# Patient Record
Sex: MALE | Race: WHITE | Employment: UNEMPLOYED | URBAN - METROPOLITAN AREA
[De-identification: names, ages, dates, MRNs, and addresses within clinical notes are randomized per-mention and may not be internally consistent; named-entity substitution may affect disease eponyms.]

---

## 2019-12-25 ENCOUNTER — APPOINTMENT (OUTPATIENT)
Dept: GENERAL RADIOLOGY | Age: 59
DRG: 030 | End: 2019-12-25
Payer: MEDICAID

## 2019-12-25 ENCOUNTER — APPOINTMENT (OUTPATIENT)
Dept: CT IMAGING | Age: 59
DRG: 030 | End: 2019-12-25
Payer: MEDICAID

## 2019-12-25 ENCOUNTER — HOSPITAL ENCOUNTER (INPATIENT)
Age: 59
LOS: 33 days | Discharge: NURSING, REHAB OR LONG-TERM CARE | DRG: 030 | End: 2020-01-27
Attending: EMERGENCY MEDICINE | Admitting: PSYCHIATRY & NEUROLOGY
Payer: MEDICAID

## 2019-12-25 PROBLEM — I60.9 SAH (SUBARACHNOID HEMORRHAGE) (HCC): Status: ACTIVE | Noted: 2019-12-25

## 2019-12-25 LAB
ABO/RH: NORMAL
ACT TEG: 121 SEC (ref 86–118)
ALBUMIN SERPL-MCNC: 4.2 G/DL (ref 3.5–5.2)
ALBUMIN/GLOBULIN RATIO: 1.5 (ref 1–2.5)
ALLEN TEST: ABNORMAL
ALP BLD-CCNC: 95 U/L (ref 40–129)
ALT SERPL-CCNC: 23 U/L (ref 5–41)
AMYLASE: 27 U/L (ref 28–100)
ANGLE, RAPID TEG: 77.2 DEG (ref 64–80)
ANION GAP SERPL CALCULATED.3IONS-SCNC: 15 MMOL/L (ref 9–17)
ANTIBODY SCREEN: NEGATIVE
ARM BAND NUMBER: NORMAL
AST SERPL-CCNC: 25 U/L
BILIRUB SERPL-MCNC: 1.27 MG/DL (ref 0.3–1.2)
BILIRUBIN DIRECT: 0.45 MG/DL
BILIRUBIN, INDIRECT: 0.82 MG/DL (ref 0–1)
BLOOD BANK SPECIMEN: ABNORMAL
BUN BLDV-MCNC: 7 MG/DL (ref 6–20)
CARBOXYHEMOGLOBIN: 3.3 % (ref 0–5)
CHLORIDE BLD-SCNC: 90 MMOL/L (ref 98–107)
CO2: 30 MMOL/L (ref 20–31)
CREAT SERPL-MCNC: 0.4 MG/DL (ref 0.7–1.2)
EPL-TEG: 0.6 % (ref 0–15)
ETHANOL PERCENT: <0.01 %
ETHANOL: <10 MG/DL
EXPIRATION DATE: NORMAL
FIO2: ABNORMAL
GFR AFRICAN AMERICAN: >60 ML/MIN
GFR NON-AFRICAN AMERICAN: >60 ML/MIN
GFR SERPL CREATININE-BSD FRML MDRD: ABNORMAL ML/MIN/{1.73_M2}
GFR SERPL CREATININE-BSD FRML MDRD: ABNORMAL ML/MIN/{1.73_M2}
GLOBULIN: ABNORMAL G/DL (ref 1.5–3.8)
GLUCOSE BLD-MCNC: 181 MG/DL (ref 70–99)
HCG QUALITATIVE: ABNORMAL
HCO3 VENOUS: 34.6 MMOL/L (ref 24–30)
HCT VFR BLD CALC: 39.5 % (ref 40.7–50.3)
HEMOGLOBIN: 11.6 G/DL (ref 13–17)
HEPARIN THERAPY: ABNORMAL
INR BLD: 1
KINETICS RAPID TEG: 0.9 MIN (ref 1–2)
LACTIC ACID, WHOLE BLOOD: 1.7 MMOL/L (ref 0.7–2.1)
LIPASE: 11 U/L (ref 13–60)
LY30 (LYSIS) TEG: 0.6 % (ref 0–8)
MA(MAX CLOT) RAPID TEG: 70.9 MM (ref 52–71)
MCH RBC QN AUTO: 22.1 PG (ref 25.2–33.5)
MCHC RBC AUTO-ENTMCNC: 29.4 G/DL (ref 28.4–34.8)
MCV RBC AUTO: 75.1 FL (ref 82.6–102.9)
METHEMOGLOBIN: ABNORMAL % (ref 0–1.5)
MODE: ABNORMAL
MYOGLOBIN: <21 NG/ML (ref 28–72)
NEGATIVE BASE EXCESS, VEN: ABNORMAL MMOL/L (ref 0–2)
NOTIFICATION TIME: ABNORMAL
NOTIFICATION: ABNORMAL
NRBC AUTOMATED: 0 PER 100 WBC
O2 DEVICE/FLOW/%: ABNORMAL
O2 SAT, VEN: 57.2 % (ref 60–85)
OXYHEMOGLOBIN: ABNORMAL % (ref 95–98)
PARTIAL THROMBOPLASTIN TIME: 23.9 SEC (ref 20.5–30.5)
PATIENT TEMP: 37
PCO2, VEN, TEMP ADJ: ABNORMAL MMHG (ref 39–55)
PCO2, VEN: 46.1 (ref 39–55)
PDW BLD-RTO: 20.1 % (ref 11.8–14.4)
PEEP/CPAP: ABNORMAL
PH VENOUS: 7.49 (ref 7.32–7.42)
PH, VEN, TEMP ADJ: ABNORMAL (ref 7.32–7.42)
PLATELET # BLD: 259 K/UL (ref 138–453)
PMV BLD AUTO: 8.8 FL (ref 8.1–13.5)
PO2, VEN, TEMP ADJ: ABNORMAL MMHG (ref 30–50)
PO2, VEN: 31.5 (ref 30–50)
POSITIVE BASE EXCESS, VEN: 10.1 MMOL/L (ref 0–2)
POTASSIUM SERPL-SCNC: 3.2 MMOL/L (ref 3.7–5.3)
PROTHROMBIN TIME: 10.5 SEC (ref 9–12)
PSV: ABNORMAL
PT. POSITION: ABNORMAL
RBC # BLD: 5.26 M/UL (ref 4.21–5.77)
REACTION TIME RAPID TEG: 0.8 MIN (ref 0–1)
RESPIRATORY RATE: ABNORMAL
SAMPLE SITE: ABNORMAL
SET RATE: ABNORMAL
SODIUM BLD-SCNC: 135 MMOL/L (ref 135–144)
TEG COMMENT: ABNORMAL
TEXT FOR RESPIRATORY: ABNORMAL
TOTAL CK: 33 U/L (ref 39–308)
TOTAL HB: ABNORMAL G/DL (ref 12–16)
TOTAL PROTEIN: 7 G/DL (ref 6.4–8.3)
TOTAL RATE: ABNORMAL
TROPONIN INTERP: ABNORMAL
TROPONIN T: ABNORMAL NG/ML
TROPONIN, HIGH SENSITIVITY: 27 NG/L (ref 0–22)
VT: ABNORMAL
WBC # BLD: 6.4 K/UL (ref 3.5–11.3)

## 2019-12-25 PROCEDURE — 83690 ASSAY OF LIPASE: CPT

## 2019-12-25 PROCEDURE — 73090 X-RAY EXAM OF FOREARM: CPT

## 2019-12-25 PROCEDURE — 85210 CLOT FACTOR II PROTHROM SPEC: CPT

## 2019-12-25 PROCEDURE — 73130 X-RAY EXAM OF HAND: CPT

## 2019-12-25 PROCEDURE — 82805 BLOOD GASES W/O2 SATURATION: CPT

## 2019-12-25 PROCEDURE — 73110 X-RAY EXAM OF WRIST: CPT

## 2019-12-25 PROCEDURE — G0480 DRUG TEST DEF 1-7 CLASSES: HCPCS

## 2019-12-25 PROCEDURE — 83605 ASSAY OF LACTIC ACID: CPT

## 2019-12-25 PROCEDURE — 82150 ASSAY OF AMYLASE: CPT

## 2019-12-25 PROCEDURE — 85730 THROMBOPLASTIN TIME PARTIAL: CPT

## 2019-12-25 PROCEDURE — 72128 CT CHEST SPINE W/O DYE: CPT

## 2019-12-25 PROCEDURE — 82947 ASSAY GLUCOSE BLOOD QUANT: CPT

## 2019-12-25 PROCEDURE — 2500000003 HC RX 250 WO HCPCS

## 2019-12-25 PROCEDURE — 70450 CT HEAD/BRAIN W/O DYE: CPT

## 2019-12-25 PROCEDURE — 73610 X-RAY EXAM OF ANKLE: CPT

## 2019-12-25 PROCEDURE — 85610 PROTHROMBIN TIME: CPT

## 2019-12-25 PROCEDURE — 82565 ASSAY OF CREATININE: CPT

## 2019-12-25 PROCEDURE — 86900 BLOOD TYPING SEROLOGIC ABO: CPT

## 2019-12-25 PROCEDURE — 6360000004 HC RX CONTRAST MEDICATION: Performed by: SURGERY

## 2019-12-25 PROCEDURE — 6360000002 HC RX W HCPCS

## 2019-12-25 PROCEDURE — 84484 ASSAY OF TROPONIN QUANT: CPT

## 2019-12-25 PROCEDURE — 6360000002 HC RX W HCPCS: Performed by: STUDENT IN AN ORGANIZED HEALTH CARE EDUCATION/TRAINING PROGRAM

## 2019-12-25 PROCEDURE — 99222 1ST HOSP IP/OBS MODERATE 55: CPT | Performed by: NEUROLOGICAL SURGERY

## 2019-12-25 PROCEDURE — 74177 CT ABD & PELVIS W/CONTRAST: CPT

## 2019-12-25 PROCEDURE — 73562 X-RAY EXAM OF KNEE 3: CPT

## 2019-12-25 PROCEDURE — 99223 1ST HOSP IP/OBS HIGH 75: CPT | Performed by: PSYCHIATRY & NEUROLOGY

## 2019-12-25 PROCEDURE — 85390 FIBRINOLYSINS SCREEN I&R: CPT

## 2019-12-25 PROCEDURE — 72125 CT NECK SPINE W/O DYE: CPT

## 2019-12-25 PROCEDURE — 84703 CHORIONIC GONADOTROPIN ASSAY: CPT

## 2019-12-25 PROCEDURE — 72131 CT LUMBAR SPINE W/O DYE: CPT

## 2019-12-25 PROCEDURE — 84520 ASSAY OF UREA NITROGEN: CPT

## 2019-12-25 PROCEDURE — 82550 ASSAY OF CK (CPK): CPT

## 2019-12-25 PROCEDURE — 73502 X-RAY EXAM HIP UNI 2-3 VIEWS: CPT

## 2019-12-25 PROCEDURE — 93005 ELECTROCARDIOGRAM TRACING: CPT | Performed by: STUDENT IN AN ORGANIZED HEALTH CARE EDUCATION/TRAINING PROGRAM

## 2019-12-25 PROCEDURE — 70498 CT ANGIOGRAPHY NECK: CPT

## 2019-12-25 PROCEDURE — 80076 HEPATIC FUNCTION PANEL: CPT

## 2019-12-25 PROCEDURE — 2000000003 HC NEURO ICU R&B

## 2019-12-25 PROCEDURE — 99291 CRITICAL CARE FIRST HOUR: CPT

## 2019-12-25 PROCEDURE — 85027 COMPLETE CBC AUTOMATED: CPT

## 2019-12-25 PROCEDURE — 86901 BLOOD TYPING SEROLOGIC RH(D): CPT

## 2019-12-25 PROCEDURE — 80307 DRUG TEST PRSMV CHEM ANLYZR: CPT

## 2019-12-25 PROCEDURE — 82306 VITAMIN D 25 HYDROXY: CPT

## 2019-12-25 PROCEDURE — 83874 ASSAY OF MYOGLOBIN: CPT

## 2019-12-25 PROCEDURE — 86850 RBC ANTIBODY SCREEN: CPT

## 2019-12-25 PROCEDURE — 80051 ELECTROLYTE PANEL: CPT

## 2019-12-25 RX ORDER — ACETAMINOPHEN 325 MG/1
650 TABLET ORAL EVERY 4 HOURS PRN
Status: DISCONTINUED | OUTPATIENT
Start: 2019-12-25 | End: 2019-12-26 | Stop reason: SDUPTHER

## 2019-12-25 RX ORDER — PROCHLORPERAZINE EDISYLATE 5 MG/ML
10 INJECTION INTRAMUSCULAR; INTRAVENOUS ONCE
Status: DISCONTINUED | OUTPATIENT
Start: 2019-12-25 | End: 2019-12-30

## 2019-12-25 RX ORDER — LEVETIRACETAM 5 MG/ML
500 INJECTION INTRAVASCULAR EVERY 12 HOURS
Status: DISCONTINUED | OUTPATIENT
Start: 2019-12-26 | End: 2019-12-25 | Stop reason: DRUGHIGH

## 2019-12-25 RX ORDER — LIDOCAINE HYDROCHLORIDE 10 MG/ML
20 INJECTION, SOLUTION INFILTRATION; PERINEURAL ONCE
Status: DISCONTINUED | OUTPATIENT
Start: 2019-12-25 | End: 2019-12-30

## 2019-12-25 RX ORDER — ONDANSETRON 2 MG/ML
4 INJECTION INTRAMUSCULAR; INTRAVENOUS ONCE
Status: DISCONTINUED | OUTPATIENT
Start: 2019-12-25 | End: 2019-12-30

## 2019-12-25 RX ORDER — ONDANSETRON 2 MG/ML
INJECTION INTRAMUSCULAR; INTRAVENOUS
Status: DISCONTINUED
Start: 2019-12-25 | End: 2019-12-26

## 2019-12-25 RX ORDER — LEVETIRACETAM 10 MG/ML
1000 INJECTION INTRAVASCULAR EVERY 12 HOURS
Status: DISCONTINUED | OUTPATIENT
Start: 2019-12-26 | End: 2019-12-26 | Stop reason: SDUPTHER

## 2019-12-25 RX ORDER — LIDOCAINE HYDROCHLORIDE 10 MG/ML
INJECTION, SOLUTION INFILTRATION; PERINEURAL
Status: DISPENSED
Start: 2019-12-25 | End: 2019-12-26

## 2019-12-25 RX ORDER — SODIUM CHLORIDE 0.9 % (FLUSH) 0.9 %
10 SYRINGE (ML) INJECTION EVERY 12 HOURS SCHEDULED
Status: DISCONTINUED | OUTPATIENT
Start: 2019-12-25 | End: 2020-01-27 | Stop reason: HOSPADM

## 2019-12-25 RX ORDER — ONDANSETRON 2 MG/ML
4 INJECTION INTRAMUSCULAR; INTRAVENOUS EVERY 6 HOURS PRN
Status: DISCONTINUED | OUTPATIENT
Start: 2019-12-25 | End: 2020-01-01

## 2019-12-25 RX ORDER — LORAZEPAM 2 MG/ML
1 INJECTION INTRAMUSCULAR ONCE
Status: COMPLETED | OUTPATIENT
Start: 2019-12-25 | End: 2019-12-25

## 2019-12-25 RX ORDER — SODIUM CHLORIDE 0.9 % (FLUSH) 0.9 %
10 SYRINGE (ML) INJECTION PRN
Status: DISCONTINUED | OUTPATIENT
Start: 2019-12-25 | End: 2020-01-27 | Stop reason: HOSPADM

## 2019-12-25 RX ORDER — NIMODIPINE 30 MG/1
60 CAPSULE, LIQUID FILLED ORAL
Status: DISCONTINUED | OUTPATIENT
Start: 2019-12-26 | End: 2019-12-26

## 2019-12-25 RX ORDER — MAGNESIUM SULFATE 1 G/100ML
1 INJECTION INTRAVENOUS PRN
Status: DISCONTINUED | OUTPATIENT
Start: 2019-12-25 | End: 2019-12-26

## 2019-12-25 RX ORDER — PROMETHAZINE HYDROCHLORIDE 25 MG/ML
12.5 INJECTION, SOLUTION INTRAMUSCULAR; INTRAVENOUS ONCE
Status: DISCONTINUED | OUTPATIENT
Start: 2019-12-25 | End: 2019-12-30

## 2019-12-25 RX ORDER — LEVETIRACETAM 10 MG/ML
1000 INJECTION INTRAVASCULAR ONCE
Status: DISCONTINUED | OUTPATIENT
Start: 2019-12-25 | End: 2019-12-25 | Stop reason: DRUGHIGH

## 2019-12-25 RX ORDER — ROSUVASTATIN CALCIUM 20 MG/1
40 TABLET, COATED ORAL NIGHTLY
Status: DISCONTINUED | OUTPATIENT
Start: 2019-12-25 | End: 2019-12-28

## 2019-12-25 RX ORDER — FENTANYL CITRATE 50 UG/ML
INJECTION, SOLUTION INTRAMUSCULAR; INTRAVENOUS
Status: DISCONTINUED
Start: 2019-12-25 | End: 2019-12-26

## 2019-12-25 RX ADMIN — IOHEXOL 150 ML: 350 INJECTION, SOLUTION INTRAVENOUS at 21:15

## 2019-12-25 RX ADMIN — LORAZEPAM 1 MG: 2 INJECTION INTRAMUSCULAR at 23:50

## 2019-12-25 SDOH — HEALTH STABILITY: MENTAL HEALTH: HOW OFTEN DO YOU HAVE A DRINK CONTAINING ALCOHOL?: 4 OR MORE TIMES A WEEK

## 2019-12-25 ASSESSMENT — ENCOUNTER SYMPTOMS
NAUSEA: 0
CHEST TIGHTNESS: 0
VOMITING: 0
BACK PAIN: 0
COUGH: 0
SHORTNESS OF BREATH: 0
ABDOMINAL PAIN: 0

## 2019-12-26 ENCOUNTER — APPOINTMENT (OUTPATIENT)
Dept: CT IMAGING | Age: 59
DRG: 030 | End: 2019-12-26
Payer: MEDICAID

## 2019-12-26 ENCOUNTER — ANESTHESIA (OUTPATIENT)
Dept: INTERVENTIONAL RADIOLOGY/VASCULAR | Age: 59
DRG: 030 | End: 2019-12-26
Payer: MEDICAID

## 2019-12-26 ENCOUNTER — APPOINTMENT (OUTPATIENT)
Dept: INTERVENTIONAL RADIOLOGY/VASCULAR | Age: 59
DRG: 030 | End: 2019-12-26
Payer: MEDICAID

## 2019-12-26 ENCOUNTER — ANESTHESIA EVENT (OUTPATIENT)
Dept: INTERVENTIONAL RADIOLOGY/VASCULAR | Age: 59
DRG: 030 | End: 2019-12-26
Payer: MEDICAID

## 2019-12-26 VITALS — TEMPERATURE: 92.8 F | DIASTOLIC BLOOD PRESSURE: 97 MMHG | OXYGEN SATURATION: 97 % | SYSTOLIC BLOOD PRESSURE: 145 MMHG

## 2019-12-26 LAB
-: NORMAL
ABSOLUTE EOS #: 0.13 K/UL (ref 0–0.44)
ABSOLUTE IMMATURE GRANULOCYTE: 0 K/UL (ref 0–0.3)
ABSOLUTE LYMPH #: 0.92 K/UL (ref 1.1–3.7)
ABSOLUTE MONO #: 0.53 K/UL (ref 0.1–1.2)
AMORPHOUS: NORMAL
AMPHETAMINE SCREEN URINE: NEGATIVE
ANION GAP SERPL CALCULATED.3IONS-SCNC: 11 MMOL/L (ref 9–17)
BACTERIA: NORMAL
BARBITURATE SCREEN URINE: NEGATIVE
BASOPHILS # BLD: 0 % (ref 0–2)
BASOPHILS ABSOLUTE: 0 K/UL (ref 0–0.2)
BENZODIAZEPINE SCREEN, URINE: POSITIVE
BILIRUBIN URINE: NEGATIVE
BUN BLDV-MCNC: 7 MG/DL (ref 6–20)
BUN/CREAT BLD: ABNORMAL (ref 9–20)
BUPRENORPHINE URINE: ABNORMAL
CALCIUM SERPL-MCNC: 8.3 MG/DL (ref 8.6–10.4)
CANNABINOID SCREEN URINE: NEGATIVE
CASTS UA: NORMAL /LPF (ref 0–2)
CHLORIDE BLD-SCNC: 96 MMOL/L (ref 98–107)
CO2: 29 MMOL/L (ref 20–31)
COCAINE METABOLITE, URINE: NEGATIVE
COLOR: YELLOW
COMMENT UA: ABNORMAL
CREAT SERPL-MCNC: 0.3 MG/DL (ref 0.7–1.2)
CRYSTALS, UA: NORMAL /HPF
DIFFERENTIAL TYPE: ABNORMAL
EKG ATRIAL RATE: 110 BPM
EKG Q-T INTERVAL: 434 MS
EKG QRS DURATION: 174 MS
EKG QTC CALCULATION (BAZETT): 528 MS
EKG R AXIS: -88 DEGREES
EKG T AXIS: 43 DEGREES
EKG VENTRICULAR RATE: 89 BPM
EOSINOPHILS RELATIVE PERCENT: 2 % (ref 1–4)
EPITHELIAL CELLS UA: NORMAL /HPF (ref 0–5)
FOLATE: >20 NG/ML
GFR AFRICAN AMERICAN: >60 ML/MIN
GFR NON-AFRICAN AMERICAN: >60 ML/MIN
GFR SERPL CREATININE-BSD FRML MDRD: ABNORMAL ML/MIN/{1.73_M2}
GFR SERPL CREATININE-BSD FRML MDRD: ABNORMAL ML/MIN/{1.73_M2}
GLUCOSE BLD-MCNC: 108 MG/DL (ref 75–110)
GLUCOSE BLD-MCNC: 122 MG/DL (ref 75–110)
GLUCOSE BLD-MCNC: 133 MG/DL (ref 70–99)
GLUCOSE BLD-MCNC: 152 MG/DL (ref 75–110)
GLUCOSE URINE: NEGATIVE
HCT VFR BLD CALC: 36.7 % (ref 40.7–50.3)
HEMOGLOBIN: 10.5 G/DL (ref 13–17)
IMMATURE GRANULOCYTES: 0 %
KETONES, URINE: ABNORMAL
LEUKOCYTE ESTERASE, URINE: NEGATIVE
LYMPHOCYTES # BLD: 14 % (ref 24–43)
MCH RBC QN AUTO: 22.5 PG (ref 25.2–33.5)
MCHC RBC AUTO-ENTMCNC: 28.6 G/DL (ref 28.4–34.8)
MCV RBC AUTO: 78.8 FL (ref 82.6–102.9)
MDMA URINE: ABNORMAL
METHADONE SCREEN, URINE: NEGATIVE
METHAMPHETAMINE, URINE: ABNORMAL
MONOCYTES # BLD: 8 % (ref 3–12)
MORPHOLOGY: ABNORMAL
MRSA, DNA, NASAL: NORMAL
MUCUS: NORMAL
NITRITE, URINE: NEGATIVE
NRBC AUTOMATED: 0 PER 100 WBC
OPIATES, URINE: POSITIVE
OTHER OBSERVATIONS UA: NORMAL
OXYCODONE SCREEN URINE: NEGATIVE
PDW BLD-RTO: 20.1 % (ref 11.8–14.4)
PH UA: 7 (ref 5–8)
PHENCYCLIDINE, URINE: NEGATIVE
PLATELET # BLD: 226 K/UL (ref 138–453)
PLATELET ESTIMATE: ABNORMAL
PMV BLD AUTO: 9.7 FL (ref 8.1–13.5)
POTASSIUM SERPL-SCNC: 3.1 MMOL/L (ref 3.7–5.3)
PROPOXYPHENE, URINE: ABNORMAL
PROTEIN UA: ABNORMAL
RBC # BLD: 4.66 M/UL (ref 4.21–5.77)
RBC # BLD: ABNORMAL 10*6/UL
RBC UA: NORMAL /HPF (ref 0–2)
RENAL EPITHELIAL, UA: NORMAL /HPF
SEG NEUTROPHILS: 76 % (ref 36–65)
SEGMENTED NEUTROPHILS ABSOLUTE COUNT: 5.02 K/UL (ref 1.5–8.1)
SODIUM BLD-SCNC: 136 MMOL/L (ref 135–144)
SPECIFIC GRAVITY UA: 1.02 (ref 1–1.03)
SPECIMEN DESCRIPTION: NORMAL
TEST INFORMATION: ABNORMAL
TRICHOMONAS: NORMAL
TRICYCLIC ANTIDEPRESSANTS, UR: ABNORMAL
TROPONIN INTERP: ABNORMAL
TROPONIN T: ABNORMAL NG/ML
TROPONIN, HIGH SENSITIVITY: 26 NG/L (ref 0–22)
TURBIDITY: CLEAR
URINE HGB: NEGATIVE
UROBILINOGEN, URINE: NORMAL
VITAMIN B-12: 676 PG/ML (ref 232–1245)
VITAMIN D 25-HYDROXY: 13.4 NG/ML (ref 30–100)
WBC # BLD: 6.6 K/UL (ref 3.5–11.3)
WBC # BLD: ABNORMAL 10*3/UL
WBC UA: NORMAL /HPF (ref 0–5)
YEAST: NORMAL

## 2019-12-26 PROCEDURE — 2580000003 HC RX 258: Performed by: STUDENT IN AN ORGANIZED HEALTH CARE EDUCATION/TRAINING PROGRAM

## 2019-12-26 PROCEDURE — 84484 ASSAY OF TROPONIN QUANT: CPT

## 2019-12-26 PROCEDURE — 70450 CT HEAD/BRAIN W/O DYE: CPT

## 2019-12-26 PROCEDURE — 0PSQXZZ REPOSITION LEFT METACARPAL, EXTERNAL APPROACH: ICD-10-PCS | Performed by: ORTHOPAEDIC SURGERY

## 2019-12-26 PROCEDURE — 2580000003 HC RX 258: Performed by: EMERGENCY MEDICINE

## 2019-12-26 PROCEDURE — B3151ZZ FLUOROSCOPY OF BILATERAL COMMON CAROTID ARTERIES USING LOW OSMOLAR CONTRAST: ICD-10-PCS | Performed by: PSYCHIATRY & NEUROLOGY

## 2019-12-26 PROCEDURE — 99152 MOD SED SAME PHYS/QHP 5/>YRS: CPT | Performed by: PSYCHIATRY & NEUROLOGY

## 2019-12-26 PROCEDURE — 80307 DRUG TEST PRSMV CHEM ANLYZR: CPT

## 2019-12-26 PROCEDURE — 36226 PLACE CATH VERTEBRAL ART: CPT | Performed by: PSYCHIATRY & NEUROLOGY

## 2019-12-26 PROCEDURE — 6360000002 HC RX W HCPCS: Performed by: NURSE ANESTHETIST, CERTIFIED REGISTERED

## 2019-12-26 PROCEDURE — 99291 CRITICAL CARE FIRST HOUR: CPT | Performed by: PSYCHIATRY & NEUROLOGY

## 2019-12-26 PROCEDURE — 2580000003 HC RX 258: Performed by: NURSE ANESTHETIST, CERTIFIED REGISTERED

## 2019-12-26 PROCEDURE — 6360000002 HC RX W HCPCS: Performed by: EMERGENCY MEDICINE

## 2019-12-26 PROCEDURE — 3700000001 HC ADD 15 MINUTES (ANESTHESIA)

## 2019-12-26 PROCEDURE — 87641 MR-STAPH DNA AMP PROBE: CPT

## 2019-12-26 PROCEDURE — 82746 ASSAY OF FOLIC ACID SERUM: CPT

## 2019-12-26 PROCEDURE — C1769 GUIDE WIRE: HCPCS

## 2019-12-26 PROCEDURE — 36224 PLACE CATH CAROTD ART: CPT | Performed by: PSYCHIATRY & NEUROLOGY

## 2019-12-26 PROCEDURE — 81001 URINALYSIS AUTO W/SCOPE: CPT

## 2019-12-26 PROCEDURE — 6360000002 HC RX W HCPCS: Performed by: PSYCHIATRY & NEUROLOGY

## 2019-12-26 PROCEDURE — 82607 VITAMIN B-12: CPT

## 2019-12-26 PROCEDURE — 2500000003 HC RX 250 WO HCPCS: Performed by: NURSE ANESTHETIST, CERTIFIED REGISTERED

## 2019-12-26 PROCEDURE — 2709999900 HC NON-CHARGEABLE SUPPLY

## 2019-12-26 PROCEDURE — 36415 COLL VENOUS BLD VENIPUNCTURE: CPT

## 2019-12-26 PROCEDURE — 3700000000 HC ANESTHESIA ATTENDED CARE

## 2019-12-26 PROCEDURE — C1760 CLOSURE DEV, VASC: HCPCS

## 2019-12-26 PROCEDURE — 83735 ASSAY OF MAGNESIUM: CPT

## 2019-12-26 PROCEDURE — 2500000003 HC RX 250 WO HCPCS: Performed by: PSYCHIATRY & NEUROLOGY

## 2019-12-26 PROCEDURE — B3181ZZ FLUOROSCOPY OF BILATERAL INTERNAL CAROTID ARTERIES USING LOW OSMOLAR CONTRAST: ICD-10-PCS | Performed by: PSYCHIATRY & NEUROLOGY

## 2019-12-26 PROCEDURE — 6370000000 HC RX 637 (ALT 250 FOR IP): Performed by: STUDENT IN AN ORGANIZED HEALTH CARE EDUCATION/TRAINING PROGRAM

## 2019-12-26 PROCEDURE — 2580000003 HC RX 258: Performed by: PSYCHIATRY & NEUROLOGY

## 2019-12-26 PROCEDURE — B31G1ZZ FLUOROSCOPY OF BILATERAL VERTEBRAL ARTERIES USING LOW OSMOLAR CONTRAST: ICD-10-PCS | Performed by: PSYCHIATRY & NEUROLOGY

## 2019-12-26 PROCEDURE — 36223 PLACE CATH CAROTID/INOM ART: CPT | Performed by: PSYCHIATRY & NEUROLOGY

## 2019-12-26 PROCEDURE — 6370000000 HC RX 637 (ALT 250 FOR IP): Performed by: PSYCHIATRY & NEUROLOGY

## 2019-12-26 PROCEDURE — 6360000002 HC RX W HCPCS: Performed by: STUDENT IN AN ORGANIZED HEALTH CARE EDUCATION/TRAINING PROGRAM

## 2019-12-26 PROCEDURE — 2000000003 HC NEURO ICU R&B

## 2019-12-26 PROCEDURE — 82947 ASSAY GLUCOSE BLOOD QUANT: CPT

## 2019-12-26 PROCEDURE — 85025 COMPLETE CBC W/AUTO DIFF WBC: CPT

## 2019-12-26 PROCEDURE — 6360000004 HC RX CONTRAST MEDICATION: Performed by: PSYCHIATRY & NEUROLOGY

## 2019-12-26 PROCEDURE — C1894 INTRO/SHEATH, NON-LASER: HCPCS

## 2019-12-26 PROCEDURE — B41F1ZZ FLUOROSCOPY OF RIGHT LOWER EXTREMITY ARTERIES USING LOW OSMOLAR CONTRAST: ICD-10-PCS | Performed by: PSYCHIATRY & NEUROLOGY

## 2019-12-26 PROCEDURE — 80048 BASIC METABOLIC PNL TOTAL CA: CPT

## 2019-12-26 PROCEDURE — 93010 ELECTROCARDIOGRAM REPORT: CPT | Performed by: INTERNAL MEDICINE

## 2019-12-26 PROCEDURE — C1887 CATHETER, GUIDING: HCPCS

## 2019-12-26 RX ORDER — DEXTROSE MONOHYDRATE 50 MG/ML
100 INJECTION, SOLUTION INTRAVENOUS PRN
Status: DISCONTINUED | OUTPATIENT
Start: 2019-12-26 | End: 2020-01-27 | Stop reason: HOSPADM

## 2019-12-26 RX ORDER — ACETAMINOPHEN 325 MG/1
650 TABLET ORAL EVERY 4 HOURS PRN
Status: DISCONTINUED | OUTPATIENT
Start: 2019-12-26 | End: 2019-12-30 | Stop reason: SDUPTHER

## 2019-12-26 RX ORDER — SODIUM CHLORIDE, SODIUM LACTATE, POTASSIUM CHLORIDE, CALCIUM CHLORIDE 600; 310; 30; 20 MG/100ML; MG/100ML; MG/100ML; MG/100ML
INJECTION, SOLUTION INTRAVENOUS CONTINUOUS
Status: DISCONTINUED | OUTPATIENT
Start: 2019-12-26 | End: 2019-12-26

## 2019-12-26 RX ORDER — LORAZEPAM 1 MG/1
4 TABLET ORAL
Status: DISCONTINUED | OUTPATIENT
Start: 2019-12-26 | End: 2019-12-29

## 2019-12-26 RX ORDER — LORAZEPAM 2 MG/ML
3 INJECTION INTRAMUSCULAR
Status: DISCONTINUED | OUTPATIENT
Start: 2019-12-26 | End: 2019-12-29

## 2019-12-26 RX ORDER — LIDOCAINE HYDROCHLORIDE 10 MG/ML
INJECTION, SOLUTION EPIDURAL; INFILTRATION; INTRACAUDAL; PERINEURAL PRN
Status: DISCONTINUED | OUTPATIENT
Start: 2019-12-26 | End: 2019-12-26 | Stop reason: SDUPTHER

## 2019-12-26 RX ORDER — NICOTINE POLACRILEX 4 MG
15 LOZENGE BUCCAL PRN
Status: DISCONTINUED | OUTPATIENT
Start: 2019-12-26 | End: 2020-01-27 | Stop reason: HOSPADM

## 2019-12-26 RX ORDER — LEVETIRACETAM 5 MG/ML
500 INJECTION INTRAVASCULAR EVERY 12 HOURS
Status: DISCONTINUED | OUTPATIENT
Start: 2019-12-26 | End: 2019-12-31

## 2019-12-26 RX ORDER — IODIXANOL 270 MG/ML
100 INJECTION, SOLUTION INTRAVASCULAR
Status: COMPLETED | OUTPATIENT
Start: 2019-12-26 | End: 2019-12-26

## 2019-12-26 RX ORDER — NIMODIPINE 30 MG/1
30 CAPSULE, LIQUID FILLED ORAL
Status: DISCONTINUED | OUTPATIENT
Start: 2019-12-26 | End: 2019-12-27

## 2019-12-26 RX ORDER — THIAMINE MONONITRATE (VIT B1) 100 MG
100 TABLET ORAL DAILY
Status: DISCONTINUED | OUTPATIENT
Start: 2019-12-29 | End: 2019-12-30

## 2019-12-26 RX ORDER — LORAZEPAM 1 MG/1
1 TABLET ORAL
Status: DISCONTINUED | OUTPATIENT
Start: 2019-12-26 | End: 2019-12-29

## 2019-12-26 RX ORDER — FENTANYL CITRATE 50 UG/ML
INJECTION, SOLUTION INTRAMUSCULAR; INTRAVENOUS PRN
Status: DISCONTINUED | OUTPATIENT
Start: 2019-12-26 | End: 2019-12-26 | Stop reason: SDUPTHER

## 2019-12-26 RX ORDER — LORAZEPAM 1 MG/1
2 TABLET ORAL
Status: DISCONTINUED | OUTPATIENT
Start: 2019-12-26 | End: 2019-12-29

## 2019-12-26 RX ORDER — SODIUM CHLORIDE 9 MG/ML
INJECTION, SOLUTION INTRAVENOUS CONTINUOUS PRN
Status: DISCONTINUED | OUTPATIENT
Start: 2019-12-26 | End: 2019-12-26 | Stop reason: SDUPTHER

## 2019-12-26 RX ORDER — PROPOFOL 10 MG/ML
INJECTION, EMULSION INTRAVENOUS PRN
Status: DISCONTINUED | OUTPATIENT
Start: 2019-12-26 | End: 2019-12-26 | Stop reason: SDUPTHER

## 2019-12-26 RX ORDER — DEXTROSE MONOHYDRATE 25 G/50ML
12.5 INJECTION, SOLUTION INTRAVENOUS PRN
Status: DISCONTINUED | OUTPATIENT
Start: 2019-12-26 | End: 2020-01-27 | Stop reason: HOSPADM

## 2019-12-26 RX ORDER — MIDAZOLAM HYDROCHLORIDE 1 MG/ML
INJECTION INTRAMUSCULAR; INTRAVENOUS PRN
Status: DISCONTINUED | OUTPATIENT
Start: 2019-12-26 | End: 2019-12-26 | Stop reason: SDUPTHER

## 2019-12-26 RX ORDER — SODIUM CHLORIDE 0.9 % (FLUSH) 0.9 %
10 SYRINGE (ML) INJECTION PRN
Status: DISCONTINUED | OUTPATIENT
Start: 2019-12-26 | End: 2020-01-27 | Stop reason: HOSPADM

## 2019-12-26 RX ORDER — SODIUM CHLORIDE 9 MG/ML
INJECTION, SOLUTION INTRAVENOUS CONTINUOUS
Status: DISCONTINUED | OUTPATIENT
Start: 2019-12-26 | End: 2020-01-04

## 2019-12-26 RX ORDER — LORAZEPAM 2 MG/ML
4 INJECTION INTRAMUSCULAR
Status: DISCONTINUED | OUTPATIENT
Start: 2019-12-26 | End: 2019-12-29

## 2019-12-26 RX ORDER — LORAZEPAM 1 MG/1
3 TABLET ORAL
Status: DISCONTINUED | OUTPATIENT
Start: 2019-12-26 | End: 2019-12-29

## 2019-12-26 RX ORDER — LORAZEPAM 2 MG/ML
1 INJECTION INTRAMUSCULAR
Status: DISCONTINUED | OUTPATIENT
Start: 2019-12-26 | End: 2019-12-29

## 2019-12-26 RX ORDER — LORAZEPAM 2 MG/ML
2 INJECTION INTRAMUSCULAR
Status: DISCONTINUED | OUTPATIENT
Start: 2019-12-26 | End: 2019-12-29

## 2019-12-26 RX ORDER — LEVETIRACETAM 500 MG/1
500 TABLET ORAL 2 TIMES DAILY
Status: DISCONTINUED | OUTPATIENT
Start: 2019-12-26 | End: 2019-12-26

## 2019-12-26 RX ORDER — SODIUM CHLORIDE 0.9 % (FLUSH) 0.9 %
10 SYRINGE (ML) INJECTION EVERY 12 HOURS SCHEDULED
Status: DISCONTINUED | OUTPATIENT
Start: 2019-12-26 | End: 2020-01-27 | Stop reason: HOSPADM

## 2019-12-26 RX ADMIN — ROSUVASTATIN CALCIUM 40 MG: 20 TABLET, FILM COATED ORAL at 20:45

## 2019-12-26 RX ADMIN — NIMODIPINE 30 MG: 30 CAPSULE ORAL at 22:00

## 2019-12-26 RX ADMIN — IODIXANOL 100 ML: 270 INJECTION, SOLUTION INTRAVASCULAR at 16:26

## 2019-12-26 RX ADMIN — FOLIC ACID: 5 INJECTION, SOLUTION INTRAMUSCULAR; INTRAVENOUS; SUBCUTANEOUS at 12:17

## 2019-12-26 RX ADMIN — THIAMINE HYDROCHLORIDE 500 MG: 100 INJECTION, SOLUTION INTRAMUSCULAR; INTRAVENOUS at 11:13

## 2019-12-26 RX ADMIN — PROPOFOL 20 MG: 10 INJECTION, EMULSION INTRAVENOUS at 14:10

## 2019-12-26 RX ADMIN — NIMODIPINE 30 MG: 30 CAPSULE ORAL at 19:32

## 2019-12-26 RX ADMIN — NIMODIPINE 60 MG: 30 CAPSULE ORAL at 02:25

## 2019-12-26 RX ADMIN — LEVETIRACETAM 2000 MG: 100 INJECTION, SOLUTION INTRAVENOUS at 02:07

## 2019-12-26 RX ADMIN — FENTANYL CITRATE 50 MCG: 50 INJECTION INTRAMUSCULAR; INTRAVENOUS at 14:07

## 2019-12-26 RX ADMIN — NIMODIPINE 30 MG: 30 CAPSULE ORAL at 23:37

## 2019-12-26 RX ADMIN — SODIUM CHLORIDE: 0.9 INJECTION, SOLUTION INTRAVENOUS at 14:05

## 2019-12-26 RX ADMIN — INSULIN LISPRO 1 UNITS: 100 INJECTION, SOLUTION INTRAVENOUS; SUBCUTANEOUS at 20:44

## 2019-12-26 RX ADMIN — PROPOFOL 20 MG: 10 INJECTION, EMULSION INTRAVENOUS at 14:44

## 2019-12-26 RX ADMIN — FENTANYL CITRATE 25 MCG: 50 INJECTION INTRAMUSCULAR; INTRAVENOUS at 15:05

## 2019-12-26 RX ADMIN — LORAZEPAM 1 MG: 2 INJECTION INTRAMUSCULAR; INTRAVENOUS at 20:48

## 2019-12-26 RX ADMIN — THIAMINE HYDROCHLORIDE 500 MG: 100 INJECTION, SOLUTION INTRAMUSCULAR; INTRAVENOUS at 20:46

## 2019-12-26 RX ADMIN — MIDAZOLAM HYDROCHLORIDE 1 MG: 1 INJECTION, SOLUTION INTRAMUSCULAR; INTRAVENOUS at 14:07

## 2019-12-26 RX ADMIN — SODIUM CHLORIDE, POTASSIUM CHLORIDE, SODIUM LACTATE AND CALCIUM CHLORIDE: 600; 310; 30; 20 INJECTION, SOLUTION INTRAVENOUS at 04:30

## 2019-12-26 RX ADMIN — SODIUM CHLORIDE: 9 INJECTION, SOLUTION INTRAVENOUS at 11:14

## 2019-12-26 RX ADMIN — MIDAZOLAM HYDROCHLORIDE 0.5 MG: 1 INJECTION, SOLUTION INTRAMUSCULAR; INTRAVENOUS at 15:33

## 2019-12-26 RX ADMIN — MIDAZOLAM HYDROCHLORIDE 0.5 MG: 1 INJECTION, SOLUTION INTRAMUSCULAR; INTRAVENOUS at 15:05

## 2019-12-26 RX ADMIN — LIDOCAINE HYDROCHLORIDE 30 MG: 10 INJECTION, SOLUTION EPIDURAL; INFILTRATION; INTRACAUDAL; PERINEURAL at 14:10

## 2019-12-26 RX ADMIN — SODIUM CHLORIDE, PRESERVATIVE FREE 10 ML: 5 INJECTION INTRAVENOUS at 20:45

## 2019-12-26 RX ADMIN — LEVETIRACETAM 500 MG: 5 INJECTION INTRAVENOUS at 23:40

## 2019-12-26 RX ADMIN — PROPOFOL 20 MG: 10 INJECTION, EMULSION INTRAVENOUS at 15:34

## 2019-12-26 RX ADMIN — PROPOFOL 20 MG: 10 INJECTION, EMULSION INTRAVENOUS at 14:15

## 2019-12-26 RX ADMIN — Medication 2 G: at 14:12

## 2019-12-26 RX ADMIN — FENTANYL CITRATE 25 MCG: 50 INJECTION INTRAMUSCULAR; INTRAVENOUS at 15:33

## 2019-12-26 ASSESSMENT — PULMONARY FUNCTION TESTS
PIF_VALUE: 1
PIF_VALUE: 0
PIF_VALUE: 1
PIF_VALUE: 0
PIF_VALUE: 1
PIF_VALUE: 0
PIF_VALUE: 1
PIF_VALUE: 0
PIF_VALUE: 1
PIF_VALUE: 0
PIF_VALUE: 1
PIF_VALUE: 0
PIF_VALUE: 1
PIF_VALUE: 0
PIF_VALUE: 1
PIF_VALUE: 0
PIF_VALUE: 1

## 2019-12-26 ASSESSMENT — PAIN DESCRIPTION - ORIENTATION: ORIENTATION: LEFT;RIGHT

## 2019-12-26 ASSESSMENT — PAIN SCALES - GENERAL
PAINLEVEL_OUTOF10: 6
PAINLEVEL_OUTOF10: 0

## 2019-12-26 ASSESSMENT — LIFESTYLE VARIABLES: SMOKING_STATUS: 1

## 2019-12-26 ASSESSMENT — PAIN DESCRIPTION - DESCRIPTORS: DESCRIPTORS: CONSTANT;ACHING

## 2019-12-26 ASSESSMENT — PAIN DESCRIPTION - PAIN TYPE: TYPE: ACUTE PAIN

## 2019-12-26 ASSESSMENT — PAIN DESCRIPTION - LOCATION: LOCATION: ARM

## 2019-12-26 ASSESSMENT — PAIN DESCRIPTION - ONSET: ONSET: ON-GOING

## 2019-12-26 ASSESSMENT — PAIN DESCRIPTION - FREQUENCY: FREQUENCY: CONTINUOUS

## 2019-12-26 NOTE — PROGRESS NOTES
(!) 163/74 -- -- 102 -- 98 %   12/26/19 1648 (!) 166/80 -- -- 102 -- 96 %   12/26/19 1626 (!) 162/87 98.4 °F (36.9 °C) Oral 104 13 95 %   12/26/19 1233 (!) 160/81 -- -- 100 -- 92 %   12/26/19 1203 114/62 -- -- 72 -- 98 %   12/26/19 1103 110/64 -- -- 81 -- 99 %       I/O last 3 completed shifts:  In: -   Out: 400 [Urine:400]  I/O this shift:  In: 150 [I.V.:150]  Out: 190 [Urine:160; Blood:30]    Radiology: No new imaging ordered at this time    PHYSICAL EXAM:   GCS:  4 - Opens eyes on own   6 - Follows simple motor commands  5 - Alert and oriented    Pupil size:  Left 4 mm Right 4 mm  Pupil reaction: Yes  Wiggles fingers: Left Yes Right Yes  Hand grasp:   Left normal   Right normal  Wiggles toes: Left Yes    Right Yes  Plantar flexion: Left normal  Right normal    BP (!) 161/87   Pulse 94   Temp 98.4 °F (36.9 °C) (Oral)   Resp 13   Wt 210 lb 8.6 oz (95.5 kg)   SpO2 97%   General appearance: alert, appears stated age and cooperative  Eyes: negative findings: pupils equal, round, reactive to light and accomodation, positive findings: conjunctiva: trace subconjunctival hemorrhage  Neck: no adenopathy, no JVD and supple, symmetrical, trachea midline  Back: symmetric, no curvature. ROM normal. No CVA tenderness.   Lungs: clear to auscultation bilaterally  Chest wall: no tenderness  Abdomen: soft, non-tender; bowel sounds normal; no masses,  no organomegaly  Extremities: extremities normal, atraumatic, no cyanosis or edema  Pulses: 2+ and symmetric  Skin: Skin color, texture, turgor normal. No rashes or lesions    Spine:     Spine Tenderness ROM   Cervical 0 /10 Normal   Thoracic 0 /10 Normal   Lumbar 0 /10 Normal     Musculoskeletal    Joint Tenderness Swelling ROM   Right shoulder absent absent normal   Left shoulder absent absent normal   Right elbow absent absent normal   Left elbow absent absent normal   Right wrist absent absent normal   Left wrist present Wrist splinted Cannot assess - splinted   Right hand grasp absent absent normal   Left hand grasp absent absent normal   Right hip absent absent normal   Left hip absent absent normal   Right knee absent absent normal   Left knee absent absent normal   Right ankle absent     absent normal   Left ankle absent absent normal   Right foot absent absent normal   Left foot absent absent normal           CONSULTS: No new consults needed at this time. Ortho and neuro critical care already consulted    PROCEDURES: See ortho and neuro critical care notes    INJURIES:      Patient Active Problem List   Diagnosis    [facility]ach[facility]oid [facility]) [facility]    Subarachnoid bleed ([facility]    Traumatic subarachnoid hemorrhage with loss of consciousness of 1 hour to 5 hours 59 minutes (HCC)         Assessment/Plan:   [de-identified]  1. SAH  1. Neuro crit care as primary.    2. Seizure prophylaxis and CIWA protocol  3. BP control    DISPOSITION:   Continue ICU

## 2019-12-26 NOTE — ED PROVIDER NOTES
[facility] ED  Emergency Department Encounter  EmergencyMedicine Resident     Pt Name:Laron Duque  MRN: [id number]  ARHJIQDMY 1960  Date of evaluation: 12/25/19  PCP:  Kathy Foreman MD    [address] [place] [address]       Chief Complaint   Patient presents with    Fall     multiple falls, on eliquis, scatered [address], kcentra en route     Trauma         HISTORY OF PRESENT ILLNESS  (Location/Symptom, Timing/Onset, Context/Setting, Quality, Duration,Modifying Factors, Severity.)      Surjit Watt is a 61 y.o. male who presents via air ambulance transport from [facility] for subarachnoid hemorrhage. Patient reportedly having multiple falls today. CT scan at [facility] showing bilateral scattered subarachnoid hemorrhage. Patient is on Eliquis for history of A. fib. He received Kcentra. Patient is also complaining of right-sided rib pain, bilateral hand pain. He presents with splints on bilateral arms is not sure why they are on his arms. They appear to be old. No obvious signs of trauma. Patient tells me he has a extensive cardiac history as well including multiple stents, he has had carotid endarterectomy as well as DVTs in the past.  Severity is moderate duration is constant context is falls, subarachnoid hemorrhage          PAST MEDICAL / SURGICAL / SOCIAL / FAMILY HISTORY      has a past medical history of Alcohol abuse, Atrial fibrillation ([address] Diabetes ([address] Gastritis, Hyperlipidemia, Hypertension, Left ventricular hypertrophy, Neuropathy, and Renal cyst.      has no past surgical history on file.     Social History     Socioeconomic History    Marital status: Single     Spouse name: Not on file    Number of children: Not on file    Years of education: Not on file    Highest education level: Not on file   Occupational History    Not on file   Social Needs    Financial resource strain: Not on file    Food insecurity:     Worry: Not on file     Inability: Not on file  Transportation needs:     Medical: Not on file     Non-medical: Not on file   Tobacco Use    Smoking status: Current Every Day Smoker     Packs/day: 0.50     Types: Cigarettes    Smokeless tobacco: Current User   Substance and Sexual Activity    Alcohol use: Yes     Frequency: 4 or more times a week    Drug use: Never    Sexual activity: Not on file   Lifestyle    Physical activity:     Days per week: Not on file     Minutes per session: Not on file    Stress: Not on file   Relationships    Social connections:     Talks on phone: Not on file     Gets together: Not on file     Attends [employer] service: Not on file     Active member of club or organization: Not on file     Attends meetings of clubs or organizations: Not on file     Relationship status: Not on file    Intimate partner violence:     Fear of current or ex partner: Not on file     Emotionally abused: Not on file     Physically abused: Not on file     Forced sexual activity: Not on file   Other Topics Concern    Not on file   Social History Narrative    Not on file       Patient advised to stop smoking or to avoid tobacco use. No family history on file. Allergies:  Patient has no allergy information on record. HomeMedications:  Prior to Admission medications    Not on File       REVIEW OF SYSTEMS    (2-9 systems for level 4, 10 or more for level 5)      Review of Systems   Constitutional: Negative for chills and fever. Respiratory: Negative for cough, chest tightness and shortness of breath. Cardiovascular: Positive for chest pain. Negative for leg swelling. Gastrointestinal: Negative for abdominal pain, nausea and vomiting. Musculoskeletal: Positive for neck pain. Negative for back pain. Skin: Negative for rash and wound. Neurological: Positive for headaches. Negative for dizziness, syncope, speech difficulty and weakness.        PHYSICAL EXAM   (up to 7 for level 4, 8or more for level 5)      INITIAL VITALS:   BP  Lactic Acid, Whole Blood    Lipase    Hepatic Function Panel    TEG, Rapid Citrated    Urine Drug Screen    Urinalysis    Basic metabolic panel    Magnesium    Hemoglobin A1c    Lipid panel - fasting    CBC    Diet NPO Effective Now    Vital signs    Telemetry monitoring    Notify Physician    Notify physician    Bedrest    Adv Diet as Kristine (nurse communication)    Neuro checks    Marcella coma scale    NIHSS    Tobacco cessation education    Swallow screen by nursing before diet and oral medications started.  Stroke education    Place intermittent pneumatic compression device    Full Code    Inpatient consult to Neurosurgery    Inpatient consult to Neurocritical care    Pharmacy to Dose: Other - See Comments: The pharmacist will review this patient's medication profile to evaluate IV medications and change all base solutions to 0.9% sodium chloride if possible based on compatibility and product availability. This. ..     Consult to Endovascular Neurosurgery    Inpatient consult to Stroke Team    Inpatient Consult to Endovascular Neurosurgery    OT eval and treat    PT evaluation and treat    Initiate Oxygen Therapy Protocol    Pulse oximetry, continuous    Speech Language Pathology (SLP) eval and treat    EKG 12 Lead    EKG 12 lead    Echocardiogram complete 2D with doppler with color    Type and Screen    PATIENT STATUS (DIRECT) Inpatient    Seizure precautions       MEDICATIONS ORDERED:  Orders Placed This Encounter   Medications    ondansetron (ZOFRAN) 4 MG/2ML injection     Mari Spencer: cabinet override    fentaNYL (SUBLIMAZE) 100 MCG/2ML injection     DONA LOPEZ: cabinet override    iohexol (OMNIPAQUE 350) solution 150 mL    ondansetron (ZOFRAN) injection 4 mg    ondansetron (ZOFRAN) 4 MG/2ML injection     DONA LOPEZ: cabinet override    sodium chloride flush 0.9 % injection 10 mL    sodium chloride flush 0.9 % injection 10 mL    magnesium sulfate 1 g in dextrose 5% 100 mL IVPB    acetaminophen (TYLENOL) tablet 650 mg    ondansetron (ZOFRAN) injection 4 mg    DISCONTD: levetiracetam (KEPPRA) 1000 mg/100 mL IVPB    DISCONTD: levetiracetam (KEPPRA) 500 mg/100 mL IVPB    niMODipine (NIMOTOP) capsule 60 mg    rosuvastatin (CRESTOR) tablet 40 mg    DISCONTD: clevidipine (CLEVIPREX) infusion     Order Specific Question:   Please select a reason the therapeutic interchange was not accepted:      Answer:   Baldo Wilder for Pharmacy to Substitute    prochlorperazine (COMPAZINE) injection 10 mg    promethazine (PHENERGAN) injection 12.5 mg    magnesium sulfate 2 g in dextrose 5 % 100 mL IVPB    FOLLOWED BY Linked Order Group     levETIRAcetam (KEPPRA) 2,000 mg in sodium chloride 0.9 % 100 mL IVPB     levetiracetam (KEPPRA) 1000 mg/100 mL IVPB    lidocaine 1 % injection 20 mL    lidocaine 1 % injection     Sandra Jasso: cabinet override    LORazepam (ATIVAN) injection 1 mg         SCORES  DIAGNOSTIC RESULTS / EMERGENCY DEPARTMENT COURSE / MDM     LABS:  Results for orders placed or performed during the hospital encounter of 12/25/19   Amylase   Result Value Ref Range    Amylase 27 (L) 28 - 100 U/L   CK   Result Value Ref Range    Total CK 33 (L) 39 - 308 U/L   TROP/MYOGLOBIN   Result Value Ref Range    Troponin, High Sensitivity 27 (H) 0 - 22 ng/L    Troponin T NOT REPORTED <0.03 ng/mL    Troponin Interp NOT REPORTED     Myoglobin <21 (L) 28 - 72 ng/mL   Trauma Panel   Result Value Ref Range    WBC 6.4 3.5 - 11.3 k/uL    RBC 5.26 4.21 - 5.77 m/uL    Hemoglobin 11.6 (L) 13.0 - 17.0 g/dL    Hematocrit 39.5 (L) 40.7 - 50.3 %    MCV 75.1 (L) 82.6 - 102.9 fL    MCH 22.1 (L) 25.2 - 33.5 pg    MCHC 29.4 28.4 - 34.8 g/dL    RDW 20.1 (H) 11.8 - 14.4 %    Platelets 148 316 - 948 k/uL    MPV 8.8 8.1 - 13.5 fL    NRBC Automated 0.0 0.0 per 100 WBC    Sodium 135 135 - 144 mmol/L    Potassium 3.2 (L) 3.7 - 5.3 mmol/L    Chloride 90 (L) 98 - 107 mmol/L    CO2 30 20 - 31 mmol/L Anion Gap 15 9 - 17 mmol/L    Glucose 181 (H) 70 - 99 mg/dL    pH, Markus 7.488 (H) 7.320 - 7.420    pCO2, Markus 46.1 39 - 55    pO2, Markus 31.5 30 - 50    HCO3, Venous 34.6 (H) 24 - 30 mmol/L    Positive Base Excess, Markus 10.1 (H) 0.0 - 2.0 mmol/L    Negative Base Excess, Markus NOT REPORTED 0.0 - 2.0 mmol/L    O2 Sat, Markus 57.2 (L) 60.0 - 85.0 %    Total Hb NOT REPORTED 12.0 - 16.0 g/dl    Oxyhemoglobin NOT REPORTED 95.0 - 98.0 %    Carboxyhemoglobin 3.3 0 - 5 %    Methemoglobin NOT REPORTED 0.0 - 1.5 %    Pt Temp 37.0     pH, Markus, Temp Adj NOT REPORTED 7.320 - 7.420    pCO2, Markus, Temp Adj NOT REPORTED 39 - 55 mmHg    pO2, Markus, Temp Adj NOT REPORTED 30 - 50 mmHg    O2 Device/Flow/% NOT REPORTED     Respiratory Rate NOT REPORTED     Ozzie Test NOT REPORTED     Sample Site NOT REPORTED     Pt.  Position NOT REPORTED     Mode NOT REPORTED     Set Rate NOT REPORTED     Total Rate NOT REPORTED     VT NOT REPORTED     FIO2 TRAUMA     Peep/Cpap NOT REPORTED     PSV NOT REPORTED     Text for Respiratory NOT REPORTED     NOTIFICATION NOT REPORTED     NOTIFICATION TIME NOT REPORTED     Blood Bank Specimen BILL FOR SERVICES PERFORMED     hCG Qual PT IS MALE NEGATIVE    BUN 7 6 - 20 mg/dL    CREATININE 0.40 (L) 0.70 - 1.20 mg/dL    GFR Non-African American >60 >60 mL/min    GFR African American >60 >60 mL/min    GFR Comment          GFR Staging NOT REPORTED     Ethanol <10 <10 mg/dL    Ethanol percent <0.010 <0.010 %    Protime 10.5 9.0 - 12.0 sec    INR 1.0     PTT 23.9 20.5 - 30.5 sec   Lactic Acid, Whole Blood   Result Value Ref Range    Lactic Acid, Whole Blood 1.7 0.7 - 2.1 mmol/L   Lipase   Result Value Ref Range    Lipase 11 (L) 13 - 60 U/L   Hepatic Function Panel   Result Value Ref Range    Alb 4.2 3.5 - 5.2 g/dL    Alkaline Phosphatase 95 40 - 129 U/L    ALT 23 5 - 41 U/L    AST 25 <40 U/L    Total Bilirubin 1.27 (H) 0.3 - 1.2 mg/dL    Bilirubin, Direct 0.45 (H) <0.31 mg/dL    Bilirubin, Indirect 0.82 0.00 - 1.00 mg/dL    Total abnormality in the wrist.         XR WRIST RIGHT (MIN 3 VIEWS)   Final Result   No acute osseous abnormality the right wrist.      Widening of the scapholunate interval suggesting ligamentous injury. Radiocarpal joint osteoarthritis. CT THORACIC SPINE WO CONTRAST   Preliminary Result   No acute traumatic abnormality of the chest, abdomen, or pelvis. Remote, healing anterolateral left 6th rib fracture. No acute osseous abnormality of the thoracic or lumbar spine. CT LUMBAR SPINE WO CONTRAST   Preliminary Result   No acute traumatic abnormality of the chest, abdomen, or pelvis. Remote, healing anterolateral left 6th rib fracture. No acute osseous abnormality of the thoracic or lumbar spine. CT CHEST ABDOMEN PELVIS W CONTRAST   Preliminary Result   No acute traumatic abnormality of the chest, abdomen, or pelvis. Remote, healing anterolateral left 6th rib fracture. No acute osseous abnormality of the thoracic or lumbar spine. CTA HEAD NECK W CONTRAST   Final Result   No cerebral aneurysm or vascular malformation identified. Significant narrowing of the intracranial vessels, likely due to a   combination of atherosclerotic disease as well as vasospasm related to   subarachnoid hemorrhage. Critical stenosis of the proximal left vertebral artery. Critical stenosis of the proximal right cervical ICA with attenuated flow   seen distally. Severe near critical stenoses of the right cavernous ICA. Moderate stenoses of the contralateral cavernous ICA.          XR HAND RIGHT (MIN 3 VIEWS)    (Results Pending)   XR RADIUS ULNA LEFT (2 VIEWS)    (Results Pending)   XR RADIUS ULNA RIGHT (2 VIEWS)    (Results Pending)   XR HIP 2-3 VW W PELVIS RIGHT    (Results Pending)   XR KNEE RIGHT (3 VIEWS)    (Results Pending)   XR ANKLE RIGHT (MIN 3 VIEWS)    (Results Pending)   XR KNEE LEFT (3 VIEWS)    (Results Pending)   XR ANKLE LEFT (MIN 3 VIEWS)

## 2019-12-26 NOTE — PROGRESS NOTES
Physical Therapy  DATE: 2019    NAME: Mayra Bautista  MRN: [id number]   : 1960    Patient not seen this date for Physical Therapy due to:  [] Blood transfusion in progress  [] Hemodialysis  []  Patient Declined  [] Spine Precautions   [x] Strict Bedrest: SBR order in place. RN notified/ confirmed. PT will check back as time allows or 19. [] Surgery/ Procedure  [] Testing      [] Other        [] PT being discontinued at this time. Patient independent. No further needs. [] PT being discontinued at this time as the patient has been transferred to palliative care. No further needs.     Destin Bojorquez, PT

## 2019-12-26 NOTE — H&P
anterolateral left 6th rib fx     GENERAL DATA  Age 61 y.o.  male   Patient information was obtained from patient. History/Exam limitations: none. Patient presented to the Emergency Department by [address] as a transfer from [facility]   Injury Date: 12/25/2019   Approximate Injury Time: woke up 430pm      Transport mode:   []Ambulance      [x] Helicopter     []Car       [] Other    INJURY LOCATION  Home     HISTORY:   Patient states he has had multiple falls lately. States today he woke up at 4:30pm in his bed not knowing how he got there. He states he felt very ill, had nausea and emesis, and headache. He states he walked to the living room and called family who came and took him to the hospital.     He does not recall falling today. History of alcohol abuse. States he was not drinking today. He was evaluate at [facility]. CT head revealed bilateral subarachnoid blood. CT cervical without acute fracture. Patient transferred to [facility] for further evaluation. He did receive kcentra en route. GCS 15 on arrival to trauma bay. States he currently has right wrist pain and right flank/back pain. Patient with spica splints to bilateral forearms. States the left splint was from a fall several weeks ago. He does take eliquis and plavix at home. Has a recent history of left carotid stenting. MEDICATIONS:     eliquis   plavix   amlodipine    Insulin   Acetaminophen   Cilostazol   Albuterol   Omeprazole  Duloxetine  Digoxin   atorvastatin    ALLERGIES:   [x]  None      PAST MEDICAL HISTORY: []  None   []   Information not available due to exam limitations documented above    has a past medical history of Alcohol abuse, Atrial fibrillation ([facility] [address] Diabetes ([facility] [address] Gastritis, Hyperlipidemia, Hypertension, Left ventricular hypertrophy, Neuropathy, and Renal cyst.  has no past surgical history on file. SOCIAL HISTORY       reports that he has been smoking cigarettes.  He has been smoking about osseous abnormality of the thoracic or lumbar spine. LABS  Labs Reviewed   AMYLASE - Abnormal; Notable for the following components:       Result Value    Amylase 27 (*)     All other components within normal limits   CK - Abnormal; Notable for the following components: Total CK 33 (*)     All other components within normal limits   TROP/MYOGLOBIN - Abnormal; Notable for the following components:    Troponin, High Sensitivity 27 (*)     Myoglobin <21 (*)     All other components within normal limits   TRAUMA PANEL - Abnormal; Notable for the following components:    Hemoglobin 11.6 (*)     Hematocrit 39.5 (*)     MCV 75.1 (*)     MCH 22.1 (*)     RDW 20.1 (*)     Potassium 3.2 (*)     Chloride 90 (*)     Glucose 181 (*)     pH, Markus 7.488 (*)     HCO3, Venous 34.6 (*)     Positive Base Excess, Markus 10.1 (*)     O2 Sat, Markus 57.2 (*)     CREATININE 0.40 (*)     All other components within normal limits   LIPASE - Abnormal; Notable for the following components:    Lipase 11 (*)     All other components within normal limits   HEPATIC FUNCTION PANEL - Abnormal; Notable for the following components:     Total Bilirubin 1.27 (*)     Bilirubin, Direct 0.45 (*)     All other components within normal limits   TEG, RAPID CITRATED - Abnormal; Notable for the following components:    ACT .0 (*)     Kinetics Rapid TEG 0.9 (*)     All other components within normal limits   LACTIC ACID, WHOLE BLOOD   URINE DRUG SCREEN   URINALYSIS   BASIC METABOLIC PANEL   MAGNESIUM   HEMOGLOBIN A1C   LIPID PANEL   CBC   TYPE AND SCREEN       Scar Chamberlain DO  General Surgery Resident

## 2019-12-26 NOTE — CONSULTS
respond  1b. Level of consciousness questions:  0 - answers both questions correctly  1c. Level of consciousness questions:  0 - performs both tasks correctly  2. Best Gaze:  0 - normal  3. Visual:  0 - no visual loss  4. Facial Palsy:  2 - partial paralysis (total or near total paralysis of the lower face)  5a. Motor left arm:  1 - drift, limb holds 90 (or 45) degrees but drifts down before full 10 seconds: does not hit bed  5b. Motor right arm:  0 - no drift, limb holds 90 (or 45) degrees for full 10 seconds  6a. Motor left le - drift; leg falls by the end of the 5 second period but does not hit bed  6b. Motor right le - no drift; leg holds 30 degree position for full 5 seconds  7. Limb Ataxia:  0 - absent  8. Sensory:  1 - mild to moderate sensory loss; patient feels pinprick is less sharp or is dull on the affected side; there is a loss of superficial pain with pinprick but patient is aware of being touched   9. Best Language:  0 - no aphasia, normal  10. Dysarthria:  1 - mild to moderate, patient slurs at least some words and at worst, can be understood with some difficulty  11. Extinction and Inattention:  1 - visual, tactile, auditory, spatial or personal inattention or extinction to bilateral simultaneous stimulation in one of the sensory modalities       Total 8. Improved to 6 during the course of the stay with the left upper and lower extremity drift no longer present, but weakness to resistance.     Carrillo & Ferreira 2, modified Wilburn 1    Modified Aman Score Scale:     [x] Zero: No symptoms at all   [] 1: No significant disability despite symptoms; able to carry out all usual duties and activities   [] 2: Slight disability; unable to carry out all previous activities, but able to look after own affairs without assistance   [] 3:Moderate disability; requiring some help, but able to walk without assistance   [] 4: Moderately severe disability; unable to walk and attend to bodily needs without assistance   [] 5:Severe disability; bedridden, incontinent and requiring constant nursing care and attention         PAST MEDICAL HISTORY :       Past Medical History:        Diagnosis Date    Alcohol abuse     [facility] fibrillation ([facility] [address]     [facility]     Gastritis     Hyperlipidemia     Hypertension     Left ventricular hypertrophy     Neuropathy     Renal cyst        Past Surgical History:    No past surgical history on file.     Social History:   Social History     Socioeconomic History    Marital status: Single     Spouse name: Not on file    Number of children: Not on file    Years of education: Not on file    Highest education level: Not on file   Occupational History    Not on file   Social Needs    Financial resource strain: Not on file    Food insecurity:     Worry: Not on file     Inability: Not on file    Transportation needs:     Medical: Not on file     Non-medical: Not on file   Tobacco Use    Smoking status: Current Every Day Smoker     Packs/day: 0.50     Types: Cigarettes    Smokeless tobacco: Current User   Substance and Sexual Activity    Alcohol use: Yes     Frequency: 4 or more times a week    Drug use: Never    Sexual activity: Not on file   Lifestyle    Physical activity:     Days per week: Not on file     Minutes per session: Not on file    Stress: Not on file   Relationships    Social connections:     Talks on phone: Not on file     Gets together: Not on file     Attends Christian service: Not on file     Active member of club or organization: Not on file     Attends meetings of clubs or organizations: Not on file     Relationship status: Not on file    Intimate partner violence:     Fear of current or ex partner: Not on file     Emotionally abused: Not on file     Physically abused: Not on file     Forced sexual activity: Not on file   Other Topics Concern    Not on file   Social History Narrative    Not on file       Family History:   No 12/25/2019    CREATININE 0.40 12/25/2019    GFRAA >60 12/25/2019    LABGLOM >60 12/25/2019    GLUCOSE 181 12/25/2019       Radiology Review:  CTH    Subarachnoid hemorrhage, predominantly in the bilateral sylvian fissures and   bilateral cerebral hemisphere sulci.       Acute left parietal convexity subdural hematoma measures up to 4 mm in   thickness.  No associated mass effect.       No midline shift or evidence of intracranial herniation. CTA h&N    No cerebral aneurysm or vascular malformation identified.       Significant narrowing of the intracranial vessels, likely due to a   combination of atherosclerotic disease as well as vasospasm related to   subarachnoid hemorrhage.       Critical stenosis of the proximal left vertebral artery.       Critical stenosis of the proximal right cervical ICA with attenuated flow   seen distally.       Severe near critical stenoses of the right cavernous ICA.       Moderate stenoses of the contralateral cavernous ICA. ASSESSMENT AND PLAN:       Patient Active Problem List   Diagnosis    SAH (subarachnoid hemorrhage) ([facility] [address]       1. Last Known Well (date and time): 4 PM, 12/25/2019    2. Candidate for IV tPA therapy     Yes []     No  [x] due to the following exclusion criteria: Hemorrhage. 3. Candidate for Thrombectomy    Yes []      No [x] due to the following exclusion criteria: no lvo    61 y.o. male with likely traumatic bilateral subarachnoid hemorrhage and left parietal subdural 4 mm, no shift. Left facial and arm and leg weakness and numbness with a stroke scale of 8 on presentation. Likely combination of vasospasm around hemorrhage on top of critical right ICA stenosis. As a balance between ischemia versus hemorrhage will recommend -160. .     - Discussed with Dr. Raji Chang, Dr. Edgar Martin  avoid symptomatic hypotension, BP: 130-160. Can continue nimodipine for vasospasm as long as pressures do not drop. Keppra 1 g twice daily.   Neuro ICU admission with neurovascular checks. Hold anticoagulation.   - MRI Brain WO   - ECHO,    - Fasting Lipid panel, hemoglobin A1c.    - PT, OT, Speech eval    - Telemetry   - Blood glucose goal less than 180  - Please avoid dextrose containing solutions    Additional recommendations may follow    Please contact [facility] with any changes in patients neurologic status. Thank you for your consult.        Warren Rahman MD   12/25/2019  11:16 PM

## 2019-12-26 NOTE — PROGRESS NOTES
Attempted TCD at bedside x 2, patient about to have EEG the first time, second time patient had gone to interventional radiology.

## 2019-12-26 NOTE — CONSULTS
Department of Neurosurgery                                            Nurse Practitioner Consult Note      Reason for Consult:  [facility]  Requesting Physician:  Sergio  Neurosurgeon:   [] Dr. Dotty Villanueva  [x] Dr. Dorie Luis  [] Dr. Romy Argueta  [] Dr. Campbell Thakur  [] Dr. Suhas Quick  [] Dr. Benito Crandall    patientHistory Obtained From:      Marvelangela Aiden:         SAH    HISTORY OF PRESENT ILLNESS:       The patient is a 61 y.o. male who presents with complicated medical history. Patient with subarachnoid hemorrhage diffuse bilateral, reports waking up earlier today with nausea headache. Has had multiple falls over the last several weeks but is unsure if he fell today. Does take Eliquis for atrial fibrillation. Was found to have subarachnoid hemorrhage bilaterally, no CTA done at outlying facility. Was given Kcentra for reversal.  Does also take Plavix. Has a history, per patient of one-sided ICA occlusion, reported history of ICA stenting, for which he takes Plavix. Also has peripheral lower extremity vascular stents. Reports history of multiple heart attacks with PCI. History of alcohol abuse and diabetes. The patient does have splints on both of his wrist, unclear if he has chronic or acute fractures. Trauma to follow. PAST MEDICAL HISTORY :       Past Medical History:        Diagnosis Date    Alcohol abuse     [facility] fibrillation ([facility] [facility]     Diabetes ([facility]     Gastritis     Hyperlipidemia     Hypertension     Left ventricular hypertrophy     Neuropathy     Renal cyst        Past Surgical History:    No past surgical history on file.     Social History:   Social History     Socioeconomic History    Marital status: Single     Spouse name: Not on file    Number of children: Not on file    Years of education: Not on file    Highest education level: Not on file   Occupational History    Not on file   Social Needs    Financial resource strain: Not on file    Food insecurity:     Worry: Not on file Q12H  [START ON 12/26/2019] niMODipine (NIMOTOP) capsule 60 mg, 60 mg, Oral, 6 times per day  rosuvastatin (CRESTOR) tablet 40 mg, 40 mg, Oral, Nightly  clevidipine (CLEVIPREX) infusion, 2 mg/hr, Intravenous, Continuous    REVIEW OF SYSTEMS:       CONSTITUTIONAL: negative for fatigue and malaise--positive for headache and nausea   EYES: negative for double vision and photophobia --left-sided subconjunctival hemorrhage   HEENT: negative for tinnitus and sore throat   RESPIRATORY: negative for cough, shortness of breath   CARDIOVASCULAR: negative for chest pain, palpitations   GASTROINTESTINAL:  Positive for nausea, vomiting   GENITOURINARY: negative for incontinence   MUSCULOSKELETAL:  Positive for neck and back pain   NEUROLOGICAL: negative for seizures   PSYCHIATRIC: negative for agitated     Review of systems otherwise negative. PHYSICAL EXAM:       There were no vitals taken for this visit.       CONSTITUTIONAL: no apparent distress, appears stated age   HEAD: normocephalic, atraumatic   ENT: moist mucous membranes, uvula midline   NECK:  Cervical collar in place   BACK: without midline tenderness, step-offs or deformities   LUNGS: clear to auscultation bilaterally   CARDIOVASCULAR: regular rate and rhythm   ABDOMEN: Soft, non-tender, non-distended with normal active bowel sounds   NEUROLOGIC:  EYE OPENING     Spontaneous - 4 -       To voice - 3 -       To pain - 2 -       None - 1 -    VERBAL RESPONSE     Appropriate, oriented - 5 -       Dazed or confused - 4 -       Syllables, expletives - 3 -       Grunts - 2 -       None - 1 -    MOTOR RESPONSE     Spontaneous, command - 6 -       Localizes pain - 5 -       Withdraws pain - 4 -       Abnormal flexion - 3 -       Abnormal extension - 2 -       None - 1 -            Total GCS: 15    Mental Status:  A/o 2/3--not to situation               Cranial Nerves:    cranial nerves II-XII are grossly intact    Motor Exam:    Drift:  absent  Tone:  normal    Motor exam is symmetrical 5 out of 5 all extremities bilaterally    Sensory:    Right Upper Extremity:  normal  Left Upper Extremity:  normal  Right Lower Extremity:  normal  Left Lower Extremity:  normal    Deep Tendon Reflexes:    Right Bicep:  2+  Left Bicep:  2+  Right Knee:  2+  Left Knee:  2+    Plantar Response:    Right:  downgoing  Left:  downgoing     SKIN: no rash       LABS AND IMAGING:     CBC with Differential:    Lab Results   Component Value Date    WBC 6.4 12/25/2019    RBC 5.26 12/25/2019    HGB 11.6 12/25/2019    HCT 39.5 12/25/2019     12/25/2019    MCV 75.1 12/25/2019    MCH 22.1 12/25/2019    MCHC 29.4 12/25/2019    RDW 20.1 12/25/2019     BMP:    Lab Results   Component Value Date     12/25/2019    K 3.2 12/25/2019    CL 90 12/25/2019    CO2 30 12/25/2019    BUN 7 12/25/2019    LABALBU 4.2 12/25/2019    CREATININE 0.40 12/25/2019    GFRAA >60 12/25/2019    LABGLOM >60 12/25/2019    GLUCOSE 181 12/25/2019       Radiology Review:  No results found. ASSESSMENT AND PLAN:       Patient Active Problem List   Diagnosis    SAH (subarachnoid hemorrhage) (HCC)         A/P:  This is a 61 y.o. male with diffuse bilateral subarachnoid hemorrhage without ventricular extension, patient mentating well maintaining airway. We will treat as a spontaneous aneurysmal subarachnoid at this point with blood pressure under 130, nimodipine, endovascular consultation, Keppra. Repeat head CT in the morning, follow-up CTA.     Neurosurgery to follow  Patient care will be discussed with attending, will reevaluated patient along with attending.      - No neurosurgical interventions planned for now  - CTLS recommendations: c-collar until mentating well  - HOB: 30 degrees   - Obtain CT head in AM   - Neuro checks per protoco  - Hold all antiplatelets and anticoagulants  - O- We recommend SBP < 130   - Determine the lower limit of SBP clinically based on mentation    Additional recommendations may follow    Please

## 2019-12-26 NOTE — PROGRESS NOTES
Speech Language Pathology  [facility]  Speech Language Pathology    Date: 12/26/2019  Patient Name: Rabia Meadows  YOB: 1960   AGE: 61 y.o. 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 Pt current sleeping, did not awaken given max verbal and tactile stimuli. Pt not appropriate for speech evaluation at this time. Will continue to monitor. Next scheduled treatment: 12/27 or as appropriate    Completed by: Robert Tidwell M.A.  CCC-SLP

## 2019-12-26 NOTE — CONSULTS
 sodium chloride 100 mL/hr at 12/26/19 1114     PRN Meds:.glucose, dextrose, glucagon (rDNA), dextrose, magnesium sulfate, sodium chloride flush, LORazepam **OR** LORazepam **OR** LORazepam **OR** LORazepam **OR** LORazepam **OR** LORazepam **OR** LORazepam **OR** LORazepam, sodium chloride flush, acetaminophen, ondansetron  Past Medical History   has a past medical history of Alcohol abuse, Atrial fibrillation ([facility] [address] Diabetes ([facility] [address] Gastritis, Hyperlipidemia, Hypertension, Left ventricular hypertrophy, Neuropathy, and Renal cyst.  Past Surgical History   has no past surgical history on file. Social History   reports that he has been smoking cigarettes. He has been smoking about 0.50 packs per day. He uses smokeless tobacco.   reports current alcohol use. reports no history of drug use. Family History  family history is not on file. Review of Systems:  CONSTITUTIONAL:  negative for fevers, chills, fatigue and malaise    EYES:  negative for double vision, blurred vision and photophobia     HEENT:  negative for tinnitus, epistaxis and sore throat    RESPIRATORY:  negative for cough, shortness of breath, wheezing    CARDIOVASCULAR:  negative for chest pain, palpitations, syncope, edema    GASTROINTESTINAL:  negative for nausea, vomiting    GENITOURINARY:  negative for incontinence    MUSCULOSKELETAL:  negative for neck or back pain    NEUROLOGICAL:  Negative for weakness and tingling  negative for headaches and dizziness    PSYCHIATRIC:  negative for anxiety      Review of systems otherwise negative. OBJECTIVE:   Vitals: /62   Pulse 78   Temp 98.2 °F (36.8 °C) (Oral)   Resp 16   Wt 210 lb 8.6 oz (95.5 kg)   SpO2 98%   General appearance: Lying in bed, NAD, neck collar in place. HEENT: Atraumatic. Neck: Neck is supple. Lungs: No respiratory distress noted. Heart: In sinus rhythm. Abdomen: Soft nontender. Extremities: No lower limb edema noted.   Neurologic: He is arousable, tracking in the [id number]  Electronically signed 12/26/2019 at 11:52 AM

## 2019-12-26 NOTE — ED PROVIDER NOTES
[facility] [facility]     Emergency Department     Faculty Attestation        I performed a history and physical examination of the patient and discussed management with the resident. I reviewed the residents note and agree with the documented findings and plan of care. Any areas of disagreement are noted on the chart. I was personally present for the key portions of any procedures. I have documented in the chart those procedures where I was not present during the key portions. I have reviewed the emergency nurses triage note. I agree with the chief complaint, past medical history, past surgical history, allergies, medications, social and family history as documented unless otherwise noted below. For Physician Assistant/ Nurse Practitioner cases/documentation I have personally evaluated this patient and have completed at least one if not all key elements of the E/M (history, physical exam, and MDM). Additional findings are as noted. Please refer to Trauma Flow Sheet as well. Pertinent Comments: The patient is a trauma with transfer from [facility] ER with many falls and possible EtOHism, but anticoagulated on Eliquis. CT head with SAH and probable traumatic etiology. KCENTRA given PTA. A Trauma Priority was called for the patient, and the Trauma team was in the room. Neurosurgery consulted in the ER. Patient here with rather large subarachnoid hemorrhage and awaiting CTAs but neuro endovascular consulted as well in case aneurysm is present. Orthopedic surgery seeing the patient in the emergency room as well. CRITICAL CARE: There was a high probability of clinically significant/life threatening deterioration in this patient's condition which required my urgent intervention. Total critical care time was 30 minutes. This excludes any time for separately reportable procedures.        (Please note that portions of this note

## 2019-12-26 NOTE — CONSULTS
respond  1b. Level of consciousness questions:  0 - answers both questions correctly  1c. Level of consciousness questions:  0 - performs both tasks correctly  2. Best Gaze:  0 - normal  3. Visual:  0 - no visual loss  4. Facial Palsy:  2 - partial paralysis (total or near total paralysis of the lower face)  5a. Motor left arm:  1 - drift, limb holds 90 (or 45) degrees but drifts down before full 10 seconds: does not hit bed  5b. Motor right arm:  0 - no drift, limb holds 90 (or 45) degrees for full 10 seconds  6a. Motor left le - drift; leg falls by the end of the 5 second period but does not hit bed  6b. Motor right le - no drift; leg holds 30 degree position for full 5 seconds  7. Limb Ataxia:  0 - absent  8. Sensory:  1 - mild to moderate sensory loss; patient feels pinprick is less sharp or is dull on the affected side; there is a loss of superficial pain with pinprick but patient is aware of being touched   9. Best Language:  0 - no aphasia, normal  10. Dysarthria:  1 - mild to moderate, patient slurs at least some words and at worst, can be understood with some difficulty  11. Extinction and Inattention:  1 - visual, tactile, auditory, spatial or personal inattention or extinction to bilateral simultaneous stimulation in one of the sensory modalities       Total 8. Improved to 6 during the course of the stay with the left upper and lower extremity drift no longer present, but weakness to resistance.     Carrillo & Ferreira 2, modified Wilburn 1    Modified Aman Score Scale:     [x] Zero: No symptoms at all   [] 1: No significant disability despite symptoms; able to carry out all usual duties and activities   [] 2: Slight disability; unable to carry out all previous activities, but able to look after own affairs without assistance   [] 3:Moderate disability; requiring some help, but able to walk without assistance   [] 4: Moderately severe disability; unable to walk and attend to bodily needs without assistance   [] 5:Severe disability; bedridden, incontinent and requiring constant nursing care and attention         PAST MEDICAL HISTORY :       Past Medical History:        Diagnosis Date    Alcohol abuse     [facility] [address] [facility]     [facility]     Gastritis     Hyperlipidemia     Hypertension     Left ventricular hypertrophy     Neuropathy     Renal cyst        Past Surgical History:    No past surgical history on file.     Social History:   Social History     Socioeconomic History    Marital status: Single     Spouse name: Not on file    Number of children: Not on file    Years of education: Not on file    Highest education level: Not on file   Occupational History    Not on file   Social Needs    Financial resource strain: Not on file    Food insecurity:     Worry: Not on file     Inability: Not on file    Transportation needs:     Medical: Not on file     Non-medical: Not on file   Tobacco Use    Smoking status: Current Every Day Smoker     Packs/day: 0.50     Types: Cigarettes    Smokeless tobacco: Current User   Substance and Sexual Activity    Alcohol use: Yes     Frequency: 4 or more times a week    Drug use: Never    Sexual activity: Not on file   Lifestyle    Physical activity:     Days per week: Not on file     Minutes per session: Not on file    Stress: Not on file   Relationships    Social connections:     Talks on phone: Not on file     Gets together: Not on file     Attends Christianity service: Not on file     Active member of club or organization: Not on file     Attends meetings of clubs or organizations: Not on file     Relationship status: Not on file    Intimate partner violence:     Fear of current or ex partner: Not on file     Emotionally abused: Not on file     Physically abused: Not on file     Forced sexual activity: Not on file   Other Topics Concern    Not on file   Social History Narrative    Not on file       Family History:   No family history on file. Allergies:  Patient has no allergy information on record.     Home Medications:  Prior to Admission medications    Not on File       Current Medications:   Current Facility-Administered Medications: ondansetron (ZOFRAN) 4 MG/2ML injection, , ,   fentaNYL (SUBLIMAZE) 100 MCG/2ML injection, , ,   ondansetron (ZOFRAN) injection 4 mg, 4 mg, Intravenous, Once  ondansetron (ZOFRAN) 4 MG/2ML injection, , ,   sodium chloride flush 0.9 % injection 10 mL, 10 mL, Intravenous, 2 times per day  sodium chloride flush 0.9 % injection 10 mL, 10 mL, Intravenous, PRN  magnesium sulfate 1 g in dextrose 5% 100 mL IVPB, 1 g, Intravenous, PRN  acetaminophen (TYLENOL) tablet 650 mg, 650 mg, Oral, Q4H PRN  ondansetron (ZOFRAN) injection 4 mg, 4 mg, Intravenous, Q6H PRN  [START ON 12/26/2019] niMODipine (NIMOTOP) capsule 60 mg, 60 mg, Oral, 6 times per day  rosuvastatin (CRESTOR) tablet 40 mg, 40 mg, Oral, Nightly  prochlorperazine (COMPAZINE) injection 10 mg, 10 mg, Intravenous, Once  promethazine (PHENERGAN) injection 12.5 mg, 12.5 mg, Intravenous, Once  magnesium sulfate 2 g in dextrose 5 % 100 mL IVPB, 2 g, Intravenous, Once  levETIRAcetam (KEPPRA) 2,000 mg in sodium chloride 0.9 % 100 mL IVPB, 2,000 mg, Intravenous, Once **FOLLOWED BY** [START ON 12/26/2019] levetiracetam (KEPPRA) 1000 mg/100 mL IVPB, 1,000 mg, Intravenous, Q12H  lidocaine 1 % injection 20 mL, 20 mL, Intradermal, Once  lidocaine 1 % injection, , ,     REVIEW OF SYSTEMS:       CONSTITUTIONAL:  Positive for fatigue and malaise   EYES: negative for double vision and photophobia    HEENT: negative for tinnitus and sore throat   RESPIRATORY: negative for cough, shortness of breath   CARDIOVASCULAR: negative for chest pain, palpitations   GASTROINTESTINAL:  Positive for nausea, vomiting   GENITOURINARY: negative for incontinence   MUSCULOSKELETAL:  Positive for neck negative for back pain   NEUROLOGICAL: negative for seizures   PSYCHIATRIC: Positive for fatigue     Review of systems otherwise negative. PHYSICAL EXAM:       There were no vitals taken for this visit. CONSTITUTIONAL:   alert and oriented x 3, in moderate distress. GCS 14. Mild dysarthria, no aphasia. EOMI. left conjunctival hemorrhage   HEAD:  normocephalic,    EYES:  PERRLA, EOMI. left conjunctival hemorrhage   ENT:  moist mucous membranes   NECK:   In c-collar, symmetric, no midline tenderness to palpation    BACK:  without midline tenderness, step-offs or deformities    LUNGS:  Equal air entry bilaterally   CARDIOVASCULAR:  normal s1 / s2   ABDOMEN:  Soft, no rigidity   NEUROLOGIC:  Mental Status:  A & O x3,lethargic             Cranial Nerves:    cranial nerves II-XII are grossly intact except left lower two third facial weakness. Motor Exam:    Drift:  present -left upper and bilateral lower extremities.   Tone:  normal    Motor exam is symmetrical 4 out of 5 upper extremities bilaterally  Motor exam is symmetrical 4 out of 5 lower extremities bilaterally    Sensory:    Touch:    Right Upper Extremity:  normal  Left Upper Extremity:  decreased  Right Lower Extremity:  normal  Left Lower Extremity: Decreased    Deep Tendon Reflexes:    Right Bicep:  2+  Left Bicep:  2+  Right Knee:  2+  Left Knee:  2+    Plantar Response:  Right:  downgoing  Left:  downgoing    Clonus:  absent  Solorzano's:  absent    Coordination/Dysmetria:    Finger to Nose:   Right:  normal  Left:  normal   Dysdiadochokinesia:  absent         SKIN:  no rash      LABS AND IMAGING:     CBC with Differential:    Lab Results   Component Value Date    WBC 6.4 12/25/2019    RBC 5.26 12/25/2019    HGB 11.6 12/25/2019    HCT 39.5 12/25/2019     12/25/2019    MCV 75.1 12/25/2019    MCH 22.1 12/25/2019    MCHC 29.4 12/25/2019    RDW 20.1 12/25/2019     BMP:    Lab Results   Component Value Date     12/25/2019    K 3.2 12/25/2019    CL 90 12/25/2019    CO2 30 12/25/2019    BUN 7 12/25/2019    LABALBU 4.2

## 2019-12-26 NOTE — ED NOTES
Bed: TRAUMAB  Expected date:   Expected time:   Means of arrival:   Comments:  LF4  BV  Transfer      Lianna Mei RN  12/25/19 2047

## 2019-12-26 NOTE — CONSULTS
Orthopedic Surgery Consult  (Dr. Jesse Gardiner)      CC/Reason for consult:  Left proximal phalanx fracture, right wrist swelling    HPI:      The patient is a 61 y.o. male presents to [facility] as transfer from [facility] for bilateral [address]. Pt reports waking up around 430pm today, not feeling well. Called family who took him to ED. Pt wound to have [address] for which he was transferred as well as left proximal phalanx fracture and right scapholunate widening. Per patient and family at bedside, pt with hx of multiple falls and etoh use. Pt recently injured left thumb 2-4 weeks ago. He was placed in splint and instructed to f/u but did not. Pt also with acute right wrist pain. Pt also reports bilateral leg pain from hips downward to feet. Pt denies pain elsewhere. Pt reports chronic numbness, tingling bilateral feet. Pt with hx of afib on eliquis. Past Medical History:    Past Medical History:   Diagnosis Date    Alcohol abuse     Atrial fibrillation ([facility] [address] [facility]     Diabetes ([facility]     Gastritis     Hyperlipidemia     Hypertension     Left ventricular hypertrophy     Neuropathy     Renal cyst        Past Surgical History:    No past surgical history on file. Medications Prior to Admission:   Prior to Admission medications    Not on File       Allergies:    Patient has no allergy information on record.     Social History:   Social History     Socioeconomic History    Marital status: Single     Spouse name: Not on file    Number of children: Not on file    Years of education: Not on file    Highest education level: Not on file   Occupational History    Not on file   Social Needs    Financial resource strain: Not on file    Food insecurity:     Worry: Not on file     Inability: Not on file    Transportation needs:     Medical: Not on file     Non-medical: Not on file   Tobacco Use    Smoking status: Current Every Day Smoker     Packs/day: 0.50     Types: Cigarettes    Smokeless tobacco: Current User BCR    LUE: Mild swelling to thumb with TTP. No other ecchymoses, abrasion, deformity, erythema or lacerations. Skin intact. No TTP or crepitus to shoulder, arm, elbow, forearm. Full active and passive ROM met without pain. Compartments soft and compressible. Hand is warm and perfused. Axillary/MSC/Ulnar/Median/AIN/PIN motor intact. C4-T1 SILT. Radial pulse 2+ with BCR    RLE: No ecchymoses, abrasions, deformity, erythema or lacerations. Skin intact. TTP to hip, femur, knee, tibia/fibula, ankle or foot. Full active and passive ROM met with minimal pain. Positive log roll. Compartments soft and compressible. Foot is warm and perfused. EHL/FHL/TA/GS complex motor intact. Sural, saphenous, superficial/deep peroneal, and plantar nerve distribution SILT. Dorsalis pedis/posterior tibial pulses 2+ with BCR. Knee ligaments grossly intact. LLE: No ecchymoses, abrasions, deformity, erythema or lacerations. Skin intact. TTP to hip, femur, knee, tibia/fibula, ankle or foot. Full active and passive ROM met with minimal pain. Positive log roll. Compartments soft and compressible. Foot is warm and perfused. EHL/FHL/TA/GS complex motor intact. Sural, saphenous, superficial/deep peroneal, and plantar nerve distribution SILT. Dorsalis pedis/posterior tibial pulses 2+ with BCR. Knee ligaments grossly intact. LABS:  Recent Labs     12/25/19  2101   WBC 6.4   HGB 11.6*   HCT 39.5*      INR 1.0      K 3.2*   BUN 7   CREATININE 0.40*   GLUCOSE 181*        Radiology:   -XRs left wrist demonstrates possible acute on chronic 1st proximal phalanx fracture  -XRs right wrist demonstrates scapholunate widening    A/P: 61 y.o. male being seen after possible recent falls with the following injuries:     -Left acute on chronic 1st proximal metacarpal fracture  -R scapholunate widening  -SAH  -SDH  -OBI    -No acute surgical intervention  -Left thumb closed reduced. Thumb spica splint applied.  Keep splint clean and dry  -Right removable wrist splint to RUE. Ok to remove for ROM. -Weight bearing: NWB through left wrist. No heavy lifting, pushing, pulling, right wrist. Ok to mobilize through both elbows.   -Trauma primary  -Pain control per primary  -Strict ice and elevation for pain/swelling  -F/u LE films  -DVT ppx: ok from ortho standpoint when able  -Ok to DC. F/u with Dr. Zak Mead or [facility] 7-10d.   -Please page Ortho with any questions or concerns      Salma Aguilar DO   Orthopedic Surgery Resident PGY-2  [id number]

## 2019-12-26 NOTE — H&P
cervical ICA, severe near critical stenosis of the right cavernous ICA, moderate stenosis of the left cavernous ICA. Non con CT head repeat at our emergency facility showed subarachnoid, predominantly in the bilateral sylvian fissures and bilateral cerebral hemisphere sulcal I, acute left parietal convexity subdural hematoma 4 mm in thickness without mass-effect. Patient admitted to the neuro ICU--initially requested been to give patient demoted pain and Cardene with tight blood pressure control, however given high risk for watershed infarct, the patient will be given higher blood pressure goal per endovascular neuro critical intensivist      Admitted to ICU From: ed   Reason for ICU Admission: [facility]       PATIENT HISTORY   Past Medical History:        Diagnosis Date    Alcohol abuse     Atrial fibrillation ([facility] [address] [facility]     Diabetes ([facility] [facility]     Gastritis     Hyperlipidemia     Hypertension     Left ventricular hypertrophy     Neuropathy     Renal cyst        Past Surgical History:    No past surgical history on file.     Social History:   Social History     Socioeconomic History    Marital status: Single     Spouse name: Not on file    Number of children: Not on file    Years of education: Not on file    Highest education level: Not on file   Occupational History    Not on file   Social Needs    Financial resource strain: Not on file    Food insecurity:     Worry: Not on file     Inability: Not on file    Transportation needs:     Medical: Not on file     Non-medical: Not on file   Tobacco Use    Smoking status: Current Every Day Smoker     Packs/day: 0.50     Types: Cigarettes    Smokeless tobacco: Current User   Substance and Sexual Activity    Alcohol use: Yes     Frequency: 4 or more times a week    Drug use: Never    Sexual activity: Not on file   Lifestyle    Physical activity:     Days per week: Not on file     Minutes per session: Not on file    Stress: Not on file   Relationships    Social connections:     Talks on phone: Not on file     Gets together: Not on file     Attends Restorationism service: Not on file     Active member of club or organization: Not on file     Attends meetings of clubs or organizations: Not on file     Relationship status: Not on file    Intimate partner violence:     Fear of current or ex partner: Not on file     Emotionally abused: Not on file     Physically abused: Not on file     Forced sexual activity: Not on file   Other Topics Concern    Not on file   Social History Narrative    Not on file       Family History:   No family history on file. Allergies:    Patient has no allergy information on record. Medications Prior to Admission:    Not in a hospital admission.     Current Medications:  Current Facility-Administered Medications: clevidipine (CLEVIPREX) infusion, 2 mg/hr, Intravenous, Continuous  ondansetron (ZOFRAN) 4 MG/2ML injection, , ,   fentaNYL (SUBLIMAZE) 100 MCG/2ML injection, , ,   ondansetron (ZOFRAN) injection 4 mg, 4 mg, Intravenous, Once  ondansetron (ZOFRAN) 4 MG/2ML injection, , ,   sodium chloride flush 0.9 % injection 10 mL, 10 mL, Intravenous, 2 times per day  sodium chloride flush 0.9 % injection 10 mL, 10 mL, Intravenous, PRN  magnesium sulfate 1 g in dextrose 5% 100 mL IVPB, 1 g, Intravenous, PRN  acetaminophen (TYLENOL) tablet 650 mg, 650 mg, Oral, Q4H PRN  ondansetron (ZOFRAN) injection 4 mg, 4 mg, Intravenous, Q6H PRN  niMODipine (NIMOTOP) capsule 60 mg, 60 mg, Oral, 6 times per day  rosuvastatin (CRESTOR) tablet 40 mg, 40 mg, Oral, Nightly  prochlorperazine (COMPAZINE) injection 10 mg, 10 mg, Intravenous, Once  promethazine (PHENERGAN) injection 12.5 mg, 12.5 mg, Intravenous, Once  magnesium sulfate 2 g in dextrose 5 % 100 mL IVPB, 2 g, Intravenous, Once  levETIRAcetam (KEPPRA) 2,000 mg in sodium chloride 0.9 % 100 mL IVPB, 2,000 mg, Intravenous, Once **FOLLOWED BY** levetiracetam (KEPPRA) 1000 mg/100 mL IVPB, 1,000 mg, Intravenous, Q12H  lidocaine 1 % injection 20 mL, 20 mL, Intradermal, Once  lidocaine 1 % injection, , ,     REVIEW OF SYSTEMS       CONSTITUTIONAL:  Positive for fatigue and malaise   EYES: negative for double vision and photophobia    HEENT: negative for tinnitus and sore throat   RESPIRATORY: negative for cough, shortness of breath   CARDIOVASCULAR: negative for chest pain, palpitations   GASTROINTESTINAL:  Positive for nausea, vomiting   GENITOURINARY: negative for incontinence   MUSCULOSKELETAL:  Positive for neck negative for back pain   NEUROLOGICAL: negative for seizures   PSYCHIATRIC:  Positive for fatigue       PHYSICAL EXAM:     BP (!) 160/95   Pulse 68   Resp 16   Wt 210 lb 8.6 oz (95.5 kg)   SpO2 100%     PHYSICAL EXAM:   CONSTITUTIONAL: no apparent distress, appears stated age   HEAD: normocephalic, atraumatic   ENT: moist mucous membranes, uvula midline   NECK:  Cervical collar in place   BACK: without midline tenderness, step-offs or deformities   LUNGS: clear to auscultation bilaterally   CARDIOVASCULAR: regular rate and rhythm   ABDOMEN: Soft, non-tender, non-distended with normal active bowel sounds   NEUROLOGIC:  EYE OPENING     Spontaneous - 4 -       To voice - 3 -       To pain - 2 -       None - 1 -    VERBAL RESPONSE     Appropriate, oriented - 5 -       Dazed or confused - 4 -       Syllables, expletives - 3 -       Grunts - 2 -       None - 1 -    MOTOR RESPONSE     Spontaneous, command - 6 -       Localizes pain - 5 -       Withdraws pain - 4 -       Abnormal flexion - 3 -       Abnormal extension - 2 -       None - 1 -             Total GCS: 15     Mental Status:  A/o 2/3--not to situation                Cranial Nerves:    cranial nerves II-XII are grossly intact     Motor Exam:    Drift:  absent  Tone:  normal     Motor exam is symmetrical 5 out of 5 all extremities bilaterally     Sensory:    Right Upper Extremity:  normal  Left Upper Extremity:  normal  Right Lower Extremity:  normal  Left Contrast    Result Date: 12/25/2019  EXAMINATION: CT OF THE HEAD WITHOUT CONTRAST  12/25/2019 9:04 pm TECHNIQUE: CT of the head was performed without the administration of intravenous contrast. Dose modulation, iterative reconstruction, and/or weight based adjustment of the mA/kV was utilized to reduce the radiation dose to as low as reasonably achievable. COMPARISON: None HISTORY: ORDERING SYSTEM PROVIDED HISTORY: trauma TECHNOLOGIST PROVIDED HISTORY: trauma Multiple falls. On Eliquis. Subarachnoid hemorrhage on CT head performed at outside institution. FINDINGS: BRAIN/VENTRICLES: There is moderate subarachnoid hemorrhage in the bilateral sylvian fissures and bilateral frontal, temporal, and parietal lobe sulci. Additional subarachnoid hemorrhage is noted in the interhemispheric fissure and prepontine cistern. Gray-white matter differentiation is grossly maintained without CT evidence of acute, territorial infarct. Acute, left parietal convexity subdural hematoma measures up to 4 mm in thickness. No associated mass effect. No parenchymal hemorrhage. There is no hydrocephalus or midline shift. Basal cisterns are patent. ORBITS: The visualized portion of the orbits demonstrate no acute abnormality. SINUSES: The visualized paranasal sinuses and mastoid air cells demonstrate no acute abnormality. SOFT TISSUES/SKULL:  No acute abnormality of the visualized skull or soft tissues. Subarachnoid hemorrhage, predominantly in the bilateral sylvian fissures and bilateral cerebral hemisphere sulci. Acute left parietal convexity subdural hematoma measures up to 4 mm in thickness. No associated mass effect. No midline shift or evidence of intracranial herniation. Findings were discussed with Dae Fernandez at 9:32 pm on 12/25/2019.      Ct Cervical Spine Wo Contrast    Result Date: 12/25/2019  EXAMINATION: CT OF THE CERVICAL SPINE WITHOUT CONTRAST 12/25/2019 9:04 pm TECHNIQUE: CT of the cervical spine was performed without the administration of intravenous contrast. Multiplanar reformatted images are provided for review. Dose modulation, iterative reconstruction, and/or weight based adjustment of the mA/kV was utilized to reduce the radiation dose to as low as reasonably achievable. COMPARISON: None HISTORY: ORDERING SYSTEM PROVIDED HISTORY: trauma TECHNOLOGIST PROVIDED HISTORY: trauma FINDINGS: BONES/ALIGNMENT: No traumatic malalignment. Vertebral body heights are maintained. No acute fracture. DEGENERATIVE CHANGES: Moderate multilevel disc narrowing and endplate osteophyte formation. Multilevel uncovertebral and facet joint degenerative changes. SOFT TISSUES: Paraspinal soft tissues are normal.  Partially visualized prepontine cistern subarachnoid hemorrhage is better evaluated on CT head performed concurrently. No acute abnormality of the cervical spine. Ct Thoracic Spine Wo Contrast    Result Date: 12/25/2019  EXAMINATION: CT OF THE THORACIC SPINE WITHOUT CONTRAST; CT OF THE LUMBAR SPINE WITHOUT CONTRAST; CT OF THE CHEST, ABDOMEN, AND PELVIS WITH CONTRAST, 12/25/2019 9:04 pm; 12/25/2019 9:05 pm TECHNIQUE: CT of the thoracic spine was performed without the administration of intravenous contrast. Multiplanar reformatted images are provided for review. Dose modulation, iterative reconstruction, and/or weight based adjustment of the mA/kV was utilized to reduce the radiation dose to as low as reasonably achievable.; CT of the lumbar spine was performed without the administration of intravenous contrast. Multiplanar reformatted images are provided for review. Dose modulation, iterative reconstruction, and/or weight based adjustment of the mA/kV was utilized to reduce the radiation dose to as low as reasonably achievable.; CT of the chest, abdomen and pelvis was performed with the administration of intravenous contrast. Multiplanar reformatted images are provided for review.  Dose modulation, iterative reconstruction, and/or weight based adjustment of the mA/kV was utilized to reduce the radiation dose to as low as reasonably achievable. CT of the thoracic and lumbar spine was performed without the administration of intravenous contrast.  CT of the chest, abdomen and pelvis was performed with the administration of intravenous contrast. Multiplanar reformatted images are provided for review. Dose modulation, iterative reconstruction, and/or weight based adjustment of the mA/kV was utilized to reduce the radiation dose to as low as reasonably achievable. COMPARISON: None HISTORY: ORDERING SYSTEM PROVIDED HISTORY: trauma TECHNOLOGIST PROVIDED HISTORY: trauma Reason for Exam: fall from standing Acuity: Acute Type of Exam: Initial; ORDERING SYSTEM PROVIDED HISTORY: trauma TECHNOLOGIST PROVIDED HISTORY: trauma Reason for Exam: fall from standing Acuity: Acute Type of Exam: Initial; ORDERING SYSTEM PROVIDED HISTORY: trauma TECHNOLOGIST PROVIDED HISTORY: trauma Reason for Exam: fall from standing Acuity: Acute Type of Exam: Initial FINDINGS: CTA CHEST: Stable cardiomegaly. No pericardial effusion. No mediastinal hematoma or pneumomediastinum. No enlarged mediastinal or hilar lymph nodes. Main pulmonary artery is dilated, measuring up to 4 cm, suggesting pulmonary hypertension. Calcified and noncalcified atherosclerotic plaque of the thoracic aorta. Incidentally noted 4 vessel aortic arch with separate arch origin of the left vertebral artery. Ectatic ascending thoracic aorta measures up to 4 cm in diameter. No acute aortic abnormality. Central airways are clear. No pleural effusion or pneumothorax. No pulmonary contusion or pulmonary laceration. Mild bilateral lower lobe dependent atelectatic changes. Healing anterolateral left 6th rib fracture. No acute displaced rib fracture or chest wall hematoma. CTA ABDOMEN: No acute traumatic abnormality of the liver, spleen, pancreas, kidneys, or adrenal glands. Normal gallbladder. intravenous contrast. Multiplanar reformatted images are provided for review. Dose modulation, iterative reconstruction, and/or weight based adjustment of the mA/kV was utilized to reduce the radiation dose to as low as reasonably achievable. CT of the thoracic and lumbar spine was performed without the administration of intravenous contrast.  CT of the chest, abdomen and pelvis was performed with the administration of intravenous contrast. Multiplanar reformatted images are provided for review. Dose modulation, iterative reconstruction, and/or weight based adjustment of the mA/kV was utilized to reduce the radiation dose to as low as reasonably achievable. COMPARISON: None HISTORY: ORDERING SYSTEM PROVIDED HISTORY: trauma TECHNOLOGIST PROVIDED HISTORY: trauma Reason for Exam: fall from standing Acuity: Acute Type of Exam: Initial; ORDERING SYSTEM PROVIDED HISTORY: trauma TECHNOLOGIST PROVIDED HISTORY: trauma Reason for Exam: fall from standing Acuity: Acute Type of Exam: Initial; ORDERING SYSTEM PROVIDED HISTORY: trauma TECHNOLOGIST PROVIDED HISTORY: trauma Reason for Exam: fall from standing Acuity: Acute Type of Exam: Initial FINDINGS: CTA CHEST: Stable cardiomegaly. No pericardial effusion. No mediastinal hematoma or pneumomediastinum. No enlarged mediastinal or hilar lymph nodes. Main pulmonary artery is dilated, measuring up to 4 cm, suggesting pulmonary hypertension. Calcified and noncalcified atherosclerotic plaque of the thoracic aorta. Incidentally noted 4 vessel aortic arch with separate arch origin of the left vertebral artery. Ectatic ascending thoracic aorta measures up to 4 cm in diameter. No acute aortic abnormality. Central airways are clear. No pleural effusion or pneumothorax. No pulmonary contusion or pulmonary laceration. Mild bilateral lower lobe dependent atelectatic changes. Healing anterolateral left 6th rib fracture. No acute displaced rib fracture or chest wall hematoma.  CTA of the proximal left vertebral artery. Critical stenosis of the proximal right cervical ICA with attenuated flow seen distally. Severe near critical stenoses of the right cavernous ICA. Moderate stenoses of the contralateral cavernous ICA. Ct Chest Abdomen Pelvis W Contrast    Result Date: 12/25/2019  EXAMINATION: CT OF THE THORACIC SPINE WITHOUT CONTRAST; CT OF THE LUMBAR SPINE WITHOUT CONTRAST; CT OF THE CHEST, ABDOMEN, AND PELVIS WITH CONTRAST, 12/25/2019 9:04 pm; 12/25/2019 9:05 pm TECHNIQUE: CT of the thoracic spine was performed without the administration of intravenous contrast. Multiplanar reformatted images are provided for review. Dose modulation, iterative reconstruction, and/or weight based adjustment of the mA/kV was utilized to reduce the radiation dose to as low as reasonably achievable.; CT of the lumbar spine was performed without the administration of intravenous contrast. Multiplanar reformatted images are provided for review. Dose modulation, iterative reconstruction, and/or weight based adjustment of the mA/kV was utilized to reduce the radiation dose to as low as reasonably achievable.; CT of the chest, abdomen and pelvis was performed with the administration of intravenous contrast. Multiplanar reformatted images are provided for review. Dose modulation, iterative reconstruction, and/or weight based adjustment of the mA/kV was utilized to reduce the radiation dose to as low as reasonably achievable. CT of the thoracic and lumbar spine was performed without the administration of intravenous contrast.  CT of the chest, abdomen and pelvis was performed with the administration of intravenous contrast. Multiplanar reformatted images are provided for review. Dose modulation, iterative reconstruction, and/or weight based adjustment of the mA/kV was utilized to reduce the radiation dose to as low as reasonably achievable.  COMPARISON: None HISTORY: ORDERING SYSTEM PROVIDED HISTORY: trauma TECHNOLOGIST PROVIDED HISTORY: trauma Reason for Exam: fall from standing Acuity: Acute Type of Exam: Initial; ORDERING SYSTEM PROVIDED HISTORY: trauma TECHNOLOGIST PROVIDED HISTORY: trauma Reason for Exam: fall from standing Acuity: Acute Type of Exam: Initial; ORDERING SYSTEM PROVIDED HISTORY: trauma TECHNOLOGIST PROVIDED HISTORY: trauma Reason for Exam: fall from standing Acuity: Acute Type of Exam: Initial FINDINGS: CTA CHEST: Stable cardiomegaly. No pericardial effusion. No mediastinal hematoma or pneumomediastinum. No enlarged mediastinal or hilar lymph nodes. Main pulmonary artery is dilated, measuring up to 4 cm, suggesting pulmonary hypertension. Calcified and noncalcified atherosclerotic plaque of the thoracic aorta. Incidentally noted 4 vessel aortic arch with separate arch origin of the left vertebral artery. Ectatic ascending thoracic aorta measures up to 4 cm in diameter. No acute aortic abnormality. Central airways are clear. No pleural effusion or pneumothorax. No pulmonary contusion or pulmonary laceration. Mild bilateral lower lobe dependent atelectatic changes. Healing anterolateral left 6th rib fracture. No acute displaced rib fracture or chest wall hematoma. CTA ABDOMEN: No acute traumatic abnormality of the liver, spleen, pancreas, kidneys, or adrenal glands. Normal gallbladder. Stomach, small bowel, and colon are normal in caliber without evidence of obstruction. Normal appendix. Sigmoid diverticulosis without diverticulitis. Calcified and noncalcified aortoiliac atherosclerotic calcification. Abdominal aorta is normal in caliber. No free fluid in the abdomen. CTA PELVIS: Urinary bladder and pelvic organs are normal.  No free fluid in the pelvis. Bilateral common iliac stents are in position. THORACIC/LUMBAR SPINE: BONES/ALIGNMENT: Normal alignment of the thoracic and lumbar spine. Vertebral body heights are maintained. No acute fracture.  DEGENERATIVE CHANGES: Multilevel disc narrowing and endplate osteophyte formation. Mild multilevel facet joint degenerative changes. No high-grade, bony spinal canal stenosis. SOFT TISSUES: Paraspinal soft tissues are normal.     No acute traumatic abnormality of the chest, abdomen, or pelvis. Remote, healing anterolateral left 6th rib fracture. No acute osseous abnormality of the thoracic or lumbar spine. Labs and Images reviewed with:    [] Darrius Jiang MD    [] Cathryne Heimlich, MD  [] Keila Machuca MD  --[] there are no new interval images to review. ASSESSMENT AND PLAN:         ASSESSMENT:     This is a 61 y.o. male with multiple medical comorbidities here with bilateral subarachnoid hemorrhage, possibly traumatic while on Eliquis for atrial fibrillation status post Kcentra without any aneurysm identified on CTA. Critical multivessel cerebrovascular stenosis  Critical stenosis of L proximal Vert; crit stenosis r cervical ICA; crit stenosis R cavernous ICA; moderate stenosis L cavernous ICA. Hx of CEA, CAD with PCI, peripheral artery stenting on plavix. Orthopedic injuries as follows: Widening of the scapholunate right wrist interval: Remote rib fracture: Acute traumatic closed first proximal MC shaft fx    Patient care will be discussed with attending, will reevaluate patient along with attending. PLAN/MEDICAL DECISION MAKING:    Plan for repeat CT in the a.m. Maintain antiepileptic drugs, will defer Cardene and nimodipine in the absence of aneurysm. Orthopedics to manage fractures, trauma still on board for other injuries. Likely traumatic subarachnoid in the context of critical stenosis from significant atherosclerotic cerebrovascular disease.     NEUROLOGIC:  - Imaging repeat CT in AM  - AEDs Keppra 1000 BID  - Goal -160  - Neuro checks per protocol  Monitor for vasospasm--holding nimodipine in light of hypotension  ENS to follow and give further recs  cardene as needed    CARDIOVASCULAR:  - Goal SBP 130-160  afib--on elliquis--hold for now; on plavix --hold for now  CAD with hx of cardiac stents  -multivessel critical stenosis R vert, R ICA, moderate L ICA  - Continue telemetry  -cardiology consultation if appropriate  ENS to follow  2d echo    PULMONARY:  Monitor SPO2    RENAL/FLUID/ELECTROLYTE:  - BUN 7/ Creatinine 0.4  - Urine output monitior ml/kg/hr  - IVF: 100 ml/hr LR  - Replace electrolytes PRN  - Daily BMP  Hypokalemia--3.2--replace PO    GI/NUTRITION:  NUTRITION:  Diet NPO Effective Now  - Bowel regimen: mom  - GI prophylaxis: not indicated    ID:  - Tmax monitor  - wbc 6.4  - Continue to monitor for fevers  - Daily CBC    HEME:   - H&H 11.6  - Platelets 320  - Daily CBC    ENDOCRINE:  - Continue to monitor blood glucose, goal <180  -   OTHER:  - PT/OT/ST when ready    PROPHYLAXIS:  Stress ulcer: not indicated    DVT PROPHYLAXIS:  - SCD sleeves - Thigh High   - JEANNA stockings - Thigh High  Hold elliquis and plavix.      DISPOSITION: NICU with trauma to follow      Ree Pantoja MD  Neuro Critical Care Service   Pager [phone]  12/26/2019     1:08 AM

## 2019-12-26 NOTE — OP NOTE
[facility]    NEUROENDOVASCULAR SERVICE: POST-OP NOTE: 12/26/2019    Pt Name: Pal Ospina  MRN: [id number]  YOB: 1960  Date of Procedure: 12/26/2019  Primary Care Physician: Josephine Simon MD      Pre-Procedural Diagnosis: Traumatic Subarachnoid hemorrhage and bilateral intracranial athero with right carotid stenosis  Post-Procedural Diagnosis: Critical right significant ICA stenosis wiithout evidence of discrete aneurysm      Procedure Performed: Diagnostic cerebral angiogram     Surgeon:   Tenzin Camarena MD    Fellow:  Abner Johnson MD    1st Assistant:  Lyly Infante    PRE-PROCEDURAL EXAM:  Prestroke baseline mRS MODIFIED JUS SCORE: 1 - No significant disability: despite symptoms, able to carry out all usual duties and activities. Neurological exam performed and unchanged from initial H&P or consult    Anesthesia: MAC anesthesia  Complications: none    Intra-Operative EXAM:  Neurological exam performed and unchanged from initial H&P or consult    EBL: < Minimal      Cc            Specimens: Were not Obtained  Contrast:     Visipaque 270 low osmolar 100 Cc             Fluoro: 29.7 min    Findings:  Please see dictated Radiology note for further details  1. Critical right cervical ICA stenosis measuring approximately 90 to 99% per NASCET criteria. There is delayed anterograde filling of the cervical ica across the stenosis. There is Collateral filling of the distal right ica from the retrograde filling of the right ophthalmic artery from the right IMAX. In addition there is pial collaterals from the right PCA supplying the distal right mca territory parieto/occipital region.     2. No MCA vasospasm noted however there are irregularities suspected from diffuse intracranial athero including 50% left vert athero with stenosis in the v3 segment        POST-PROCEDURAL EXAM :   Stable neurological Exam  Neurological exam performed and unchanged from initial H&P or consult    Closure:  right Vascade 5   F        POST-PROCEDURAL MONITORING : see orders  Disposition: Neuro ICU      Recommendations:  1. Back to NSICU. 2. Do not bend left leg for 3 hours. 3. Groin checks per protocol. 4. Neuro checks per icu protocol. 5. Peripheral pulse checks per protocol.   6. ICU management per NSICU team.   7. SBP goal 130-160    MD Kenn Houston MD   Pager [phone]  Stroke, [facility]  [address]  Electronically signed 12/26/2019 at 4:14 PM

## 2019-12-26 NOTE — ED PROVIDER NOTES
[facility] [address] [facility]  Emergency Department  Emergency Medicine Resident Sign-out     Care of Krissy Desouza was assumed from Dr. Catrachito Baig and is being seen for Fall (multiple falls, on eliquis, scatered SAH, kcentra en route ) and Trauma  . The patient's initial evaluation and plan have been discussed with the prior provider who initially evaluated the patient. EMERGENCY DEPARTMENT COURSE / MEDICAL DECISION MAKING:       MEDICATIONS GIVEN:  Orders Placed This Encounter   Medications    ondansetron (ZOFRAN) 4 MG/2ML injection     Mari Spencer: cabinet override    fentaNYL (SUBLIMAZE) 100 MCG/2ML injection     DNOA LOPEZ: cabinet override    iohexol (OMNIPAQUE 350) solution 150 mL    ondansetron (ZOFRAN) injection 4 mg    ondansetron (ZOFRAN) 4 MG/2ML injection     DONA LOPEZ: cabinet override    sodium chloride flush 0.9 % injection 10 mL    sodium chloride flush 0.9 % injection 10 mL    magnesium sulfate 1 g in dextrose 5% 100 mL IVPB    acetaminophen (TYLENOL) tablet 650 mg    ondansetron (ZOFRAN) injection 4 mg    DISCONTD: levetiracetam (KEPPRA) 1000 mg/100 mL IVPB    DISCONTD: levetiracetam (KEPPRA) 500 mg/100 mL IVPB    niMODipine (NIMOTOP) capsule 60 mg    rosuvastatin (CRESTOR) tablet 40 mg    DISCONTD: clevidipine (CLEVIPREX) infusion     Order Specific Question:   Please select a reason the therapeutic interchange was not accepted:      Answer:   Tomas Ortega for Pharmacy to Substitute    prochlorperazine (COMPAZINE) injection 10 mg    promethazine (PHENERGAN) injection 12.5 mg    magnesium sulfate 2 g in dextrose 5 % 100 mL IVPB    FOLLOWED BY Linked Order Group     levETIRAcetam (KEPPRA) 2,000 mg in sodium chloride 0.9 % 100 mL IVPB     levetiracetam (KEPPRA) 1000 mg/100 mL IVPB    lidocaine 1 % injection 20 mL    lidocaine 1 % injection     Sandra Jasso: cabinet override    LORazepam (ATIVAN) injection 1 mg    clevidipine (CLEVIPREX) infusion       LABS / Contrast   Preliminary Result   No acute abnormality of the cervical spine. XR WRIST LEFT (MIN 3 VIEWS)   Final Result   Acute traumatic closed 1st proximal metacarpal fracture. No acute osseous abnormality in the wrist.         XR WRIST RIGHT (MIN 3 VIEWS)   Final Result   No acute osseous abnormality the right wrist.      Widening of the scapholunate interval suggesting ligamentous injury. Radiocarpal joint osteoarthritis. CT THORACIC SPINE WO CONTRAST   Preliminary Result   No acute traumatic abnormality of the chest, abdomen, or pelvis. Remote, healing anterolateral left 6th rib fracture. No acute osseous abnormality of the thoracic or lumbar spine. CT LUMBAR SPINE WO CONTRAST   Preliminary Result   No acute traumatic abnormality of the chest, abdomen, or pelvis. Remote, healing anterolateral left 6th rib fracture. No acute osseous abnormality of the thoracic or lumbar spine. CT CHEST ABDOMEN PELVIS W CONTRAST   Preliminary Result   No acute traumatic abnormality of the chest, abdomen, or pelvis. Remote, healing anterolateral left 6th rib fracture. No acute osseous abnormality of the thoracic or lumbar spine. CTA HEAD NECK W CONTRAST   Final Result   No cerebral aneurysm or vascular malformation identified. Significant narrowing of the intracranial vessels, likely due to a   combination of atherosclerotic disease as well as vasospasm related to   subarachnoid hemorrhage. Critical stenosis of the proximal left vertebral artery. Critical stenosis of the proximal right cervical ICA with attenuated flow   seen distally. Severe near critical stenoses of the right cavernous ICA. Moderate stenoses of the contralateral cavernous ICA.          XR HAND RIGHT (MIN 3 VIEWS)    (Results Pending)   XR RADIUS ULNA LEFT (2 VIEWS)    (Results Pending)   XR RADIUS ULNA RIGHT (2 VIEWS)    (Results Pending)   XR HIP 2-3 VW W

## 2019-12-27 ENCOUNTER — APPOINTMENT (OUTPATIENT)
Dept: GENERAL RADIOLOGY | Age: 59
DRG: 030 | End: 2019-12-27
Payer: MEDICAID

## 2019-12-27 LAB
ABSOLUTE EOS #: 0.16 K/UL (ref 0–0.44)
ABSOLUTE IMMATURE GRANULOCYTE: 0.03 K/UL (ref 0–0.3)
ABSOLUTE LYMPH #: 0.93 K/UL (ref 1.1–3.7)
ABSOLUTE MONO #: 0.55 K/UL (ref 0.1–1.2)
ANION GAP SERPL CALCULATED.3IONS-SCNC: 13 MMOL/L (ref 9–17)
BASOPHILS # BLD: 0 % (ref 0–2)
BASOPHILS ABSOLUTE: <0.03 K/UL (ref 0–0.2)
BUN BLDV-MCNC: 7 MG/DL (ref 6–20)
BUN/CREAT BLD: ABNORMAL (ref 9–20)
CALCIUM SERPL-MCNC: 8.3 MG/DL (ref 8.6–10.4)
CHLORIDE BLD-SCNC: 97 MMOL/L (ref 98–107)
CHOLESTEROL/HDL RATIO: 2
CHOLESTEROL: 105 MG/DL
CO2: 27 MMOL/L (ref 20–31)
CREAT SERPL-MCNC: 0.36 MG/DL (ref 0.7–1.2)
DIFFERENTIAL TYPE: ABNORMAL
EOSINOPHILS RELATIVE PERCENT: 2 % (ref 1–4)
ESTIMATED AVERAGE GLUCOSE: 157 MG/DL
GFR AFRICAN AMERICAN: >60 ML/MIN
GFR NON-AFRICAN AMERICAN: >60 ML/MIN
GFR SERPL CREATININE-BSD FRML MDRD: ABNORMAL ML/MIN/{1.73_M2}
GFR SERPL CREATININE-BSD FRML MDRD: ABNORMAL ML/MIN/{1.73_M2}
GLUCOSE BLD-MCNC: 131 MG/DL (ref 75–110)
GLUCOSE BLD-MCNC: 146 MG/DL (ref 70–99)
GLUCOSE BLD-MCNC: 155 MG/DL (ref 75–110)
HBA1C MFR BLD: 7.1 % (ref 4–6)
HCT VFR BLD CALC: 35.4 % (ref 40.7–50.3)
HDLC SERPL-MCNC: 52 MG/DL
HEMOGLOBIN: 10.3 G/DL (ref 13–17)
IMMATURE GRANULOCYTES: 0 %
LDL CHOLESTEROL: 41 MG/DL (ref 0–130)
LYMPHOCYTES # BLD: 14 % (ref 24–43)
MAGNESIUM: 1.6 MG/DL (ref 1.6–2.6)
MCH RBC QN AUTO: 22.5 PG (ref 25.2–33.5)
MCHC RBC AUTO-ENTMCNC: 29.1 G/DL (ref 28.4–34.8)
MCV RBC AUTO: 77.3 FL (ref 82.6–102.9)
MONOCYTES # BLD: 8 % (ref 3–12)
NRBC AUTOMATED: 0 PER 100 WBC
PDW BLD-RTO: 19.8 % (ref 11.8–14.4)
PLATELET # BLD: 223 K/UL (ref 138–453)
PLATELET ESTIMATE: ABNORMAL
PMV BLD AUTO: 9.3 FL (ref 8.1–13.5)
POTASSIUM SERPL-SCNC: 3.5 MMOL/L (ref 3.7–5.3)
RBC # BLD: 4.58 M/UL (ref 4.21–5.77)
RBC # BLD: ABNORMAL 10*6/UL
SEG NEUTROPHILS: 76 % (ref 36–65)
SEGMENTED NEUTROPHILS ABSOLUTE COUNT: 5.11 K/UL (ref 1.5–8.1)
SODIUM BLD-SCNC: 137 MMOL/L (ref 135–144)
TRIGL SERPL-MCNC: 60 MG/DL
VLDLC SERPL CALC-MCNC: NORMAL MG/DL (ref 1–30)
WBC # BLD: 6.8 K/UL (ref 3.5–11.3)
WBC # BLD: ABNORMAL 10*3/UL

## 2019-12-27 PROCEDURE — 93886 INTRACRANIAL COMPLETE STUDY: CPT

## 2019-12-27 PROCEDURE — 2580000003 HC RX 258: Performed by: PSYCHIATRY & NEUROLOGY

## 2019-12-27 PROCEDURE — 2580000003 HC RX 258: Performed by: STUDENT IN AN ORGANIZED HEALTH CARE EDUCATION/TRAINING PROGRAM

## 2019-12-27 PROCEDURE — 92610 EVALUATE SWALLOWING FUNCTION: CPT

## 2019-12-27 PROCEDURE — 80061 LIPID PANEL: CPT

## 2019-12-27 PROCEDURE — 6360000002 HC RX W HCPCS: Performed by: STUDENT IN AN ORGANIZED HEALTH CARE EDUCATION/TRAINING PROGRAM

## 2019-12-27 PROCEDURE — 92611 MOTION FLUOROSCOPY/SWALLOW: CPT

## 2019-12-27 PROCEDURE — 97162 PT EVAL MOD COMPLEX 30 MIN: CPT

## 2019-12-27 PROCEDURE — 6360000002 HC RX W HCPCS: Performed by: PSYCHIATRY & NEUROLOGY

## 2019-12-27 PROCEDURE — 82947 ASSAY GLUCOSE BLOOD QUANT: CPT

## 2019-12-27 PROCEDURE — 6360000002 HC RX W HCPCS: Performed by: NURSE PRACTITIONER

## 2019-12-27 PROCEDURE — 2500000003 HC RX 250 WO HCPCS: Performed by: STUDENT IN AN ORGANIZED HEALTH CARE EDUCATION/TRAINING PROGRAM

## 2019-12-27 PROCEDURE — 74018 RADEX ABDOMEN 1 VIEW: CPT

## 2019-12-27 PROCEDURE — 6370000000 HC RX 637 (ALT 250 FOR IP): Performed by: PSYCHIATRY & NEUROLOGY

## 2019-12-27 PROCEDURE — 71045 X-RAY EXAM CHEST 1 VIEW: CPT

## 2019-12-27 PROCEDURE — 2700000000 HC OXYGEN THERAPY PER DAY

## 2019-12-27 PROCEDURE — 31720 CLEARANCE OF AIRWAYS: CPT

## 2019-12-27 PROCEDURE — 83735 ASSAY OF MAGNESIUM: CPT

## 2019-12-27 PROCEDURE — 94761 N-INVAS EAR/PLS OXIMETRY MLT: CPT

## 2019-12-27 PROCEDURE — 85025 COMPLETE CBC W/AUTO DIFF WBC: CPT

## 2019-12-27 PROCEDURE — 2000000003 HC NEURO ICU R&B

## 2019-12-27 PROCEDURE — 6360000002 HC RX W HCPCS

## 2019-12-27 PROCEDURE — 83036 HEMOGLOBIN GLYCOSYLATED A1C: CPT

## 2019-12-27 PROCEDURE — 97535 SELF CARE MNGMENT TRAINING: CPT

## 2019-12-27 PROCEDURE — 74230 X-RAY XM SWLNG FUNCJ C+: CPT

## 2019-12-27 PROCEDURE — 97166 OT EVAL MOD COMPLEX 45 MIN: CPT

## 2019-12-27 PROCEDURE — 6370000000 HC RX 637 (ALT 250 FOR IP): Performed by: STUDENT IN AN ORGANIZED HEALTH CARE EDUCATION/TRAINING PROGRAM

## 2019-12-27 PROCEDURE — 97530 THERAPEUTIC ACTIVITIES: CPT

## 2019-12-27 PROCEDURE — 99233 SBSQ HOSP IP/OBS HIGH 50: CPT | Performed by: PSYCHIATRY & NEUROLOGY

## 2019-12-27 PROCEDURE — 6370000000 HC RX 637 (ALT 250 FOR IP): Performed by: NURSE PRACTITIONER

## 2019-12-27 PROCEDURE — 36415 COLL VENOUS BLD VENIPUNCTURE: CPT

## 2019-12-27 PROCEDURE — 2500000003 HC RX 250 WO HCPCS: Performed by: PSYCHIATRY & NEUROLOGY

## 2019-12-27 PROCEDURE — 80048 BASIC METABOLIC PNL TOTAL CA: CPT

## 2019-12-27 RX ORDER — METOPROLOL TARTRATE 5 MG/5ML
5 INJECTION INTRAVENOUS EVERY 5 MIN PRN
Status: DISCONTINUED | OUTPATIENT
Start: 2019-12-27 | End: 2020-01-27 | Stop reason: HOSPADM

## 2019-12-27 RX ORDER — POTASSIUM CHLORIDE 7.45 MG/ML
40 INJECTION INTRAVENOUS ONCE
Status: COMPLETED | OUTPATIENT
Start: 2019-12-27 | End: 2019-12-27

## 2019-12-27 RX ORDER — ERGOCALCIFEROL 1.25 MG/1
50000 CAPSULE ORAL WEEKLY
Status: DISCONTINUED | OUTPATIENT
Start: 2019-12-27 | End: 2020-01-27 | Stop reason: HOSPADM

## 2019-12-27 RX ORDER — HYDRALAZINE HYDROCHLORIDE 20 MG/ML
10 INJECTION INTRAMUSCULAR; INTRAVENOUS EVERY 4 HOURS PRN
Status: DISCONTINUED | OUTPATIENT
Start: 2019-12-27 | End: 2020-01-08

## 2019-12-27 RX ORDER — POTASSIUM CHLORIDE 20 MEQ/1
40 TABLET, EXTENDED RELEASE ORAL 2 TIMES DAILY WITH MEALS
Status: DISCONTINUED | OUTPATIENT
Start: 2019-12-27 | End: 2019-12-30

## 2019-12-27 RX ORDER — POTASSIUM CHLORIDE 7.45 MG/ML
INJECTION INTRAVENOUS
Status: COMPLETED
Start: 2019-12-27 | End: 2019-12-27

## 2019-12-27 RX ORDER — HEPARIN SODIUM 5000 [USP'U]/ML
5000 INJECTION, SOLUTION INTRAVENOUS; SUBCUTANEOUS EVERY 8 HOURS SCHEDULED
Status: DISCONTINUED | OUTPATIENT
Start: 2019-12-27 | End: 2019-12-31

## 2019-12-27 RX ADMIN — METOPROLOL TARTRATE 5 MG: 5 INJECTION, SOLUTION INTRAVENOUS at 16:11

## 2019-12-27 RX ADMIN — NIMODIPINE 30 MG: 30 CAPSULE ORAL at 08:09

## 2019-12-27 RX ADMIN — MAGNESIUM SULFATE HEPTAHYDRATE 2 G: 500 INJECTION, SOLUTION INTRAMUSCULAR; INTRAVENOUS at 06:39

## 2019-12-27 RX ADMIN — METOPROLOL TARTRATE 5 MG: 5 INJECTION, SOLUTION INTRAVENOUS at 15:19

## 2019-12-27 RX ADMIN — HYDRALAZINE HYDROCHLORIDE 10 MG: 20 INJECTION INTRAMUSCULAR; INTRAVENOUS at 21:56

## 2019-12-27 RX ADMIN — METOPROLOL TARTRATE 5 MG: 5 INJECTION, SOLUTION INTRAVENOUS at 15:09

## 2019-12-27 RX ADMIN — HEPARIN SODIUM 5000 UNITS: 5000 INJECTION INTRAVENOUS; SUBCUTANEOUS at 21:01

## 2019-12-27 RX ADMIN — HEPARIN SODIUM 5000 UNITS: 5000 INJECTION INTRAVENOUS; SUBCUTANEOUS at 15:31

## 2019-12-27 RX ADMIN — LORAZEPAM 1 MG: 1 TABLET ORAL at 18:08

## 2019-12-27 RX ADMIN — METOPROLOL TARTRATE 5 MG: 5 INJECTION, SOLUTION INTRAVENOUS at 09:36

## 2019-12-27 RX ADMIN — LORAZEPAM 2 MG: 2 INJECTION INTRAMUSCULAR; INTRAVENOUS at 22:25

## 2019-12-27 RX ADMIN — LORAZEPAM 1 MG: 2 INJECTION INTRAMUSCULAR; INTRAVENOUS at 02:00

## 2019-12-27 RX ADMIN — METOPROLOL TARTRATE 5 MG: 5 INJECTION, SOLUTION INTRAVENOUS at 18:00

## 2019-12-27 RX ADMIN — THIAMINE HYDROCHLORIDE 500 MG: 100 INJECTION, SOLUTION INTRAMUSCULAR; INTRAVENOUS at 18:42

## 2019-12-27 RX ADMIN — LEVETIRACETAM 500 MG: 5 INJECTION INTRAVENOUS at 12:42

## 2019-12-27 RX ADMIN — NIMODIPINE 30 MG: 30 CAPSULE ORAL at 10:05

## 2019-12-27 RX ADMIN — METOPROLOL TARTRATE 25 MG: 25 TABLET ORAL at 18:08

## 2019-12-27 RX ADMIN — HYDRALAZINE HYDROCHLORIDE 10 MG: 20 INJECTION INTRAMUSCULAR; INTRAVENOUS at 16:41

## 2019-12-27 RX ADMIN — LORAZEPAM 1 MG: 2 INJECTION INTRAMUSCULAR; INTRAVENOUS at 20:58

## 2019-12-27 RX ADMIN — SODIUM CHLORIDE, PRESERVATIVE FREE 10 ML: 5 INJECTION INTRAVENOUS at 21:43

## 2019-12-27 RX ADMIN — NIMODIPINE 30 MG: 30 CAPSULE ORAL at 06:27

## 2019-12-27 RX ADMIN — FOLIC ACID: 5 INJECTION, SOLUTION INTRAMUSCULAR; INTRAVENOUS; SUBCUTANEOUS at 08:09

## 2019-12-27 RX ADMIN — THIAMINE HYDROCHLORIDE 500 MG: 100 INJECTION, SOLUTION INTRAMUSCULAR; INTRAVENOUS at 11:50

## 2019-12-27 RX ADMIN — METOPROLOL TARTRATE 5 MG: 5 INJECTION, SOLUTION INTRAVENOUS at 12:04

## 2019-12-27 RX ADMIN — NIMODIPINE 30 MG: 30 CAPSULE ORAL at 01:46

## 2019-12-27 RX ADMIN — NIMODIPINE 30 MG: 30 CAPSULE ORAL at 04:08

## 2019-12-27 RX ADMIN — ERGOCALCIFEROL 50000 UNITS: 1.25 CAPSULE ORAL at 18:01

## 2019-12-27 RX ADMIN — POTASSIUM CHLORIDE 40 MEQ: 7.46 INJECTION, SOLUTION INTRAVENOUS at 01:40

## 2019-12-27 RX ADMIN — THIAMINE HYDROCHLORIDE 500 MG: 100 INJECTION, SOLUTION INTRAMUSCULAR; INTRAVENOUS at 03:00

## 2019-12-27 RX ADMIN — POTASSIUM CHLORIDE 40 MEQ: 7.45 INJECTION INTRAVENOUS at 01:40

## 2019-12-27 RX ADMIN — METOPROLOL TARTRATE 5 MG: 5 INJECTION, SOLUTION INTRAVENOUS at 04:05

## 2019-12-27 RX ADMIN — METOPROLOL TARTRATE 5 MG: 5 INJECTION, SOLUTION INTRAVENOUS at 01:39

## 2019-12-27 ASSESSMENT — PAIN SCALES - GENERAL
PAINLEVEL_OUTOF10: 0

## 2019-12-27 ASSESSMENT — PAIN - FUNCTIONAL ASSESSMENT
PAIN_FUNCTIONAL_ASSESSMENT: 0-10
PAIN_FUNCTIONAL_ASSESSMENT: 0-10

## 2019-12-27 ASSESSMENT — ENCOUNTER SYMPTOMS
RESPIRATORY NEGATIVE: 1
EYES NEGATIVE: 1

## 2019-12-27 NOTE — PROGRESS NOTES
Daily Progress Note  Neuro Critical Care    Patient Name: Krissy Desouza  Patient : 1960  Room/Bed: 0522/0522-01  Code Status: full  Allergies: Allergies not on file    CHIEF COMPLAINT:      Keesha    The patient is a 56 y. o. male PMH significant for atrial fibrillation on Eliquis, bilateral ICA stenosis right more than left, DM2, HLD, HTN, CAD s/p PCI stents, PVD, CEA, ETOH abuse. Home meds not avaiable.      TFx from [facility] as a trauma alert with head CT showing diffuse bilateral SAH and left parietal SDH--received kcentra at outlying facility.       Patient was found confused by family at his home after being unable to reach him on the phone.  He was complaining of headache, and actively vomiting.  Also had bilateral arm scrapes and bruises concerning for recent fall, patient himself does not member falling, said around 4 PM he woke up and felt frontal and vertex headache and neck pain, and felt nauseous and started throwing up.  Endorses drinking alcohol last night states that he had 2 beers.  Per family has significant alcohol abuse history and has had multiple falls in the past.     On presentation in the ED [facility], patient mildly lethargic but oriented x4, complaining of headache, nausea, mild vertigo, left arm weaker than right, bilateral leg weakness.      Significant interdisciplinary discussion between the neurosurgeon, radiologist, trauma surgeon and stroke specialist about whether  bleed is consistent with traumatic versus spontaneous subarachnoid. Stroke specialist has significant concern for severe stenosis, suspects traumatic bleed and that patient is at risk for vasospasm. In addition has intracranial left vertebral artery athero-stenosis.   Consensus management strategy is to keep the patient 130-160 for blood pressure goals to avoid right MCA watershed stroke.     No aneurysm found on CTA or malformation-critical stenosis of the proximal left vertebral artery, critical stenosis of the proximal right cervical ICA, severe near critical stenosis of the right cavernous ICA, moderate stenosis of the left cavernous ICA.  Non con CT head repeat at our emergency facility showed subarachnoid, predominantly in the bilateral sylvian fissures and bilateral cerebral hemisphere sulcal I, acute left parietal convexity subdural hematoma 4 mm in thickness without mass-effect.     Patient admitted to the neuro ICU--initially requested been to give patient demoted pain and Cardene with tight blood pressure control, however given high risk for watershed infarct, the patient will be given higher blood pressure goal per endovascular neuro critical intensivist      CURRENT MEDICATIONS:  SCHEDULED MEDICATIONS:   insulin lispro  0-6 Units Subcutaneous TID WC    insulin lispro  0-3 Units Subcutaneous Nightly    thiamine (VITAMIN B1) IVPB  500 mg Intravenous Q8H    Followed by   Renetta Nelson ON 2019] thiamine  100 mg Oral Daily    niMODipine  30 mg Oral Q2H    IVPB builder   Intravenous Daily    sodium chloride flush  10 mL Intravenous 2 times per day    levetiracetam  500 mg Intravenous Q12H    ondansetron  4 mg Intravenous Once    sodium chloride flush  10 mL Intravenous 2 times per day    rosuvastatin  40 mg Oral Nightly    prochlorperazine  10 mg Intravenous Once    promethazine  12.5 mg Intravenous Once    lidocaine  20 mL Intradermal Once     CONTINUOUS INFUSIONS:   clevidipine      dextrose      sodium chloride 100 mL/hr at 19 1413     PRN MEDICATIONS:   metoprolol, glucose, dextrose, glucagon (rDNA), dextrose, magnesium sulfate, sodium chloride flush, LORazepam **OR** LORazepam **OR** LORazepam **OR** LORazepam **OR** LORazepam **OR** LORazepam **OR** LORazepam **OR** LORazepam, acetaminophen, sodium chloride flush, ondansetron    VITALS:  Temperature Range: Temp: 98.4 °F (36.9 °C) Temp  Av.2 °F (36.2 °C)  Min: 92.8 °F (33.8 °C)  Max: 98.5 °F (36.9 °C)  BP Range: Systolic (15BTB), NXP:752 , Min:84 , NKC:787     Diastolic (34RRL), BMZ:35, Min:55, Max:102    Pulse Range: Pulse  Av  Min: 72  Max: 123  Respiration Range: Resp  Av.6  Min: 0  Max: 22  Current Pulse Ox: SpO2: 93 %  24HR Pulse Ox Range: SpO2  Av.3 %  Min: 66 %  Max: 100 %  Patient Vitals for the past 12 hrs:   BP Temp Pulse Resp SpO2   19 0500 135/79 -- 104 16 --   19 0400 (!) 154/92 98.4 °F (36.9 °C) 111 16 93 %   19 0300 (!) 155/87 -- 102 14 97 %   19 0200 (!) 148/69 -- 93 16 96 %   19 0100 (!) 143/78 -- 120 16 98 %   19 0000 138/77 -- 114 16 95 %   19 2300 (!) 143/80 -- 105 16 96 %   19 2200 (!) 156/87 -- 99 14 99 %   19 2100 139/72 -- 115 16 90 %   19 2000 (!) 148/99 -- 123 18 91 %     There is no height or weight on file to calculate BMI.  []<16 Severe malnutrition  []16-16.99 Moderate malnutrition  []17-18.49 Mild malnutrition  []18.5-24.9 Normal  []25-29.9 Overweight (not obese)  []30-34.9 Obese class 1 (Low Risk)  []35-39.9 Obese class 2 (Moderate Risk)  []?40 Obese class 3 (High Risk)    RECENT LABS:   Lab Results   Component Value Date    WBC 6.6 2019    HGB 10.5 (L) 2019    HCT 36.7 (L) 2019     2019    ALT 23 2019    AST 25 2019     2019    K 3.5 (L) 2019    CL 97 (L) 2019    CREATININE 0.36 (L) 2019    BUN 7 2019    CO2 27 2019    INR 1.0 2019     24 HOUR INTAKE/OUTPUT:    Intake/Output Summary (Last 24 hours) at 2019 0710  Last data filed at 2019 0300  Gross per 24 hour   Intake 1970 ml   Output 2890 ml   Net -920 ml       Labs and Images reviewed with:  [] Dr. Brar Friends. Sanjay    [] Dr. Garrett Zaman  [] Dr. Keila Machuca  [] There are no new interval images to review. PHYSICAL EXAM       CONSTITUTIONAL:  Well developed, well nourished, alert and oriented x 3, in no acute distress. GCS 15. Nontoxic. No dysarthria. No aphasia. HEAD:  normocephalic, atraumatic    EYES:  PERRLA, EOMI.   ENT:  moist mucous membranes   NECK:  supple, symmetric   LUNGS:  Equal air entry bilaterally   CARDIOVASCULAR:  normal s1 / s2, RRR, distal pulses intact   ABDOMEN:  Soft, no rigidity   NEUROLOGIC:    Neurologic:  Cranial Nerves:              CNII: Visual acuity normal, Visual fields full to confrontation              CNIII, IV, VI: Pupils equal, round and reactive to light, full extraoccular movements without nystagmus              CN V: Facial sensation intact bilaterally to fine touch and pinprick, masseter 5/5              CN VII: Facial muscles asymmetric with left sided facial droop with smile. CN VIII: deaf on L               CN IX: Deferred              CN X: Palate elevates symmetrically              CN XI: Full strength shoulder shrug bilaterally              CN XII: Tongue protrusion full and midline  Sensation: Sensation is intact to proprioception, two point discrimination, and light touch  Cerebellar: Heel to shin intact bilaterally  Gait: Patient's gait is normal not ataxic wide-based, no shuffling. Muscle Strength:  + 5 / 5 Strength to LUE flexion, Extension  + 5 / 5 Strength to RUE flexion , Extension  + 5 / 5 Strength to flexion & extension of Left Hip leg plantar and dorsi flexion  + 5 / 5 Strength to flexion & extension of Right Hip leg plantar and dorsi flexion         DRAINS:  [x] There are no drains for Neuro Critical Care to monitor at this time. ASSESSMENT AND PLAN:           NEUROLOGIC:  1. Bilateral SAH,  IVH ,  L parietal SDH with critical R ICA stenosis   - Keppra 500 q12h  - Goal SBP < 160   - Neuro checks per protocol  - CARLOS following : TCDs, diagnostic angio 12/26   - CTH repeat stable  - Nimodipine, if TCD no spasm can d/c  - MRI with SWI  -SLP     2.  Alcoholism  - Watch for withdrawal  - B12 & folate normal   - CIWA  - Thiamine 2/3 , folate qd     CARDIOVASCULAR:  - Goal SBP

## 2019-12-27 NOTE — CARE COORDINATION
Case Management Initial Discharge Plan  [place],             Met with:patient to discuss discharge plans. Information verified: address, contacts, phone number, , insurance Yes  PCP: Chelsea Salter MD  Date of last visit:     Insurance Provider: [facility]    Discharge Planning    Living Arrangements:      Support Systems:       Home has 1 stories  3 stairs to climb to get into front door, 0stairs to climb to reach second floor  Location of bedroom/bathroom in home main floor    Patient able to perform ADL's:Independent    Current Services (outpatient & in home) none  DME equipment: walker  DME provider:       Potential Assistance Needed:       Patient agreeable to home care: Yes  Freedom of choice provided:  yes    Prior SNF/Rehab Placement and Facility: none  Agreeable to SNF/Rehab: Yes  [place] of choice provided: yes   Evaluation: n/a    Expected Discharge date:     Patient expects to be discharged to: Follow Up Appointment: Best Day/ Time:      Transportation provider: self/family  Transportation arrangements needed for discharge: No    Readmission Risk              Risk of Unplanned Readmission:        14             Does patient have a readmission risk score greater than 14?: No  If yes, follow-up appointment must be made within 7 days of discharge. Goals of Care: TO be able to eat and care for self      Discharge Plan: Pt is agreeable to home care or snf pending pt/ot rec.  Lists provided          Electronically signed by Eliseo Cortes RN on 19 at 6:28 PM

## 2019-12-27 NOTE — PLAN OF CARE
TODAY:      AWAKE & FOLLOWING COMMANDS:  [] No   [x] Yes    INTUBATED:   [x] No   [] Yes    SEDATION/ANALGESIA:    [] Propofol gtt  [] Versed gtt  [] Ativan gtt   [x] No Sedation    FEEDING: Able to take PO?   [x] No:   NPO for SLP clinical swallow study     DVT Prophylaxis:  [x] Yes: Heparin           [] No rationale:    Stress Ulcer Prophylaxis: [] Yes:   [x] Not indicated    VASOPRESSORS:  [x] No    [] Yes    CENTRAL/ARTERIAL LINES:  [x] No       BECERRIL CATHETER: [x] No       DRAINS: [x] No       Head of Bed: [x] Elevated:          [] Flat    Glucose management:  [x] Sliding Scale :  Low ISS         Inez Rising  12/27/2019  9:55 AM

## 2019-12-27 NOTE — PROGRESS NOTES
consistent with traumatic versus spontaneous subarachnoid. Stroke specialist has significant concern for severe stenosis, suspects traumatic bleed and that patient is at risk for vasospasm. In addition has intracranial left vertebral artery athero-stenosis. Consensus management strategy is to keep the patient 130-160 for blood pressure goals to avoid right MCA watershed stroke. No aneurysm found on CTA or malformation-critical stenosis of the proximal left vertebral artery, critical stenosis of the proximal right cervical ICA, severe near critical stenosis of the right cavernous ICA, moderate stenosis of the left cavernous ICA. Non con CT head repeat at our emergency facility showed subarachnoid, predominantly in the bilateral sylvian fissures and bilateral cerebral hemisphere sulcal I, acute left parietal convexity subdural hematoma 4 mm in thickness without mass-effect. Patient admitted to the neuro ICU--initially requested been to give patient demoted pain and Cardene with tight blood pressure control, however given high risk for watershed infarct, the patient will be given higher blood pressure goal per endovascular neuro critical intensivist       Pain:  Pain Assessment  Pain Assessment: 0-10  Pain Level: 0    Reason for Referral  Surjit Watt was referred for a bedside swallow evaluation to assess the efficiency of his swallow function, identify signs and symptoms of aspiration and make recommendations regarding safe dietary consistencies, effective compensatory strategies, and safe eating environment. Impression  Recommend NPO and complete Modified Barium Swallow Study to r/o/confirm aspiration prior to initiation of PO intake. Initially, pt with no immediate, overt s/s of aspiration with puree, nectar thick, and thin liquid. Pt presented with +immediate wet/gurgly cough with soft solid consistency, and +latent wet/gurgly cough following all PO trials. Decreased hyolaryngeal elevation observed.  Pt

## 2019-12-27 NOTE — PROGRESS NOTES
Wt 210 lb 8.6 oz (95.5 kg)   SpO2 92%     Blood pressure range: Systolic (64RSJ), TBI:884 , Min:108 , PYB:459   ; Diastolic (45SUF), MZV:26, Min:62, Max:105      ROS:  Review of Systems   Constitutional: Negative. Eyes: Negative. Respiratory: Negative. Cardiovascular: Negative. Genitourinary: Negative. Musculoskeletal: Negative. Hematological: Negative. NEUROLOGIC EXAMINATION  Physical Exam  HENT:      Head: Normocephalic and atraumatic. Cardiovascular:      Rate and Rhythm: Normal rate and regular rhythm. Pulmonary:      Effort: Pulmonary effort is normal.      Breath sounds: Normal breath sounds. Abdominal:      General: Abdomen is flat. Palpations: Abdomen is soft. Neurological:      General: No focal deficit present. Mental Status: He is lethargic. GCS: GCS eye subscore is 4. GCS verbal subscore is 5. GCS motor subscore is 6. Motor: No tremor, atrophy, abnormal muscle tone or seizure activity. Deep Tendon Reflexes:      Reflex Scores:       Bicep reflexes are 2+ on the right side and 2+ on the left side. Patellar reflexes are 2+ on the right side and 2+ on the left side.     .  Neurologic Exam     Gait, Coordination, and Reflexes     Reflexes   Right biceps: 2+  Left biceps: 2+  Right patellar: 2+  Left patellar: 2+       Lab Results:   CBC:   Recent Labs     12/25/19 2101 12/26/19  0826 12/27/19  0748   WBC 6.4 6.6 6.8   HGB 11.6* 10.5* 10.3*    226 223     BMP:    Recent Labs     12/25/19 2101 12/26/19  0826 12/27/19  0326    136 137   K 3.2* 3.1* 3.5*   CL 90* 96* 97*   CO2 30 29 27   BUN 7 7 7   CREATININE 0.40* 0.30* 0.36*   GLUCOSE 181* 133* 146*         Lab Results   Component Value Date    ALT 23 12/25/2019    AST 25 12/25/2019    INR 1.0 12/25/2019    [id number] 676 12/26/2019       No results found for: PHENYTOIN, PHENYTOIN, VALPROATE, CBMZ    IMAGING  CT Head w/o 12/26  Stable head CT demonstrating extensive bilateral

## 2019-12-27 NOTE — PROCEDURES
INSTRUMENTAL SWALLOW REPORT  MODIFIED BARIUM SWALLOW    NAME: Rachell Varma   : 1960  MRN: [id number]       Date of Eval: 2019              Referring Diagnosis(es):      Past Medical History:  has a past medical history of Alcohol abuse, Atrial fibrillation ([facility] [address] Diabetes ([facility] [address] Gastritis, Hyperlipidemia, Hypertension, Left ventricular hypertrophy, Neuropathy, and Renal cyst.  Past Surgical History:  has no past surgical history on file. Current Diet Solid Consistency: NPO  Current Diet Liquid Consistency: NPO       Type of Study: Initial MBS       Recent CXR: (  19  )    Impression   Increased right lower lobe opacity in the setting of trauma could represent a   pulmonary contusion or aspiration.               Patient Complaints/Reason for Referral:  Rachell Varma was referred for a MBS to assess the efficiency of his swallow function, assess for aspiration, and to make recommendations regarding safe dietary consistencies, effective compensatory strategies, and safe eating environment. Onset of problem: Per chart, The patient is a 56 y. o. male PMH significant for atrial fibrillation on Eliquis, bilateral ICA stenosis right more than left, DM2, HLD, HTN, CAD s/p PCI stents, PVD, CEA, ETOH abuse. Home meds not avaiable. TFx from [facility] as a trauma alert with head CT showing diffuse bilateral SAH and left parietal SDH--received kcentra at outlying facility. Patient was found confused by family at his home after being unable to reach him on the phone. He was complaining of headache, and actively vomiting. Also had bilateral arm scrapes and bruises concerning for recent fall, patient himself does not member falling, said around 4 PM he woke up and felt frontal and vertex headache and neck pain, and felt nauseous and started throwing up. Endorses drinking alcohol last night states that he had 2 beers.  Per family has significant alcohol abuse history and has had of nectar thick liquid and soft solid consistencies with latent cough and throat clear, respectively. Pt presented with +impaired mastication and increased oral transit time with puree and soft solid consistency. Pt exhibited premature spillage to the valleculae with soft solid and puree consistencies and premature spillage to the pyriform sinuses with nectar thick liquid. Additionally, pt with +min-mod residual in valleculae and pyriform with nectar thick liquid and puree, and min residual in valleculae and mod residual in pyriform sinuses with soft solid consistency. Decreased hyolaryngeal elevation & decreased airway protection observed throughout evaluation. ST to follow up to provide O/M/pharyngeal exercise program for dysphagia. Results & recommendations reviewed with pt & reported to RN. Patient Position: Lateral and Patient Degrees: 90    Consistencies Administered: Dysphagia Soft and Bite-Sized (Dysphagia III); Nectar  teaspoon;Dysphagia Pureed (Dysphagia I)                  Dysphagia Outcome Severity Scale: Level 1: Severe dysphagia- NPO. Unable to tolerate any PO safely  Penetration-Aspiration Scale (PAS): 7 - Material enters the airway, passes below the vocal folds, and is not ejected from the trachea despite effort    Recommended Diet:  Solid consistency: NPO  Liquid consistency: NPO     Medication administration: Via NG/PEG    Recommendations/Treatment  Requires SLP Intervention: Yes        D/C Recommendations: Further therapy recommended at discharge. Recommended Exercises:    Therapeutic Interventions: Patient/Family education; Laryngeal exercises; Pharyngeal exercises; Tongue base strengthening;[place];Oral motor exercises         Education: Images and recommendations were reviewed with pt & RN following this exam.   Patient Education: yes  Patient Education Response: Needs reinforcement    Prognosis  Prognosis for safe diet advancement: guarded  Duration/Frequency of

## 2019-12-27 NOTE — PROGRESS NOTES
Occupational Therapy   Occupational Therapy Initial Assessment  Date: 2019   Patient Name: Mayra Bautista  MRN: [id number]     : 1960    Date of Service: 2019    Discharge Recommendations:    Further therapy recommended at discharge. Assessment   Performance deficits / Impairments: Decreased functional mobility ; Decreased safe awareness;Decreased ADL status; Decreased high-level IADLs;Decreased endurance;Decreased strength;Decreased ROM; Decreased balance;Decreased posture  Assessment: Patient is expected to need acute OT services at this time to address deficits impacting performance and safety in ADL tasks and functional mobility/transfers to engage in functional activity. Prognosis: Good  Decision Making: Medium Complexity  OT Education: OT Role  Patient Education: OT role, OT POC, purpose of evaluation, importance of OOB activity - good return   REQUIRES OT FOLLOW UP: Yes  Activity Tolerance  Activity Tolerance: Patient Tolerated treatment well  Safety Devices  Safety Devices in place: Yes  Type of devices: All fall risk precautions in place;Gait belt;Patient at risk for falls; Left in bed;Call light within reach; Bed alarm in place;Nurse notified  Restraints  Initially in place: No           Patient Diagnosis(es): The encounter diagnosis was Subarachnoid bleed ([facility] [address] has a past medical history of Alcohol abuse, Atrial fibrillation ([facility] [address] Diabetes ([facility] [address] Gastritis, Hyperlipidemia, Hypertension, Left ventricular hypertrophy, Neuropathy, and Renal cyst.   has no past surgical history on file.            Restrictions  Restrictions/Precautions  Restrictions/Precautions: Fall Risk, Seizure, Weight Bearing  Required Braces or Orthoses?: No  Upper Extremity Weight Bearing Restrictions  Right Upper Extremity Weight Bearing: (No heavy lifting, pushing, pulling, right wrist)  Left Upper Extremity Weight Bearing: Non Weight Bearing  Position Activity Restriction  Other position/activity restrictions: okay to WB through the elbows     Subjective   General  Patient assessed for rehabilitation services?: Yes  Family / Caregiver Present: No  Patient Currently in Pain: Denies  Pain Assessment  Pain Assessment: 0-10  Pain Level: 0    Social/Functional History  Social/Functional History  Lives With: Alone  Type of Home: Apartment  Home Layout: One level  Bathroom Shower/Tub: Tub/Shower unit  Bathroom Toilet: Standard  Bathroom Accessibility: Accessible  ADL Assistance: Independent  Homemaking Assistance: Independent  Homemaking Responsibilities: Yes  Meal Prep Responsibility: Primary  Laundry Responsibility: Primary  Cleaning Responsibility: Primary  Shopping Responsibility: Primary  Ambulation Assistance: Independent  Transfer Assistance: Independent  Active : No  Mode of Transportation: Family  Occupation: On disability       Objective   Vision: Within Functional Limits  Hearing: Exceptions to [facility] in the L ear )  Hearing Exceptions: Hard of hearing/hearing concerns          Balance  Sitting Balance: Contact guard assistance(CGA with periods of Min A sitting EOB)  Standing Balance: Minimal assistance(posterior lean noted during standing )  Standing Balance  Time: ~3 min   Activity: EOB   ADL  Feeding: Independent  Grooming: Stand by assistance(cues to wipe mouth off from drooling )  UE Bathing: Minimal assistance  LE Bathing: Maximum assistance  UE Dressing: Minimal assistance  LE Dressing: Maximum assistance(pt requiring Max A to don socks )  Toileting:  Moderate assistance  Tone RUE  RUE Tone: Normotonic  Tone LUE  LUE Tone: Normotonic  Coordination  Movements Are Fluid And Coordinated: Yes     Bed mobility  Supine to Sit: Moderate assistance  Sit to Supine: Minimal assistance  Scooting: Minimal assistance  Transfers  Sit to stand: Minimal assistance  Stand to sit: Minimal assistance  Transfer Comments: difficulty maintaining NWB status of B UEs      Cognition  Overall Cognitive Status: Exceptions  Arousal/Alertness: Delayed responses to stimuli  Following Commands: Follows one step commands consistently; Follows one step commands with increased time  Safety Judgement: Decreased awareness of need for assistance;Decreased awareness of need for safety  Insights: Decreased awareness of deficits  Initiation: Requires cues for some  Sequencing: Requires cues for some                 LUE AROM : WFL  Left Hand AROM: WFL  RUE AROM : WFL  Right Hand AROM: WFL  LUE Strength  Gross LUE Strength: WFL  LUE Strength Comment: painful in wrist, DNT MMT; grossly WFL   RUE Strength  Gross RUE Strength: WFL  RUE Strength Comment: painful in wrist, DNT MMT; grossly [facility]               Plan   Plan  Times per week: 3-5x/wk     AM-PAC Score        AM-[facility] Inpatient Daily Activity Raw Score: 17 (12/27/19 1537)  AM-PAC Inpatient ADL T-Scale Score : 37.26 (12/27/19 1537)  ADL Inpatient CMS 0-100% Score: 50.11 (12/27/19 1537)  ADL Inpatient CMS G-Code Modifier : CK (12/27/19 1537)    Goals  Short term goals  Time Frame for Short term goals: Patient will, by discharge  Short term goal 1: engage in ROM exercises to reduce risk of swelling and maintain functional use of bilateral hands  Short term goal 2: demonstrate UB self-cares at [facility] 346 term goal 3: demonstrate LB self-cares at [facility] using AE   Short term goal 4: demonstrate ~10 min of dynamic standing tolerance to engage in ADL tasks using LRD   Short term goal 5: demonstrate ~30 min of functional activity tolerance to engage in ADL tasks        Therapy Time   Individual Concurrent Group Co-treatment   Time In 1008         Time Out 1030         Minutes 22                 Magali Red, OTR/L

## 2019-12-27 NOTE — PROGRESS NOTES
ENDOVASCULAR NEUROSURGERY PROGRESS NOTE  12/27/2019 12:00 PM  Subjective:   Admit Date: 12/25/2019  PCP: Mayo Curry MD    Patient is more arousable this morning. He is following commands. No headache. Objective:   Vitals: BP (!) 147/89   Pulse 106   Temp 98.6 °F (37 °C) (Oral)   Resp 17   Wt 210 lb 8.6 oz (95.5 kg)   SpO2 93%   General appearance: Lying in bed, NAD  HEENT: Atraumatic. Neck: Neck is supple. Lungs: No respiratory distress noted. Abdomen: Soft nontender. Extremities: No lower limb edema noted. Neurologic: He is arousable, tracking in the room, he is following simple commands. He is oriented to the month and year correctly. CN: Both are equal reactive to light at 3 mm, he is tracking the room, no facial droop noted. MOTOR:  He is moving both upper and lower extremities against gravity with no drift noted. SENSORY: He is withdrawing both upper and lower extremities.     Medications and labs:   Scheduled Meds:   potassium chloride  40 mEq Oral BID WC    vitamin D  50,000 Units Oral Weekly    heparin (porcine)  5,000 Units Subcutaneous 3 times per day    insulin lispro  0-6 Units Subcutaneous TID WC    insulin lispro  0-3 Units Subcutaneous Nightly    thiamine (VITAMIN B1) IVPB  500 mg Intravenous Q8H    Followed by   Omar Garvin ON 12/29/2019] thiamine  100 mg Oral Daily    niMODipine  30 mg Oral Q2H    IVPB builder   Intravenous Daily    sodium chloride flush  10 mL Intravenous 2 times per day    levetiracetam  500 mg Intravenous Q12H    ondansetron  4 mg Intravenous Once    sodium chloride flush  10 mL Intravenous 2 times per day    rosuvastatin  40 mg Oral Nightly    prochlorperazine  10 mg Intravenous Once    promethazine  12.5 mg Intravenous Once    lidocaine  20 mL Intradermal Once     Continuous Infusions:   dextrose      sodium chloride 100 mL/hr at 12/26/19 1413     CBC:   Recent Labs     12/25/19  2101 12/26/19  0826 12/27/19  0748   WBC 6.4 6.6 6.8   HGB 11.6* 10.5* 10.3*    226 223     BMP:    Recent Labs     12/25/19 2101 12/26/19  0826 12/27/19  0326    136 137   K 3.2* 3.1* 3.5*   CL 90* 96* 97*   CO2 30 29 27   BUN 7 7 7   CREATININE 0.40* 0.30* 0.36*   GLUCOSE 181* 133* 146*     Hepatic:   Recent Labs     12/25/19 2101   AST 25   ALT 23   BILITOT 1.27*   ALKPHOS 95     Troponin: No results for input(s): TROPONINI in the last 72 hours. BNP: No results for input(s): BNP in the last 72 hours. Lipids:   Recent Labs     12/27/19  0748   CHOL 105   HDL 52     INR:   Recent Labs     12/25/19 2101   INR 1.0       Assessment and Recommendations:     Traumatic subarachnoid hemorrhage. Less likely aneurysmal subarachnoid hemorrhage.     s/p diagnostic cerebral angiogram in December 26, 2019  1. Critical right cervical ICA stenosis measuring approximately 90 to 99% per NASCET criteria. There is delayed anterograde filling of the cervical ica across the stenosis. There is Collateral filling of the distal right ica from the retrograde filling of the right ophthalmic artery from the right IMAX. In addition there is pial collaterals from the right PCA supplying the distal right mca territory parieto/occipital region. 2. No MCA vasospasm noted however there are irregularities suspected from diffuse intracranial athero including 50% left vert athero with stenosis in the v3 segment     Right groin looks clean, no hematoma or swelling noted. PLAN:  1. Traumatic subarachnoid hemorrhage management per neuro ICU. 2. Pressure goal 1 30-1 60.  3. Smoking cessation, PT/OT evaluation. Please contact neuro endovascular team for any questions or concerns.     Sola Patton MD  Stroke, [facility]  [address] [place]  Electronically signed 12/27/2019 at 12:00 PM

## 2019-12-27 NOTE — PROGRESS NOTES
goals  Time Frame for Short term goals: 15  Short term goal 1: Pt to perform bed mobility CGA  Short term goal 2: Demonstrate functional transfers CGA  Short term goal 3: Ambulate 100ft w/ least restrictive AD Desi with good safety awareness  Short term goal 4: Tolerate 30 minutes of therapy to demo increased endurance  Patient Goals   Patient goals : Did not state       Therapy Time   Individual Concurrent Group Co-treatment   Time In 1008         Time Out 1031         Minutes 23         Timed Code Treatment Minutes: 9 Minutes       Selena Chen, PT

## 2019-12-27 NOTE — PROGRESS NOTES
Nutrition Assessment    Type and Reason for Visit: Initial, Consult    Nutrition Recommendations: Recommend Glucerna at 85 ml per hour to provide 2448 kcal 122 g protein    Nutrition Assessment: RD consulted for initiation of Tube feed. Malnutrition Assessment:  · Malnutrition Status:    · Context: Acute illness or injury  · Findings of the 6 clinical characteristics of malnutrition (Minimum of 2 out of 6 clinical characteristics is required to make the diagnosis of moderate or severe Protein Calorie Malnutrition based on AND/ASPEN Guidelines):  1. Energy Intake-Unable to assess, Unable to assess    2. Weight Loss-Unable to assess,    3. Fat Loss-Unable to assess,    4. Muscle Loss-Unable to assess,    5. Fluid Accumulation-Mild fluid accumulation, Extremities  6.   Strength-Not measured    Nutrition Risk Level: High    Nutrient Needs:  · Estimated Daily Total Kcal: 25 kcal/sh=1372 kcal  · Estimated Daily Protein (g): 1g/kg=95g    Nutrition Diagnosis:   · Problem: Inadequate oral intake  · Etiology: related to Difficulty swallowing     Signs and symptoms:  as evidenced by NPO status due to medical condition, Nutrition support - EN    Objective Information:  · Current Nutrition Therapies:  · Oral Diet Orders: NPO   · Anthropometric Measures:  · Ht:   N/A  · Current Body Wt: 210 lb (95.3 kg)    Nutrition Interventions:   Start Tube Feeding  Education Not Indicated    Nutrition Evaluation:   · Evaluation: Goals set   · Goals: meet more than 75% of nutrition needs    · Monitoring: TF Tolerance, TF Intake, Pertinent Labs, Monitor Bowel Function      Electronically signed by Sachin Sweet RD, LD on 12/27/19 at 2:49 PM    Contact Number: [phone]

## 2019-12-28 ENCOUNTER — APPOINTMENT (OUTPATIENT)
Dept: CT IMAGING | Age: 59
DRG: 030 | End: 2019-12-28
Payer: MEDICAID

## 2019-12-28 ENCOUNTER — APPOINTMENT (OUTPATIENT)
Dept: GENERAL RADIOLOGY | Age: 59
DRG: 030 | End: 2019-12-28
Payer: MEDICAID

## 2019-12-28 LAB
ABSOLUTE EOS #: 0.08 K/UL (ref 0–0.44)
ABSOLUTE IMMATURE GRANULOCYTE: 0 K/UL (ref 0–0.3)
ABSOLUTE LYMPH #: 0.98 K/UL (ref 1.1–3.7)
ABSOLUTE MONO #: 0.53 K/UL (ref 0.1–1.2)
ALLEN TEST: POSITIVE
ALLEN TEST: POSITIVE
ANION GAP SERPL CALCULATED.3IONS-SCNC: 14 MMOL/L (ref 9–17)
BASOPHILS # BLD: 0 % (ref 0–2)
BASOPHILS ABSOLUTE: 0 K/UL (ref 0–0.2)
BUN BLDV-MCNC: 6 MG/DL (ref 6–20)
BUN/CREAT BLD: ABNORMAL (ref 9–20)
CALCIUM SERPL-MCNC: 8.3 MG/DL (ref 8.6–10.4)
CHLORIDE BLD-SCNC: 97 MMOL/L (ref 98–107)
CO2: 22 MMOL/L (ref 20–31)
CREAT SERPL-MCNC: 0.23 MG/DL (ref 0.7–1.2)
DIFFERENTIAL TYPE: ABNORMAL
EKG ATRIAL RATE: 133 BPM
EKG ATRIAL RATE: 156 BPM
EKG Q-T INTERVAL: 356 MS
EKG Q-T INTERVAL: 364 MS
EKG QRS DURATION: 162 MS
EKG QRS DURATION: 172 MS
EKG QTC CALCULATION (BAZETT): 500 MS
EKG QTC CALCULATION (BAZETT): 522 MS
EKG R AXIS: -77 DEGREES
EKG R AXIS: -82 DEGREES
EKG T AXIS: 39 DEGREES
EKG T AXIS: 9 DEGREES
EKG VENTRICULAR RATE: 119 BPM
EKG VENTRICULAR RATE: 124 BPM
EOSINOPHILS RELATIVE PERCENT: 1 % (ref 1–4)
FIO2: 30
FIO2: 4.5
GFR AFRICAN AMERICAN: >60 ML/MIN
GFR NON-AFRICAN AMERICAN: >60 ML/MIN
GFR SERPL CREATININE-BSD FRML MDRD: ABNORMAL ML/MIN/{1.73_M2}
GFR SERPL CREATININE-BSD FRML MDRD: ABNORMAL ML/MIN/{1.73_M2}
GLUCOSE BLD-MCNC: 139 MG/DL (ref 70–99)
GLUCOSE BLD-MCNC: 148 MG/DL (ref 75–110)
GLUCOSE BLD-MCNC: 149 MG/DL (ref 75–110)
GLUCOSE BLD-MCNC: 172 MG/DL (ref 75–110)
HCT VFR BLD CALC: 37.3 % (ref 40.7–50.3)
HEMOGLOBIN: 10.8 G/DL (ref 13–17)
IMMATURE GRANULOCYTES: 0 %
LYMPHOCYTES # BLD: 13 % (ref 24–43)
MAGNESIUM: 1.6 MG/DL (ref 1.6–2.6)
MCH RBC QN AUTO: 22.5 PG (ref 25.2–33.5)
MCHC RBC AUTO-ENTMCNC: 29 G/DL (ref 28.4–34.8)
MCV RBC AUTO: 77.5 FL (ref 82.6–102.9)
MODE: ABNORMAL
MODE: ABNORMAL
MONOCYTES # BLD: 7 % (ref 3–12)
MORPHOLOGY: ABNORMAL
NEGATIVE BASE EXCESS, ART: ABNORMAL (ref 0–2)
NEGATIVE BASE EXCESS, ART: ABNORMAL (ref 0–2)
NRBC AUTOMATED: 0 PER 100 WBC
O2 DEVICE/FLOW/%: ABNORMAL
O2 DEVICE/FLOW/%: ABNORMAL
PATIENT TEMP: ABNORMAL
PATIENT TEMP: ABNORMAL
PDW BLD-RTO: 20.3 % (ref 11.8–14.4)
PLATELET # BLD: 226 K/UL (ref 138–453)
PLATELET ESTIMATE: ABNORMAL
PMV BLD AUTO: 9.1 FL (ref 8.1–13.5)
POC HCO3: 26.6 MMOL/L (ref 21–28)
POC HCO3: 27.6 MMOL/L (ref 21–28)
POC O2 SATURATION: 97 % (ref 94–98)
POC O2 SATURATION: 98 % (ref 94–98)
POC PCO2 TEMP: ABNORMAL MM HG
POC PCO2 TEMP: ABNORMAL MM HG
POC PCO2: 35.8 MM HG (ref 35–48)
POC PCO2: 38.9 MM HG (ref 35–48)
POC PH TEMP: ABNORMAL
POC PH TEMP: ABNORMAL
POC PH: 7.46 (ref 7.35–7.45)
POC PH: 7.48 (ref 7.35–7.45)
POC PO2 TEMP: ABNORMAL MM HG
POC PO2 TEMP: ABNORMAL MM HG
POC PO2: 100.3 MM HG (ref 83–108)
POC PO2: 85.2 MM HG (ref 83–108)
POSITIVE BASE EXCESS, ART: 3 (ref 0–3)
POSITIVE BASE EXCESS, ART: 4 (ref 0–3)
POTASSIUM SERPL-SCNC: 3.4 MMOL/L (ref 3.7–5.3)
RBC # BLD: 4.81 M/UL (ref 4.21–5.77)
RBC # BLD: ABNORMAL 10*6/UL
SAMPLE SITE: ABNORMAL
SAMPLE SITE: ABNORMAL
SEG NEUTROPHILS: 79 % (ref 36–65)
SEGMENTED NEUTROPHILS ABSOLUTE COUNT: 5.91 K/UL (ref 1.5–8.1)
SODIUM BLD-SCNC: 133 MMOL/L (ref 135–144)
TCO2 (CALC), ART: 28 MMOL/L (ref 22–29)
TCO2 (CALC), ART: 29 MMOL/L (ref 22–29)
TROPONIN INTERP: ABNORMAL
TROPONIN T: ABNORMAL NG/ML
TROPONIN, HIGH SENSITIVITY: 27 NG/L (ref 0–22)
WBC # BLD: 7.5 K/UL (ref 3.5–11.3)
WBC # BLD: ABNORMAL 10*3/UL

## 2019-12-28 PROCEDURE — 6370000000 HC RX 637 (ALT 250 FOR IP): Performed by: PSYCHIATRY & NEUROLOGY

## 2019-12-28 PROCEDURE — 6360000002 HC RX W HCPCS: Performed by: STUDENT IN AN ORGANIZED HEALTH CARE EDUCATION/TRAINING PROGRAM

## 2019-12-28 PROCEDURE — 70450 CT HEAD/BRAIN W/O DYE: CPT

## 2019-12-28 PROCEDURE — 5A1955Z RESPIRATORY VENTILATION, GREATER THAN 96 CONSECUTIVE HOURS: ICD-10-PCS | Performed by: PSYCHIATRY & NEUROLOGY

## 2019-12-28 PROCEDURE — 6360000002 HC RX W HCPCS: Performed by: NURSE PRACTITIONER

## 2019-12-28 PROCEDURE — 2500000003 HC RX 250 WO HCPCS: Performed by: STUDENT IN AN ORGANIZED HEALTH CARE EDUCATION/TRAINING PROGRAM

## 2019-12-28 PROCEDURE — 2500000003 HC RX 250 WO HCPCS: Performed by: NURSE PRACTITIONER

## 2019-12-28 PROCEDURE — 71045 X-RAY EXAM CHEST 1 VIEW: CPT

## 2019-12-28 PROCEDURE — 6370000000 HC RX 637 (ALT 250 FOR IP): Performed by: STUDENT IN AN ORGANIZED HEALTH CARE EDUCATION/TRAINING PROGRAM

## 2019-12-28 PROCEDURE — 93005 ELECTROCARDIOGRAM TRACING: CPT | Performed by: STUDENT IN AN ORGANIZED HEALTH CARE EDUCATION/TRAINING PROGRAM

## 2019-12-28 PROCEDURE — 6360000002 HC RX W HCPCS: Performed by: PSYCHIATRY & NEUROLOGY

## 2019-12-28 PROCEDURE — 2580000003 HC RX 258: Performed by: PSYCHIATRY & NEUROLOGY

## 2019-12-28 PROCEDURE — 6370000000 HC RX 637 (ALT 250 FOR IP): Performed by: NURSE PRACTITIONER

## 2019-12-28 PROCEDURE — 84484 ASSAY OF TROPONIN QUANT: CPT

## 2019-12-28 PROCEDURE — 2700000000 HC OXYGEN THERAPY PER DAY

## 2019-12-28 PROCEDURE — 36600 WITHDRAWAL OF ARTERIAL BLOOD: CPT

## 2019-12-28 PROCEDURE — 2000000003 HC NEURO ICU R&B

## 2019-12-28 PROCEDURE — 2580000003 HC RX 258: Performed by: STUDENT IN AN ORGANIZED HEALTH CARE EDUCATION/TRAINING PROGRAM

## 2019-12-28 PROCEDURE — 31500 INSERT EMERGENCY AIRWAY: CPT

## 2019-12-28 PROCEDURE — 99291 CRITICAL CARE FIRST HOUR: CPT | Performed by: PSYCHIATRY & NEUROLOGY

## 2019-12-28 PROCEDURE — 85025 COMPLETE CBC W/AUTO DIFF WBC: CPT

## 2019-12-28 PROCEDURE — 99233 SBSQ HOSP IP/OBS HIGH 50: CPT | Performed by: PSYCHIATRY & NEUROLOGY

## 2019-12-28 PROCEDURE — 0BH18EZ INSERTION OF ENDOTRACHEAL AIRWAY INTO TRACHEA, VIA NATURAL OR ARTIFICIAL OPENING ENDOSCOPIC: ICD-10-PCS | Performed by: PSYCHIATRY & NEUROLOGY

## 2019-12-28 PROCEDURE — 82803 BLOOD GASES ANY COMBINATION: CPT

## 2019-12-28 PROCEDURE — 80048 BASIC METABOLIC PNL TOTAL CA: CPT

## 2019-12-28 PROCEDURE — 94761 N-INVAS EAR/PLS OXIMETRY MLT: CPT

## 2019-12-28 PROCEDURE — 94770 HC ETCO2 MONITOR DAILY: CPT

## 2019-12-28 PROCEDURE — 2500000003 HC RX 250 WO HCPCS: Performed by: PSYCHIATRY & NEUROLOGY

## 2019-12-28 PROCEDURE — 83735 ASSAY OF MAGNESIUM: CPT

## 2019-12-28 PROCEDURE — 36415 COLL VENOUS BLD VENIPUNCTURE: CPT

## 2019-12-28 PROCEDURE — 82947 ASSAY GLUCOSE BLOOD QUANT: CPT

## 2019-12-28 PROCEDURE — 94002 VENT MGMT INPAT INIT DAY: CPT

## 2019-12-28 RX ORDER — SOTALOL HYDROCHLORIDE 80 MG/1
80 TABLET ORAL 2 TIMES DAILY
Status: DISCONTINUED | OUTPATIENT
Start: 2019-12-28 | End: 2019-12-31

## 2019-12-28 RX ORDER — MAGNESIUM SULFATE 1 G/100ML
1 INJECTION INTRAVENOUS
Status: COMPLETED | OUTPATIENT
Start: 2019-12-28 | End: 2019-12-28

## 2019-12-28 RX ORDER — ERGOCALCIFEROL 1.25 MG/1
50000 CAPSULE ORAL WEEKLY
Qty: 8 CAPSULE | Refills: 0 | Status: ON HOLD | OUTPATIENT
Start: 2019-12-28 | End: 2020-02-12 | Stop reason: HOSPADM

## 2019-12-28 RX ORDER — CHLORHEXIDINE GLUCONATE 0.12 MG/ML
15 RINSE ORAL 2 TIMES DAILY
Status: DISCONTINUED | OUTPATIENT
Start: 2019-12-28 | End: 2020-01-27 | Stop reason: HOSPADM

## 2019-12-28 RX ORDER — METOPROLOL TARTRATE 5 MG/5ML
5 INJECTION INTRAVENOUS ONCE
Status: COMPLETED | OUTPATIENT
Start: 2019-12-28 | End: 2019-12-28

## 2019-12-28 RX ORDER — METOPROLOL TARTRATE 50 MG/1
50 TABLET, FILM COATED ORAL 2 TIMES DAILY
Status: DISCONTINUED | OUTPATIENT
Start: 2019-12-28 | End: 2019-12-28

## 2019-12-28 RX ORDER — OLANZAPINE 10 MG/1
5 INJECTION, POWDER, LYOPHILIZED, FOR SOLUTION INTRAMUSCULAR ONCE
Status: COMPLETED | OUTPATIENT
Start: 2019-12-28 | End: 2019-12-28

## 2019-12-28 RX ORDER — ROSUVASTATIN CALCIUM 5 MG/1
5 TABLET, COATED ORAL NIGHTLY
Status: DISCONTINUED | OUTPATIENT
Start: 2019-12-28 | End: 2020-01-27 | Stop reason: HOSPADM

## 2019-12-28 RX ADMIN — HEPARIN SODIUM 5000 UNITS: 5000 INJECTION INTRAVENOUS; SUBCUTANEOUS at 13:56

## 2019-12-28 RX ADMIN — THIAMINE HYDROCHLORIDE 500 MG: 100 INJECTION, SOLUTION INTRAMUSCULAR; INTRAVENOUS at 10:23

## 2019-12-28 RX ADMIN — ROSUVASTATIN CALCIUM 5 MG: 5 TABLET, FILM COATED ORAL at 20:14

## 2019-12-28 RX ADMIN — FAMOTIDINE 20 MG: 10 INJECTION, SOLUTION INTRAVENOUS at 09:20

## 2019-12-28 RX ADMIN — METOPROLOL TARTRATE 5 MG: 5 INJECTION, SOLUTION INTRAVENOUS at 04:07

## 2019-12-28 RX ADMIN — INSULIN LISPRO 1 UNITS: 100 INJECTION, SOLUTION INTRAVENOUS; SUBCUTANEOUS at 20:40

## 2019-12-28 RX ADMIN — SODIUM CHLORIDE, PRESERVATIVE FREE 10 ML: 5 INJECTION INTRAVENOUS at 20:21

## 2019-12-28 RX ADMIN — METOPROLOL TARTRATE 5 MG: 5 INJECTION, SOLUTION INTRAVENOUS at 01:04

## 2019-12-28 RX ADMIN — HEPARIN SODIUM 5000 UNITS: 5000 INJECTION INTRAVENOUS; SUBCUTANEOUS at 21:20

## 2019-12-28 RX ADMIN — SODIUM CHLORIDE: 9 INJECTION, SOLUTION INTRAVENOUS at 07:25

## 2019-12-28 RX ADMIN — THIAMINE HYDROCHLORIDE 500 MG: 100 INJECTION, SOLUTION INTRAMUSCULAR; INTRAVENOUS at 02:56

## 2019-12-28 RX ADMIN — POTASSIUM CHLORIDE 40 MEQ: 1500 TABLET, EXTENDED RELEASE ORAL at 20:19

## 2019-12-28 RX ADMIN — LORAZEPAM 2 MG: 2 INJECTION INTRAMUSCULAR; INTRAVENOUS at 00:17

## 2019-12-28 RX ADMIN — METOPROLOL TARTRATE 5 MG: 5 INJECTION, SOLUTION INTRAVENOUS at 04:21

## 2019-12-28 RX ADMIN — MAGNESIUM SULFATE HEPTAHYDRATE 1 G: 1 INJECTION, SOLUTION INTRAVENOUS at 08:59

## 2019-12-28 RX ADMIN — METOPROLOL TARTRATE 5 MG: 5 INJECTION, SOLUTION INTRAVENOUS at 10:23

## 2019-12-28 RX ADMIN — WATER 2.1 ML: 1 INJECTION INTRAMUSCULAR; INTRAVENOUS; SUBCUTANEOUS at 03:39

## 2019-12-28 RX ADMIN — FOLIC ACID: 5 INJECTION, SOLUTION INTRAMUSCULAR; INTRAVENOUS; SUBCUTANEOUS at 08:56

## 2019-12-28 RX ADMIN — FAMOTIDINE 20 MG: 10 INJECTION, SOLUTION INTRAVENOUS at 20:15

## 2019-12-28 RX ADMIN — DEXMEDETOMIDINE HYDROCHLORIDE 0.2 MCG/KG/HR: 100 INJECTION, SOLUTION INTRAVENOUS at 16:17

## 2019-12-28 RX ADMIN — POTASSIUM BICARBONATE 40 MEQ: 782 TABLET, EFFERVESCENT ORAL at 08:54

## 2019-12-28 RX ADMIN — DILTIAZEM HYDROCHLORIDE 5 MG/HR: 5 INJECTION INTRAVENOUS at 05:23

## 2019-12-28 RX ADMIN — METOPROLOL TARTRATE 5 MG: 5 INJECTION, SOLUTION INTRAVENOUS at 01:24

## 2019-12-28 RX ADMIN — SOTALOL HYDROCHLORIDE 80 MG: 80 TABLET ORAL at 13:56

## 2019-12-28 RX ADMIN — OLANZAPINE 5 MG: 10 INJECTION, POWDER, LYOPHILIZED, FOR SOLUTION INTRAMUSCULAR at 03:37

## 2019-12-28 RX ADMIN — Medication 15 ML: at 20:14

## 2019-12-28 RX ADMIN — Medication 15 ML: at 10:23

## 2019-12-28 RX ADMIN — HEPARIN SODIUM 5000 UNITS: 5000 INJECTION INTRAVENOUS; SUBCUTANEOUS at 06:58

## 2019-12-28 RX ADMIN — SODIUM CHLORIDE, PRESERVATIVE FREE 10 ML: 5 INJECTION INTRAVENOUS at 20:20

## 2019-12-28 RX ADMIN — METOPROLOL TARTRATE 5 MG: 5 INJECTION, SOLUTION INTRAVENOUS at 04:13

## 2019-12-28 RX ADMIN — ACETAMINOPHEN 650 MG: 325 TABLET ORAL at 08:54

## 2019-12-28 RX ADMIN — MAGNESIUM SULFATE HEPTAHYDRATE 1 G: 1 INJECTION, SOLUTION INTRAVENOUS at 08:54

## 2019-12-28 RX ADMIN — POTASSIUM CHLORIDE 40 MEQ: 1500 TABLET, EXTENDED RELEASE ORAL at 08:54

## 2019-12-28 RX ADMIN — INSULIN LISPRO 1 UNITS: 100 INJECTION, SOLUTION INTRAVENOUS; SUBCUTANEOUS at 16:51

## 2019-12-28 RX ADMIN — Medication 1 MG/HR: at 04:42

## 2019-12-28 RX ADMIN — THIAMINE HYDROCHLORIDE 500 MG: 100 INJECTION, SOLUTION INTRAMUSCULAR; INTRAVENOUS at 21:19

## 2019-12-28 RX ADMIN — LORAZEPAM 4 MG: 2 INJECTION INTRAMUSCULAR; INTRAVENOUS at 01:24

## 2019-12-28 RX ADMIN — LEVETIRACETAM 500 MG: 5 INJECTION INTRAVENOUS at 00:30

## 2019-12-28 RX ADMIN — LEVETIRACETAM 500 MG: 5 INJECTION INTRAVENOUS at 14:05

## 2019-12-28 RX ADMIN — LORAZEPAM 4 MG: 2 INJECTION INTRAMUSCULAR; INTRAVENOUS at 02:51

## 2019-12-28 RX ADMIN — HYDRALAZINE HYDROCHLORIDE 10 MG: 20 INJECTION INTRAMUSCULAR; INTRAVENOUS at 09:20

## 2019-12-28 RX ADMIN — SOTALOL HYDROCHLORIDE 80 MG: 80 TABLET ORAL at 20:14

## 2019-12-28 RX ADMIN — INSULIN LISPRO 1 UNITS: 100 INJECTION, SOLUTION INTRAVENOUS; SUBCUTANEOUS at 14:05

## 2019-12-28 ASSESSMENT — PULMONARY FUNCTION TESTS
PIF_VALUE: 20
PIF_VALUE: 19
PIF_VALUE: 21
PIF_VALUE: 18
PIF_VALUE: 22
PIF_VALUE: 20
PIF_VALUE: 20

## 2019-12-28 NOTE — PROGRESS NOTES
Charting intubations and reintubations    ETT Size : 8.0  ETT Placement : 26@ lips  ETT secured with : Ronit    Tube placement verified by:  Auscultation : yes  Positive color change on end tidal CO2 detector : yes  CXR : ordered    Reason for intubation :Airway protection          -- Notify charge therapist regarding all intubations, extubations, reintubations and self extubations    John Gallagher  4:47 AM

## 2019-12-28 NOTE — PROCEDURES
Intubation Procedure Note    Indication: impending respiratory failure    Consent: Unable to be obtained due to the emergent nature of this procedure. Medications Used: etomidate intravenously and succinycholine intravenously    Procedure: The patient was placed in the appropriate position. Intubation was performed by indirect laryngoscopy using a bronchoscope and an 8.0 cuffed endotracheal tube. The cuff was then inflated and the tube was secured appropriately at a distance of 26 cm to the dental ridge. Initial confirmation of placement included bilateral breath sounds, an end tidal CO2 detector, absence of sounds over the stomach, tube fogging and adequate pulse oximetry reading. A chest x-ray to verify correct placement of the tube has been ordered but is still pending. The patient tolerated the procedure well.      Complications: None

## 2019-12-28 NOTE — PLAN OF CARE
Problem: OXYGENATION/RESPIRATORY FUNCTION  Goal: Patient will maintain patent airway  Outcome: Ongoing     Problem: OXYGENATION/RESPIRATORY FUNCTION  Goal: Patient will achieve/maintain normal respiratory rate/effort  Description  Respiratory rate and effort will be within normal limits for the patient  Outcome: Ongoing     Problem: MECHANICAL VENTILATION  Goal: Mobility/activity is maintained at optimum level for patient  12/28/2019 0450 by Juan Prar RCP  Outcome: Ongoing     Problem: MECHANICAL VENTILATION  Goal: Patient will maintain patent airway  Outcome: Ongoing     Problem: MECHANICAL VENTILATION  Goal: Oral health is maintained or improved  Outcome: Ongoing     Problem: MECHANICAL VENTILATION  Goal: ET tube will be managed safely  Outcome: Ongoing     Problem: MECHANICAL VENTILATION  Goal: Ability to express needs and understand communication  Outcome: Ongoing     Problem: SKIN INTEGRITY  Goal: Skin integrity is maintained or improved  Outcome: Ongoing

## 2019-12-28 NOTE — PROGRESS NOTES
Daily Progress Note  Neuro Critical Care    Patient Name: Rabia Meadows  Patient : 1960  Room/Bed: 0522/0522-01  Code Status: full  Allergies: No Known Allergies    CHIEF COMPLAINT:      SAH     INTERVAL HISTORY    The patient is a 56 y. o. male PMH significant for atrial fibrillation on Eliquis, bilateral ICA stenosis right more than left, DM2, HLD, HTN, CAD s/p PCI stents, PVD, CEA, ETOH abuse. Home meds not avaiable.      TFx from [facility] as a trauma alert with head CT showing diffuse bilateral SAH and left parietal SDH--received kcentra at outlying facility.       Patient was found confused by family at his home after being unable to reach him on the phone.  He was complaining of headache, and actively vomiting.  Also had bilateral arm scrapes and bruises concerning for recent fall, patient himself does not member falling, said around 4 PM he woke up and felt frontal and vertex headache and neck pain, and felt nauseous and started throwing up.  Endorses drinking alcohol last night states that he had 2 beers.  Per family has significant alcohol abuse history and has had multiple falls in the past.     On presentation in the ED [facility], patient mildly lethargic but oriented x4, complaining of headache, nausea, mild vertigo, left arm weaker than right, bilateral leg weakness.      Significant interdisciplinary discussion between the neurosurgeon, radiologist, trauma surgeon and stroke specialist about whether  bleed is consistent with traumatic versus spontaneous subarachnoid. Stroke specialist has significant concern for severe stenosis, suspects traumatic bleed and that patient is at risk for vasospasm. In addition has intracranial left vertebral artery athero-stenosis.   Consensus management strategy is to keep the patient 130-160 for blood pressure goals to avoid right MCA watershed stroke.     No aneurysm found on CTA or malformation-critical stenosis of the proximal left vertebral Intravenous Once    sodium chloride flush  10 mL Intravenous 2 times per day    rosuvastatin  40 mg Oral Nightly    prochlorperazine  10 mg Intravenous Once    promethazine  12.5 mg Intravenous Once    lidocaine  20 mL Intradermal Once     CONTINUOUS INFUSIONS:   diltiazem (CARDIZEM) 125 mg in dextrose 5% 125 mL infusion 5 mg/hr (19 0523)    midazolam 3 mg/hr (19 0640)    dextrose      sodium chloride 100 mL/hr at 19 0725     PRN MEDICATIONS:   metoprolol, hydrALAZINE, glucose, dextrose, glucagon (rDNA), dextrose, magnesium sulfate, sodium chloride flush, LORazepam **OR** LORazepam **OR** LORazepam **OR** LORazepam **OR** LORazepam **OR** LORazepam **OR** LORazepam **OR** LORazepam, acetaminophen, sodium chloride flush, ondansetron    VITALS:  Temperature Range: Temp: 98.9 °F (37.2 °C) Temp  Av.6 °F (37 °C)  Min: 98.2 °F (36.8 °C)  Max: 98.9 °F (37.2 °C)  BP Range: Systolic (67XVT), XPH:417 , Min:119 , VUD:192     Diastolic (55ZXV), AAU:74, Min:65, Max:113    Pulse Range: Pulse  Av.7  Min: 94  Max: 157  Respiration Range: Resp  Av.7  Min: 17  Max: 36  Current Pulse Ox: SpO2: 100 %  24HR Pulse Ox Range: SpO2  Av.6 %  Min: 85 %  Max: 100 %  Patient Vitals for the past 12 hrs:   BP Temp Temp src Pulse Resp SpO2 Height   19 0730 -- -- -- 115 -- 100 % --   19 0500 (!) 176/113 -- -- 130 20 98 % --   19 0438 -- -- -- 131 -- 96 % 5' 11\" (1.803 m)   19 0400 (!) 180/98 98.9 °F (37.2 °C) Oral 157 (!) 36 96 % --   19 0300 136/86 -- -- 129 -- 100 % --   19 0200 (!) 155/81 -- -- 116 26 98 % --   19 0100 (!) 134/93 -- -- 127 26 (!) 88 % --   19 0000 (!) 176/95 98.5 °F (36.9 °C) Axillary 114 20 (!) 88 % --   19 2300 (!) 155 -- -- 112 -- 94 % --   19 2200 (!) 155 -- -- 104 -- (!) 85 % --   19 2156 (!) 166/104 -- -- -- -- -- --   19 2100 (!) 155/98 -- -- 96 -- 96 % --     Estimated body mass index is 29.36 assess this morning but moving all extremities spontaneously with stimulation       DRAINS:  [x] There are no drains for Neuro Critical Care to monitor at this time. ASSESSMENT AND PLAN:           NEUROLOGIC:  1. Bilateral SAH,  IVH ,  L parietal SDH with critical R ICA stenosis   - Keppra 500 q12h  - Goal SBP < 160   - Neuro checks per protocol  -f/u MRI and CT Head this morning.  - CARLOS following : TCDs, diagnostic angio 12/26   - CTH repeat stable  - Nimodipine, if TCD no spasm can d/c  - MRI with SWI  -SLP   Sedation -- wean off of Versed; status post Zyprexa one-time  2. Alcoholism  - Watch for withdrawal  - B12 & folate normal   - CIWA  - Thiamine 2/3 , folate qd     CARDIOVASCULAR:  - Goal  - 160   - Continue telemetry  - Troponin elevation stable  - Awaiting Lipid panel results and A1C  - Echo pending   - Crestor  - Afib restart sotalol 50 mg twice daily, home dose; cardizem drip    PULMONARY:  -Intubated -- follow blood gases  - CXR concern aspiration -- infiltrate on postintubation exam  7.48/35.8/85.2/26.6 on 30%    RENAL/FLUID/ELECTROLYTE:  - BUN/CR 6/0.23  - condom cath  - Urine output 0.9 ml/kg/hr, - 2L since admit   - IVF: NS @ 100cc/hr   - K 3.5   MAG 2G today. GI/NUTRITION:  NUTRITION:  DIET TUBE FEED CONTINUOUS/CYCLIC NPO; Diabetic; Nasogastric; Cyclic; 25; 85; 24  - Bowel regimen: MoM  - GI prophylaxis: none   - Vit D 89028 U     ID:  - questionable fever this morning, tmax 37.2 overnight no leukocytosis 7.5  - Continue to monitor for fevers  - Daily CBC    HEME:   - H&H stable  - Platelets stable  - Daily CBC  - R/s AC    ENDOCRINE:  - Continue to monitor blood glucose, goal <180  - Insulin home dose    OTHER:  - PT/OT/ST   - Code Status: Full    PROPHYLAXIS:  Stress ulcer:     DVT PROPHYLAXIS:  - SCD sleeves - Thigh High   - JEANNA stockings - Thigh High    DISPOSITION:  [x] To remain ICU:   [] OK for out of ICU from Neuro Critical Care standpoint    We will continue to follow along.

## 2019-12-28 NOTE — PLAN OF CARE
PROVIDE ADEQUATE OXYGENATION WITH ACCEPTABLE SP02/ABG'S    [x]  IDENTIFY APPROPRIATE OXYGEN THERAPY  [x]   MONITOR SP02/ABG'S AS NEEDED   [x]   PATIENT EDUCATION AS NEEDED    Ventilator Bronchodilator assessment    Breath sounds: rhonchi cleared with suctioning  Inspiratory Pressure: 19  Plateau Pressure: 15    Patient assessed at level 1          []    Bronchodilator Assessment    BRONCHODILATOR ASSESSMENT SCORE  Score 0 (Home) 1 2 3 4   Breath Sounds   []  Chronic Ventilator: Patient at baseline [x]  Mild Wheezes/ Clear []  Intermittent wheezes with good air entry []  Bilateral/unilateral wheezing with diminished air entry []  Insp/Exp wheeze and/or poor aeration   Ventilator Pressures   []  Chronic Ventilator [x]  Insp. Pressure less than 25 cm H20 []  Insp. Pressure less than 25 cm H20 []  Insp. Pressure exceeds 25 cm H20 []  Insp.  Pressure exceeds 30 cm H20   Plateau Pressure []  NA   [x]  Plateau Pressure less than 4  []  Plateau Pressure less than or equal to 5 []  Plateau Pressure greater than or equal to 6 []  Plateau Pressure greater than or equal to [place]  7:47 AM

## 2019-12-28 NOTE — PROGRESS NOTES
traumatic subarachnoid hemorrhage   - Left occipital parietal subdural hematoma, traumatic, stable   - Atrial fibrillation.   Eliquis is on hold     PLAN   - No neurosurgical intervention is planned at this time   - Neuro checks per unit protocol   - Head CT due to loss of neuro exam following intubation   - Control HR, consult cards if needed   - Extubate when able   - CIWA protocol for EtOH withdrawal   - Continue management of TBI per neuro critical care team     Please contact neurosurgery with any change in the patient neurological status or any questions or concerns    Dorothy Mullen MD, [facility]  Neurosurgery/Neuro Critical Care Service  Pager [phone]

## 2019-12-28 NOTE — PROGRESS NOTES
Pt is resting in bed at this time, A/O x3, denies pain. See flowsheet for full assessment. Pt is saturated with urine and stool at this time. Full linen change.

## 2019-12-28 NOTE — PROGRESS NOTES
Patient admitted on Mechanical Ventilator Protocol. Patients height measured at 71\" for an IBW 75.3kg    Patient placed on the ventilator on settings as charted on flowsheeet. Ventilator Bronchodilator assessment    Breath sounds: clear  Inspiratory Pressure: 22  Plateau Pressure: 17    Patient assessed at level 1          []    Bronchodilator Assessment    BRONCHODILATOR ASSESSMENT SCORE  Score 0 (Home) 1 2 3 4   Breath Sounds   []  Chronic Ventilator: Patient at baseline [x]  Mild Wheezes/ Clear []  Intermittent wheezes with good air entry []  Bilateral/unilateral wheezing with diminished air entry []  Insp/Exp wheeze and/or poor aeration   Ventilator Pressures   []  Chronic Ventilator [x]  Insp. Pressure less than 25 cm H20 []  Insp. Pressure less than 25 cm H20 []  Insp. Pressure exceeds 25 cm H20 []  Insp.  Pressure exceeds 30 cm H20   Plateau Pressure []  NA   []  Plateau Pressure less than 4  [x]  Plateau Pressure less than or equal to 5 []  Plateau Pressure greater than or equal to 6 []  Plateau Pressure greater than or equal to [id number]  4:49 AM

## 2019-12-28 NOTE — FLOWSHEET NOTE
Patient's mother gave phone numbers to obtain records on heart stents. RN called all numbers. Offices are not open until Monday.     Neema Carvajal office: [phone]    Dr. Carina Seaman (Family Doctor): [phone]    Medical Records: [phone]

## 2019-12-28 NOTE — PROGRESS NOTES
(12/28/19 1006)    midazolam 3 mg/hr (12/28/19 0640)    dextrose      sodium chloride 100 mL/hr at 12/28/19 0725     CBC:   Recent Labs     12/26/19  0826 12/27/19  0748 12/28/19  0417   WBC 6.6 6.8 7.5   HGB 10.5* 10.3* 10.8*    223 226     BMP:    Recent Labs     12/26/19  0826 12/27/19  0326 12/28/19  0417    137 133*   K 3.1* 3.5* 3.4*   CL 96* 97* 97*   CO2 29 27 22   BUN 7 7 6   CREATININE 0.30* 0.36* 0.23*   GLUCOSE 133* 146* 139*     Hepatic:   Recent Labs     12/25/19  2101   AST 25   ALT 23   BILITOT 1.27*   ALKPHOS 95     Troponin: No results for input(s): TROPONINI in the last 72 hours. BNP: No results for input(s): BNP in the last 72 hours. Lipids:   Recent Labs     12/27/19  0748   CHOL 105   HDL 52     INR:   Recent Labs     12/25/19  2101   INR 1.0       Assessment and Recommendations:     Traumatic subarachnoid hemorrhage.     s/p diagnostic cerebral angiogram in December 26, 2019  1. Critical right cervical ICA stenosis measuring approximately 90 to 99% per NASCET criteria. There is delayed anterograde filling of the cervical ica across the stenosis. There is Collateral filling of the distal right ica from the retrograde filling of the right ophthalmic artery from the right IMAX. In addition there is pial collaterals from the right PCA supplying the distal right mca territory parieto/occipital region. 2. No MCA vasospasm noted however there are irregularities suspected from diffuse intracranial athero including 50% left vert athero with stenosis in the v3 segment     Right groin looks clean, no hematoma or swelling noted. He is now intubated. Follow-up CT head this morning. PLAN:  1. Traumatic subarachnoid hemorrhage management per neuro ICU. 2. Pressure goal 1 30-1 60.  3. Smoking cessation, PT/OT evaluation. Please contact neuro endovascular team for any questions or concerns.     Evelio Aranda MD  Stroke, [facility]  [address] [place]  Electronically signed 12/28/2019 at 12:53 PM

## 2019-12-28 NOTE — PLAN OF CARE
TODAY:      AWAKE & FOLLOWING COMMANDS:  [x] No   [] Yes    INTUBATED:   [] No   [x] Yes    SEDATION/ANALGESIA:    [] Propofol gtt  [x] Versed gtt  [] Ativan gtt   [] No Sedation    FEEDING: Able to take PO?  [] No:    [x] NG/OG [] PEG  Tube Feeds: Diabetic tube feeds      DVT Prophylaxis:  [x] Yes: heparin           [] No rationale:     Stress Ulcer Prophylaxis: [x] Yes: pepcid  [] Not indicated    VASOPRESSORS:  [x] No    [] Yes    CENTRAL/ARTERIAL LINES:  [x] No   [] Yes    BECERRIL CATHETER: [x] No   [] Yes    DRAINS: [x] No   [] Yes    Head of Bed: [x] Elevated:          [] Flat    Glucose management: [x] Not indicated, consistently less than 180 [] Sliding Scale :            [] Long Acting:    Anselmo Poster  12/28/2019  8:30 AM

## 2019-12-29 ENCOUNTER — APPOINTMENT (OUTPATIENT)
Dept: GENERAL RADIOLOGY | Age: 59
DRG: 030 | End: 2019-12-29
Payer: MEDICAID

## 2019-12-29 LAB
-: NORMAL
ABSOLUTE EOS #: 0.07 K/UL (ref 0–0.4)
ABSOLUTE IMMATURE GRANULOCYTE: 0 K/UL (ref 0–0.3)
ABSOLUTE LYMPH #: 1.01 K/UL (ref 1–4.8)
ABSOLUTE MONO #: 0.34 K/UL (ref 0.1–0.8)
ALLEN TEST: POSITIVE
AMORPHOUS: NORMAL
ANION GAP SERPL CALCULATED.3IONS-SCNC: 13 MMOL/L (ref 9–17)
BACTERIA: NORMAL
BASOPHILS # BLD: 1 % (ref 0–2)
BASOPHILS ABSOLUTE: 0.07 K/UL (ref 0–0.2)
BILIRUBIN URINE: ABNORMAL
BUN BLDV-MCNC: 13 MG/DL (ref 6–20)
BUN/CREAT BLD: ABNORMAL (ref 9–20)
C DIFF AG + TOXIN: NEGATIVE
CALCIUM SERPL-MCNC: 9.1 MG/DL (ref 8.6–10.4)
CASTS UA: NORMAL /LPF (ref 0–8)
CHLORIDE BLD-SCNC: 102 MMOL/L (ref 98–107)
CO2: 21 MMOL/L (ref 20–31)
COLOR: ABNORMAL
COMMENT UA: ABNORMAL
CREAT SERPL-MCNC: 0.4 MG/DL (ref 0.7–1.2)
CRYSTALS, UA: NORMAL /HPF
DIFFERENTIAL TYPE: ABNORMAL
EOSINOPHILS RELATIVE PERCENT: 1 % (ref 1–4)
EPITHELIAL CELLS UA: NORMAL /HPF (ref 0–5)
FIO2: 30
GFR AFRICAN AMERICAN: >60 ML/MIN
GFR NON-AFRICAN AMERICAN: >60 ML/MIN
GFR SERPL CREATININE-BSD FRML MDRD: ABNORMAL ML/MIN/{1.73_M2}
GFR SERPL CREATININE-BSD FRML MDRD: ABNORMAL ML/MIN/{1.73_M2}
GLUCOSE BLD-MCNC: 185 MG/DL (ref 75–110)
GLUCOSE BLD-MCNC: 186 MG/DL (ref 70–99)
GLUCOSE BLD-MCNC: 203 MG/DL (ref 75–110)
GLUCOSE BLD-MCNC: 211 MG/DL (ref 75–110)
GLUCOSE BLD-MCNC: 213 MG/DL (ref 74–100)
GLUCOSE BLD-MCNC: 214 MG/DL (ref 75–110)
GLUCOSE URINE: NEGATIVE
HCT VFR BLD CALC: 40.2 % (ref 40.7–50.3)
HEMOGLOBIN: 11.2 G/DL (ref 13–17)
IMMATURE GRANULOCYTES: 0 %
KETONES, URINE: ABNORMAL
LACTIC ACID, WHOLE BLOOD: 1.4 MMOL/L (ref 0.7–2.1)
LEUKOCYTE ESTERASE, URINE: ABNORMAL
LYMPHOCYTES # BLD: 15 % (ref 24–44)
MAGNESIUM: 1.9 MG/DL (ref 1.6–2.6)
MCH RBC QN AUTO: 22.5 PG (ref 25.2–33.5)
MCHC RBC AUTO-ENTMCNC: 27.9 G/DL (ref 28.4–34.8)
MCV RBC AUTO: 80.7 FL (ref 82.6–102.9)
MODE: ABNORMAL
MONOCYTES # BLD: 5 % (ref 1–7)
MORPHOLOGY: ABNORMAL
MUCUS: NORMAL
NEGATIVE BASE EXCESS, ART: ABNORMAL (ref 0–2)
NITRITE, URINE: NEGATIVE
NRBC AUTOMATED: 0 PER 100 WBC
O2 DEVICE/FLOW/%: ABNORMAL
OTHER OBSERVATIONS UA: NORMAL
PATIENT TEMP: 37.8
PDW BLD-RTO: 20.8 % (ref 11.8–14.4)
PH UA: 5 (ref 5–8)
PLATELET # BLD: 224 K/UL (ref 138–453)
PLATELET ESTIMATE: ABNORMAL
PMV BLD AUTO: 9.9 FL (ref 8.1–13.5)
POC HCO3: 23.1 MMOL/L (ref 21–28)
POC O2 SATURATION: 98 % (ref 94–98)
POC PCO2 TEMP: 29 MM HG
POC PCO2: 28.1 MM HG (ref 35–48)
POC PH TEMP: 7.51
POC PH: 7.52 (ref 7.35–7.45)
POC PO2 TEMP: 94 MM HG
POC PO2: 89 MM HG (ref 83–108)
POSITIVE BASE EXCESS, ART: 1 (ref 0–3)
POTASSIUM SERPL-SCNC: 4.3 MMOL/L (ref 3.7–5.3)
PROCALCITONIN: 0.05 NG/ML
PROTEIN UA: ABNORMAL
RBC # BLD: 4.98 M/UL (ref 4.21–5.77)
RBC # BLD: ABNORMAL 10*6/UL
RBC UA: NORMAL /HPF (ref 0–4)
RENAL EPITHELIAL, UA: NORMAL /HPF
SAMPLE SITE: ABNORMAL
SEG NEUTROPHILS: 78 % (ref 36–66)
SEGMENTED NEUTROPHILS ABSOLUTE COUNT: 5.21 K/UL (ref 1.8–7.7)
SODIUM BLD-SCNC: 136 MMOL/L (ref 135–144)
SPECIFIC GRAVITY UA: 1.03 (ref 1–1.03)
SPECIMEN DESCRIPTION: NORMAL
TCO2 (CALC), ART: 24 MMOL/L (ref 22–29)
TRICHOMONAS: NORMAL
TURBIDITY: ABNORMAL
URINE HGB: NEGATIVE
UROBILINOGEN, URINE: NORMAL
WBC # BLD: 6.7 K/UL (ref 3.5–11.3)
WBC # BLD: ABNORMAL 10*3/UL
WBC UA: NORMAL /HPF (ref 0–5)
YEAST: NORMAL

## 2019-12-29 PROCEDURE — 6360000002 HC RX W HCPCS: Performed by: PSYCHIATRY & NEUROLOGY

## 2019-12-29 PROCEDURE — 87040 BLOOD CULTURE FOR BACTERIA: CPT

## 2019-12-29 PROCEDURE — 94761 N-INVAS EAR/PLS OXIMETRY MLT: CPT

## 2019-12-29 PROCEDURE — 6360000002 HC RX W HCPCS: Performed by: NURSE PRACTITIONER

## 2019-12-29 PROCEDURE — 2000000003 HC NEURO ICU R&B

## 2019-12-29 PROCEDURE — 94770 HC ETCO2 MONITOR DAILY: CPT

## 2019-12-29 PROCEDURE — 95951 PR EEG MONITORING/VIDEORECORD: CPT | Performed by: PSYCHIATRY & NEUROLOGY

## 2019-12-29 PROCEDURE — 2580000003 HC RX 258: Performed by: PSYCHIATRY & NEUROLOGY

## 2019-12-29 PROCEDURE — 83605 ASSAY OF LACTIC ACID: CPT

## 2019-12-29 PROCEDURE — 86403 PARTICLE AGGLUT ANTBDY SCRN: CPT

## 2019-12-29 PROCEDURE — 87205 SMEAR GRAM STAIN: CPT

## 2019-12-29 PROCEDURE — 6360000002 HC RX W HCPCS: Performed by: STUDENT IN AN ORGANIZED HEALTH CARE EDUCATION/TRAINING PROGRAM

## 2019-12-29 PROCEDURE — 0PSQXZZ REPOSITION LEFT METACARPAL, EXTERNAL APPROACH: ICD-10-PCS | Performed by: ORTHOPAEDIC SURGERY

## 2019-12-29 PROCEDURE — 83735 ASSAY OF MAGNESIUM: CPT

## 2019-12-29 PROCEDURE — 80048 BASIC METABOLIC PNL TOTAL CA: CPT

## 2019-12-29 PROCEDURE — 87324 CLOSTRIDIUM AG IA: CPT

## 2019-12-29 PROCEDURE — 2580000003 HC RX 258: Performed by: STUDENT IN AN ORGANIZED HEALTH CARE EDUCATION/TRAINING PROGRAM

## 2019-12-29 PROCEDURE — 6370000000 HC RX 637 (ALT 250 FOR IP): Performed by: STUDENT IN AN ORGANIZED HEALTH CARE EDUCATION/TRAINING PROGRAM

## 2019-12-29 PROCEDURE — 84145 PROCALCITONIN (PCT): CPT

## 2019-12-29 PROCEDURE — 82803 BLOOD GASES ANY COMBINATION: CPT

## 2019-12-29 PROCEDURE — 94003 VENT MGMT INPAT SUBQ DAY: CPT

## 2019-12-29 PROCEDURE — 2700000000 HC OXYGEN THERAPY PER DAY

## 2019-12-29 PROCEDURE — 73120 X-RAY EXAM OF HAND: CPT

## 2019-12-29 PROCEDURE — 99233 SBSQ HOSP IP/OBS HIGH 50: CPT | Performed by: PSYCHIATRY & NEUROLOGY

## 2019-12-29 PROCEDURE — 2500000003 HC RX 250 WO HCPCS: Performed by: NURSE PRACTITIONER

## 2019-12-29 PROCEDURE — 36415 COLL VENOUS BLD VENIPUNCTURE: CPT

## 2019-12-29 PROCEDURE — 81001 URINALYSIS AUTO W/SCOPE: CPT

## 2019-12-29 PROCEDURE — 2500000003 HC RX 250 WO HCPCS: Performed by: PSYCHIATRY & NEUROLOGY

## 2019-12-29 PROCEDURE — 6370000000 HC RX 637 (ALT 250 FOR IP): Performed by: PSYCHIATRY & NEUROLOGY

## 2019-12-29 PROCEDURE — 87070 CULTURE OTHR SPECIMN AEROBIC: CPT

## 2019-12-29 PROCEDURE — 87449 NOS EACH ORGANISM AG IA: CPT

## 2019-12-29 PROCEDURE — 99291 CRITICAL CARE FIRST HOUR: CPT | Performed by: PSYCHIATRY & NEUROLOGY

## 2019-12-29 PROCEDURE — 82947 ASSAY GLUCOSE BLOOD QUANT: CPT

## 2019-12-29 PROCEDURE — 85025 COMPLETE CBC W/AUTO DIFF WBC: CPT

## 2019-12-29 PROCEDURE — 6370000000 HC RX 637 (ALT 250 FOR IP): Performed by: NURSE PRACTITIONER

## 2019-12-29 PROCEDURE — 2500000003 HC RX 250 WO HCPCS: Performed by: STUDENT IN AN ORGANIZED HEALTH CARE EDUCATION/TRAINING PROGRAM

## 2019-12-29 PROCEDURE — 73130 X-RAY EXAM OF HAND: CPT

## 2019-12-29 PROCEDURE — 95951 HC EEG MONITORING VIDEO RECORDING: CPT

## 2019-12-29 PROCEDURE — 71045 X-RAY EXAM CHEST 1 VIEW: CPT

## 2019-12-29 PROCEDURE — 36600 WITHDRAWAL OF ARTERIAL BLOOD: CPT

## 2019-12-29 RX ORDER — MIDAZOLAM HYDROCHLORIDE 1 MG/ML
0.5 INJECTION INTRAMUSCULAR; INTRAVENOUS
Status: DISCONTINUED | OUTPATIENT
Start: 2019-12-29 | End: 2019-12-30

## 2019-12-29 RX ORDER — LIDOCAINE HYDROCHLORIDE 10 MG/ML
5 INJECTION, SOLUTION INFILTRATION; PERINEURAL ONCE
Status: DISCONTINUED | OUTPATIENT
Start: 2019-12-29 | End: 2019-12-30

## 2019-12-29 RX ORDER — LABETALOL 20 MG/4 ML (5 MG/ML) INTRAVENOUS SYRINGE
10 ONCE
Status: COMPLETED | OUTPATIENT
Start: 2019-12-29 | End: 2019-12-30

## 2019-12-29 RX ORDER — MIDAZOLAM HYDROCHLORIDE 1 MG/ML
2 INJECTION INTRAMUSCULAR; INTRAVENOUS ONCE
Status: COMPLETED | OUTPATIENT
Start: 2019-12-29 | End: 2019-12-29

## 2019-12-29 RX ORDER — FENTANYL CITRATE 50 UG/ML
25 INJECTION, SOLUTION INTRAMUSCULAR; INTRAVENOUS ONCE
Status: COMPLETED | OUTPATIENT
Start: 2019-12-29 | End: 2019-12-29

## 2019-12-29 RX ADMIN — INSULIN LISPRO 1 UNITS: 100 INJECTION, SOLUTION INTRAVENOUS; SUBCUTANEOUS at 20:52

## 2019-12-29 RX ADMIN — INSULIN LISPRO 2 UNITS: 100 INJECTION, SOLUTION INTRAVENOUS; SUBCUTANEOUS at 11:56

## 2019-12-29 RX ADMIN — FENTANYL CITRATE 25 MCG: 50 INJECTION, SOLUTION INTRAMUSCULAR; INTRAVENOUS at 13:30

## 2019-12-29 RX ADMIN — HEPARIN SODIUM 5000 UNITS: 5000 INJECTION INTRAVENOUS; SUBCUTANEOUS at 06:41

## 2019-12-29 RX ADMIN — MIDAZOLAM HYDROCHLORIDE 2 MG: 1 INJECTION, SOLUTION INTRAMUSCULAR; INTRAVENOUS at 18:14

## 2019-12-29 RX ADMIN — DEXMEDETOMIDINE HYDROCHLORIDE 0.8 MCG/KG/HR: 100 INJECTION, SOLUTION INTRAVENOUS at 18:08

## 2019-12-29 RX ADMIN — ROSUVASTATIN CALCIUM 5 MG: 5 TABLET, FILM COATED ORAL at 20:55

## 2019-12-29 RX ADMIN — METOPROLOL TARTRATE 5 MG: 5 INJECTION, SOLUTION INTRAVENOUS at 15:05

## 2019-12-29 RX ADMIN — Medication 100 MG: at 08:12

## 2019-12-29 RX ADMIN — POTASSIUM CHLORIDE 40 MEQ: 1500 TABLET, EXTENDED RELEASE ORAL at 20:55

## 2019-12-29 RX ADMIN — HYDRALAZINE HYDROCHLORIDE 10 MG: 20 INJECTION INTRAMUSCULAR; INTRAVENOUS at 14:09

## 2019-12-29 RX ADMIN — INSULIN LISPRO 2 UNITS: 100 INJECTION, SOLUTION INTRAVENOUS; SUBCUTANEOUS at 17:57

## 2019-12-29 RX ADMIN — HEPARIN SODIUM 5000 UNITS: 5000 INJECTION INTRAVENOUS; SUBCUTANEOUS at 20:57

## 2019-12-29 RX ADMIN — HYDRALAZINE HYDROCHLORIDE 10 MG: 20 INJECTION INTRAMUSCULAR; INTRAVENOUS at 15:36

## 2019-12-29 RX ADMIN — SOTALOL HYDROCHLORIDE 80 MG: 80 TABLET ORAL at 08:12

## 2019-12-29 RX ADMIN — SODIUM CHLORIDE, PRESERVATIVE FREE 10 ML: 5 INJECTION INTRAVENOUS at 08:12

## 2019-12-29 RX ADMIN — FOLIC ACID: 5 INJECTION, SOLUTION INTRAMUSCULAR; INTRAVENOUS; SUBCUTANEOUS at 08:12

## 2019-12-29 RX ADMIN — MIDAZOLAM HYDROCHLORIDE 0.5 MG: 1 INJECTION, SOLUTION INTRAMUSCULAR; INTRAVENOUS at 21:42

## 2019-12-29 RX ADMIN — LEVETIRACETAM 500 MG: 5 INJECTION INTRAVENOUS at 14:09

## 2019-12-29 RX ADMIN — HYDRALAZINE HYDROCHLORIDE 10 MG: 20 INJECTION INTRAMUSCULAR; INTRAVENOUS at 21:11

## 2019-12-29 RX ADMIN — POTASSIUM BICARBONATE 40 MEQ: 782 TABLET, EFFERVESCENT ORAL at 08:12

## 2019-12-29 RX ADMIN — Medication 15 ML: at 08:13

## 2019-12-29 RX ADMIN — SOTALOL HYDROCHLORIDE 80 MG: 80 TABLET ORAL at 20:56

## 2019-12-29 RX ADMIN — SODIUM CHLORIDE, PRESERVATIVE FREE 10 ML: 5 INJECTION INTRAVENOUS at 20:56

## 2019-12-29 RX ADMIN — Medication 15 ML: at 20:50

## 2019-12-29 RX ADMIN — HEPARIN SODIUM 5000 UNITS: 5000 INJECTION INTRAVENOUS; SUBCUTANEOUS at 14:10

## 2019-12-29 RX ADMIN — POTASSIUM CHLORIDE 40 MEQ: 1500 TABLET, EXTENDED RELEASE ORAL at 08:12

## 2019-12-29 RX ADMIN — FAMOTIDINE 20 MG: 10 INJECTION, SOLUTION INTRAVENOUS at 08:12

## 2019-12-29 RX ADMIN — LEVETIRACETAM 500 MG: 5 INJECTION INTRAVENOUS at 00:15

## 2019-12-29 RX ADMIN — INSULIN LISPRO 2 UNITS: 100 INJECTION, SOLUTION INTRAVENOUS; SUBCUTANEOUS at 08:13

## 2019-12-29 RX ADMIN — FAMOTIDINE 20 MG: 10 INJECTION, SOLUTION INTRAVENOUS at 20:52

## 2019-12-29 RX ADMIN — HYDRALAZINE HYDROCHLORIDE 10 MG: 20 INJECTION INTRAMUSCULAR; INTRAVENOUS at 04:30

## 2019-12-29 RX ADMIN — ACETAMINOPHEN 650 MG: 325 TABLET ORAL at 08:29

## 2019-12-29 RX ADMIN — THIAMINE HYDROCHLORIDE 500 MG: 100 INJECTION, SOLUTION INTRAMUSCULAR; INTRAVENOUS at 03:09

## 2019-12-29 ASSESSMENT — PULMONARY FUNCTION TESTS
PIF_VALUE: 24
PIF_VALUE: 18
PIF_VALUE: 17
PIF_VALUE: 30
PIF_VALUE: 20
PIF_VALUE: 20
PIF_VALUE: 19
PIF_VALUE: 19
PIF_VALUE: 22
PIF_VALUE: 26
PIF_VALUE: 20
PIF_VALUE: 18
PIF_VALUE: 20

## 2019-12-29 NOTE — PROGRESS NOTES
Orthopedic Progress Note    Patient:  Rabia Meadows  YOB: 1960     61 y.o. male    Patients thumb spica splint to left upper extremity was taken down for evaluation of the neurovascular status to distal extremity due to concern of cold fingers. Fingers were found to have good capillary refill after splint was taken down and were pink and appeared well perfused. On examination the hand was warm to touch. On imaging it appeared as though the 1st metacarpal base fracture had displaced from initial films. A reduction of this fracture was performed and post reduction xray imaging confirmed improved alignment of the fracture. A new thumb spica splint was placed and molded to the left upper extremity and repeat xrays confirmed improved alignment of the fracture. Patient is to maintain this splint until follow up visit with [facility] orthopedics 7-10 days following initial presentation and orthopedic evaluation here at the hospital which was 12/25/2019. He is to maintain nonweight bearing status of this left upper extremity. Please page ortho with any further questions.      Dao Edwards DO  Orthopedic Surgery Resident, PGY-1  [facility]

## 2019-12-29 NOTE — PROGRESS NOTES
ENDOVASCULAR NEUROSURGERY PROGRESS NOTE  12/29/2019 8:07 AM  Subjective:   Admit Date: 12/25/2019  PCP: Samantha Francis MD    And is intubated. Exam is limited as patient was on Versed and Precedex. He has been weaned off Versed and Precedex over the night. Objective:   Vitals: /85   Pulse 97   Temp 100 °F (37.8 °C) (Axillary)   Resp 19   Ht 5' 11\" (1.803 m)   Wt 210 lb 8.6 oz (95.5 kg)   SpO2 100%   BMI 29.36 kg/m²   General appearance: Intubated   HEENT: Atraumatic. Neck: Neck is supple. Lungs:  Intubated  Abdomen: Soft nontender. Extremities: No lower limb edema noted. Neurologic: He is arousable, tracking in the room, he is not following simple commands. CN: Both are equal reactive to light at 3 mm, he is tracking the room, no facial droop noted. MOTOR:  Is withdrawing both upper and lower extremities. SENSORY: He is withdrawing both upper and lower extremities.     Medications and labs:   Scheduled Meds:   famotidine (PEPCID) injection  20 mg Intravenous BID    rosuvastatin  5 mg Oral Nightly    sotalol  80 mg Oral BID    chlorhexidine  15 mL Mouth/Throat BID    potassium chloride  40 mEq Oral BID WC    vitamin D  50,000 Units Oral Weekly    heparin (porcine)  5,000 Units Subcutaneous 3 times per day    potassium bicarb-citric acid  40 mEq Oral Daily    insulin lispro  0-6 Units Subcutaneous TID WC    insulin lispro  0-3 Units Subcutaneous Nightly    thiamine  100 mg Oral Daily    IVPB builder   Intravenous Daily    sodium chloride flush  10 mL Intravenous 2 times per day    levetiracetam  500 mg Intravenous Q12H    ondansetron  4 mg Intravenous Once    sodium chloride flush  10 mL Intravenous 2 times per day    prochlorperazine  10 mg Intravenous Once    promethazine  12.5 mg Intravenous Once    lidocaine  20 mL Intradermal Once     Continuous Infusions:   dexmedetomidine (PRECEDEX) IV infusion 0.3 mcg/kg/hr (12/29/19 0104)    dextrose      sodium chloride 100 mL/hr at 12/28/19 0725     CBC:   Recent Labs     12/27/19  0748 12/28/19  0417 12/29/19  0401   WBC 6.8 7.5 6.7   HGB 10.3* 10.8* 11.2*    226 224     BMP:    Recent Labs     12/27/19  0326 12/28/19  0417 12/29/19  0401    133* 136   K 3.5* 3.4* 4.3   CL 97* 97* 102   CO2 27 22 21   BUN 7 6 13   CREATININE 0.36* 0.23* 0.40*   GLUCOSE 146* 139* 186*     Hepatic:   No results for input(s): AST, ALT, ALB, BILITOT, ALKPHOS in the last 72 hours. Troponin: No results for input(s): TROPONINI in the last 72 hours. BNP: No results for input(s): BNP in the last 72 hours. Lipids:   Recent Labs     12/27/19  0748   CHOL 105   HDL 52     INR:   No results for input(s): INR in the last 72 hours. Assessment and Recommendations:     Traumatic subarachnoid hemorrhage.     s/p diagnostic cerebral angiogram in December 26, 2019  1. Critical right cervical ICA stenosis measuring approximately 90 to 99% per NASCET criteria. There is delayed anterograde filling of the cervical ica across the stenosis. There is Collateral filling of the distal right ica from the retrograde filling of the right ophthalmic artery from the right IMAX. In addition there is pial collaterals from the right PCA supplying the distal right mca territory parieto/occipital region. 2. No MCA vasospasm noted however there are irregularities suspected from diffuse intracranial athero including 50% left vert athero with stenosis in the v3 segment     Right groin looks clean, no hematoma or swelling noted. He is now intubated. Follow-up CT head this morning. PLAN:  1. Traumatic subarachnoid hemorrhage management per neuro ICU. 2. Pressure goal 130-1 60.  3. Smoking cessation, PT/OT evaluation. Please contact neuro endovascular team for any questions or concerns.     Jose Garg MD  Stroke, [facility]  [address] [facility] [place]  Electronically signed 12/29/2019 at 8:07 AM

## 2019-12-29 NOTE — PROGRESS NOTES
12/29/19 0743   Vent Information   Vent Mode CPAP   Pressure Support 12 cmH20   PEEP/CPAP 5   Vent Patient Data   Rate Measured 41 br/min   Minute Volume 15.7 Liters   Placed on cpap as ordered. PSV increased to 12 to help with tachypnea with no change. RR is greater than 40 over 5 minutes. Very good cuff leak noted.

## 2019-12-29 NOTE — PROGRESS NOTES
Neurosurgery Resident    Daily Progress Note     12/29/2019  9:11 AM    Pt febrile this morning to 103.2. Fever work up in process. Fecal management system placed last night for excessive diarrhea with foul odor. Specimen sent for possible c.diff. Pt on precedex now, versed shut off yesterday. He will arouse more, not following commands. He spontaneously moves all extremities. Still intubated, getting PRN benzo's for EtOH withdrawal. Repeat head CT performed yesterday with findings of a stable SDH, IVH, and extensive bihemispheric SAH. Vitals:    12/29/19 0725 12/29/19 0743 12/29/19 0748 12/29/19 0815   BP:       Pulse: 97 94 97    Resp:       Temp:    103.2 °F (39.6 °C)   TempSrc:    Oral   SpO2: 99% 100% 100%    Weight:       Height:           Exam  Febrile to 103.2  HENT:      Head: Normocephalic and atraumatic. Cardiovascular:     A fib, rate controlled  Pulmonary:    Intubated with clear breath sounds bilaterally  Abdominal:      General: Abdomen is flat.      Palpations: Abdomen is soft.    Neurological:      General: No focal deficit present.      Mental Status: intubated and sedated, not following commands     Motor: No tremor, atrophy, abnormal muscle tone or seizure activity.      Deep Tendon Reflexes:      Reflex Scores:       Bicep reflexes are 2+ on the right side and 2+ on the left side.       Patellar reflexes are 2+ on the right side and 2+ on the left side.     Lab Results   Component Value Date    WBC 6.7 12/29/2019    HGB 11.2 (L) 12/29/2019    HCT 40.2 (L) 12/29/2019     12/29/2019    CHOL 105 12/27/2019    TRIG 60 12/27/2019    HDL 52 12/27/2019    ALT 23 12/25/2019    AST 25 12/25/2019     12/29/2019    K 4.3 12/29/2019     12/29/2019    CREATININE 0.40 (L) 12/29/2019    BUN 13 12/29/2019    CO2 21 12/29/2019    INR 1.0 12/25/2019    LABA1C 7.1 (H) 12/27/2019       61 y.o. male who presents with               - Diffuse traumatic subarachnoid hemorrhage              - Left occipital parietal subdural hematoma, traumatic, stable              - Atrial fibrillation.  Eliquis is on hold     PLAN              - No neurosurgical intervention is planned at this time              - Neuro checks per unit protocol              - No additional imaging needed at this time              - Control HR, consult cards if needed              - Extubate when able              - CIWA protocol for EtOH withdrawal              - Continue management of TBI per neuro critical care team   - Fever work up     Please contact neurosurgery with any change in the patient neurological status or any questions or concerns      Annie Ramirez MD, [facility]  Neurosurgery/Neuro Critical Care Service  Pager [phone]

## 2019-12-29 NOTE — PROGRESS NOTES
The transport originated from [facility]. Pt. was transported to [facility]. Assisting with the transport was Hilaria MONSON. Appropriate devices were applied to monitor the patient's condition during transport. Patient transported  via 30% O2 via ambu bag. Patient tolerated well.         Devin Weber  6:28 AM

## 2019-12-29 NOTE — PROGRESS NOTES
on CTA or malformation-critical stenosis of the proximal left vertebral artery, critical stenosis of the proximal right cervical ICA, severe near critical stenosis of the right cavernous ICA, moderate stenosis of the left cavernous ICA. Non con CT head repeat at our emergency facility showed subarachnoid, predominantly in the bilateral sylvian fissures and bilateral cerebral hemisphere sulcal I, acute left parietal convexity subdural hematoma 4 mm in thickness without mass-effect. Hospital Course:   12/27: CT head stable  12/28: Intubated overnight for respiratory distress, started on Cardizem infusion for atrial fibrillation with RVR, weaned off and Sotalol started. Versed for sedation with concern for possible alcohol withdrawal.    Last 24h:   Overnight, patient had loose foul smelling stool, c. Diff sent and was negative. Precedex turned off for most of the day and weaned on the ventilator. During rounds, patient noted to have left upper extremity tremor, placed on LTME. Left hand cool to touch with delayed capillary refill, thumb spica splint removed to assess neurovascular status. With splint off, radial and ulnar pulses palpable and assessed via doppler. Capillary refill improved as did warmth and color. Ortho called to replace splint. Febrile this morning - infectious workup sent. Will hold off on antibiotics with no leukocytosis - if spiked temp again will start Vancomycin and zosyn.      CURRENT MEDICATIONS:  SCHEDULED MEDICATIONS:   lidocaine  5 mL Intradermal Once    famotidine (PEPCID) injection  20 mg Intravenous BID    rosuvastatin  5 mg Oral Nightly    sotalol  80 mg Oral BID    chlorhexidine  15 mL Mouth/Throat BID    potassium chloride  40 mEq Oral BID WC    vitamin D  50,000 Units Oral Weekly    heparin (porcine)  5,000 Units Subcutaneous 3 times per day    potassium bicarb-citric acid  40 mEq Oral Daily    insulin lispro  0-6 Units Subcutaneous TID     insulin lispro  0-3 Units Subcutaneous Nightly    thiamine  100 mg Oral Daily    IVPB builder   Intravenous Daily    sodium chloride flush  10 mL Intravenous 2 times per day    levetiracetam  500 mg Intravenous Q12H    ondansetron  4 mg Intravenous Once    sodium chloride flush  10 mL Intravenous 2 times per day    prochlorperazine  10 mg Intravenous Once    promethazine  12.5 mg Intravenous Once    lidocaine  20 mL Intradermal Once     CONTINUOUS INFUSIONS:   dexmedetomidine (PRECEDEX) IV infusion 0.4 mcg/kg/hr (19 1508)    dextrose      sodium chloride 100 mL/hr at 19 0725     PRN MEDICATIONS:   metoprolol, hydrALAZINE, glucose, dextrose, glucagon (rDNA), dextrose, magnesium sulfate, sodium chloride flush, acetaminophen, sodium chloride flush, ondansetron    VITALS:  Temperature Range: Temp: 99.5 °F (37.5 °C) Temp  Av.5 °F (38.1 °C)  Min: 99.5 °F (37.5 °C)  Max: 103.2 °F (39.6 °C)  BP Range: Systolic (77LKT), JESSICA:459 , Min:105 , RTQ:875     Diastolic (25SAV), WCZ:25, Min:67, Max:103    Pulse Range: Pulse  Av.9  Min: 65  Max: 145  Respiration Range: Resp  Av.6  Min: 15  Max: 30  Current Pulse Ox: SpO2: 92 %  24HR Pulse Ox Range: SpO2  Av %  Min: 92 %  Max: 100 %  Patient Vitals for the past 12 hrs:   BP Temp Temp src Pulse Resp SpO2   19 1505 -- -- -- 145 -- 92 %   19 1410 (!) 174/99 -- -- 89 -- 97 %   19 1305 (!) 180/103 -- -- 86 -- 95 %   19 1205 (!) 157/98 99.5 °F (37.5 °C) Oral 94 -- 99 %   19 1141 -- -- -- 83 -- 100 %   19 1105 137/86 -- -- 88 -- 100 %   19 1005 121/67 -- -- 82 -- 99 %   12/29/19 0815 -- 103.2 °F (39.6 °C) Oral -- -- --   1948 -- -- -- 97 -- 100 %   1943 -- -- -- 94 -- 100 %   19 -- -- -- 97 -- 99 %   19 0602 139/85 -- -- 98 -- 98 %   19 0504 (!) 143/77 -- -- 90 19 100 %   19 0417 (!) 175/88 -- -- 91 -- 99 %   19 0403 (!) 181/94 -- -- 86 -- --     Estimated body mass index is 29.36 kg/m² as calculated from the following:    Height as of this encounter: 5' 11\" (1.803 m). Weight as of this encounter: 210 lb 8.6 oz (95.5 kg).  []<16 Severe malnutrition  []16-16.99 Moderate malnutrition  []17-18.49 Mild malnutrition  []18.5-24.9 Normal  [x]25-29.9 Overweight (not obese)  []30-34.9 Obese class 1 (Low Risk)  []35-39.9 Obese class 2 (Moderate Risk)  []?40 Obese class 3 (High Risk)    RECENT LABS:   Lab Results   Component Value Date    WBC 6.7 12/29/2019    HGB 11.2 (L) 12/29/2019    HCT 40.2 (L) 12/29/2019     12/29/2019    CHOL 105 12/27/2019    TRIG 60 12/27/2019    HDL 52 12/27/2019    ALT 23 12/25/2019    AST 25 12/25/2019     12/29/2019    K 4.3 12/29/2019     12/29/2019    CREATININE 0.40 (L) 12/29/2019    BUN 13 12/29/2019    CO2 21 12/29/2019    INR 1.0 12/25/2019    LABA1C 7.1 (H) 12/27/2019     24 HOUR INTAKE/OUTPUT:    Intake/Output Summary (Last 24 hours) at 12/29/2019 1546  Last data filed at 12/29/2019 1200  Gross per 24 hour   Intake 7714 ml   Output 800 ml   Net 6914 ml       Labs and Images reviewed with:  [] Dr. Liang Martin. Sanjay    [x] Dr. Narcisa Redd  [] Dr. Chapincito Calix  [] There are no new interval images to review. PHYSICAL EXAM       CONSTITUTIONAL:  Intubated. Off sedation, patient will open eyes, follow simple commands intermittently. HEAD:  normocephalic   EYES:  PERRL, left eye with subconjunctival hemorrhage   ENT:  moist mucous membranes   NECK:  supple, symmetric   LUNGS:  Equal air entry bilaterally   CARDIOVASCULAR:  normal s1 / s2, RRR, distal pulses intact   MUSCULOSKELETAL: Left thumb spica splint applied   ABDOMEN:  Soft, no rigidity   NEUROLOGIC:  Mental Status: Intubated             Cranial Nerves:    III: Pupils:  equal, round, reactive to light  III,IV,VI: Extra Ocular Movements: intact    Motor Exam:    Localizes with right upper extremity, spontaneous movement to all extremities.       DRAINS:  [x] There are no drains for Neuro Critical Care to monitor at this time. ASSESSMENT AND PLAN:       62 yo male with history of HTN, HLD, Atrial fibrillation who presents with likely a traumatic subdural hematoma, Left parietal SDH, and critical right ICA stenosis    NEUROLOGIC:  - POD # 3 diagnostic angiogram  - Follow up MRI brain  - Concern for seizure like activity, follow up LTME  - Continue Keppra 500 mg BID, seizure precautions  - Folic acid and thiamine   - Precedex for sedation, CIWA protocol discontinued  - Goal SBP < 160  - Neuro checks per protocol    CARDIOVASCULAR:  - Goal SBP < 160  - History of atrial fibrillation, continue sotalol 80 mg BID, Lopressor 5 mg IV prn  - Lipid panel: Cholesterol 105, LDL 41  - Follow up echocardiogram  - Continue telemetry    PULMONARY:  - Vent Settings: 30/15/600/5  - Daily AB.52/23.1/28.1/89  - CXR no acute findings  - Wean as tolerated    RENAL/FLUID/ELECTROLYTE:  - BUN 13/ Creatinine 0.40  - I&Os: 7714/1200  - Total fluids @ 100 mL/hr  - Replace electrolytes PRN  - Daily BMP    GI/NUTRITION:  NUTRITION:  DIET TUBE FEED CONTINUOUS/CYCLIC NPO; Diabetic; Nasogastric; Cyclic; 25; 85; 24  - Bowel regimen: Loose stools - FMS in place  - GI prophylaxis: Pepcid 20 mg BID    ID:  - Tmax 39.6 this morning  - WBC 6.7  - c diff negative  - Procalcitonin 0.05  - Follow up blood cultures  - UA unremarkable   - Continue to monitor for fevers  - Daily CBC    HEME:   - H&H 11.2/40.2  - Platelets 754  - Daily CBC    ENDOCRINE:  - Continue to monitor blood glucose, goal <180  - Hemoglobin A1C 7.1  - continue low ISS    OTHER:  - PT/OT/ST   - Code Status: Full    PROPHYLAXIS:  Stress ulcer: H2 blocker    DVT PROPHYLAXIS:  - SCD sleeves - Thigh High   - Heparin 5000 units q8h    DISPOSITION:  [x] To remain ICU: close neurological monitoring and vent management  [] OK for out of ICU from Neuro Critical Care standpoint    We will continue to follow along.      For any changes in exam or patient status please contact Neuro Critical Care.       PAMELA Yee - CNP  Neuro Critical Care  Pager [phone]  12/29/2019     3:46 PM

## 2019-12-29 NOTE — PROGRESS NOTES
12/29/19 0748   Vent Information   Vent Mode PRVC   Vt Ordered 600 mL   Rate Set 15 bmp   FiO2  30 %   PEEP/CPAP 5   Returned to [facility], [facility]

## 2019-12-30 ENCOUNTER — APPOINTMENT (OUTPATIENT)
Dept: GENERAL RADIOLOGY | Age: 59
DRG: 030 | End: 2019-12-30
Payer: MEDICAID

## 2019-12-30 ENCOUNTER — APPOINTMENT (OUTPATIENT)
Dept: MRI IMAGING | Age: 59
DRG: 030 | End: 2019-12-30
Payer: MEDICAID

## 2019-12-30 LAB
-: NORMAL
ABSOLUTE EOS #: 0.06 K/UL (ref 0–0.44)
ABSOLUTE IMMATURE GRANULOCYTE: 0.06 K/UL (ref 0–0.3)
ABSOLUTE LYMPH #: 1.18 K/UL (ref 1.1–3.7)
ABSOLUTE MONO #: 0.68 K/UL (ref 0.1–1.2)
ALBUMIN SERPL-MCNC: 3 G/DL (ref 3.5–5.2)
ALBUMIN/GLOBULIN RATIO: 1.1 (ref 1–2.5)
ALP BLD-CCNC: 68 U/L (ref 40–129)
ALT SERPL-CCNC: 21 U/L (ref 5–41)
AMORPHOUS: NORMAL
AMYLASE: 42 U/L (ref 28–100)
ANION GAP SERPL CALCULATED.3IONS-SCNC: 11 MMOL/L (ref 9–17)
AST SERPL-CCNC: 21 U/L
BACTERIA: NORMAL
BASOPHILS # BLD: 1 % (ref 0–2)
BASOPHILS ABSOLUTE: 0.06 K/UL (ref 0–0.2)
BILIRUB SERPL-MCNC: 0.76 MG/DL (ref 0.3–1.2)
BILIRUBIN DIRECT: 0.29 MG/DL
BILIRUBIN URINE: NEGATIVE
BILIRUBIN, INDIRECT: 0.47 MG/DL (ref 0–1)
BUN BLDV-MCNC: 12 MG/DL (ref 6–20)
BUN/CREAT BLD: ABNORMAL (ref 9–20)
CALCIUM SERPL-MCNC: 8.5 MG/DL (ref 8.6–10.4)
CASTS UA: NORMAL /LPF (ref 0–8)
CHLORIDE BLD-SCNC: 102 MMOL/L (ref 98–107)
CO2: 22 MMOL/L (ref 20–31)
COLOR: YELLOW
COMMENT UA: ABNORMAL
CREAT SERPL-MCNC: 0.41 MG/DL (ref 0.7–1.2)
CRYSTALS, UA: NORMAL /HPF
DIFFERENTIAL TYPE: ABNORMAL
EOSINOPHILS RELATIVE PERCENT: 1 % (ref 1–4)
EPITHELIAL CELLS UA: NORMAL /HPF (ref 0–5)
GFR AFRICAN AMERICAN: >60 ML/MIN
GFR NON-AFRICAN AMERICAN: >60 ML/MIN
GFR SERPL CREATININE-BSD FRML MDRD: ABNORMAL ML/MIN/{1.73_M2}
GFR SERPL CREATININE-BSD FRML MDRD: ABNORMAL ML/MIN/{1.73_M2}
GLOBULIN: ABNORMAL G/DL (ref 1.5–3.8)
GLUCOSE BLD-MCNC: 201 MG/DL (ref 75–110)
GLUCOSE BLD-MCNC: 207 MG/DL (ref 75–110)
GLUCOSE BLD-MCNC: 210 MG/DL (ref 75–110)
GLUCOSE BLD-MCNC: 221 MG/DL (ref 70–99)
GLUCOSE BLD-MCNC: 225 MG/DL (ref 75–110)
GLUCOSE URINE: ABNORMAL
HCT VFR BLD CALC: 35.6 % (ref 40.7–50.3)
HEMOGLOBIN: 10.6 G/DL (ref 13–17)
IMMATURE GRANULOCYTES: 1 %
KETONES, URINE: NEGATIVE
LEUKOCYTE ESTERASE, URINE: NEGATIVE
LIPASE: 56 U/L (ref 13–60)
LV EF: 45 %
LVEF MODALITY: NORMAL
LYMPHOCYTES # BLD: 19 % (ref 24–43)
MAGNESIUM: 1.5 MG/DL (ref 1.6–2.6)
MCH RBC QN AUTO: 22.8 PG (ref 25.2–33.5)
MCHC RBC AUTO-ENTMCNC: 29.8 G/DL (ref 28.4–34.8)
MCV RBC AUTO: 76.6 FL (ref 82.6–102.9)
MONOCYTES # BLD: 11 % (ref 3–12)
MORPHOLOGY: ABNORMAL
MUCUS: NORMAL
NITRITE, URINE: NEGATIVE
NRBC AUTOMATED: 0 PER 100 WBC
OTHER OBSERVATIONS UA: NORMAL
PDW BLD-RTO: 21 % (ref 11.8–14.4)
PH UA: 8.5 (ref 5–8)
PLATELET # BLD: 236 K/UL (ref 138–453)
PLATELET ESTIMATE: ABNORMAL
PMV BLD AUTO: 9.8 FL (ref 8.1–13.5)
POTASSIUM SERPL-SCNC: 4.3 MMOL/L (ref 3.7–5.3)
PROTEIN UA: NEGATIVE
RBC # BLD: 4.65 M/UL (ref 4.21–5.77)
RBC # BLD: ABNORMAL 10*6/UL
RBC UA: NORMAL /HPF (ref 0–4)
RENAL EPITHELIAL, UA: NORMAL /HPF
SEG NEUTROPHILS: 67 % (ref 36–65)
SEGMENTED NEUTROPHILS ABSOLUTE COUNT: 4.16 K/UL (ref 1.5–8.1)
SODIUM BLD-SCNC: 135 MMOL/L (ref 135–144)
SPECIFIC GRAVITY UA: 1.02 (ref 1–1.03)
TOTAL PROTEIN: 5.7 G/DL (ref 6.4–8.3)
TRICHOMONAS: NORMAL
TURBIDITY: ABNORMAL
URINE HGB: ABNORMAL
UROBILINOGEN, URINE: NORMAL
WBC # BLD: 6.2 K/UL (ref 3.5–11.3)
WBC # BLD: ABNORMAL 10*3/UL
WBC UA: NORMAL /HPF (ref 0–5)
YEAST: NORMAL

## 2019-12-30 PROCEDURE — 81001 URINALYSIS AUTO W/SCOPE: CPT

## 2019-12-30 PROCEDURE — 6370000000 HC RX 637 (ALT 250 FOR IP): Performed by: STUDENT IN AN ORGANIZED HEALTH CARE EDUCATION/TRAINING PROGRAM

## 2019-12-30 PROCEDURE — 6360000002 HC RX W HCPCS: Performed by: STUDENT IN AN ORGANIZED HEALTH CARE EDUCATION/TRAINING PROGRAM

## 2019-12-30 PROCEDURE — 99233 SBSQ HOSP IP/OBS HIGH 50: CPT | Performed by: PSYCHIATRY & NEUROLOGY

## 2019-12-30 PROCEDURE — 2700000000 HC OXYGEN THERAPY PER DAY

## 2019-12-30 PROCEDURE — 2580000003 HC RX 258: Performed by: STUDENT IN AN ORGANIZED HEALTH CARE EDUCATION/TRAINING PROGRAM

## 2019-12-30 PROCEDURE — 2500000003 HC RX 250 WO HCPCS: Performed by: PSYCHIATRY & NEUROLOGY

## 2019-12-30 PROCEDURE — 82150 ASSAY OF AMYLASE: CPT

## 2019-12-30 PROCEDURE — 6360000002 HC RX W HCPCS

## 2019-12-30 PROCEDURE — 95951 PR EEG MONITORING/VIDEORECORD: CPT | Performed by: PSYCHIATRY & NEUROLOGY

## 2019-12-30 PROCEDURE — 6370000000 HC RX 637 (ALT 250 FOR IP): Performed by: PSYCHIATRY & NEUROLOGY

## 2019-12-30 PROCEDURE — 94770 HC ETCO2 MONITOR DAILY: CPT

## 2019-12-30 PROCEDURE — 36620 INSERTION CATHETER ARTERY: CPT

## 2019-12-30 PROCEDURE — 2580000003 HC RX 258: Performed by: NURSE PRACTITIONER

## 2019-12-30 PROCEDURE — 6360000004 HC RX CONTRAST MEDICATION: Performed by: NURSE PRACTITIONER

## 2019-12-30 PROCEDURE — 2500000003 HC RX 250 WO HCPCS: Performed by: NURSE PRACTITIONER

## 2019-12-30 PROCEDURE — 6360000002 HC RX W HCPCS: Performed by: NURSE PRACTITIONER

## 2019-12-30 PROCEDURE — 36415 COLL VENOUS BLD VENIPUNCTURE: CPT

## 2019-12-30 PROCEDURE — 85025 COMPLETE CBC W/AUTO DIFF WBC: CPT

## 2019-12-30 PROCEDURE — 2580000003 HC RX 258: Performed by: PSYCHIATRY & NEUROLOGY

## 2019-12-30 PROCEDURE — 80076 HEPATIC FUNCTION PANEL: CPT

## 2019-12-30 PROCEDURE — 99212 OFFICE O/P EST SF 10 MIN: CPT

## 2019-12-30 PROCEDURE — 93306 TTE W/DOPPLER COMPLETE: CPT

## 2019-12-30 PROCEDURE — 6370000000 HC RX 637 (ALT 250 FOR IP): Performed by: NURSE PRACTITIONER

## 2019-12-30 PROCEDURE — A9576 INJ PROHANCE MULTIPACK: HCPCS | Performed by: NURSE PRACTITIONER

## 2019-12-30 PROCEDURE — 87070 CULTURE OTHR SPECIMN AEROBIC: CPT

## 2019-12-30 PROCEDURE — 95951 HC EEG MONITORING VIDEO RECORDING: CPT

## 2019-12-30 PROCEDURE — 94761 N-INVAS EAR/PLS OXIMETRY MLT: CPT

## 2019-12-30 PROCEDURE — 99291 CRITICAL CARE FIRST HOUR: CPT | Performed by: PSYCHIATRY & NEUROLOGY

## 2019-12-30 PROCEDURE — 87205 SMEAR GRAM STAIN: CPT

## 2019-12-30 PROCEDURE — 70553 MRI BRAIN STEM W/O & W/DYE: CPT

## 2019-12-30 PROCEDURE — 83690 ASSAY OF LIPASE: CPT

## 2019-12-30 PROCEDURE — 80048 BASIC METABOLIC PNL TOTAL CA: CPT

## 2019-12-30 PROCEDURE — 83735 ASSAY OF MAGNESIUM: CPT

## 2019-12-30 PROCEDURE — 94003 VENT MGMT INPAT SUBQ DAY: CPT

## 2019-12-30 PROCEDURE — 71045 X-RAY EXAM CHEST 1 VIEW: CPT

## 2019-12-30 PROCEDURE — 87040 BLOOD CULTURE FOR BACTERIA: CPT

## 2019-12-30 PROCEDURE — 76937 US GUIDE VASCULAR ACCESS: CPT

## 2019-12-30 PROCEDURE — 82947 ASSAY GLUCOSE BLOOD QUANT: CPT

## 2019-12-30 PROCEDURE — 89220 SPUTUM SPECIMEN COLLECTION: CPT

## 2019-12-30 PROCEDURE — 2500000003 HC RX 250 WO HCPCS: Performed by: STUDENT IN AN ORGANIZED HEALTH CARE EDUCATION/TRAINING PROGRAM

## 2019-12-30 PROCEDURE — 2000000003 HC NEURO ICU R&B

## 2019-12-30 RX ORDER — LABETALOL 20 MG/4 ML (5 MG/ML) INTRAVENOUS SYRINGE
10 ONCE
Status: DISCONTINUED | OUTPATIENT
Start: 2019-12-30 | End: 2019-12-30

## 2019-12-30 RX ORDER — SODIUM CHLORIDE 0.9 % (FLUSH) 0.9 %
10 SYRINGE (ML) INJECTION 2 TIMES DAILY
Status: DISCONTINUED | OUTPATIENT
Start: 2019-12-30 | End: 2020-01-27 | Stop reason: HOSPADM

## 2019-12-30 RX ORDER — NICOTINE 21 MG/24HR
1 PATCH, TRANSDERMAL 24 HOURS TRANSDERMAL DAILY
Status: DISCONTINUED | OUTPATIENT
Start: 2019-12-30 | End: 2020-01-27 | Stop reason: HOSPADM

## 2019-12-30 RX ORDER — THIAMINE MONONITRATE (VIT B1) 100 MG
100 TABLET ORAL DAILY
Status: DISCONTINUED | OUTPATIENT
Start: 2019-12-30 | End: 2020-01-27 | Stop reason: HOSPADM

## 2019-12-30 RX ORDER — LABETALOL 20 MG/4 ML (5 MG/ML) INTRAVENOUS SYRINGE
10
Status: DISCONTINUED | OUTPATIENT
Start: 2019-12-30 | End: 2020-01-08

## 2019-12-30 RX ORDER — ACETAMINOPHEN 500 MG
1000 TABLET ORAL EVERY 8 HOURS PRN
Status: DISCONTINUED | OUTPATIENT
Start: 2019-12-30 | End: 2020-01-01

## 2019-12-30 RX ORDER — FENTANYL CITRATE 50 UG/ML
50 INJECTION, SOLUTION INTRAMUSCULAR; INTRAVENOUS ONCE
Status: COMPLETED | OUTPATIENT
Start: 2019-12-30 | End: 2019-12-30

## 2019-12-30 RX ORDER — ATROPINE SULFATE 0.1 MG/ML
INJECTION INTRAVENOUS
Status: DISPENSED
Start: 2019-12-30 | End: 2019-12-31

## 2019-12-30 RX ORDER — FENTANYL CITRATE 50 UG/ML
INJECTION, SOLUTION INTRAMUSCULAR; INTRAVENOUS
Status: COMPLETED
Start: 2019-12-30 | End: 2019-12-30

## 2019-12-30 RX ORDER — MIDAZOLAM HYDROCHLORIDE 1 MG/ML
1 INJECTION INTRAMUSCULAR; INTRAVENOUS
Status: DISCONTINUED | OUTPATIENT
Start: 2019-12-30 | End: 2020-01-06

## 2019-12-30 RX ORDER — MIDAZOLAM HYDROCHLORIDE 1 MG/ML
2 INJECTION INTRAMUSCULAR; INTRAVENOUS ONCE
Status: COMPLETED | OUTPATIENT
Start: 2019-12-30 | End: 2019-12-30

## 2019-12-30 RX ORDER — HALOPERIDOL 5 MG/ML
5 INJECTION INTRAMUSCULAR ONCE
Status: DISCONTINUED | OUTPATIENT
Start: 2019-12-30 | End: 2019-12-30

## 2019-12-30 RX ORDER — FOLIC ACID 1 MG/1
1 TABLET ORAL DAILY
Status: DISCONTINUED | OUTPATIENT
Start: 2019-12-30 | End: 2020-01-27 | Stop reason: HOSPADM

## 2019-12-30 RX ORDER — HALOPERIDOL 5 MG/ML
5 INJECTION INTRAMUSCULAR ONCE
Status: COMPLETED | OUTPATIENT
Start: 2019-12-30 | End: 2019-12-30

## 2019-12-30 RX ORDER — MAGNESIUM SULFATE 1 G/100ML
2 INJECTION INTRAVENOUS ONCE
Status: COMPLETED | OUTPATIENT
Start: 2019-12-30 | End: 2019-12-30

## 2019-12-30 RX ADMIN — MIDAZOLAM HYDROCHLORIDE 0.5 MG: 1 INJECTION, SOLUTION INTRAMUSCULAR; INTRAVENOUS at 02:56

## 2019-12-30 RX ADMIN — FAMOTIDINE 20 MG: 10 INJECTION, SOLUTION INTRAVENOUS at 19:53

## 2019-12-30 RX ADMIN — INSULIN LISPRO 2 UNITS: 100 INJECTION, SOLUTION INTRAVENOUS; SUBCUTANEOUS at 12:07

## 2019-12-30 RX ADMIN — FAMOTIDINE 20 MG: 10 INJECTION, SOLUTION INTRAVENOUS at 08:48

## 2019-12-30 RX ADMIN — Medication 15 ML: at 09:00

## 2019-12-30 RX ADMIN — Medication 2 MG/HR: at 10:20

## 2019-12-30 RX ADMIN — SOTALOL HYDROCHLORIDE 80 MG: 80 TABLET ORAL at 08:47

## 2019-12-30 RX ADMIN — Medication 100 MG: at 08:47

## 2019-12-30 RX ADMIN — ACETAMINOPHEN 1000 MG: 500 TABLET ORAL at 04:37

## 2019-12-30 RX ADMIN — Medication 10 MG: at 00:03

## 2019-12-30 RX ADMIN — ROSUVASTATIN CALCIUM 5 MG: 5 TABLET, FILM COATED ORAL at 19:53

## 2019-12-30 RX ADMIN — HYDRALAZINE HYDROCHLORIDE 10 MG: 20 INJECTION INTRAMUSCULAR; INTRAVENOUS at 21:23

## 2019-12-30 RX ADMIN — SOTALOL HYDROCHLORIDE 80 MG: 80 TABLET ORAL at 19:53

## 2019-12-30 RX ADMIN — MAGNESIUM SULFATE HEPTAHYDRATE 2 G: 1 INJECTION, SOLUTION INTRAVENOUS at 05:50

## 2019-12-30 RX ADMIN — FENTANYL CITRATE 50 MCG: 50 INJECTION, SOLUTION INTRAMUSCULAR; INTRAVENOUS at 08:02

## 2019-12-30 RX ADMIN — HYDRALAZINE HYDROCHLORIDE 10 MG: 20 INJECTION INTRAMUSCULAR; INTRAVENOUS at 04:38

## 2019-12-30 RX ADMIN — Medication 100 MG: at 11:50

## 2019-12-30 RX ADMIN — ACETAMINOPHEN 1000 MG: 500 TABLET ORAL at 16:52

## 2019-12-30 RX ADMIN — POTASSIUM BICARBONATE 40 MEQ: 782 TABLET, EFFERVESCENT ORAL at 08:48

## 2019-12-30 RX ADMIN — Medication 15 ML: at 19:53

## 2019-12-30 RX ADMIN — MIDAZOLAM 2 MG/HR: 5 INJECTION INTRAMUSCULAR; INTRAVENOUS at 17:53

## 2019-12-30 RX ADMIN — SODIUM CHLORIDE, PRESERVATIVE FREE 10 ML: 5 INJECTION INTRAVENOUS at 19:53

## 2019-12-30 RX ADMIN — HEPARIN SODIUM 5000 UNITS: 5000 INJECTION INTRAVENOUS; SUBCUTANEOUS at 05:45

## 2019-12-30 RX ADMIN — PIPERACILLIN AND TAZOBACTAM: 3; .375 INJECTION, POWDER, FOR SOLUTION INTRAVENOUS at 19:54

## 2019-12-30 RX ADMIN — GADOTERIDOL 18 ML: 279.3 INJECTION, SOLUTION INTRAVENOUS at 16:04

## 2019-12-30 RX ADMIN — HEPARIN SODIUM 5000 UNITS: 5000 INJECTION INTRAVENOUS; SUBCUTANEOUS at 20:01

## 2019-12-30 RX ADMIN — Medication 1500 MG: at 11:50

## 2019-12-30 RX ADMIN — FOLIC ACID: 5 INJECTION, SOLUTION INTRAMUSCULAR; INTRAVENOUS; SUBCUTANEOUS at 09:42

## 2019-12-30 RX ADMIN — PIPERACILLIN AND TAZOBACTAM: 3; .375 INJECTION, POWDER, FOR SOLUTION INTRAVENOUS at 12:33

## 2019-12-30 RX ADMIN — HALOPERIDOL LACTATE 5 MG: 5 INJECTION, SOLUTION INTRAMUSCULAR at 05:28

## 2019-12-30 RX ADMIN — MIDAZOLAM HYDROCHLORIDE 2 MG: 1 INJECTION, SOLUTION INTRAMUSCULAR; INTRAVENOUS at 10:09

## 2019-12-30 RX ADMIN — LEVETIRACETAM 500 MG: 5 INJECTION INTRAVENOUS at 11:58

## 2019-12-30 RX ADMIN — ACETAMINOPHEN 650 MG: 325 TABLET ORAL at 00:05

## 2019-12-30 RX ADMIN — INSULIN LISPRO 2 UNITS: 100 INJECTION, SOLUTION INTRAVENOUS; SUBCUTANEOUS at 17:49

## 2019-12-30 RX ADMIN — HEPARIN SODIUM 5000 UNITS: 5000 INJECTION INTRAVENOUS; SUBCUTANEOUS at 13:57

## 2019-12-30 RX ADMIN — LEVETIRACETAM 500 MG: 5 INJECTION INTRAVENOUS at 00:11

## 2019-12-30 RX ADMIN — INSULIN LISPRO 1 UNITS: 100 INJECTION, SOLUTION INTRAVENOUS; SUBCUTANEOUS at 19:53

## 2019-12-30 RX ADMIN — FOLIC ACID 1 MG: 1 TABLET ORAL at 11:50

## 2019-12-30 RX ADMIN — INSULIN LISPRO 2 UNITS: 100 INJECTION, SOLUTION INTRAVENOUS; SUBCUTANEOUS at 08:57

## 2019-12-30 RX ADMIN — Medication 10 MG: at 23:23

## 2019-12-30 ASSESSMENT — PULMONARY FUNCTION TESTS
PIF_VALUE: 17
PIF_VALUE: 19
PIF_VALUE: 18
PIF_VALUE: 12
PIF_VALUE: 13
PIF_VALUE: 17
PIF_VALUE: 21
PIF_VALUE: 15
PIF_VALUE: 20
PIF_VALUE: 19
PIF_VALUE: 14
PIF_VALUE: 17
PIF_VALUE: 16
PIF_VALUE: 20

## 2019-12-30 ASSESSMENT — PAIN SCALES - GENERAL: PAINLEVEL_OUTOF10: 0

## 2019-12-30 NOTE — PROGRESS NOTES
Physical Therapy  DATE: 2019    NAME: Michele Burns  MRN: [id number]   : 1960    Patient not seen this date for Physical Therapy due to:  [] Blood transfusion in progress  [] Hemodialysis  []  Patient Declined  [] Spine Precautions   [] Strict Bedrest  [] Surgery/ Procedure  [] Testing      [x] Other (per RN, attempting to wean pt from vent for possible extubation later this date. Will check back )       [] PT being discontinued at this time. Patient independent. No further needs. [] PT being discontinued at this time as the patient has been transferred to palliative care. No further needs.     Scar Barksdale, PTA

## 2019-12-30 NOTE — PLAN OF CARE
Ventilator Bronchodilator assessment    Breath sounds: clear/slightly decreased bases  Inspiratory Pressure: 14  Plateau Pressure: 13    Patient assessed at level 1          [x]    Bronchodilator Assessment    BRONCHODILATOR ASSESSMENT SCORE  Score 0 (Home) 1 2 3 4   Breath Sounds   []  Chronic Ventilator: Patient at baseline [x]  Mild Wheezes/ Clear []  Intermittent wheezes with good air entry []  Bilateral/unilateral wheezing with diminished air entry []  Insp/Exp wheeze and/or poor aeration   Ventilator Pressures   []  Chronic Ventilator [x]  Insp. Pressure less than 25 cm H20 []  Insp. Pressure less than 25 cm H20 []  Insp. Pressure exceeds 25 cm H20 []  Insp.  Pressure exceeds 30 cm H20   Plateau Pressure []  NA   [x]  Plateau Pressure less than 4  []  Plateau Pressure less than or equal to 5 []  Plateau Pressure greater than or equal to 6 []  Plateau Pressure greater than or equal to 8       jame persaud  2:17 PM

## 2019-12-30 NOTE — PROCEDURES
Arterial Line Placement Procedure Note                     Indication: Need for serial blood work, arterial blood gases, mechanical ventilation and cardiovascular instability    Consent: Unable to be obtained due to the emergent nature of this procedure. Ozzie's Test: Not indicated in this particular procedure    Procedure: The skin over the left Axillary artery was prepped with Chloraprep. Local anesthesia was obtained by infiltration using 1% Lidocaine without epinephrine. A 20 gauge angiocath was then inserted, over a needle, into the vessel. The transducer set was then attached and securely fastened to the skin with sutures and with an adhesive dressing. Waveforms on the monitor were observed and found to be sufficient. The patient had good distal perfusion after the procedure. The site was then dressed in a sterile fashion. The patient tolerated the procedure well.      Complications: None    Kay Flores  12/30/2019  1:47 PM

## 2019-12-30 NOTE — PROCEDURES
LONG-TERM EEG-VIDEO [address]    Patient: Naun Rordigues  Age: 61 y.o. MRN: [id number]    Referring Physician: No ref. provider found  History: The patient is a 61 y.o. male who presented breakthrough seizure/encephalopathy. This long-term video-EEG monitoring study was performed to determine the nature of the patient's clinical events. The patient is on neuroactive medications.    Naun Rodrigues   Current Facility-Administered Medications   Medication Dose Route Frequency Provider Last Rate Last Dose    lidocaine 1 % injection 5 mL  5 mL Intradermal Once Messi Mandril, DO        midazolam (VERSED) injection 0.5 mg  0.5 mg Intravenous Q2H PRN Andrea Perish, DO   0.5 mg at 12/29/19 2142    labetalol (NORMODYNE;TRANDATE) injection syringe 10 mg  10 mg Intravenous Once Andrea Perish, DO        famotidine (PEPCID) injection 20 mg  20 mg Intravenous BID Stevie Mcardle, APRN - CNP   20 mg at 12/29/19 2052    rosuvastatin (CRESTOR) tablet 5 mg  5 mg Oral Nightly Stevie Mcardle, APRN - CNP   5 mg at 12/29/19 2055    sotalol (BETAPACE) tablet 80 mg  80 mg Oral BID Narcisa Redd MD   80 mg at 12/29/19 2056    chlorhexidine (PERIDEX) 0.12 % solution 15 mL  15 mL Mouth/Throat BID Narcisa Redd MD   15 mL at 12/29/19 2050    dexmedetomidine (PRECEDEX) 400 mcg in sodium chloride 0.9 % 100 mL infusion  0.2 mcg/kg/hr Intravenous Continuous Narcisa Redd MD   Stopped at 12/29/19 1814    metoprolol (LOPRESSOR) injection 5 mg  5 mg Intravenous Q5 Min PRN Neena Watt MD   5 mg at 12/29/19 1505    potassium chloride (KLOR-CON M) extended release tablet 40 mEq  40 mEq Oral BID  Zan Rich, DO   40 mEq at 12/29/19 2055    vitamin D (ERGOCALCIFEROL) capsule 50,000 Units  50,000 Units Oral Weekly Zan Rich, DO   50,000 Units at 12/27/19 1801    heparin (porcine) injection 5,000 Units  5,000 Units Subcutaneous 3 10 mL at 12/29/19 2056    sodium chloride flush 0.9 % injection 10 mL  10 mL Intravenous PRN Ruddy Cárdenas MD        ondansetron [facility]) injection 4 mg  4 mg Intravenous Q6H PRN Ruddy Cárdenas MD        prochlorperazine (COMPAZINE) injection 10 mg  10 mg Intravenous Once Ruddy Cárdenas MD        promethazine [facility]) injection 12.5 mg  12.5 mg Intravenous Once Ruddy Cárdenas MD        lidocaine 1 % injection 20 mL  20 mL Intradermal Once Keara Matters, DO         Technical Description: This is a 21-channel digital EEG recording with time-locked video. Electrodes were placed in accordance with the 10-20 International System of Electrode Placement. Single lead EKG monitoring was included. Baseline EEG Recording:  A formal baseline EEG recording was not obtained. Day 1 - 12/29/19, starting at 14:26    Interictal EEG Samples: The background activity consisted of 6 to 7 Hz of polymorphic theta frequency. Frequent bursts of 1 to 2 Hz of rhythmic delta activity were seen. During behavioral sleep, sleep spindles were seen which are symmetric over both hemispheres. No abnormal epileptiform activity was seen. . The EKG channel revealed no abnormalities. Ictal EEG Recording / Patient Events: During this period the patient had no events or seizures. Summary: During this day of recording no events were recorded. The interictal EEG was abnormal due to diffuse slowing and disorganized background in theta frequency suggesting moderate encephalopathy. Monitoring was continued in order to record the patient's typical events. The EKG channel revealed no abnormalities. Wilma Leos MD  Diplomate, American Board of Psychiatry and Neurology  Diplomate, American Board of Clinical Neurophysiology  Diplomate, American Board of Epilepsy     Please note this is a preliminary report and updated daily. The final report will have a summary of behavior and electrographic findings with clinical correlation.

## 2019-12-30 NOTE — PROGRESS NOTES
135   K 3.4* 4.3 4.3   CL 97* 102 102   CO2 22 21 22   BUN 6 13 12   CREATININE 0.23* 0.40* 0.41*   GLUCOSE 139* 186* 221*     Hepatic:   No results for input(s): AST, ALT, ALB, BILITOT, ALKPHOS in the last 72 hours. Troponin: No results for input(s): TROPONINI in the last 72 hours. BNP: No results for input(s): BNP in the last 72 hours. Lipids:   No results for input(s): CHOL, HDL in the last 72 hours. Invalid input(s): LDLCALCU  INR:   No results for input(s): INR in the last 72 hours. Assessment and Recommendations:     Traumatic subarachnoid hemorrhage.     s/p diagnostic cerebral angiogram in December 26, 2019  1. Critical right cervical ICA stenosis measuring approximately 90 to 99% per NASCET criteria. There is delayed anterograde filling of the cervical ica across the stenosis. There is Collateral filling of the distal right ica from the retrograde filling of the right ophthalmic artery from the right IMAX. In addition there is pial collaterals from the right PCA supplying the distal right mca territory parieto/occipital region. 2. No MCA vasospasm noted however there are irregularities suspected from diffuse intracranial athero including 50% left vert athero with stenosis in the v3 segment    He is intubated. PLAN:  1. Traumatic subarachnoid hemorrhage management per neuro ICU. 2. Pressure goal 30-1 60.  3. Smoking cessation, PT/OT evaluation. 4. Suspect alcohol withdrawal    Please contact neuro endovascular team for any questions or concerns.     Bruna Torrez MD  Stroke, [facility]  [id number]  Electronically signed 12/30/2019 at 8:35 AM

## 2019-12-30 NOTE — PROGRESS NOTES
Daily Progress Note  Neuro Critical Care    Patient Name: Melanie Edmonds  Patient : 1960  Room/Bed: 0529/0529-01  Code Status: Full  Allergies: No Known Allergies    CHIEF COMPLAINT:     Subarachnoid hemorrhage     INTERVAL HISTORY    Initial Presentation :    The patient is a 62 yo male with history of atrial fibrillation on Eliquis, bilateral ICA stenosis (right more than left), DM2, HLD, HTN, CAD s/p PCI stents, PVD, CEA, and ETOH abuse who presents as a transfer from [facility] as a trauma alert for CT head revealed diffuse bialteral SAH and left parietal SDH.  He was given [place] and transferred to [facility] for higher level of care.       Patient was found confused by family at his home after being unable to reach him on the phone.  He was complaining of headache, and actively vomiting.  Also had bilateral arm scrapes and bruises concerning for recent fall, patient himself does not member falling, said around 4 PM he woke up and felt frontal and vertex headache and neck pain, and felt nauseous and started throwing up.  Endorses drinking alcohol last night states that he had 2 beers.  Per family has significant alcohol abuse history and has had multiple falls in the past.     On presentation in the ED [facility], patient mildly lethargic but oriented x4, complaining of headache, nausea, mild vertigo, left arm weaker than right, bilateral leg weakness.      Significant interdisciplinary discussion between the neurosurgeon, radiologist, trauma surgeon and stroke specialist about whether  bleed is consistent with traumatic versus spontaneous subarachnoid.  Stroke specialist has significant concern for severe stenosis, suspects traumatic bleed and that patient is at risk for vasospasm.  In addition has intracranial left vertebral artery athero-stenosis.  Consensus management strategy is to keep the patient 130-160 for blood pressure goals to avoid right MCA watershed stroke.     No aneurysm found on CTA or malformation-critical stenosis of the proximal left vertebral artery, critical stenosis of the proximal right cervical ICA, severe near critical stenosis of the right cavernous ICA, moderate stenosis of the left cavernous ICA. Non con CT head repeat at our emergency facility showed subarachnoid, predominantly in the bilateral sylvian fissures and bilateral cerebral hemisphere sulcal I, acute left parietal convexity subdural hematoma 4 mm in thickness without mass-effect.     Hospital Course:   12/27: CT head stable  12/28: Intubated overnight for respiratory distress, started on Cardizem infusion for atrial fibrillation with RVR, weaned off and Sotalol started. Versed for sedation with concern for possible alcohol withdrawal.     12/29: loose foul smelling stool, c. Diff sent and was negative. Off Precdex,had left upper extremity tremor, placed on LTME. Febrile this morning - infectious workup sent. Will hold off on antibiotics with no leukocytosis - if spiked temp again will start Vancomycin and zosyn. Last 24h:   Patient is seen and evaluated at bedside off sedation intubated. SBP in 1  range  Requiring  Hydralazine x2 . On Keppra 500mg q12, LTM EEG shows diffuse slowing and disorganized background in theta frequency suggesting moderate encephalopathy.   Before rounds patient became agitated and restless he was started on Precedex drip and Versed drip,He spiked a fever of 102.5 and is started on Vanco and Zosyn      CURRENT MEDICATIONS:  SCHEDULED MEDICATIONS:   magnesium sulfate  2 g Intravenous Once    labetalol  10 mg Intravenous Once    lidocaine  5 mL Intradermal Once    famotidine (PEPCID) injection  20 mg Intravenous BID    rosuvastatin  5 mg Oral Nightly    sotalol  80 mg Oral BID    chlorhexidine  15 mL Mouth/Throat BID    potassium chloride  40 mEq Oral BID WC    vitamin D  50,000 Units Oral Weekly    heparin (porcine)  5,000 Units Subcutaneous 3 times per day    Continue to monitor blood glucose, goal <180  - Hemoglobin A1C 7.1  - continue low ISS     OTHER:  - PT/OT/ST   - Code Status: Full     PROPHYLAXIS:  Stress ulcer: H2 blocker     DVT PROPHYLAXIS:  - SCD sleeves - Thigh High   - Heparin 5000 units q8h    DISPOSITION:  [x] To remain ICU:   [] OK for out of ICU from Neuro Critical Care standpoint    We will continue to follow along. For any changes in exam or patient status please contact Neuro Critical Care.       Re Lamb MD  Neuro Critical Care  Pager [phone]  12/30/2019     7:01 AM

## 2019-12-30 NOTE — PROGRESS NOTES
Nutrition Assessment (Enteral Nutrition)    Type and Reason for Visit: Reassess    Nutrition Recommendations: Suggest decreasing TF rate to 80 mL/hr to provide 2300 kcals, 115 gm protein per day. Nutrition Assessment: Pt currently tolerating TF at 85 mL/hr. Noted total fluid goal of 100 mL/hr. Malnutrition Assessment:  · Malnutrition Status: Insufficient data  · Context: Acute illness or injury  · Findings of the 6 clinical characteristics of malnutrition (Minimum of 2 out of 6 clinical characteristics is required to make the diagnosis of moderate or severe Protein Calorie Malnutrition based on AND/ASPEN Guidelines):  1. Energy Intake-Unable to assess, Unable to assess    2. Weight Loss-Unable to assess,    3. Fat Loss-Unable to assess,    4. Muscle Loss-Unable to assess,    5. Fluid Accumulation-Mild fluid accumulation, Extremities, Generalized  6.   Strength-Not measured    Nutrition Risk Level: High    Nutrition Needs:  · Estimated Daily Total Kcal: 2189-6687 kcals/day  · Estimated Daily Protein (g):  gm/day    Nutrition Diagnosis:   · Problem: Inadequate oral intake  · Etiology: related to Impaired respiratory function-inability to consume food     Signs and symptoms:  as evidenced by NPO status due to medical condition, Nutrition support - EN    Objective Information:  · Wound Type: None  · Current Nutrition Therapies:  · Oral Diet Orders: NPO   · Tube Feeding (TF) Orders:   · Feeding Route: Nasogastric  · Formula: Diabetic  · Rate (ml/hr):85 mL/hr    · Volume (ml/day): 2040 mL/day  · Duration: Continuous  · Current TF & Flush Orders Provides: Glucerna (diabetic) at 85 mL/hr = 2448 kcals, 122 gm protein  · Goal TF & Flush Orders Provides: Glucerna (diabetic) at 80 mL/hr = 2300 kcals, 115 gm protein per day  · Anthropometric Measures:  · Ht: 5' 11\" (180.3 cm)   · Current Body Wt: 210 lb 8.6 oz (95.5 kg)  · Ideal Body Wt: 172 lb (78 kg), % Ideal Body 122%  · BMI Classification: BMI 25.0 - 29.9 Overweight(BMI 29.4)    Nutrition Interventions:   Modify current Tube Feeding  Continued Inpatient Monitoring, Education Not Indicated    Nutrition Evaluation:   · Evaluation: Goal achieved   · Goals: meet more than 75% of nutrition needs   · Monitoring: TF Intake, TF Tolerance, Pertinent Labs, Weight, Monitor Hemodynamic Status, I&O      Electronically signed by Mandie Chakraborty MS, RD, LD on 12/30/19 at 3:21 PM    Contact Number: [phone]

## 2019-12-30 NOTE — PROGRESS NOTES
Pharmacy Note  Vancomycin Consult    Stephanie Parson is a 61 y.o. male started on Vancomycin for fevers; consult received from Dr. Cornelio Aleman to manage therapy. Also receiving the following antibiotics: Zosyn. Patient Active Problem List   Diagnosis    SAH ([facility]arachnoid hemorrhage) ([facility]    Subarachnoid bleed ([facility]    Traumatic subarachnoid hemorrhage with loss of consciousness of 1 hour to 5 hours 59 minutes (HCC)    Carotid stenosis, right    Asymptomatic stenosis of left vertebral artery    Intracranial atherosclerosis    History of CEA (carotid endarterectomy)    Ventilator dependence ([facility]    Alcohol withdrawal syndrome, with delirium ([facility]       Allergies:  Patient has no known allergies. Temp max: 39.2 C    Recent Labs     12/29/19  0401 12/30/19  0404   BUN 13 12       Recent Labs     12/29/19  0401 12/30/19  0404   CREATININE 0.40* 0.41*       Recent Labs     12/29/19  0401 12/30/19  0404   WBC 6.7 6.2         Intake/Output Summary (Last 24 hours) at 12/30/2019 1049  Last data filed at 12/30/2019 1045  Gross per 24 hour   Intake 2568 ml   Output 1990 ml   Net 578 ml       Culture Date      Source                       Results  12.26                  MRSA Swab               Negative  12.29                  C Diff                          Negative  12.29                  Blood x2                     Pending  12.29                  Sputum                       Pending    Ht Readings from Last 1 Encounters:   12/28/19 5' 11\" (1.803 m)        Wt Readings from Last 1 Encounters:   12/25/19 210 lb 8.6 oz (95.5 kg)         Body mass index is 29.36 kg/m². Estimated Creatinine Clearance: 229 mL/min (A) (based on SCr of 0.41 mg/dL (L)). Goal Trough Level: 15-20 mcg/mL    Assessment/Plan:  Will initiate vancomycin 1500 mg IV every 12 hours. Timing of trough level will be determined based on culture results, renal function, and clinical response. Thank you for the consult.   Will continue to follow.   Alex Cruz PharmD BCPS  12/30/2019 10:49 AM

## 2019-12-30 NOTE — PROGRESS NOTES
12/30/2019  4:42 PM   Wound Surface Area (cm^2) 1.5 cm^2 12/30/2019  4:42 PM   Wound Volume (cm^3) 0.3 cm^3 12/30/2019  4:42 PM   Wound Assessment Dry;Red;Yellow 12/30/2019  4:42 PM   Drainage Amount Scant 12/30/2019  4:00 PM   Drainage Description Yellow 12/30/2019  4:00 PM   Gisselle-wound Assessment Hyperpigmented 12/30/2019  4:42 PM   Number of days: 4       Plan   Plan of Care: Wound 12/26/19 Ankle Lateral;Left-Dressing/Treatment: Hydrating gel, Tegaderm   Wound is dry, red. Daily dressing change with Skintegrity wound gel and dry gauze dressing to restore moisture to wound.     Specialty Bed Required : Yes   [] Low Air Loss   [x] Pressure Redistribution   [place] isoFlex in use  [] Fluid Immersion  [] Bariatric  [] Total Pressure Relief  [] Other:     Current Diet: DIET TUBE FEED CONTINUOUS/CYCLIC NPO; Diabetic; Nasogastric; Continuous; 25; 80; 24   SABIHA FREIREN, RN, [employer]

## 2019-12-30 NOTE — PROCEDURES
[address] [facility] 30        CONTINUOUS VIDEO EEG MONITORING           PATIENT: Michele Burns  MRN #: [id number]  DATE:  12/29/2019 at 11:27PM to 12/30/2019 at 2:32PM  REFERRING PHYSICIAN: THANH Boyd      BRIEF HISTORY: Patient is a 61year old male who was found confused at home, and workup found bilateral SAH, left parietal SDH, LTME to evaluate. AEDs:  Keppra 500mg BID; versed drip     EEG DESCRIPTION: 21 EEG electrodes were placed according to International 10/20 System. Single EKG electrode was also placed. Video recording was time-locked with EEG recording. BASELINE: Normal baseline was not obtainable. The background showed continuous slow, generalized in delta and theta frequency of 2-7 Hz, ranging between 10-50uV. There was a posterior dominant rhythm at [facility], which was symmetric and reactive to eye opening/closure. Spontaneous variability was present. Hyperventilation and photic stimulation were not performed. Single lead EKG showed irregular, heart rate at 70s-80s per minute. Day 1 - 12/29/2019 at 11:27PM to 12/30/2019 at 12:30PM-> 2:32PM  AEDs:  Keppra 500mg BID; versed drip   Interictal:  Posterior dominant rhythm was at [facility]. At times, intermittent rhythmic slow, generalized. Sleep spindles were noted. Ictal: None    Summary: During above recoding period, there was evidence of mild to moderate diffuse encephalopathy. No epileptiform discharges or EEG/clinical seizures were noted. Single lead EKG detected irregular heart rate at 70s- 80s. CLASSIFICATION  Abnormal II (Coma)  1. Continuous slow, generalized   2. Background slow    IMPRESSION  The patient underwent continuous video EEG monitoring from 12/29/2019 at 11:27PM to 12/30/2019 at 2:32PM, which showed evidence of mild to moderate diffuse encephalopathy, no epileptiform discharges or EEG/clinical seizures were noted.        Holden Barron MD, [address] [place] [address], Neurology  Board Certified Epileptologist

## 2019-12-31 ENCOUNTER — APPOINTMENT (OUTPATIENT)
Dept: GENERAL RADIOLOGY | Age: 59
DRG: 030 | End: 2019-12-31
Payer: MEDICAID

## 2019-12-31 LAB
ABSOLUTE EOS #: 0.17 K/UL (ref 0–0.44)
ABSOLUTE IMMATURE GRANULOCYTE: 0.06 K/UL (ref 0–0.3)
ABSOLUTE LYMPH #: 1.31 K/UL (ref 1.1–3.7)
ABSOLUTE MONO #: 0.57 K/UL (ref 0.1–1.2)
ANION GAP SERPL CALCULATED.3IONS-SCNC: 12 MMOL/L (ref 9–17)
BASOPHILS # BLD: 1 % (ref 0–2)
BASOPHILS ABSOLUTE: 0.06 K/UL (ref 0–0.2)
BUN BLDV-MCNC: 8 MG/DL (ref 6–20)
BUN/CREAT BLD: ABNORMAL (ref 9–20)
CALCIUM SERPL-MCNC: 8.7 MG/DL (ref 8.6–10.4)
CHLORIDE BLD-SCNC: 95 MMOL/L (ref 98–107)
CO2: 22 MMOL/L (ref 20–31)
CREAT SERPL-MCNC: 0.39 MG/DL (ref 0.7–1.2)
CULTURE: NORMAL
CULTURE: NORMAL
DIFFERENTIAL TYPE: ABNORMAL
DIRECT EXAM: NORMAL
EOSINOPHILS RELATIVE PERCENT: 3 % (ref 1–4)
GFR AFRICAN AMERICAN: >60 ML/MIN
GFR NON-AFRICAN AMERICAN: >60 ML/MIN
GFR SERPL CREATININE-BSD FRML MDRD: ABNORMAL ML/MIN/{1.73_M2}
GFR SERPL CREATININE-BSD FRML MDRD: ABNORMAL ML/MIN/{1.73_M2}
GLUCOSE BLD-MCNC: 244 MG/DL (ref 75–110)
GLUCOSE BLD-MCNC: 254 MG/DL (ref 75–110)
GLUCOSE BLD-MCNC: 264 MG/DL (ref 75–110)
GLUCOSE BLD-MCNC: 266 MG/DL (ref 70–99)
HCT VFR BLD CALC: 39.1 % (ref 40.7–50.3)
HEMOGLOBIN: 11.4 G/DL (ref 13–17)
IMMATURE GRANULOCYTES: 1 %
LYMPHOCYTES # BLD: 23 % (ref 24–43)
Lab: NORMAL
MAGNESIUM: 1.4 MG/DL (ref 1.6–2.6)
MCH RBC QN AUTO: 22.5 PG (ref 25.2–33.5)
MCHC RBC AUTO-ENTMCNC: 29.2 G/DL (ref 28.4–34.8)
MCV RBC AUTO: 77.3 FL (ref 82.6–102.9)
MONOCYTES # BLD: 10 % (ref 3–12)
MORPHOLOGY: ABNORMAL
NRBC AUTOMATED: 0 PER 100 WBC
OSMOLALITY URINE: 449 MOSM/KG (ref 80–1300)
PDW BLD-RTO: 20.9 % (ref 11.8–14.4)
PLATELET # BLD: 231 K/UL (ref 138–453)
PLATELET ESTIMATE: ABNORMAL
PMV BLD AUTO: 9.6 FL (ref 8.1–13.5)
POTASSIUM SERPL-SCNC: 4.6 MMOL/L (ref 3.7–5.3)
RBC # BLD: 5.06 M/UL (ref 4.21–5.77)
RBC # BLD: ABNORMAL 10*6/UL
SEG NEUTROPHILS: 62 % (ref 36–65)
SEGMENTED NEUTROPHILS ABSOLUTE COUNT: 3.53 K/UL (ref 1.5–8.1)
SERUM OSMOLALITY: 285 MOSM/KG (ref 275–295)
SODIUM BLD-SCNC: 129 MMOL/L (ref 135–144)
SODIUM,UR: 125 MMOL/L
SPECIMEN DESCRIPTION: NORMAL
WBC # BLD: 5.7 K/UL (ref 3.5–11.3)
WBC # BLD: ABNORMAL 10*3/UL

## 2019-12-31 PROCEDURE — 94770 HC ETCO2 MONITOR DAILY: CPT

## 2019-12-31 PROCEDURE — 99291 CRITICAL CARE FIRST HOUR: CPT | Performed by: PSYCHIATRY & NEUROLOGY

## 2019-12-31 PROCEDURE — 82947 ASSAY GLUCOSE BLOOD QUANT: CPT

## 2019-12-31 PROCEDURE — 83930 ASSAY OF BLOOD OSMOLALITY: CPT

## 2019-12-31 PROCEDURE — 36415 COLL VENOUS BLD VENIPUNCTURE: CPT

## 2019-12-31 PROCEDURE — 6370000000 HC RX 637 (ALT 250 FOR IP): Performed by: STUDENT IN AN ORGANIZED HEALTH CARE EDUCATION/TRAINING PROGRAM

## 2019-12-31 PROCEDURE — 2700000000 HC OXYGEN THERAPY PER DAY

## 2019-12-31 PROCEDURE — 6360000002 HC RX W HCPCS: Performed by: NURSE PRACTITIONER

## 2019-12-31 PROCEDURE — 2580000003 HC RX 258: Performed by: STUDENT IN AN ORGANIZED HEALTH CARE EDUCATION/TRAINING PROGRAM

## 2019-12-31 PROCEDURE — 83735 ASSAY OF MAGNESIUM: CPT

## 2019-12-31 PROCEDURE — 2500000003 HC RX 250 WO HCPCS: Performed by: NURSE PRACTITIONER

## 2019-12-31 PROCEDURE — 84300 ASSAY OF URINE SODIUM: CPT

## 2019-12-31 PROCEDURE — 2000000003 HC NEURO ICU R&B

## 2019-12-31 PROCEDURE — 6370000000 HC RX 637 (ALT 250 FOR IP): Performed by: PSYCHIATRY & NEUROLOGY

## 2019-12-31 PROCEDURE — 6370000000 HC RX 637 (ALT 250 FOR IP): Performed by: NURSE PRACTITIONER

## 2019-12-31 PROCEDURE — 93886 INTRACRANIAL COMPLETE STUDY: CPT

## 2019-12-31 PROCEDURE — 71045 X-RAY EXAM CHEST 1 VIEW: CPT

## 2019-12-31 PROCEDURE — 83935 ASSAY OF URINE OSMOLALITY: CPT

## 2019-12-31 PROCEDURE — 85025 COMPLETE CBC W/AUTO DIFF WBC: CPT

## 2019-12-31 PROCEDURE — 94003 VENT MGMT INPAT SUBQ DAY: CPT

## 2019-12-31 PROCEDURE — 6360000002 HC RX W HCPCS: Performed by: PSYCHIATRY & NEUROLOGY

## 2019-12-31 PROCEDURE — 94761 N-INVAS EAR/PLS OXIMETRY MLT: CPT

## 2019-12-31 PROCEDURE — 97110 THERAPEUTIC EXERCISES: CPT

## 2019-12-31 PROCEDURE — 6360000002 HC RX W HCPCS: Performed by: STUDENT IN AN ORGANIZED HEALTH CARE EDUCATION/TRAINING PROGRAM

## 2019-12-31 PROCEDURE — 2580000003 HC RX 258: Performed by: NURSE PRACTITIONER

## 2019-12-31 PROCEDURE — 2500000003 HC RX 250 WO HCPCS: Performed by: STUDENT IN AN ORGANIZED HEALTH CARE EDUCATION/TRAINING PROGRAM

## 2019-12-31 PROCEDURE — 80048 BASIC METABOLIC PNL TOTAL CA: CPT

## 2019-12-31 PROCEDURE — 89220 SPUTUM SPECIMEN COLLECTION: CPT

## 2019-12-31 RX ORDER — LABETALOL 20 MG/4 ML (5 MG/ML) INTRAVENOUS SYRINGE
10 ONCE
Status: COMPLETED | OUTPATIENT
Start: 2019-12-31 | End: 2019-12-31

## 2019-12-31 RX ORDER — PROPOFOL 10 MG/ML
20 INJECTION, EMULSION INTRAVENOUS
Status: DISCONTINUED | OUTPATIENT
Start: 2019-12-31 | End: 2020-01-03

## 2019-12-31 RX ORDER — SOTALOL HYDROCHLORIDE 120 MG/1
120 TABLET ORAL 2 TIMES DAILY
Status: DISCONTINUED | OUTPATIENT
Start: 2019-12-31 | End: 2020-01-01

## 2019-12-31 RX ORDER — INSULIN GLARGINE 100 [IU]/ML
8 INJECTION, SOLUTION SUBCUTANEOUS DAILY
Status: DISCONTINUED | OUTPATIENT
Start: 2019-12-31 | End: 2020-01-01

## 2019-12-31 RX ORDER — CHLORDIAZEPOXIDE HYDROCHLORIDE 25 MG/1
25 CAPSULE, GELATIN COATED ORAL 3 TIMES DAILY
Status: DISCONTINUED | OUTPATIENT
Start: 2019-12-31 | End: 2020-01-05

## 2019-12-31 RX ADMIN — VANCOMYCIN HYDROCHLORIDE 1500 MG: 10 INJECTION, POWDER, LYOPHILIZED, FOR SOLUTION INTRAVENOUS at 00:17

## 2019-12-31 RX ADMIN — INSULIN LISPRO 2 UNITS: 100 INJECTION, SOLUTION INTRAVENOUS; SUBCUTANEOUS at 18:25

## 2019-12-31 RX ADMIN — Medication 15 ML: at 10:45

## 2019-12-31 RX ADMIN — SODIUM CHLORIDE, PRESERVATIVE FREE 10 ML: 5 INJECTION INTRAVENOUS at 09:00

## 2019-12-31 RX ADMIN — FAMOTIDINE 20 MG: 10 INJECTION, SOLUTION INTRAVENOUS at 20:04

## 2019-12-31 RX ADMIN — ACETAMINOPHEN 1000 MG: 500 TABLET ORAL at 00:09

## 2019-12-31 RX ADMIN — Medication 15 ML: at 21:35

## 2019-12-31 RX ADMIN — SOTALOL HYDROCHLORIDE 80 MG: 80 TABLET ORAL at 09:00

## 2019-12-31 RX ADMIN — CHLORDIAZEPOXIDE HYDROCHLORIDE 25 MG: 25 CAPSULE ORAL at 11:39

## 2019-12-31 RX ADMIN — INSULIN GLARGINE 8 UNITS: 100 INJECTION, SOLUTION SUBCUTANEOUS at 11:34

## 2019-12-31 RX ADMIN — SOTALOL HYDROCHLORIDE 120 MG: 120 TABLET ORAL at 20:05

## 2019-12-31 RX ADMIN — FOLIC ACID 1 MG: 1 TABLET ORAL at 10:41

## 2019-12-31 RX ADMIN — SODIUM CHLORIDE, PRESERVATIVE FREE 10 ML: 5 INJECTION INTRAVENOUS at 20:05

## 2019-12-31 RX ADMIN — ENOXAPARIN SODIUM 40 MG: 40 INJECTION SUBCUTANEOUS at 11:35

## 2019-12-31 RX ADMIN — FAMOTIDINE 20 MG: 10 INJECTION, SOLUTION INTRAVENOUS at 10:41

## 2019-12-31 RX ADMIN — LEVETIRACETAM 500 MG: 5 INJECTION INTRAVENOUS at 02:12

## 2019-12-31 RX ADMIN — Medication 100 MG: at 10:41

## 2019-12-31 RX ADMIN — ACETAMINOPHEN 1000 MG: 500 TABLET ORAL at 21:35

## 2019-12-31 RX ADMIN — Medication 10 MG: at 00:12

## 2019-12-31 RX ADMIN — PIPERACILLIN AND TAZOBACTAM: 3; .375 INJECTION, POWDER, FOR SOLUTION INTRAVENOUS at 03:38

## 2019-12-31 RX ADMIN — Medication 10 MG: at 04:08

## 2019-12-31 RX ADMIN — INSULIN LISPRO 3 UNITS: 100 INJECTION, SOLUTION INTRAVENOUS; SUBCUTANEOUS at 12:14

## 2019-12-31 RX ADMIN — ROSUVASTATIN CALCIUM 5 MG: 5 TABLET, FILM COATED ORAL at 20:05

## 2019-12-31 RX ADMIN — HEPARIN SODIUM 5000 UNITS: 5000 INJECTION INTRAVENOUS; SUBCUTANEOUS at 05:50

## 2019-12-31 RX ADMIN — INSULIN LISPRO 2 UNITS: 100 INJECTION, SOLUTION INTRAVENOUS; SUBCUTANEOUS at 20:19

## 2019-12-31 RX ADMIN — Medication 10 MG: at 06:06

## 2019-12-31 RX ADMIN — Medication 10 MG: at 02:12

## 2019-12-31 RX ADMIN — ACETAMINOPHEN 1000 MG: 500 TABLET ORAL at 13:25

## 2019-12-31 RX ADMIN — CHLORDIAZEPOXIDE HYDROCHLORIDE 25 MG: 25 CAPSULE ORAL at 20:04

## 2019-12-31 ASSESSMENT — PULMONARY FUNCTION TESTS
PIF_VALUE: 13
PIF_VALUE: 16
PIF_VALUE: 15
PIF_VALUE: 17
PIF_VALUE: 20
PIF_VALUE: 17
PIF_VALUE: 15
PIF_VALUE: 16
PIF_VALUE: 22
PIF_VALUE: 15
PIF_VALUE: 18
PIF_VALUE: 18
PIF_VALUE: 16
PIF_VALUE: 17

## 2019-12-31 NOTE — PLAN OF CARE
Problem: ACTIVITY INTOLERANCE/IMPAIRED MOBILITY  Goal: Mobility/activity is maintained at optimum level for patient  Outcome: Ongoing     Problem: OXYGENATION/RESPIRATORY FUNCTION  Goal: Patient will maintain patent airway  Outcome: Ongoing     Problem: OXYGENATION/RESPIRATORY FUNCTION  Goal: Patient will achieve/maintain normal respiratory rate/effort  Description  Respiratory rate and effort will be within normal limits for the patient  Outcome: Ongoing     Problem: MECHANICAL VENTILATION  Goal: Mobility/activity is maintained at optimum level for patient  Outcome: Ongoing     Problem: MECHANICAL VENTILATION  Goal: Patient will maintain patent airway  Outcome: Ongoing     Problem: MECHANICAL VENTILATION  Goal: Oral health is maintained or improved  Outcome: Ongoing     Problem: MECHANICAL VENTILATION  Goal: ET tube will be managed safely  Outcome: Ongoing     Problem: MECHANICAL VENTILATION  Goal: Ability to express needs and understand communication  Outcome: Ongoing     Problem: SKIN INTEGRITY  Goal: Skin integrity is maintained or improved  12/31/2019 0908 by French Maria RCP  Outcome: Ongoing

## 2019-12-31 NOTE — PLAN OF CARE
Problem: HEMODYNAMIC STATUS  Goal: Patient has stable vital signs and fluid balance  Outcome: Ongoing     Problem: Pain:  Description  Pain management should include both nonpharmacologic and pharmacologic interventions. Goal: Pain level will decrease  Description  Pain level will decrease  Outcome: Ongoing  Goal: Control of acute pain  Description  Control of acute pain  Outcome: Ongoing  Goal: Control of chronic pain  Description  Control of chronic pain  Outcome: Ongoing     Problem: Falls - Risk of:  Goal: Will remain free from falls  Description  Will remain free from falls  Outcome: Ongoing  Goal: Absence of physical injury  Description  Absence of physical injury  Outcome: Ongoing     Problem: Risk for Impaired Skin Integrity  Goal: Tissue integrity - skin and mucous membranes  Description  Structural intactness and normal physiological function of skin and  mucous membranes.   Outcome: Ongoing     Problem: OXYGENATION/RESPIRATORY FUNCTION  Goal: Patient will maintain patent airway  12/31/2019 1849 by Gerry Cortez RN  Outcome: Ongoing  12/31/2019 0908 by Mahnaz Boone RCP  Outcome: Ongoing  Goal: Patient will achieve/maintain normal respiratory rate/effort  Description  Respiratory rate and effort will be within normal limits for the patient  12/31/2019 1849 by Gerry Cortez RN  Outcome: Ongoing  12/31/2019 0908 by Mahnaz Boone RCP  Outcome: Ongoing     Problem: MECHANICAL VENTILATION  Goal: Mobility/activity is maintained at optimum level for patient  12/31/2019 1849 by Gerry Cortez RN  Outcome: Ongoing  12/31/2019 0908 by Mahnaz Boone RCP  Outcome: Ongoing  Goal: Patient will maintain patent airway  12/31/2019 1849 by Gerry Cortez RN  Outcome: Ongoing  12/31/2019 0908 by Mahnaz Boone RCP  Outcome: Ongoing  Goal: Oral health is maintained or improved  12/31/2019 1849 by Gerry Cortez RN  Outcome: Ongoing  12/31/2019 0908 by Mahnaz Boone RCP  Outcome: Ongoing  Goal: ET tube will be managed safely  12/31/2019 1849 by Eze Bhatia RN  Outcome: Ongoing  12/31/2019 0908 by Edyta Ellison RCP  Outcome: Ongoing  Goal: Ability to express needs and understand communication  12/31/2019 1849 by Eze Bhatia RN  Outcome: Ongoing  12/31/2019 0908 by Edyta Ellison RCP  Outcome: Ongoing     Problem: SKIN INTEGRITY  Goal: Skin integrity is maintained or improved  12/31/2019 1849 by Eze Bhatia RN  Outcome: Ongoing  12/31/2019 0908 by Edyta Ellison RCP  Outcome: Ongoing     Problem: Restraint Use - Nonviolent/Non-Self-Destructive Behavior:  Goal: Absence of restraint indications  Description  Absence of restraint indications  Outcome: Ongoing  Goal: Absence of restraint-related injury  Description  Absence of restraint-related injury  Outcome: Ongoing

## 2019-12-31 NOTE — PROGRESS NOTES
Daily Progress Note  Neuro Critical Care    Patient Name: Rola Ferguson  Patient : [id number]  Room/Bed: 0529/0529-01  Code Status: full  Allergies: No Known Allergies    CHIEF COMPLAINT:      SAH     INTERVAL HISTORY    Initial Presentation     The patient is a 62 yo male with history of atrial fibrillation on Eliquis, bilateral ICA stenosis (right more than left), DM2, HLD, HTN, CAD s/p PCI stents, PVD, CEA, and ETOH abuse who presents as a transfer from [facility] as a trauma alert for CT head revealed diffuse bialteral SAH and left parietal SDH.  He was given [place] and transferred to [facility] for higher level of care.       Patient was found confused by family at his home after being unable to reach him on the phone.  He was complaining of headache, and actively vomiting.  Also had bilateral arm scrapes and bruises concerning for recent fall, patient himself does not member falling, said around 4 PM he woke up and felt frontal and vertex headache and neck pain, and felt nauseous and started throwing up.  Endorses drinking alcohol last night states that he had 2 beers.  Per family has significant alcohol abuse history and has had multiple falls in the past.     On presentation in the ED [facility], patient mildly lethargic but oriented x4, complaining of headache, nausea, mild vertigo, left arm weaker than right, bilateral leg weakness.      Significant interdisciplinary discussion between the neurosurgeon, radiologist, trauma surgeon and stroke specialist about whether  bleed is consistent with traumatic versus spontaneous subarachnoid.  Stroke specialist has significant concern for severe stenosis, suspects traumatic bleed and that patient is at risk for vasospasm.  In addition has intracranial left vertebral artery athero-stenosis.  Consensus management strategy is to keep the patient 130-160 for blood pressure goals to avoid right MCA watershed stroke.     No aneurysm found on CTA or malformation-critical stenosis of the proximal left vertebral artery, critical stenosis of the proximal right cervical ICA, severe near critical stenosis of the right cavernous ICA, moderate stenosis of the left cavernous ICA. Non con CT head repeat at our emergency facility showed subarachnoid, predominantly in the bilateral sylvian fissures and bilateral cerebral hemisphere sulcal I, acute left parietal convexity subdural hematoma 4 mm in thickness without mass-effect.  [place] Course:   12/27: CT head stable  12/28: Intubated overnight for respiratory distress, started on Cardizem infusion for atrial fibrillation with RVR, weaned off and Sotalol started. Versed for sedation with concern for possible alcohol withdrawal.     12/29: loose foul smelling stool, c. Diff sent and was negative. Off Precdex,had left upper extremity tremor, placed on LTME. Febrile this morning - infectious workup sent. Will hold off on antibiotics with no leukocytosis - if spiked temp again will start Vancomycin and zosyn.        12/30:  On Keppra 500mg q12, LTM EEG shows diffuse slowing and disorganized background in theta frequency suggesting moderate encephalopathy. Before rounds patient became agitated and restless he was started on Precedex drip and Versed drip,He spiked a fever of 102.5 and is started on Vanco and Zosyn        Last 24h:   Patient is seen and evaluated at bedside,  SBP goal of less than 160 maintened however requiring multiple  prnsas needed's. He is off Precedex on Versed running at 4 mcg/hr he had temperature spikes of T-max 102. 1over night on Vanco and Zosyn, blood cultures negative so far, sputum pending no leucocytosis. Sodium 129 on a.m. labs.  I/O 1248/3050    CURRENT MEDICATIONS:  SCHEDULED MEDICATIONS:   nicotine  1 patch Transdermal Daily    folic acid  1 mg Oral Daily    thiamine  100 mg Oral Daily    IVPB builder   Intravenous Q8H    vancomycin (VANCOCIN) intermittent dosing (placeholder) Procalcitonin 0.05  - blood cultures no growth  - UA unremarkable   - Continue to monitor for fevers  - Daily CBC     HEME:   - H&H 11.4/39.1  - Platelets 231  - Daily CBC     ENDOCRINE:  - Continue to monitor blood glucose, goal <180  - Hemoglobin A1C 7.1  - on Lantus 8 daily plus sliding scale for better glycemic control     OTHER:  - PT/OT/ST   - Code Status: Full     PROPHYLAXIS:  Stress ulcer: H2 blocker     DVT PROPHYLAXIS:  - SCD sleeves - Thigh High   0n lovenox 40 mg daily    DISPOSITION:  [x] To remain ICU:   [] OK for out of ICU from Neuro Critical Care standpoint    We will continue to follow along. For any changes in exam or patient status please contact Neuro Critical Care.       Sridhar Briggs MD  Neuro Critical Care  Pager [phone]  12/31/2019     7:40 AM

## 2020-01-01 ENCOUNTER — APPOINTMENT (OUTPATIENT)
Dept: GENERAL RADIOLOGY | Age: 60
DRG: 030 | End: 2020-01-01
Payer: MEDICAID

## 2020-01-01 LAB
ABSOLUTE EOS #: 0.12 K/UL (ref 0–0.44)
ABSOLUTE IMMATURE GRANULOCYTE: 0.06 K/UL (ref 0–0.3)
ABSOLUTE LYMPH #: 1.36 K/UL (ref 1.1–3.7)
ABSOLUTE MONO #: 0.74 K/UL (ref 0.1–1.2)
ANION GAP SERPL CALCULATED.3IONS-SCNC: 13 MMOL/L (ref 9–17)
BASOPHILS # BLD: 1 % (ref 0–2)
BASOPHILS ABSOLUTE: 0.06 K/UL (ref 0–0.2)
BUN BLDV-MCNC: 13 MG/DL (ref 6–20)
BUN/CREAT BLD: ABNORMAL (ref 9–20)
CALCIUM SERPL-MCNC: 8.7 MG/DL (ref 8.6–10.4)
CHLORIDE BLD-SCNC: 94 MMOL/L (ref 98–107)
CO2: 24 MMOL/L (ref 20–31)
CREAT SERPL-MCNC: 0.54 MG/DL (ref 0.7–1.2)
CULTURE: NORMAL
DIFFERENTIAL TYPE: ABNORMAL
DIRECT EXAM: NORMAL
EOSINOPHILS RELATIVE PERCENT: 2 % (ref 1–4)
GFR AFRICAN AMERICAN: >60 ML/MIN
GFR NON-AFRICAN AMERICAN: >60 ML/MIN
GFR SERPL CREATININE-BSD FRML MDRD: ABNORMAL ML/MIN/{1.73_M2}
GFR SERPL CREATININE-BSD FRML MDRD: ABNORMAL ML/MIN/{1.73_M2}
GLUCOSE BLD-MCNC: 234 MG/DL (ref 75–110)
GLUCOSE BLD-MCNC: 258 MG/DL (ref 75–110)
GLUCOSE BLD-MCNC: 260 MG/DL (ref 75–110)
GLUCOSE BLD-MCNC: 281 MG/DL (ref 70–99)
HCT VFR BLD CALC: 37 % (ref 40.7–50.3)
HEMOGLOBIN: 11 G/DL (ref 13–17)
IMMATURE GRANULOCYTES: 1 %
LYMPHOCYTES # BLD: 22 % (ref 24–43)
Lab: NORMAL
MAGNESIUM: 1.7 MG/DL (ref 1.6–2.6)
MCH RBC QN AUTO: 22.8 PG (ref 25.2–33.5)
MCHC RBC AUTO-ENTMCNC: 29.7 G/DL (ref 28.4–34.8)
MCV RBC AUTO: 76.8 FL (ref 82.6–102.9)
MONOCYTES # BLD: 12 % (ref 3–12)
MORPHOLOGY: ABNORMAL
NRBC AUTOMATED: 0 PER 100 WBC
PDW BLD-RTO: 20.7 % (ref 11.8–14.4)
PLATELET # BLD: 242 K/UL (ref 138–453)
PLATELET ESTIMATE: ABNORMAL
PMV BLD AUTO: 10.5 FL (ref 8.1–13.5)
POTASSIUM SERPL-SCNC: 4.6 MMOL/L (ref 3.7–5.3)
RBC # BLD: 4.82 M/UL (ref 4.21–5.77)
RBC # BLD: ABNORMAL 10*6/UL
SEG NEUTROPHILS: 62 % (ref 36–65)
SEGMENTED NEUTROPHILS ABSOLUTE COUNT: 3.86 K/UL (ref 1.5–8.1)
SODIUM BLD-SCNC: 131 MMOL/L (ref 135–144)
SPECIMEN DESCRIPTION: NORMAL
WBC # BLD: 6.2 K/UL (ref 3.5–11.3)
WBC # BLD: ABNORMAL 10*3/UL

## 2020-01-01 PROCEDURE — 6370000000 HC RX 637 (ALT 250 FOR IP): Performed by: STUDENT IN AN ORGANIZED HEALTH CARE EDUCATION/TRAINING PROGRAM

## 2020-01-01 PROCEDURE — 2580000003 HC RX 258: Performed by: NURSE PRACTITIONER

## 2020-01-01 PROCEDURE — 71045 X-RAY EXAM CHEST 1 VIEW: CPT

## 2020-01-01 PROCEDURE — 99291 CRITICAL CARE FIRST HOUR: CPT | Performed by: PSYCHIATRY & NEUROLOGY

## 2020-01-01 PROCEDURE — 6360000002 HC RX W HCPCS: Performed by: PSYCHIATRY & NEUROLOGY

## 2020-01-01 PROCEDURE — 94770 HC ETCO2 MONITOR DAILY: CPT

## 2020-01-01 PROCEDURE — 6360000002 HC RX W HCPCS: Performed by: STUDENT IN AN ORGANIZED HEALTH CARE EDUCATION/TRAINING PROGRAM

## 2020-01-01 PROCEDURE — 2500000003 HC RX 250 WO HCPCS: Performed by: NURSE PRACTITIONER

## 2020-01-01 PROCEDURE — 2700000000 HC OXYGEN THERAPY PER DAY

## 2020-01-01 PROCEDURE — 6370000000 HC RX 637 (ALT 250 FOR IP): Performed by: NURSE PRACTITIONER

## 2020-01-01 PROCEDURE — 85025 COMPLETE CBC W/AUTO DIFF WBC: CPT

## 2020-01-01 PROCEDURE — 6370000000 HC RX 637 (ALT 250 FOR IP): Performed by: PSYCHIATRY & NEUROLOGY

## 2020-01-01 PROCEDURE — 80048 BASIC METABOLIC PNL TOTAL CA: CPT

## 2020-01-01 PROCEDURE — 2580000003 HC RX 258: Performed by: STUDENT IN AN ORGANIZED HEALTH CARE EDUCATION/TRAINING PROGRAM

## 2020-01-01 PROCEDURE — 82947 ASSAY GLUCOSE BLOOD QUANT: CPT

## 2020-01-01 PROCEDURE — 94003 VENT MGMT INPAT SUBQ DAY: CPT

## 2020-01-01 PROCEDURE — 83735 ASSAY OF MAGNESIUM: CPT

## 2020-01-01 PROCEDURE — 2000000003 HC NEURO ICU R&B

## 2020-01-01 PROCEDURE — 94761 N-INVAS EAR/PLS OXIMETRY MLT: CPT

## 2020-01-01 RX ORDER — ACETAMINOPHEN 160 MG/5ML
650 SOLUTION ORAL EVERY 6 HOURS PRN
Status: DISCONTINUED | OUTPATIENT
Start: 2020-01-01 | End: 2020-01-27 | Stop reason: HOSPADM

## 2020-01-01 RX ORDER — SOTALOL HYDROCHLORIDE 80 MG/1
40 TABLET ORAL ONCE
Status: COMPLETED | OUTPATIENT
Start: 2020-01-01 | End: 2020-01-01

## 2020-01-01 RX ORDER — ACETAMINOPHEN 325 MG/1
650 TABLET ORAL EVERY 6 HOURS PRN
Status: DISCONTINUED | OUTPATIENT
Start: 2020-01-01 | End: 2020-01-01

## 2020-01-01 RX ORDER — IBUPROFEN 400 MG/1
400 TABLET ORAL EVERY 6 HOURS PRN
Status: DISCONTINUED | OUTPATIENT
Start: 2020-01-01 | End: 2020-01-27 | Stop reason: HOSPADM

## 2020-01-01 RX ORDER — INSULIN GLARGINE 100 [IU]/ML
4 INJECTION, SOLUTION SUBCUTANEOUS ONCE
Status: COMPLETED | OUTPATIENT
Start: 2020-01-01 | End: 2020-01-01

## 2020-01-01 RX ORDER — SOTALOL HYDROCHLORIDE 80 MG/1
160 TABLET ORAL 2 TIMES DAILY
Status: DISCONTINUED | OUTPATIENT
Start: 2020-01-01 | End: 2020-01-27 | Stop reason: HOSPADM

## 2020-01-01 RX ORDER — ASPIRIN 81 MG/1
324 TABLET, CHEWABLE ORAL DAILY
Status: DISCONTINUED | OUTPATIENT
Start: 2020-01-01 | End: 2020-01-09

## 2020-01-01 RX ORDER — INSULIN GLARGINE 100 [IU]/ML
12 INJECTION, SOLUTION SUBCUTANEOUS DAILY
Status: DISCONTINUED | OUTPATIENT
Start: 2020-01-02 | End: 2020-01-02

## 2020-01-01 RX ORDER — SODIUM CHLORIDE 1000 MG
2 TABLET, SOLUBLE MISCELLANEOUS
Status: DISCONTINUED | OUTPATIENT
Start: 2020-01-01 | End: 2020-01-10

## 2020-01-01 RX ADMIN — INSULIN GLARGINE 4 UNITS: 100 INJECTION, SOLUTION SUBCUTANEOUS at 12:09

## 2020-01-01 RX ADMIN — SOTALOL HYDROCHLORIDE 40 MG: 80 TABLET ORAL at 12:51

## 2020-01-01 RX ADMIN — CHLORDIAZEPOXIDE HYDROCHLORIDE 25 MG: 25 CAPSULE ORAL at 08:57

## 2020-01-01 RX ADMIN — SODIUM CHLORIDE, PRESERVATIVE FREE 10 ML: 5 INJECTION INTRAVENOUS at 21:22

## 2020-01-01 RX ADMIN — ACETAMINOPHEN 650 MG: 650 SOLUTION ORAL at 21:31

## 2020-01-01 RX ADMIN — SODIUM CHLORIDE TAB 1 GM 2 G: 1 TAB at 12:50

## 2020-01-01 RX ADMIN — ACETAMINOPHEN 650 MG: 650 SOLUTION ORAL at 13:05

## 2020-01-01 RX ADMIN — INSULIN LISPRO 2 UNITS: 100 INJECTION, SOLUTION INTRAVENOUS; SUBCUTANEOUS at 21:41

## 2020-01-01 RX ADMIN — FOLIC ACID 1 MG: 1 TABLET ORAL at 08:30

## 2020-01-01 RX ADMIN — Medication 15 ML: at 21:22

## 2020-01-01 RX ADMIN — SOTALOL HYDROCHLORIDE 160 MG: 80 TABLET ORAL at 21:22

## 2020-01-01 RX ADMIN — FAMOTIDINE 20 MG: 10 INJECTION, SOLUTION INTRAVENOUS at 21:22

## 2020-01-01 RX ADMIN — CHLORDIAZEPOXIDE HYDROCHLORIDE 25 MG: 25 CAPSULE ORAL at 21:22

## 2020-01-01 RX ADMIN — SODIUM CHLORIDE TAB 1 GM 2 G: 1 TAB at 17:59

## 2020-01-01 RX ADMIN — INSULIN GLARGINE 8 UNITS: 100 INJECTION, SOLUTION SUBCUTANEOUS at 08:33

## 2020-01-01 RX ADMIN — INSULIN LISPRO 3 UNITS: 100 INJECTION, SOLUTION INTRAVENOUS; SUBCUTANEOUS at 17:54

## 2020-01-01 RX ADMIN — HYDRALAZINE HYDROCHLORIDE 10 MG: 20 INJECTION INTRAMUSCULAR; INTRAVENOUS at 22:13

## 2020-01-01 RX ADMIN — SODIUM CHLORIDE, PRESERVATIVE FREE 10 ML: 5 INJECTION INTRAVENOUS at 09:08

## 2020-01-01 RX ADMIN — PROPOFOL 20 MCG/KG/MIN: 10 INJECTION, EMULSION INTRAVENOUS at 08:28

## 2020-01-01 RX ADMIN — CHLORDIAZEPOXIDE HYDROCHLORIDE 25 MG: 25 CAPSULE ORAL at 15:14

## 2020-01-01 RX ADMIN — Medication 100 MG: at 08:30

## 2020-01-01 RX ADMIN — ROSUVASTATIN CALCIUM 5 MG: 5 TABLET, FILM COATED ORAL at 21:22

## 2020-01-01 RX ADMIN — INSULIN LISPRO 2 UNITS: 100 INJECTION, SOLUTION INTRAVENOUS; SUBCUTANEOUS at 12:08

## 2020-01-01 RX ADMIN — Medication 15 ML: at 08:57

## 2020-01-01 RX ADMIN — FAMOTIDINE 20 MG: 10 INJECTION, SOLUTION INTRAVENOUS at 08:33

## 2020-01-01 RX ADMIN — ASPIRIN 324 MG: 81 TABLET, CHEWABLE ORAL at 12:50

## 2020-01-01 RX ADMIN — HYDRALAZINE HYDROCHLORIDE 10 MG: 20 INJECTION INTRAMUSCULAR; INTRAVENOUS at 02:16

## 2020-01-01 RX ADMIN — SOTALOL HYDROCHLORIDE 120 MG: 120 TABLET ORAL at 09:28

## 2020-01-01 RX ADMIN — ENOXAPARIN SODIUM 40 MG: 40 INJECTION SUBCUTANEOUS at 08:48

## 2020-01-01 RX ADMIN — INSULIN LISPRO 3 UNITS: 100 INJECTION, SOLUTION INTRAVENOUS; SUBCUTANEOUS at 08:03

## 2020-01-01 RX ADMIN — PROPOFOL 10 MCG/KG/MIN: 10 INJECTION, EMULSION INTRAVENOUS at 18:02

## 2020-01-01 ASSESSMENT — PULMONARY FUNCTION TESTS
PIF_VALUE: 15
PIF_VALUE: 22
PIF_VALUE: 21
PIF_VALUE: 17
PIF_VALUE: 22
PIF_VALUE: 18
PIF_VALUE: 21

## 2020-01-01 NOTE — PROGRESS NOTES
control              - Extubate when able              - CIWA protocol d/c'd per primary   - Librium 25 mg TID              - Continue management of TBI per neuro critical care team   - Fever work up in process - on vanc/zosyn per primary     Please contact neurosurgery with any change in the patient neurological status or any questions or concerns      Kurtis Fish MD, [facility] [place]  Neurosurgery/Neuro Critical Care Service  Pager [phone]

## 2020-01-01 NOTE — PROGRESS NOTES
morning but didn't tolerate it as he became tachypneic.    MEDICATIONS:     CURRENT MEDICATIONS:  SCHEDULED MEDICATIONS:   sotalol  120 mg Oral BID    insulin glargine  8 Units Subcutaneous Daily    enoxaparin  40 mg Subcutaneous Daily    chlordiazePOXIDE  25 mg Per NG tube TID    nicotine  1 patch Transdermal Daily    folic acid  1 mg Oral Daily    thiamine  100 mg Oral Daily    sodium chloride flush  10 mL Intravenous BID    famotidine (PEPCID) injection  20 mg Intravenous BID    rosuvastatin  5 mg Oral Nightly    chlorhexidine  15 mL Mouth/Throat BID    vitamin D  50,000 Units Oral Weekly    insulin lispro  0-6 Units Subcutaneous TID WC    insulin lispro  0-3 Units Subcutaneous Nightly    sodium chloride flush  10 mL Intravenous 2 times per day    sodium chloride flush  10 mL Intravenous 2 times per day     CONTINUOUS INFUSIONS:   propofol      midazolam (VERSED) infusion Stopped (19 1404)    dextrose      sodium chloride 100 mL/hr at 19 0725     PRN MEDICATIONS:   acetaminophen, midazolam, labetalol, metoprolol, hydrALAZINE, glucose, dextrose, glucagon (rDNA), dextrose, magnesium sulfate, sodium chloride flush, sodium chloride flush, ondansetron    VITALS 24 Hours     Temperature Range: Temp: 99.4 °F (37.4 °C) Temp  Av.3 °F (37.9 °C)  Min: 99.4 °F (37.4 °C)  Max: 101 °F (38.3 °C)  BP Range: Systolic (80QJB), ZCL:354 , Min:101 , RXO:817     Diastolic (67VTS), TXQ:04, Min:48, Max:113    Pulse Range: Pulse  Av  Min: 82  Max: 119  Respiration Range: Resp  Av.5  Min: 17  Max: 31  Current Pulse Ox: SpO2: 99 %  24HR Pulse Ox Range: SpO2  Av.6 %  Min: 96 %  Max: 100 %  Patient Vitals for the past 12 hrs:   BP Temp Temp src Pulse Resp SpO2   20 0402 (!) 151/97 99.4 °F (37.4 °C) Oral 101 22 99 %   20 0323 -- -- -- 91 22 99 %   20 0302 (!) 158/72 -- -- 92 -- 96 %   01/01/20 0230 (!) 156/78 -- -- 87 21 100 %   20 0202 (!) 181/93 -- -- 86 22 100 % 01/01/20 0102 (!) 153/90 -- -- 89 17 99 %   01/01/20 0002 (!) 162/94 100.1 °F (37.8 °C) Oral 85 19 100 %   01/01/20 0000 -- -- -- 86 19 --   12/31/19 2337 -- -- -- 88 -- 100 %   12/31/19 2302 (!) 141/92 -- -- 88 22 99 %   12/31/19 2300 -- -- -- 89 22 --   12/31/19 2202 (!) 138/90 100.2 °F (37.9 °C) -- 82 22 99 %   12/31/19 2200 -- -- -- 89 -- --   12/31/19 2102 (!) 154/97 -- -- 92 20 100 %   12/31/19 2100 -- -- -- 91 20 --   12/31/19 2002 (!) 157/92 101 °F (38.3 °C) Oral 101 21 100 %   12/31/19 2000 -- -- -- 104 21 --   12/31/19 1922 -- -- -- 103 -- 99 %   12/31/19 1902 (!) 169/79 -- -- 96 -- 99 %     Estimated body mass index is 29.36 kg/m² as calculated from the following:    Height as of this encounter: 5' 11\" (1.803 m). Weight as of this encounter: 210 lb 8.6 oz (95.5 kg). PHYSICAL EXAM       Physical Exam  HENT:      Head: Normocephalic and atraumatic. Eyes:      General:         Right eye: No foreign body, discharge or hordeolum. Left eye: No foreign body, discharge or hordeolum. Conjunctiva/sclera:      Right eye: Right conjunctiva is not injected. No chemosis, exudate or hemorrhage. Left eye: Left conjunctiva is injected. Hemorrhage present. No chemosis or exudate. Pupils: Pupils are equal, round, and reactive to light. Cardiovascular:      Rate and Rhythm: Normal rate. Rhythm irregular. Pulmonary:      Breath sounds: Normal breath sounds. Neurological:      Mental Status: He is alert. GCS: GCS eye subscore is 4. GCS verbal subscore is 1. GCS motor subscore is 5. Cranial Nerves: Cranial nerves are intact. Motor: Abnormal muscle tone present. Deep Tendon Reflexes:      Reflex Scores:       Bicep reflexes are 1+ on the right side and 1+ on the left side. Patellar reflexes are 1+ on the right side and 1+ on the left side.      Comments:   Visual tracking is present  Not obeying commands   Localizes to pain on all extremities   Equal movement on all extremities   Cogwheel rigidity RUE           INTAKE/OUTPUT & DRAINS   24 HOUR INTAKE/OUTPUT:    Intake/Output Summary (Last 24 hours) at 1/1/2020 5974  Last data filed at 1/1/2020 0400  Gross per 24 hour   Intake 1211 ml   Output 1250 ml   Net -39 ml       DRAINS:  [x] There are no drains for Neuro Critical Care to monitor at this time.    LABS      Recent Results (from the past 24 hour(s))   OSMOLALITY    Collection Time: 12/31/19  9:40 AM   Result Value Ref Range    Serum Osmolality 285 275 - 295 mOsm/kg   SODIUM, URINE, RANDOM    Collection Time: 12/31/19 10:21 AM   Result Value Ref Range    Sodium,Ur 125 mmol/L   OSMOLALITY, URINE    Collection Time: 12/31/19 10:21 AM   Result Value Ref Range    Osmolality, Ur 449 80 - 1300 mOsm/kg   POC Glucose Fingerstick    Collection Time: 12/31/19 12:04 PM   Result Value Ref Range    POC Glucose 264 (H) 75 - 110 mg/dL   POC Glucose Fingerstick    Collection Time: 12/31/19  6:23 PM   Result Value Ref Range    POC Glucose 244 (H) 75 - 110 mg/dL   POC Glucose Fingerstick    Collection Time: 12/31/19  8:18 PM   Result Value Ref Range    POC Glucose 254 (H) 75 - 110 mg/dL   Magnesium    Collection Time: 01/01/20  5:15 AM   Result Value Ref Range    Magnesium 1.7 1.6 - 2.6 mg/dL   CBC WITH AUTO DIFFERENTIAL    Collection Time: 01/01/20  5:15 AM   Result Value Ref Range    WBC 6.2 3.5 - 11.3 k/uL    RBC 4.82 4.21 - 5.77 m/uL    Hemoglobin 11.0 (L) 13.0 - 17.0 g/dL    Hematocrit 37.0 (L) 40.7 - 50.3 %    MCV 76.8 (L) 82.6 - 102.9 fL    MCH 22.8 (L) 25.2 - 33.5 pg    MCHC 29.7 28.4 - 34.8 g/dL    RDW 20.7 (H) 11.8 - 14.4 %    Platelets 589 855 - 354 k/uL    MPV 10.5 8.1 - 13.5 fL    NRBC Automated 0.0 0.0 per 100 WBC    Differential Type NOT REPORTED     Seg Neutrophils PENDING %    Lymphocytes PENDING %    Monocytes PENDING %    Eosinophils % PENDING %    Basophils PENDING %    Immature Granulocytes PENDING 0 %    Segs Absolute PENDING k/uL    Absolute Lymph # PENDING k/uL compared to prior study. No additional acute fracture or dislocation in the left hand. Overlying splint obscures osseous details. Acute traumatic closed angulated proximal 1st metacarpal fracture with greater angulation of fracture fragments compared the prior study. Xr Hand Left (min 3 Views)    Result Date: 12/26/2019  EXAMINATION: THREE XRAY VIEWS OF THE LEFT HAND 12/26/2019 1:47 am COMPARISON: Left wrist radiographs 12/25/2019 2131 hours HISTORY: ORDERING SYSTEM PROVIDED HISTORY: post reduction TECHNOLOGIST PROVIDED HISTORY: post reduction Reason for Exam: fall trauma Acuity: Acute FINDINGS: Acute traumatic closed comminuted mildly angulated proximal 1st metacarpal fracture. Joint alignment is maintained. No additional acute fracture or dislocation in the left hand. Acute traumatic closed comminuted mildly angulated proximal 1st metacarpal fracture. Xr Hand Right (min 3 Views)    Result Date: 12/26/2019  EXAMINATION: THREE XRAY VIEWS OF THE RIGHT HAND 12/26/2019 1:36 am COMPARISON: None. HISTORY: ORDERING SYSTEM PROVIDED HISTORY: Trauma/Fracture TECHNOLOGIST PROVIDED HISTORY: Include clenched fist view Trauma/Fracture Reason for Exam: fall FINDINGS: Joint alignment is maintained. No acute fracture or dislocation in the right hand. Degenerative changes in the interphalangeal joints. Old ulnar styloid process fracture. Degenerative changes in the radiocarpal joint. No acute osseous abnormality in the right hand. Xr Hip Left (2-3 Views)    Result Date: 12/26/2019  EXAMINATION: ONE XRAY VIEW OF THE PELVIS AND TWO XRAY VIEWS RIGHT HIP; TWO XRAY VIEWS OF THE LEFT HIP 12/26/2019 1:37 am COMPARISON: None. HISTORY: ORDERING SYSTEM PROVIDED HISTORY: Trauma/Fracture TECHNOLOGIST PROVIDED HISTORY: AP and cross-table lateral of the hip please, thank you Trauma/Fracture Reason for Exam: fall/trauma Acuity: Acute FINDINGS: The bones are osteopenic.   The femoral heads located bilateral.  No acute fracture or dislocation pelvis or hips bilaterally. SI joints are grossly intact. Pubic symphysis is intact. The sacrum is partially obscured by contrast in the bladder. No acute osseous abnormality in the pelvis or hips bilaterally. Osteopenia. Xr Knee Left (3 Views)    Result Date: 12/26/2019  EXAMINATION: THREE XRAY VIEWS OF THE LEFT KNEE 12/26/2019 1:36 am COMPARISON: None. HISTORY: ORDERING SYSTEM PROVIDED HISTORY: Trauma/Fracture TECHNOLOGIST PROVIDED HISTORY: Trauma/Fracture Reason for Exam: fall/ trauma Acuity: Acute FINDINGS: Osteopenia. No acute fracture or dislocation. No focal osseous lesion. No joint effusion. No focal soft tissue abnormality. Vascular calcifications. No acute abnormality of the knee. Osteopenia. Xr Knee Right (3 Views)    Result Date: 12/26/2019  EXAMINATION: THREE XRAY VIEWS OF THE RIGHT KNEE 12/26/2019 1:36 am COMPARISON: None. HISTORY: ORDERING SYSTEM PROVIDED HISTORY: Trauma/Fracture TECHNOLOGIST PROVIDED HISTORY: Trauma/Fracture Reason for Exam: ,fall trauma,unable to get xtable on patient Acuity: Acute FINDINGS: The bones are osteopenic. No acute fracture or dislocation. No focal osseous lesion. No evidence of joint effusion. No focal soft tissue abnormality. Vascular calcifications. No acute abnormality of the knee. Osteopenia. Xr Ankle Left (min 3 Views)    Result Date: 12/26/2019  EXAMINATION: THREE XRAY VIEWS OF THE LEFT ANKLE 12/26/2019 1:36 am COMPARISON: None. HISTORY: ORDERING SYSTEM PROVIDED HISTORY: Trauma/Fracture TECHNOLOGIST PROVIDED HISTORY: Trauma/Fracture Acuity: Acute FINDINGS: No acute fracture or dislocation. Normal alignment of the ankle mortise. No focal osseous lesion. No joint effusion. No focal soft tissue abnormality. Vascular calcifications. No acute abnormality of the ankle.      Xr Ankle Right (min 3 Views)    Result Date: 12/26/2019  EXAMINATION: THREE XRAY VIEWS OF THE RIGHT ANKLE 12/26/2019 1:36 am COMPARISON: intracranial hemorrhage. No hydrocephalus. Ct Head Wo Contrast    Result Date: 12/26/2019  EXAMINATION: CT OF THE HEAD WITHOUT CONTRAST  12/26/2019 4:51 am TECHNIQUE: CT of the head was performed without the administration of intravenous contrast. Dose modulation, iterative reconstruction, and/or weight based adjustment of the mA/kV was utilized to reduce the radiation dose to as low as reasonably achievable. COMPARISON: 12/25/2019 HISTORY: ORDERING SYSTEM PROVIDED HISTORY: worsening mentation TECHNOLOGIST PROVIDED HISTORY: worsening mentation FINDINGS: BRAIN/VENTRICLES: Diffuse subarachnoid hemorrhage within the cerebral sulci, sylvian fissures, anterior interhemispheric fissure, and prepontine cistern, similar in appearance to prior study. There is intraventricular hemorrhage layering in the occipital horns. No hydrocephalus. No mass effect or midline shift. Left parietal convexity subdural hemorrhage measures up to 5 mm in thickness. The basal cisterns are patent. The gray-white matter differentiation is maintained. ORBITS: The visualized portion of the orbits demonstrate no acute abnormality. SINUSES: The visualized paranasal sinuses and mastoid air cells demonstrate no acute abnormality. SOFT TISSUES/SKULL:  No acute abnormality of the visualized skull or soft tissues. 1. Stable head CT demonstrating diffuse moderate volume subarachnoid hemorrhage and left parietal convexity subdural hemorrhage measuring up to 5 mm in thickness. 2. No mass effect or midline shift. No hydrocephalus. The findings were sent to the Radiology Results [address] at 5:48 am on 12/26/2019to be communicated to a licensed caregiver.      Ct Head Wo Contrast    Result Date: 12/26/2019  EXAMINATION: CT OF THE HEAD WITHOUT CONTRAST  12/25/2019 9:04 pm TECHNIQUE: CT of the head was performed without the administration of intravenous contrast. Dose modulation, iterative reconstruction, and/or weight based adjustment of COMPARISON: None HISTORY: ORDERING SYSTEM PROVIDED HISTORY: trauma TECHNOLOGIST PROVIDED HISTORY: trauma FINDINGS: BONES/ALIGNMENT: No traumatic malalignment. Vertebral body heights are maintained. No acute fracture. DEGENERATIVE CHANGES: Moderate multilevel disc narrowing and endplate osteophyte formation. Multilevel uncovertebral and facet joint degenerative changes. SOFT TISSUES: Paraspinal soft tissues are normal.  Partially visualized prepontine cistern subarachnoid hemorrhage is better evaluated on CT head performed concurrently. No acute abnormality of the cervical spine. Ct Thoracic Spine Wo Contrast    Result Date: 12/26/2019  EXAMINATION: CT OF THE THORACIC SPINE WITHOUT CONTRAST; CT OF THE LUMBAR SPINE WITHOUT CONTRAST; CT OF THE CHEST, ABDOMEN, AND PELVIS WITH CONTRAST, 12/25/2019 9:04 pm; 12/25/2019 9:05 pm TECHNIQUE: CT of the thoracic and lumbar spine was performed without the administration of intravenous contrast.  CT of the chest, abdomen and pelvis was performed with the administration of intravenous contrast. Multiplanar reformatted images are provided for review. Dose modulation, iterative reconstruction, and/or weight based adjustment of the mA/kV was utilized to reduce the radiation dose to as low as reasonably achievable. COMPARISON: None HISTORY: ORDERING SYSTEM PROVIDED HISTORY: trauma TECHNOLOGIST PROVIDED HISTORY: trauma Reason for Exam: fall from standing Acuity: Acute Type of Exam: Initial; ORDERING SYSTEM PROVIDED HISTORY: trauma TECHNOLOGIST PROVIDED HISTORY: trauma Reason for Exam: fall from standing Acuity: Acute Type of Exam: Initial; ORDERING SYSTEM PROVIDED HISTORY: trauma TECHNOLOGIST PROVIDED HISTORY: trauma Reason for Exam: fall from standing Acuity: Acute Type of Exam: Initial FINDINGS: CTA CHEST: Stable cardiomegaly. No pericardial effusion. No mediastinal hematoma or pneumomediastinum. No enlarged mediastinal or hilar lymph nodes.   Main pulmonary artery is dilated, measuring up to 4 cm, suggesting pulmonary hypertension. Calcified and noncalcified atherosclerotic plaque of the thoracic aorta. Incidentally noted 4 vessel aortic arch with separate arch origin of the left vertebral artery. Ectatic ascending thoracic aorta measures up to 4 cm in diameter. No acute aortic abnormality. Central airways are clear. No pleural effusion or pneumothorax. No pulmonary contusion or pulmonary laceration. Mild bilateral lower lobe dependent atelectatic changes. Healing anterolateral left 6th rib fracture. No acute displaced rib fracture or chest wall hematoma. CTA ABDOMEN: No acute traumatic abnormality of the liver, spleen, pancreas, kidneys, or adrenal glands. Normal gallbladder. Stomach, small bowel, and colon are normal in caliber without evidence of obstruction. Normal appendix. Sigmoid diverticulosis without diverticulitis. Calcified and noncalcified aortoiliac atherosclerotic calcification. Abdominal aorta is normal in caliber. No free fluid in the abdomen. CTA PELVIS: Urinary bladder and pelvic organs are normal.  No free fluid in the pelvis. Bilateral common iliac stents are in position. THORACIC/LUMBAR SPINE: BONES/ALIGNMENT: Normal alignment of the thoracic and lumbar spine. Vertebral body heights are maintained. No acute fracture. DEGENERATIVE CHANGES: Multilevel disc narrowing and endplate osteophyte formation. Mild multilevel facet joint degenerative changes. No high-grade, bony spinal canal stenosis. SOFT TISSUES: Paraspinal soft tissues are normal.     No acute traumatic abnormality of the chest, abdomen, or pelvis. Remote, healing anterolateral left 6th rib fracture. No acute osseous abnormality of the thoracic or lumbar spine.      Ct Lumbar Spine Wo Contrast    Result Date: 12/26/2019  EXAMINATION: CT OF THE THORACIC SPINE WITHOUT CONTRAST; CT OF THE LUMBAR SPINE WITHOUT CONTRAST; CT OF THE CHEST, ABDOMEN, AND PELVIS WITH CONTRAST, 12/25/2019 9:04 pm; appendix. Sigmoid diverticulosis without diverticulitis. Calcified and noncalcified aortoiliac atherosclerotic calcification. Abdominal aorta is normal in caliber. No free fluid in the abdomen. CTA PELVIS: Urinary bladder and pelvic organs are normal.  No free fluid in the pelvis. Bilateral common iliac stents are in position. THORACIC/LUMBAR SPINE: BONES/ALIGNMENT: Normal alignment of the thoracic and lumbar spine. Vertebral body heights are maintained. No acute fracture. DEGENERATIVE CHANGES: Multilevel disc narrowing and endplate osteophyte formation. Mild multilevel facet joint degenerative changes. No high-grade, bony spinal canal stenosis. SOFT TISSUES: Paraspinal soft tissues are normal.     No acute traumatic abnormality of the chest, abdomen, or pelvis. Remote, healing anterolateral left 6th rib fracture. No acute osseous abnormality of the thoracic or lumbar spine. Ir Angiogram Carotid C Erebral Bilateral    Result Date: 12/27/2019  Date of procedure: 12/26/2019 Procedure: Diagnostic cerebral angiogram Indication: Subarachnoid hemorrhage, evaluation for aneurysm. Procedures: 1. Selective right common carotid artery angiogram 2. Selective right internal carotid artery cerebral angiogram 3. Selective right vertebral artery cerebral angiogram 4. Selective left common carotid artery angiogram 5. Selective left internal carotid artery cerebral angiogram 6. Selective left vertebral artery cerebral angiogram 7.  Right common femoral artery angiogram Neurointerventionalist: Ravi Crow MD [place]: Nicole Bonds MD Comparison: None Fluoroscopy time: 29.7 minutes Contrast: 100 cc Visipaque-270 Side of Access: Right femoral Closure device: Vascade Sedation: Conscious sedation Patient arrived to the angio suite: 1325 Puncture obtained at: 1400 Vascular access removed at: 1605 Consent:After explaining the risks and benefits to the patient and the patient's family, including but not limited to stroke, coma, antegrade flow into the external and internal carotid arteries with normal filling of the external carotid artery branches. Course and caliber of the cervical portion of the right internal carotid artery revealed significant proximal right ICA stenosis/string sign measuring approximately 90-99% per NASCET criteria. Flow across the high-grade stenosis was delayed with earlier filling noted of the distal supraclinoid internal carotid artery through retrograde opacification of the right ophthalmic artery from the internal maxillary artery. Further inspection demonstrates no evidence of dissection, aneurysm. Right CCA technique: The right internal carotid artery run was performed from the common carotid artery due to severe stenosis. Digital subtraction angiography of the intracranial right internal carotid circulation was performed in frontal, and lateral projections. Interpretation:There is slow antegrade filling of the distal internal carotid artery from the cervical internal carotid artery. Noted retrograde Filling of the right ophthalmic artery from the right internal maxillary artery branches with slow filling anterior cerebral artery, middle cerebral artery and the distal branches due to previously noted critical proximal cervical ICA stenosis. Inspection of the remaining right internal carotid circulation revealed no other evidence of cerebral aneurysm, arteriovenous malformation. There is severe stenosis noted in the petrous/cavernous and supraclinoid right ICA noted with diminutive appearance. Capillary and venous phase images were also unremarkable, with no evidence of veno-occlusive disease. Left CCA technique: The left common carotid artery was selectively catheterized under fluoroscopic guidance and digital subtraction angiography images were obtained in biplane projections of the left cervical common carotid artery.  Interpretation: The left common carotid artery injection demonstrates normal antegrade flow into the external and internal carotid arteries with normal filling of the external carotid artery branches. Caliber and lumen contour of the cervical portion of the left internal carotid artery noted to be large and irregular which is likely due to prior endarterectomy. Left ICA technique: The left internal carotid artery was then selectively catheterized, and digital subtraction angiography of the intracranial left internal carotid artery circulation was performed in frontal and lateral projections. Interpretation:There is normal antegrade filling of the distal internal carotid artery, ophthalmic artery, anterior cerebral artery, middle cerebral artery and distal branches. Inspection of the remaining left internal carotid artery circulation revealed  mild to moderate left cavernous ICA stenosis. Otherwise, no other evidence of cerebral aneurysm, arteriovenous malformation, or arterial stenosis. No vasospasm noted. Irregularity of the vessels without hemodynamic stenosis which could reflect intracranial atherosclerosis Capillary and venous phase images were also unremarkable, with no evidence of veno-occlusive disease. Left VA technique: The left vertebral artery noted to arise from the aortic arch. Left vertebral artery was then selectively catheterized with roadmap guidance. Digital subtraction angiography of the intracranial left vertebrobasilar run-off  was obtained in frontal and lateral projections. Interpretation:The left vertebral artery injection demonstrates normal antegrade opacification of the left  vertebral artery, including the cervical segment, basilar artery, and respective branches. There is a left V4 moderate stenosis noted. Otherwise, there was no evidence of  cerebral aneurysm, arteriovenous malformation, or arterial stenosis.  There is noted opacification of the distal right middle cerebral artery parieto-occipital territory from pial collaterals arising off the right posterior cerebral head and neck was performed with the administration of intravenous contrast. Multiplanar reformatted images are provided for review. MIP images are provided for review. Stenosis of the internal carotid arteries measured using NASCET criteria. Dose modulation, iterative reconstruction, and/or weight based adjustment of the mA/kV was utilized to reduce the radiation dose to as low as reasonably achievable. COMPARISON: None. HISTORY: ORDERING SYSTEM PROVIDED HISTORY: sah TECHNOLOGIST PROVIDED HISTORY: sah Reason for Exam: SAH after trauma Acuity: Acute Type of Exam: Initial FINDINGS: CTA NECK: AORTIC ARCH/ARCH VESSELS: There is a critical stenosis of the proximal left vertebral artery with moderate and severe stenoses of the V2 segment. There is a severe stenosis at the origin of the right vertebral artery. 66% stenosis of the left subclavian artery is noted. CAROTID ARTERIES: There is 25% stenosis of the proximal left common carotid artery and 50% stenosis of the mid and distal segments. The cervical portion of the left internal carotid artery is normal in course and caliber. There is 20% stenosis of the right common carotid artery. A critical stenosis of the proximal right cervical ICA is noted with attenuated flow seen distally. VERTEBRAL ARTERIES: No dissection, arterial injury, or significant stenosis. SOFT TISSUES: There is a thickened appearance to the mid esophagus. A small amount of layering debris is present within the trachea, likely reflecting pooled secretion. BONES: No acute osseous abnormality. CTA HEAD: ANTERIOR CIRCULATION: There is severely attenuated flow within the petrous and cavernous segments of the right internal carotid artery with severe, near critical stenoses of the cavernous ICA. There is also attenuated flow within the contralateral ICA, to a lesser degree, with moderate stenoses of the left cavernous ICA.  There are moderate and severe multifocal stenoses of the MCA branch vessels, ORDERING SYSTEM PROVIDED HISTORY: trauma TECHNOLOGIST PROVIDED HISTORY: trauma Reason for Exam: fall from standing Acuity: Acute Type of Exam: Initial FINDINGS: CTA CHEST: Stable cardiomegaly. No pericardial effusion. No mediastinal hematoma or pneumomediastinum. No enlarged mediastinal or hilar lymph nodes. Main pulmonary artery is dilated, measuring up to 4 cm, suggesting pulmonary hypertension. Calcified and noncalcified atherosclerotic plaque of the thoracic aorta. Incidentally noted 4 vessel aortic arch with separate arch origin of the left vertebral artery. Ectatic ascending thoracic aorta measures up to 4 cm in diameter. No acute aortic abnormality. Central airways are clear. No pleural effusion or pneumothorax. No pulmonary contusion or pulmonary laceration. Mild bilateral lower lobe dependent atelectatic changes. Healing anterolateral left 6th rib fracture. No acute displaced rib fracture or chest wall hematoma. CTA ABDOMEN: No acute traumatic abnormality of the liver, spleen, pancreas, kidneys, or adrenal glands. Normal gallbladder. Stomach, small bowel, and colon are normal in caliber without evidence of obstruction. Normal appendix. Sigmoid diverticulosis without diverticulitis. Calcified and noncalcified aortoiliac atherosclerotic calcification. Abdominal aorta is normal in caliber. No free fluid in the abdomen. CTA PELVIS: Urinary bladder and pelvic organs are normal.  No free fluid in the pelvis. Bilateral common iliac stents are in position. THORACIC/LUMBAR SPINE: BONES/ALIGNMENT: Normal alignment of the thoracic and lumbar spine. Vertebral body heights are maintained. No acute fracture. DEGENERATIVE CHANGES: Multilevel disc narrowing and endplate osteophyte formation. Mild multilevel facet joint degenerative changes. No high-grade, bony spinal canal stenosis. SOFT TISSUES: Paraspinal soft tissues are normal.     No acute traumatic abnormality of the chest, abdomen, or pelvis. remains in NICU for vent management.  Stepdown after extubation      Stan Tripp MD  Neurology Resident  [facility]   Phone: Vonda Quigley

## 2020-01-01 NOTE — PLAN OF CARE
Problem: SKIN INTEGRITY  Goal: Skin integrity is maintained or improved  1/1/2020 0744 by Prashant Powers RCP  Outcome: Ongoing  12/31/2019 2311 by Manoj Pickard RCP  Outcome: Ongoing  12/31/2019 1849 by Sergio Lopez RN  Outcome: Ongoing   Ventilator Bronchodilator assessment    Breath sounds: clear/decreased bases  Inspiratory Pressure: 19  Plateau Pressure: 17    Patient assessed at level 1          [x]    Bronchodilator Assessment    BRONCHODILATOR ASSESSMENT SCORE  Score 0 (Home) 1 2 3 4   Breath Sounds   []  Chronic Ventilator: Patient at baseline [x]  Mild Wheezes/ Clear []  Intermittent wheezes with good air entry []  Bilateral/unilateral wheezing with diminished air entry []  Insp/Exp wheeze and/or poor aeration   Ventilator Pressures   []  Chronic Ventilator [x]  Insp. Pressure less than 25 cm H20 []  Insp. Pressure less than 25 cm H20 []  Insp. Pressure exceeds 25 cm H20 []  Insp.  Pressure exceeds 30 cm H20   Plateau Pressure []  NA   [x]  Plateau Pressure less than 4  []  Plateau Pressure less than or equal to 5 []  Plateau Pressure greater than or equal to 6 []  Plateau Pressure greater than or equal to 8       jame persaud  12:03 AM

## 2020-01-02 ENCOUNTER — APPOINTMENT (OUTPATIENT)
Dept: GENERAL RADIOLOGY | Age: 60
DRG: 030 | End: 2020-01-02
Payer: MEDICAID

## 2020-01-02 ENCOUNTER — APPOINTMENT (OUTPATIENT)
Dept: CT IMAGING | Age: 60
DRG: 030 | End: 2020-01-02
Payer: MEDICAID

## 2020-01-02 LAB
ABSOLUTE EOS #: 0.06 K/UL (ref 0–0.4)
ABSOLUTE IMMATURE GRANULOCYTE: 0.06 K/UL (ref 0–0.3)
ABSOLUTE LYMPH #: 1.1 K/UL (ref 1–4.8)
ABSOLUTE MONO #: 0.55 K/UL (ref 0.1–0.8)
ALLEN TEST: POSITIVE
ANION GAP SERPL CALCULATED.3IONS-SCNC: 11 MMOL/L (ref 9–17)
BASOPHILS # BLD: 1 % (ref 0–2)
BASOPHILS ABSOLUTE: 0.06 K/UL (ref 0–0.2)
BUN BLDV-MCNC: 18 MG/DL (ref 6–20)
BUN/CREAT BLD: ABNORMAL (ref 9–20)
CALCIUM SERPL-MCNC: 8.8 MG/DL (ref 8.6–10.4)
CHLORIDE BLD-SCNC: 100 MMOL/L (ref 98–107)
CO2: 22 MMOL/L (ref 20–31)
CREAT SERPL-MCNC: 0.55 MG/DL (ref 0.7–1.2)
DIFFERENTIAL TYPE: ABNORMAL
EOSINOPHILS RELATIVE PERCENT: 1 % (ref 1–4)
FIO2: 30
GFR AFRICAN AMERICAN: >60 ML/MIN
GFR NON-AFRICAN AMERICAN: >60 ML/MIN
GFR SERPL CREATININE-BSD FRML MDRD: ABNORMAL ML/MIN/{1.73_M2}
GFR SERPL CREATININE-BSD FRML MDRD: ABNORMAL ML/MIN/{1.73_M2}
GLUCOSE BLD-MCNC: 229 MG/DL (ref 75–110)
GLUCOSE BLD-MCNC: 248 MG/DL (ref 75–110)
GLUCOSE BLD-MCNC: 256 MG/DL (ref 75–110)
GLUCOSE BLD-MCNC: 293 MG/DL (ref 75–110)
GLUCOSE BLD-MCNC: 301 MG/DL (ref 70–99)
HCT VFR BLD CALC: 34.3 % (ref 40.7–50.3)
HEMOGLOBIN: 10.2 G/DL (ref 13–17)
IMMATURE GRANULOCYTES: 1 %
LYMPHOCYTES # BLD: 18 % (ref 24–44)
MAGNESIUM: 1.7 MG/DL (ref 1.6–2.6)
MCH RBC QN AUTO: 23.1 PG (ref 25.2–33.5)
MCHC RBC AUTO-ENTMCNC: 29.7 G/DL (ref 28.4–34.8)
MCV RBC AUTO: 77.6 FL (ref 82.6–102.9)
MODE: ABNORMAL
MONOCYTES # BLD: 9 % (ref 1–7)
MORPHOLOGY: ABNORMAL
NEGATIVE BASE EXCESS, ART: ABNORMAL (ref 0–2)
NRBC AUTOMATED: 0 PER 100 WBC
O2 DEVICE/FLOW/%: ABNORMAL
PATIENT TEMP: ABNORMAL
PDW BLD-RTO: 20.9 % (ref 11.8–14.4)
PLATELET # BLD: 223 K/UL (ref 138–453)
PLATELET ESTIMATE: ABNORMAL
PMV BLD AUTO: 11.5 FL (ref 8.1–13.5)
POC HCO3: 25.8 MMOL/L (ref 21–28)
POC O2 SATURATION: 98 % (ref 94–98)
POC PCO2 TEMP: ABNORMAL MM HG
POC PCO2: 32 MM HG (ref 35–48)
POC PH TEMP: ABNORMAL
POC PH: 7.51 (ref 7.35–7.45)
POC PO2 TEMP: ABNORMAL MM HG
POC PO2: 85.5 MM HG (ref 83–108)
POSITIVE BASE EXCESS, ART: 3 (ref 0–3)
POTASSIUM SERPL-SCNC: 4.2 MMOL/L (ref 3.7–5.3)
RBC # BLD: 4.42 M/UL (ref 4.21–5.77)
RBC # BLD: ABNORMAL 10*6/UL
SAMPLE SITE: ABNORMAL
SEG NEUTROPHILS: 70 % (ref 36–66)
SEGMENTED NEUTROPHILS ABSOLUTE COUNT: 4.27 K/UL (ref 1.8–7.7)
SODIUM BLD-SCNC: 133 MMOL/L (ref 135–144)
TCO2 (CALC), ART: 27 MMOL/L (ref 22–29)
WBC # BLD: 6.1 K/UL (ref 3.5–11.3)
WBC # BLD: ABNORMAL 10*3/UL

## 2020-01-02 PROCEDURE — 6370000000 HC RX 637 (ALT 250 FOR IP): Performed by: STUDENT IN AN ORGANIZED HEALTH CARE EDUCATION/TRAINING PROGRAM

## 2020-01-02 PROCEDURE — 2580000003 HC RX 258: Performed by: STUDENT IN AN ORGANIZED HEALTH CARE EDUCATION/TRAINING PROGRAM

## 2020-01-02 PROCEDURE — 6370000000 HC RX 637 (ALT 250 FOR IP): Performed by: NURSE PRACTITIONER

## 2020-01-02 PROCEDURE — 82803 BLOOD GASES ANY COMBINATION: CPT

## 2020-01-02 PROCEDURE — 6360000002 HC RX W HCPCS: Performed by: STUDENT IN AN ORGANIZED HEALTH CARE EDUCATION/TRAINING PROGRAM

## 2020-01-02 PROCEDURE — 2000000003 HC NEURO ICU R&B

## 2020-01-02 PROCEDURE — 94761 N-INVAS EAR/PLS OXIMETRY MLT: CPT

## 2020-01-02 PROCEDURE — 2580000003 HC RX 258: Performed by: NURSE PRACTITIONER

## 2020-01-02 PROCEDURE — 6360000002 HC RX W HCPCS: Performed by: PSYCHIATRY & NEUROLOGY

## 2020-01-02 PROCEDURE — 70450 CT HEAD/BRAIN W/O DYE: CPT

## 2020-01-02 PROCEDURE — 6370000000 HC RX 637 (ALT 250 FOR IP): Performed by: PSYCHIATRY & NEUROLOGY

## 2020-01-02 PROCEDURE — 99291 CRITICAL CARE FIRST HOUR: CPT | Performed by: PSYCHIATRY & NEUROLOGY

## 2020-01-02 PROCEDURE — 36415 COLL VENOUS BLD VENIPUNCTURE: CPT

## 2020-01-02 PROCEDURE — 2500000003 HC RX 250 WO HCPCS: Performed by: NURSE PRACTITIONER

## 2020-01-02 PROCEDURE — 82947 ASSAY GLUCOSE BLOOD QUANT: CPT

## 2020-01-02 PROCEDURE — 97110 THERAPEUTIC EXERCISES: CPT

## 2020-01-02 PROCEDURE — 71045 X-RAY EXAM CHEST 1 VIEW: CPT

## 2020-01-02 PROCEDURE — 36600 WITHDRAWAL OF ARTERIAL BLOOD: CPT

## 2020-01-02 PROCEDURE — 99212 OFFICE O/P EST SF 10 MIN: CPT

## 2020-01-02 PROCEDURE — 2500000003 HC RX 250 WO HCPCS: Performed by: STUDENT IN AN ORGANIZED HEALTH CARE EDUCATION/TRAINING PROGRAM

## 2020-01-02 PROCEDURE — 94003 VENT MGMT INPAT SUBQ DAY: CPT

## 2020-01-02 PROCEDURE — 87070 CULTURE OTHR SPECIMN AEROBIC: CPT

## 2020-01-02 PROCEDURE — 83735 ASSAY OF MAGNESIUM: CPT

## 2020-01-02 PROCEDURE — 94770 HC ETCO2 MONITOR DAILY: CPT

## 2020-01-02 PROCEDURE — 87205 SMEAR GRAM STAIN: CPT

## 2020-01-02 PROCEDURE — 2700000000 HC OXYGEN THERAPY PER DAY

## 2020-01-02 PROCEDURE — 80048 BASIC METABOLIC PNL TOTAL CA: CPT

## 2020-01-02 PROCEDURE — 85025 COMPLETE CBC W/AUTO DIFF WBC: CPT

## 2020-01-02 RX ORDER — INSULIN GLARGINE 100 [IU]/ML
5 INJECTION, SOLUTION SUBCUTANEOUS ONCE
Status: COMPLETED | OUTPATIENT
Start: 2020-01-02 | End: 2020-01-02

## 2020-01-02 RX ORDER — INSULIN GLARGINE 100 [IU]/ML
17 INJECTION, SOLUTION SUBCUTANEOUS DAILY
Status: DISCONTINUED | OUTPATIENT
Start: 2020-01-03 | End: 2020-01-03

## 2020-01-02 RX ADMIN — SODIUM CHLORIDE TAB 1 GM 2 G: 1 TAB at 18:21

## 2020-01-02 RX ADMIN — PROPOFOL 18 MCG/KG/MIN: 10 INJECTION, EMULSION INTRAVENOUS at 20:21

## 2020-01-02 RX ADMIN — HYDRALAZINE HYDROCHLORIDE 10 MG: 20 INJECTION INTRAMUSCULAR; INTRAVENOUS at 04:28

## 2020-01-02 RX ADMIN — ROSUVASTATIN CALCIUM 5 MG: 5 TABLET, FILM COATED ORAL at 20:28

## 2020-01-02 RX ADMIN — SODIUM CHLORIDE, PRESERVATIVE FREE 10 ML: 5 INJECTION INTRAVENOUS at 20:25

## 2020-01-02 RX ADMIN — ACETAMINOPHEN 650 MG: 650 SOLUTION ORAL at 04:28

## 2020-01-02 RX ADMIN — ASPIRIN 324 MG: 81 TABLET, CHEWABLE ORAL at 09:09

## 2020-01-02 RX ADMIN — FAMOTIDINE 20 MG: 10 INJECTION, SOLUTION INTRAVENOUS at 09:10

## 2020-01-02 RX ADMIN — CHLORDIAZEPOXIDE HYDROCHLORIDE 25 MG: 25 CAPSULE ORAL at 20:21

## 2020-01-02 RX ADMIN — SOTALOL HYDROCHLORIDE 160 MG: 80 TABLET ORAL at 20:28

## 2020-01-02 RX ADMIN — SODIUM CHLORIDE, PRESERVATIVE FREE 10 ML: 5 INJECTION INTRAVENOUS at 09:13

## 2020-01-02 RX ADMIN — INSULIN LISPRO 3 UNITS: 100 INJECTION, SOLUTION INTRAVENOUS; SUBCUTANEOUS at 09:11

## 2020-01-02 RX ADMIN — Medication 10 MG: at 21:47

## 2020-01-02 RX ADMIN — SOTALOL HYDROCHLORIDE 160 MG: 80 TABLET ORAL at 09:09

## 2020-01-02 RX ADMIN — CHLORDIAZEPOXIDE HYDROCHLORIDE 25 MG: 25 CAPSULE ORAL at 09:20

## 2020-01-02 RX ADMIN — PIPERACILLIN AND TAZOBACTAM 3.38 G: 3; .375 INJECTION, POWDER, FOR SOLUTION INTRAVENOUS at 18:21

## 2020-01-02 RX ADMIN — SODIUM CHLORIDE TAB 1 GM 2 G: 1 TAB at 09:09

## 2020-01-02 RX ADMIN — SODIUM CHLORIDE, PRESERVATIVE FREE 10 ML: 5 INJECTION INTRAVENOUS at 09:14

## 2020-01-02 RX ADMIN — CHLORDIAZEPOXIDE HYDROCHLORIDE 25 MG: 25 CAPSULE ORAL at 16:22

## 2020-01-02 RX ADMIN — INSULIN GLARGINE 12 UNITS: 100 INJECTION, SOLUTION SUBCUTANEOUS at 09:10

## 2020-01-02 RX ADMIN — PROPOFOL 15 MCG/KG/MIN: 10 INJECTION, EMULSION INTRAVENOUS at 09:32

## 2020-01-02 RX ADMIN — INSULIN GLARGINE 5 UNITS: 100 INJECTION, SOLUTION SUBCUTANEOUS at 13:27

## 2020-01-02 RX ADMIN — Medication 15 ML: at 20:24

## 2020-01-02 RX ADMIN — FAMOTIDINE 20 MG: 10 INJECTION, SOLUTION INTRAVENOUS at 20:28

## 2020-01-02 RX ADMIN — Medication 100 MG: at 09:09

## 2020-01-02 RX ADMIN — PIPERACILLIN AND TAZOBACTAM 4.5 G: 4; .5 INJECTION, POWDER, LYOPHILIZED, FOR SOLUTION INTRAVENOUS; PARENTERAL at 11:32

## 2020-01-02 RX ADMIN — ENOXAPARIN SODIUM 40 MG: 40 INJECTION SUBCUTANEOUS at 09:10

## 2020-01-02 RX ADMIN — FOLIC ACID 1 MG: 1 TABLET ORAL at 09:09

## 2020-01-02 RX ADMIN — SODIUM CHLORIDE TAB 1 GM 2 G: 1 TAB at 13:26

## 2020-01-02 RX ADMIN — INSULIN LISPRO 1 UNITS: 100 INJECTION, SOLUTION INTRAVENOUS; SUBCUTANEOUS at 20:52

## 2020-01-02 RX ADMIN — INSULIN LISPRO 2 UNITS: 100 INJECTION, SOLUTION INTRAVENOUS; SUBCUTANEOUS at 13:27

## 2020-01-02 RX ADMIN — METOPROLOL TARTRATE 5 MG: 5 INJECTION, SOLUTION INTRAVENOUS at 18:58

## 2020-01-02 RX ADMIN — Medication 15 ML: at 09:20

## 2020-01-02 RX ADMIN — INSULIN LISPRO 3 UNITS: 100 INJECTION, SOLUTION INTRAVENOUS; SUBCUTANEOUS at 17:22

## 2020-01-02 RX ADMIN — HYDRALAZINE HYDROCHLORIDE 10 MG: 20 INJECTION INTRAMUSCULAR; INTRAVENOUS at 17:20

## 2020-01-02 ASSESSMENT — PULMONARY FUNCTION TESTS
PIF_VALUE: 24
PIF_VALUE: 24
PIF_VALUE: 21
PIF_VALUE: 25
PIF_VALUE: 29
PIF_VALUE: 19
PIF_VALUE: 21
PIF_VALUE: 15
PIF_VALUE: 19
PIF_VALUE: 24
PIF_VALUE: 20
PIF_VALUE: 15
PIF_VALUE: 19
PIF_VALUE: 31

## 2020-01-02 NOTE — PROGRESS NOTES
versus spontaneous subarachnoid. Stroke specialist has significant concern for severe stenosis, suspects traumatic bleed and that patient is at risk for vasospasm. In addition has intracranial left vertebral artery athero-stenosis. Consensus management strategy is to keep the patient 130-160 for blood pressure goals to avoid right MCA watershed stroke. No aneurysm found on CTA or malformation-critical stenosis of the proximal left vertebral artery, critical stenosis of the proximal right cervical ICA, severe near critical stenosis of the right cavernous ICA, moderate stenosis of the left cavernous ICA. Non con CT head repeat at our emergency facility showed subarachnoid, predominantly in the bilateral sylvian fissures and bilateral cerebral hemisphere sulcal I, acute left parietal convexity subdural hematoma 4 mm in thickness without mass-effect. Hospital Course:   12/27: CT head stable  12/28: Intubated overnight for respiratory distress, started on Cardizem infusion for atrial fibrillation with RVR, weaned off and Sotalol started. Versed for sedation with concern for possible alcohol withdrawal.     12/29: loose foul smelling stool, c. Diff sent and was negative. Off Precdex,had left upper extremity tremor, placed on LTME. Febrile this morning - infectious workup sent. Will hold off on antibiotics with no leukocytosis - if spiked temp again will start Vancomycin and zosyn. 12/30:  On Keppra 500mg q12, LTM EEG shows diffuse slowing and disorganized background in theta frequency suggesting moderate encephalopathy. Before rounds patient became agitated and restless he was started on Precedex drip and Versed drip,He spiked a fever of 102.5 and is started on Vanco and Zosyn      12/31:   off Precedex on Versed running at 4 mcg/hr he had temperature spikes of T-max 102. 1/1: T max 101 at 21:00. Off antibiotics. TCD is normal, no vasospasm.  Underwent spontaneous breathing trial this morning but didn't osseous abnormality in the wrist.     Xr Wrist Right (min 3 Views)    Result Date: 12/25/2019  EXAMINATION: 3 XRAY VIEWS OF THE RIGHT WRIST 12/25/2019 9:45 pm COMPARISON: None. HISTORY: ORDERING SYSTEM PROVIDED HISTORY: fall TECHNOLOGIST PROVIDED HISTORY: fall Acuity: Acute Type of Exam: Initial FINDINGS: Overlying splint obscures osseous details. Carpal bones are intact. Advanced radiocarpal joint osteoarthritis with joint space narrowing and subchondral sclerosis. Widening of the scapholunate joint. Posttraumatic deformity of the ulnar styloid process. No acute fracture or dislocation. No acute osseous abnormality the right wrist. Widening of the scapholunate interval suggesting ligamentous injury. Radiocarpal joint osteoarthritis. Xr Hand Left (min 3 Views)    Result Date: 12/26/2019  EXAMINATION: THREE XRAY VIEWS OF THE LEFT HAND 12/26/2019 1:47 am COMPARISON: 12/25/2019 2353 hours HISTORY: ORDERING SYSTEM PROVIDED HISTORY: post splint TECHNOLOGIST PROVIDED HISTORY: post splint Reason for Exam: fall trauma/post splint Acuity: Acute FINDINGS: Overlying splint obscures osseous details. Again seen is acute traumatic closed proximal 1st metacarpal fracture. There appears to be greater fracture fragment displacement compared to prior study. No additional acute fracture or dislocation in the left hand. Overlying splint obscures osseous details. Acute traumatic closed angulated proximal 1st metacarpal fracture with greater angulation of fracture fragments compared the prior study. Xr Hand Left (min 3 Views)    Result Date: 12/26/2019  EXAMINATION: THREE XRAY VIEWS OF THE LEFT HAND 12/26/2019 1:47 am COMPARISON: Left wrist radiographs 12/25/2019 2131 hours HISTORY: ORDERING SYSTEM PROVIDED HISTORY: post reduction TECHNOLOGIST PROVIDED HISTORY: post reduction Reason for Exam: fall trauma Acuity: Acute FINDINGS: Acute traumatic closed comminuted mildly angulated proximal 1st metacarpal fracture. utilized to reduce the radiation dose to as low as reasonably achievable. COMPARISON: 12/26/2019 5:10 a.m. HISTORY: ORDERING SYSTEM PROVIDED HISTORY: Reassess bleed for stability. TECHNOLOGIST PROVIDED HISTORY: Reassess bleed for stability. Reason for Exam: Reassess bleed for stability. Acuity: Unknown Type of Exam: Unknown FINDINGS: BRAIN/VENTRICLES: Again seen is extensive subarachnoid hemorrhage within the sylvian fissures as well as cerebral sulci bilaterally. Subdural hemorrhage overlies the left parietal convexity measures 4 mm in thickness. Hemorrhage in the prepontine cistern as well. Small amount of intraventricular hemorrhage layering in the occipital horns bilaterally. Ventricles are stable in caliber. No hydrocephalus. The gray-white matter differentiation is maintained. No midline shift. ORBITS: The visualized portion of the orbits demonstrate no acute abnormality. SINUSES: The visualized paranasal sinuses and mastoid air cells demonstrate no acute abnormality. SOFT TISSUES/SKULL:  No acute abnormality of the visualized skull or soft tissues. Stable head CT demonstrating extensive bilateral subarachnoid hemorrhage as well as intraventricular hemorrhage and left parietal convexity subdural hemorrhage. No new intracranial hemorrhage. No hydrocephalus. Ct Head Wo Contrast    Result Date: 12/26/2019  EXAMINATION: CT OF THE HEAD WITHOUT CONTRAST  12/26/2019 4:51 am TECHNIQUE: CT of the head was performed without the administration of intravenous contrast. Dose modulation, iterative reconstruction, and/or weight based adjustment of the mA/kV was utilized to reduce the radiation dose to as low as reasonably achievable.  COMPARISON: 12/25/2019 HISTORY: ORDERING SYSTEM PROVIDED HISTORY: worsening mentation TECHNOLOGIST PROVIDED HISTORY: worsening mentation FINDINGS: BRAIN/VENTRICLES: Diffuse subarachnoid hemorrhage within the cerebral sulci, sylvian fissures, anterior interhemispheric fissure, and subdural hematoma measures up to 4 mm in thickness. No associated mass effect. No parenchymal hemorrhage. There is no hydrocephalus or midline shift. Basal cisterns are patent. ORBITS: The visualized portion of the orbits demonstrate no acute abnormality. SINUSES: The visualized paranasal sinuses and mastoid air cells demonstrate no acute abnormality. SOFT TISSUES/SKULL:  No acute abnormality of the visualized skull or soft tissues. Subarachnoid hemorrhage, predominantly in the bilateral sylvian fissures and bilateral cerebral hemisphere sulci. Acute left parietal convexity subdural hematoma measures up to 4 mm in thickness. No associated mass effect. No midline shift or evidence of intracranial herniation. Findings were discussed with Nuvia Crews at 9:32 pm on 12/25/2019. Ct Cervical Spine Wo Contrast    Result Date: 12/26/2019  EXAMINATION: CT OF THE CERVICAL SPINE WITHOUT CONTRAST 12/25/2019 9:04 pm TECHNIQUE: CT of the cervical spine was performed without the administration of intravenous contrast. Multiplanar reformatted images are provided for review. Dose modulation, iterative reconstruction, and/or weight based adjustment of the mA/kV was utilized to reduce the radiation dose to as low as reasonably achievable. COMPARISON: None HISTORY: ORDERING SYSTEM PROVIDED HISTORY: trauma TECHNOLOGIST PROVIDED HISTORY: trauma FINDINGS: BONES/ALIGNMENT: No traumatic malalignment. Vertebral body heights are maintained. No acute fracture. DEGENERATIVE CHANGES: Moderate multilevel disc narrowing and endplate osteophyte formation. Multilevel uncovertebral and facet joint degenerative changes. SOFT TISSUES: Paraspinal soft tissues are normal.  Partially visualized prepontine cistern subarachnoid hemorrhage is better evaluated on CT head performed concurrently. No acute abnormality of the cervical spine.      Ct Thoracic Spine Wo Contrast    Result Date: 12/26/2019  EXAMINATION: CT OF THE THORACIC SPINE WITHOUT CONTRAST; CT OF THE LUMBAR SPINE WITHOUT CONTRAST; CT OF THE CHEST, ABDOMEN, AND PELVIS WITH CONTRAST, 12/25/2019 9:04 pm; 12/25/2019 9:05 pm TECHNIQUE: CT of the thoracic and lumbar spine was performed without the administration of intravenous contrast.  CT of the chest, abdomen and pelvis was performed with the administration of intravenous contrast. Multiplanar reformatted images are provided for review. Dose modulation, iterative reconstruction, and/or weight based adjustment of the mA/kV was utilized to reduce the radiation dose to as low as reasonably achievable. COMPARISON: None HISTORY: ORDERING SYSTEM PROVIDED HISTORY: trauma TECHNOLOGIST PROVIDED HISTORY: trauma Reason for Exam: fall from standing Acuity: Acute Type of Exam: Initial; ORDERING SYSTEM PROVIDED HISTORY: trauma TECHNOLOGIST PROVIDED HISTORY: trauma Reason for Exam: fall from standing Acuity: Acute Type of Exam: Initial; ORDERING SYSTEM PROVIDED HISTORY: trauma TECHNOLOGIST PROVIDED HISTORY: trauma Reason for Exam: fall from standing Acuity: Acute Type of Exam: Initial FINDINGS: CTA CHEST: Stable cardiomegaly. No pericardial effusion. No mediastinal hematoma or pneumomediastinum. No enlarged mediastinal or hilar lymph nodes. Main pulmonary artery is dilated, measuring up to 4 cm, suggesting pulmonary hypertension. Calcified and noncalcified atherosclerotic plaque of the thoracic aorta. Incidentally noted 4 vessel aortic arch with separate arch origin of the left vertebral artery. Ectatic ascending thoracic aorta measures up to 4 cm in diameter. No acute aortic abnormality. Central airways are clear. No pleural effusion or pneumothorax. No pulmonary contusion or pulmonary laceration. Mild bilateral lower lobe dependent atelectatic changes. Healing anterolateral left 6th rib fracture. No acute displaced rib fracture or chest wall hematoma.  CTA ABDOMEN: No acute traumatic abnormality of the liver, spleen, pancreas, kidneys, or adrenal glands. Normal gallbladder. Stomach, small bowel, and colon are normal in caliber without evidence of obstruction. Normal appendix. Sigmoid diverticulosis without diverticulitis. Calcified and noncalcified aortoiliac atherosclerotic calcification. Abdominal aorta is normal in caliber. No free fluid in the abdomen. CTA PELVIS: Urinary bladder and pelvic organs are normal.  No free fluid in the pelvis. Bilateral common iliac stents are in position. THORACIC/LUMBAR SPINE: BONES/ALIGNMENT: Normal alignment of the thoracic and lumbar spine. Vertebral body heights are maintained. No acute fracture. DEGENERATIVE CHANGES: Multilevel disc narrowing and endplate osteophyte formation. Mild multilevel facet joint degenerative changes. No high-grade, bony spinal canal stenosis. SOFT TISSUES: Paraspinal soft tissues are normal.     No acute traumatic abnormality of the chest, abdomen, or pelvis. Remote, healing anterolateral left 6th rib fracture. No acute osseous abnormality of the thoracic or lumbar spine. Ct Lumbar Spine Wo Contrast    Result Date: 12/26/2019  EXAMINATION: CT OF THE THORACIC SPINE WITHOUT CONTRAST; CT OF THE LUMBAR SPINE WITHOUT CONTRAST; CT OF THE CHEST, ABDOMEN, AND PELVIS WITH CONTRAST, 12/25/2019 9:04 pm; 12/25/2019 9:05 pm TECHNIQUE: CT of the thoracic and lumbar spine was performed without the administration of intravenous contrast.  CT of the chest, abdomen and pelvis was performed with the administration of intravenous contrast. Multiplanar reformatted images are provided for review. Dose modulation, iterative reconstruction, and/or weight based adjustment of the mA/kV was utilized to reduce the radiation dose to as low as reasonably achievable.  COMPARISON: None HISTORY: ORDERING SYSTEM PROVIDED HISTORY: trauma TECHNOLOGIST PROVIDED HISTORY: trauma Reason for Exam: fall from standing Acuity: Acute Type of Exam: Initial; ORDERING SYSTEM PROVIDED HISTORY: trauma TECHNOLOGIST PROVIDED HISTORY: trauma Reason for Exam: fall from standing Acuity: Acute Type of Exam: Initial; ORDERING SYSTEM PROVIDED HISTORY: trauma TECHNOLOGIST PROVIDED HISTORY: trauma Reason for Exam: fall from standing Acuity: Acute Type of Exam: Initial FINDINGS: CTA CHEST: Stable cardiomegaly. No pericardial effusion. No mediastinal hematoma or pneumomediastinum. No enlarged mediastinal or hilar lymph nodes. Main pulmonary artery is dilated, measuring up to 4 cm, suggesting pulmonary hypertension. Calcified and noncalcified atherosclerotic plaque of the thoracic aorta. Incidentally noted 4 vessel aortic arch with separate arch origin of the left vertebral artery. Ectatic ascending thoracic aorta measures up to 4 cm in diameter. No acute aortic abnormality. Central airways are clear. No pleural effusion or pneumothorax. No pulmonary contusion or pulmonary laceration. Mild bilateral lower lobe dependent atelectatic changes. Healing anterolateral left 6th rib fracture. No acute displaced rib fracture or chest wall hematoma. CTA ABDOMEN: No acute traumatic abnormality of the liver, spleen, pancreas, kidneys, or adrenal glands. Normal gallbladder. Stomach, small bowel, and colon are normal in caliber without evidence of obstruction. Normal appendix. Sigmoid diverticulosis without diverticulitis. Calcified and noncalcified aortoiliac atherosclerotic calcification. Abdominal aorta is normal in caliber. No free fluid in the abdomen. CTA PELVIS: Urinary bladder and pelvic organs are normal.  No free fluid in the pelvis. Bilateral common iliac stents are in position. THORACIC/LUMBAR SPINE: BONES/ALIGNMENT: Normal alignment of the thoracic and lumbar spine. Vertebral body heights are maintained. No acute fracture. DEGENERATIVE CHANGES: Multilevel disc narrowing and endplate osteophyte formation. Mild multilevel facet joint degenerative changes. No high-grade, bony spinal canal stenosis.  SOFT TISSUES: Paraspinal soft hypertension, hyperlipidemia, coronary artery disease s/p PCI stents, peripheral vascular disease, history of bilateral ICA stenosis with  right worse than left, history of CEA, alcohol abuse. ? Patient presented after a fall from outside facility as a trauma alert?and found to have bilateral subarachnoid hemorrhage/left parietal subdural hematoma. ? He received Kcentra in outside hospital. ? Neuro endovascular was consulted. Description and findings: The patient's right groin was prepped and draped in standard sterile fashion and under local anesthesia with conscious sedation, the right common femoral artery was accessed with a single-wall needle technique, and a 5 Fijian intravascular sheath was placed within the right common femoral artery, establishing arterial access. A 5 Fijian multipurpose catheter was then advanced over a guide wire to the level of the aortic arch, and used to selectively catheterize the right common carotid artery. Right CCA technique: The right common carotid artery was selectively catheterized under fluoroscopic guidance and digital subtraction angiography images were obtained in biplane projections of the right cervical carotid artery. Interpretation:The right common carotid artery injection demonstrates normal antegrade flow into the external and internal carotid arteries with normal filling of the external carotid artery branches. Course and caliber of the cervical portion of the right internal carotid artery revealed significant proximal right ICA stenosis/string sign measuring approximately 90-99% per NASCET criteria. Flow across the high-grade stenosis was delayed with earlier filling noted of the distal supraclinoid internal carotid artery through retrograde opacification of the right ophthalmic artery from the internal maxillary artery. Further inspection demonstrates no evidence of dissection, aneurysm. Right CCA technique:  The right internal carotid artery run was performed from the common carotid artery due to severe stenosis. Digital subtraction angiography of the intracranial right internal carotid circulation was performed in frontal, and lateral projections. Interpretation:There is slow antegrade filling of the distal internal carotid artery from the cervical internal carotid artery. Noted retrograde Filling of the right ophthalmic artery from the right internal maxillary artery branches with slow filling anterior cerebral artery, middle cerebral artery and the distal branches due to previously noted critical proximal cervical ICA stenosis. Inspection of the remaining right internal carotid circulation revealed no other evidence of cerebral aneurysm, arteriovenous malformation. There is severe stenosis noted in the petrous/cavernous and supraclinoid right ICA noted with diminutive appearance. Capillary and venous phase images were also unremarkable, with no evidence of veno-occlusive disease. Left CCA technique: The left common carotid artery was selectively catheterized under fluoroscopic guidance and digital subtraction angiography images were obtained in biplane projections of the left cervical common carotid artery. Interpretation: The left common carotid artery injection demonstrates normal antegrade flow into the external and internal carotid arteries with normal filling of the external carotid artery branches. Caliber and lumen contour of the cervical portion of the left internal carotid artery noted to be large and irregular which is likely due to prior endarterectomy. Left ICA technique: The left internal carotid artery was then selectively catheterized, and digital subtraction angiography of the intracranial left internal carotid artery circulation was performed in frontal and lateral projections. Interpretation:There is normal antegrade filling of the distal internal carotid artery, ophthalmic artery, anterior cerebral artery, middle cerebral artery and distal branches. Inspection of the remaining left internal carotid artery circulation revealed  mild to moderate left cavernous ICA stenosis. Otherwise, no other evidence of cerebral aneurysm, arteriovenous malformation, or arterial stenosis. No vasospasm noted. Irregularity of the vessels without hemodynamic stenosis which could reflect intracranial atherosclerosis Capillary and venous phase images were also unremarkable, with no evidence of veno-occlusive disease. Left VA technique: The left vertebral artery noted to arise from the aortic arch. Left vertebral artery was then selectively catheterized with roadmap guidance. Digital subtraction angiography of the intracranial left vertebrobasilar run-off  was obtained in frontal and lateral projections. Interpretation:The left vertebral artery injection demonstrates normal antegrade opacification of the left  vertebral artery, including the cervical segment, basilar artery, and respective branches. There is a left V4 moderate stenosis noted. Otherwise, there was no evidence of  cerebral aneurysm, arteriovenous malformation, or arterial stenosis. There is noted opacification of the distal right middle cerebral artery parieto-occipital territory from pial collaterals arising off the right posterior cerebral artery. Capillary and venous phase images were otherwise unremarkable. Right CFA technique: A right common femoral artery angiogram was performed and demonstrated arterial catheterization proximal to the bifurcation. There was no evidence of dissection or occlusion within the right common femoral artery. There is internal iliac artery stent noted. A 5F Vascade closure device was used to establish hemostasis at the right common femoral artery access site. No immediate complications were experienced. Patient was re-examined after the procedure with no change noted in their neurologic examination, with distal pulses present.  The patient was subsequently discharged from the neurointerventional suite. Impression: 1. Critical right ICA stenosis with a string sign is noted measuring approximately 90-99% per NASCET criteria. Noted retrograde Filling of the right ophthalmic artery from the right internal maxillary artery branches with slow filling of the anterior cerebral artery, middle cerebral artery and the distal branches in addition to distal right mca vascular supply from the right pca due to previously noted critical proximal cervical ICA stenosis. 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 3.No evidence of clear discrete aneurysm, arteriovenous malformation, arterial dissection, or veno-occlusive disease. 4. 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 Racheal Blum dictated this invasive procedure. Dr. Nilay Markham was present for all procedural and imaging components of this case. Examination was reviewed and reported findings confirmed and edited by Dr. Nilay Markham. Dr. Nilay Markham supervised and interpreted this procedure. Kathryn Mayen MD Final report electronically signed by Kathryn Mayen M.D. on 12/27/2019 4:00 PM    Cta Head Neck W Contrast    Result Date: 12/25/2019  EXAMINATION: CTA OF THE HEAD AND NECK WITH CONTRAST 12/25/2019 9:07 pm: TECHNIQUE: CTA of the head and neck was performed with the administration of intravenous contrast. Multiplanar reformatted images are provided for review. MIP images are provided for review. Stenosis of the internal carotid arteries measured using NASCET criteria. Dose modulation, iterative reconstruction, and/or weight based adjustment of the mA/kV was utilized to reduce the radiation dose to as low as reasonably achievable. COMPARISON: None.  HISTORY: ORDERING SYSTEM PROVIDED HISTORY: sah TECHNOLOGIST PROVIDED HISTORY: sah Reason for Exam: SAH after trauma Acuity: Acute Type of Exam: Initial FINDINGS: CTA NECK: AORTIC ARCH/ARCH VESSELS: There is a critical stenosis of the proximal left stenosis of the proximal left vertebral artery. Critical stenosis of the proximal right cervical ICA with attenuated flow seen distally. Severe near critical stenoses of the right cavernous ICA. Moderate stenoses of the contralateral cavernous ICA. Ct Chest Abdomen Pelvis W Contrast    Result Date: 12/26/2019  EXAMINATION: CT OF THE THORACIC SPINE WITHOUT CONTRAST; CT OF THE LUMBAR SPINE WITHOUT CONTRAST; CT OF THE CHEST, ABDOMEN, AND PELVIS WITH CONTRAST, 12/25/2019 9:04 pm; 12/25/2019 9:05 pm TECHNIQUE: CT of the thoracic and lumbar spine was performed without the administration of intravenous contrast.  CT of the chest, abdomen and pelvis was performed with the administration of intravenous contrast. Multiplanar reformatted images are provided for review. Dose modulation, iterative reconstruction, and/or weight based adjustment of the mA/kV was utilized to reduce the radiation dose to as low as reasonably achievable. COMPARISON: None HISTORY: ORDERING SYSTEM PROVIDED HISTORY: trauma TECHNOLOGIST PROVIDED HISTORY: trauma Reason for Exam: fall from standing Acuity: Acute Type of Exam: Initial; ORDERING SYSTEM PROVIDED HISTORY: trauma TECHNOLOGIST PROVIDED HISTORY: trauma Reason for Exam: fall from standing Acuity: Acute Type of Exam: Initial; ORDERING SYSTEM PROVIDED HISTORY: trauma TECHNOLOGIST PROVIDED HISTORY: trauma Reason for Exam: fall from standing Acuity: Acute Type of Exam: Initial FINDINGS: CTA CHEST: Stable cardiomegaly. No pericardial effusion. No mediastinal hematoma or pneumomediastinum. No enlarged mediastinal or hilar lymph nodes. Main pulmonary artery is dilated, measuring up to 4 cm, suggesting pulmonary hypertension. Calcified and noncalcified atherosclerotic plaque of the thoracic aorta. Incidentally noted 4 vessel aortic arch with separate arch origin of the left vertebral artery. Ectatic ascending thoracic aorta measures up to 4 cm in diameter. No acute aortic abnormality.   Central airways are clear. No pleural effusion or pneumothorax. No pulmonary contusion or pulmonary laceration. Mild bilateral lower lobe dependent atelectatic changes. Healing anterolateral left 6th rib fracture. No acute displaced rib fracture or chest wall hematoma. CTA ABDOMEN: No acute traumatic abnormality of the liver, spleen, pancreas, kidneys, or adrenal glands. Normal gallbladder. Stomach, small bowel, and colon are normal in caliber without evidence of obstruction. Normal appendix. Sigmoid diverticulosis without diverticulitis. Calcified and noncalcified aortoiliac atherosclerotic calcification. Abdominal aorta is normal in caliber. No free fluid in the abdomen. CTA PELVIS: Urinary bladder and pelvic organs are normal.  No free fluid in the pelvis. Bilateral common iliac stents are in position. THORACIC/LUMBAR SPINE: BONES/ALIGNMENT: Normal alignment of the thoracic and lumbar spine. Vertebral body heights are maintained. No acute fracture. DEGENERATIVE CHANGES: Multilevel disc narrowing and endplate osteophyte formation. Mild multilevel facet joint degenerative changes. No high-grade, bony spinal canal stenosis. SOFT TISSUES: Paraspinal soft tissues are normal.     No acute traumatic abnormality of the chest, abdomen, or pelvis. Remote, healing anterolateral left 6th rib fracture. No acute osseous abnormality of the thoracic or lumbar spine. Xr Hip 2-3 Vw W Pelvis Right    Result Date: 12/26/2019  EXAMINATION: ONE XRAY VIEW OF THE PELVIS AND TWO XRAY VIEWS RIGHT HIP; TWO XRAY VIEWS OF THE LEFT HIP 12/26/2019 1:37 am COMPARISON: None. HISTORY: ORDERING SYSTEM PROVIDED HISTORY: Trauma/Fracture TECHNOLOGIST PROVIDED HISTORY: AP and cross-table lateral of the hip please, thank you Trauma/Fracture Reason for Exam: fall/trauma Acuity: Acute FINDINGS: The bones are osteopenic. The femoral heads located bilateral.  No acute fracture or dislocation pelvis or hips bilaterally. SI joints are grossly intact.

## 2020-01-02 NOTE — PROGRESS NOTES
Nutrition Assessment (Enteral Nutrition)    Type and Reason for Visit: Reassess    Nutrition Recommendations: Decrease TF to 70 ml/hr x 24 hours to provide 2016 kcal and 101g protein/day. Meets estimated needs with current propofol infusion rate. Nutrition Assessment: Pt is currently tolerating TF of Glucerna 1.2 @ 80ml/hr. Propofol now infusing @ 8.6ml/hr providing 227 kcal/day in addition to TF. Malnutrition Assessment:  · Malnutrition Status: Insufficient data  · Context: Acute illness or injury  · Findings of the 6 clinical characteristics of malnutrition (Minimum of 2 out of 6 clinical characteristics is required to make the diagnosis of moderate or severe Protein Calorie Malnutrition based on AND/ASPEN Guidelines):  1. Energy Intake-Unable to assess, Unable to assess    2. Weight Loss-Unable to assess,    3. Fat Loss-Unable to assess,    4. Muscle Loss-Unable to assess,    5. Fluid Accumulation-Mild fluid accumulation, Extremities, Generalized  6.  Strength-Not measured    Nutrition Risk Level: High    Nutrition Needs:  · Estimated Daily Total Kcal: 2093-7885 kcals/day  · Estimated Daily Protein (g):  gm/day    Nutrition Diagnosis:   · Problem: Inadequate oral intake  · Etiology: related to Impaired respiratory function-inability to consume food     Signs and symptoms:  as evidenced by NPO status due to medical condition, Nutrition support - EN    Objective Information:  · Nutrition-Focused Physical Findings:    · Wound Type: None  · Current Nutrition Therapies:  · Oral Diet Orders: NPO   · Tube Feeding (TF) Orders:   · Feeding Route: Nasogastric  · Formula: Diabetic  · Rate (ml/hr):80 ml/hr    · Volume (ml/day): 1920 ml/day  · Duration: Continuous  · Current TF & Flush Orders Provides: Glucerna @ 80ml/hr = 2304kcal and 115g protein per day  · Goal TF & Flush Orders Provides: Glucerna @ 70ml/hr = 2016 kcal and 101g protein per day.   · Additional Calories:  Propofol providing 227

## 2020-01-02 NOTE — PLAN OF CARE
PROVIDE ADEQUATE OXYGENATION WITH ACCEPTABLE SP02/ABG'S    [x]  IDENTIFY APPROPRIATE OXYGEN THERAPY  [x]   MONITOR SP02/ABG'S AS NEEDED   [x]   PATIENT EDUCATION AS NEEDED    Ventilator Bronchodilator assessment    Breath sounds: clear after suctioning  Inspiratory Pressure: 21  Plateau Pressure: 16    Patient assessed at level 1          []    Bronchodilator Assessment    BRONCHODILATOR ASSESSMENT SCORE  Score 0 (Home) 1 2 3 4   Breath Sounds   []  Chronic Ventilator: Patient at baseline [x]  Mild Wheezes/ Clear []  Intermittent wheezes with good air entry []  Bilateral/unilateral wheezing with diminished air entry []  Insp/Exp wheeze and/or poor aeration   Ventilator Pressures   []  Chronic Ventilator [x]  Insp. Pressure less than 25 cm H20 []  Insp. Pressure less than 25 cm H20 []  Insp. Pressure exceeds 25 cm H20 []  Insp.  Pressure exceeds 30 cm H20   Plateau Pressure []  NA   [x]  Plateau Pressure less than 4  []  Plateau Pressure less than or equal to 5 []  Plateau Pressure greater than or equal to 6 []  Plateau Pressure greater than or equal to [place]  8:09 AM

## 2020-01-02 NOTE — CARE COORDINATION
Spoke with Mery Conroy at [facility]. Oracio Highks the hospital liaison here to do on site. Pt is still intubated and [facility] is able to accept when off vent. Also their sister [address] does have 24 hour RT on staff.  [facility] will follow

## 2020-01-02 NOTE — PLAN OF CARE
Neuro assessment completed, fall precautions in place, aspirations precautions in place, assess for barriers in communication and mobility, interventions to assist in communication and mobility in place, encouraged to call for assistance, adaptive devices used as needed, assess emotional state and support offered, encouraged patient to communicate by available means, and support systems included in patient care. Skin assessed for breakdown and redness, patient turned regularly, and heels elevated off of bed on pillows. Fall risk precautions in place. Bed in lowest position with wheels locked, bed alarm in place and activated. Patient unable to use call light to communicate needs, frequent visual checks performed for safety. Will continue to monitor.

## 2020-01-02 NOTE — PROGRESS NOTES
[address] [facility] Wound Ostomy Continence Nurse  Follow up      NAME:  Pablo Verma RECORD NUMBER:  [id number]  AGE: 61 y.o. GENDER: male  : 1960  TODAY'S DATE:  2020    Subjective:   Reason for [id number] Nurse Evaluation and Assessment:   Wound to left lateral ankle, present on admission, likely traumatic in nature. History of frequent falls         Urmila Damon is a 61 y.o. male referred by:   [x] Physician  [] Nursing  [] Other:     Wound Identification:  Wound Type: traumatic  Contributing Factors: diabetes, poor glucose control, chronic pressure, smoking and substance abuse        PAST MEDICAL HISTORY        Diagnosis Date    Alcohol abuse     Atrial fibrillation ([address]     Diabetes ([facility] [address]     Gastritis     Hyperlipidemia     Hypertension     Left ventricular hypertrophy     Neuropathy     Renal cyst        PAST SURGICAL HISTORY    Past Surgical History:   Procedure Laterality Date    ANGIOPLASTY      NO VASCULAR LEG STENTS PER DR. Chika Ruvalcaba    CORONARY ANGIOPLASTY WITH STENT PLACEMENT  2019    PT HAS 7 CARDIAC STENTS UNKNOWN 1.5 T ONLY       FAMILY HISTORY    No family history on file.     SOCIAL HISTORY    Social History     Tobacco Use    Smoking status: Current Every Day Smoker     Packs/day: 0.50     Types: Cigarettes    Smokeless tobacco: Current User   Substance Use Topics    Alcohol use: Yes     Frequency: 4 or more times a week    Drug use: Never       ALLERGIES    No Known Allergies        Objective:      BP (!) 165/103   Pulse 96   Temp 101.2 °F (38.4 °C)   Resp 23   Ht 5' 11\" (1.803 m)   Wt 210 lb 8.6 oz (95.5 kg)   SpO2 98%   BMI 29.36 kg/m²   Angel Risk Score: Angel Scale Score: 15    LABS    CBC:   Lab Results   Component Value Date    WBC 6.2 2020    RBC 4.82 2020    HGB 11.0 2020     CMP:  Albumin:    Lab Results   Component Value Date    LABALBU 3.0 2019     PT/INR:    Lab Results   Component Value Date    PROTIME 10.5 2019

## 2020-01-02 NOTE — PROGRESS NOTES
Physical Therapy  DATE: 2020  NAME: Tamika Beckman  MRN: [id number]   : 1960    Discharge Recommendations: Continue to Assess (pending progress)     Subjective: RM agreeable to PROM. Pt intubated and sedated at this time. Unresponsive the therapist's voice, spontaneous movement of all 4 extremities noted  Pain: SHA, no facial grimace noted with movements, vitals WNL  Patient follows: No Commands  Is patient on ventilator: Yes  Is patient on sedation: Yes  Precautions:  bilat soft wrist restraints, NG, dotson, Intubated, sedated    Therapeutic exercises:  B UE/LE PROM in all available planes x 15 } reps  Bilat gastrocnemius stretching 3 reps x 30 seconds    Goals  Short Term Goals  Short term goal 1: Pt to perform bed mobility CGA  Short term goal 2: Demonstrate functional transfers CGA  Short term goal 3: Ambulate 100ft w/ least restrictive AD Desi with good safety awareness  Short term goal 4: Tolerate 30 minutes of therapy to demo increased endurance       Plan: Progress functional mobility as medically appropriate.    Time In: 0830  Time Out: 0845  Time Coded Minutes (treatment minutes): 15  Rehab Potential: fair  Treatments/week: 2-3x/wk until extubated    Jeyson Asif PTA

## 2020-01-02 NOTE — PROGRESS NOTES
ENDOVASCULAR NEUROSURGERY PROGRESS NOTE  1/2/2020 9:26 AM  Subjective:   Admit Date: 12/25/2019  PCP: Chelsea Salter MD    Patient is intubated. Objective:   Vitals: BP (!) 165/103   Pulse 96   Temp 101.2 °F (38.4 °C)   Resp 23   Ht 5' 11\" (1.803 m)   Wt 210 lb 8.6 oz (95.5 kg)   SpO2 98%   BMI 29.36 kg/m²   Did examine Precedex. General appearance: Intubated   HEENT: Atraumatic. Neck: Neck is supple. Lungs: Intubated  Abdomen: Soft nontender. Extremities: No lower limb edema noted. Neurologic: He is arousable, tracking in the room, he is not following simple commands. CN: Both are equal reactive to light at 3 mm, he is tracking the room, no facial droop noted. MOTOR:  He is withdrawing both upper and lower extremities. SENSORY: He is withdrawing both upper and lower extremities.     Medications and labs:   Scheduled Meds:   aspirin  324 mg Oral Daily    sotalol  160 mg Oral BID    insulin glargine  12 Units Subcutaneous Daily    sodium chloride  2 g Oral TID WC    enoxaparin  40 mg Subcutaneous Daily    chlordiazePOXIDE  25 mg Per NG tube TID    nicotine  1 patch Transdermal Daily    folic acid  1 mg Oral Daily    thiamine  100 mg Oral Daily    sodium chloride flush  10 mL Intravenous BID    famotidine (PEPCID) injection  20 mg Intravenous BID    rosuvastatin  5 mg Oral Nightly    chlorhexidine  15 mL Mouth/Throat BID    vitamin D  50,000 Units Oral Weekly    insulin lispro  0-6 Units Subcutaneous TID WC    insulin lispro  0-3 Units Subcutaneous Nightly    sodium chloride flush  10 mL Intravenous 2 times per day    sodium chloride flush  10 mL Intravenous 2 times per day     Continuous Infusions:   propofol 15 mcg/kg/min (01/02/20 0905)    dextrose      sodium chloride 50 mL/hr at 01/01/20 1130     CBC:   Recent Labs     12/31/19 0357 01/01/20  0515   WBC 5.7 6.2   HGB 11.4* 11.0*    242     BMP:    Recent Labs     12/31/19 0357 01/01/20  0515   * 131*   K 4.6

## 2020-01-03 ENCOUNTER — APPOINTMENT (OUTPATIENT)
Dept: GENERAL RADIOLOGY | Age: 60
DRG: 030 | End: 2020-01-03
Payer: MEDICAID

## 2020-01-03 LAB
ABSOLUTE EOS #: 0.11 K/UL (ref 0–0.44)
ABSOLUTE IMMATURE GRANULOCYTE: 0 K/UL (ref 0–0.3)
ABSOLUTE LYMPH #: 0.95 K/UL (ref 1.1–3.7)
ABSOLUTE MONO #: 0.45 K/UL (ref 0.1–1.2)
ALLEN TEST: POSITIVE
ANION GAP SERPL CALCULATED.3IONS-SCNC: 12 MMOL/L (ref 9–17)
BASOPHILS # BLD: 1 % (ref 0–2)
BASOPHILS ABSOLUTE: 0.06 K/UL (ref 0–0.2)
BUN BLDV-MCNC: 16 MG/DL (ref 6–20)
BUN/CREAT BLD: ABNORMAL (ref 9–20)
CALCIUM SERPL-MCNC: 8.6 MG/DL (ref 8.6–10.4)
CHLORIDE BLD-SCNC: 99 MMOL/L (ref 98–107)
CO2: 22 MMOL/L (ref 20–31)
CREAT SERPL-MCNC: 0.41 MG/DL (ref 0.7–1.2)
DIFFERENTIAL TYPE: ABNORMAL
EOSINOPHILS RELATIVE PERCENT: 2 % (ref 1–4)
FIO2: 30
GFR AFRICAN AMERICAN: >60 ML/MIN
GFR NON-AFRICAN AMERICAN: >60 ML/MIN
GFR SERPL CREATININE-BSD FRML MDRD: ABNORMAL ML/MIN/{1.73_M2}
GFR SERPL CREATININE-BSD FRML MDRD: ABNORMAL ML/MIN/{1.73_M2}
GLUCOSE BLD-MCNC: 252 MG/DL (ref 75–110)
GLUCOSE BLD-MCNC: 261 MG/DL (ref 75–110)
GLUCOSE BLD-MCNC: 275 MG/DL (ref 70–99)
GLUCOSE BLD-MCNC: 284 MG/DL (ref 75–110)
HCT VFR BLD CALC: 36.6 % (ref 40.7–50.3)
HEMOGLOBIN: 10.5 G/DL (ref 13–17)
IMMATURE GRANULOCYTES: 0 %
LYMPHOCYTES # BLD: 17 % (ref 24–43)
MAGNESIUM: 1.9 MG/DL (ref 1.6–2.6)
MCH RBC QN AUTO: 23.1 PG (ref 25.2–33.5)
MCHC RBC AUTO-ENTMCNC: 28.7 G/DL (ref 28.4–34.8)
MCV RBC AUTO: 80.4 FL (ref 82.6–102.9)
MODE: ABNORMAL
MONOCYTES # BLD: 8 % (ref 3–12)
MORPHOLOGY: ABNORMAL
NEGATIVE BASE EXCESS, ART: ABNORMAL (ref 0–2)
NRBC AUTOMATED: 0 PER 100 WBC
O2 DEVICE/FLOW/%: ABNORMAL
PATIENT TEMP: ABNORMAL
PDW BLD-RTO: 21.2 % (ref 11.8–14.4)
PLATELET # BLD: 191 K/UL (ref 138–453)
PLATELET ESTIMATE: ABNORMAL
PMV BLD AUTO: 11.4 FL (ref 8.1–13.5)
POC HCO3: 25.3 MMOL/L (ref 21–28)
POC O2 SATURATION: 99 % (ref 94–98)
POC PCO2 TEMP: ABNORMAL MM HG
POC PCO2: 26.2 MM HG (ref 35–48)
POC PH TEMP: ABNORMAL
POC PH: 7.59 (ref 7.35–7.45)
POC PO2 TEMP: ABNORMAL MM HG
POC PO2: 126.6 MM HG (ref 83–108)
POSITIVE BASE EXCESS, ART: 4 (ref 0–3)
POTASSIUM SERPL-SCNC: 4.3 MMOL/L (ref 3.7–5.3)
PROCALCITONIN: 0.05 NG/ML
RBC # BLD: 4.55 M/UL (ref 4.21–5.77)
RBC # BLD: ABNORMAL 10*6/UL
SAMPLE SITE: ABNORMAL
SEG NEUTROPHILS: 72 % (ref 36–65)
SEGMENTED NEUTROPHILS ABSOLUTE COUNT: 4.03 K/UL (ref 1.5–8.1)
SODIUM BLD-SCNC: 133 MMOL/L (ref 135–144)
TCO2 (CALC), ART: 26 MMOL/L (ref 22–29)
WBC # BLD: 5.6 K/UL (ref 3.5–11.3)
WBC # BLD: ABNORMAL 10*3/UL

## 2020-01-03 PROCEDURE — 82947 ASSAY GLUCOSE BLOOD QUANT: CPT

## 2020-01-03 PROCEDURE — 6370000000 HC RX 637 (ALT 250 FOR IP): Performed by: NURSE PRACTITIONER

## 2020-01-03 PROCEDURE — 2580000003 HC RX 258: Performed by: STUDENT IN AN ORGANIZED HEALTH CARE EDUCATION/TRAINING PROGRAM

## 2020-01-03 PROCEDURE — 2500000003 HC RX 250 WO HCPCS: Performed by: STUDENT IN AN ORGANIZED HEALTH CARE EDUCATION/TRAINING PROGRAM

## 2020-01-03 PROCEDURE — 6360000002 HC RX W HCPCS: Performed by: STUDENT IN AN ORGANIZED HEALTH CARE EDUCATION/TRAINING PROGRAM

## 2020-01-03 PROCEDURE — 83735 ASSAY OF MAGNESIUM: CPT

## 2020-01-03 PROCEDURE — 6370000000 HC RX 637 (ALT 250 FOR IP): Performed by: STUDENT IN AN ORGANIZED HEALTH CARE EDUCATION/TRAINING PROGRAM

## 2020-01-03 PROCEDURE — 36415 COLL VENOUS BLD VENIPUNCTURE: CPT

## 2020-01-03 PROCEDURE — 2700000000 HC OXYGEN THERAPY PER DAY

## 2020-01-03 PROCEDURE — 94003 VENT MGMT INPAT SUBQ DAY: CPT

## 2020-01-03 PROCEDURE — 2500000003 HC RX 250 WO HCPCS: Performed by: NURSE PRACTITIONER

## 2020-01-03 PROCEDURE — 82803 BLOOD GASES ANY COMBINATION: CPT

## 2020-01-03 PROCEDURE — 84145 PROCALCITONIN (PCT): CPT

## 2020-01-03 PROCEDURE — 94761 N-INVAS EAR/PLS OXIMETRY MLT: CPT

## 2020-01-03 PROCEDURE — 71045 X-RAY EXAM CHEST 1 VIEW: CPT

## 2020-01-03 PROCEDURE — 6360000002 HC RX W HCPCS: Performed by: PSYCHIATRY & NEUROLOGY

## 2020-01-03 PROCEDURE — 73110 X-RAY EXAM OF WRIST: CPT

## 2020-01-03 PROCEDURE — 85025 COMPLETE CBC W/AUTO DIFF WBC: CPT

## 2020-01-03 PROCEDURE — 73130 X-RAY EXAM OF HAND: CPT

## 2020-01-03 PROCEDURE — 2580000003 HC RX 258: Performed by: NURSE PRACTITIONER

## 2020-01-03 PROCEDURE — 2000000003 HC NEURO ICU R&B

## 2020-01-03 PROCEDURE — 94770 HC ETCO2 MONITOR DAILY: CPT

## 2020-01-03 PROCEDURE — 6370000000 HC RX 637 (ALT 250 FOR IP): Performed by: PSYCHIATRY & NEUROLOGY

## 2020-01-03 PROCEDURE — 99291 CRITICAL CARE FIRST HOUR: CPT | Performed by: PSYCHIATRY & NEUROLOGY

## 2020-01-03 PROCEDURE — 95714 VEEG EA 12-26 HR UNMNTR: CPT

## 2020-01-03 PROCEDURE — 80048 BASIC METABOLIC PNL TOTAL CA: CPT

## 2020-01-03 RX ORDER — PROPOFOL 10 MG/ML
5 INJECTION, EMULSION INTRAVENOUS
Status: DISCONTINUED | OUTPATIENT
Start: 2020-01-03 | End: 2020-01-05

## 2020-01-03 RX ORDER — SENNOSIDES 8.8 MG/5ML
5 LIQUID ORAL NIGHTLY
Status: DISCONTINUED | OUTPATIENT
Start: 2020-01-03 | End: 2020-01-09

## 2020-01-03 RX ORDER — INSULIN GLARGINE 100 [IU]/ML
22 INJECTION, SOLUTION SUBCUTANEOUS DAILY
Status: DISCONTINUED | OUTPATIENT
Start: 2020-01-04 | End: 2020-01-03

## 2020-01-03 RX ORDER — INSULIN GLARGINE 100 [IU]/ML
22 INJECTION, SOLUTION SUBCUTANEOUS DAILY
Status: DISCONTINUED | OUTPATIENT
Start: 2020-01-03 | End: 2020-01-04

## 2020-01-03 RX ORDER — INSULIN GLARGINE 100 [IU]/ML
22 INJECTION, SOLUTION SUBCUTANEOUS DAILY
Status: DISCONTINUED | OUTPATIENT
Start: 2020-01-03 | End: 2020-01-03

## 2020-01-03 RX ORDER — PROPRANOLOL HYDROCHLORIDE 40 MG/1
40 TABLET ORAL 2 TIMES DAILY
Status: DISCONTINUED | OUTPATIENT
Start: 2020-01-03 | End: 2020-01-09

## 2020-01-03 RX ADMIN — FAMOTIDINE 20 MG: 10 INJECTION, SOLUTION INTRAVENOUS at 20:19

## 2020-01-03 RX ADMIN — SODIUM CHLORIDE: 9 INJECTION, SOLUTION INTRAVENOUS at 01:37

## 2020-01-03 RX ADMIN — CHLORDIAZEPOXIDE HYDROCHLORIDE 25 MG: 25 CAPSULE ORAL at 20:18

## 2020-01-03 RX ADMIN — ERGOCALCIFEROL 50000 UNITS: 1.25 CAPSULE ORAL at 09:24

## 2020-01-03 RX ADMIN — SODIUM CHLORIDE, PRESERVATIVE FREE 10 ML: 5 INJECTION INTRAVENOUS at 21:00

## 2020-01-03 RX ADMIN — PROPRANOLOL HYDROCHLORIDE 40 MG: 40 TABLET ORAL at 20:18

## 2020-01-03 RX ADMIN — SODIUM CHLORIDE, PRESERVATIVE FREE 10 ML: 5 INJECTION INTRAVENOUS at 20:18

## 2020-01-03 RX ADMIN — INSULIN GLARGINE 22 UNITS: 100 INJECTION, SOLUTION SUBCUTANEOUS at 13:40

## 2020-01-03 RX ADMIN — SOTALOL HYDROCHLORIDE 160 MG: 80 TABLET ORAL at 09:27

## 2020-01-03 RX ADMIN — PROPOFOL 15 MCG/KG/MIN: 10 INJECTION, EMULSION INTRAVENOUS at 06:15

## 2020-01-03 RX ADMIN — Medication 15 ML: at 20:18

## 2020-01-03 RX ADMIN — SODIUM CHLORIDE TAB 1 GM 2 G: 1 TAB at 09:24

## 2020-01-03 RX ADMIN — ACETAMINOPHEN 650 MG: 650 SOLUTION ORAL at 09:30

## 2020-01-03 RX ADMIN — CHLORDIAZEPOXIDE HYDROCHLORIDE 25 MG: 25 CAPSULE ORAL at 16:28

## 2020-01-03 RX ADMIN — ROSUVASTATIN CALCIUM 5 MG: 5 TABLET, FILM COATED ORAL at 20:18

## 2020-01-03 RX ADMIN — Medication 15 ML: at 09:30

## 2020-01-03 RX ADMIN — ASPIRIN 324 MG: 81 TABLET, CHEWABLE ORAL at 09:23

## 2020-01-03 RX ADMIN — Medication 10 MG: at 03:03

## 2020-01-03 RX ADMIN — PIPERACILLIN AND TAZOBACTAM 3.38 G: 3; .375 INJECTION, POWDER, FOR SOLUTION INTRAVENOUS at 20:32

## 2020-01-03 RX ADMIN — Medication 100 MG: at 09:24

## 2020-01-03 RX ADMIN — INSULIN LISPRO 6 UNITS: 100 INJECTION, SOLUTION INTRAVENOUS; SUBCUTANEOUS at 13:37

## 2020-01-03 RX ADMIN — FAMOTIDINE 20 MG: 10 INJECTION, SOLUTION INTRAVENOUS at 09:24

## 2020-01-03 RX ADMIN — ACETAMINOPHEN 650 MG: 650 SOLUTION ORAL at 20:18

## 2020-01-03 RX ADMIN — FOLIC ACID 1 MG: 1 TABLET ORAL at 09:23

## 2020-01-03 RX ADMIN — ENOXAPARIN SODIUM 40 MG: 40 INJECTION SUBCUTANEOUS at 09:24

## 2020-01-03 RX ADMIN — SODIUM CHLORIDE TAB 1 GM 2 G: 1 TAB at 13:28

## 2020-01-03 RX ADMIN — PIPERACILLIN AND TAZOBACTAM 3.38 G: 3; .375 INJECTION, POWDER, FOR SOLUTION INTRAVENOUS at 01:38

## 2020-01-03 RX ADMIN — SODIUM CHLORIDE TAB 1 GM 2 G: 1 TAB at 16:29

## 2020-01-03 RX ADMIN — PIPERACILLIN AND TAZOBACTAM 3.38 G: 3; .375 INJECTION, POWDER, FOR SOLUTION INTRAVENOUS at 13:28

## 2020-01-03 RX ADMIN — PROPOFOL 15 MCG/KG/MIN: 10 INJECTION, EMULSION INTRAVENOUS at 15:00

## 2020-01-03 RX ADMIN — PROPRANOLOL HYDROCHLORIDE 40 MG: 40 TABLET ORAL at 13:28

## 2020-01-03 RX ADMIN — INSULIN LISPRO 3 UNITS: 100 INJECTION, SOLUTION INTRAVENOUS; SUBCUTANEOUS at 20:35

## 2020-01-03 RX ADMIN — INSULIN LISPRO 6 UNITS: 100 INJECTION, SOLUTION INTRAVENOUS; SUBCUTANEOUS at 17:13

## 2020-01-03 RX ADMIN — DOCUSATE SODIUM 100 MG: 50 LIQUID ORAL at 13:28

## 2020-01-03 RX ADMIN — CHLORDIAZEPOXIDE HYDROCHLORIDE 25 MG: 25 CAPSULE ORAL at 09:25

## 2020-01-03 ASSESSMENT — PULMONARY FUNCTION TESTS
PIF_VALUE: 21
PIF_VALUE: 22
PIF_VALUE: 19
PIF_VALUE: 22
PIF_VALUE: 19
PIF_VALUE: 20
PIF_VALUE: 18
PIF_VALUE: 19
PIF_VALUE: 21
PIF_VALUE: 23
PIF_VALUE: 19

## 2020-01-03 NOTE — PROGRESS NOTES
Progress Note      -No neurosurgical intervention planned at this time              - Neuro checks per unit protocol              - HR control              - Extubate when able              - Continue management of TBI per neuro critical care team              - NS to sign off. Please page with any questions    Dexter Kruger.  Jose, [address]

## 2020-01-03 NOTE — PROGRESS NOTES
Orthopedic Progress Note    Patient:  Stephanie Parson  YOB: 1960     61 y.o. male    Subjective:  Patient seen and examined this morning. Patient still intubated  No issue overnight per nursing. Pain controlled on current regimen. Vitals reviewed, afebrile    Objective:   Vitals:    01/03/20 0504   BP: (!) 156/83   Pulse: 94   Resp: 18   Temp:    SpO2: 99%     Gen: NAD, intubation  Cardiovascular: Regular rate, no dependent edema, distal pulses 2+  Respiratory: Chest symmetric, no accessory muscle use, normal respirations, no audible wheezes  MSK: LUE: Splint/dressing clean, dry, intact. Compartments soft. Fingers warm and perfused, unable to assess neurovascular status due to intubation  RUE: Compartments soft. Fingers warm and perfused, unable to assess neurovascular status due to intubation      Recent Labs     01/02/20  1100   WBC 6.1   HGB 10.2*   HCT 34.3*      *   K 4.2   BUN 18   CREATININE 0.55*   GLUCOSE 301*      Meds: Lovenox, ASA   See rec for complete list    Impression/plan: 61 y.o. male being seen after possible recent falls with the following injuries:      -Left acute on chronic 1st proximal metacarpal fracture s/p CR & splinting  -R scapholunate widening  -SAH  -SDH  -OBI    -Continue non operative management  -Follow up repeat x-rays today  -WB status: Non weight bearing bilateral upper extremity  -Maintain splint, keep clean and dry  -Pain control PO/IV Medication. Attempt to Wean IV medications. -DVT ppx: EPC, ASA, Lovenox per primary.   -Ice/Elevate as needed for pain and swelling.  -Encourage Incentive Spirometry use and deep breathing. -PT/OT to evaluate and treat.   -Follow up with Dr. Jesse Gardiner or [facility] ortho in one week  -Please page ortho with any questions    ----------------------------------------  Rufino Mccarthy, DO  PGY-2, Department of [address], [place], [place]

## 2020-01-03 NOTE — PROGRESS NOTES
TODAY:       AWAKE & FOLLOWING COMMANDS:                   [x] No               [] Yes     INTUBATED:              [] No               [x] Yes     SEDATION/ANALGESIA:        [x] Propofol gtt             [] Versed gtt               [] Ativan gtt                [] No Sedation    Pain medications: FEEDING: Able to take PO?   [x] No:  [] NPO for:                 DVT Prophylaxis:  [x] Yes:    [] No rationale:      Stress Ulcer Prophylaxis: [x] Yes:              [] Not indicated     VASOPRESSORS:    [x] No                           [] Yes     CENTRAL/ARTERIAL LINES:  [x] No               BECERRIL CATHETER: [x] No                    DRAINS: [x] No          [] Yes:  Location:  Date placed: , Output:  mL/24h     Head of Bed: [x] Elevated:          [] Flat     Glucose management: [] Not indicated, consistently less than 180 , CASANDRA

## 2020-01-03 NOTE — PROGRESS NOTES
Speech Language Pathology  [facility]  Speech Language Pathology    Date: 1/3/2020  Patient Name: Sharmin Madison  YOB: 1960   AGE: 61 y.o.   MRN: [id number]        Patient Not Available for Speech Therapy     Due to:  [] Testing  [] Hemodialysis  [] Cancelled by RN  [] Surgery   [x] Intubation/Sedation/Pain Medication  [] Medical instability  [] Other:    Next scheduled treatment: As medically appropriate     Completed by: Jluis Melvin M.S., CF-SLP

## 2020-01-04 ENCOUNTER — APPOINTMENT (OUTPATIENT)
Dept: GENERAL RADIOLOGY | Age: 60
DRG: 030 | End: 2020-01-04
Payer: MEDICAID

## 2020-01-04 LAB
ABSOLUTE EOS #: 0.12 K/UL (ref 0–0.4)
ABSOLUTE IMMATURE GRANULOCYTE: 0 K/UL (ref 0–0.3)
ABSOLUTE LYMPH #: 1.22 K/UL (ref 1–4.8)
ABSOLUTE MONO #: 0.58 K/UL (ref 0.1–0.8)
ALLEN TEST: POSITIVE
ANION GAP SERPL CALCULATED.3IONS-SCNC: 14 MMOL/L (ref 9–17)
BASOPHILS # BLD: 1 % (ref 0–2)
BASOPHILS ABSOLUTE: 0.06 K/UL (ref 0–0.2)
BUN BLDV-MCNC: 16 MG/DL (ref 6–20)
BUN/CREAT BLD: ABNORMAL (ref 9–20)
CALCIUM SERPL-MCNC: 9 MG/DL (ref 8.6–10.4)
CHLORIDE BLD-SCNC: 101 MMOL/L (ref 98–107)
CO2: 22 MMOL/L (ref 20–31)
CREAT SERPL-MCNC: 0.61 MG/DL (ref 0.7–1.2)
CULTURE: NORMAL
CULTURE: NORMAL
DIFFERENTIAL TYPE: ABNORMAL
EOSINOPHILS RELATIVE PERCENT: 2 % (ref 1–4)
FIO2: 30
GFR AFRICAN AMERICAN: >60 ML/MIN
GFR NON-AFRICAN AMERICAN: >60 ML/MIN
GFR SERPL CREATININE-BSD FRML MDRD: ABNORMAL ML/MIN/{1.73_M2}
GFR SERPL CREATININE-BSD FRML MDRD: ABNORMAL ML/MIN/{1.73_M2}
GLUCOSE BLD-MCNC: 193 MG/DL (ref 75–110)
GLUCOSE BLD-MCNC: 227 MG/DL (ref 75–110)
GLUCOSE BLD-MCNC: 252 MG/DL (ref 70–99)
GLUCOSE BLD-MCNC: 262 MG/DL (ref 75–110)
HCT VFR BLD CALC: 35.4 % (ref 40.7–50.3)
HEMOGLOBIN: 10.1 G/DL (ref 13–17)
IMMATURE GRANULOCYTES: 0 %
LYMPHOCYTES # BLD: 21 % (ref 24–44)
Lab: NORMAL
Lab: NORMAL
MAGNESIUM: 1.8 MG/DL (ref 1.6–2.6)
MCH RBC QN AUTO: 22.7 PG (ref 25.2–33.5)
MCHC RBC AUTO-ENTMCNC: 28.5 G/DL (ref 28.4–34.8)
MCV RBC AUTO: 79.6 FL (ref 82.6–102.9)
MODE: ABNORMAL
MONOCYTES # BLD: 10 % (ref 1–7)
MORPHOLOGY: ABNORMAL
NEGATIVE BASE EXCESS, ART: ABNORMAL (ref 0–2)
NRBC AUTOMATED: 0 PER 100 WBC
O2 DEVICE/FLOW/%: ABNORMAL
PATIENT TEMP: ABNORMAL
PDW BLD-RTO: 20.8 % (ref 11.8–14.4)
PLATELET # BLD: 208 K/UL (ref 138–453)
PLATELET ESTIMATE: ABNORMAL
PMV BLD AUTO: 11.5 FL (ref 8.1–13.5)
POC HCO3: 27.8 MMOL/L (ref 21–28)
POC O2 SATURATION: 98 % (ref 94–98)
POC PCO2 TEMP: ABNORMAL MM HG
POC PCO2: 34.3 MM HG (ref 35–48)
POC PH TEMP: ABNORMAL
POC PH: 7.52 (ref 7.35–7.45)
POC PO2 TEMP: ABNORMAL MM HG
POC PO2: 95.7 MM HG (ref 83–108)
POSITIVE BASE EXCESS, ART: 5 (ref 0–3)
POTASSIUM SERPL-SCNC: 4.6 MMOL/L (ref 3.7–5.3)
PROCALCITONIN: 0.07 NG/ML
RBC # BLD: 4.45 M/UL (ref 4.21–5.77)
RBC # BLD: ABNORMAL 10*6/UL
SAMPLE SITE: ABNORMAL
SEG NEUTROPHILS: 66 % (ref 36–66)
SEGMENTED NEUTROPHILS ABSOLUTE COUNT: 3.82 K/UL (ref 1.8–7.7)
SODIUM BLD-SCNC: 137 MMOL/L (ref 135–144)
SPECIMEN DESCRIPTION: NORMAL
SPECIMEN DESCRIPTION: NORMAL
TCO2 (CALC), ART: 29 MMOL/L (ref 22–29)
WBC # BLD: 5.8 K/UL (ref 3.5–11.3)
WBC # BLD: ABNORMAL 10*3/UL

## 2020-01-04 PROCEDURE — 82947 ASSAY GLUCOSE BLOOD QUANT: CPT

## 2020-01-04 PROCEDURE — 6370000000 HC RX 637 (ALT 250 FOR IP): Performed by: PSYCHIATRY & NEUROLOGY

## 2020-01-04 PROCEDURE — 94770 HC ETCO2 MONITOR DAILY: CPT

## 2020-01-04 PROCEDURE — 6370000000 HC RX 637 (ALT 250 FOR IP): Performed by: STUDENT IN AN ORGANIZED HEALTH CARE EDUCATION/TRAINING PROGRAM

## 2020-01-04 PROCEDURE — 82803 BLOOD GASES ANY COMBINATION: CPT

## 2020-01-04 PROCEDURE — 2500000003 HC RX 250 WO HCPCS: Performed by: NURSE PRACTITIONER

## 2020-01-04 PROCEDURE — 6360000002 HC RX W HCPCS: Performed by: STUDENT IN AN ORGANIZED HEALTH CARE EDUCATION/TRAINING PROGRAM

## 2020-01-04 PROCEDURE — 6360000002 HC RX W HCPCS: Performed by: PSYCHIATRY & NEUROLOGY

## 2020-01-04 PROCEDURE — 2000000003 HC NEURO ICU R&B

## 2020-01-04 PROCEDURE — 2580000003 HC RX 258: Performed by: NURSE PRACTITIONER

## 2020-01-04 PROCEDURE — 6370000000 HC RX 637 (ALT 250 FOR IP): Performed by: NURSE PRACTITIONER

## 2020-01-04 PROCEDURE — 2700000000 HC OXYGEN THERAPY PER DAY

## 2020-01-04 PROCEDURE — 99291 CRITICAL CARE FIRST HOUR: CPT | Performed by: PSYCHIATRY & NEUROLOGY

## 2020-01-04 PROCEDURE — 84145 PROCALCITONIN (PCT): CPT

## 2020-01-04 PROCEDURE — 2580000003 HC RX 258: Performed by: STUDENT IN AN ORGANIZED HEALTH CARE EDUCATION/TRAINING PROGRAM

## 2020-01-04 PROCEDURE — 36600 WITHDRAWAL OF ARTERIAL BLOOD: CPT

## 2020-01-04 PROCEDURE — 2500000003 HC RX 250 WO HCPCS: Performed by: STUDENT IN AN ORGANIZED HEALTH CARE EDUCATION/TRAINING PROGRAM

## 2020-01-04 PROCEDURE — 95718 EEG PHYS/QHP 2-12 HR W/VEEG: CPT | Performed by: PSYCHIATRY & NEUROLOGY

## 2020-01-04 PROCEDURE — 85025 COMPLETE CBC W/AUTO DIFF WBC: CPT

## 2020-01-04 PROCEDURE — 94761 N-INVAS EAR/PLS OXIMETRY MLT: CPT

## 2020-01-04 PROCEDURE — 80048 BASIC METABOLIC PNL TOTAL CA: CPT

## 2020-01-04 PROCEDURE — 94003 VENT MGMT INPAT SUBQ DAY: CPT

## 2020-01-04 PROCEDURE — 36415 COLL VENOUS BLD VENIPUNCTURE: CPT

## 2020-01-04 PROCEDURE — 71045 X-RAY EXAM CHEST 1 VIEW: CPT

## 2020-01-04 PROCEDURE — 83735 ASSAY OF MAGNESIUM: CPT

## 2020-01-04 PROCEDURE — 95714 VEEG EA 12-26 HR UNMNTR: CPT

## 2020-01-04 RX ORDER — MODAFINIL 100 MG/1
100 TABLET ORAL DAILY
Status: DISCONTINUED | OUTPATIENT
Start: 2020-01-04 | End: 2020-01-04

## 2020-01-04 RX ORDER — MODAFINIL 100 MG/1
100 TABLET ORAL 2 TIMES DAILY
Status: DISCONTINUED | OUTPATIENT
Start: 2020-01-04 | End: 2020-01-09

## 2020-01-04 RX ORDER — INSULIN GLARGINE 100 [IU]/ML
25 INJECTION, SOLUTION SUBCUTANEOUS DAILY
Status: DISCONTINUED | OUTPATIENT
Start: 2020-01-05 | End: 2020-01-08

## 2020-01-04 RX ORDER — INSULIN GLARGINE 100 [IU]/ML
3 INJECTION, SOLUTION SUBCUTANEOUS ONCE
Status: COMPLETED | OUTPATIENT
Start: 2020-01-04 | End: 2020-01-04

## 2020-01-04 RX ORDER — FENTANYL CITRATE 50 UG/ML
25 INJECTION, SOLUTION INTRAMUSCULAR; INTRAVENOUS
Status: DISCONTINUED | OUTPATIENT
Start: 2020-01-04 | End: 2020-01-04

## 2020-01-04 RX ORDER — FENTANYL CITRATE 50 UG/ML
50 INJECTION, SOLUTION INTRAMUSCULAR; INTRAVENOUS ONCE
Status: COMPLETED | OUTPATIENT
Start: 2020-01-04 | End: 2020-01-04

## 2020-01-04 RX ADMIN — INSULIN LISPRO 6 UNITS: 100 INJECTION, SOLUTION INTRAVENOUS; SUBCUTANEOUS at 23:56

## 2020-01-04 RX ADMIN — HYDRALAZINE HYDROCHLORIDE 10 MG: 20 INJECTION INTRAMUSCULAR; INTRAVENOUS at 05:05

## 2020-01-04 RX ADMIN — Medication 15 ML: at 20:34

## 2020-01-04 RX ADMIN — CHLORDIAZEPOXIDE HYDROCHLORIDE 25 MG: 25 CAPSULE ORAL at 09:18

## 2020-01-04 RX ADMIN — ROSUVASTATIN CALCIUM 5 MG: 5 TABLET, FILM COATED ORAL at 20:34

## 2020-01-04 RX ADMIN — PROPRANOLOL HYDROCHLORIDE 40 MG: 40 TABLET ORAL at 09:19

## 2020-01-04 RX ADMIN — MODAFINIL 100 MG: 100 TABLET ORAL at 13:17

## 2020-01-04 RX ADMIN — ASPIRIN 324 MG: 81 TABLET, CHEWABLE ORAL at 09:19

## 2020-01-04 RX ADMIN — DOCUSATE SODIUM 100 MG: 50 LIQUID ORAL at 09:20

## 2020-01-04 RX ADMIN — SODIUM CHLORIDE, PRESERVATIVE FREE 10 ML: 5 INJECTION INTRAVENOUS at 09:20

## 2020-01-04 RX ADMIN — FAMOTIDINE 20 MG: 10 INJECTION, SOLUTION INTRAVENOUS at 09:19

## 2020-01-04 RX ADMIN — PROPRANOLOL HYDROCHLORIDE 40 MG: 40 TABLET ORAL at 20:34

## 2020-01-04 RX ADMIN — SODIUM CHLORIDE, PRESERVATIVE FREE 10 ML: 5 INJECTION INTRAVENOUS at 20:47

## 2020-01-04 RX ADMIN — ENOXAPARIN SODIUM 40 MG: 40 INJECTION SUBCUTANEOUS at 09:20

## 2020-01-04 RX ADMIN — CHLORDIAZEPOXIDE HYDROCHLORIDE 25 MG: 25 CAPSULE ORAL at 20:34

## 2020-01-04 RX ADMIN — Medication 15 ML: at 09:20

## 2020-01-04 RX ADMIN — PROPOFOL 15 MCG/KG/MIN: 10 INJECTION, EMULSION INTRAVENOUS at 00:09

## 2020-01-04 RX ADMIN — INSULIN GLARGINE 3 UNITS: 100 INJECTION, SOLUTION SUBCUTANEOUS at 12:04

## 2020-01-04 RX ADMIN — Medication 100 MG: at 09:18

## 2020-01-04 RX ADMIN — SODIUM CHLORIDE, PRESERVATIVE FREE 10 ML: 5 INJECTION INTRAVENOUS at 20:34

## 2020-01-04 RX ADMIN — SODIUM CHLORIDE TAB 1 GM 2 G: 1 TAB at 17:10

## 2020-01-04 RX ADMIN — INSULIN LISPRO 6 UNITS: 100 INJECTION, SOLUTION INTRAVENOUS; SUBCUTANEOUS at 11:27

## 2020-01-04 RX ADMIN — SOTALOL HYDROCHLORIDE 160 MG: 80 TABLET ORAL at 20:34

## 2020-01-04 RX ADMIN — Medication 10 MG: at 04:03

## 2020-01-04 RX ADMIN — FENTANYL CITRATE 50 MCG: 50 INJECTION, SOLUTION INTRAMUSCULAR; INTRAVENOUS at 13:18

## 2020-01-04 RX ADMIN — INSULIN LISPRO 3 UNITS: 100 INJECTION, SOLUTION INTRAVENOUS; SUBCUTANEOUS at 17:10

## 2020-01-04 RX ADMIN — PIPERACILLIN AND TAZOBACTAM 3.38 G: 3; .375 INJECTION, POWDER, FOR SOLUTION INTRAVENOUS at 04:22

## 2020-01-04 RX ADMIN — ACETAMINOPHEN 650 MG: 650 SOLUTION ORAL at 19:44

## 2020-01-04 RX ADMIN — SODIUM CHLORIDE TAB 1 GM 2 G: 1 TAB at 09:19

## 2020-01-04 RX ADMIN — FOLIC ACID 1 MG: 1 TABLET ORAL at 09:19

## 2020-01-04 RX ADMIN — CHLORDIAZEPOXIDE HYDROCHLORIDE 25 MG: 25 CAPSULE ORAL at 13:17

## 2020-01-04 RX ADMIN — SOTALOL HYDROCHLORIDE 160 MG: 80 TABLET ORAL at 09:18

## 2020-01-04 RX ADMIN — FAMOTIDINE 20 MG: 10 INJECTION, SOLUTION INTRAVENOUS at 20:34

## 2020-01-04 RX ADMIN — SODIUM CHLORIDE TAB 1 GM 2 G: 1 TAB at 11:39

## 2020-01-04 RX ADMIN — INSULIN LISPRO 6 UNITS: 100 INJECTION, SOLUTION INTRAVENOUS; SUBCUTANEOUS at 09:10

## 2020-01-04 RX ADMIN — INSULIN GLARGINE 22 UNITS: 100 INJECTION, SOLUTION SUBCUTANEOUS at 10:28

## 2020-01-04 ASSESSMENT — PULMONARY FUNCTION TESTS
PIF_VALUE: 21
PIF_VALUE: 19
PIF_VALUE: 22
PIF_VALUE: 20
PIF_VALUE: 19
PIF_VALUE: 21
PIF_VALUE: 20
PIF_VALUE: 23
PIF_VALUE: 24
PIF_VALUE: 21
PIF_VALUE: 26
PIF_VALUE: 23
PIF_VALUE: 19
PIF_VALUE: 26

## 2020-01-04 ASSESSMENT — PAIN SCALES - GENERAL: PAINLEVEL_OUTOF10: 5

## 2020-01-04 NOTE — PLAN OF CARE
Monitor vital signs q1 and prn. Medicate per orders. Continue to monitor labs as ordered. Intake and output . Will continue to monitor.

## 2020-01-04 NOTE — PLAN OF CARE
Ventilator Bronchodilator assessment    Breath sounds: clear throughout all lung fields. Slightly diminished in lower luing fields  Inspiratory Pressure: 21  Plateau Pressure: 18    Patient assessed at level 1          []    Bronchodilator Assessment    BRONCHODILATOR ASSESSMENT SCORE  Score 0 (Home) 1 2 3 4   Breath Sounds   []  Chronic Ventilator: Patient at baseline [x]  Mild Wheezes/ Clear []  Intermittent wheezes with good air entry []  Bilateral/unilateral wheezing with diminished air entry []  Insp/Exp wheeze and/or poor aeration   Ventilator Pressures   []  Chronic Ventilator [x]  Insp. Pressure less than 25 cm H20 []  Insp. Pressure less than 25 cm H20 []  Insp. Pressure exceeds 25 cm H20 []  Insp.  Pressure exceeds 30 cm H20   Plateau Pressure []  NA   [x]  Plateau Pressure less than 4  []  Plateau Pressure less than or equal to 5 []  Plateau Pressure greater than or equal to 6 []  Plateau Pressure greater than or equal to 8       [employer]  5:23 PM

## 2020-01-04 NOTE — PROGRESS NOTES
whether  bleed is consistent with traumatic versus spontaneous subarachnoid. Stroke specialist has significant concern for severe stenosis, suspects traumatic bleed and that patient is at risk for vasospasm. In addition has intracranial left vertebral artery athero-stenosis. Consensus management strategy is to keep the patient 130-160 for blood pressure goals to avoid right MCA watershed stroke. No aneurysm found on CTA or malformation-critical stenosis of the proximal left vertebral artery, critical stenosis of the proximal right cervical ICA, severe near critical stenosis of the right cavernous ICA, moderate stenosis of the left cavernous ICA. Non con CT head repeat at our emergency facility showed subarachnoid, predominantly in the bilateral sylvian fissures and bilateral cerebral hemisphere sulcal I, acute left parietal convexity subdural hematoma 4 mm in thickness without mass-effect. Hospital Course:   12/27: CT head stable  12/28: Intubated overnight for respiratory distress, started on Cardizem infusion for atrial fibrillation with RVR, weaned off and Sotalol started. Versed for sedation with concern for possible alcohol withdrawal.     12/29: loose foul smelling stool, c. Diff sent and was negative. Off Precdex,had left upper extremity tremor, placed on LTME. Febrile this morning - infectious workup sent. Will hold off on antibiotics with no leukocytosis - if spiked temp again will start Vancomycin and zosyn. 12/30:  On Keppra 500mg q12, LTM EEG shows diffuse slowing and disorganized background in theta frequency suggesting moderate encephalopathy. Before rounds patient became agitated and restless he was started on Precedex drip and Versed drip,He spiked a fever of 102.5 and is started on Vanco and Zosyn      12/31:   off Precedex on Versed running at 4 mcg/hr he had temperature spikes of T-max 102. 1/1: T max 101 at 21:00. Off antibiotics. TCD is normal, no vasospasm.  Underwent spontaneous breathing trial this morning but didn't tolerate it as he became tachypneic. 1/2:  multiple spikes of fever T-max 103.1 at 8 PM.  Continue to be off antibiotics as septic work-up was negative. Still not following commands. Sotalol was increased to 160 twice daily in light of RVR. He had multiple spikes of temperature T-max 103. 1. Chest x-ray showed right lower zone consolidation concerning for aspiration pneumonia and he was started on Zosyn. Sputum sample was attempted but unsuccessful by RT. Patient is more tachypneic off propofol and has been maintained on propofol which was increased to 20 mics per hour. Heart rate is better controlled on sotalol 160 twice daily. 1/3 continues to be intubated on sedation 15 mics of propofol. Not following commands of propofol. Fever spike 101.1 overnight. CPAP trial was attempted unsuccessfully tidal volume decreased from 600 300 RSBI 120. Over the last 24 hours, he remained on 15 mics per kilogram per hour propofol overnight. Propofol was weaned this morning. Better tolerating CPAP trial this morning.      MEDICATIONS:     CURRENT MEDICATIONS:  SCHEDULED MEDICATIONS:   insulin lispro  0-12 Units Subcutaneous TID WC    insulin lispro  0-6 Units Subcutaneous Nightly    docusate  100 mg Oral Daily    senna  5 mL Oral Nightly    propranolol  40 mg Oral BID    insulin glargine  22 Units Subcutaneous Daily    piperacillin-tazobactam  3.375 g Intravenous Q8H    aspirin  324 mg Oral Daily    sotalol  160 mg Oral BID    sodium chloride  2 g Oral TID WC    enoxaparin  40 mg Subcutaneous Daily    chlordiazePOXIDE  25 mg Per NG tube TID    nicotine  1 patch Transdermal Daily    folic acid  1 mg Oral Daily    thiamine  100 mg Oral Daily    sodium chloride flush  10 mL Intravenous BID    famotidine (PEPCID) injection  20 mg Intravenous BID    rosuvastatin  5 mg Oral Nightly    chlorhexidine  15 mL Mouth/Throat BID    vitamin D  50,000 Units Oral Weekly    sodium chloride flush  10 mL Intravenous 2 times per day    sodium chloride flush  10 mL Intravenous 2 times per day     CONTINUOUS INFUSIONS:   dexmedetomidine (PRECEDEX) IV infusion Stopped (20 1305)    propofol 15 mcg/kg/min (20 0009)    dextrose      sodium chloride 50 mL/hr at 20 0137     PRN MEDICATIONS:   ibuprofen, acetaminophen, midazolam, labetalol, metoprolol, hydrALAZINE, glucose, dextrose, glucagon (rDNA), dextrose, magnesium sulfate, sodium chloride flush, sodium chloride flush    VITALS 24 Hours     Temperature Range: Temp: 98.8 °F (37.1 °C) Temp  Av.2 °F (37.9 °C)  Min: 98.8 °F (37.1 °C)  Max: 102.1 °F (38.9 °C)  BP Range: Systolic (66FHP), DXR:124 , Min:71 , HSD:569     Diastolic (86ZDA), NXE:09, Min:41, Max:109    Pulse Range: Pulse  Av.5  Min: 64  Max: 99  Respiration Range: Resp  Av  Min: 14  Max: 17  Current Pulse Ox: SpO2: 98 %  24HR Pulse Ox Range: SpO2  Av.3 %  Min: 97 %  Max: 100 %  Patient Vitals for the past 12 hrs:   BP Temp Pulse SpO2   20 0604 137/72 -- 95 98 %   20 0504 (!) 190/100 -- 88 97 %   20 0412 (!) 166/78 98.8 °F (37.1 °C) 81 100 %   20 0352 -- -- 82 100 %   20 0104 (!) 149/109 -- 83 99 %   20 0017 -- -- 86 100 %   20 0004 132/76 100.1 °F (37.8 °C) 82 99 %   20 2303 126/65 -- 82 98 %   20 113/68 -- 84 99 %   20 136/78 -- 84 99 %   20 (!) 142/83 101 °F (38.3 °C) 88 100 %   20 -- -- 83 100 %   20 (!) 163/78 -- 80 100 %     Estimated body mass index is 29.36 kg/m² as calculated from the following:    Height as of this encounter: 5' 11\" (1.803 m). Weight as of this encounter: 210 lb 8.6 oz (95.5 kg). PHYSICAL EXAM       Physical Exam  HENT:      Head: Normocephalic and atraumatic. Eyes:      General:         Right eye: No foreign body, discharge or hordeolum.          Left eye: No foreign body, discharge or hordeolum. Conjunctiva/sclera:      Right eye: Right conjunctiva is not injected. No chemosis, exudate or hemorrhage. Left eye: Left conjunctiva is injected. Hemorrhage present. No chemosis or exudate. Pupils: Pupils are equal, round, and reactive to light. Cardiovascular:      Rate and Rhythm: Normal rate. Rhythm irregular. Pulmonary:      Breath sounds: Normal breath sounds. Neurological:      Mental Status: He is alert. GCS: GCS eye subscore is 4. GCS verbal subscore is 1. GCS motor subscore is 5. Cranial Nerves: Cranial nerves are intact. Motor: Abnormal muscle tone present. Deep Tendon Reflexes:      Reflex Scores:       Bicep reflexes are 1+ on the right side and 1+ on the left side. Patellar reflexes are 1+ on the right side and 1+ on the left side. Comments:   Visual tracking is present  Not obeying commands   Localizes to pain on all extremities   Equal movement on all extremities   Cogwheel rigidity RUE           INTAKE/OUTPUT & DRAINS   24 HOUR INTAKE/OUTPUT:    Intake/Output Summary (Last 24 hours) at 1/4/2020 0630  Last data filed at 1/4/2020 0600  Gross per 24 hour   Intake 5003 ml   Output 1000 ml   Net 4003 ml       DRAINS:  [x] There are no drains for Neuro Critical Care to monitor at this time.    LABS      Recent Results (from the past 24 hour(s))   Arterial Blood Gas, POC    Collection Time: 01/03/20  8:49 AM   Result Value Ref Range    POC pH 7.594 (H) 7.350 - 7.450    POC pCO2 26.2 (L) 35.0 - 48.0 mm Hg    POC PO2 126.6 (H) 83.0 - 108.0 mm Hg    POC HCO3 25.3 21.0 - 28.0 mmol/L    TCO2 (calc), Art 26 22.0 - 29.0 mmol/L    Negative Base Excess, Art NOT REPORTED 0.0 - 2.0    Positive Base Excess, Art 4 (H) 0.0 - 3.0    POC O2 SAT 99 (H) 94.0 - 98.0 %    O2 Device/Flow/% Adult Ventilator     Ozzie Test POSITIVE     Sample Site Right Radial Artery     Mode PRVC     FIO2 30.0     Pt Temp NOT REPORTED     POC pH Temp NOT REPORTED     POC pCO2 Temp NOT Absolute Immature Granulocyte 0.00 0.00 - 0.30 k/uL    Segs Absolute 3.82 1.8 - 7.7 k/uL    Absolute Lymph # 1.22 1.0 - 4.8 k/uL    Absolute Mono # 0.58 0.1 - 0.8 k/uL    Absolute Eos # 0.12 0.0 - 0.4 k/uL    Basophils Absolute 0.06 0.0 - 0.2 k/uL    Morphology MICROCYTOSIS PRESENT     Morphology ANISOCYTOSIS PRESENT     Morphology 1+ ELLIPTOCYTES    BASIC METABOLIC PANEL    Collection Time: 01/04/20  4:10 AM   Result Value Ref Range    Glucose 252 (H) 70 - 99 mg/dL    BUN 16 6 - 20 mg/dL    CREATININE 0.61 (L) 0.70 - 1.20 mg/dL    Bun/Cre Ratio NOT REPORTED 9 - 20    Calcium 9.0 8.6 - 10.4 mg/dL    Sodium 137 135 - 144 mmol/L    Potassium 4.6 3.7 - 5.3 mmol/L    Chloride 101 98 - 107 mmol/L    CO2 22 20 - 31 mmol/L    Anion Gap 14 9 - 17 mmol/L    GFR Non-African American >60 >60 mL/min    GFR African American >60 >60 mL/min    GFR Comment          GFR Staging NOT REPORTED    Procalcitonin    Collection Time: 01/04/20  4:10 AM   Result Value Ref Range    Procalcitonin 0.07 <0.09 ng/mL           RADIOLOGY   Xr Radius Ulna Left (2 Views)    Result Date: 12/26/2019  EXAMINATION: TWO XRAY VIEWS OF THE LEFT FOREARM 12/26/2019 1:37 am COMPARISON: None. HISTORY: ORDERING SYSTEM PROVIDED HISTORY: Trauma/Fracture TECHNOLOGIST PROVIDED HISTORY: Trauma/Fracture Reason for Exam: fall/trauma Acuity: Acute FINDINGS: Fiberglass splint obscures osseous details of the distal forearm. The left radius and left are intact. No acute fracture or dislocation in the left forearm. Partial visualization of 1st proximal metacarpal fracture. Osteopenia. No acute osseous abnormality left radius or ulna. Xr Radius Ulna Right (2 Views)    Result Date: 12/26/2019  EXAMINATION: TWO XRAY VIEWS OF THE RIGHT FOREARM 12/26/2019 1:37 am COMPARISON: None. HISTORY: ORDERING SYSTEM PROVIDED HISTORY: Trauma/Fracture TECHNOLOGIST PROVIDED HISTORY: Trauma/Fracture Reason for Exam: fall/trauma Acuity: Acute FINDINGS: The bones are osteopenic.   The right radius and right ulna are intact. No acute fracture or dislocation in the right forearm. Along the ulnar aspect of the proximal forearm, there is soft tissue laceration. Antecubital fossa IV catheter. Osteopenia. No acute osseous abnormality right forearm. Soft tissue laceration ulnar aspect of the proximal forearm. Xr Wrist Left (min 3 Views)    Result Date: 12/25/2019  EXAMINATION: 3 XRAY VIEWS OF THE LEFT WRIST 12/25/2019 9:45 pm COMPARISON: None. HISTORY: ORDERING SYSTEM PROVIDED HISTORY: fall TECHNOLOGIST PROVIDED HISTORY: fall Acuity: Acute Type of Exam: Initial FINDINGS: Overlying splint partially obscures osseous details. Acute traumatic closed proximal 1st metacarpal fracture. The carpal bones are intact. Carpal alignment is maintained soft tissues of the wrist are unremarkable. Acute traumatic closed 1st proximal metacarpal fracture. No acute osseous abnormality in the wrist.     Xr Wrist Right (min 3 Views)    Result Date: 12/25/2019  EXAMINATION: 3 XRAY VIEWS OF THE RIGHT WRIST 12/25/2019 9:45 pm COMPARISON: None. HISTORY: ORDERING SYSTEM PROVIDED HISTORY: fall TECHNOLOGIST PROVIDED HISTORY: fall Acuity: Acute Type of Exam: Initial FINDINGS: Overlying splint obscures osseous details. Carpal bones are intact. Advanced radiocarpal joint osteoarthritis with joint space narrowing and subchondral sclerosis. Widening of the scapholunate joint. Posttraumatic deformity of the ulnar styloid process. No acute fracture or dislocation. No acute osseous abnormality the right wrist. Widening of the scapholunate interval suggesting ligamentous injury. Radiocarpal joint osteoarthritis.      Xr Hand Left (min 3 Views)    Result Date: 12/26/2019  EXAMINATION: THREE XRAY VIEWS OF THE LEFT HAND 12/26/2019 1:47 am COMPARISON: 12/25/2019 2353 hours HISTORY: ORDERING SYSTEM PROVIDED HISTORY: post splint TECHNOLOGIST PROVIDED HISTORY: post splint Reason for Exam: fall trauma/post splint Acuity: soft tissues. Stable head CT demonstrating extensive bilateral subarachnoid hemorrhage as well as intraventricular hemorrhage and left parietal convexity subdural hemorrhage. No new intracranial hemorrhage. No hydrocephalus. Ct Head Wo Contrast    Result Date: 12/26/2019  EXAMINATION: CT OF THE HEAD WITHOUT CONTRAST  12/26/2019 4:51 am TECHNIQUE: CT of the head was performed without the administration of intravenous contrast. Dose modulation, iterative reconstruction, and/or weight based adjustment of the mA/kV was utilized to reduce the radiation dose to as low as reasonably achievable. COMPARISON: 12/25/2019 HISTORY: ORDERING SYSTEM PROVIDED HISTORY: worsening mentation TECHNOLOGIST PROVIDED HISTORY: worsening mentation FINDINGS: BRAIN/VENTRICLES: Diffuse subarachnoid hemorrhage within the cerebral sulci, sylvian fissures, anterior interhemispheric fissure, and prepontine cistern, similar in appearance to prior study. There is intraventricular hemorrhage layering in the occipital horns. No hydrocephalus. No mass effect or midline shift. Left parietal convexity subdural hemorrhage measures up to 5 mm in thickness. The basal cisterns are patent. The gray-white matter differentiation is maintained. ORBITS: The visualized portion of the orbits demonstrate no acute abnormality. SINUSES: The visualized paranasal sinuses and mastoid air cells demonstrate no acute abnormality. SOFT TISSUES/SKULL:  No acute abnormality of the visualized skull or soft tissues. 1. Stable head CT demonstrating diffuse moderate volume subarachnoid hemorrhage and left parietal convexity subdural hemorrhage measuring up to 5 mm in thickness. 2. No mass effect or midline shift. No hydrocephalus. The findings were sent to the Radiology Results [address] at 5:48 am on 12/26/2019to be communicated to a licensed caregiver.      Ct Head Wo Contrast    Result Date: 12/26/2019  EXAMINATION: CT OF THE HEAD WITHOUT CONTRAST 12/25/2019 9:04 pm TECHNIQUE: CT of the head was performed without the administration of intravenous contrast. Dose modulation, iterative reconstruction, and/or weight based adjustment of the mA/kV was utilized to reduce the radiation dose to as low as reasonably achievable. COMPARISON: None HISTORY: ORDERING SYSTEM PROVIDED HISTORY: trauma TECHNOLOGIST PROVIDED HISTORY: trauma Multiple falls. On Eliquis. Subarachnoid hemorrhage on CT head performed at outside institution. FINDINGS: BRAIN/VENTRICLES: There is moderate subarachnoid hemorrhage in the bilateral sylvian fissures and bilateral frontal, temporal, and parietal lobe sulci. Additional subarachnoid hemorrhage is noted in the interhemispheric fissure and prepontine cistern. Gray-white matter differentiation is grossly maintained without CT evidence of acute, territorial infarct. Acute, left parietal convexity subdural hematoma measures up to 4 mm in thickness. No associated mass effect. No parenchymal hemorrhage. There is no hydrocephalus or midline shift. Basal cisterns are patent. ORBITS: The visualized portion of the orbits demonstrate no acute abnormality. SINUSES: The visualized paranasal sinuses and mastoid air cells demonstrate no acute abnormality. SOFT TISSUES/SKULL:  No acute abnormality of the visualized skull or soft tissues. Subarachnoid hemorrhage, predominantly in the bilateral sylvian fissures and bilateral cerebral hemisphere sulci. Acute left parietal convexity subdural hematoma measures up to 4 mm in thickness. No associated mass effect. No midline shift or evidence of intracranial herniation. Findings were discussed with Elizabeth Signs at 9:32 pm on 12/25/2019.      Ct Cervical Spine Wo Contrast    Result Date: 12/26/2019  EXAMINATION: CT OF THE CERVICAL SPINE WITHOUT CONTRAST 12/25/2019 9:04 pm TECHNIQUE: CT of the cervical spine was performed without the administration of intravenous contrast. Multiplanar reformatted images FINDINGS: CTA CHEST: Stable cardiomegaly. No pericardial effusion. No mediastinal hematoma or pneumomediastinum. No enlarged mediastinal or hilar lymph nodes. Main pulmonary artery is dilated, measuring up to 4 cm, suggesting pulmonary hypertension. Calcified and noncalcified atherosclerotic plaque of the thoracic aorta. Incidentally noted 4 vessel aortic arch with separate arch origin of the left vertebral artery. Ectatic ascending thoracic aorta measures up to 4 cm in diameter. No acute aortic abnormality. Central airways are clear. No pleural effusion or pneumothorax. No pulmonary contusion or pulmonary laceration. Mild bilateral lower lobe dependent atelectatic changes. Healing anterolateral left 6th rib fracture. No acute displaced rib fracture or chest wall hematoma. CTA ABDOMEN: No acute traumatic abnormality of the liver, spleen, pancreas, kidneys, or adrenal glands. Normal gallbladder. Stomach, small bowel, and colon are normal in caliber without evidence of obstruction. Normal appendix. Sigmoid diverticulosis without diverticulitis. Calcified and noncalcified aortoiliac atherosclerotic calcification. Abdominal aorta is normal in caliber. No free fluid in the abdomen. CTA PELVIS: Urinary bladder and pelvic organs are normal.  No free fluid in the pelvis. Bilateral common iliac stents are in position. THORACIC/LUMBAR SPINE: BONES/ALIGNMENT: Normal alignment of the thoracic and lumbar spine. Vertebral body heights are maintained. No acute fracture. DEGENERATIVE CHANGES: Multilevel disc narrowing and endplate osteophyte formation. Mild multilevel facet joint degenerative changes. No high-grade, bony spinal canal stenosis. SOFT TISSUES: Paraspinal soft tissues are normal.     No acute traumatic abnormality of the chest, abdomen, or pelvis. Remote, healing anterolateral left 6th rib fracture. No acute osseous abnormality of the thoracic or lumbar spine.      Ct Lumbar Spine Wo Contrast    Result Date: 12/26/2019  EXAMINATION: CT OF THE THORACIC SPINE WITHOUT CONTRAST; CT OF THE LUMBAR SPINE WITHOUT CONTRAST; CT OF THE CHEST, ABDOMEN, AND PELVIS WITH CONTRAST, 12/25/2019 9:04 pm; 12/25/2019 9:05 pm TECHNIQUE: CT of the thoracic and lumbar spine was performed without the administration of intravenous contrast.  CT of the chest, abdomen and pelvis was performed with the administration of intravenous contrast. Multiplanar reformatted images are provided for review. Dose modulation, iterative reconstruction, and/or weight based adjustment of the mA/kV was utilized to reduce the radiation dose to as low as reasonably achievable. COMPARISON: None HISTORY: ORDERING SYSTEM PROVIDED HISTORY: trauma TECHNOLOGIST PROVIDED HISTORY: trauma Reason for Exam: fall from standing Acuity: Acute Type of Exam: Initial; ORDERING SYSTEM PROVIDED HISTORY: trauma TECHNOLOGIST PROVIDED HISTORY: trauma Reason for Exam: fall from standing Acuity: Acute Type of Exam: Initial; ORDERING SYSTEM PROVIDED HISTORY: trauma TECHNOLOGIST PROVIDED HISTORY: trauma Reason for Exam: fall from standing Acuity: Acute Type of Exam: Initial FINDINGS: CTA CHEST: Stable cardiomegaly. No pericardial effusion. No mediastinal hematoma or pneumomediastinum. No enlarged mediastinal or hilar lymph nodes. Main pulmonary artery is dilated, measuring up to 4 cm, suggesting pulmonary hypertension. Calcified and noncalcified atherosclerotic plaque of the thoracic aorta. Incidentally noted 4 vessel aortic arch with separate arch origin of the left vertebral artery. Ectatic ascending thoracic aorta measures up to 4 cm in diameter. No acute aortic abnormality. Central airways are clear. No pleural effusion or pneumothorax. No pulmonary contusion or pulmonary laceration. Mild bilateral lower lobe dependent atelectatic changes. Healing anterolateral left 6th rib fracture. No acute displaced rib fracture or chest wall hematoma.  CTA ABDOMEN: No acute traumatic subtraction angiography images were obtained in biplane projections of the right cervical carotid artery. Interpretation:The right common carotid artery injection demonstrates normal antegrade flow into the external and internal carotid arteries with normal filling of the external carotid artery branches. Course and caliber of the cervical portion of the right internal carotid artery revealed significant proximal right ICA stenosis/string sign measuring approximately 90-99% per NASCET criteria. Flow across the high-grade stenosis was delayed with earlier filling noted of the distal supraclinoid internal carotid artery through retrograde opacification of the right ophthalmic artery from the internal maxillary artery. Further inspection demonstrates no evidence of dissection, aneurysm. Right CCA technique: The right internal carotid artery run was performed from the common carotid artery due to severe stenosis. Digital subtraction angiography of the intracranial right internal carotid circulation was performed in frontal, and lateral projections. Interpretation:There is slow antegrade filling of the distal internal carotid artery from the cervical internal carotid artery. Noted retrograde Filling of the right ophthalmic artery from the right internal maxillary artery branches with slow filling anterior cerebral artery, middle cerebral artery and the distal branches due to previously noted critical proximal cervical ICA stenosis. Inspection of the remaining right internal carotid circulation revealed no other evidence of cerebral aneurysm, arteriovenous malformation. There is severe stenosis noted in the petrous/cavernous and supraclinoid right ICA noted with diminutive appearance. Capillary and venous phase images were also unremarkable, with no evidence of veno-occlusive disease. Left CCA technique:  The left common carotid artery was selectively catheterized under fluoroscopic guidance and digital subtraction or arterial stenosis. There is noted opacification of the distal right middle cerebral artery parieto-occipital territory from pial collaterals arising off the right posterior cerebral artery. Capillary and venous phase images were otherwise unremarkable. Right CFA technique: A right common femoral artery angiogram was performed and demonstrated arterial catheterization proximal to the bifurcation. There was no evidence of dissection or occlusion within the right common femoral artery. There is internal iliac artery stent noted. A 5F Vascade closure device was used to establish hemostasis at the right common femoral artery access site. No immediate complications were experienced. Patient was re-examined after the procedure with no change noted in their neurologic examination, with distal pulses present. The patient was subsequently discharged from the neurointerventional suite. Impression: 1. Critical right ICA stenosis with a string sign is noted measuring approximately 90-99% per NASCET criteria. Noted retrograde Filling of the right ophthalmic artery from the right internal maxillary artery branches with slow filling of the anterior cerebral artery, middle cerebral artery and the distal branches in addition to distal right mca vascular supply from the right pca due to previously noted critical proximal cervical ICA stenosis. 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 3.No evidence of clear discrete aneurysm, arteriovenous malformation, arterial dissection, or veno-occlusive disease. 4. 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 Mario Chahal dictated this invasive procedure. Dr. Emani Peña was present for all procedural and imaging components of this case. Examination was reviewed and reported findings confirmed and edited by Dr. Emani Peña. Dr. Emani Peña supervised and interpreted this procedure.  Yudelka Thornton MD Final report electronically signed by Waldo Angelo M.D. on 12/27/2019 4:00 PM    Cta Head Neck W Contrast    Result Date: 12/25/2019  EXAMINATION: CTA OF THE HEAD AND NECK WITH CONTRAST 12/25/2019 9:07 pm: TECHNIQUE: CTA of the head and neck was performed with the administration of intravenous contrast. Multiplanar reformatted images are provided for review. MIP images are provided for review. Stenosis of the internal carotid arteries measured using NASCET criteria. Dose modulation, iterative reconstruction, and/or weight based adjustment of the mA/kV was utilized to reduce the radiation dose to as low as reasonably achievable. COMPARISON: None. HISTORY: ORDERING SYSTEM PROVIDED HISTORY: sah TECHNOLOGIST PROVIDED HISTORY: sah Reason for Exam: SAH after trauma Acuity: Acute Type of Exam: Initial FINDINGS: CTA NECK: AORTIC ARCH/ARCH VESSELS: There is a critical stenosis of the proximal left vertebral artery with moderate and severe stenoses of the V2 segment. There is a severe stenosis at the origin of the right vertebral artery. 66% stenosis of the left subclavian artery is noted. CAROTID ARTERIES: There is 25% stenosis of the proximal left common carotid artery and 50% stenosis of the mid and distal segments. The cervical portion of the left internal carotid artery is normal in course and caliber. There is 20% stenosis of the right common carotid artery. A critical stenosis of the proximal right cervical ICA is noted with attenuated flow seen distally. VERTEBRAL ARTERIES: No dissection, arterial injury, or significant stenosis. SOFT TISSUES: There is a thickened appearance to the mid esophagus. A small amount of layering debris is present within the trachea, likely reflecting pooled secretion. BONES: No acute osseous abnormality. CTA HEAD: ANTERIOR CIRCULATION: There is severely attenuated flow within the petrous and cavernous segments of the right internal carotid artery with severe, near critical stenoses of the cavernous ICA.   There is also attenuated flow within the contralateral ICA, to a lesser degree, with moderate stenoses of the left cavernous ICA. There are moderate and severe multifocal stenoses of the MCA branch vessels, worse on the right-hand side. The right anterior cerebral artery is attenuated compared to the left. POSTERIOR CIRCULATION: There are mild-to-moderate stenoses of the distal right vertebral artery and a moderate to severe stenosis of the distal left. The basilar artery and PCAs appear to be slightly attenuated. OTHER: No dural venous sinus thrombosis on this non-dedicated study. BRAIN: See separately dictated noncontrast head CT report. No cerebral aneurysm or vascular malformation identified. Significant narrowing of the intracranial vessels, likely due to a combination of atherosclerotic disease as well as vasospasm related to subarachnoid hemorrhage. Critical stenosis of the proximal left vertebral artery. Critical stenosis of the proximal right cervical ICA with attenuated flow seen distally. Severe near critical stenoses of the right cavernous ICA. Moderate stenoses of the contralateral cavernous ICA. Ct Chest Abdomen Pelvis W Contrast    Result Date: 12/26/2019  EXAMINATION: CT OF THE THORACIC SPINE WITHOUT CONTRAST; CT OF THE LUMBAR SPINE WITHOUT CONTRAST; CT OF THE CHEST, ABDOMEN, AND PELVIS WITH CONTRAST, 12/25/2019 9:04 pm; 12/25/2019 9:05 pm TECHNIQUE: CT of the thoracic and lumbar spine was performed without the administration of intravenous contrast.  CT of the chest, abdomen and pelvis was performed with the administration of intravenous contrast. Multiplanar reformatted images are provided for review. Dose modulation, iterative reconstruction, and/or weight based adjustment of the mA/kV was utilized to reduce the radiation dose to as low as reasonably achievable.  COMPARISON: None HISTORY: ORDERING SYSTEM PROVIDED HISTORY: trauma TECHNOLOGIST PROVIDED HISTORY: trauma Reason for Exam: fall from standing Acuity: Acute Type of Exam: Initial; ORDERING SYSTEM PROVIDED HISTORY: trauma TECHNOLOGIST PROVIDED HISTORY: trauma Reason for Exam: fall from standing Acuity: Acute Type of Exam: Initial; ORDERING SYSTEM PROVIDED HISTORY: trauma TECHNOLOGIST PROVIDED HISTORY: trauma Reason for Exam: fall from standing Acuity: Acute Type of Exam: Initial FINDINGS: CTA CHEST: Stable cardiomegaly. No pericardial effusion. No mediastinal hematoma or pneumomediastinum. No enlarged mediastinal or hilar lymph nodes. Main pulmonary artery is dilated, measuring up to 4 cm, suggesting pulmonary hypertension. Calcified and noncalcified atherosclerotic plaque of the thoracic aorta. Incidentally noted 4 vessel aortic arch with separate arch origin of the left vertebral artery. Ectatic ascending thoracic aorta measures up to 4 cm in diameter. No acute aortic abnormality. Central airways are clear. No pleural effusion or pneumothorax. No pulmonary contusion or pulmonary laceration. Mild bilateral lower lobe dependent atelectatic changes. Healing anterolateral left 6th rib fracture. No acute displaced rib fracture or chest wall hematoma. CTA ABDOMEN: No acute traumatic abnormality of the liver, spleen, pancreas, kidneys, or adrenal glands. Normal gallbladder. Stomach, small bowel, and colon are normal in caliber without evidence of obstruction. Normal appendix. Sigmoid diverticulosis without diverticulitis. Calcified and noncalcified aortoiliac atherosclerotic calcification. Abdominal aorta is normal in caliber. No free fluid in the abdomen. CTA PELVIS: Urinary bladder and pelvic organs are normal.  No free fluid in the pelvis. Bilateral common iliac stents are in position. THORACIC/LUMBAR SPINE: BONES/ALIGNMENT: Normal alignment of the thoracic and lumbar spine. Vertebral body heights are maintained. No acute fracture. DEGENERATIVE CHANGES: Multilevel disc narrowing and endplate osteophyte formation.   Mild multilevel facet joint degenerative changes. No high-grade, bony spinal canal stenosis. SOFT TISSUES: Paraspinal soft tissues are normal.     No acute traumatic abnormality of the chest, abdomen, or pelvis. Remote, healing anterolateral left 6th rib fracture. No acute osseous abnormality of the thoracic or lumbar spine. Xr Hip 2-3 Vw W Pelvis Right    Result Date: 12/26/2019  EXAMINATION: ONE XRAY VIEW OF THE PELVIS AND TWO XRAY VIEWS RIGHT HIP; TWO XRAY VIEWS OF THE LEFT HIP 12/26/2019 1:37 am COMPARISON: None. HISTORY: ORDERING SYSTEM PROVIDED HISTORY: Trauma/Fracture TECHNOLOGIST PROVIDED HISTORY: AP and cross-table lateral of the hip please, thank you Trauma/Fracture Reason for Exam: fall/trauma Acuity: Acute FINDINGS: The bones are osteopenic. The femoral heads located bilateral.  No acute fracture or dislocation pelvis or hips bilaterally. SI joints are grossly intact. Pubic symphysis is intact. The sacrum is partially obscured by contrast in the bladder. No acute osseous abnormality in the pelvis or hips bilaterally. Osteopenia. 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 SAH induced vasospasm. LTME repeat showed diffuse encephalopathy no epileptiform discharges.   Continue aspirin 324 mg daily  Critical right ICA stenosis   Systolic blood pressure goal 140-180  Modafinil 100 mg BID   Hold Eliquis for now     Cardio  On sotalol 160 mg twice daily  Heart rate is better controlled overnight with no RVR  EF 45%    Pulm  ABG this morning 7.51, 34, 98, 22  CPAP trial yesterday the patient became tachypneic to 42 breaths/min and tidal volume went down to 300 with RSBI 120, which indicates that the patient has high risk of failed extubation  Respiratory alkalosis due to hyperventilation due to delirium tremens  PRVC 14,

## 2020-01-04 NOTE — PLAN OF CARE
Patient remained safe and free from falls and injury throughout shift. Off sedation. Remains in restraints and intubated.

## 2020-01-05 LAB
ABSOLUTE EOS #: 0.17 K/UL (ref 0–0.44)
ABSOLUTE IMMATURE GRANULOCYTE: 0 K/UL (ref 0–0.3)
ABSOLUTE LYMPH #: 0.97 K/UL (ref 1.1–3.7)
ABSOLUTE MONO #: 0.4 K/UL (ref 0.1–1.2)
ANION GAP SERPL CALCULATED.3IONS-SCNC: 11 MMOL/L (ref 9–17)
BASOPHILS # BLD: 1 % (ref 0–2)
BASOPHILS ABSOLUTE: 0.06 K/UL (ref 0–0.2)
BUN BLDV-MCNC: 16 MG/DL (ref 6–20)
BUN/CREAT BLD: ABNORMAL (ref 9–20)
CALCIUM SERPL-MCNC: 8.6 MG/DL (ref 8.6–10.4)
CHLORIDE BLD-SCNC: 100 MMOL/L (ref 98–107)
CO2: 24 MMOL/L (ref 20–31)
CREAT SERPL-MCNC: 0.43 MG/DL (ref 0.7–1.2)
CULTURE: NORMAL
CULTURE: NORMAL
DIFFERENTIAL TYPE: ABNORMAL
EOSINOPHILS RELATIVE PERCENT: 3 % (ref 1–4)
GFR AFRICAN AMERICAN: >60 ML/MIN
GFR NON-AFRICAN AMERICAN: >60 ML/MIN
GFR SERPL CREATININE-BSD FRML MDRD: ABNORMAL ML/MIN/{1.73_M2}
GFR SERPL CREATININE-BSD FRML MDRD: ABNORMAL ML/MIN/{1.73_M2}
GLUCOSE BLD-MCNC: 171 MG/DL (ref 75–110)
GLUCOSE BLD-MCNC: 172 MG/DL (ref 75–110)
GLUCOSE BLD-MCNC: 187 MG/DL (ref 75–110)
GLUCOSE BLD-MCNC: 205 MG/DL (ref 75–110)
GLUCOSE BLD-MCNC: 227 MG/DL (ref 75–110)
GLUCOSE BLD-MCNC: 240 MG/DL (ref 75–110)
GLUCOSE BLD-MCNC: 258 MG/DL (ref 70–99)
HCT VFR BLD CALC: 34.7 % (ref 40.7–50.3)
HEMOGLOBIN: 10.2 G/DL (ref 13–17)
IMMATURE GRANULOCYTES: 0 %
LYMPHOCYTES # BLD: 17 % (ref 24–43)
Lab: NORMAL
Lab: NORMAL
MAGNESIUM: 1.8 MG/DL (ref 1.6–2.6)
MCH RBC QN AUTO: 22.9 PG (ref 25.2–33.5)
MCHC RBC AUTO-ENTMCNC: 29.4 G/DL (ref 28.4–34.8)
MCV RBC AUTO: 77.8 FL (ref 82.6–102.9)
MONOCYTES # BLD: 7 % (ref 3–12)
MORPHOLOGY: ABNORMAL
NRBC AUTOMATED: 0 PER 100 WBC
PDW BLD-RTO: 20.9 % (ref 11.8–14.4)
PLATELET # BLD: 263 K/UL (ref 138–453)
PLATELET ESTIMATE: ABNORMAL
PMV BLD AUTO: 11.6 FL (ref 8.1–13.5)
POTASSIUM SERPL-SCNC: 4.3 MMOL/L (ref 3.7–5.3)
PROCALCITONIN: 0.07 NG/ML
RBC # BLD: 4.46 M/UL (ref 4.21–5.77)
RBC # BLD: ABNORMAL 10*6/UL
SEG NEUTROPHILS: 72 % (ref 36–65)
SEGMENTED NEUTROPHILS ABSOLUTE COUNT: 4.1 K/UL (ref 1.5–8.1)
SODIUM BLD-SCNC: 135 MMOL/L (ref 135–144)
SPECIMEN DESCRIPTION: NORMAL
SPECIMEN DESCRIPTION: NORMAL
TROPONIN INTERP: ABNORMAL
TROPONIN T: ABNORMAL NG/ML
TROPONIN, HIGH SENSITIVITY: 24 NG/L (ref 0–22)
WBC # BLD: 5.7 K/UL (ref 3.5–11.3)
WBC # BLD: ABNORMAL 10*3/UL

## 2020-01-05 PROCEDURE — 36415 COLL VENOUS BLD VENIPUNCTURE: CPT

## 2020-01-05 PROCEDURE — 99291 CRITICAL CARE FIRST HOUR: CPT | Performed by: PSYCHIATRY & NEUROLOGY

## 2020-01-05 PROCEDURE — 2500000003 HC RX 250 WO HCPCS: Performed by: NURSE PRACTITIONER

## 2020-01-05 PROCEDURE — 85025 COMPLETE CBC W/AUTO DIFF WBC: CPT

## 2020-01-05 PROCEDURE — 95718 EEG PHYS/QHP 2-12 HR W/VEEG: CPT | Performed by: PSYCHIATRY & NEUROLOGY

## 2020-01-05 PROCEDURE — 6370000000 HC RX 637 (ALT 250 FOR IP): Performed by: STUDENT IN AN ORGANIZED HEALTH CARE EDUCATION/TRAINING PROGRAM

## 2020-01-05 PROCEDURE — 93005 ELECTROCARDIOGRAM TRACING: CPT | Performed by: STUDENT IN AN ORGANIZED HEALTH CARE EDUCATION/TRAINING PROGRAM

## 2020-01-05 PROCEDURE — 6370000000 HC RX 637 (ALT 250 FOR IP): Performed by: NURSE PRACTITIONER

## 2020-01-05 PROCEDURE — 6370000000 HC RX 637 (ALT 250 FOR IP): Performed by: PSYCHIATRY & NEUROLOGY

## 2020-01-05 PROCEDURE — 2500000003 HC RX 250 WO HCPCS: Performed by: STUDENT IN AN ORGANIZED HEALTH CARE EDUCATION/TRAINING PROGRAM

## 2020-01-05 PROCEDURE — 6360000002 HC RX W HCPCS: Performed by: STUDENT IN AN ORGANIZED HEALTH CARE EDUCATION/TRAINING PROGRAM

## 2020-01-05 PROCEDURE — 84484 ASSAY OF TROPONIN QUANT: CPT

## 2020-01-05 PROCEDURE — 2000000003 HC NEURO ICU R&B

## 2020-01-05 PROCEDURE — 2700000000 HC OXYGEN THERAPY PER DAY

## 2020-01-05 PROCEDURE — 94003 VENT MGMT INPAT SUBQ DAY: CPT

## 2020-01-05 PROCEDURE — 6360000002 HC RX W HCPCS: Performed by: NURSE PRACTITIONER

## 2020-01-05 PROCEDURE — 2580000003 HC RX 258: Performed by: NURSE PRACTITIONER

## 2020-01-05 PROCEDURE — 94770 HC ETCO2 MONITOR DAILY: CPT

## 2020-01-05 PROCEDURE — 83735 ASSAY OF MAGNESIUM: CPT

## 2020-01-05 PROCEDURE — 84145 PROCALCITONIN (PCT): CPT

## 2020-01-05 PROCEDURE — 80048 BASIC METABOLIC PNL TOTAL CA: CPT

## 2020-01-05 PROCEDURE — 2580000003 HC RX 258: Performed by: STUDENT IN AN ORGANIZED HEALTH CARE EDUCATION/TRAINING PROGRAM

## 2020-01-05 PROCEDURE — 95714 VEEG EA 12-26 HR UNMNTR: CPT

## 2020-01-05 PROCEDURE — 94761 N-INVAS EAR/PLS OXIMETRY MLT: CPT

## 2020-01-05 PROCEDURE — 6360000002 HC RX W HCPCS: Performed by: PSYCHIATRY & NEUROLOGY

## 2020-01-05 RX ORDER — CHLORDIAZEPOXIDE HYDROCHLORIDE 5 MG/1
20 CAPSULE, GELATIN COATED ORAL 3 TIMES DAILY
Status: DISCONTINUED | OUTPATIENT
Start: 2020-01-05 | End: 2020-01-06

## 2020-01-05 RX ORDER — SODIUM CHLORIDE 9 MG/ML
INJECTION, SOLUTION INTRAVENOUS CONTINUOUS
Status: DISCONTINUED | OUTPATIENT
Start: 2020-01-06 | End: 2020-01-27

## 2020-01-05 RX ADMIN — INSULIN LISPRO 3 UNITS: 100 INJECTION, SOLUTION INTRAVENOUS; SUBCUTANEOUS at 17:07

## 2020-01-05 RX ADMIN — ROSUVASTATIN CALCIUM 5 MG: 5 TABLET, FILM COATED ORAL at 21:02

## 2020-01-05 RX ADMIN — PROPRANOLOL HYDROCHLORIDE 40 MG: 40 TABLET ORAL at 21:02

## 2020-01-05 RX ADMIN — SODIUM CHLORIDE, PRESERVATIVE FREE 10 ML: 5 INJECTION INTRAVENOUS at 21:09

## 2020-01-05 RX ADMIN — CHLORDIAZEPOXIDE HYDROCHLORIDE 25 MG: 25 CAPSULE ORAL at 08:33

## 2020-01-05 RX ADMIN — HYDRALAZINE HYDROCHLORIDE 10 MG: 20 INJECTION INTRAMUSCULAR; INTRAVENOUS at 10:13

## 2020-01-05 RX ADMIN — SODIUM CHLORIDE TAB 1 GM 2 G: 1 TAB at 08:33

## 2020-01-05 RX ADMIN — SENNOSIDES 8.8 MG: 8.8 LIQUID ORAL at 21:03

## 2020-01-05 RX ADMIN — CHLORDIAZEPOXIDE HYDROCHLORIDE 20 MG: 5 CAPSULE ORAL at 21:02

## 2020-01-05 RX ADMIN — ACETAMINOPHEN 650 MG: 650 SOLUTION ORAL at 03:39

## 2020-01-05 RX ADMIN — SODIUM CHLORIDE, PRESERVATIVE FREE 10 ML: 5 INJECTION INTRAVENOUS at 08:35

## 2020-01-05 RX ADMIN — Medication 15 ML: at 08:30

## 2020-01-05 RX ADMIN — INSULIN LISPRO 6 UNITS: 100 INJECTION, SOLUTION INTRAVENOUS; SUBCUTANEOUS at 05:38

## 2020-01-05 RX ADMIN — Medication 100 MG: at 08:35

## 2020-01-05 RX ADMIN — SOTALOL HYDROCHLORIDE 160 MG: 80 TABLET ORAL at 21:02

## 2020-01-05 RX ADMIN — Medication 10 MG: at 08:27

## 2020-01-05 RX ADMIN — CHLORDIAZEPOXIDE HYDROCHLORIDE 20 MG: 5 CAPSULE ORAL at 13:11

## 2020-01-05 RX ADMIN — Medication 15 ML: at 21:02

## 2020-01-05 RX ADMIN — MODAFINIL 100 MG: 100 TABLET ORAL at 13:11

## 2020-01-05 RX ADMIN — MODAFINIL 100 MG: 100 TABLET ORAL at 08:33

## 2020-01-05 RX ADMIN — INSULIN LISPRO 3 UNITS: 100 INJECTION, SOLUTION INTRAVENOUS; SUBCUTANEOUS at 12:39

## 2020-01-05 RX ADMIN — PROPRANOLOL HYDROCHLORIDE 40 MG: 40 TABLET ORAL at 08:36

## 2020-01-05 RX ADMIN — DOCUSATE SODIUM 100 MG: 50 LIQUID ORAL at 08:32

## 2020-01-05 RX ADMIN — INSULIN GLARGINE 25 UNITS: 100 INJECTION, SOLUTION SUBCUTANEOUS at 12:38

## 2020-01-05 RX ADMIN — INSULIN LISPRO 6 UNITS: 100 INJECTION, SOLUTION INTRAVENOUS; SUBCUTANEOUS at 23:23

## 2020-01-05 RX ADMIN — MAGNESIUM SULFATE HEPTAHYDRATE 2 G: 500 INJECTION, SOLUTION INTRAMUSCULAR; INTRAVENOUS at 13:07

## 2020-01-05 RX ADMIN — FAMOTIDINE 20 MG: 10 INJECTION, SOLUTION INTRAVENOUS at 21:02

## 2020-01-05 RX ADMIN — ENOXAPARIN SODIUM 40 MG: 40 INJECTION SUBCUTANEOUS at 08:32

## 2020-01-05 RX ADMIN — SOTALOL HYDROCHLORIDE 160 MG: 80 TABLET ORAL at 08:36

## 2020-01-05 RX ADMIN — FAMOTIDINE 20 MG: 10 INJECTION, SOLUTION INTRAVENOUS at 08:29

## 2020-01-05 RX ADMIN — SODIUM CHLORIDE TAB 1 GM 2 G: 1 TAB at 13:10

## 2020-01-05 RX ADMIN — FOLIC ACID 1 MG: 1 TABLET ORAL at 08:36

## 2020-01-05 RX ADMIN — SODIUM CHLORIDE TAB 1 GM 2 G: 1 TAB at 17:07

## 2020-01-05 RX ADMIN — Medication 10 MG: at 01:22

## 2020-01-05 RX ADMIN — SODIUM CHLORIDE, PRESERVATIVE FREE 10 ML: 5 INJECTION INTRAVENOUS at 21:10

## 2020-01-05 RX ADMIN — ASPIRIN 324 MG: 81 TABLET, CHEWABLE ORAL at 08:33

## 2020-01-05 RX ADMIN — HYDRALAZINE HYDROCHLORIDE 10 MG: 20 INJECTION INTRAMUSCULAR; INTRAVENOUS at 02:20

## 2020-01-05 ASSESSMENT — PULMONARY FUNCTION TESTS
PIF_VALUE: 14
PIF_VALUE: 26
PIF_VALUE: 24
PIF_VALUE: 22
PIF_VALUE: 13
PIF_VALUE: 24
PIF_VALUE: 13
PIF_VALUE: 21
PIF_VALUE: 18
PIF_VALUE: 26
PIF_VALUE: 20
PIF_VALUE: 13
PIF_VALUE: 21
PIF_VALUE: 25
PIF_VALUE: 21
PIF_VALUE: 22
PIF_VALUE: 24
PIF_VALUE: 27

## 2020-01-05 NOTE — PROGRESS NOTES
Pt had 8-beat run of V-tach, converted back to A-fib with HR 70's on his own without intervention. BP stable. Notified Dr. Shannan Larson, orders received.

## 2020-01-05 NOTE — PROGRESS NOTES
Daily Progress Note  Neuro Critical Care    Patient Name: Jeremy Suarez  Patient : 1960  Room/Bed: 0529/0529-01  Allergies: No Known Allergies  Problem List:   Patient Active Problem List   Diagnosis    SA[facility]achnoid [facility]) ([facility]    Subarachnoid bleed ([facility]    Traumatic subarachnoid hemorrhage with loss of consciousness of 1 hour to 5 hours 59 minutes (HCC)    Carotid stenosis, right    Asymptomatic stenosis of left vertebral artery    Intracranial atherosclerosis    History of CEA (carotid endarterectomy)    Ventilator dependence ([facility]    Delirium tremens ([facility]    Chronic a-fib    On mechanically assisted ventilation (HCC)    Encephalopathy       INTERVAL HISTORY    The patient is a 60 yo male with history of atrial fibrillation on Eliquis, bilateral ICA stenosis (right more than left), DM2, HLD, HTN, CAD s/p PCI stents, PVD, CEA, and ETOH abuse who presents as a transfer from [facility] as a trauma alert for CT head revealed diffuse bialteral SAH and left parietal SDH. He was given [facility] and transferred to [facility] for higher level of care. Patient was found confused by family at his home after being unable to reach him on the phone. He was complaining of headache, and actively vomiting. Also had bilateral arm scrapes and bruises concerning for recent fall, patient himself does not member falling, said around 4 PM he woke up and felt frontal and vertex headache and neck pain, and felt nauseous and started throwing up. Endorses drinking alcohol last night states that he had 2 beers. Per family has significant alcohol abuse history and has had multiple falls in the past.     On presentation in the ED [facility], patient mildly lethargic but oriented x4, complaining of headache, nausea, mild vertigo, left arm weaker than right, bilateral leg weakness.       Significant interdisciplinary discussion between the neurosurgeon, radiologist, trauma surgeon and stroke specialist about whether  bleed is consistent with traumatic versus spontaneous subarachnoid. Stroke specialist has significant concern for severe stenosis, suspects traumatic bleed and that patient is at risk for vasospasm. In addition has intracranial left vertebral artery athero-stenosis. Consensus management strategy is to keep the patient 130-160 for blood pressure goals to avoid right MCA watershed stroke. No aneurysm found on CTA or malformation-critical stenosis of the proximal left vertebral artery, critical stenosis of the proximal right cervical ICA, severe near critical stenosis of the right cavernous ICA, moderate stenosis of the left cavernous ICA. Non con CT head repeat at our emergency facility showed subarachnoid, predominantly in the bilateral sylvian fissures and bilateral cerebral hemisphere sulcal I, acute left parietal convexity subdural hematoma 4 mm in thickness without mass-effect. Hospital Course:   12/27: CT head stable  12/28: Intubated overnight for respiratory distress, started on Cardizem infusion for atrial fibrillation with RVR, weaned off and Sotalol started. Versed for sedation with concern for possible alcohol withdrawal.     12/29: loose foul smelling stool, c. Diff sent and was negative. Off Precdex,had left upper extremity tremor, placed on LTME. Febrile this morning - infectious workup sent. Will hold off on antibiotics with no leukocytosis - if spiked temp again will start Vancomycin and zosyn. 12/30:  On Keppra 500mg q12, LTM EEG shows diffuse slowing and disorganized background in theta frequency suggesting moderate encephalopathy. Before rounds patient became agitated and restless he was started on Precedex drip and Versed drip,He spiked a fever of 102.5 and is started on Vanco and Zosyn      12/31:   off Precedex on Versed running at 4 mcg/hr he had temperature spikes of T-max 102. 1/1: T max 101 at 21:00. Off antibiotics. TCD is normal, no vasospasm. chloride flush  10 mL Intravenous BID    famotidine (PEPCID) injection  20 mg Intravenous BID    rosuvastatin  5 mg Oral Nightly    chlorhexidine  15 mL Mouth/Throat BID    vitamin D  50,000 Units Oral Weekly    sodium chloride flush  10 mL Intravenous 2 times per day    sodium chloride flush  10 mL Intravenous 2 times per day     CONTINUOUS INFUSIONS:   propofol Stopped (20 1115)    dextrose       PRN MEDICATIONS:   ibuprofen, acetaminophen, midazolam, labetalol, metoprolol, hydrALAZINE, glucose, dextrose, glucagon (rDNA), dextrose, magnesium sulfate, sodium chloride flush, sodium chloride flush    VITALS 24 Hours     Temperature Range: Temp: 100.9 °F (38.3 °C) Temp  Av.9 °F (37.7 °C)  Min: 98.4 °F (36.9 °C)  Max: 101.5 °F (38.6 °C)  BP Range: Systolic (83BWA), OQC:017 , Min:124 , EXQ:515     Diastolic (93XNH), SHX:85, Min:67, Max:104    Pulse Range: Pulse  Av.5  Min: 77  Max: 94  Respiration Range: Resp  Av  Min: 14  Max: 17  Current Pulse Ox: SpO2: 99 %  24HR Pulse Ox Range: SpO2  Av.8 %  Min: 97 %  Max: 100 %  Patient Vitals for the past 12 hrs:   BP Temp Temp src Pulse Resp SpO2   20 0705 134/79 -- -- 88 -- 99 %   20 0605 (!) 142/84 -- -- 94 -- 97 %   20 0508 -- -- -- 91 17 98 %   20 0505 (!) 142/84 -- -- 92 -- 97 %   20 0405 134/84 100.9 °F (38.3 °C) Oral 90 -- 98 %   20 0305 (!) 168/81 -- -- 86 -- 99 %   20 0220 (!) 188/84 -- -- 81 -- 100 %   20 0205 (!) 169/104 -- -- 81 -- 100 %   20 0120 (!) 175/92 -- -- 81 -- 100 %   20 0105 (!) 174/92 -- -- 83 -- 100 %   20 0025 (!) 163/99 -- -- 78 -- 99 %   20 0005 (!) 196/98 99.5 °F (37.5 °C) Oral 80 -- 99 %   20 2334 -- -- -- 77 14 100 %   20 2305 131/83 -- -- 78 -- 99 %   20 2205 124/67 -- -- 80 -- 99 %   20 2105 (!) 165/89 -- -- 92 -- 99 %   20 (!) 155/81 101.5 °F (38.6 °C) Oral 92 -- 98 %   20 -- -- -- 92 -- 98 % Range    POC Glucose 227 (H) 75 - 110 mg/dL   POC Glucose Fingerstick    Collection Time: 01/04/20  5:08 PM   Result Value Ref Range    POC Glucose 193 (H) 75 - 110 mg/dL   POC Glucose Fingerstick    Collection Time: 01/04/20 11:55 PM   Result Value Ref Range    POC Glucose 205 (H) 75 - 110 mg/dL   EKG 12 Lead    Collection Time: 01/05/20  1:44 AM   Result Value Ref Range    Ventricular Rate 74 BPM    Atrial Rate 468 BPM    QRS Duration 154 ms    Q-T Interval 430 ms    QTc Calculation (Bazett) 477 ms    R Axis -72 degrees    T Axis 19 degrees   Magnesium    Collection Time: 01/05/20  4:36 AM   Result Value Ref Range    Magnesium 1.8 1.6 - 2.6 mg/dL   CBC WITH AUTO DIFFERENTIAL    Collection Time: 01/05/20  4:36 AM   Result Value Ref Range    WBC 5.7 3.5 - 11.3 k/uL    RBC 4.46 4.21 - 5.77 m/uL    Hemoglobin 10.2 (L) 13.0 - 17.0 g/dL    Hematocrit 34.7 (L) 40.7 - 50.3 %    MCV 77.8 (L) 82.6 - 102.9 fL    MCH 22.9 (L) 25.2 - 33.5 pg    MCHC 29.4 28.4 - 34.8 g/dL    RDW 20.9 (H) 11.8 - 14.4 %    Platelets 051 393 - 132 k/uL    MPV 11.6 8.1 - 13.5 fL    NRBC Automated 0.0 0.0 per 100 WBC    Differential Type NOT REPORTED     WBC Morphology NOT REPORTED     RBC Morphology NOT REPORTED     Platelet Estimate NOT REPORTED     Immature Granulocytes 0 0 %    Seg Neutrophils 72 (H) 36 - 65 %    Lymphocytes 17 (L) 24 - 43 %    Monocytes 7 3 - 12 %    Eosinophils % 3 1 - 4 %    Basophils 1 0 - 2 %    Absolute Immature Granulocyte 0.00 0.00 - 0.30 k/uL    Segs Absolute 4.10 1.50 - 8.10 k/uL    Absolute Lymph # 0.97 (L) 1.10 - 3.70 k/uL    Absolute Mono # 0.40 0.10 - 1.20 k/uL    Absolute Eos # 0.17 0.00 - 0.44 k/uL    Basophils Absolute 0.06 0.00 - 0.20 k/uL    Morphology MICROCYTOSIS PRESENT     Morphology ANISOCYTOSIS PRESENT     Morphology 1+ ELLIPTOCYTES    BASIC METABOLIC PANEL    Collection Time: 01/05/20  4:36 AM   Result Value Ref Range    Glucose 258 (H) 70 - 99 mg/dL    BUN 16 6 - 20 mg/dL    CREATININE 0.43 (L) 0.70 - 1.20 mg/dL    Bun/Cre Ratio NOT REPORTED 9 - 20    Calcium 8.6 8.6 - 10.4 mg/dL    Sodium 135 135 - 144 mmol/L    Potassium 4.3 3.7 - 5.3 mmol/L    Chloride 100 98 - 107 mmol/L    CO2 24 20 - 31 mmol/L    Anion Gap 11 9 - 17 mmol/L    GFR Non-African American >60 >60 mL/min    GFR African American >60 >60 mL/min    GFR Comment          GFR Staging NOT REPORTED    Procalcitonin    Collection Time: 01/05/20  4:36 AM   Result Value Ref Range    Procalcitonin 0.07 <0.09 ng/mL   POC Glucose Fingerstick    Collection Time: 01/05/20  5:33 AM   Result Value Ref Range    POC Glucose 240 (H) 75 - 110 mg/dL           RADIOLOGY   Xr Radius Ulna Left (2 Views)    Result Date: 12/26/2019  EXAMINATION: TWO XRAY VIEWS OF THE LEFT FOREARM 12/26/2019 1:37 am COMPARISON: None. HISTORY: ORDERING SYSTEM PROVIDED HISTORY: Trauma/Fracture TECHNOLOGIST PROVIDED HISTORY: Trauma/Fracture Reason for Exam: fall/trauma Acuity: Acute FINDINGS: Fiberglass splint obscures osseous details of the distal forearm. The left radius and left are intact. No acute fracture or dislocation in the left forearm. Partial visualization of 1st proximal metacarpal fracture. Osteopenia. No acute osseous abnormality left radius or ulna. Xr Radius Ulna Right (2 Views)    Result Date: 12/26/2019  EXAMINATION: TWO XRAY VIEWS OF THE RIGHT FOREARM 12/26/2019 1:37 am COMPARISON: None. HISTORY: ORDERING SYSTEM PROVIDED HISTORY: Trauma/Fracture TECHNOLOGIST PROVIDED HISTORY: Trauma/Fracture Reason for Exam: fall/trauma Acuity: Acute FINDINGS: The bones are osteopenic. The right radius and right ulna are intact. No acute fracture or dislocation in the right forearm. Along the ulnar aspect of the proximal forearm, there is soft tissue laceration. Antecubital fossa IV catheter. Osteopenia. No acute osseous abnormality right forearm. Soft tissue laceration ulnar aspect of the proximal forearm.      Xr Wrist Left (min 3 Views)    Result Date: 12/25/2019  EXAMINATION: 3 XRAY VIEWS OF THE LEFT WRIST 12/25/2019 9:45 pm COMPARISON: None. HISTORY: ORDERING SYSTEM PROVIDED HISTORY: fall TECHNOLOGIST PROVIDED HISTORY: fall Acuity: Acute Type of Exam: Initial FINDINGS: Overlying splint partially obscures osseous details. Acute traumatic closed proximal 1st metacarpal fracture. The carpal bones are intact. Carpal alignment is maintained soft tissues of the wrist are unremarkable. Acute traumatic closed 1st proximal metacarpal fracture. No acute osseous abnormality in the wrist.     Xr Wrist Right (min 3 Views)    Result Date: 12/25/2019  EXAMINATION: 3 XRAY VIEWS OF THE RIGHT WRIST 12/25/2019 9:45 pm COMPARISON: None. HISTORY: ORDERING SYSTEM PROVIDED HISTORY: fall TECHNOLOGIST PROVIDED HISTORY: fall Acuity: Acute Type of Exam: Initial FINDINGS: Overlying splint obscures osseous details. Carpal bones are intact. Advanced radiocarpal joint osteoarthritis with joint space narrowing and subchondral sclerosis. Widening of the scapholunate joint. Posttraumatic deformity of the ulnar styloid process. No acute fracture or dislocation. No acute osseous abnormality the right wrist. Widening of the scapholunate interval suggesting ligamentous injury. Radiocarpal joint osteoarthritis. Xr Hand Left (min 3 Views)    Result Date: 12/26/2019  EXAMINATION: THREE XRAY VIEWS OF THE LEFT HAND 12/26/2019 1:47 am COMPARISON: 12/25/2019 2353 hours HISTORY: ORDERING SYSTEM PROVIDED HISTORY: post splint TECHNOLOGIST PROVIDED HISTORY: post splint Reason for Exam: fall trauma/post splint Acuity: Acute FINDINGS: Overlying splint obscures osseous details. Again seen is acute traumatic closed proximal 1st metacarpal fracture. There appears to be greater fracture fragment displacement compared to prior study. No additional acute fracture or dislocation in the left hand. Overlying splint obscures osseous details.   Acute traumatic closed angulated proximal 1st metacarpal fracture with greater angulation of fracture fragments compared the prior study. Xr Hand Left (min 3 Views)    Result Date: 12/26/2019  EXAMINATION: THREE XRAY VIEWS OF THE LEFT HAND 12/26/2019 1:47 am COMPARISON: Left wrist radiographs 12/25/2019 2131 hours HISTORY: ORDERING SYSTEM PROVIDED HISTORY: post reduction TECHNOLOGIST PROVIDED HISTORY: post reduction Reason for Exam: fall trauma Acuity: Acute FINDINGS: Acute traumatic closed comminuted mildly angulated proximal 1st metacarpal fracture. Joint alignment is maintained. No additional acute fracture or dislocation in the left hand. Acute traumatic closed comminuted mildly angulated proximal 1st metacarpal fracture. Xr Hand Right (min 3 Views)    Result Date: 12/26/2019  EXAMINATION: THREE XRAY VIEWS OF THE RIGHT HAND 12/26/2019 1:36 am COMPARISON: None. HISTORY: ORDERING SYSTEM PROVIDED HISTORY: Trauma/Fracture TECHNOLOGIST PROVIDED HISTORY: Include clenched fist view Trauma/Fracture Reason for Exam: fall FINDINGS: Joint alignment is maintained. No acute fracture or dislocation in the right hand. Degenerative changes in the interphalangeal joints. Old ulnar styloid process fracture. Degenerative changes in the radiocarpal joint. No acute osseous abnormality in the right hand. Xr Hip Left (2-3 Views)    Result Date: 12/26/2019  EXAMINATION: ONE XRAY VIEW OF THE PELVIS AND TWO XRAY VIEWS RIGHT HIP; TWO XRAY VIEWS OF THE LEFT HIP 12/26/2019 1:37 am COMPARISON: None. HISTORY: ORDERING SYSTEM PROVIDED HISTORY: Trauma/Fracture TECHNOLOGIST PROVIDED HISTORY: AP and cross-table lateral of the hip please, thank you Trauma/Fracture Reason for Exam: fall/trauma Acuity: Acute FINDINGS: The bones are osteopenic. The femoral heads located bilateral.  No acute fracture or dislocation pelvis or hips bilaterally. SI joints are grossly intact. Pubic symphysis is intact. The sacrum is partially obscured by contrast in the bladder.      No acute osseous abnormality in the pelvis or hips bilaterally. Osteopenia. Xr Knee Left (3 Views)    Result Date: 12/26/2019  EXAMINATION: THREE XRAY VIEWS OF THE LEFT KNEE 12/26/2019 1:36 am COMPARISON: None. HISTORY: ORDERING SYSTEM PROVIDED HISTORY: Trauma/Fracture TECHNOLOGIST PROVIDED HISTORY: Trauma/Fracture Reason for Exam: fall/ trauma Acuity: Acute FINDINGS: Osteopenia. No acute fracture or dislocation. No focal osseous lesion. No joint effusion. No focal soft tissue abnormality. Vascular calcifications. No acute abnormality of the knee. Osteopenia. Xr Knee Right (3 Views)    Result Date: 12/26/2019  EXAMINATION: THREE XRAY VIEWS OF THE RIGHT KNEE 12/26/2019 1:36 am COMPARISON: None. HISTORY: ORDERING SYSTEM PROVIDED HISTORY: Trauma/Fracture TECHNOLOGIST PROVIDED HISTORY: Trauma/Fracture Reason for Exam: ,fall trauma,unable to get xtable on patient Acuity: Acute FINDINGS: The bones are osteopenic. No acute fracture or dislocation. No focal osseous lesion. No evidence of joint effusion. No focal soft tissue abnormality. Vascular calcifications. No acute abnormality of the knee. Osteopenia. Xr Ankle Left (min 3 Views)    Result Date: 12/26/2019  EXAMINATION: THREE XRAY VIEWS OF THE LEFT ANKLE 12/26/2019 1:36 am COMPARISON: None. HISTORY: ORDERING SYSTEM PROVIDED HISTORY: Trauma/Fracture TECHNOLOGIST PROVIDED HISTORY: Trauma/Fracture Acuity: Acute FINDINGS: No acute fracture or dislocation. Normal alignment of the ankle mortise. No focal osseous lesion. No joint effusion. No focal soft tissue abnormality. Vascular calcifications. No acute abnormality of the ankle. Xr Ankle Right (min 3 Views)    Result Date: 12/26/2019  EXAMINATION: THREE XRAY VIEWS OF THE RIGHT ANKLE 12/26/2019 1:36 am COMPARISON: None. HISTORY: ORDERING SYSTEM PROVIDED HISTORY: Trauma/Fracture TECHNOLOGIST PROVIDED HISTORY: Trauma/Fracture FINDINGS: Diffuse osteopenia. No acute fracture or dislocation. trauma Multiple falls. On Eliquis. Subarachnoid hemorrhage on CT head performed at outside institution. FINDINGS: BRAIN/VENTRICLES: There is moderate subarachnoid hemorrhage in the bilateral sylvian fissures and bilateral frontal, temporal, and parietal lobe sulci. Additional subarachnoid hemorrhage is noted in the interhemispheric fissure and prepontine cistern. Gray-white matter differentiation is grossly maintained without CT evidence of acute, territorial infarct. Acute, left parietal convexity subdural hematoma measures up to 4 mm in thickness. No associated mass effect. No parenchymal hemorrhage. There is no hydrocephalus or midline shift. Basal cisterns are patent. ORBITS: The visualized portion of the orbits demonstrate no acute abnormality. SINUSES: The visualized paranasal sinuses and mastoid air cells demonstrate no acute abnormality. SOFT TISSUES/SKULL:  No acute abnormality of the visualized skull or soft tissues. Subarachnoid hemorrhage, predominantly in the bilateral sylvian fissures and bilateral cerebral hemisphere sulci. Acute left parietal convexity subdural hematoma measures up to 4 mm in thickness. No associated mass effect. No midline shift or evidence of intracranial herniation. Findings were discussed with Marek Akers at 9:32 pm on 12/25/2019. Ct Cervical Spine Wo Contrast    Result Date: 12/26/2019  EXAMINATION: CT OF THE CERVICAL SPINE WITHOUT CONTRAST 12/25/2019 9:04 pm TECHNIQUE: CT of the cervical spine was performed without the administration of intravenous contrast. Multiplanar reformatted images are provided for review. Dose modulation, iterative reconstruction, and/or weight based adjustment of the mA/kV was utilized to reduce the radiation dose to as low as reasonably achievable. COMPARISON: None HISTORY: ORDERING SYSTEM PROVIDED HISTORY: trauma TECHNOLOGIST PROVIDED HISTORY: trauma FINDINGS: BONES/ALIGNMENT: No traumatic malalignment.   Vertebral body heights are maintained. No acute fracture. DEGENERATIVE CHANGES: Moderate multilevel disc narrowing and endplate osteophyte formation. Multilevel uncovertebral and facet joint degenerative changes. SOFT TISSUES: Paraspinal soft tissues are normal.  Partially visualized prepontine cistern subarachnoid hemorrhage is better evaluated on CT head performed concurrently. No acute abnormality of the cervical spine. Ct Thoracic Spine Wo Contrast    Result Date: 12/26/2019  EXAMINATION: CT OF THE THORACIC SPINE WITHOUT CONTRAST; CT OF THE LUMBAR SPINE WITHOUT CONTRAST; CT OF THE CHEST, ABDOMEN, AND PELVIS WITH CONTRAST, 12/25/2019 9:04 pm; 12/25/2019 9:05 pm TECHNIQUE: CT of the thoracic and lumbar spine was performed without the administration of intravenous contrast.  CT of the chest, abdomen and pelvis was performed with the administration of intravenous contrast. Multiplanar reformatted images are provided for review. Dose modulation, iterative reconstruction, and/or weight based adjustment of the mA/kV was utilized to reduce the radiation dose to as low as reasonably achievable. COMPARISON: None HISTORY: ORDERING SYSTEM PROVIDED HISTORY: trauma TECHNOLOGIST PROVIDED HISTORY: trauma Reason for Exam: fall from standing Acuity: Acute Type of Exam: Initial; ORDERING SYSTEM PROVIDED HISTORY: trauma TECHNOLOGIST PROVIDED HISTORY: trauma Reason for Exam: fall from standing Acuity: Acute Type of Exam: Initial; ORDERING SYSTEM PROVIDED HISTORY: trauma TECHNOLOGIST PROVIDED HISTORY: trauma Reason for Exam: fall from standing Acuity: Acute Type of Exam: Initial FINDINGS: CTA CHEST: Stable cardiomegaly. No pericardial effusion. No mediastinal hematoma or pneumomediastinum. No enlarged mediastinal or hilar lymph nodes. Main pulmonary artery is dilated, measuring up to 4 cm, suggesting pulmonary hypertension. Calcified and noncalcified atherosclerotic plaque of the thoracic aorta.  Incidentally noted 4 vessel aortic arch with with the administration of intravenous contrast. Multiplanar reformatted images are provided for review. Dose modulation, iterative reconstruction, and/or weight based adjustment of the mA/kV was utilized to reduce the radiation dose to as low as reasonably achievable. COMPARISON: None HISTORY: ORDERING SYSTEM PROVIDED HISTORY: trauma TECHNOLOGIST PROVIDED HISTORY: trauma Reason for Exam: fall from standing Acuity: Acute Type of Exam: Initial; ORDERING SYSTEM PROVIDED HISTORY: trauma TECHNOLOGIST PROVIDED HISTORY: trauma Reason for Exam: fall from standing Acuity: Acute Type of Exam: Initial; ORDERING SYSTEM PROVIDED HISTORY: trauma TECHNOLOGIST PROVIDED HISTORY: trauma Reason for Exam: fall from standing Acuity: Acute Type of Exam: Initial FINDINGS: CTA CHEST: Stable cardiomegaly. No pericardial effusion. No mediastinal hematoma or pneumomediastinum. No enlarged mediastinal or hilar lymph nodes. Main pulmonary artery is dilated, measuring up to 4 cm, suggesting pulmonary hypertension. Calcified and noncalcified atherosclerotic plaque of the thoracic aorta. Incidentally noted 4 vessel aortic arch with separate arch origin of the left vertebral artery. Ectatic ascending thoracic aorta measures up to 4 cm in diameter. No acute aortic abnormality. Central airways are clear. No pleural effusion or pneumothorax. No pulmonary contusion or pulmonary laceration. Mild bilateral lower lobe dependent atelectatic changes. Healing anterolateral left 6th rib fracture. No acute displaced rib fracture or chest wall hematoma. CTA ABDOMEN: No acute traumatic abnormality of the liver, spleen, pancreas, kidneys, or adrenal glands. Normal gallbladder. Stomach, small bowel, and colon are normal in caliber without evidence of obstruction. Normal appendix. Sigmoid diverticulosis without diverticulitis. Calcified and noncalcified aortoiliac atherosclerotic calcification. Abdominal aorta is normal in caliber.   No free fluid in the abdomen. CTA PELVIS: Urinary bladder and pelvic organs are normal.  No free fluid in the pelvis. Bilateral common iliac stents are in position. THORACIC/LUMBAR SPINE: BONES/ALIGNMENT: Normal alignment of the thoracic and lumbar spine. Vertebral body heights are maintained. No acute fracture. DEGENERATIVE CHANGES: Multilevel disc narrowing and endplate osteophyte formation. Mild multilevel facet joint degenerative changes. No high-grade, bony spinal canal stenosis. SOFT TISSUES: Paraspinal soft tissues are normal.     No acute traumatic abnormality of the chest, abdomen, or pelvis. Remote, healing anterolateral left 6th rib fracture. No acute osseous abnormality of the thoracic or lumbar spine. Ir Angiogram Carotid C Erebral Bilateral    Result Date: 12/27/2019  Date of procedure: 12/26/2019 Procedure: Diagnostic cerebral angiogram Indication: Subarachnoid hemorrhage, evaluation for aneurysm. Procedures: 1. Selective right common carotid artery angiogram 2. Selective right internal carotid artery cerebral angiogram 3. Selective right vertebral artery cerebral angiogram 4. Selective left common carotid artery angiogram 5. Selective left internal carotid artery cerebral angiogram 6. Selective left vertebral artery cerebral angiogram 7. Right common femoral artery angiogram Neurointerventionalist: Donny Douglas MD Malcolm: Macario Barlow MD Comparison: None Fluoroscopy time: 29.7 minutes Contrast: 100 cc Visipaque-270 Side of Access: Right femoral Closure device: Vascade Sedation: Conscious sedation Patient arrived to the angio suite: 1325 Puncture obtained at: 1400 Vascular access removed at: 1605 Consent:After explaining the risks and benefits to the patient and the patient's family, including but not limited to stroke, coma, death, vessel injury, dissection, tear, occlusion, and X-ray dye allergic type reaction, a signed consent form was obtained.  Anesthesia: IV moderate sedation was supervised by Dr. David Cooper carotid artery revealed significant proximal right ICA stenosis/string sign measuring approximately 90-99% per NASCET criteria. Flow across the high-grade stenosis was delayed with earlier filling noted of the distal supraclinoid internal carotid artery through retrograde opacification of the right ophthalmic artery from the internal maxillary artery. Further inspection demonstrates no evidence of dissection, aneurysm. Right CCA technique: The right internal carotid artery run was performed from the common carotid artery due to severe stenosis. Digital subtraction angiography of the intracranial right internal carotid circulation was performed in frontal, and lateral projections. Interpretation:There is slow antegrade filling of the distal internal carotid artery from the cervical internal carotid artery. Noted retrograde Filling of the right ophthalmic artery from the right internal maxillary artery branches with slow filling anterior cerebral artery, middle cerebral artery and the distal branches due to previously noted critical proximal cervical ICA stenosis. Inspection of the remaining right internal carotid circulation revealed no other evidence of cerebral aneurysm, arteriovenous malformation. There is severe stenosis noted in the petrous/cavernous and supraclinoid right ICA noted with diminutive appearance. Capillary and venous phase images were also unremarkable, with no evidence of veno-occlusive disease. Left CCA technique: The left common carotid artery was selectively catheterized under fluoroscopic guidance and digital subtraction angiography images were obtained in biplane projections of the left cervical common carotid artery. Interpretation: The left common carotid artery injection demonstrates normal antegrade flow into the external and internal carotid arteries with normal filling of the external carotid artery branches.  Caliber and lumen contour of the cervical portion of the left internal carotid artery noted to be large and irregular which is likely due to prior endarterectomy. Left ICA technique: The left internal carotid artery was then selectively catheterized, and digital subtraction angiography of the intracranial left internal carotid artery circulation was performed in frontal and lateral projections. Interpretation:There is normal antegrade filling of the distal internal carotid artery, ophthalmic artery, anterior cerebral artery, middle cerebral artery and distal branches. Inspection of the remaining left internal carotid artery circulation revealed  mild to moderate left cavernous ICA stenosis. Otherwise, no other evidence of cerebral aneurysm, arteriovenous malformation, or arterial stenosis. No vasospasm noted. Irregularity of the vessels without hemodynamic stenosis which could reflect intracranial atherosclerosis Capillary and venous phase images were also unremarkable, with no evidence of veno-occlusive disease. Left VA technique: The left vertebral artery noted to arise from the aortic arch. Left vertebral artery was then selectively catheterized with roadmap guidance. Digital subtraction angiography of the intracranial left vertebrobasilar run-off  was obtained in frontal and lateral projections. Interpretation:The left vertebral artery injection demonstrates normal antegrade opacification of the left  vertebral artery, including the cervical segment, basilar artery, and respective branches. There is a left V4 moderate stenosis noted. Otherwise, there was no evidence of  cerebral aneurysm, arteriovenous malformation, or arterial stenosis. There is noted opacification of the distal right middle cerebral artery parieto-occipital territory from pial collaterals arising off the right posterior cerebral artery. Capillary and venous phase images were otherwise unremarkable.  Right CFA technique: A right common femoral artery angiogram was performed and demonstrated arterial catheterization internal carotid arteries measured using NASCET criteria. Dose modulation, iterative reconstruction, and/or weight based adjustment of the mA/kV was utilized to reduce the radiation dose to as low as reasonably achievable. COMPARISON: None. HISTORY: ORDERING SYSTEM PROVIDED HISTORY: sah TECHNOLOGIST PROVIDED HISTORY: sah Reason for Exam: SAH after trauma Acuity: Acute Type of Exam: Initial FINDINGS: CTA NECK: AORTIC ARCH/ARCH VESSELS: There is a critical stenosis of the proximal left vertebral artery with moderate and severe stenoses of the V2 segment. There is a severe stenosis at the origin of the right vertebral artery. 66% stenosis of the left subclavian artery is noted. CAROTID ARTERIES: There is 25% stenosis of the proximal left common carotid artery and 50% stenosis of the mid and distal segments. The cervical portion of the left internal carotid artery is normal in course and caliber. There is 20% stenosis of the right common carotid artery. A critical stenosis of the proximal right cervical ICA is noted with attenuated flow seen distally. VERTEBRAL ARTERIES: No dissection, arterial injury, or significant stenosis. SOFT TISSUES: There is a thickened appearance to the mid esophagus. A small amount of layering debris is present within the trachea, likely reflecting pooled secretion. BONES: No acute osseous abnormality. CTA HEAD: ANTERIOR CIRCULATION: There is severely attenuated flow within the petrous and cavernous segments of the right internal carotid artery with severe, near critical stenoses of the cavernous ICA. There is also attenuated flow within the contralateral ICA, to a lesser degree, with moderate stenoses of the left cavernous ICA. There are moderate and severe multifocal stenoses of the MCA branch vessels, worse on the right-hand side. The right anterior cerebral artery is attenuated compared to the left.  POSTERIOR CIRCULATION: There are mild-to-moderate stenoses of the distal right vertebral artery and a moderate to severe stenosis of the distal left. The basilar artery and PCAs appear to be slightly attenuated. OTHER: No dural venous sinus thrombosis on this non-dedicated study. BRAIN: See separately dictated noncontrast head CT report. No cerebral aneurysm or vascular malformation identified. Significant narrowing of the intracranial vessels, likely due to a combination of atherosclerotic disease as well as vasospasm related to subarachnoid hemorrhage. Critical stenosis of the proximal left vertebral artery. Critical stenosis of the proximal right cervical ICA with attenuated flow seen distally. Severe near critical stenoses of the right cavernous ICA. Moderate stenoses of the contralateral cavernous ICA. Ct Chest Abdomen Pelvis W Contrast    Result Date: 12/26/2019  EXAMINATION: CT OF THE THORACIC SPINE WITHOUT CONTRAST; CT OF THE LUMBAR SPINE WITHOUT CONTRAST; CT OF THE CHEST, ABDOMEN, AND PELVIS WITH CONTRAST, 12/25/2019 9:04 pm; 12/25/2019 9:05 pm TECHNIQUE: CT of the thoracic and lumbar spine was performed without the administration of intravenous contrast.  CT of the chest, abdomen and pelvis was performed with the administration of intravenous contrast. Multiplanar reformatted images are provided for review. Dose modulation, iterative reconstruction, and/or weight based adjustment of the mA/kV was utilized to reduce the radiation dose to as low as reasonably achievable.  COMPARISON: None HISTORY: ORDERING SYSTEM PROVIDED HISTORY: trauma TECHNOLOGIST PROVIDED HISTORY: trauma Reason for Exam: fall from standing Acuity: Acute Type of Exam: Initial; ORDERING SYSTEM PROVIDED HISTORY: trauma TECHNOLOGIST PROVIDED HISTORY: trauma Reason for Exam: fall from standing Acuity: Acute Type of Exam: Initial; ORDERING SYSTEM PROVIDED HISTORY: trauma TECHNOLOGIST PROVIDED HISTORY: trauma Reason for Exam: fall from standing Acuity: Acute Type of Exam: Initial FINDINGS: CTA CHEST: Stable cardiomegaly. No pericardial effusion. No mediastinal hematoma or pneumomediastinum. No enlarged mediastinal or hilar lymph nodes. Main pulmonary artery is dilated, measuring up to 4 cm, suggesting pulmonary hypertension. Calcified and noncalcified atherosclerotic plaque of the thoracic aorta. Incidentally noted 4 vessel aortic arch with separate arch origin of the left vertebral artery. Ectatic ascending thoracic aorta measures up to 4 cm in diameter. No acute aortic abnormality. Central airways are clear. No pleural effusion or pneumothorax. No pulmonary contusion or pulmonary laceration. Mild bilateral lower lobe dependent atelectatic changes. Healing anterolateral left 6th rib fracture. No acute displaced rib fracture or chest wall hematoma. CTA ABDOMEN: No acute traumatic abnormality of the liver, spleen, pancreas, kidneys, or adrenal glands. Normal gallbladder. Stomach, small bowel, and colon are normal in caliber without evidence of obstruction. Normal appendix. Sigmoid diverticulosis without diverticulitis. Calcified and noncalcified aortoiliac atherosclerotic calcification. Abdominal aorta is normal in caliber. No free fluid in the abdomen. CTA PELVIS: Urinary bladder and pelvic organs are normal.  No free fluid in the pelvis. Bilateral common iliac stents are in position. THORACIC/LUMBAR SPINE: BONES/ALIGNMENT: Normal alignment of the thoracic and lumbar spine. Vertebral body heights are maintained. No acute fracture. DEGENERATIVE CHANGES: Multilevel disc narrowing and endplate osteophyte formation. Mild multilevel facet joint degenerative changes. No high-grade, bony spinal canal stenosis. SOFT TISSUES: Paraspinal soft tissues are normal.     No acute traumatic abnormality of the chest, abdomen, or pelvis. Remote, healing anterolateral left 6th rib fracture. No acute osseous abnormality of the thoracic or lumbar spine.      Xr Hip 2-3 Vw W Pelvis Right    Result Date: One reported 8 beats of ventricular tarcycardia was not seen due to EKG artifacts during that time. 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 SAH induced vasospasm. LTME repeat showed diffuse encephalopathy no epileptiform discharges. Continue aspirin 324 mg daily  Critical right ICA stenosis   Systolic blood pressure goal 140-180  Modafinil 100 mg BID   Hold Eliquis for now     Cardio  On sotalol 160 mg twice daily  Heart rate is better controlled overnight with no RVR  EF 45%  Cardiac pauses reported on LTM E. No telemetry correlation  We will continue cardiac monitor    Pulm  Last ABG 7.51, 34, 98, 22  Tolerated CPAP yesterday. Still stuporous   Decrease Librium then re-eval tomorrow for extubation  PRVC 14, 600, 5, 30%  CXR: ETT, NG in place, bibasilar atelectasis. No consolidation     Endo  Serum glucose continue to be above target of 180  HB A1C: 7.1  On Lantus 22 units daily and high dose sliding scale    GI  Milk of magnesia for bowel regimen  Pepcid 20 mg IV  BID for GI PPx  Had a bowel movement yesterday     Renal  Hyponatremia: 131->133 -> 137 -> 135  Continue salt tabs  Intake 5 L output 1 L +4  BUN 16, creatinine 0.43    Heme/ID  Tmax 101 at 7 PM last night  Chest x-ray right lower zone consolidation. Repeat chest x-ray showed resolution  Was started on Zosyn 3.375 g every 8 hours for gram-negative and anaerobic coverage  Discontinue Zosyn  Hemoglobin hematocrit stable      MS  Nondisplaced mildly angulated left first metacarpal fracture  Splinted by orthopedics  Follow-up with orthopedics    LDAs:  NG tube, ETT, ext. Urinary     DVT PPX: Lovenox 40 mg daily  GI prophylaxis Pepcid 20 mg IV twice daily    Disposition: remains in NICU for vent management.  Stepdown after extubation

## 2020-01-05 NOTE — SIGNIFICANT EVENT
Patient had an 8 beat run of nonsustained monomorphic ventricular tachycardia. Patient asymptomatic and VSS. STAT EKG unchanged from prior EKG on 12/25. QTc interval 477, JTc interval 323. Last Mg2+ 1.8, K+ 4.6. Per nursing staff, no other events prior. BP (!) 163/99   Pulse 78   Temp 99.5 °F (37.5 °C) (Oral)   Resp 14   Ht 5' 11\" (1.803 m)   Wt 210 lb 8.6 oz (95.5 kg)   SpO2 99%   BMI 29.36 kg/m²       Mr. Eber Malhotra remains asymptomatic and hemodynamically stable. Will continue to monitor closely throughout the night. Bryant Valdes MD, [facility]  [facility]  1/5/2020 at 2:05 AM         This note is created with the assistance of a speech recognition program.  While intending to generate a document that actually reflects the content of the visit, the document can still have some errors including those of syntax and sound like substitutions which may escape proof reading. In such instances, actual meaning can be extrapolated by contextual diversion.

## 2020-01-05 NOTE — PLAN OF CARE
Patient will remain safe and free from injury throughout shift. Intubated and in restraints at his time for safety. Hourly rounding to assess neuro status, pain, and needs. Bed alarm maintained. Restraints on bi-lateral upper extremities remains.

## 2020-01-05 NOTE — CARE COORDINATION
Care Transition  Called mom (Mrs. Mercedes Hall) to discuss LTACH choices. Freedom of Choice provided. Mom chose [facility] [address]. Unable to fax referral.     Gregor Reece liaison for [facility] and left vm to let her know of referral and will need fax number to fax referral to.

## 2020-01-06 ENCOUNTER — APPOINTMENT (OUTPATIENT)
Dept: CT IMAGING | Age: 60
DRG: 030 | End: 2020-01-06
Payer: MEDICAID

## 2020-01-06 LAB
ABSOLUTE EOS #: 0.14 K/UL (ref 0–0.4)
ABSOLUTE IMMATURE GRANULOCYTE: 0 K/UL (ref 0–0.3)
ABSOLUTE LYMPH #: 1.09 K/UL (ref 1–4.8)
ABSOLUTE MONO #: 0.48 K/UL (ref 0.1–0.8)
ALLEN TEST: ABNORMAL
ANION GAP SERPL CALCULATED.3IONS-SCNC: 13 MMOL/L (ref 9–17)
BASOPHILS # BLD: 0 % (ref 0–2)
BASOPHILS ABSOLUTE: 0 K/UL (ref 0–0.2)
BUN BLDV-MCNC: 17 MG/DL (ref 6–20)
BUN/CREAT BLD: ABNORMAL (ref 9–20)
CALCIUM SERPL-MCNC: 8.5 MG/DL (ref 8.6–10.4)
CHLORIDE BLD-SCNC: 101 MMOL/L (ref 98–107)
CO2: 24 MMOL/L (ref 20–31)
CREAT SERPL-MCNC: 0.39 MG/DL (ref 0.7–1.2)
CULTURE: NORMAL
DIFFERENTIAL TYPE: ABNORMAL
DIRECT EXAM: NORMAL
EKG ATRIAL RATE: 468 BPM
EKG Q-T INTERVAL: 430 MS
EKG QRS DURATION: 154 MS
EKG QTC CALCULATION (BAZETT): 477 MS
EKG R AXIS: -72 DEGREES
EKG T AXIS: 19 DEGREES
EKG VENTRICULAR RATE: 74 BPM
EOSINOPHILS RELATIVE PERCENT: 2 % (ref 1–4)
FIO2: 30
GFR AFRICAN AMERICAN: >60 ML/MIN
GFR NON-AFRICAN AMERICAN: >60 ML/MIN
GFR SERPL CREATININE-BSD FRML MDRD: ABNORMAL ML/MIN/{1.73_M2}
GFR SERPL CREATININE-BSD FRML MDRD: ABNORMAL ML/MIN/{1.73_M2}
GLUCOSE BLD-MCNC: 129 MG/DL (ref 75–110)
GLUCOSE BLD-MCNC: 131 MG/DL (ref 75–110)
GLUCOSE BLD-MCNC: 148 MG/DL (ref 75–110)
GLUCOSE BLD-MCNC: 155 MG/DL (ref 75–110)
GLUCOSE BLD-MCNC: 156 MG/DL (ref 70–99)
HCT VFR BLD CALC: 37.5 % (ref 40.7–50.3)
HEMOGLOBIN: 11.1 G/DL (ref 13–17)
IMMATURE GRANULOCYTES: 0 %
LYMPHOCYTES # BLD: 16 % (ref 24–44)
Lab: NORMAL
MAGNESIUM: 2 MG/DL (ref 1.6–2.6)
MCH RBC QN AUTO: 23.2 PG (ref 25.2–33.5)
MCHC RBC AUTO-ENTMCNC: 29.6 G/DL (ref 28.4–34.8)
MCV RBC AUTO: 78.5 FL (ref 82.6–102.9)
MODE: ABNORMAL
MONOCYTES # BLD: 7 % (ref 1–7)
MORPHOLOGY: ABNORMAL
NEGATIVE BASE EXCESS, ART: ABNORMAL (ref 0–2)
NRBC AUTOMATED: 0 PER 100 WBC
O2 DEVICE/FLOW/%: ABNORMAL
PATIENT TEMP: ABNORMAL
PDW BLD-RTO: 20.9 % (ref 11.8–14.4)
PLATELET # BLD: 330 K/UL (ref 138–453)
PLATELET ESTIMATE: ABNORMAL
PMV BLD AUTO: 11.5 FL (ref 8.1–13.5)
POC HCO3: 27.9 MMOL/L (ref 21–28)
POC O2 SATURATION: 99 % (ref 94–98)
POC PCO2 TEMP: ABNORMAL MM HG
POC PCO2: 36.7 MM HG (ref 35–48)
POC PH TEMP: ABNORMAL
POC PH: 7.49 (ref 7.35–7.45)
POC PO2 TEMP: ABNORMAL MM HG
POC PO2: 106.8 MM HG (ref 83–108)
POSITIVE BASE EXCESS, ART: 4 (ref 0–3)
POTASSIUM SERPL-SCNC: 4.3 MMOL/L (ref 3.7–5.3)
RBC # BLD: 4.78 M/UL (ref 4.21–5.77)
RBC # BLD: ABNORMAL 10*6/UL
SAMPLE SITE: ABNORMAL
SEG NEUTROPHILS: 75 % (ref 36–66)
SEGMENTED NEUTROPHILS ABSOLUTE COUNT: 5.09 K/UL (ref 1.8–7.7)
SODIUM BLD-SCNC: 138 MMOL/L (ref 135–144)
SPECIMEN DESCRIPTION: NORMAL
TCO2 (CALC), ART: 29 MMOL/L (ref 22–29)
WBC # BLD: 6.8 K/UL (ref 3.5–11.3)
WBC # BLD: ABNORMAL 10*3/UL

## 2020-01-06 PROCEDURE — 36600 WITHDRAWAL OF ARTERIAL BLOOD: CPT

## 2020-01-06 PROCEDURE — 95714 VEEG EA 12-26 HR UNMNTR: CPT

## 2020-01-06 PROCEDURE — 51798 US URINE CAPACITY MEASURE: CPT

## 2020-01-06 PROCEDURE — 36415 COLL VENOUS BLD VENIPUNCTURE: CPT

## 2020-01-06 PROCEDURE — 6360000002 HC RX W HCPCS: Performed by: STUDENT IN AN ORGANIZED HEALTH CARE EDUCATION/TRAINING PROGRAM

## 2020-01-06 PROCEDURE — 93010 ELECTROCARDIOGRAM REPORT: CPT | Performed by: INTERNAL MEDICINE

## 2020-01-06 PROCEDURE — 99291 CRITICAL CARE FIRST HOUR: CPT | Performed by: PSYCHIATRY & NEUROLOGY

## 2020-01-06 PROCEDURE — 6360000002 HC RX W HCPCS: Performed by: PSYCHIATRY & NEUROLOGY

## 2020-01-06 PROCEDURE — 51701 INSERT BLADDER CATHETER: CPT

## 2020-01-06 PROCEDURE — 2580000003 HC RX 258: Performed by: STUDENT IN AN ORGANIZED HEALTH CARE EDUCATION/TRAINING PROGRAM

## 2020-01-06 PROCEDURE — 99233 SBSQ HOSP IP/OBS HIGH 50: CPT | Performed by: PSYCHIATRY & NEUROLOGY

## 2020-01-06 PROCEDURE — 2700000000 HC OXYGEN THERAPY PER DAY

## 2020-01-06 PROCEDURE — 6370000000 HC RX 637 (ALT 250 FOR IP): Performed by: NURSE PRACTITIONER

## 2020-01-06 PROCEDURE — 2500000003 HC RX 250 WO HCPCS: Performed by: NURSE PRACTITIONER

## 2020-01-06 PROCEDURE — 6370000000 HC RX 637 (ALT 250 FOR IP): Performed by: STUDENT IN AN ORGANIZED HEALTH CARE EDUCATION/TRAINING PROGRAM

## 2020-01-06 PROCEDURE — 94770 HC ETCO2 MONITOR DAILY: CPT

## 2020-01-06 PROCEDURE — 97110 THERAPEUTIC EXERCISES: CPT

## 2020-01-06 PROCEDURE — 85025 COMPLETE CBC W/AUTO DIFF WBC: CPT

## 2020-01-06 PROCEDURE — 6360000004 HC RX CONTRAST MEDICATION: Performed by: NURSE PRACTITIONER

## 2020-01-06 PROCEDURE — 82803 BLOOD GASES ANY COMBINATION: CPT

## 2020-01-06 PROCEDURE — 94761 N-INVAS EAR/PLS OXIMETRY MLT: CPT

## 2020-01-06 PROCEDURE — 94003 VENT MGMT INPAT SUBQ DAY: CPT

## 2020-01-06 PROCEDURE — 70498 CT ANGIOGRAPHY NECK: CPT

## 2020-01-06 PROCEDURE — 6370000000 HC RX 637 (ALT 250 FOR IP): Performed by: PSYCHIATRY & NEUROLOGY

## 2020-01-06 PROCEDURE — 2000000003 HC NEURO ICU R&B

## 2020-01-06 PROCEDURE — 95718 EEG PHYS/QHP 2-12 HR W/VEEG: CPT | Performed by: PSYCHIATRY & NEUROLOGY

## 2020-01-06 PROCEDURE — 2580000003 HC RX 258: Performed by: NURSE PRACTITIONER

## 2020-01-06 PROCEDURE — 82947 ASSAY GLUCOSE BLOOD QUANT: CPT

## 2020-01-06 PROCEDURE — 80048 BASIC METABOLIC PNL TOTAL CA: CPT

## 2020-01-06 PROCEDURE — 76937 US GUIDE VASCULAR ACCESS: CPT

## 2020-01-06 PROCEDURE — 83735 ASSAY OF MAGNESIUM: CPT

## 2020-01-06 RX ORDER — CHLORDIAZEPOXIDE HYDROCHLORIDE 5 MG/1
20 CAPSULE, GELATIN COATED ORAL EVERY EVENING
Status: DISCONTINUED | OUTPATIENT
Start: 2020-01-06 | End: 2020-01-09

## 2020-01-06 RX ADMIN — FOLIC ACID 1 MG: 1 TABLET ORAL at 08:17

## 2020-01-06 RX ADMIN — Medication 100 MG: at 08:17

## 2020-01-06 RX ADMIN — ROSUVASTATIN CALCIUM 5 MG: 5 TABLET, FILM COATED ORAL at 20:43

## 2020-01-06 RX ADMIN — SODIUM CHLORIDE: 9 INJECTION, SOLUTION INTRAVENOUS at 09:39

## 2020-01-06 RX ADMIN — ASPIRIN 324 MG: 81 TABLET, CHEWABLE ORAL at 08:17

## 2020-01-06 RX ADMIN — SODIUM CHLORIDE, PRESERVATIVE FREE 10 ML: 5 INJECTION INTRAVENOUS at 08:34

## 2020-01-06 RX ADMIN — SOTALOL HYDROCHLORIDE 160 MG: 80 TABLET ORAL at 20:44

## 2020-01-06 RX ADMIN — SODIUM CHLORIDE: 9 INJECTION, SOLUTION INTRAVENOUS at 00:08

## 2020-01-06 RX ADMIN — MODAFINIL 100 MG: 100 TABLET ORAL at 12:01

## 2020-01-06 RX ADMIN — FAMOTIDINE 20 MG: 10 INJECTION, SOLUTION INTRAVENOUS at 08:16

## 2020-01-06 RX ADMIN — ENOXAPARIN SODIUM 40 MG: 40 INJECTION SUBCUTANEOUS at 08:16

## 2020-01-06 RX ADMIN — PROPRANOLOL HYDROCHLORIDE 40 MG: 40 TABLET ORAL at 20:43

## 2020-01-06 RX ADMIN — SODIUM CHLORIDE, PRESERVATIVE FREE 10 ML: 5 INJECTION INTRAVENOUS at 20:43

## 2020-01-06 RX ADMIN — SODIUM CHLORIDE TAB 1 GM 2 G: 1 TAB at 12:01

## 2020-01-06 RX ADMIN — PROPRANOLOL HYDROCHLORIDE 40 MG: 40 TABLET ORAL at 08:20

## 2020-01-06 RX ADMIN — SODIUM CHLORIDE, PRESERVATIVE FREE 10 ML: 5 INJECTION INTRAVENOUS at 08:21

## 2020-01-06 RX ADMIN — DOCUSATE SODIUM 100 MG: 50 LIQUID ORAL at 08:14

## 2020-01-06 RX ADMIN — SOTALOL HYDROCHLORIDE 160 MG: 80 TABLET ORAL at 08:20

## 2020-01-06 RX ADMIN — CHLORDIAZEPOXIDE HYDROCHLORIDE 20 MG: 5 CAPSULE ORAL at 17:13

## 2020-01-06 RX ADMIN — MODAFINIL 100 MG: 100 TABLET ORAL at 08:20

## 2020-01-06 RX ADMIN — SODIUM CHLORIDE TAB 1 GM 2 G: 1 TAB at 08:17

## 2020-01-06 RX ADMIN — SODIUM CHLORIDE TAB 1 GM 2 G: 1 TAB at 17:13

## 2020-01-06 RX ADMIN — IOHEXOL 90 ML: 350 INJECTION, SOLUTION INTRAVENOUS at 13:17

## 2020-01-06 RX ADMIN — Medication 15 ML: at 20:43

## 2020-01-06 RX ADMIN — Medication 15 ML: at 08:14

## 2020-01-06 RX ADMIN — FAMOTIDINE 20 MG: 10 INJECTION, SOLUTION INTRAVENOUS at 20:43

## 2020-01-06 RX ADMIN — INSULIN LISPRO 3 UNITS: 100 INJECTION, SOLUTION INTRAVENOUS; SUBCUTANEOUS at 17:20

## 2020-01-06 RX ADMIN — HYDRALAZINE HYDROCHLORIDE 10 MG: 20 INJECTION INTRAMUSCULAR; INTRAVENOUS at 17:11

## 2020-01-06 ASSESSMENT — PULMONARY FUNCTION TESTS
PIF_VALUE: 21
PIF_VALUE: 13
PIF_VALUE: 25
PIF_VALUE: 34
PIF_VALUE: 37
PIF_VALUE: 14
PIF_VALUE: 24
PIF_VALUE: 42
PIF_VALUE: 13
PIF_VALUE: 26
PIF_VALUE: 21
PIF_VALUE: 14
PIF_VALUE: 32
PIF_VALUE: 13
PIF_VALUE: 14
PIF_VALUE: 23
PIF_VALUE: 25
PIF_VALUE: 13
PIF_VALUE: 33
PIF_VALUE: 30
PIF_VALUE: 23
PIF_VALUE: 38
PIF_VALUE: 13
PIF_VALUE: 21
PIF_VALUE: 34

## 2020-01-06 NOTE — PROGRESS NOTES
ENDOVASCULAR NEUROSURGERY PROGRESS NOTE  1/6/2020 11:56 AM  Subjective:   Admit Date: 12/25/2019  PCP: Ritesh Adams MD    Patient is intubated. Not following commands. Objective:   Vitals: /81   Pulse 74   Temp 99.1 °F (37.3 °C) (Axillary)   Resp 14   Ht 5' 11\" (1.803 m)   Wt 210 lb 8.6 oz (95.5 kg)   SpO2 99%   BMI 29.36 kg/m²   Did examine Precedex. General appearance: Intubated   HEENT: Atraumatic. Neck: Neck is supple. Lungs: Intubated  Abdomen: Soft nontender. Extremities: No lower limb edema noted. Neurologic:  He is intubated, did not follow commands, did not track in the room. Grimace to pain to noxious stimuli. CN: Both are equal reactive to light at 3 mm, he is tracking the room, no facial droop noted. MOTOR:  He is withdrawing both upper and lower extremities. SENSORY: He is withdrawing both upper and lower extremities.     Medications and labs:   Scheduled Meds:   chlordiazePOXIDE  20 mg Per NG tube QPM    insulin glargine  25 Units Subcutaneous Daily    insulin lispro  0-18 Units Subcutaneous Q6H    modafinil  100 mg Oral BID    docusate  100 mg Oral Daily    senna  5 mL Oral Nightly    propranolol  40 mg Oral BID    aspirin  324 mg Oral Daily    sotalol  160 mg Oral BID    sodium chloride  2 g Oral TID WC    enoxaparin  40 mg Subcutaneous Daily    nicotine  1 patch Transdermal Daily    folic acid  1 mg Oral Daily    thiamine  100 mg Oral Daily    sodium chloride flush  10 mL Intravenous BID    famotidine (PEPCID) injection  20 mg Intravenous BID    rosuvastatin  5 mg Oral Nightly    chlorhexidine  15 mL Mouth/Throat BID    vitamin D  50,000 Units Oral Weekly    sodium chloride flush  10 mL Intravenous 2 times per day    sodium chloride flush  10 mL Intravenous 2 times per day     Continuous Infusions:   sodium chloride 100 mL/hr at 01/06/20 0939    dextrose       CBC:   Recent Labs     01/04/20  0410 01/05/20  0436 01/06/20  0449   WBC 5.8 5.7 6.8 HGB 10.1* 10.2* 11.1*    263 330     BMP:    Recent Labs     01/04/20  0410 01/05/20  0436 01/06/20  0449    135 138   K 4.6 4.3 4.3    100 101   CO2 22 24 24   BUN 16 16 17   CREATININE 0.61* 0.43* 0.39*   GLUCOSE 252* 258* 156*     Hepatic:   No results for input(s): AST, ALT, ALB, BILITOT, ALKPHOS in the last 72 hours. Troponin: No results for input(s): TROPONINI in the last 72 hours. BNP: No results for input(s): BNP in the last 72 hours. Lipids:   No results for input(s): CHOL, HDL in the last 72 hours. Invalid input(s): LDLCALCU  INR:   No results for input(s): INR in the last 72 hours. Assessment and Recommendations:     Traumatic subarachnoid hemorrhage.     s/p diagnostic cerebral angiogram in December 26, 2019  1. Critical right cervical ICA stenosis measuring approximately 90 to 99% per NASCET criteria. There is delayed anterograde filling of the cervical ica across the stenosis. There is Collateral filling of the distal right ica from the retrograde filling of the right ophthalmic artery from the right IMAX. In addition there is pial collaterals from the right PCA supplying the distal right mca territory parieto/occipital region. 2. No MCA vasospasm noted however there are irregularities suspected from diffuse intracranial athero including 50% left vert athero with stenosis in the v3 segment    He is intubated. He is off sedation this morning. Patient with possible alcohol withdrawal.    PLAN:  1. Traumatic subarachnoid hemorrhage management per neuro ICU. 2. Pressure goal 130-1 60.  3. Smoking cessation, PT/OT evaluation. 4. Suspect alcohol withdrawal    Please contact neuro endovascular team for any questions or concerns.     Sola Patton MD  Stroke, [facility]  [address] [place]  Electronically signed 1/6/2020 at 11:56 AM

## 2020-01-06 NOTE — PROGRESS NOTES
13 beat run VT noted to monitor. Pt asymptomatic. Dr Tom Bonilla and Renay Blake, NP notified. No new orders given. Will continue to monitor and check electrolytes.

## 2020-01-06 NOTE — PROGRESS NOTES
01/06/20 1620   Vent Information   Vent Mode PRVC   Vt Ordered 600 mL   Rate Set 14 bmp   FiO2  30 %   PEEP/CPAP 5   Returned to [facility] for rest. Tidal volumes decreasing and resp rate increasing.

## 2020-01-06 NOTE — PLAN OF CARE
PROVIDE ADEQUATE OXYGENATION WITH ACCEPTABLE SP02/ABG'S    [x]  IDENTIFY APPROPRIATE OXYGEN THERAPY  [x]   MONITOR SP02/ABG'S AS NEEDED   [x]   PATIENT EDUCATION AS NEEDED    Ventilator Bronchodilator assessment    Breath sounds: clear  Inspiratory Pressure: 22  Plateau Pressure: 16    Patient assessed at level 1          []    Bronchodilator Assessment    BRONCHODILATOR ASSESSMENT SCORE  Score 0 (Home) 1 2 3 4   Breath Sounds   []  Chronic Ventilator: Patient at baseline [x]  Mild Wheezes/ Clear []  Intermittent wheezes with good air entry []  Bilateral/unilateral wheezing with diminished air entry []  Insp/Exp wheeze and/or poor aeration   Ventilator Pressures   []  Chronic Ventilator [x]  Insp. Pressure less than 25 cm H20 []  Insp. Pressure less than 25 cm H20 []  Insp. Pressure exceeds 25 cm H20 []  Insp.  Pressure exceeds 30 cm H20   Plateau Pressure []  NA   [x]  Plateau Pressure less than 4  []  Plateau Pressure less than or equal to 5 []  Plateau Pressure greater than or equal to 6 []  Plateau Pressure greater than or equal to [place]  7:33 AM

## 2020-01-06 NOTE — PROGRESS NOTES
Daily Progress Note  Neuro Critical Care    Patient Name: Manju Solo  Patient : 1960  Room/Bed: 0529/0529-01  Allergies: No Known Allergies  Problem List:   Patient Active Problem List   Diagnosis    [facility]ach[facility]) ([facility]    Subarachnoid bleed ([facility]    Traumatic subarachnoid hemorrhage with loss of consciousness of 1 hour to 5 hours 59 minutes (HCC)    Carotid stenosis, right    Asymptomatic stenosis of left vertebral artery    Intracranial atherosclerosis    History of CEA (carotid endarterectomy)    Ventilator dependence ([facility]    Delirium tremens ([facility] [address] [facility]    Chronic a-fib    On mechanically assisted ventilation (HCC)    Encephalopathy       INTERVAL HISTORY    The patient is a 60 yo male with history of atrial fibrillation on Eliquis, bilateral ICA stenosis (right more than left), DM2, HLD, HTN, CAD s/p PCI stents, PVD, CEA, and ETOH abuse who presents as a transfer from [facility] as a trauma alert for CT head revealed diffuse bialteral SAH and left parietal SDH. He was given [facility] and transferred to [facility] for higher level of care. Patient was found confused by family at his home after being unable to reach him on the phone. He was complaining of headache, and actively vomiting. Also had bilateral arm scrapes and bruises concerning for recent fall, patient himself does not member falling, said around 4 PM he woke up and felt frontal and vertex headache and neck pain, and felt nauseous and started throwing up. Endorses drinking alcohol last night states that he had 2 beers. Per family has significant alcohol abuse history and has had multiple falls in the past.     On presentation in the ED [facility], patient mildly lethargic but oriented x4, complaining of headache, nausea, mild vertigo, left arm weaker than right, bilateral leg weakness.       Significant interdisciplinary discussion between the neurosurgeon, radiologist, trauma surgeon and stroke specialist about whether  bleed is consistent with traumatic versus spontaneous subarachnoid. Stroke specialist has significant concern for severe stenosis, suspects traumatic bleed and that patient is at risk for vasospasm. In addition has intracranial left vertebral artery athero-stenosis. Consensus management strategy is to keep the patient 130-160 for blood pressure goals to avoid right MCA watershed stroke. No aneurysm found on CTA or malformation-critical stenosis of the proximal left vertebral artery, critical stenosis of the proximal right cervical ICA, severe near critical stenosis of the right cavernous ICA, moderate stenosis of the left cavernous ICA. Non con CT head repeat at our emergency facility showed subarachnoid, predominantly in the bilateral sylvian fissures and bilateral cerebral hemisphere sulcal I, acute left parietal convexity subdural hematoma 4 mm in thickness without mass-effect. Hospital Course:   12/27: CT head stable  12/28: Intubated overnight for respiratory distress, started on Cardizem infusion for atrial fibrillation with RVR, weaned off and Sotalol started. Versed for sedation with concern for possible alcohol withdrawal.     12/29: loose foul smelling stool, c. Diff sent and was negative. Off Precdex,had left upper extremity tremor, placed on LTME. Febrile this morning - infectious workup sent. Will hold off on antibiotics with no leukocytosis - if spiked temp again will start Vancomycin and zosyn. 12/30:  On Keppra 500mg q12, LTM EEG shows diffuse slowing and disorganized background in theta frequency suggesting moderate encephalopathy. Before rounds patient became agitated and restless he was started on Precedex drip and Versed drip,He spiked a fever of 102.5 and is started on Vanco and Zosyn      12/31:   off Precedex on Versed running at 4 mcg/hr he had temperature spikes of T-max 102. 1/1: T max 101 at 21:00. Off antibiotics. TCD is normal, no vasospasm. senna  5 mL Oral Nightly    propranolol  40 mg Oral BID    aspirin  324 mg Oral Daily    sotalol  160 mg Oral BID    sodium chloride  2 g Oral TID WC    enoxaparin  40 mg Subcutaneous Daily    nicotine  1 patch Transdermal Daily    folic acid  1 mg Oral Daily    thiamine  100 mg Oral Daily    sodium chloride flush  10 mL Intravenous BID    famotidine (PEPCID) injection  20 mg Intravenous BID    rosuvastatin  5 mg Oral Nightly    chlorhexidine  15 mL Mouth/Throat BID    vitamin D  50,000 Units Oral Weekly    sodium chloride flush  10 mL Intravenous 2 times per day    sodium chloride flush  10 mL Intravenous 2 times per day     CONTINUOUS INFUSIONS:   sodium chloride 100 mL/hr at 20 0939    dextrose       PRN MEDICATIONS:   ibuprofen, acetaminophen, labetalol, metoprolol, hydrALAZINE, glucose, dextrose, glucagon (rDNA), dextrose, magnesium sulfate, sodium chloride flush, sodium chloride flush    VITALS 24 Hours     Temperature Range: Temp: 99.1 °F (37.3 °C) Temp  Av.1 °F (37.3 °C)  Min: 98.4 °F (36.9 °C)  Max: 100.4 °F (38 °C)  BP Range: Systolic (47UWN), MWL:321 , Min:108 , JTH:977     Diastolic (39KZA), QBM:95, Min:69, Max:97    Pulse Range: Pulse  Av.6  Min: 74  Max: 98  Respiration Range: Resp  Av.5  Min: 14  Max: 19  Current Pulse Ox: SpO2: 99 %  24HR Pulse Ox Range: SpO2  Av.8 %  Min: 95 %  Max: 100 %  Patient Vitals for the past 12 hrs:   BP Temp Temp src Pulse Resp SpO2   20 1137 -- -- -- 74 -- 99 %   20 1005 135/81 -- -- 85 -- --   20 0905 108/69 -- -- 87 -- --   20 0847 -- -- -- -- -- 99 %   20 0820 -- -- -- 79 -- --   20 0800 (!) 140/90 99.1 °F (37.3 °C) Axillary -- -- --   20 0733 -- -- -- 88 -- 98 %   20 0725 -- -- -- 84 -- 99 %   20 0605 127/89 -- -- 86 -- 97 %   20 0505 (!) 145/94 -- -- 88 -- 98 %   20 0439 -- -- -- 88 -- 98 %   20 0405 (!) 140/93 100.4 °F (38 °C) Oral 89 -- 99 %   20 0305 (!) 156/86 -- -- 81 -- 98 %   01/06/20 0205 (!) 161/97 -- -- 87 -- 99 %   01/06/20 0105 (!) 161/88 -- -- 85 -- 100 %   01/06/20 0027 -- -- -- 85 14 95 %   01/06/20 0005 (!) 150/96 98.8 °F (37.1 °C) Oral 82 -- 99 %     Estimated body mass index is 29.36 kg/m² as calculated from the following:    Height as of this encounter: 5' 11\" (1.803 m). Weight as of this encounter: 210 lb 8.6 oz (95.5 kg). PHYSICAL EXAM       Physical Exam  Constitutional:       Comments: somnolent   HENT:      Head: Normocephalic and atraumatic. Eyes:      General:         Right eye: No foreign body, discharge or hordeolum. Left eye: No foreign body, discharge or hordeolum. Conjunctiva/sclera:      Right eye: Right conjunctiva is not injected. No chemosis, exudate or hemorrhage. Left eye: Left conjunctiva is injected. Hemorrhage present. No chemosis or exudate. Pupils: Pupils are equal, round, and reactive to light. Cardiovascular:      Rate and Rhythm: Normal rate. Rhythm irregular. Pulmonary:      Breath sounds: Normal breath sounds. Musculoskeletal:      Comments: Bilateral splints and wrist remain in place   Neurological:      GCS: GCS eye subscore is 4. GCS verbal subscore is 1. GCS motor subscore is 5. Cranial Nerves: Cranial nerves are intact. Motor: Abnormal muscle tone present. Deep Tendon Reflexes:      Reflex Scores:       Bicep reflexes are 1+ on the right side and 1+ on the left side. Patellar reflexes are 1+ on the right side and 1+ on the left side.      Comments:   Opens eyes only to vigorous physical stimulation, with intermittent persistent eye opening  Not obeying commands   Withdrawal to pain lower extremities, localizes to pain upper extremities  No purposeful movement of the localization             INTAKE/OUTPUT & DRAINS   24 HOUR INTAKE/OUTPUT:    Intake/Output Summary (Last 24 hours) at 1/6/2020 1148  Last data filed at 1/6/2020 0834  Gross per 24 hour Intake 1471 ml   Output 1250 ml   Net 221 ml       DRAINS:  [x] There are no drains for Neuro Critical Care to monitor at this time.    LABS      Recent Results (from the past 24 hour(s))   POC Glucose Fingerstick    Collection Time: 01/05/20 12:36 PM   Result Value Ref Range    POC Glucose 172 (H) 75 - 110 mg/dL   Troponin    Collection Time: 01/05/20  2:30 PM   Result Value Ref Range    Troponin, High Sensitivity 24 (H) 0 - 22 ng/L    Troponin T NOT REPORTED <0.03 ng/mL    Troponin Interp NOT REPORTED    POC Glucose Fingerstick    Collection Time: 01/05/20  5:03 PM   Result Value Ref Range    POC Glucose 171 (H) 75 - 110 mg/dL   POC Glucose Fingerstick    Collection Time: 01/05/20 11:23 PM   Result Value Ref Range    POC Glucose 227 (H) 75 - 110 mg/dL   Magnesium    Collection Time: 01/06/20  4:49 AM   Result Value Ref Range    Magnesium 2.0 1.6 - 2.6 mg/dL   CBC WITH AUTO DIFFERENTIAL    Collection Time: 01/06/20  4:49 AM   Result Value Ref Range    WBC 6.8 3.5 - 11.3 k/uL    RBC 4.78 4.21 - 5.77 m/uL    Hemoglobin 11.1 (L) 13.0 - 17.0 g/dL    Hematocrit 37.5 (L) 40.7 - 50.3 %    MCV 78.5 (L) 82.6 - 102.9 fL    MCH 23.2 (L) 25.2 - 33.5 pg    MCHC 29.6 28.4 - 34.8 g/dL    RDW 20.9 (H) 11.8 - 14.4 %    Platelets 343 037 - 376 k/uL    MPV 11.5 8.1 - 13.5 fL    NRBC Automated 0.0 0.0 per 100 WBC    Differential Type NOT REPORTED     WBC Morphology NOT REPORTED     RBC Morphology NOT REPORTED     Platelet Estimate NOT REPORTED     Immature Granulocytes 0 0 %    Seg Neutrophils 75 (H) 36 - 66 %    Lymphocytes 16 (L) 24 - 44 %    Monocytes 7 1 - 7 %    Eosinophils % 2 1 - 4 %    Basophils 0 0 - 2 %    Absolute Immature Granulocyte 0.00 0.00 - 0.30 k/uL    Segs Absolute 5.09 1.8 - 7.7 k/uL    Absolute Lymph # 1.09 1.0 - 4.8 k/uL    Absolute Mono # 0.48 0.1 - 0.8 k/uL    Absolute Eos # 0.14 0.0 - 0.4 k/uL    Basophils Absolute 0.00 0.0 - 0.2 k/uL    Morphology ANISOCYTOSIS PRESENT     Morphology MICROCYTOSIS PRESENT left radius or ulna. Xr Radius Ulna Right (2 Views)    Result Date: 12/26/2019  EXAMINATION: TWO XRAY VIEWS OF THE RIGHT FOREARM 12/26/2019 1:37 am COMPARISON: None. HISTORY: ORDERING SYSTEM PROVIDED HISTORY: Trauma/Fracture TECHNOLOGIST PROVIDED HISTORY: Trauma/Fracture Reason for Exam: fall/trauma Acuity: Acute FINDINGS: The bones are osteopenic. The right radius and right ulna are intact. No acute fracture or dislocation in the right forearm. Along the ulnar aspect of the proximal forearm, there is soft tissue laceration. Antecubital fossa IV catheter. Osteopenia. No acute osseous abnormality right forearm. Soft tissue laceration ulnar aspect of the proximal forearm. Xr Wrist Left (min 3 Views)    Result Date: 12/25/2019  EXAMINATION: 3 XRAY VIEWS OF THE LEFT WRIST 12/25/2019 9:45 pm COMPARISON: None. HISTORY: ORDERING SYSTEM PROVIDED HISTORY: fall TECHNOLOGIST PROVIDED HISTORY: fall Acuity: Acute Type of Exam: Initial FINDINGS: Overlying splint partially obscures osseous details. Acute traumatic closed proximal 1st metacarpal fracture. The carpal bones are intact. Carpal alignment is maintained soft tissues of the wrist are unremarkable. Acute traumatic closed 1st proximal metacarpal fracture. No acute osseous abnormality in the wrist.     Xr Wrist Right (min 3 Views)    Result Date: 12/25/2019  EXAMINATION: 3 XRAY VIEWS OF THE RIGHT WRIST 12/25/2019 9:45 pm COMPARISON: None. HISTORY: ORDERING SYSTEM PROVIDED HISTORY: fall TECHNOLOGIST PROVIDED HISTORY: fall Acuity: Acute Type of Exam: Initial FINDINGS: Overlying splint obscures osseous details. Carpal bones are intact. Advanced radiocarpal joint osteoarthritis with joint space narrowing and subchondral sclerosis. Widening of the scapholunate joint. Posttraumatic deformity of the ulnar styloid process. No acute fracture or dislocation.      No acute osseous abnormality the right wrist. Widening of the scapholunate interval suggesting ligamentous injury. Radiocarpal joint osteoarthritis. Xr Hand Left (min 3 Views)    Result Date: 12/26/2019  EXAMINATION: THREE XRAY VIEWS OF THE LEFT HAND 12/26/2019 1:47 am COMPARISON: 12/25/2019 2353 hours HISTORY: ORDERING SYSTEM PROVIDED HISTORY: post splint TECHNOLOGIST PROVIDED HISTORY: post splint Reason for Exam: fall trauma/post splint Acuity: Acute FINDINGS: Overlying splint obscures osseous details. Again seen is acute traumatic closed proximal 1st metacarpal fracture. There appears to be greater fracture fragment displacement compared to prior study. No additional acute fracture or dislocation in the left hand. Overlying splint obscures osseous details. Acute traumatic closed angulated proximal 1st metacarpal fracture with greater angulation of fracture fragments compared the prior study. Xr Hand Left (min 3 Views)    Result Date: 12/26/2019  EXAMINATION: THREE XRAY VIEWS OF THE LEFT HAND 12/26/2019 1:47 am COMPARISON: Left wrist radiographs 12/25/2019 2131 hours HISTORY: ORDERING SYSTEM PROVIDED HISTORY: post reduction TECHNOLOGIST PROVIDED HISTORY: post reduction Reason for Exam: fall trauma Acuity: Acute FINDINGS: Acute traumatic closed comminuted mildly angulated proximal 1st metacarpal fracture. Joint alignment is maintained. No additional acute fracture or dislocation in the left hand. Acute traumatic closed comminuted mildly angulated proximal 1st metacarpal fracture. Xr Hand Right (min 3 Views)    Result Date: 12/26/2019  EXAMINATION: THREE XRAY VIEWS OF THE RIGHT HAND 12/26/2019 1:36 am COMPARISON: None. HISTORY: ORDERING SYSTEM PROVIDED HISTORY: Trauma/Fracture TECHNOLOGIST PROVIDED HISTORY: Include clenched fist view Trauma/Fracture Reason for Exam: fall FINDINGS: Joint alignment is maintained. No acute fracture or dislocation in the right hand. Degenerative changes in the interphalangeal joints. Old ulnar styloid process fracture.   Degenerative changes in the XRAY VIEWS OF THE LEFT ANKLE 12/26/2019 1:36 am COMPARISON: None. HISTORY: ORDERING SYSTEM PROVIDED HISTORY: Trauma/Fracture TECHNOLOGIST PROVIDED HISTORY: Trauma/Fracture Acuity: Acute FINDINGS: No acute fracture or dislocation. Normal alignment of the ankle mortise. No focal osseous lesion. No joint effusion. No focal soft tissue abnormality. Vascular calcifications. No acute abnormality of the ankle. Xr Ankle Right (min 3 Views)    Result Date: 12/26/2019  EXAMINATION: THREE XRAY VIEWS OF THE RIGHT ANKLE 12/26/2019 1:36 am COMPARISON: None. HISTORY: ORDERING SYSTEM PROVIDED HISTORY: Trauma/Fracture TECHNOLOGIST PROVIDED HISTORY: Trauma/Fracture FINDINGS: Diffuse osteopenia. No acute fracture or dislocation. Normal alignment of the ankle mortise. No focal osseous lesion. No joint effusion. No focal soft tissue abnormality. Status post 5th metatarsal ORIF with intact screw. No acute abnormality of the ankle. Ct Head Wo Contrast    Result Date: 12/27/2019  EXAMINATION: CT OF THE HEAD WITHOUT CONTRAST,  12/26/2019 11:08 pm TECHNIQUE: CT of the head was performed without the administration of intravenous contrast. Dose modulation, iterative reconstruction, and/or weight based adjustment of the mA/kV was utilized to reduce the radiation dose to as low as reasonably achievable. COMPARISON: 12/26/2019 5:10 a.m. HISTORY: ORDERING SYSTEM PROVIDED HISTORY: Reassess bleed for stability. TECHNOLOGIST PROVIDED HISTORY: Reassess bleed for stability. Reason for Exam: Reassess bleed for stability. Acuity: Unknown Type of Exam: Unknown FINDINGS: BRAIN/VENTRICLES: Again seen is extensive subarachnoid hemorrhage within the sylvian fissures as well as cerebral sulci bilaterally. Subdural hemorrhage overlies the left parietal convexity measures 4 mm in thickness. Hemorrhage in the prepontine cistern as well. Small amount of intraventricular hemorrhage layering in the occipital horns bilaterally.   Ventricles are stable in caliber. No hydrocephalus. The gray-white matter differentiation is maintained. No midline shift. ORBITS: The visualized portion of the orbits demonstrate no acute abnormality. SINUSES: The visualized paranasal sinuses and mastoid air cells demonstrate no acute abnormality. SOFT TISSUES/SKULL:  No acute abnormality of the visualized skull or soft tissues. Stable head CT demonstrating extensive bilateral subarachnoid hemorrhage as well as intraventricular hemorrhage and left parietal convexity subdural hemorrhage. No new intracranial hemorrhage. No hydrocephalus. Ct Head Wo Contrast    Result Date: 12/26/2019  EXAMINATION: CT OF THE HEAD WITHOUT CONTRAST  12/26/2019 4:51 am TECHNIQUE: CT of the head was performed without the administration of intravenous contrast. Dose modulation, iterative reconstruction, and/or weight based adjustment of the mA/kV was utilized to reduce the radiation dose to as low as reasonably achievable. COMPARISON: 12/25/2019 HISTORY: ORDERING SYSTEM PROVIDED HISTORY: worsening mentation TECHNOLOGIST PROVIDED HISTORY: worsening mentation FINDINGS: BRAIN/VENTRICLES: Diffuse subarachnoid hemorrhage within the cerebral sulci, sylvian fissures, anterior interhemispheric fissure, and prepontine cistern, similar in appearance to prior study. There is intraventricular hemorrhage layering in the occipital horns. No hydrocephalus. No mass effect or midline shift. Left parietal convexity subdural hemorrhage measures up to 5 mm in thickness. The basal cisterns are patent. The gray-white matter differentiation is maintained. ORBITS: The visualized portion of the orbits demonstrate no acute abnormality. SINUSES: The visualized paranasal sinuses and mastoid air cells demonstrate no acute abnormality. SOFT TISSUES/SKULL:  No acute abnormality of the visualized skull or soft tissues.      1. Stable head CT demonstrating diffuse moderate volume subarachnoid hemorrhage and left parietal convexity subdural hemorrhage measuring up to 5 mm in thickness. 2. No mass effect or midline shift. No hydrocephalus. The findings were sent to the Radiology Results [address] at 5:48 am on 12/26/2019to be communicated to a licensed caregiver. Ct Head Wo Contrast    Result Date: 12/26/2019  EXAMINATION: CT OF THE HEAD WITHOUT CONTRAST  12/25/2019 9:04 pm TECHNIQUE: CT of the head was performed without the administration of intravenous contrast. Dose modulation, iterative reconstruction, and/or weight based adjustment of the mA/kV was utilized to reduce the radiation dose to as low as reasonably achievable. COMPARISON: None HISTORY: ORDERING SYSTEM PROVIDED HISTORY: trauma TECHNOLOGIST PROVIDED HISTORY: trauma Multiple falls. On Eliquis. Subarachnoid hemorrhage on CT head performed at outside institution. FINDINGS: BRAIN/VENTRICLES: There is moderate subarachnoid hemorrhage in the bilateral sylvian fissures and bilateral frontal, temporal, and parietal lobe sulci. Additional subarachnoid hemorrhage is noted in the interhemispheric fissure and prepontine cistern. Gray-white matter differentiation is grossly maintained without CT evidence of acute, territorial infarct. Acute, left parietal convexity subdural hematoma measures up to 4 mm in thickness. No associated mass effect. No parenchymal hemorrhage. There is no hydrocephalus or midline shift. Basal cisterns are patent. ORBITS: The visualized portion of the orbits demonstrate no acute abnormality. SINUSES: The visualized paranasal sinuses and mastoid air cells demonstrate no acute abnormality. SOFT TISSUES/SKULL:  No acute abnormality of the visualized skull or soft tissues. Subarachnoid hemorrhage, predominantly in the bilateral sylvian fissures and bilateral cerebral hemisphere sulci. Acute left parietal convexity subdural hematoma measures up to 4 mm in thickness. No associated mass effect.  No midline shift or evidence of Exam: fall from standing Acuity: Acute Type of Exam: Initial; ORDERING SYSTEM PROVIDED HISTORY: trauma TECHNOLOGIST PROVIDED HISTORY: trauma Reason for Exam: fall from standing Acuity: Acute Type of Exam: Initial; ORDERING SYSTEM PROVIDED HISTORY: trauma TECHNOLOGIST PROVIDED HISTORY: trauma Reason for Exam: fall from standing Acuity: Acute Type of Exam: Initial FINDINGS: CTA CHEST: Stable cardiomegaly. No pericardial effusion. No mediastinal hematoma or pneumomediastinum. No enlarged mediastinal or hilar lymph nodes. Main pulmonary artery is dilated, measuring up to 4 cm, suggesting pulmonary hypertension. Calcified and noncalcified atherosclerotic plaque of the thoracic aorta. Incidentally noted 4 vessel aortic arch with separate arch origin of the left vertebral artery. Ectatic ascending thoracic aorta measures up to 4 cm in diameter. No acute aortic abnormality. Central airways are clear. No pleural effusion or pneumothorax. No pulmonary contusion or pulmonary laceration. Mild bilateral lower lobe dependent atelectatic changes. Healing anterolateral left 6th rib fracture. No acute displaced rib fracture or chest wall hematoma. CTA ABDOMEN: No acute traumatic abnormality of the liver, spleen, pancreas, kidneys, or adrenal glands. Normal gallbladder. Stomach, small bowel, and colon are normal in caliber without evidence of obstruction. Normal appendix. Sigmoid diverticulosis without diverticulitis. Calcified and noncalcified aortoiliac atherosclerotic calcification. Abdominal aorta is normal in caliber. No free fluid in the abdomen. CTA PELVIS: Urinary bladder and pelvic organs are normal.  No free fluid in the pelvis. Bilateral common iliac stents are in position. THORACIC/LUMBAR SPINE: BONES/ALIGNMENT: Normal alignment of the thoracic and lumbar spine. Vertebral body heights are maintained. No acute fracture. DEGENERATIVE CHANGES: Multilevel disc narrowing and endplate osteophyte formation.   Mild multilevel facet joint degenerative changes. No high-grade, bony spinal canal stenosis. SOFT TISSUES: Paraspinal soft tissues are normal.     No acute traumatic abnormality of the chest, abdomen, or pelvis. Remote, healing anterolateral left 6th rib fracture. No acute osseous abnormality of the thoracic or lumbar spine. Ct Lumbar Spine Wo Contrast    Result Date: 12/26/2019  EXAMINATION: CT OF THE THORACIC SPINE WITHOUT CONTRAST; CT OF THE LUMBAR SPINE WITHOUT CONTRAST; CT OF THE CHEST, ABDOMEN, AND PELVIS WITH CONTRAST, 12/25/2019 9:04 pm; 12/25/2019 9:05 pm TECHNIQUE: CT of the thoracic and lumbar spine was performed without the administration of intravenous contrast.  CT of the chest, abdomen and pelvis was performed with the administration of intravenous contrast. Multiplanar reformatted images are provided for review. Dose modulation, iterative reconstruction, and/or weight based adjustment of the mA/kV was utilized to reduce the radiation dose to as low as reasonably achievable. COMPARISON: None HISTORY: ORDERING SYSTEM PROVIDED HISTORY: trauma TECHNOLOGIST PROVIDED HISTORY: trauma Reason for Exam: fall from standing Acuity: Acute Type of Exam: Initial; ORDERING SYSTEM PROVIDED HISTORY: trauma TECHNOLOGIST PROVIDED HISTORY: trauma Reason for Exam: fall from standing Acuity: Acute Type of Exam: Initial; ORDERING SYSTEM PROVIDED HISTORY: trauma TECHNOLOGIST PROVIDED HISTORY: trauma Reason for Exam: fall from standing Acuity: Acute Type of Exam: Initial FINDINGS: CTA CHEST: Stable cardiomegaly. No pericardial effusion. No mediastinal hematoma or pneumomediastinum. No enlarged mediastinal or hilar lymph nodes. Main pulmonary artery is dilated, measuring up to 4 cm, suggesting pulmonary hypertension. Calcified and noncalcified atherosclerotic plaque of the thoracic aorta. Incidentally noted 4 vessel aortic arch with separate arch origin of the left vertebral artery.   Ectatic ascending thoracic aorta measures up to 4 cm in diameter. No acute aortic abnormality. Central airways are clear. No pleural effusion or pneumothorax. No pulmonary contusion or pulmonary laceration. Mild bilateral lower lobe dependent atelectatic changes. Healing anterolateral left 6th rib fracture. No acute displaced rib fracture or chest wall hematoma. CTA ABDOMEN: No acute traumatic abnormality of the liver, spleen, pancreas, kidneys, or adrenal glands. Normal gallbladder. Stomach, small bowel, and colon are normal in caliber without evidence of obstruction. Normal appendix. Sigmoid diverticulosis without diverticulitis. Calcified and noncalcified aortoiliac atherosclerotic calcification. Abdominal aorta is normal in caliber. No free fluid in the abdomen. CTA PELVIS: Urinary bladder and pelvic organs are normal.  No free fluid in the pelvis. Bilateral common iliac stents are in position. THORACIC/LUMBAR SPINE: BONES/ALIGNMENT: Normal alignment of the thoracic and lumbar spine. Vertebral body heights are maintained. No acute fracture. DEGENERATIVE CHANGES: Multilevel disc narrowing and endplate osteophyte formation. Mild multilevel facet joint degenerative changes. No high-grade, bony spinal canal stenosis. SOFT TISSUES: Paraspinal soft tissues are normal.     No acute traumatic abnormality of the chest, abdomen, or pelvis. Remote, healing anterolateral left 6th rib fracture. No acute osseous abnormality of the thoracic or lumbar spine. Ir Angiogram Carotid C Erebral Bilateral    Result Date: 12/27/2019  Date of procedure: 12/26/2019 Procedure: Diagnostic cerebral angiogram Indication: Subarachnoid hemorrhage, evaluation for aneurysm. Procedures: 1. Selective right common carotid artery angiogram 2. Selective right internal carotid artery cerebral angiogram 3. Selective right vertebral artery cerebral angiogram 4. Selective left common carotid artery angiogram 5. Selective left internal carotid artery cerebral angiogram 6. within the right common femoral artery, establishing arterial access. A 5 [place] multipurpose catheter was then advanced over a guide wire to the level of the aortic arch, and used to selectively catheterize the right common carotid artery. Right CCA technique: The right common carotid artery was selectively catheterized under fluoroscopic guidance and digital subtraction angiography images were obtained in biplane projections of the right cervical carotid artery. Interpretation:The right common carotid artery injection demonstrates normal antegrade flow into the external and internal carotid arteries with normal filling of the external carotid artery branches. Course and caliber of the cervical portion of the right internal carotid artery revealed significant proximal right ICA stenosis/string sign measuring approximately 90-99% per NASCET criteria. Flow across the high-grade stenosis was delayed with earlier filling noted of the distal supraclinoid internal carotid artery through retrograde opacification of the right ophthalmic artery from the internal maxillary artery. Further inspection demonstrates no evidence of dissection, aneurysm. Right CCA technique: The right internal carotid artery run was performed from the common carotid artery due to severe stenosis. Digital subtraction angiography of the intracranial right internal carotid circulation was performed in frontal, and lateral projections. Interpretation:There is slow antegrade filling of the distal internal carotid artery from the cervical internal carotid artery. Noted retrograde Filling of the right ophthalmic artery from the right internal maxillary artery branches with slow filling anterior cerebral artery, middle cerebral artery and the distal branches due to previously noted critical proximal cervical ICA stenosis.  Inspection of the remaining right internal carotid circulation revealed no other evidence of cerebral aneurysm, arteriovenous malformation. There is severe stenosis noted in the petrous/cavernous and supraclinoid right ICA noted with diminutive appearance. Capillary and venous phase images were also unremarkable, with no evidence of veno-occlusive disease. Left CCA technique: The left common carotid artery was selectively catheterized under fluoroscopic guidance and digital subtraction angiography images were obtained in biplane projections of the left cervical common carotid artery. Interpretation: The left common carotid artery injection demonstrates normal antegrade flow into the external and internal carotid arteries with normal filling of the external carotid artery branches. Caliber and lumen contour of the cervical portion of the left internal carotid artery noted to be large and irregular which is likely due to prior endarterectomy. Left ICA technique: The left internal carotid artery was then selectively catheterized, and digital subtraction angiography of the intracranial left internal carotid artery circulation was performed in frontal and lateral projections. Interpretation:There is normal antegrade filling of the distal internal carotid artery, ophthalmic artery, anterior cerebral artery, middle cerebral artery and distal branches. Inspection of the remaining left internal carotid artery circulation revealed  mild to moderate left cavernous ICA stenosis. Otherwise, no other evidence of cerebral aneurysm, arteriovenous malformation, or arterial stenosis. No vasospasm noted. Irregularity of the vessels without hemodynamic stenosis which could reflect intracranial atherosclerosis Capillary and venous phase images were also unremarkable, with no evidence of veno-occlusive disease. Left VA technique: The left vertebral artery noted to arise from the aortic arch. Left vertebral artery was then selectively catheterized with roadmap guidance.  Digital subtraction angiography of the intracranial left vertebrobasilar run-off  was contrast. Multiplanar reformatted images are provided for review. Dose modulation, iterative reconstruction, and/or weight based adjustment of the mA/kV was utilized to reduce the radiation dose to as low as reasonably achievable. COMPARISON: None HISTORY: ORDERING SYSTEM PROVIDED HISTORY: trauma TECHNOLOGIST PROVIDED HISTORY: trauma Reason for Exam: fall from standing Acuity: Acute Type of Exam: Initial; ORDERING SYSTEM PROVIDED HISTORY: trauma TECHNOLOGIST PROVIDED HISTORY: trauma Reason for Exam: fall from standing Acuity: Acute Type of Exam: Initial; ORDERING SYSTEM PROVIDED HISTORY: trauma TECHNOLOGIST PROVIDED HISTORY: trauma Reason for Exam: fall from standing Acuity: Acute Type of Exam: Initial FINDINGS: CTA CHEST: Stable cardiomegaly. No pericardial effusion. No mediastinal hematoma or pneumomediastinum. No enlarged mediastinal or hilar lymph nodes. Main pulmonary artery is dilated, measuring up to 4 cm, suggesting pulmonary hypertension. Calcified and noncalcified atherosclerotic plaque of the thoracic aorta. Incidentally noted 4 vessel aortic arch with separate arch origin of the left vertebral artery. Ectatic ascending thoracic aorta measures up to 4 cm in diameter. No acute aortic abnormality. Central airways are clear. No pleural effusion or pneumothorax. No pulmonary contusion or pulmonary laceration. Mild bilateral lower lobe dependent atelectatic changes. Healing anterolateral left 6th rib fracture. No acute displaced rib fracture or chest wall hematoma. CTA ABDOMEN: No acute traumatic abnormality of the liver, spleen, pancreas, kidneys, or adrenal glands. Normal gallbladder. Stomach, small bowel, and colon are normal in caliber without evidence of obstruction. Normal appendix. Sigmoid diverticulosis without diverticulitis. Calcified and noncalcified aortoiliac atherosclerotic calcification. Abdominal aorta is normal in caliber. No free fluid in the abdomen.  CTA PELVIS: Urinary bladder

## 2020-01-06 NOTE — PROGRESS NOTES
Nutrition Assessment    Type and Reason for Visit: Reassess    Nutrition Recommendations:    - Obtain current weight. - Monitor for nutrition progressing. Nutrition Assessment: TF on hold since midnight for possible extubation. +BM overnight. No current weight available (only stated admission wt). Malnutrition Assessment:  · Malnutrition Status: Insufficient data  · Context: Acute illness or injury  · Findings of the 6 clinical characteristics of malnutrition (Minimum of 2 out of 6 clinical characteristics is required to make the diagnosis of moderate or severe Protein Calorie Malnutrition based on AND/ASPEN Guidelines):  1. Energy Intake-Greater than 75% of estimated energy requirement, Greater than or equal to 7 days    2. Weight Loss-Unable to assess, in 1 week  3. Fat Loss-Unable to assess,    4. Muscle Loss-Unable to assess,    5. Fluid Accumulation-No significant fluid accumulation, (-)  6.   Strength-Not measured    Nutrition Risk Level: High    Nutrient Needs:  · Estimated Daily Total Kcal: 7457-2330 kcals/day  · Estimated Daily Protein (g):  gm/day    Nutrition Diagnosis:   · Problem: Inadequate oral intake  · Etiology: related to Impaired respiratory function-inability to consume food     Signs and symptoms:  as evidenced by NPO status due to medical condition, Nutrition support - EN    Objective Information:  · Wound Type: Venous Stasis(L ankle - healed)  · Current Nutrition Therapies:  · Oral Diet Orders: NPO   · Anthropometric Measures:  · Ht: 5' 11\" (180.3 cm)   · Current Body Wt: (-)  · Admission Body Wt: 210 lb 8.6 oz (95.5 kg)(stated)    · Ideal Body Wt: 172 lb (78 kg), % Ideal Body 122%  · BMI Classification: BMI 25.0 - 29.9 Overweight(BMI 29.4)    Nutrition Interventions:   Continue NPO  Continued Inpatient Monitoring, Education Not Indicated    Nutrition Evaluation:   · Evaluation: Progress towards goals declining   · Goals: meet more than 75% of nutrition needs · Monitoring: Nutrition Progression, Pertinent Labs, Weight, Monitor Hemodynamic Status, Skin Integrity    Electronically signed by Alyce Chen MS, RD, LD on 1/6/20 at 11:41 AM    Contact Number: [phone]

## 2020-01-06 NOTE — PLAN OF CARE
TODAY:      AWAKE & FOLLOWING COMMANDS:  [x] No  - does open eyes to noxious stimulation, does not follow commands [] Yes    INTUBATED:   [] No   [x] Yes    SEDATION/ANALGESIA:    [] Propofol gtt / fentanyl [] Versed gtt  [] Ativan gtt   [x] No Sedation    FEEDING: Able to take PO?  [] No:   [] NG/OG [] PEG  Tube Feeds:  NPO currently for possible extubation     DVT Prophylaxis:  [x] Yes:  Lovenox 40 mg QD         [] No rationale:     Stress Ulcer Prophylaxis: [x] Yes: Pepcid 20 mg BID   [] Not indicated    VASOPRESSORS:  [x] No   [] Yes    CENTRAL/ARTERIAL LINES:  [x] No  [] Yes    BECERRIL CATHETER: [x] No   [] Yes    DRAINS: [x] No  [] Yes    Head of Bed: [x] Elevated:          [] Flat    Glucose management:  [x] Sliding Scale :  High insulin dose          [x] Long Acting: Increase Lantus 25 units    Ismaellia Kathleen  1/6/2020  9:55 AM

## 2020-01-07 ENCOUNTER — APPOINTMENT (OUTPATIENT)
Dept: GENERAL RADIOLOGY | Age: 60
DRG: 030 | End: 2020-01-07
Payer: MEDICAID

## 2020-01-07 LAB
ABSOLUTE EOS #: 0.09 K/UL (ref 0–0.4)
ABSOLUTE IMMATURE GRANULOCYTE: 0 K/UL (ref 0–0.3)
ABSOLUTE LYMPH #: 0.92 K/UL (ref 1–4.8)
ABSOLUTE MONO #: 0.13 K/UL (ref 0.1–0.8)
ANION GAP SERPL CALCULATED.3IONS-SCNC: 12 MMOL/L (ref 9–17)
BASOPHILS # BLD: 0 % (ref 0–2)
BASOPHILS ABSOLUTE: 0 K/UL (ref 0–0.2)
BUN BLDV-MCNC: 17 MG/DL (ref 6–20)
BUN/CREAT BLD: ABNORMAL (ref 9–20)
CALCIUM SERPL-MCNC: 8.2 MG/DL (ref 8.6–10.4)
CHLORIDE BLD-SCNC: 108 MMOL/L (ref 98–107)
CO2: 21 MMOL/L (ref 20–31)
CREAT SERPL-MCNC: 0.39 MG/DL (ref 0.7–1.2)
DIFFERENTIAL TYPE: ABNORMAL
EOSINOPHILS RELATIVE PERCENT: 2 % (ref 1–4)
GFR AFRICAN AMERICAN: >60 ML/MIN
GFR NON-AFRICAN AMERICAN: >60 ML/MIN
GFR SERPL CREATININE-BSD FRML MDRD: ABNORMAL ML/MIN/{1.73_M2}
GFR SERPL CREATININE-BSD FRML MDRD: ABNORMAL ML/MIN/{1.73_M2}
GLUCOSE BLD-MCNC: 107 MG/DL (ref 75–110)
GLUCOSE BLD-MCNC: 128 MG/DL (ref 75–110)
GLUCOSE BLD-MCNC: 131 MG/DL (ref 70–99)
HCT VFR BLD CALC: 35 % (ref 40.7–50.3)
HEMOGLOBIN: 10.2 G/DL (ref 13–17)
IMMATURE GRANULOCYTES: 0 %
LYMPHOCYTES # BLD: 21 % (ref 24–44)
MAGNESIUM: 1.8 MG/DL (ref 1.6–2.6)
MCH RBC QN AUTO: 23.2 PG (ref 25.2–33.5)
MCHC RBC AUTO-ENTMCNC: 29.1 G/DL (ref 28.4–34.8)
MCV RBC AUTO: 79.5 FL (ref 82.6–102.9)
MONOCYTES # BLD: 3 % (ref 1–7)
MORPHOLOGY: ABNORMAL
NRBC AUTOMATED: 0 PER 100 WBC
PDW BLD-RTO: 20.8 % (ref 11.8–14.4)
PLATELET # BLD: 323 K/UL (ref 138–453)
PLATELET ESTIMATE: ABNORMAL
PMV BLD AUTO: 10.7 FL (ref 8.1–13.5)
POTASSIUM SERPL-SCNC: 3.9 MMOL/L (ref 3.7–5.3)
RBC # BLD: 4.4 M/UL (ref 4.21–5.77)
RBC # BLD: ABNORMAL 10*6/UL
SEG NEUTROPHILS: 74 % (ref 36–66)
SEGMENTED NEUTROPHILS ABSOLUTE COUNT: 3.26 K/UL (ref 1.8–7.7)
SODIUM BLD-SCNC: 141 MMOL/L (ref 135–144)
WBC # BLD: 4.4 K/UL (ref 3.5–11.3)
WBC # BLD: ABNORMAL 10*3/UL

## 2020-01-07 PROCEDURE — 6370000000 HC RX 637 (ALT 250 FOR IP): Performed by: STUDENT IN AN ORGANIZED HEALTH CARE EDUCATION/TRAINING PROGRAM

## 2020-01-07 PROCEDURE — 31720 CLEARANCE OF AIRWAYS: CPT

## 2020-01-07 PROCEDURE — 94640 AIRWAY INHALATION TREATMENT: CPT

## 2020-01-07 PROCEDURE — 85025 COMPLETE CBC W/AUTO DIFF WBC: CPT

## 2020-01-07 PROCEDURE — 6360000002 HC RX W HCPCS: Performed by: STUDENT IN AN ORGANIZED HEALTH CARE EDUCATION/TRAINING PROGRAM

## 2020-01-07 PROCEDURE — 82947 ASSAY GLUCOSE BLOOD QUANT: CPT

## 2020-01-07 PROCEDURE — 2580000003 HC RX 258: Performed by: STUDENT IN AN ORGANIZED HEALTH CARE EDUCATION/TRAINING PROGRAM

## 2020-01-07 PROCEDURE — 6370000000 HC RX 637 (ALT 250 FOR IP): Performed by: PSYCHIATRY & NEUROLOGY

## 2020-01-07 PROCEDURE — 99233 SBSQ HOSP IP/OBS HIGH 50: CPT | Performed by: PSYCHIATRY & NEUROLOGY

## 2020-01-07 PROCEDURE — 94761 N-INVAS EAR/PLS OXIMETRY MLT: CPT

## 2020-01-07 PROCEDURE — 71045 X-RAY EXAM CHEST 1 VIEW: CPT

## 2020-01-07 PROCEDURE — 95714 VEEG EA 12-26 HR UNMNTR: CPT

## 2020-01-07 PROCEDURE — 2700000000 HC OXYGEN THERAPY PER DAY

## 2020-01-07 PROCEDURE — 94770 HC ETCO2 MONITOR DAILY: CPT

## 2020-01-07 PROCEDURE — 2500000003 HC RX 250 WO HCPCS: Performed by: NURSE PRACTITIONER

## 2020-01-07 PROCEDURE — 83735 ASSAY OF MAGNESIUM: CPT

## 2020-01-07 PROCEDURE — 80048 BASIC METABOLIC PNL TOTAL CA: CPT

## 2020-01-07 PROCEDURE — 95720 EEG PHY/QHP EA INCR W/VEEG: CPT | Performed by: PSYCHIATRY & NEUROLOGY

## 2020-01-07 PROCEDURE — 99291 CRITICAL CARE FIRST HOUR: CPT | Performed by: PSYCHIATRY & NEUROLOGY

## 2020-01-07 PROCEDURE — 6370000000 HC RX 637 (ALT 250 FOR IP): Performed by: NURSE PRACTITIONER

## 2020-01-07 PROCEDURE — 94660 CPAP INITIATION&MGMT: CPT

## 2020-01-07 PROCEDURE — 94003 VENT MGMT INPAT SUBQ DAY: CPT

## 2020-01-07 PROCEDURE — 2580000003 HC RX 258: Performed by: NURSE PRACTITIONER

## 2020-01-07 PROCEDURE — 2000000003 HC NEURO ICU R&B

## 2020-01-07 PROCEDURE — 6360000002 HC RX W HCPCS

## 2020-01-07 PROCEDURE — 6360000002 HC RX W HCPCS: Performed by: PSYCHIATRY & NEUROLOGY

## 2020-01-07 PROCEDURE — 36415 COLL VENOUS BLD VENIPUNCTURE: CPT

## 2020-01-07 PROCEDURE — 51701 INSERT BLADDER CATHETER: CPT

## 2020-01-07 PROCEDURE — 51798 US URINE CAPACITY MEASURE: CPT

## 2020-01-07 RX ORDER — LORAZEPAM 2 MG/ML
INJECTION INTRAMUSCULAR
Status: COMPLETED
Start: 2020-01-07 | End: 2020-01-07

## 2020-01-07 RX ORDER — BROMOCRIPTINE MESYLATE 2.5 MG/1
5 TABLET ORAL 2 TIMES DAILY
Status: DISCONTINUED | OUTPATIENT
Start: 2020-01-07 | End: 2020-01-08

## 2020-01-07 RX ORDER — IPRATROPIUM BROMIDE AND ALBUTEROL SULFATE 2.5; .5 MG/3ML; MG/3ML
1 SOLUTION RESPIRATORY (INHALATION) EVERY 4 HOURS
Status: DISCONTINUED | OUTPATIENT
Start: 2020-01-07 | End: 2020-01-09

## 2020-01-07 RX ORDER — LORAZEPAM 2 MG/ML
1 INJECTION INTRAMUSCULAR ONCE
Status: COMPLETED | OUTPATIENT
Start: 2020-01-07 | End: 2020-01-07

## 2020-01-07 RX ORDER — ALBUTEROL SULFATE 2.5 MG/3ML
2.5 SOLUTION RESPIRATORY (INHALATION)
Status: DISCONTINUED | OUTPATIENT
Start: 2020-01-07 | End: 2020-01-09

## 2020-01-07 RX ADMIN — FAMOTIDINE 20 MG: 10 INJECTION, SOLUTION INTRAVENOUS at 19:56

## 2020-01-07 RX ADMIN — SODIUM CHLORIDE TAB 1 GM 2 G: 1 TAB at 08:27

## 2020-01-07 RX ADMIN — LORAZEPAM 1 MG: 2 INJECTION INTRAMUSCULAR at 21:48

## 2020-01-07 RX ADMIN — Medication 15 ML: at 08:33

## 2020-01-07 RX ADMIN — SODIUM CHLORIDE TAB 1 GM 2 G: 1 TAB at 17:09

## 2020-01-07 RX ADMIN — PROPRANOLOL HYDROCHLORIDE 40 MG: 40 TABLET ORAL at 08:27

## 2020-01-07 RX ADMIN — LORAZEPAM 1 MG: 2 INJECTION INTRAMUSCULAR; INTRAVENOUS at 21:48

## 2020-01-07 RX ADMIN — Medication 15 ML: at 19:56

## 2020-01-07 RX ADMIN — FOLIC ACID 1 MG: 1 TABLET ORAL at 08:27

## 2020-01-07 RX ADMIN — Medication 100 MG: at 08:26

## 2020-01-07 RX ADMIN — CHLORDIAZEPOXIDE HYDROCHLORIDE 20 MG: 5 CAPSULE ORAL at 17:09

## 2020-01-07 RX ADMIN — HYDRALAZINE HYDROCHLORIDE 10 MG: 20 INJECTION INTRAMUSCULAR; INTRAVENOUS at 11:37

## 2020-01-07 RX ADMIN — ROSUVASTATIN CALCIUM 5 MG: 5 TABLET, FILM COATED ORAL at 19:56

## 2020-01-07 RX ADMIN — BROMOCRIPTINE MESYLATE 5 MG: 2.5 TABLET ORAL at 19:56

## 2020-01-07 RX ADMIN — PROPRANOLOL HYDROCHLORIDE 40 MG: 40 TABLET ORAL at 19:56

## 2020-01-07 RX ADMIN — SODIUM CHLORIDE, PRESERVATIVE FREE 10 ML: 5 INJECTION INTRAVENOUS at 08:28

## 2020-01-07 RX ADMIN — SOTALOL HYDROCHLORIDE 160 MG: 80 TABLET ORAL at 08:27

## 2020-01-07 RX ADMIN — SODIUM CHLORIDE, PRESERVATIVE FREE 10 ML: 5 INJECTION INTRAVENOUS at 19:57

## 2020-01-07 RX ADMIN — SODIUM CHLORIDE TAB 1 GM 2 G: 1 TAB at 12:57

## 2020-01-07 RX ADMIN — ASPIRIN 324 MG: 81 TABLET, CHEWABLE ORAL at 08:27

## 2020-01-07 RX ADMIN — SODIUM CHLORIDE, PRESERVATIVE FREE 10 ML: 5 INJECTION INTRAVENOUS at 08:33

## 2020-01-07 RX ADMIN — DOCUSATE SODIUM 100 MG: 50 LIQUID ORAL at 08:26

## 2020-01-07 RX ADMIN — MODAFINIL 100 MG: 100 TABLET ORAL at 12:57

## 2020-01-07 RX ADMIN — SODIUM CHLORIDE: 9 INJECTION, SOLUTION INTRAVENOUS at 17:19

## 2020-01-07 RX ADMIN — ACETAMINOPHEN 650 MG: 650 SOLUTION ORAL at 00:24

## 2020-01-07 RX ADMIN — SOTALOL HYDROCHLORIDE 160 MG: 80 TABLET ORAL at 19:56

## 2020-01-07 RX ADMIN — IPRATROPIUM BROMIDE AND ALBUTEROL SULFATE 1 AMPULE: .5; 3 SOLUTION RESPIRATORY (INHALATION) at 22:25

## 2020-01-07 RX ADMIN — FAMOTIDINE 20 MG: 10 INJECTION, SOLUTION INTRAVENOUS at 08:26

## 2020-01-07 RX ADMIN — MODAFINIL 100 MG: 100 TABLET ORAL at 08:27

## 2020-01-07 RX ADMIN — ENOXAPARIN SODIUM 40 MG: 40 INJECTION SUBCUTANEOUS at 08:26

## 2020-01-07 RX ADMIN — HYDRALAZINE HYDROCHLORIDE 10 MG: 20 INJECTION INTRAMUSCULAR; INTRAVENOUS at 17:09

## 2020-01-07 ASSESSMENT — PULMONARY FUNCTION TESTS
PIF_VALUE: 17
PIF_VALUE: 29
PIF_VALUE: 14
PIF_VALUE: 29
PIF_VALUE: 25
PIF_VALUE: 27
PIF_VALUE: 15
PIF_VALUE: 30
PIF_VALUE: 15
PIF_VALUE: 27
PIF_VALUE: 29
PIF_VALUE: 15
PIF_VALUE: 15
PIF_VALUE: 24
PIF_VALUE: 14
PIF_VALUE: 15
PIF_VALUE: 16

## 2020-01-07 NOTE — PLAN OF CARE
Problem: Risk for Impaired Skin Integrity  Goal: Tissue integrity - skin and mucous membranes  Description  Structural intactness and normal physiological function of skin and  mucous membranes.   Outcome: Ongoing     Problem: OXYGENATION/RESPIRATORY FUNCTION  Goal: Patient will maintain patent airway  Outcome: Ongoing     Problem: Restraint Use - Nonviolent/Non-Self-Destructive Behavior:  Goal: Absence of restraint-related injury  Description  Absence of restraint-related injury  Outcome: Ongoing   Flo Lira RN

## 2020-01-07 NOTE — PROGRESS NOTES
TODAY:      AWAKE & FOLLOWING COMMANDS:  [] No   [x] Yes awake, not following commands    INTUBATED:   [] No   [x] Yes    SEDATION/ANALGESIA:    [] Propofol gtt  [] Versed gtt  [] Ativan gtt   [x] No Sedation  Pain medications: librium 25 qhs  FEEDING: Able to take PO?  [] No:  [] NPO for: \ [] NG/OG [] PEG  Tube Feeds: \     [] Yes:  Diet:   DVT Prophylaxis:  [x] Yes:   lovenox        [] No rationale:     Stress Ulcer Prophylaxis: [x] Yes: pepcid  [] Not indicated    VASOPRESSORS:  [x] No    [] Yes  [] Levophed [] Dopamine [] Vasopressin  [] Dobutamine [] Phenylephrine [] Epinephrine    CENTRAL/ARTERIAL LINES:  [x] No    [] Yes:  Location: , Date placed: , Indication:     BECERRIL CATHETER: [x] No    [] Yes:  Location:penis , Date placed Indication: urinary retention    DRAINS: [x] No    [] Yes:  Location:, Date placed: , Output:     Head of Bed: [x] Elevated:          [] Flat    Glucose management: [] Not indicated, consistently less than 180 [x] Sliding Scale :     breanna-log       [x] Long Acting:lantus  Secondary Stroke PPX: [] Antiplatelet:  asa              [] Statin:    crestor 5mg

## 2020-01-07 NOTE — PROGRESS NOTES
Occupational Therapy    Occupational Therapy Not Seen Note    DATE: 2020  Name: Dora King  : 1960  MRN: [id number]    Patient not available for Occupational Therapy due to: Other: pt intubated    Next Scheduled Treatment: Attempt at later date.     Electronically signed by RD Avila on 2020 at 10:40 AM

## 2020-01-07 NOTE — PLAN OF CARE
Pt was extubated today at [address] [place] [address] and placed on 2L NC. Respiratory status stable with good productive cough, no acute distress noted today. Bilateral wrist restraints removed when extubated. Catherene Cowden, pt's mother updated, by phone, on progress. Pt was treated with hydralazine x2 for SBP >160- see flowsheet for details. Neuro exam slightly improved, still not following commands but very brisk localizing and purposeful movements. Tube feeding remains off for now. Two medium to large liquid BM's on shift, cream applied to reddened coccyx and scrotum. 450cc UOP from condom cath, team was notified of low output during rounds. Continue to monitor, bladder scan, and straight cath as needed. Problem: HEMODYNAMIC STATUS  Goal: Patient has stable vital signs and fluid balance  Outcome: Ongoing     Problem: Pain:  Goal: Pain level will decrease  Description  Pain level will decrease  Outcome: Ongoing  Goal: Control of acute pain  Description  Control of acute pain  Outcome: Ongoing  Goal: Control of chronic pain  Description  Control of chronic pain  Outcome: Ongoing     Problem: Falls - Risk of:  Goal: Will remain free from falls  Description  Will remain free from falls  Outcome: Ongoing  Goal: Absence of physical injury  Description  Absence of physical injury  Outcome: Ongoing     Problem: Risk for Impaired Skin Integrity  Goal: Tissue integrity - skin and mucous membranes  Description  Structural intactness and normal physiological function of skin and  mucous membranes.   1/7/2020 1826 by Hemal Caruso RN  Outcome: Ongoing  1/7/2020 0524 by Corina Lynn RN  Outcome: Ongoing     Problem: OXYGENATION/RESPIRATORY FUNCTION  Goal: Patient will maintain patent airway  1/7/2020 1826 by Hemal Caruso RN  Outcome: Ongoing  1/7/2020 0524 by Corina Lynn RN  Outcome: Ongoing  Goal: Patient will achieve/maintain normal respiratory rate/effort  Description  Respiratory rate and effort will be within normal limits for the

## 2020-01-07 NOTE — PROGRESS NOTES
ENDOVASCULAR NEUROSURGERY PROGRESS NOTE  1/7/2020 9:12 AM  Subjective:   Admit Date: 12/25/2019  PCP: Brandon Patel MD    Patient is intubated. Not following commands. Patient is more arousable this morning, open his eyes and tracking in the room. He did not follow commands. Objective:   Vitals: BP (!) 156/88   Pulse 92   Temp 98.2 °F (36.8 °C) (Oral)   Resp 17   Ht 5' 11\" (1.803 m)   Wt 187 lb 9.8 oz (85.1 kg)   SpO2 100%   BMI 26.17 kg/m²   Did examine Precedex. General appearance: Intubated   HEENT: Atraumatic. Neck: Neck is supple. Lungs: Intubated  Abdomen: Soft nontender. Extremities: No lower limb edema noted. Neurologic:  He is intubated, did not follow commands, he is tracking in the room. Grimace to pain to noxious stimuli. CN: Both are equal reactive to light at 3 mm, he is tracking the room, no facial droop noted. MOTOR:  He is withdrawing both upper and lower extremities. SENSORY: He is withdrawing both upper and lower extremities.     Medications and labs:   Scheduled Meds:   chlordiazePOXIDE  20 mg Per NG tube QPM    insulin glargine  25 Units Subcutaneous Daily    insulin lispro  0-18 Units Subcutaneous Q6H    modafinil  100 mg Oral BID    docusate  100 mg Oral Daily    senna  5 mL Oral Nightly    propranolol  40 mg Oral BID    aspirin  324 mg Oral Daily    sotalol  160 mg Oral BID    sodium chloride  2 g Oral TID WC    enoxaparin  40 mg Subcutaneous Daily    nicotine  1 patch Transdermal Daily    folic acid  1 mg Oral Daily    thiamine  100 mg Oral Daily    sodium chloride flush  10 mL Intravenous BID    famotidine (PEPCID) injection  20 mg Intravenous BID    rosuvastatin  5 mg Oral Nightly    chlorhexidine  15 mL Mouth/Throat BID    vitamin D  50,000 Units Oral Weekly    sodium chloride flush  10 mL Intravenous 2 times per day    sodium chloride flush  10 mL Intravenous 2 times per day     Continuous Infusions:   sodium chloride 100 mL/hr at 01/06/20 8060    dextrose       CBC:   Recent Labs     01/05/20  0436 01/06/20  0449 01/07/20  0424   WBC 5.7 6.8 4.4   HGB 10.2* 11.1* 10.2*    330 323     BMP:    Recent Labs     01/05/20  0436 01/06/20  0449 01/07/20  0424    138 141   K 4.3 4.3 3.9    101 108*   CO2 24 24 21   BUN 16 17 17   CREATININE 0.43* 0.39* 0.39*   GLUCOSE 258* 156* 131*     Hepatic:   No results for input(s): AST, ALT, ALB, BILITOT, ALKPHOS in the last 72 hours. Troponin: No results for input(s): TROPONINI in the last 72 hours. BNP: No results for input(s): BNP in the last 72 hours. Lipids:   No results for input(s): CHOL, HDL in the last 72 hours. Invalid input(s): LDLCALCU  INR:   No results for input(s): INR in the last 72 hours. Assessment and Recommendations:     Traumatic subarachnoid hemorrhage.     s/p diagnostic cerebral angiogram in December 26, 2019  1. Critical right cervical ICA stenosis measuring approximately 90 to 99% per NASCET criteria. There is delayed anterograde filling of the cervical ica across the stenosis. There is Collateral filling of the distal right ica from the retrograde filling of the right ophthalmic artery from the right IMAX. In addition there is pial collaterals from the right PCA supplying the distal right mca territory parieto/occipital region. 2. No MCA vasospasm noted however there are irregularities suspected from diffuse intracranial athero including 50% left vert athero with stenosis in the v3 segment    He is intubated. He is off sedation this morning. Patient with possible alcohol withdrawal.    PLAN:  1. Traumatic subarachnoid hemorrhage management per neuro ICU. 2. Pressure goal 130-1 60.  3. Smoking cessation, PT/OT evaluation. 4. Suspect alcohol withdrawal  5. Right ICA critical stenosis, asymptomatic. He needs a follow-up with neuro endovascular clinic for potential treatment    Please contact neuro endovascular team for any questions or concerns.     Saif

## 2020-01-07 NOTE — PROGRESS NOTES
Daily Progress Note  Neuro Critical Care    Patient Name: Jeremy Suarez  Patient : 1960  Room/Bed: 0529/0529-01  Allergies: No Known Allergies  Problem List:   Patient Active Problem List   Diagnosis    SAH [facility]ach[facility]oid [facility]) ([facility]    Subarachnoid bleed ([facility]    Traumatic subarachnoid hemorrhage with loss of consciousness of 1 hour to 5 hours 59 minutes (HCC)    Carotid stenosis, right    Asymptomatic stenosis of left vertebral artery    Intracranial atherosclerosis    History of CEA (carotid endarterectomy)    Ventilator dependence ([facility]    Delirium tremens ([facility]    Chronic a-fib    On mechanically assisted ventilation (HCC)    Encephalopathy       INTERVAL HISTORY    The patient is a 60 yo male with history of atrial fibrillation on Eliquis, bilateral ICA stenosis (right more than left), DM2, HLD, HTN, CAD s/p PCI stents, PVD, CEA, and ETOH abuse who presents as a transfer from [facility] as a trauma alert for CT head revealed diffuse bialteral SAH and left parietal SDH. He was given [facility] and [facility] and transferred to [facility] for higher level of care. Patient was found confused by family at his home after being unable to reach him on the phone. He was complaining of headache, and actively vomiting. Also had bilateral arm scrapes and bruises concerning for recent fall, patient himself does not member falling, said around 4 PM he woke up and felt frontal and vertex headache and neck pain, and felt nauseous and started throwing up. Endorses drinking alcohol last night states that he had 2 beers. Per family has significant alcohol abuse history and has had multiple falls in the past.     On presentation in the ED [facility], patient mildly lethargic but oriented x4, complaining of headache, nausea, mild vertigo, left arm weaker than right, bilateral leg weakness.       Significant interdisciplinary discussion between the neurosurgeon, radiologist, trauma surgeon and stroke Underwent spontaneous breathing trial this morning but didn't tolerate it as he became tachypneic. 1/2:  multiple spikes of fever T-max 103.1 at 8 PM.  Continue to be off antibiotics as septic work-up was negative. Still not following commands. Sotalol was increased to 160 twice daily in light of RVR. He had multiple spikes of temperature T-max 103. 1. Chest x-ray showed right lower zone consolidation concerning for aspiration pneumonia and he was started on Zosyn. Sputum sample was attempted but unsuccessful by RT. Patient is more tachypneic off propofol and has been maintained on propofol which was increased to 20 mics per hour. Heart rate is better controlled on sotalol 160 twice daily. 1/3 continues to be intubated on sedation 15 mics of propofol. Not following commands of propofol. Fever spike 101.1 overnight. CPAP trial was attempted unsuccessfully tidal volume decreased from 600 300 RSBI 120.  1/4: He remained on 15 mics per kilogram per hour propofol overnight. Propofol was weaned this morning. Better tolerating CPAP trial this morning. 1/5: he remained off sedation overnight. Had 8 beats run of V. tach, monomorphic, resolved spontaneously. Received 10 mg of labetalol and 10 mg of hydralazine. Had 2 bowel movements. T-max 100.9. EEG reported 3 questionable cardiac pauses on LTME, no telemetry correlation    Over the last 24 hours, has been off sedation. Alert but doesn't follow commands. Tolerated CPAP well. Extubated to room air.      MEDICATIONS:     CURRENT MEDICATIONS:  SCHEDULED MEDICATIONS:   chlordiazePOXIDE  20 mg Per NG tube QPM    insulin glargine  25 Units Subcutaneous Daily    insulin lispro  0-18 Units Subcutaneous Q6H    modafinil  100 mg Oral BID    docusate  100 mg Oral Daily    senna  5 mL Oral Nightly    propranolol  40 mg Oral BID    aspirin  324 mg Oral Daily    sotalol  160 mg Oral BID    sodium chloride  2 g Oral TID WC    enoxaparin  40 mg Subcutaneous Daily    nicotine  1 patch Transdermal Daily    folic acid  1 mg Oral Daily    thiamine  100 mg Oral Daily    sodium chloride flush  10 mL Intravenous BID    famotidine (PEPCID) injection  20 mg Intravenous BID    rosuvastatin  5 mg Oral Nightly    chlorhexidine  15 mL Mouth/Throat BID    vitamin D  50,000 Units Oral Weekly    sodium chloride flush  10 mL Intravenous 2 times per day    sodium chloride flush  10 mL Intravenous 2 times per day     CONTINUOUS INFUSIONS:   sodium chloride 100 mL/hr at 20 0939    dextrose       PRN MEDICATIONS:   ibuprofen, acetaminophen, labetalol, metoprolol, hydrALAZINE, glucose, dextrose, glucagon (rDNA), dextrose, magnesium sulfate, sodium chloride flush, sodium chloride flush    VITALS 24 Hours     Temperature Range: Temp: 98.1 °F (36.7 °C) Temp  Av.1 °F (37.3 °C)  Min: 97.5 °F (36.4 °C)  Max: 100.6 °F (38.1 °C)  BP Range: Systolic (65CCG), AU , Min:108 , TSM:497     Diastolic (30XOQ), TDM:93, Min:68, Max:103    Pulse Range: Pulse  Av.2  Min: 73  Max: 103  Respiration Range: Resp  Av.5  Min: 14  Max: 29  Current Pulse Ox: SpO2: 100 %  24HR Pulse Ox Range: SpO2  Av.1 %  Min: 93 %  Max: 100 %  Patient Vitals for the past 12 hrs:   BP Temp Temp src Pulse Resp SpO2   20 0727 -- -- -- 88 -- 100 %   20 07 -- -- -- 85 -- 100 %   20 0640 (!) 163/103 -- -- 79 -- 100 %   20 0605 (!) 173/90 -- -- 81 -- 100 %   20 0505 117/68 -- -- 76 -- 100 %   20 0405 109/70 98.1 °F (36.7 °C) Oral 76 17 100 %   20 0305 (!) 145/78 -- -- 76 -- 100 %   20 0300 -- -- -- 76 18 97 %   20 0205 (!) 140/83 -- -- 75 -- 99 %   20 0105 (!) 160/90 -- -- 74 -- 99 %   20 0005 132/84 100.6 °F (38.1 °C) Oral 79 14 93 %   20 2321 -- -- -- 79 -- 96 %   20 2305 (!) 161/99 -- -- 81 -- 96 %   20 2205 (!) 146/78 -- -- 76 -- 95 %   20 210 (!) 158/88 -- -- 103 -- 93 %   20 (!) 140/89 Basophils Absolute 0.00 0.0 - 0.2 k/uL    Morphology ANISOCYTOSIS PRESENT     Morphology MICROCYTOSIS PRESENT     Morphology 1+ ELLIPTOCYTES    BASIC METABOLIC PANEL    Collection Time: 01/07/20  4:24 AM   Result Value Ref Range    Glucose 131 (H) 70 - 99 mg/dL    BUN 17 6 - 20 mg/dL    CREATININE 0.39 (L) 0.70 - 1.20 mg/dL    Bun/Cre Ratio NOT REPORTED 9 - 20    Calcium 8.2 (L) 8.6 - 10.4 mg/dL    Sodium 141 135 - 144 mmol/L    Potassium 3.9 3.7 - 5.3 mmol/L    Chloride 108 (H) 98 - 107 mmol/L    CO2 21 20 - 31 mmol/L    Anion Gap 12 9 - 17 mmol/L    GFR Non-African American >60 >60 mL/min    GFR African American >60 >60 mL/min    GFR Comment          GFR Staging NOT REPORTED            RADIOLOGY   Xr Radius Ulna Left (2 Views)    Result Date: 12/26/2019  EXAMINATION: TWO XRAY VIEWS OF THE LEFT FOREARM 12/26/2019 1:37 am COMPARISON: None. HISTORY: ORDERING SYSTEM PROVIDED HISTORY: Trauma/Fracture TECHNOLOGIST PROVIDED HISTORY: Trauma/Fracture Reason for Exam: fall/trauma Acuity: Acute FINDINGS: Fiberglass splint obscures osseous details of the distal forearm. The left radius and left are intact. No acute fracture or dislocation in the left forearm. Partial visualization of 1st proximal metacarpal fracture. Osteopenia. No acute osseous abnormality left radius or ulna. Xr Radius Ulna Right (2 Views)    Result Date: 12/26/2019  EXAMINATION: TWO XRAY VIEWS OF THE RIGHT FOREARM 12/26/2019 1:37 am COMPARISON: None. HISTORY: ORDERING SYSTEM PROVIDED HISTORY: Trauma/Fracture TECHNOLOGIST PROVIDED HISTORY: Trauma/Fracture Reason for Exam: fall/trauma Acuity: Acute FINDINGS: The bones are osteopenic. The right radius and right ulna are intact. No acute fracture or dislocation in the right forearm. Along the ulnar aspect of the proximal forearm, there is soft tissue laceration. Antecubital fossa IV catheter. Osteopenia. No acute osseous abnormality right forearm.  Soft tissue laceration ulnar aspect of the proximal forearm. Xr Wrist Left (min 3 Views)    Result Date: 12/25/2019  EXAMINATION: 3 XRAY VIEWS OF THE LEFT WRIST 12/25/2019 9:45 pm COMPARISON: None. HISTORY: ORDERING SYSTEM PROVIDED HISTORY: fall TECHNOLOGIST PROVIDED HISTORY: fall Acuity: Acute Type of Exam: Initial FINDINGS: Overlying splint partially obscures osseous details. Acute traumatic closed proximal 1st metacarpal fracture. The carpal bones are intact. Carpal alignment is maintained soft tissues of the wrist are unremarkable. Acute traumatic closed 1st proximal metacarpal fracture. No acute osseous abnormality in the wrist.     Xr Wrist Right (min 3 Views)    Result Date: 12/25/2019  EXAMINATION: 3 XRAY VIEWS OF THE RIGHT WRIST 12/25/2019 9:45 pm COMPARISON: None. HISTORY: ORDERING SYSTEM PROVIDED HISTORY: fall TECHNOLOGIST PROVIDED HISTORY: fall Acuity: Acute Type of Exam: Initial FINDINGS: Overlying splint obscures osseous details. Carpal bones are intact. Advanced radiocarpal joint osteoarthritis with joint space narrowing and subchondral sclerosis. Widening of the scapholunate joint. Posttraumatic deformity of the ulnar styloid process. No acute fracture or dislocation. No acute osseous abnormality the right wrist. Widening of the scapholunate interval suggesting ligamentous injury. Radiocarpal joint osteoarthritis. Xr Hand Left (min 3 Views)    Result Date: 12/26/2019  EXAMINATION: THREE XRAY VIEWS OF THE LEFT HAND 12/26/2019 1:47 am COMPARISON: 12/25/2019 2353 hours HISTORY: ORDERING SYSTEM PROVIDED HISTORY: post splint TECHNOLOGIST PROVIDED HISTORY: post splint Reason for Exam: fall trauma/post splint Acuity: Acute FINDINGS: Overlying splint obscures osseous details. Again seen is acute traumatic closed proximal 1st metacarpal fracture. There appears to be greater fracture fragment displacement compared to prior study. No additional acute fracture or dislocation in the left hand.      Overlying splint obscures sacrum is partially obscured by contrast in the bladder. No acute osseous abnormality in the pelvis or hips bilaterally. Osteopenia. Xr Knee Left (3 Views)    Result Date: 12/26/2019  EXAMINATION: THREE XRAY VIEWS OF THE LEFT KNEE 12/26/2019 1:36 am COMPARISON: None. HISTORY: ORDERING SYSTEM PROVIDED HISTORY: Trauma/Fracture TECHNOLOGIST PROVIDED HISTORY: Trauma/Fracture Reason for Exam: fall/ trauma Acuity: Acute FINDINGS: Osteopenia. No acute fracture or dislocation. No focal osseous lesion. No joint effusion. No focal soft tissue abnormality. Vascular calcifications. No acute abnormality of the knee. Osteopenia. Xr Knee Right (3 Views)    Result Date: 12/26/2019  EXAMINATION: THREE XRAY VIEWS OF THE RIGHT KNEE 12/26/2019 1:36 am COMPARISON: None. HISTORY: ORDERING SYSTEM PROVIDED HISTORY: Trauma/Fracture TECHNOLOGIST PROVIDED HISTORY: Trauma/Fracture Reason for Exam: ,fall trauma,unable to get xtable on patient Acuity: Acute FINDINGS: The bones are osteopenic. No acute fracture or dislocation. No focal osseous lesion. No evidence of joint effusion. No focal soft tissue abnormality. Vascular calcifications. No acute abnormality of the knee. Osteopenia. Xr Ankle Left (min 3 Views)    Result Date: 12/26/2019  EXAMINATION: THREE XRAY VIEWS OF THE LEFT ANKLE 12/26/2019 1:36 am COMPARISON: None. HISTORY: ORDERING SYSTEM PROVIDED HISTORY: Trauma/Fracture TECHNOLOGIST PROVIDED HISTORY: Trauma/Fracture Acuity: Acute FINDINGS: No acute fracture or dislocation. Normal alignment of the ankle mortise. No focal osseous lesion. No joint effusion. No focal soft tissue abnormality. Vascular calcifications. No acute abnormality of the ankle. Xr Ankle Right (min 3 Views)    Result Date: 12/26/2019  EXAMINATION: THREE XRAY VIEWS OF THE RIGHT ANKLE 12/26/2019 1:36 am COMPARISON: None.  HISTORY: ORDERING SYSTEM PROVIDED HISTORY: Trauma/Fracture TECHNOLOGIST PROVIDED HISTORY: Trauma/Fracture FINDINGS: Diffuse osteopenia. No acute fracture or dislocation. Normal alignment of the ankle mortise. No focal osseous lesion. No joint effusion. No focal soft tissue abnormality. Status post 5th metatarsal ORIF with intact screw. No acute abnormality of the ankle. Ct Head Wo Contrast    Result Date: 12/27/2019  EXAMINATION: CT OF THE HEAD WITHOUT CONTRAST,  12/26/2019 11:08 pm TECHNIQUE: CT of the head was performed without the administration of intravenous contrast. Dose modulation, iterative reconstruction, and/or weight based adjustment of the mA/kV was utilized to reduce the radiation dose to as low as reasonably achievable. COMPARISON: 12/26/2019 5:10 a.m. HISTORY: ORDERING SYSTEM PROVIDED HISTORY: Reassess bleed for stability. TECHNOLOGIST PROVIDED HISTORY: Reassess bleed for stability. Reason for Exam: Reassess bleed for stability. Acuity: Unknown Type of Exam: Unknown FINDINGS: BRAIN/VENTRICLES: Again seen is extensive subarachnoid hemorrhage within the sylvian fissures as well as cerebral sulci bilaterally. Subdural hemorrhage overlies the left parietal convexity measures 4 mm in thickness. Hemorrhage in the prepontine cistern as well. Small amount of intraventricular hemorrhage layering in the occipital horns bilaterally. Ventricles are stable in caliber. No hydrocephalus. The gray-white matter differentiation is maintained. No midline shift. ORBITS: The visualized portion of the orbits demonstrate no acute abnormality. SINUSES: The visualized paranasal sinuses and mastoid air cells demonstrate no acute abnormality. SOFT TISSUES/SKULL:  No acute abnormality of the visualized skull or soft tissues. Stable head CT demonstrating extensive bilateral subarachnoid hemorrhage as well as intraventricular hemorrhage and left parietal convexity subdural hemorrhage. No new intracranial hemorrhage. No hydrocephalus.      Ct Head Wo Contrast    Result Date: 12/26/2019  EXAMINATION: CT OF THE HEAD WITHOUT CONTRAST  12/26/2019 4:51 am TECHNIQUE: CT of the head was performed without the administration of intravenous contrast. Dose modulation, iterative reconstruction, and/or weight based adjustment of the mA/kV was utilized to reduce the radiation dose to as low as reasonably achievable. COMPARISON: 12/25/2019 HISTORY: ORDERING SYSTEM PROVIDED HISTORY: worsening mentation TECHNOLOGIST PROVIDED HISTORY: worsening mentation FINDINGS: BRAIN/VENTRICLES: Diffuse subarachnoid hemorrhage within the cerebral sulci, sylvian fissures, anterior interhemispheric fissure, and prepontine cistern, similar in appearance to prior study. There is intraventricular hemorrhage layering in the occipital horns. No hydrocephalus. No mass effect or midline shift. Left parietal convexity subdural hemorrhage measures up to 5 mm in thickness. The basal cisterns are patent. The gray-white matter differentiation is maintained. ORBITS: The visualized portion of the orbits demonstrate no acute abnormality. SINUSES: The visualized paranasal sinuses and mastoid air cells demonstrate no acute abnormality. SOFT TISSUES/SKULL:  No acute abnormality of the visualized skull or soft tissues. 1. Stable head CT demonstrating diffuse moderate volume subarachnoid hemorrhage and left parietal convexity subdural hemorrhage measuring up to 5 mm in thickness. 2. No mass effect or midline shift. No hydrocephalus. The findings were sent to the Radiology Results [address] at 5:48 am on 12/26/2019to be communicated to a licensed caregiver. Ct Head Wo Contrast    Result Date: 12/26/2019  EXAMINATION: CT OF THE HEAD WITHOUT CONTRAST  12/25/2019 9:04 pm TECHNIQUE: CT of the head was performed without the administration of intravenous contrast. Dose modulation, iterative reconstruction, and/or weight based adjustment of the mA/kV was utilized to reduce the radiation dose to as low as reasonably achievable.  COMPARISON: None HISTORY: BONES/ALIGNMENT: No traumatic malalignment. Vertebral body heights are maintained. No acute fracture. DEGENERATIVE CHANGES: Moderate multilevel disc narrowing and endplate osteophyte formation. Multilevel uncovertebral and facet joint degenerative changes. SOFT TISSUES: Paraspinal soft tissues are normal.  Partially visualized prepontine cistern subarachnoid hemorrhage is better evaluated on CT head performed concurrently. No acute abnormality of the cervical spine. Ct Thoracic Spine Wo Contrast    Result Date: 12/26/2019  EXAMINATION: CT OF THE THORACIC SPINE WITHOUT CONTRAST; CT OF THE LUMBAR SPINE WITHOUT CONTRAST; CT OF THE CHEST, ABDOMEN, AND PELVIS WITH CONTRAST, 12/25/2019 9:04 pm; 12/25/2019 9:05 pm TECHNIQUE: CT of the thoracic and lumbar spine was performed without the administration of intravenous contrast.  CT of the chest, abdomen and pelvis was performed with the administration of intravenous contrast. Multiplanar reformatted images are provided for review. Dose modulation, iterative reconstruction, and/or weight based adjustment of the mA/kV was utilized to reduce the radiation dose to as low as reasonably achievable. COMPARISON: None HISTORY: ORDERING SYSTEM PROVIDED HISTORY: trauma TECHNOLOGIST PROVIDED HISTORY: trauma Reason for Exam: fall from standing Acuity: Acute Type of Exam: Initial; ORDERING SYSTEM PROVIDED HISTORY: trauma TECHNOLOGIST PROVIDED HISTORY: trauma Reason for Exam: fall from standing Acuity: Acute Type of Exam: Initial; ORDERING SYSTEM PROVIDED HISTORY: trauma TECHNOLOGIST PROVIDED HISTORY: trauma Reason for Exam: fall from standing Acuity: Acute Type of Exam: Initial FINDINGS: CTA CHEST: Stable cardiomegaly. No pericardial effusion. No mediastinal hematoma or pneumomediastinum. No enlarged mediastinal or hilar lymph nodes. Main pulmonary artery is dilated, measuring up to 4 cm, suggesting pulmonary hypertension.  Calcified and noncalcified atherosclerotic plaque of the thoracic aorta. Incidentally noted 4 vessel aortic arch with separate arch origin of the left vertebral artery. Ectatic ascending thoracic aorta measures up to 4 cm in diameter. No acute aortic abnormality. Central airways are clear. No pleural effusion or pneumothorax. No pulmonary contusion or pulmonary laceration. Mild bilateral lower lobe dependent atelectatic changes. Healing anterolateral left 6th rib fracture. No acute displaced rib fracture or chest wall hematoma. CTA ABDOMEN: No acute traumatic abnormality of the liver, spleen, pancreas, kidneys, or adrenal glands. Normal gallbladder. Stomach, small bowel, and colon are normal in caliber without evidence of obstruction. Normal appendix. Sigmoid diverticulosis without diverticulitis. Calcified and noncalcified aortoiliac atherosclerotic calcification. Abdominal aorta is normal in caliber. No free fluid in the abdomen. CTA PELVIS: Urinary bladder and pelvic organs are normal.  No free fluid in the pelvis. Bilateral common iliac stents are in position. THORACIC/LUMBAR SPINE: BONES/ALIGNMENT: Normal alignment of the thoracic and lumbar spine. Vertebral body heights are maintained. No acute fracture. DEGENERATIVE CHANGES: Multilevel disc narrowing and endplate osteophyte formation. Mild multilevel facet joint degenerative changes. No high-grade, bony spinal canal stenosis. SOFT TISSUES: Paraspinal soft tissues are normal.     No acute traumatic abnormality of the chest, abdomen, or pelvis. Remote, healing anterolateral left 6th rib fracture. No acute osseous abnormality of the thoracic or lumbar spine.      Ct Lumbar Spine Wo Contrast    Result Date: 12/26/2019  EXAMINATION: CT OF THE THORACIC SPINE WITHOUT CONTRAST; CT OF THE LUMBAR SPINE WITHOUT CONTRAST; CT OF THE CHEST, ABDOMEN, AND PELVIS WITH CONTRAST, 12/25/2019 9:04 pm; 12/25/2019 9:05 pm TECHNIQUE: CT of the thoracic and lumbar spine was performed without the administration of intravenous contrast.  CT of the chest, abdomen and pelvis was performed with the administration of intravenous contrast. Multiplanar reformatted images are provided for review. Dose modulation, iterative reconstruction, and/or weight based adjustment of the mA/kV was utilized to reduce the radiation dose to as low as reasonably achievable. COMPARISON: None HISTORY: ORDERING SYSTEM PROVIDED HISTORY: trauma TECHNOLOGIST PROVIDED HISTORY: trauma Reason for Exam: fall from standing Acuity: Acute Type of Exam: Initial; ORDERING SYSTEM PROVIDED HISTORY: trauma TECHNOLOGIST PROVIDED HISTORY: trauma Reason for Exam: fall from standing Acuity: Acute Type of Exam: Initial; ORDERING SYSTEM PROVIDED HISTORY: trauma TECHNOLOGIST PROVIDED HISTORY: trauma Reason for Exam: fall from standing Acuity: Acute Type of Exam: Initial FINDINGS: CTA CHEST: Stable cardiomegaly. No pericardial effusion. No mediastinal hematoma or pneumomediastinum. No enlarged mediastinal or hilar lymph nodes. Main pulmonary artery is dilated, measuring up to 4 cm, suggesting pulmonary hypertension. Calcified and noncalcified atherosclerotic plaque of the thoracic aorta. Incidentally noted 4 vessel aortic arch with separate arch origin of the left vertebral artery. Ectatic ascending thoracic aorta measures up to 4 cm in diameter. No acute aortic abnormality. Central airways are clear. No pleural effusion or pneumothorax. No pulmonary contusion or pulmonary laceration. Mild bilateral lower lobe dependent atelectatic changes. Healing anterolateral left 6th rib fracture. No acute displaced rib fracture or chest wall hematoma. CTA ABDOMEN: No acute traumatic abnormality of the liver, spleen, pancreas, kidneys, or adrenal glands. Normal gallbladder. Stomach, small bowel, and colon are normal in caliber without evidence of obstruction. Normal appendix. Sigmoid diverticulosis without diverticulitis.  Calcified and noncalcified aortoiliac atherosclerotic Course and caliber of the cervical portion of the right internal carotid artery revealed significant proximal right ICA stenosis/string sign measuring approximately 90-99% per NASCET criteria. Flow across the high-grade stenosis was delayed with earlier filling noted of the distal supraclinoid internal carotid artery through retrograde opacification of the right ophthalmic artery from the internal maxillary artery. Further inspection demonstrates no evidence of dissection, aneurysm. Right CCA technique: The right internal carotid artery run was performed from the common carotid artery due to severe stenosis. Digital subtraction angiography of the intracranial right internal carotid circulation was performed in frontal, and lateral projections. Interpretation:There is slow antegrade filling of the distal internal carotid artery from the cervical internal carotid artery. Noted retrograde Filling of the right ophthalmic artery from the right internal maxillary artery branches with slow filling anterior cerebral artery, middle cerebral artery and the distal branches due to previously noted critical proximal cervical ICA stenosis. Inspection of the remaining right internal carotid circulation revealed no other evidence of cerebral aneurysm, arteriovenous malformation. There is severe stenosis noted in the petrous/cavernous and supraclinoid right ICA noted with diminutive appearance. Capillary and venous phase images were also unremarkable, with no evidence of veno-occlusive disease. Left CCA technique: The left common carotid artery was selectively catheterized under fluoroscopic guidance and digital subtraction angiography images were obtained in biplane projections of the left cervical common carotid artery. Interpretation: The left common carotid artery injection demonstrates normal antegrade flow into the external and internal carotid arteries with normal filling of the external carotid artery branches.  Caliber and CIRCULATION: There are mild-to-moderate stenoses of the distal right vertebral artery and a moderate to severe stenosis of the distal left. The basilar artery and PCAs appear to be slightly attenuated. OTHER: No dural venous sinus thrombosis on this non-dedicated study. BRAIN: See separately dictated noncontrast head CT report. No cerebral aneurysm or vascular malformation identified. Significant narrowing of the intracranial vessels, likely due to a combination of atherosclerotic disease as well as vasospasm related to subarachnoid hemorrhage. Critical stenosis of the proximal left vertebral artery. Critical stenosis of the proximal right cervical ICA with attenuated flow seen distally. Severe near critical stenoses of the right cavernous ICA. Moderate stenoses of the contralateral cavernous ICA. Ct Chest Abdomen Pelvis W Contrast    Result Date: 12/26/2019  EXAMINATION: CT OF THE THORACIC SPINE WITHOUT CONTRAST; CT OF THE LUMBAR SPINE WITHOUT CONTRAST; CT OF THE CHEST, ABDOMEN, AND PELVIS WITH CONTRAST, 12/25/2019 9:04 pm; 12/25/2019 9:05 pm TECHNIQUE: CT of the thoracic and lumbar spine was performed without the administration of intravenous contrast.  CT of the chest, abdomen and pelvis was performed with the administration of intravenous contrast. Multiplanar reformatted images are provided for review. Dose modulation, iterative reconstruction, and/or weight based adjustment of the mA/kV was utilized to reduce the radiation dose to as low as reasonably achievable.  COMPARISON: None HISTORY: ORDERING SYSTEM PROVIDED HISTORY: trauma TECHNOLOGIST PROVIDED HISTORY: trauma Reason for Exam: fall from standing Acuity: Acute Type of Exam: Initial; ORDERING SYSTEM PROVIDED HISTORY: trauma TECHNOLOGIST PROVIDED HISTORY: trauma Reason for Exam: fall from standing Acuity: Acute Type of Exam: Initial; ORDERING SYSTEM PROVIDED HISTORY: trauma TECHNOLOGIST PROVIDED HISTORY: trauma Reason for Exam: fall from standing Acuity: Acute Type of Exam: Initial FINDINGS: CTA CHEST: Stable cardiomegaly. No pericardial effusion. No mediastinal hematoma or pneumomediastinum. No enlarged mediastinal or hilar lymph nodes. Main pulmonary artery is dilated, measuring up to 4 cm, suggesting pulmonary hypertension. Calcified and noncalcified atherosclerotic plaque of the thoracic aorta. Incidentally noted 4 vessel aortic arch with separate arch origin of the left vertebral artery. Ectatic ascending thoracic aorta measures up to 4 cm in diameter. No acute aortic abnormality. Central airways are clear. No pleural effusion or pneumothorax. No pulmonary contusion or pulmonary laceration. Mild bilateral lower lobe dependent atelectatic changes. Healing anterolateral left 6th rib fracture. No acute displaced rib fracture or chest wall hematoma. CTA ABDOMEN: No acute traumatic abnormality of the liver, spleen, pancreas, kidneys, or adrenal glands. Normal gallbladder. Stomach, small bowel, and colon are normal in caliber without evidence of obstruction. Normal appendix. Sigmoid diverticulosis without diverticulitis. Calcified and noncalcified aortoiliac atherosclerotic calcification. Abdominal aorta is normal in caliber. No free fluid in the abdomen. CTA PELVIS: Urinary bladder and pelvic organs are normal.  No free fluid in the pelvis. Bilateral common iliac stents are in position. THORACIC/LUMBAR SPINE: BONES/ALIGNMENT: Normal alignment of the thoracic and lumbar spine. Vertebral body heights are maintained. No acute fracture. DEGENERATIVE CHANGES: Multilevel disc narrowing and endplate osteophyte formation. Mild multilevel facet joint degenerative changes. No high-grade, bony spinal canal stenosis. SOFT TISSUES: Paraspinal soft tissues are normal.     No acute traumatic abnormality of the chest, abdomen, or pelvis. Remote, healing anterolateral left 6th rib fracture. No acute osseous abnormality of the thoracic or lumbar spine.      Robert Valdovinos Hip 2-3 Vw W Pelvis Right    Result Date: 12/26/2019  EXAMINATION: ONE XRAY VIEW OF THE PELVIS AND TWO XRAY VIEWS RIGHT HIP; TWO XRAY VIEWS OF THE LEFT HIP 12/26/2019 1:37 am COMPARISON: None. HISTORY: ORDERING SYSTEM PROVIDED HISTORY: Trauma/Fracture TECHNOLOGIST PROVIDED HISTORY: AP and cross-table lateral of the hip please, thank you Trauma/Fracture Reason for Exam: fall/trauma Acuity: Acute FINDINGS: The bones are osteopenic. The femoral heads located bilateral.  No acute fracture or dislocation pelvis or hips bilaterally. SI joints are grossly intact. Pubic symphysis is intact. The sacrum is partially obscured by contrast in the bladder. No acute osseous abnormality in the pelvis or hips bilaterally. Osteopenia. CTA H/N 1/6/2019  1. New low-density extra-axial collection along the left cerebral convexity measuring 14 mm, with new 4 mm midline shift to the right. This is partially evaluated due to lack of noncontrast CT images. 2. Moderate stenosis involving petrous segment of left internal carotid artery. Severe stenosis involving proximal cavernous segment of left internal carotid artery. Moderate stenosis involving clinoid and supraclinoid segment of left internal carotid artery. 3. Severe diffuse stenosis involving petrous segment of right internal carotid artery, and cavernous segment of right renal carotid artery. Moderate to severe stenosis involving clinoid and supraclinoid segment of the right internal carotid artery. 4. Moderate multifocal stenosis involving anterior cerebral arteries, middle cerebral arteries, and posterior cerebral arteries, without evidence of large vessel occlusion. Appearance is reasonably similar to the previous examination on December 25, 2019.  5. Unchanged appearance of greater than 90% stenosis involving proximal right internal carotid artery, with diffuse severe narrowing of remainder of right internal carotid artery.   This extends into intracranial segments. 6. Postsurgical changes at the left carotid bifurcation, without significant flow-limiting stenosis. 7. Moderate stenosis at origin of right vertebral artery. Moderate to severe stenosis at the proximal left vertebral artery, just distal to the origin. 8. Approximately 50% stenosis involving the left proximal subclavian artery. 9. Scattered multifocal ground-glass pulmonary opacities at both lung apices. Fluid density seen within the trachea adjacent to the endotracheal tube. LTME    Day 1 - 1/3/2020 at 11: 32AM to 1/4/2020 at 7AM  AEDs:  propofol  Interictal: continuous slow, generalized in delta and theta frequency, mostly in delta frequency. No epileptiform discharges. Occasionally eye opening/closing artifacts were seen. Ictal: None     Summary: During above recoding period, there was evidence of moderate to severe diffuse encephalopathy, no epileptiform discharges or EEG/clinical seizures were noted. Day 2 - 1/4/2020 at 7AM to 1/5/2020 at 7AM  AEDs:  propofol  Interictal: continuous slow, generalized in delta and theta frequency. No epileptiform discharges. EKG lead from time to time, not well placed. Heart rate at times, was seen at 100s, mild irregular. At 12:22:50PM, there was 14 second cardiac pause vs  EKG lead off. At time when VT was reported (1-2AM) , EKG showed artifacts. At 5:39:57AM, nursing staff was changing patient, there was 11 second cardiac pause vs EKG lead off; again at 5:43:41AM, lasted 9 seconds. Ictal: None     Summary: During above recoding period, there was evidence of moderate to severe diffuse encephalopathy, no epileptiform discharges or EEG/clinical seizures were noted. There were three  questionable cardiac pause episodes lasted from 9-14 seconds. One reported 8 beats of ventricular tarcycardia was not seen due to EKG artifacts during that time.           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 LTME repeat showed diffuse encephalopathy no epileptiform discharges. DC LTME  Continue aspirin 324 mg daily  Critical right ICA stenosis   Systolic blood pressure goal 140-180  Modafinil 100 mg BID   Hold Eliquis for now   Bromocriptine 5 mg BID for central fever     Cardio  On sotalol 160 mg twice daily  Heart rate is better controlled overnight with no RVR  EF 45%  Cardiac pauses reported on LTM E. No telemetry correlation  We will continue cardiac monitor    Pulm  Extubated to room air  Continue O2 monitor  Maintain saturation > 92%    Endo  Serum glucose is better controlled < 180  HB A1C: 7.1  On Lantus 25 units daily and high dose sliding scale    GI  Milk of magnesia for bowel regimen  Pepcid 20 mg IV  BID for GI PPx  Had a bowel movement yesterday     Renal  Hyponatremia: 131->133 -> 137 -> 135  Continue salt tabs  Intake 2.5 L output 1 L +4  BUN 17, creatinine 0.39    Heme/ID  Tmax 100.4   Chest x-ray right lower zone consolidation. Repeat chest x-ray showed resolution  Hemoglobin hematocrit stable  Bromocriptine as above     MS  Nondisplaced mildly angulated left first metacarpal fracture  Splinted by orthopedics  Follow-up with orthopedics    LDAs:  NG tube, ETT, ext. Urinary     DVT PPX: Lovenox 40 mg daily  GI prophylaxis Pepcid 20 mg IV twice daily    Disposition: remains in NICU for vent management.  Stepdown after extubation      Meera Mireles MD  Neurology Resident  [facility]   Phone: Vonda Quigley

## 2020-01-08 ENCOUNTER — APPOINTMENT (OUTPATIENT)
Dept: CT IMAGING | Age: 60
DRG: 030 | End: 2020-01-08
Payer: MEDICAID

## 2020-01-08 ENCOUNTER — ANESTHESIA EVENT (OUTPATIENT)
Dept: INTERVENTIONAL RADIOLOGY/VASCULAR | Age: 60
DRG: 030 | End: 2020-01-08
Payer: MEDICAID

## 2020-01-08 ENCOUNTER — APPOINTMENT (OUTPATIENT)
Dept: GENERAL RADIOLOGY | Age: 60
DRG: 030 | End: 2020-01-08
Payer: MEDICAID

## 2020-01-08 ENCOUNTER — ANESTHESIA (OUTPATIENT)
Dept: INTERVENTIONAL RADIOLOGY/VASCULAR | Age: 60
DRG: 030 | End: 2020-01-08
Payer: MEDICAID

## 2020-01-08 ENCOUNTER — APPOINTMENT (OUTPATIENT)
Dept: INTERVENTIONAL RADIOLOGY/VASCULAR | Age: 60
DRG: 030 | End: 2020-01-08
Payer: MEDICAID

## 2020-01-08 VITALS — DIASTOLIC BLOOD PRESSURE: 110 MMHG | SYSTOLIC BLOOD PRESSURE: 127 MMHG | TEMPERATURE: 69 F | OXYGEN SATURATION: 100 %

## 2020-01-08 LAB
ABSOLUTE EOS #: 0.05 K/UL (ref 0–0.44)
ABSOLUTE IMMATURE GRANULOCYTE: 0 K/UL (ref 0–0.3)
ABSOLUTE LYMPH #: 0.8 K/UL (ref 1.1–3.7)
ABSOLUTE MONO #: 0.25 K/UL (ref 0.1–1.2)
ALLEN TEST: POSITIVE
ANION GAP SERPL CALCULATED.3IONS-SCNC: 14 MMOL/L (ref 9–17)
BASOPHILS # BLD: 1 % (ref 0–2)
BASOPHILS ABSOLUTE: 0.05 K/UL (ref 0–0.2)
BUN BLDV-MCNC: 15 MG/DL (ref 6–20)
BUN/CREAT BLD: ABNORMAL (ref 9–20)
CALCIUM SERPL-MCNC: 8.3 MG/DL (ref 8.6–10.4)
CHLORIDE BLD-SCNC: 108 MMOL/L (ref 98–107)
CO2: 18 MMOL/L (ref 20–31)
CREAT SERPL-MCNC: 0.34 MG/DL (ref 0.7–1.2)
DIFFERENTIAL TYPE: ABNORMAL
EOSINOPHILS RELATIVE PERCENT: 1 % (ref 1–4)
FIO2: 30
GFR AFRICAN AMERICAN: >60 ML/MIN
GFR NON-AFRICAN AMERICAN: >60 ML/MIN
GFR SERPL CREATININE-BSD FRML MDRD: ABNORMAL ML/MIN/{1.73_M2}
GFR SERPL CREATININE-BSD FRML MDRD: ABNORMAL ML/MIN/{1.73_M2}
GLUCOSE BLD-MCNC: 121 MG/DL (ref 75–110)
GLUCOSE BLD-MCNC: 130 MG/DL (ref 75–110)
GLUCOSE BLD-MCNC: 153 MG/DL (ref 70–99)
GLUCOSE BLD-MCNC: 175 MG/DL (ref 74–100)
HCT VFR BLD CALC: 35.2 % (ref 40.7–50.3)
HEMOGLOBIN: 10.5 G/DL (ref 13–17)
IMMATURE GRANULOCYTES: 0 %
LYMPHOCYTES # BLD: 16 % (ref 24–43)
MAGNESIUM: 1.7 MG/DL (ref 1.6–2.6)
MCH RBC QN AUTO: 23.9 PG (ref 25.2–33.5)
MCHC RBC AUTO-ENTMCNC: 29.8 G/DL (ref 28.4–34.8)
MCV RBC AUTO: 80.2 FL (ref 82.6–102.9)
MODE: ABNORMAL
MONOCYTES # BLD: 5 % (ref 3–12)
MORPHOLOGY: ABNORMAL
NEGATIVE BASE EXCESS, ART: 1 (ref 0–2)
NRBC AUTOMATED: 0 PER 100 WBC
O2 DEVICE/FLOW/%: ABNORMAL
PATIENT TEMP: ABNORMAL
PDW BLD-RTO: 20.9 % (ref 11.8–14.4)
PLATELET # BLD: 367 K/UL (ref 138–453)
PLATELET ESTIMATE: ABNORMAL
PMV BLD AUTO: 11 FL (ref 8.1–13.5)
POC HCO3: 22 MMOL/L (ref 21–28)
POC LACTIC ACID: 0.81 MMOL/L (ref 0.56–1.39)
POC O2 SATURATION: 98 % (ref 94–98)
POC PCO2 TEMP: ABNORMAL MM HG
POC PCO2: 29.5 MM HG (ref 35–48)
POC PH TEMP: ABNORMAL
POC PH: 7.48 (ref 7.35–7.45)
POC PO2 TEMP: ABNORMAL MM HG
POC PO2: 100.3 MM HG (ref 83–108)
POSITIVE BASE EXCESS, ART: ABNORMAL (ref 0–3)
POTASSIUM SERPL-SCNC: 4.5 MMOL/L (ref 3.7–5.3)
RBC # BLD: 4.39 M/UL (ref 4.21–5.77)
RBC # BLD: ABNORMAL 10*6/UL
SAMPLE SITE: ABNORMAL
SEG NEUTROPHILS: 77 % (ref 36–65)
SEGMENTED NEUTROPHILS ABSOLUTE COUNT: 3.85 K/UL (ref 1.5–8.1)
SODIUM BLD-SCNC: 140 MMOL/L (ref 135–144)
TCO2 (CALC), ART: 23 MMOL/L (ref 22–29)
WBC # BLD: 5 K/UL (ref 3.5–11.3)
WBC # BLD: ABNORMAL 10*3/UL

## 2020-01-08 PROCEDURE — 6360000004 HC RX CONTRAST MEDICATION: Performed by: STUDENT IN AN ORGANIZED HEALTH CARE EDUCATION/TRAINING PROGRAM

## 2020-01-08 PROCEDURE — 94003 VENT MGMT INPAT SUBQ DAY: CPT

## 2020-01-08 PROCEDURE — 99233 SBSQ HOSP IP/OBS HIGH 50: CPT | Performed by: PSYCHIATRY & NEUROLOGY

## 2020-01-08 PROCEDURE — 70498 CT ANGIOGRAPHY NECK: CPT

## 2020-01-08 PROCEDURE — 2709999900 HC NON-CHARGEABLE SUPPLY

## 2020-01-08 PROCEDURE — 37215 TRANSCATH STENT CCA W/EPS: CPT | Performed by: PSYCHIATRY & NEUROLOGY

## 2020-01-08 PROCEDURE — 36223 PLACE CATH CAROTID/INOM ART: CPT | Performed by: PSYCHIATRY & NEUROLOGY

## 2020-01-08 PROCEDURE — 2580000003 HC RX 258: Performed by: NURSE ANESTHETIST, CERTIFIED REGISTERED

## 2020-01-08 PROCEDURE — 3700000000 HC ANESTHESIA ATTENDED CARE

## 2020-01-08 PROCEDURE — 82947 ASSAY GLUCOSE BLOOD QUANT: CPT

## 2020-01-08 PROCEDURE — 6360000002 HC RX W HCPCS: Performed by: STUDENT IN AN ORGANIZED HEALTH CARE EDUCATION/TRAINING PROGRAM

## 2020-01-08 PROCEDURE — 2500000003 HC RX 250 WO HCPCS: Performed by: NURSE PRACTITIONER

## 2020-01-08 PROCEDURE — 70450 CT HEAD/BRAIN W/O DYE: CPT

## 2020-01-08 PROCEDURE — 6370000000 HC RX 637 (ALT 250 FOR IP): Performed by: PSYCHIATRY & NEUROLOGY

## 2020-01-08 PROCEDURE — 94761 N-INVAS EAR/PLS OXIMETRY MLT: CPT

## 2020-01-08 PROCEDURE — 36415 COLL VENOUS BLD VENIPUNCTURE: CPT

## 2020-01-08 PROCEDURE — 2500000003 HC RX 250 WO HCPCS: Performed by: NURSE ANESTHETIST, CERTIFIED REGISTERED

## 2020-01-08 PROCEDURE — 3700000001 HC ADD 15 MINUTES (ANESTHESIA)

## 2020-01-08 PROCEDURE — 94640 AIRWAY INHALATION TREATMENT: CPT

## 2020-01-08 PROCEDURE — 6370000000 HC RX 637 (ALT 250 FOR IP): Performed by: STUDENT IN AN ORGANIZED HEALTH CARE EDUCATION/TRAINING PROGRAM

## 2020-01-08 PROCEDURE — 0BH18EZ INSERTION OF ENDOTRACHEAL AIRWAY INTO TRACHEA, VIA NATURAL OR ARTIFICIAL OPENING ENDOSCOPIC: ICD-10-PCS | Performed by: PSYCHIATRY & NEUROLOGY

## 2020-01-08 PROCEDURE — 2000000003 HC NEURO ICU R&B

## 2020-01-08 PROCEDURE — 36600 WITHDRAWAL OF ARTERIAL BLOOD: CPT

## 2020-01-08 PROCEDURE — 2700000000 HC OXYGEN THERAPY PER DAY

## 2020-01-08 PROCEDURE — 2580000003 HC RX 258: Performed by: STUDENT IN AN ORGANIZED HEALTH CARE EDUCATION/TRAINING PROGRAM

## 2020-01-08 PROCEDURE — 36224 PLACE CATH CAROTD ART: CPT | Performed by: PSYCHIATRY & NEUROLOGY

## 2020-01-08 PROCEDURE — 2580000003 HC RX 258: Performed by: PSYCHIATRY & NEUROLOGY

## 2020-01-08 PROCEDURE — 2500000003 HC RX 250 WO HCPCS: Performed by: STUDENT IN AN ORGANIZED HEALTH CARE EDUCATION/TRAINING PROGRAM

## 2020-01-08 PROCEDURE — 71045 X-RAY EXAM CHEST 1 VIEW: CPT

## 2020-01-08 PROCEDURE — C1876 STENT, NON-COA/NON-COV W/DEL: HCPCS

## 2020-01-08 PROCEDURE — 99291 CRITICAL CARE FIRST HOUR: CPT | Performed by: PSYCHIATRY & NEUROLOGY

## 2020-01-08 PROCEDURE — 83605 ASSAY OF LACTIC ACID: CPT

## 2020-01-08 PROCEDURE — 6370000000 HC RX 637 (ALT 250 FOR IP): Performed by: NURSE PRACTITIONER

## 2020-01-08 PROCEDURE — C1725 CATH, TRANSLUMIN NON-LASER: HCPCS

## 2020-01-08 PROCEDURE — 94770 HC ETCO2 MONITOR DAILY: CPT

## 2020-01-08 PROCEDURE — 5A1955Z RESPIRATORY VENTILATION, GREATER THAN 96 CONSECUTIVE HOURS: ICD-10-PCS | Performed by: PSYCHIATRY & NEUROLOGY

## 2020-01-08 PROCEDURE — 6360000002 HC RX W HCPCS: Performed by: PSYCHIATRY & NEUROLOGY

## 2020-01-08 PROCEDURE — 95708 EEG WO VID EA 12-26HR UNMNTR: CPT

## 2020-01-08 PROCEDURE — 82803 BLOOD GASES ANY COMBINATION: CPT

## 2020-01-08 PROCEDURE — C1884 EMBOLIZATION PROTECT SYST: HCPCS

## 2020-01-08 PROCEDURE — 85025 COMPLETE CBC W/AUTO DIFF WBC: CPT

## 2020-01-08 PROCEDURE — 83735 ASSAY OF MAGNESIUM: CPT

## 2020-01-08 PROCEDURE — 61635 INTRACRAN ANGIOPLSTY W/STENT: CPT | Performed by: PSYCHIATRY & NEUROLOGY

## 2020-01-08 PROCEDURE — C1769 GUIDE WIRE: HCPCS

## 2020-01-08 PROCEDURE — 6360000004 HC RX CONTRAST MEDICATION: Performed by: NEUROLOGICAL SURGERY

## 2020-01-08 PROCEDURE — 94002 VENT MGMT INPAT INIT DAY: CPT

## 2020-01-08 PROCEDURE — C1894 INTRO/SHEATH, NON-LASER: HCPCS

## 2020-01-08 PROCEDURE — 6360000002 HC RX W HCPCS: Performed by: NURSE ANESTHETIST, CERTIFIED REGISTERED

## 2020-01-08 PROCEDURE — C1760 CLOSURE DEV, VASC: HCPCS

## 2020-01-08 PROCEDURE — B3131ZZ FLUOROSCOPY OF RIGHT COMMON CAROTID ARTERY USING LOW OSMOLAR CONTRAST: ICD-10-PCS | Performed by: PSYCHIATRY & NEUROLOGY

## 2020-01-08 PROCEDURE — 037K3DZ DILATION OF RIGHT INTERNAL CAROTID ARTERY WITH INTRALUMINAL DEVICE, PERCUTANEOUS APPROACH: ICD-10-PCS | Performed by: PSYCHIATRY & NEUROLOGY

## 2020-01-08 PROCEDURE — 80048 BASIC METABOLIC PNL TOTAL CA: CPT

## 2020-01-08 RX ORDER — SODIUM CHLORIDE 0.9 % (FLUSH) 0.9 %
10 SYRINGE (ML) INJECTION PRN
Status: CANCELLED | OUTPATIENT
Start: 2020-01-08

## 2020-01-08 RX ORDER — HYDRALAZINE HYDROCHLORIDE 20 MG/ML
10 INJECTION INTRAMUSCULAR; INTRAVENOUS
Status: DISCONTINUED | OUTPATIENT
Start: 2020-01-08 | End: 2020-01-27 | Stop reason: HOSPADM

## 2020-01-08 RX ORDER — ASPIRIN 81 MG/1
324 TABLET, CHEWABLE ORAL ONCE
Status: COMPLETED | OUTPATIENT
Start: 2020-01-08 | End: 2020-01-08

## 2020-01-08 RX ORDER — CLOPIDOGREL 300 MG/1
600 TABLET, FILM COATED ORAL ONCE
Status: COMPLETED | OUTPATIENT
Start: 2020-01-08 | End: 2020-01-08

## 2020-01-08 RX ORDER — INSULIN GLARGINE 100 [IU]/ML
25 INJECTION, SOLUTION SUBCUTANEOUS DAILY
Status: DISCONTINUED | OUTPATIENT
Start: 2020-01-09 | End: 2020-01-27 | Stop reason: HOSPADM

## 2020-01-08 RX ORDER — SODIUM CHLORIDE 0.9 % (FLUSH) 0.9 %
10 SYRINGE (ML) INJECTION EVERY 12 HOURS SCHEDULED
Status: CANCELLED | OUTPATIENT
Start: 2020-01-08

## 2020-01-08 RX ORDER — FENTANYL CITRATE 50 UG/ML
50 INJECTION, SOLUTION INTRAMUSCULAR; INTRAVENOUS EVERY 5 MIN PRN
Status: DISCONTINUED | OUTPATIENT
Start: 2020-01-08 | End: 2020-01-22 | Stop reason: HOSPADM

## 2020-01-08 RX ORDER — PROTAMINE SULFATE 10 MG/ML
INJECTION, SOLUTION INTRAVENOUS PRN
Status: DISCONTINUED | OUTPATIENT
Start: 2020-01-08 | End: 2020-01-08 | Stop reason: SDUPTHER

## 2020-01-08 RX ORDER — SODIUM CHLORIDE 9 MG/ML
INJECTION, SOLUTION INTRAVENOUS CONTINUOUS PRN
Status: DISCONTINUED | OUTPATIENT
Start: 2020-01-08 | End: 2020-01-08 | Stop reason: SDUPTHER

## 2020-01-08 RX ORDER — MIDAZOLAM HYDROCHLORIDE 1 MG/ML
INJECTION INTRAMUSCULAR; INTRAVENOUS PRN
Status: DISCONTINUED | OUTPATIENT
Start: 2020-01-08 | End: 2020-01-08 | Stop reason: SDUPTHER

## 2020-01-08 RX ORDER — SUCCINYLCHOLINE CHLORIDE 20 MG/ML
100 INJECTION INTRAMUSCULAR; INTRAVENOUS ONCE
Status: COMPLETED | OUTPATIENT
Start: 2020-01-08 | End: 2020-01-08

## 2020-01-08 RX ORDER — LABETALOL 20 MG/4 ML (5 MG/ML) INTRAVENOUS SYRINGE
20
Status: DISCONTINUED | OUTPATIENT
Start: 2020-01-08 | End: 2020-01-27 | Stop reason: HOSPADM

## 2020-01-08 RX ORDER — FENTANYL CITRATE 50 UG/ML
25 INJECTION, SOLUTION INTRAMUSCULAR; INTRAVENOUS ONCE
Status: CANCELLED | OUTPATIENT
Start: 2020-01-08 | End: 2020-01-08

## 2020-01-08 RX ORDER — HYDRALAZINE HYDROCHLORIDE 20 MG/ML
10 INJECTION INTRAMUSCULAR; INTRAVENOUS EVERY 4 HOURS PRN
Status: DISCONTINUED | OUTPATIENT
Start: 2020-01-08 | End: 2020-01-08

## 2020-01-08 RX ORDER — IODIXANOL 270 MG/ML
98 INJECTION, SOLUTION INTRAVASCULAR
Status: COMPLETED | OUTPATIENT
Start: 2020-01-08 | End: 2020-01-08

## 2020-01-08 RX ORDER — ACETAMINOPHEN 325 MG/1
650 TABLET ORAL EVERY 4 HOURS PRN
Status: DISCONTINUED | OUTPATIENT
Start: 2020-01-08 | End: 2020-01-27 | Stop reason: HOSPADM

## 2020-01-08 RX ORDER — ONDANSETRON 2 MG/ML
4 INJECTION INTRAMUSCULAR; INTRAVENOUS ONCE
Status: CANCELLED | OUTPATIENT
Start: 2020-01-08 | End: 2020-01-08

## 2020-01-08 RX ORDER — CEFAZOLIN SODIUM 1 G/3ML
INJECTION, POWDER, FOR SOLUTION INTRAMUSCULAR; INTRAVENOUS PRN
Status: DISCONTINUED | OUTPATIENT
Start: 2020-01-08 | End: 2020-01-08 | Stop reason: SDUPTHER

## 2020-01-08 RX ORDER — SODIUM CHLORIDE, SODIUM LACTATE, POTASSIUM CHLORIDE, CALCIUM CHLORIDE 600; 310; 30; 20 MG/100ML; MG/100ML; MG/100ML; MG/100ML
INJECTION, SOLUTION INTRAVENOUS CONTINUOUS
Status: CANCELLED | OUTPATIENT
Start: 2020-01-08

## 2020-01-08 RX ORDER — HEPARIN SODIUM 1000 [USP'U]/ML
INJECTION, SOLUTION INTRAVENOUS; SUBCUTANEOUS PRN
Status: DISCONTINUED | OUTPATIENT
Start: 2020-01-08 | End: 2020-01-08 | Stop reason: SDUPTHER

## 2020-01-08 RX ORDER — ETOMIDATE 2 MG/ML
20 INJECTION INTRAVENOUS ONCE
Status: COMPLETED | OUTPATIENT
Start: 2020-01-08 | End: 2020-01-08

## 2020-01-08 RX ORDER — ROCURONIUM BROMIDE 10 MG/ML
INJECTION, SOLUTION INTRAVENOUS PRN
Status: DISCONTINUED | OUTPATIENT
Start: 2020-01-08 | End: 2020-01-08 | Stop reason: SDUPTHER

## 2020-01-08 RX ADMIN — LEVETIRACETAM 750 MG: 100 INJECTION, SOLUTION INTRAVENOUS at 17:46

## 2020-01-08 RX ADMIN — Medication 10 MG: at 14:52

## 2020-01-08 RX ADMIN — IPRATROPIUM BROMIDE AND ALBUTEROL SULFATE 1 AMPULE: .5; 3 SOLUTION RESPIRATORY (INHALATION) at 11:22

## 2020-01-08 RX ADMIN — HYDRALAZINE HYDROCHLORIDE 10 MG: 20 INJECTION INTRAMUSCULAR; INTRAVENOUS at 15:06

## 2020-01-08 RX ADMIN — FAMOTIDINE 20 MG: 10 INJECTION, SOLUTION INTRAVENOUS at 20:28

## 2020-01-08 RX ADMIN — LEVETIRACETAM 2000 MG: 100 INJECTION, SOLUTION INTRAVENOUS at 06:10

## 2020-01-08 RX ADMIN — SOTALOL HYDROCHLORIDE 160 MG: 80 TABLET ORAL at 11:42

## 2020-01-08 RX ADMIN — SODIUM CHLORIDE: 9 INJECTION, SOLUTION INTRAVENOUS at 08:09

## 2020-01-08 RX ADMIN — IPRATROPIUM BROMIDE AND ALBUTEROL SULFATE 1 AMPULE: .5; 3 SOLUTION RESPIRATORY (INHALATION) at 23:15

## 2020-01-08 RX ADMIN — IPRATROPIUM BROMIDE AND ALBUTEROL SULFATE 1 AMPULE: .5; 3 SOLUTION RESPIRATORY (INHALATION) at 02:29

## 2020-01-08 RX ADMIN — SODIUM CHLORIDE, PRESERVATIVE FREE 10 ML: 5 INJECTION INTRAVENOUS at 20:31

## 2020-01-08 RX ADMIN — HYDRALAZINE HYDROCHLORIDE 10 MG: 20 INJECTION INTRAMUSCULAR; INTRAVENOUS at 20:44

## 2020-01-08 RX ADMIN — Medication 10 MG: at 13:40

## 2020-01-08 RX ADMIN — SODIUM CHLORIDE, PRESERVATIVE FREE 10 ML: 5 INJECTION INTRAVENOUS at 20:29

## 2020-01-08 RX ADMIN — SODIUM CHLORIDE: 9 INJECTION, SOLUTION INTRAVENOUS at 07:06

## 2020-01-08 RX ADMIN — ROCURONIUM BROMIDE 50 MG: 10 INJECTION INTRAVENOUS at 08:18

## 2020-01-08 RX ADMIN — ROSUVASTATIN CALCIUM 5 MG: 5 TABLET, FILM COATED ORAL at 21:00

## 2020-01-08 RX ADMIN — Medication 5 MCG/MIN: at 02:11

## 2020-01-08 RX ADMIN — FOLIC ACID 1 MG: 1 TABLET ORAL at 11:35

## 2020-01-08 RX ADMIN — IODIXANOL 98 ML: 270 INJECTION, SOLUTION INTRAVASCULAR at 09:10

## 2020-01-08 RX ADMIN — CEFAZOLIN 2000 MG: 1 INJECTION, POWDER, FOR SOLUTION INTRAMUSCULAR; INTRAVENOUS at 07:44

## 2020-01-08 RX ADMIN — SODIUM CHLORIDE TAB 1 GM 2 G: 1 TAB at 17:46

## 2020-01-08 RX ADMIN — Medication 10 MG: at 11:35

## 2020-01-08 RX ADMIN — Medication 10 MG: at 19:20

## 2020-01-08 RX ADMIN — PHENYLEPHRINE HYDROCHLORIDE 1 MCG/KG/MIN: 10 INJECTION INTRAVENOUS at 12:45

## 2020-01-08 RX ADMIN — Medication 10 MG: at 12:55

## 2020-01-08 RX ADMIN — Medication 10 MG: at 18:34

## 2020-01-08 RX ADMIN — CLOPIDOGREL BISULFATE 600 MG: 300 TABLET, FILM COATED ORAL at 06:14

## 2020-01-08 RX ADMIN — IPRATROPIUM BROMIDE AND ALBUTEROL SULFATE 1 AMPULE: .5; 3 SOLUTION RESPIRATORY (INHALATION) at 19:46

## 2020-01-08 RX ADMIN — PROPRANOLOL HYDROCHLORIDE 40 MG: 40 TABLET ORAL at 20:29

## 2020-01-08 RX ADMIN — IPRATROPIUM BROMIDE AND ALBUTEROL SULFATE 1 AMPULE: .5; 3 SOLUTION RESPIRATORY (INHALATION) at 15:51

## 2020-01-08 RX ADMIN — ROCURONIUM BROMIDE 50 MG: 10 INJECTION INTRAVENOUS at 07:27

## 2020-01-08 RX ADMIN — ASPIRIN 81 MG 324 MG: 81 TABLET ORAL at 06:12

## 2020-01-08 RX ADMIN — IOHEXOL 90 ML: 350 INJECTION, SOLUTION INTRAVENOUS at 05:26

## 2020-01-08 RX ADMIN — MIDAZOLAM HYDROCHLORIDE 2 MG: 1 INJECTION, SOLUTION INTRAMUSCULAR; INTRAVENOUS at 09:40

## 2020-01-08 RX ADMIN — SENNOSIDES 8.8 MG: 8.8 LIQUID ORAL at 21:00

## 2020-01-08 RX ADMIN — Medication 15 ML: at 20:28

## 2020-01-08 RX ADMIN — Medication 10 MG: at 16:17

## 2020-01-08 RX ADMIN — SOTALOL HYDROCHLORIDE 160 MG: 80 TABLET ORAL at 20:28

## 2020-01-08 RX ADMIN — SUCCINYLCHOLINE CHLORIDE 100 MG: 20 INJECTION INTRAMUSCULAR; INTRAVENOUS at 02:13

## 2020-01-08 RX ADMIN — CHLORDIAZEPOXIDE HYDROCHLORIDE 20 MG: 5 CAPSULE ORAL at 20:28

## 2020-01-08 RX ADMIN — SODIUM CHLORIDE TAB 1 GM 2 G: 1 TAB at 11:34

## 2020-01-08 RX ADMIN — HEPARIN SODIUM 6000 UNITS: 1000 INJECTION INTRAVENOUS; SUBCUTANEOUS at 08:15

## 2020-01-08 RX ADMIN — Medication 10 MG: at 21:56

## 2020-01-08 RX ADMIN — PROTAMINE SULFATE 40 MG: 10 INJECTION, SOLUTION INTRAVENOUS at 09:09

## 2020-01-08 RX ADMIN — Medication 100 MG: at 11:34

## 2020-01-08 RX ADMIN — ETOMIDATE 20 MG: 2 INJECTION INTRAVENOUS at 02:20

## 2020-01-08 RX ADMIN — MODAFINIL 100 MG: 100 TABLET ORAL at 11:37

## 2020-01-08 RX ADMIN — FAMOTIDINE 20 MG: 10 INJECTION, SOLUTION INTRAVENOUS at 11:34

## 2020-01-08 ASSESSMENT — PULMONARY FUNCTION TESTS
PIF_VALUE: 18
PIF_VALUE: 18
PIF_VALUE: 14
PIF_VALUE: 16
PIF_VALUE: 18
PIF_VALUE: 3
PIF_VALUE: 18
PIF_VALUE: 18
PIF_VALUE: 20
PIF_VALUE: 18
PIF_VALUE: 18
PIF_VALUE: 17
PIF_VALUE: 18
PIF_VALUE: 20
PIF_VALUE: 18
PIF_VALUE: 1
PIF_VALUE: 18
PIF_VALUE: 18
PIF_VALUE: 13
PIF_VALUE: 18
PIF_VALUE: 16
PIF_VALUE: 18
PIF_VALUE: 18
PIF_VALUE: 14
PIF_VALUE: 18
PIF_VALUE: 16
PIF_VALUE: 19
PIF_VALUE: 18
PIF_VALUE: 17
PIF_VALUE: 20
PIF_VALUE: 18
PIF_VALUE: 23
PIF_VALUE: 18
PIF_VALUE: 18
PIF_VALUE: 17
PIF_VALUE: 18
PIF_VALUE: 18
PIF_VALUE: 19
PIF_VALUE: 18
PIF_VALUE: 19
PIF_VALUE: 18
PIF_VALUE: 18
PIF_VALUE: 15
PIF_VALUE: 18
PIF_VALUE: 18
PIF_VALUE: 17
PIF_VALUE: 18
PIF_VALUE: 10
PIF_VALUE: 20
PIF_VALUE: 18
PIF_VALUE: 26
PIF_VALUE: 19
PIF_VALUE: 17
PIF_VALUE: 4
PIF_VALUE: 17
PIF_VALUE: 18
PIF_VALUE: 20
PIF_VALUE: 14
PIF_VALUE: 13
PIF_VALUE: 18
PIF_VALUE: 1
PIF_VALUE: 17
PIF_VALUE: 18
PIF_VALUE: 1
PIF_VALUE: 17
PIF_VALUE: 16
PIF_VALUE: 18
PIF_VALUE: 19
PIF_VALUE: 19
PIF_VALUE: 18
PIF_VALUE: 17
PIF_VALUE: 26
PIF_VALUE: 20
PIF_VALUE: 20
PIF_VALUE: 18
PIF_VALUE: 20
PIF_VALUE: 18
PIF_VALUE: 16
PIF_VALUE: 17
PIF_VALUE: 17
PIF_VALUE: 18
PIF_VALUE: 11
PIF_VALUE: 18
PIF_VALUE: 3
PIF_VALUE: 46
PIF_VALUE: 18
PIF_VALUE: 15
PIF_VALUE: 18
PIF_VALUE: 17
PIF_VALUE: 18
PIF_VALUE: 20
PIF_VALUE: 18
PIF_VALUE: 18
PIF_VALUE: 21
PIF_VALUE: 4
PIF_VALUE: 18
PIF_VALUE: 22
PIF_VALUE: 17
PIF_VALUE: 18
PIF_VALUE: 0
PIF_VALUE: 17
PIF_VALUE: 1
PIF_VALUE: 19
PIF_VALUE: 16
PIF_VALUE: 16
PIF_VALUE: 19
PIF_VALUE: 18
PIF_VALUE: 14
PIF_VALUE: 18
PIF_VALUE: 1
PIF_VALUE: 18
PIF_VALUE: 16
PIF_VALUE: 20
PIF_VALUE: 21
PIF_VALUE: 1
PIF_VALUE: 17
PIF_VALUE: 18
PIF_VALUE: 17
PIF_VALUE: 22
PIF_VALUE: 19
PIF_VALUE: 17
PIF_VALUE: 18
PIF_VALUE: 20
PIF_VALUE: 16
PIF_VALUE: 20
PIF_VALUE: 16
PIF_VALUE: 0
PIF_VALUE: 18
PIF_VALUE: 17
PIF_VALUE: 16
PIF_VALUE: 19
PIF_VALUE: 18
PIF_VALUE: 16
PIF_VALUE: 20
PIF_VALUE: 18
PIF_VALUE: 17
PIF_VALUE: 18
PIF_VALUE: 23
PIF_VALUE: 17
PIF_VALUE: 17
PIF_VALUE: 9
PIF_VALUE: 15
PIF_VALUE: 17
PIF_VALUE: 16
PIF_VALUE: 18
PIF_VALUE: 18
PIF_VALUE: 17

## 2020-01-08 ASSESSMENT — LIFESTYLE VARIABLES: SMOKING_STATUS: 1

## 2020-01-08 NOTE — PROGRESS NOTES
Date: 1/8/2020  Time: 0214  Patient identity confirmed:  Yes  Indications: RESPIRATORY FAILURE  Preoxygenation: YES    Laryngoscope size and type Harmon 4  Airway introducer used: Yes  Evac: Yes  ETT size:an 8.0 cuffed  Number of attempts:1   Cords visualized:  [x] Clearly  [] Poorly  Breath sounds present bilaterally: Yes   ETCO2   [x] Positive   ETT secured at  26    ETT secured with [employer] TUBE GARCIA  Chest x-ray ordered: Yes     Difficult airway:    No       If yes, was red tape placed around ETT:   No    Was this a Code Situation:    No             BP: 114/71        Procedure performed by: Nikita Mendoza  2:27 AM

## 2020-01-08 NOTE — PLAN OF CARE
Ventilator Bronchodilator assessment    Breath sounds: clear, slightly dimnished throughout  Inspiratory Pressure: 20  Plateau Pressure: 15    Patient assessed at level 3          [x]    Bronchodilator Assessment    BRONCHODILATOR ASSESSMENT SCORE  Score 0 (Home) 1 2 3 4   Breath Sounds   []  Chronic Ventilator: Patient at baseline [x]  Mild Wheezes/ Clear []  Intermittent wheezes with good air entry []  Bilateral/unilateral wheezing with diminished air entry []  Insp/Exp wheeze and/or poor aeration   Ventilator Pressures   []  Chronic Ventilator [x]  Insp. Pressure less than 25 cm H20 [x]  Insp. Pressure less than 25 cm H20 []  Insp. Pressure exceeds 25 cm H20 []  Insp.  Pressure exceeds 30 cm H20   Plateau Pressure []  NA   []  Plateau Pressure less than 4  [x]  Plateau Pressure less than or equal to 5 []  Plateau Pressure greater than or equal to 6 []  Plateau Pressure greater than or equal to 8       General Villarreal  11:41 AM

## 2020-01-08 NOTE — PLAN OF CARE
Problem: HEMODYNAMIC STATUS  Goal: Patient has stable vital signs and fluid balance  1/8/2020 0412 by Fredrick Marie RN  Outcome: Ongoing     Problem: Falls - Risk of:  Goal: Will remain free from falls  Description  Will remain free from falls  1/8/2020 0412 by Fredrick Marie RN  Outcome: Ongoing     Problem: Risk for Impaired Skin Integrity  Goal: Tissue integrity - skin and mucous membranes  Description  Structural intactness and normal physiological function of skin and  mucous membranes.   1/8/2020 0412 by Fredrick Marie RN  Outcome: Ongoing     Problem: SKIN INTEGRITY  Goal: Skin integrity is maintained or improved  1/8/2020 0412 by Fredrick Marie RN  Outcome: Ongoing     Problem: Restraint Use - Nonviolent/Non-Self-Destructive Behavior:  Goal: Absence of restraint-related injury  Description  Absence of restraint-related injury  1/7/2020 1826 by Fermín Walsh RN  Outcome: ongoing     Fredrick Marie RN

## 2020-01-08 NOTE — SIGNIFICANT EVENT
Continues to decompensate with SBP in the 50s - 60s. Started Levo and Vaso. Maintains gaze palsy, will obtain stat CTA head due to concern for CVA. Have called endovascular fellow x2 but no reply. Has h/o critical stenosis of the proximal right cervical ICA, severe near critical stenosis of the right cavernous ICA, moderate stenosis of the left cavernous ICA. Have updated Dr. Mariusz Lebron, NICU attending.  If CTA head does not demonstrate acute CVA, will load with Keppra

## 2020-01-08 NOTE — PROGRESS NOTES
Patient respirations reaching into the 40s with substantial accessory muscle use, O2 sat remains stable. Contacted RT to come evaluate. Made Dr. Margaux Maya with primary team aware. RT came to bedside and placed patient on bipap at this time. Order given for 1 mg of ativan by Dr. Margaux Maya. Will continue to monitor.   Julio Mccoy RN

## 2020-01-08 NOTE — PROGRESS NOTES
Daily Progress Note  Neuro Critical Care    Patient Name: Melanie Edmonds  Patient : 1960  Room/Bed: 0529/0529-01  Allergies: No Known Allergies  Problem List:   Patient Active Problem List   Diagnosis    SAH (sub[facility]achnoid hemorrhage) ([facility] [address]    Subarachnoid bleed ([facility] [address]    Traumatic subarachnoid hemorrhage with loss of consciousness of 1 hour to 5 hours 59 minutes (HCC)    Carotid stenosis, right    Asymptomatic stenosis of left vertebral artery    Intracranial atherosclerosis    History of CEA (carotid endarterectomy)    Ventilator dependence ([facility] [address]    Delirium tremens ([facility] [address]    Chronic a-fib    On mechanically assisted ventilation (HCC)    Encephalopathy       INTERVAL HISTORY    The patient is a 62 yo male with history of atrial fibrillation on Eliquis, bilateral ICA stenosis (right more than left), DM2, HLD, HTN, CAD s/p PCI stents, PVD, CEA, and ETOH abuse who presents as a transfer from [facility] as a trauma alert for CT head revealed diffuse bialteral SAH and left parietal SDH. He was given [facility] and [facility] and transferred to [facility] for higher level of care. Patient was found confused by family at his home after being unable to reach him on the phone. He was complaining of headache, and actively vomiting. Also had bilateral arm scrapes and bruises concerning for recent fall, patient himself does not member falling, said around 4 PM he woke up and felt frontal and vertex headache and neck pain, and felt nauseous and started throwing up. Endorses drinking alcohol last night states that he had 2 beers. Per family has significant alcohol abuse history and has had multiple falls in the past.     On presentation in the ED [facility], patient mildly lethargic but oriented x4, complaining of headache, nausea, mild vertigo, left arm weaker than right, bilateral leg weakness.       Significant interdisciplinary discussion between the neurosurgeon, radiologist, trauma surgeon and stroke Underwent spontaneous breathing trial this morning but didn't tolerate it as he became tachypneic. 1/2:  multiple spikes of fever T-max 103.1 at 8 PM.  Continue to be off antibiotics as septic work-up was negative. Still not following commands. Sotalol was increased to 160 twice daily in light of RVR. He had multiple spikes of temperature T-max 103. 1. Chest x-ray showed right lower zone consolidation concerning for aspiration pneumonia and he was started on Zosyn. Sputum sample was attempted but unsuccessful by RT. Patient is more tachypneic off propofol and has been maintained on propofol which was increased to 20 mics per hour. Heart rate is better controlled on sotalol 160 twice daily. 1/3 continues to be intubated on sedation 15 mics of propofol. Not following commands of propofol. Fever spike 101.1 overnight. CPAP trial was attempted unsuccessfully tidal volume decreased from 600 300 RSBI 120.  1/4: He remained on 15 mics per kilogram per hour propofol overnight. Propofol was weaned this morning. Better tolerating CPAP trial this morning. 1/5: he remained off sedation overnight. Had 8 beats run of V. tach, monomorphic, resolved spontaneously. Received 10 mg of labetalol and 10 mg of hydralazine. Had 2 bowel movements. T-max 100.9. EEG reported 3 questionable cardiac pauses on LTME, no telemetry correlation    1/7 has been off sedation. Alert but doesn't follow commands. Tolerated CPAP well. Extubated to room air. Over the last 24 hours: developed respiratory distress and tachypnea during the night. Received 1 mg of Ativan. Had right gaze preference and was not moving the right side to painful stimuli. Endovascular was called and he was taken for right ICA stenting this morning. He was loaded with 2 g of Keppra. Had successful stenting for right ICA critical stenosis.         MEDICATIONS:     CURRENT MEDICATIONS:  SCHEDULED MEDICATIONS:   bromocriptine  5 mg Oral BID    ipratropium-albuterol  1 ampule Inhalation Q4H    chlordiazePOXIDE  20 mg Per NG tube QPM    insulin glargine  25 Units Subcutaneous Daily    insulin lispro  0-18 Units Subcutaneous Q6H    modafinil  100 mg Oral BID    docusate  100 mg Oral Daily    senna  5 mL Oral Nightly    propranolol  40 mg Oral BID    aspirin  324 mg Oral Daily    sotalol  160 mg Oral BID    sodium chloride  2 g Oral TID WC    enoxaparin  40 mg Subcutaneous Daily    nicotine  1 patch Transdermal Daily    folic acid  1 mg Oral Daily    thiamine  100 mg Oral Daily    sodium chloride flush  10 mL Intravenous BID    famotidine (PEPCID) injection  20 mg Intravenous BID    rosuvastatin  5 mg Oral Nightly    chlorhexidine  15 mL Mouth/Throat BID    vitamin D  50,000 Units Oral Weekly    sodium chloride flush  10 mL Intravenous 2 times per day    sodium chloride flush  10 mL Intravenous 2 times per day     CONTINUOUS INFUSIONS:   norepinephrine Stopped (20 0214)    sodium chloride 100 mL/hr at 20 1719    dextrose       PRN MEDICATIONS:   albuterol, ibuprofen, acetaminophen, labetalol, metoprolol, hydrALAZINE, glucose, dextrose, glucagon (rDNA), dextrose, magnesium sulfate, sodium chloride flush, sodium chloride flush    VITALS 24 Hours     Temperature Range: Temp: 98.9 °F (37.2 °C) Temp  Av.3 °F (36.8 °C)  Min: 97.8 °F (36.6 °C)  Max: 98.9 °F (37.2 °C)  BP Range: Systolic (89CTQ), RJN:498 , Min:63 , BXX:948     Diastolic (32TPC), HZK:10, Min:46, Max:103    Pulse Range: Pulse  Av.5  Min: 67  Max: 96  Respiration Range: Resp  Av.4  Min: 15  Max: 42  Current Pulse Ox: SpO2: 100 %  24HR Pulse Ox Range: SpO2  Av.3 %  Min: 87 %  Max: 100 %  Patient Vitals for the past 12 hrs:   BP Temp Temp src Pulse Resp SpO2   20 0510 120/85 -- -- 88 29 100 %   20 0505 110/71 -- -- 87 (!) 33 100 %   20 0500 128/75 -- -- 91 (!) 31 100 %   20 0450 124/73 -- -- 85 25 100 %   20 0445 96/62 not injected. No chemosis, exudate or hemorrhage. Left eye: Left conjunctiva is injected. Hemorrhage present. No chemosis or exudate. Pupils: Pupils are equal, round, and reactive to light. Cardiovascular:      Rate and Rhythm: Normal rate. Rhythm irregular. Pulmonary:      Breath sounds: Normal breath sounds. Neurological:      Mental Status: He is alert. GCS: GCS eye subscore is 4. GCS verbal subscore is 1. GCS motor subscore is 5. Cranial Nerves: Cranial nerves are intact. Motor: No abnormal muscle tone. Deep Tendon Reflexes:      Reflex Scores:       Bicep reflexes are 1+ on the right side and 1+ on the left side. Patellar reflexes are 1+ on the right side and 1+ on the left side. Comments: Intubated on sedation   Right side is flaccid. Response to pain by moving the left upper and lower extremities. Tone is normal.  Right-sided gaze preference           INTAKE/OUTPUT & DRAINS   24 HOUR INTAKE/OUTPUT:    Intake/Output Summary (Last 24 hours) at 1/8/2020 0626  Last data filed at 1/8/2020 0548  Gross per 24 hour   Intake 2498 ml   Output 700 ml   Net 1798 ml       DRAINS:  [x] There are no drains for Neuro Critical Care to monitor at this time.    LABS      Recent Results (from the past 24 hour(s))   POC Glucose Fingerstick    Collection Time: 01/07/20 11:40 AM   Result Value Ref Range    POC Glucose 107 75 - 110 mg/dL   POC Glucose Fingerstick    Collection Time: 01/07/20  5:15 PM   Result Value Ref Range    POC Glucose 128 (H) 75 - 110 mg/dL   POC Glucose Fingerstick    Collection Time: 01/08/20 12:33 AM   Result Value Ref Range    POC Glucose 130 (H) 75 - 110 mg/dL   Arterial Blood Gas, POC    Collection Time: 01/08/20  2:43 AM   Result Value Ref Range    POC pH 7.480 (H) 7.350 - 7.450    POC pCO2 29.5 (L) 35.0 - 48.0 mm Hg    POC PO2 100.3 83.0 - 108.0 mm Hg    POC HCO3 22.0 21.0 - 28.0 mmol/L    TCO2 (calc), Art 23 22.0 - 29.0 mmol/L    Negative Base Excess, Art 1 0.0 - 2.0    Positive Base Excess, Art NOT REPORTED 0.0 - 3.0    POC O2 SAT 98 94.0 - 98.0 %    O2 Device/Flow/% Adult Ventilator     Ozzie Test POSITIVE     Sample Site Right Radial Artery     Mode NOT REPORTED     FIO2 30.0     Pt Temp NOT REPORTED     POC pH Temp NOT REPORTED     POC pCO2 Temp NOT REPORTED mm Hg    POC pO2 Temp NOT REPORTED mm Hg   Lactic Acid, POC    Collection Time: 01/08/20  2:43 AM   Result Value Ref Range    POC Lactic Acid 0.81 0.56 - 1.39 mmol/L   POCT Glucose    Collection Time: 01/08/20  2:43 AM   Result Value Ref Range    POC Glucose 175 (H) 74 - 100 mg/dL   Magnesium    Collection Time: 01/08/20  4:52 AM   Result Value Ref Range    Magnesium 1.7 1.6 - 2.6 mg/dL   CBC WITH AUTO DIFFERENTIAL    Collection Time: 01/08/20  4:52 AM   Result Value Ref Range    WBC 5.0 3.5 - 11.3 k/uL    RBC 4.39 4.21 - 5.77 m/uL    Hemoglobin 10.5 (L) 13.0 - 17.0 g/dL    Hematocrit 35.2 (L) 40.7 - 50.3 %    MCV 80.2 (L) 82.6 - 102.9 fL    MCH 23.9 (L) 25.2 - 33.5 pg    MCHC 29.8 28.4 - 34.8 g/dL    RDW 20.9 (H) 11.8 - 14.4 %    Platelets 445 222 - 421 k/uL    MPV 11.0 8.1 - 13.5 fL    NRBC Automated 0.0 0.0 per 100 WBC    Differential Type NOT REPORTED     WBC Morphology NOT REPORTED     RBC Morphology NOT REPORTED     Platelet Estimate NOT REPORTED     Immature Granulocytes 0 0 %    Seg Neutrophils 77 (H) 36 - 65 %    Lymphocytes 16 (L) 24 - 43 %    Monocytes 5 3 - 12 %    Eosinophils % 1 1 - 4 %    Basophils 1 0 - 2 %    Absolute Immature Granulocyte 0.00 0.00 - 0.30 k/uL    Segs Absolute 3.85 1.50 - 8.10 k/uL    Absolute Lymph # 0.80 (L) 1.10 - 3.70 k/uL    Absolute Mono # 0.25 0.10 - 1.20 k/uL    Absolute Eos # 0.05 0.00 - 0.44 k/uL    Basophils Absolute 0.05 0.00 - 0.20 k/uL    Morphology ANISOCYTOSIS PRESENT     Morphology MICROCYTOSIS PRESENT     Morphology 1+ ELLIPTOCYTES    BASIC METABOLIC PANEL    Collection Time: 01/08/20  4:52 AM   Result Value Ref Range    Glucose 153 (H) 70 - 99 mg/dL    BUN 15 6 - 20 mg/dL    CREATININE 0.34 (L) 0.70 - 1.20 mg/dL    Bun/Cre Ratio NOT REPORTED 9 - 20    Calcium 8.3 (L) 8.6 - 10.4 mg/dL    Sodium 140 135 - 144 mmol/L    Potassium 4.5 3.7 - 5.3 mmol/L    Chloride 108 (H) 98 - 107 mmol/L    CO2 18 (L) 20 - 31 mmol/L    Anion Gap 14 9 - 17 mmol/L    GFR Non-African American >60 >60 mL/min    GFR African American >60 >60 mL/min    GFR Comment          GFR Staging NOT REPORTED            RADIOLOGY   Xr Radius Ulna Left (2 Views)    Result Date: 12/26/2019  EXAMINATION: TWO XRAY VIEWS OF THE LEFT FOREARM 12/26/2019 1:37 am COMPARISON: None. HISTORY: ORDERING SYSTEM PROVIDED HISTORY: Trauma/Fracture TECHNOLOGIST PROVIDED HISTORY: Trauma/Fracture Reason for Exam: fall/trauma Acuity: Acute FINDINGS: Fiberglass splint obscures osseous details of the distal forearm. The left radius and left are intact. No acute fracture or dislocation in the left forearm. Partial visualization of 1st proximal metacarpal fracture. Osteopenia. No acute osseous abnormality left radius or ulna. Xr Radius Ulna Right (2 Views)    Result Date: 12/26/2019  EXAMINATION: TWO XRAY VIEWS OF THE RIGHT FOREARM 12/26/2019 1:37 am COMPARISON: None. HISTORY: ORDERING SYSTEM PROVIDED HISTORY: Trauma/Fracture TECHNOLOGIST PROVIDED HISTORY: Trauma/Fracture Reason for Exam: fall/trauma Acuity: Acute FINDINGS: The bones are osteopenic. The right radius and right ulna are intact. No acute fracture or dislocation in the right forearm. Along the ulnar aspect of the proximal forearm, there is soft tissue laceration. Antecubital fossa IV catheter. Osteopenia. No acute osseous abnormality right forearm. Soft tissue laceration ulnar aspect of the proximal forearm. Xr Wrist Left (min 3 Views)    Result Date: 12/25/2019  EXAMINATION: 3 XRAY VIEWS OF THE LEFT WRIST 12/25/2019 9:45 pm COMPARISON: None.  HISTORY: ORDERING SYSTEM PROVIDED HISTORY: fall TECHNOLOGIST PROVIDED HISTORY: fall Acuity: Acute Type of 12/26/2019 1:47 am COMPARISON: Left wrist radiographs 12/25/2019 2131 hours HISTORY: ORDERING SYSTEM PROVIDED HISTORY: post reduction TECHNOLOGIST PROVIDED HISTORY: post reduction Reason for Exam: fall trauma Acuity: Acute FINDINGS: Acute traumatic closed comminuted mildly angulated proximal 1st metacarpal fracture. Joint alignment is maintained. No additional acute fracture or dislocation in the left hand. Acute traumatic closed comminuted mildly angulated proximal 1st metacarpal fracture. Xr Hand Right (min 3 Views)    Result Date: 12/26/2019  EXAMINATION: THREE XRAY VIEWS OF THE RIGHT HAND 12/26/2019 1:36 am COMPARISON: None. HISTORY: ORDERING SYSTEM PROVIDED HISTORY: Trauma/Fracture TECHNOLOGIST PROVIDED HISTORY: Include clenched fist view Trauma/Fracture Reason for Exam: fall FINDINGS: Joint alignment is maintained. No acute fracture or dislocation in the right hand. Degenerative changes in the interphalangeal joints. Old ulnar styloid process fracture. Degenerative changes in the radiocarpal joint. No acute osseous abnormality in the right hand. Xr Hip Left (2-3 Views)    Result Date: 12/26/2019  EXAMINATION: ONE XRAY VIEW OF THE PELVIS AND TWO XRAY VIEWS RIGHT HIP; TWO XRAY VIEWS OF THE LEFT HIP 12/26/2019 1:37 am COMPARISON: None. HISTORY: ORDERING SYSTEM PROVIDED HISTORY: Trauma/Fracture TECHNOLOGIST PROVIDED HISTORY: AP and cross-table lateral of the hip please, thank you Trauma/Fracture Reason for Exam: fall/trauma Acuity: Acute FINDINGS: The bones are osteopenic. The femoral heads located bilateral.  No acute fracture or dislocation pelvis or hips bilaterally. SI joints are grossly intact. Pubic symphysis is intact. The sacrum is partially obscured by contrast in the bladder. No acute osseous abnormality in the pelvis or hips bilaterally. Osteopenia.      Xr Knee Left (3 Views)    Result Date: 12/26/2019  EXAMINATION: THREE XRAY VIEWS OF THE LEFT KNEE 12/26/2019 1:36 am COMPARISON: None. HISTORY: ORDERING SYSTEM PROVIDED HISTORY: Trauma/Fracture TECHNOLOGIST PROVIDED HISTORY: Trauma/Fracture Reason for Exam: fall/ trauma Acuity: Acute FINDINGS: Osteopenia. No acute fracture or dislocation. No focal osseous lesion. No joint effusion. No focal soft tissue abnormality. Vascular calcifications. No acute abnormality of the knee. Osteopenia. Xr Knee Right (3 Views)    Result Date: 12/26/2019  EXAMINATION: THREE XRAY VIEWS OF THE RIGHT KNEE 12/26/2019 1:36 am COMPARISON: None. HISTORY: ORDERING SYSTEM PROVIDED HISTORY: Trauma/Fracture TECHNOLOGIST PROVIDED HISTORY: Trauma/Fracture Reason for Exam: ,fall trauma,unable to get xtable on patient Acuity: Acute FINDINGS: The bones are osteopenic. No acute fracture or dislocation. No focal osseous lesion. No evidence of joint effusion. No focal soft tissue abnormality. Vascular calcifications. No acute abnormality of the knee. Osteopenia. Xr Ankle Left (min 3 Views)    Result Date: 12/26/2019  EXAMINATION: THREE XRAY VIEWS OF THE LEFT ANKLE 12/26/2019 1:36 am COMPARISON: None. HISTORY: ORDERING SYSTEM PROVIDED HISTORY: Trauma/Fracture TECHNOLOGIST PROVIDED HISTORY: Trauma/Fracture Acuity: Acute FINDINGS: No acute fracture or dislocation. Normal alignment of the ankle mortise. No focal osseous lesion. No joint effusion. No focal soft tissue abnormality. Vascular calcifications. No acute abnormality of the ankle. Xr Ankle Right (min 3 Views)    Result Date: 12/26/2019  EXAMINATION: THREE XRAY VIEWS OF THE RIGHT ANKLE 12/26/2019 1:36 am COMPARISON: None. HISTORY: ORDERING SYSTEM PROVIDED HISTORY: Trauma/Fracture TECHNOLOGIST PROVIDED HISTORY: Trauma/Fracture FINDINGS: Diffuse osteopenia. No acute fracture or dislocation. Normal alignment of the ankle mortise. No focal osseous lesion. No joint effusion. No focal soft tissue abnormality. Status post 5th metatarsal ORIF with intact screw.      No acute abnormality of the ankle. Ct Head Wo Contrast    Result Date: 12/27/2019  EXAMINATION: CT OF THE HEAD WITHOUT CONTRAST,  12/26/2019 11:08 pm TECHNIQUE: CT of the head was performed without the administration of intravenous contrast. Dose modulation, iterative reconstruction, and/or weight based adjustment of the mA/kV was utilized to reduce the radiation dose to as low as reasonably achievable. COMPARISON: 12/26/2019 5:10 a.m. HISTORY: ORDERING SYSTEM PROVIDED HISTORY: Reassess bleed for stability. TECHNOLOGIST PROVIDED HISTORY: Reassess bleed for stability. Reason for Exam: Reassess bleed for stability. Acuity: Unknown Type of Exam: Unknown FINDINGS: BRAIN/VENTRICLES: Again seen is extensive subarachnoid hemorrhage within the sylvian fissures as well as cerebral sulci bilaterally. Subdural hemorrhage overlies the left parietal convexity measures 4 mm in thickness. Hemorrhage in the prepontine cistern as well. Small amount of intraventricular hemorrhage layering in the occipital horns bilaterally. Ventricles are stable in caliber. No hydrocephalus. The gray-white matter differentiation is maintained. No midline shift. ORBITS: The visualized portion of the orbits demonstrate no acute abnormality. SINUSES: The visualized paranasal sinuses and mastoid air cells demonstrate no acute abnormality. SOFT TISSUES/SKULL:  No acute abnormality of the visualized skull or soft tissues. Stable head CT demonstrating extensive bilateral subarachnoid hemorrhage as well as intraventricular hemorrhage and left parietal convexity subdural hemorrhage. No new intracranial hemorrhage. No hydrocephalus.      Ct Head Wo Contrast    Result Date: 12/26/2019  EXAMINATION: CT OF THE HEAD WITHOUT CONTRAST  12/26/2019 4:51 am TECHNIQUE: CT of the head was performed without the administration of intravenous contrast. Dose modulation, iterative reconstruction, and/or weight based adjustment of the mA/kV was utilized to reduce the radiation dose to as low as reasonably achievable. COMPARISON: 12/25/2019 HISTORY: ORDERING SYSTEM PROVIDED HISTORY: worsening mentation TECHNOLOGIST PROVIDED HISTORY: worsening mentation FINDINGS: BRAIN/VENTRICLES: Diffuse subarachnoid hemorrhage within the cerebral sulci, sylvian fissures, anterior interhemispheric fissure, and prepontine cistern, similar in appearance to prior study. There is intraventricular hemorrhage layering in the occipital horns. No hydrocephalus. No mass effect or midline shift. Left parietal convexity subdural hemorrhage measures up to 5 mm in thickness. The basal cisterns are patent. The gray-white matter differentiation is maintained. ORBITS: The visualized portion of the orbits demonstrate no acute abnormality. SINUSES: The visualized paranasal sinuses and mastoid air cells demonstrate no acute abnormality. SOFT TISSUES/SKULL:  No acute abnormality of the visualized skull or soft tissues. 1. Stable head CT demonstrating diffuse moderate volume subarachnoid hemorrhage and left parietal convexity subdural hemorrhage measuring up to 5 mm in thickness. 2. No mass effect or midline shift. No hydrocephalus. The findings were sent to the Radiology Results [address] at 5:48 am on 12/26/2019to be communicated to a licensed caregiver. Ct Head Wo Contrast    Result Date: 12/26/2019  EXAMINATION: CT OF THE HEAD WITHOUT CONTRAST  12/25/2019 9:04 pm TECHNIQUE: CT of the head was performed without the administration of intravenous contrast. Dose modulation, iterative reconstruction, and/or weight based adjustment of the mA/kV was utilized to reduce the radiation dose to as low as reasonably achievable. COMPARISON: None HISTORY: ORDERING SYSTEM PROVIDED HISTORY: trauma TECHNOLOGIST PROVIDED HISTORY: trauma Multiple falls. On Eliquis. Subarachnoid hemorrhage on CT head performed at outside institution.  FINDINGS: BRAIN/VENTRICLES: There is moderate subarachnoid hemorrhage in the bilateral sylvian fissures and bilateral frontal, temporal, and parietal lobe sulci. Additional subarachnoid hemorrhage is noted in the interhemispheric fissure and prepontine cistern. Gray-white matter differentiation is grossly maintained without CT evidence of acute, territorial infarct. Acute, left parietal convexity subdural hematoma measures up to 4 mm in thickness. No associated mass effect. No parenchymal hemorrhage. There is no hydrocephalus or midline shift. Basal cisterns are patent. ORBITS: The visualized portion of the orbits demonstrate no acute abnormality. SINUSES: The visualized paranasal sinuses and mastoid air cells demonstrate no acute abnormality. SOFT TISSUES/SKULL:  No acute abnormality of the visualized skull or soft tissues. Subarachnoid hemorrhage, predominantly in the bilateral sylvian fissures and bilateral cerebral hemisphere sulci. Acute left parietal convexity subdural hematoma measures up to 4 mm in thickness. No associated mass effect. No midline shift or evidence of intracranial herniation. Findings were discussed with Elizabeth Signs at 9:32 pm on 12/25/2019. Ct Cervical Spine Wo Contrast    Result Date: 12/26/2019  EXAMINATION: CT OF THE CERVICAL SPINE WITHOUT CONTRAST 12/25/2019 9:04 pm TECHNIQUE: CT of the cervical spine was performed without the administration of intravenous contrast. Multiplanar reformatted images are provided for review. Dose modulation, iterative reconstruction, and/or weight based adjustment of the mA/kV was utilized to reduce the radiation dose to as low as reasonably achievable. COMPARISON: None HISTORY: ORDERING SYSTEM PROVIDED HISTORY: trauma TECHNOLOGIST PROVIDED HISTORY: trauma FINDINGS: BONES/ALIGNMENT: No traumatic malalignment. Vertebral body heights are maintained. No acute fracture. DEGENERATIVE CHANGES: Moderate multilevel disc narrowing and endplate osteophyte formation. Multilevel uncovertebral and facet joint degenerative changes.  SOFT is permanently stored in the [place] [id number]. The following is the conscious sedation record including Start and End times: Fentanyl, propofol and Versed from 1400 to  1600 . Clinical History:59 y. o.?male?with past medical history including atrial fibrillation on Eliquis, diabetes mellitus, hypertension, hyperlipidemia, coronary artery disease s/p PCI stents, peripheral vascular disease, history of bilateral ICA stenosis with  right worse than left, history of CEA, alcohol abuse. ? Patient presented after a fall from outside facility as a trauma alert?and found to have bilateral subarachnoid hemorrhage/left parietal subdural hematoma. ? He received Kcentra in outside hospital. ? Neuro endovascular was consulted. Description and findings: The patient's right groin was prepped and draped in standard sterile fashion and under local anesthesia with conscious sedation, the right common femoral artery was accessed with a single-wall needle technique, and a 5 Fijian intravascular sheath was placed within the right common femoral artery, establishing arterial access. A 5 Fijian multipurpose catheter was then advanced over a guide wire to the level of the aortic arch, and used to selectively catheterize the right common carotid artery. Right CCA technique: The right common carotid artery was selectively catheterized under fluoroscopic guidance and digital subtraction angiography images were obtained in biplane projections of the right cervical carotid artery. Interpretation:The right common carotid artery injection demonstrates normal antegrade flow into the external and internal carotid arteries with normal filling of the external carotid artery branches. Course and caliber of the cervical portion of the right internal carotid artery revealed significant proximal right ICA stenosis/string sign measuring approximately 90-99% per NASCET criteria.  Flow across the high-grade stenosis was delayed with earlier filling noted of the distal supraclinoid internal carotid artery through retrograde opacification of the right ophthalmic artery from the internal maxillary artery. Further inspection demonstrates no evidence of dissection, aneurysm. Right CCA technique: The right internal carotid artery run was performed from the common carotid artery due to severe stenosis. Digital subtraction angiography of the intracranial right internal carotid circulation was performed in frontal, and lateral projections. Interpretation:There is slow antegrade filling of the distal internal carotid artery from the cervical internal carotid artery. Noted retrograde Filling of the right ophthalmic artery from the right internal maxillary artery branches with slow filling anterior cerebral artery, middle cerebral artery and the distal branches due to previously noted critical proximal cervical ICA stenosis. Inspection of the remaining right internal carotid circulation revealed no other evidence of cerebral aneurysm, arteriovenous malformation. There is severe stenosis noted in the petrous/cavernous and supraclinoid right ICA noted with diminutive appearance. Capillary and venous phase images were also unremarkable, with no evidence of veno-occlusive disease. Left CCA technique: The left common carotid artery was selectively catheterized under fluoroscopic guidance and digital subtraction angiography images were obtained in biplane projections of the left cervical common carotid artery. Interpretation: The left common carotid artery injection demonstrates normal antegrade flow into the external and internal carotid arteries with normal filling of the external carotid artery branches. Caliber and lumen contour of the cervical portion of the left internal carotid artery noted to be large and irregular which is likely due to prior endarterectomy. Left ICA technique:  The left internal carotid artery was then selectively catheterized, and digital subtraction angiography of the intracranial left internal carotid artery circulation was performed in frontal and lateral projections. Interpretation:There is normal antegrade filling of the distal internal carotid artery, ophthalmic artery, anterior cerebral artery, middle cerebral artery and distal branches. Inspection of the remaining left internal carotid artery circulation revealed  mild to moderate left cavernous ICA stenosis. Otherwise, no other evidence of cerebral aneurysm, arteriovenous malformation, or arterial stenosis. No vasospasm noted. Irregularity of the vessels without hemodynamic stenosis which could reflect intracranial atherosclerosis Capillary and venous phase images were also unremarkable, with no evidence of veno-occlusive disease. Left VA technique: The left vertebral artery noted to arise from the aortic arch. Left vertebral artery was then selectively catheterized with roadmap guidance. Digital subtraction angiography of the intracranial left vertebrobasilar run-off  was obtained in frontal and lateral projections. Interpretation:The left vertebral artery injection demonstrates normal antegrade opacification of the left  vertebral artery, including the cervical segment, basilar artery, and respective branches. There is a left V4 moderate stenosis noted. Otherwise, there was no evidence of  cerebral aneurysm, arteriovenous malformation, or arterial stenosis. There is noted opacification of the distal right middle cerebral artery parieto-occipital territory from pial collaterals arising off the right posterior cerebral artery. Capillary and venous phase images were otherwise unremarkable. Right CFA technique: A right common femoral artery angiogram was performed and demonstrated arterial catheterization proximal to the bifurcation. There was no evidence of dissection or occlusion within the right common femoral artery. There is internal iliac artery stent noted.  A 5F Vascade closure device was used to establish hemostasis at the right common femoral artery access site. No immediate complications were experienced. Patient was re-examined after the procedure with no change noted in their neurologic examination, with distal pulses present. The patient was subsequently discharged from the neurointerventional suite. Impression: 1. Critical right ICA stenosis with a string sign is noted measuring approximately 90-99% per NASCET criteria. Noted retrograde Filling of the right ophthalmic artery from the right internal maxillary artery branches with slow filling of the anterior cerebral artery, middle cerebral artery and the distal branches in addition to distal right mca vascular supply from the right pca due to previously noted critical proximal cervical ICA stenosis. 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 3.No evidence of clear discrete aneurysm, arteriovenous malformation, arterial dissection, or veno-occlusive disease. 4. 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 Anderson Jarvis dictated this invasive procedure. Dr. Kristofer Zamora was present for all procedural and imaging components of this case. Examination was reviewed and reported findings confirmed and edited by Dr. Kristofer Zamora. Dr. Kristofer Zamora supervised and interpreted this procedure. Frankie Mederos MD Final report electronically signed by Frankie Mederos M.D. on 12/27/2019 4:00 PM    Cta Head Neck W Contrast    Result Date: 12/25/2019  EXAMINATION: CTA OF THE HEAD AND NECK WITH CONTRAST 12/25/2019 9:07 pm: TECHNIQUE: CTA of the head and neck was performed with the administration of intravenous contrast. Multiplanar reformatted images are provided for review. MIP images are provided for review. Stenosis of the internal carotid arteries measured using NASCET criteria.  Dose modulation, iterative reconstruction, and/or weight based adjustment of the mA/kV was utilized to reduce the radiation dose to as low as reasonably achievable. COMPARISON: None. HISTORY: ORDERING SYSTEM PROVIDED HISTORY: sah TECHNOLOGIST PROVIDED HISTORY: sah Reason for Exam: SAH after trauma Acuity: Acute Type of Exam: Initial FINDINGS: CTA NECK: AORTIC ARCH/ARCH VESSELS: There is a critical stenosis of the proximal left vertebral artery with moderate and severe stenoses of the V2 segment. There is a severe stenosis at the origin of the right vertebral artery. 66% stenosis of the left subclavian artery is noted. CAROTID ARTERIES: There is 25% stenosis of the proximal left common carotid artery and 50% stenosis of the mid and distal segments. The cervical portion of the left internal carotid artery is normal in course and caliber. There is 20% stenosis of the right common carotid artery. A critical stenosis of the proximal right cervical ICA is noted with attenuated flow seen distally. VERTEBRAL ARTERIES: No dissection, arterial injury, or significant stenosis. SOFT TISSUES: There is a thickened appearance to the mid esophagus. A small amount of layering debris is present within the trachea, likely reflecting pooled secretion. BONES: No acute osseous abnormality. CTA HEAD: ANTERIOR CIRCULATION: There is severely attenuated flow within the petrous and cavernous segments of the right internal carotid artery with severe, near critical stenoses of the cavernous ICA. There is also attenuated flow within the contralateral ICA, to a lesser degree, with moderate stenoses of the left cavernous ICA. There are moderate and severe multifocal stenoses of the MCA branch vessels, worse on the right-hand side. The right anterior cerebral artery is attenuated compared to the left. POSTERIOR CIRCULATION: There are mild-to-moderate stenoses of the distal right vertebral artery and a moderate to severe stenosis of the distal left. The basilar artery and PCAs appear to be slightly attenuated. OTHER: No dural venous sinus thrombosis on this non-dedicated study. pulmonary hypertension. Calcified and noncalcified atherosclerotic plaque of the thoracic aorta. Incidentally noted 4 vessel aortic arch with separate arch origin of the left vertebral artery. Ectatic ascending thoracic aorta measures up to 4 cm in diameter. No acute aortic abnormality. Central airways are clear. No pleural effusion or pneumothorax. No pulmonary contusion or pulmonary laceration. Mild bilateral lower lobe dependent atelectatic changes. Healing anterolateral left 6th rib fracture. No acute displaced rib fracture or chest wall hematoma. CTA ABDOMEN: No acute traumatic abnormality of the liver, spleen, pancreas, kidneys, or adrenal glands. Normal gallbladder. Stomach, small bowel, and colon are normal in caliber without evidence of obstruction. Normal appendix. Sigmoid diverticulosis without diverticulitis. Calcified and noncalcified aortoiliac atherosclerotic calcification. Abdominal aorta is normal in caliber. No free fluid in the abdomen. CTA PELVIS: Urinary bladder and pelvic organs are normal.  No free fluid in the pelvis. Bilateral common iliac stents are in position. THORACIC/LUMBAR SPINE: BONES/ALIGNMENT: Normal alignment of the thoracic and lumbar spine. Vertebral body heights are maintained. No acute fracture. DEGENERATIVE CHANGES: Multilevel disc narrowing and endplate osteophyte formation. Mild multilevel facet joint degenerative changes. No high-grade, bony spinal canal stenosis. SOFT TISSUES: Paraspinal soft tissues are normal.     No acute traumatic abnormality of the chest, abdomen, or pelvis. Remote, healing anterolateral left 6th rib fracture. No acute osseous abnormality of the thoracic or lumbar spine. Xr Hip 2-3 Vw W Pelvis Right    Result Date: 12/26/2019  EXAMINATION: ONE XRAY VIEW OF THE PELVIS AND TWO XRAY VIEWS RIGHT HIP; TWO XRAY VIEWS OF THE LEFT HIP 12/26/2019 1:37 am COMPARISON: None.  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 Continue aspirin 324 mg daily  Critical right ICA stenosis-post right ICA stenting  Systolic blood pressure goal 100-140  Modafinil 100 mg BID   Hold Eliquis for now   Discontinue bromocriptine  Delirium tremens, Librium 20 mg at bedtime    Cardio  On sotalol 160 mg twice daily  Heart rate is better controlled overnight with no RVR  EF 45%  Cardiac pauses reported on LTM E. No telemetry correlation  We will continue cardiac monitor    Pulm  Extubated to room air  Continue O2 monitor  Maintain saturation > 92%    Endo  Serum glucose is better controlled < 180  HB A1C: 7.1  Lantus is on hold as patient has been n.p.o. Resume Lantus tomorrow    GI  Milk of magnesia for bowel regimen  Pepcid 20 mg IV  BID for GI PPx  Resume tube feeds    Renal  Hyponatremia: 131->133 -> 137 -> 135  Continue salt tabs  Intake 2.5 L output 1 L +4  BUN 17, creatinine 0.39    Heme/ID  Tmax 98   Hemoglobin hematocrit stable      MS  Nondisplaced mildly angulated left first metacarpal fracture  Splinted by orthopedics  Follow-up with orthopedics    LDAs:  NG tube, ETT, ext. Urinary     DVT PPX: Lovenox 40 mg daily  GI prophylaxis Pepcid 20 mg IV twice daily    Disposition: remains in NICU for vent management.  Stepdown after extubation      Jones Kaur MD  Neurology Resident  Neuro Critical [facility]   Phone: Vonda Quigley

## 2020-01-08 NOTE — PROCEDURES
PROCEDURE NOTE - EMERGENCY INTUBATION    PATIENT NAME: [id number] [facility] 43 RECORD NO. [id number]  DATE: 1/8/2020  ATTENDING PHYSICIAN: ONDINA    PREOPERATIVE DIAGNOSIS:  Acute Respiratory Failure  POSTOPERATIVE DIAGNOSIS:  Same  PROCEDURE PERFORMED:  Emergency endotracheal intubation  PERFORMING PHYSICIAN: Aileen Perez DO    MEDICATIONS: etomidate intravenously and succinycholine intravenously    DISCUSSION:  Michele Burns is a 61y.o.-year-old male who requires intubation and ventilatory support due to impending respiratory failure, airway compromise and airway protection. The history and physical examination were reviewed and confirmed. CONSENT: Unable to be obtained due to patient's condition. PROCEDURE:  A timeout was initiated by the bedside nurse and was confirmed by those present. The patient was placed in the appropriate position. Cricoid pressure was not required. 8.0 inubation was performed by direct laryngoscopy using a laryngoscope and a  cuffed endotracheal tube. The cuff was then inflated and the tube was secured appropriately at a distance of 25 cm to the dental ridge. Initial confirmation of placement included bilateral breath sounds, an end tidal CO2 detector, absence of sounds over the stomach, tube fogging, adequate chest rise, adequate pulse oximetry reading and improved skin color. A chest x-ray to verify correct placement of the tube has been ordered but is still pending. The patient tolerated the procedure well.      COMPLICATIONS:  None     Aileen Perez DO  2:23 AM, 1/8/20

## 2020-01-08 NOTE — PROGRESS NOTES
Nutrition Assessment (Enteral Nutrition)    Type and Reason for Visit: Reassess    Nutrition Recommendations: When medically appropriate, restart TF. Recommend Glucerna 1.2 with goal of 75ml/hr to provide 2160 kcal and 108g protein per day. Monitor blood sugars and tolerance. Nutrition Assessment: Pt was re-intubated overnight. Pt is NPO x ~2 days. Malnutrition Assessment:  · Malnutrition Status: At risk for malnutrition  · Context: Acute illness or injury  · Findings of the 6 clinical characteristics of malnutrition (Minimum of 2 out of 6 clinical characteristics is required to make the diagnosis of moderate or severe Protein Calorie Malnutrition based on AND/ASPEN Guidelines):  1. Energy Intake-Less than or equal to 75% of estimated energy requirement, Greater than or equal to 7 days    2. Weight Loss-Unable to assess, in 1 week  3. Fat Loss-Unable to assess,    4. Muscle Loss-Unable to assess,    5. Fluid Accumulation-Mild fluid accumulation, Generalized, Extremities  6.  Strength-Not measured    Nutrition Risk Level: High    Nutrition Needs:  · Estimated Daily Total Kcal: 6815-4470 kcals/day  · Estimated Daily Protein (g):  gm/day     Nutrition Diagnosis:   · Problem: Inadequate oral intake  · Etiology: related to Impaired respiratory function-inability to consume food     Signs and symptoms:  as evidenced by NPO status due to medical condition    Objective Information:  · Nutrition-Focused Physical Findings:    · Wound Type: Venous Stasis(L ankle - healed)  · Current Nutrition Therapies:  · Oral Diet Orders: NPO   · Tube Feeding (TF) Orders: NPO  · Goal TF & Flush Orders Provides: Glucerna 1.2 @ 75ml/hr to provide 2160kcal and 108g protein per day. · Anthropometric Measures:  · Ht: 5' 11\" (180.3 cm)   · Current Body Wt: 187 lb 9.8 oz (85.1 kg)  · Admission Body Wt: 210 lb 8.6 oz (95.5 kg)  · Weight Change: 11% weight loss x 2 weeks per admission and current weights.  Likely does not

## 2020-01-08 NOTE — ANESTHESIA PRE PROCEDURE
Department of Anesthesiology  Preprocedure Note       Name:  Jose Chavez   Age:  61 y.o.  :  1960                                          MRN:  [id number]         Date:  2020      Surgeon: * No surgeons listed *    Procedure: IR ANGIOGRAM CAROTID CEREBRAL BILATERAL    Medications prior to admission:   Prior to Admission medications    Medication Sig Start Date End Date Taking? Authorizing Provider   vitamin D (ERGOCALCIFEROL) 1.25 MG (99459 UT) CAPS capsule Take 1 capsule by mouth once a week for 8 doses 19  Jarocho Copeland DO       Current medications:    No current facility-administered medications for this visit.       Current Outpatient Medications   Medication Sig Dispense Refill    vitamin D (ERGOCALCIFEROL) 1.25 MG (36430 UT) CAPS capsule Take 1 capsule by mouth once a week for 8 doses 8 capsule 0     Facility-Administered Medications Ordered in Other Visits   Medication Dose Route Frequency Provider Last Rate Last Dose    norepinephrine (LEVOPHED) 16 mg in dextrose 5% 250 mL infusion  2 mcg/min Intravenous Continuous Franklyn Rosenthal DO   Stopped at 20 0214    bromocriptine (PARLODEL) tablet 5 mg  5 mg Oral BID Patricia Mccall MD   5 mg at 20 1956    ipratropium-albuterol (DUONEB) nebulizer solution 1 ampule  1 ampule Inhalation Q4H Franklyn Rosenthal DO   1 ampule at 20 0229    albuterol (PROVENTIL) nebulizer solution 2.5 mg  2.5 mg Nebulization As Directed RT PRN Franklyn Rosenthal DO        chlordiazePOXIDE (LIBRIUM) capsule 20 mg  20 mg Per NG tube QPM PAMELA Blank CNP   20 mg at 20 1709    0.9 % sodium chloride infusion   Intravenous Continuous Tutu Quan  mL/hr at 20 1719      insulin glargine (LANTUS) injection vial 25 Units  25 Units Subcutaneous Daily PAMELA Blank CNP   25 Units at 20 1238    insulin lispro (HUMALOG) injection vial 0-18 Units  0-18 Units Subcutaneous Q6H PAMELA Blank CNP 3 Units at 01/06/20 1720    modafinil (PROVIGIL) tablet 100 mg  100 mg Oral BID Amilcar Weber MD   100 mg at 01/07/20 1257    docusate (COLACE) 50 MG/5ML liquid 100 mg  100 mg Oral Daily Donata Saver Saint rosenda, DO   100 mg at 01/07/20 2620    senna (SENOKOT) 8.8 MG/5ML syrup 8.8 mg  5 mL Oral Nightly Donata Saver Saint rosenda, DO   8.8 mg at 01/05/20 2103    propranolol (INDERAL) tablet 40 mg  40 mg Oral BID Donata Saver Brzycki, DO   40 mg at 01/07/20 1956    ibuprofen (ADVIL;MOTRIN) tablet 400 mg  400 mg Oral Q6H PRN Andrey Aguilar, APRN - CNP        acetaminophen (TYLENOL) 160 MG/5ML solution 650 mg  650 mg Oral Q6H PRN Andrey Aguilar, APRN - CNP   650 mg at 01/07/20 0024    aspirin chewable tablet 324 mg  324 mg Oral Daily Andrey Aguilar, APRN - CNP   324 mg at 01/07/20 0827    sotalol (BETAPACE) tablet 160 mg  160 mg Oral BID Andrey Aguilar, APRN - CNP   160 mg at 01/07/20 1956    sodium chloride tablet 2 g  2 g Oral TID WC Andrey Aguilar, APRN - CNP   2 g at 01/07/20 1709    enoxaparin (LOVENOX) injection 40 mg  40 mg Subcutaneous Daily Ayaka White MD   40 mg at 01/07/20 0826    nicotine (NICODERM CQ) 21 MG/24HR 1 patch  1 patch Transdermal Daily Andrey Aguilar, APRN - CNP   1 patch at 76/24/58 5034    folic acid (FOLVITE) tablet 1 mg  1 mg Oral Daily Andrey Aguilar, APRN - CNP   1 mg at 01/07/20 0827    vitamin B-1 (THIAMINE) tablet 100 mg  100 mg Oral Daily Andrey Aguilar, APRN - CNP   100 mg at 01/07/20 7684    sodium chloride flush 0.9 % injection 10 mL  10 mL Intravenous BID Andrey Aguilar, APRN - CNP   10 mL at 01/07/20 1957    labetalol (NORMODYNE;TRANDATE) injection syringe 10 mg  10 mg Intravenous Q2H PRN Dina Burdock, DO   10 mg at 01/05/20 0827    famotidine (PEPCID) injection 20 mg  20 mg Intravenous BID PAMELA Flores CNP   20 mg at 01/07/20 1956    rosuvastatin (CRESTOR) tablet 5 mg  5 mg Oral Nightly PAMELA Flores CNP   5 mg at 01/07/20 1956    chlorhexidine (PERIDEX) 0.12 % solution 15 mL  15 mL Mouth/Throat BID Cayla Moy MD   15 mL at 01/07/20 1956    metoprolol (LOPRESSOR) injection 5 mg  5 mg Intravenous Q5 Min PRN Ada Collazo MD   5 mg at 01/02/20 1858    vitamin D (ERGOCALCIFEROL) capsule 50,000 Units  50,000 Units Oral Weekly Shira Jacks, DO   50,000 Units at 01/03/20 3592    hydrALAZINE (APRESOLINE) injection 10 mg  10 mg Intravenous Q4H PRN Shira Jacks, DO   10 mg at 01/07/20 1709    glucose (GLUTOSE) 40 % oral gel 15 g  15 g Oral PRN Benito Coley MD        dextrose 50 % IV solution  12.5 g Intravenous PRN Benito Coley MD        glucagon (rDNA) injection 1 mg  1 mg Intramuscular PRN Benito Coley MD        dextrose 5 % solution  100 mL/hr Intravenous PRN Benito Coley MD        magnesium sulfate 1 g in sodium chloride 0.9 % 100 mL IVPB  1 g Intravenous PRN Bridget Reynolds MD        sodium chloride flush 0.9 % injection 10 mL  10 mL Intravenous 2 times per day Shira Jacks, DO   10 mL at 01/07/20 9944    sodium chloride flush 0.9 % injection 10 mL  10 mL Intravenous PRN Shira Jacks, DO        sodium chloride flush 0.9 % injection 10 mL  10 mL Intravenous 2 times per day Benito Coley MD   10 mL at 01/03/20 2100    sodium chloride flush 0.9 % injection 10 mL  10 mL Intravenous PRN Benito Coley MD           Allergies:  No Known Allergies    Problem List:    Patient Active Problem List   Diagnosis Code    SAH (subarachnoid hemorrhage) ([facility] [address] I60.9    Subarachnoid bleed ([facility] [address] I60.9    Traumatic subarachnoid hemorrhage with loss of consciousness of 1 hour to 5 hours 59 minutes ([address] S06. 6X3A    Carotid stenosis, right I65.21    Asymptomatic stenosis of left vertebral artery I65.02    Intracranial atherosclerosis I67.2    History of CEA (carotid endarterectomy) Z98.890    Ventilator dependence (HCC) Z99.11    Delirium tremens (HCC) F10.231    Chronic a-fib I48.20    On mechanically assisted ventilation ([address] Z99.11    Encephalopathy G93.40       Past Medical History:        Diagnosis Date    Alcohol abuse     Atrial fibrillation (HCC)     Diabetes ([facility] [address]     Gastritis     Hyperlipidemia     Hypertension     Left ventricular hypertrophy     Neuropathy     Renal cyst        Past Surgical History:        Procedure Laterality Date    ANGIOPLASTY  2019    NO VASCULAR LEG STENTS PER DR. Yasmeen Mullins    CORONARY ANGIOPLASTY WITH STENT PLACEMENT  2019    PT HAS 7 CARDIAC STENTS UNKNOWN 1.5 T ONLY       Social History:    Social History     Tobacco Use    Smoking status: Current Every Day Smoker     Packs/day: 0.50     Types: Cigarettes    Smokeless tobacco: Current User   Substance Use Topics    Alcohol use: Yes     Frequency: 4 or more times a week                                Ready to quit: Not Answered  Counseling given: Not Answered      Vital Signs (Current): There were no vitals filed for this visit.                                            BP Readings from Last 3 Encounters:   01/08/20 (!) 166/101   12/26/19 (!) 145/97       NPO Status:                                                                                 BMI:   Wt Readings from Last 3 Encounters:   01/06/20 187 lb 9.8 oz (85.1 kg)     There is no height or weight on file to calculate BMI.    CBC:   Lab Results   Component Value Date    WBC 5.0 01/08/2020    RBC 4.39 01/08/2020    HGB 10.5 01/08/2020    HCT 35.2 01/08/2020    MCV 80.2 01/08/2020    RDW 20.9 01/08/2020     01/08/2020       CMP:   Lab Results   Component Value Date     01/08/2020    K 4.5 01/08/2020     01/08/2020    CO2 18 01/08/2020    BUN 15 01/08/2020    CREATININE 0.34 01/08/2020    GFRAA >60 01/08/2020    LABGLOM >60 01/08/2020    GLUCOSE 153 01/08/2020    PROT 5.7 12/30/2019    CALCIUM 8.3 01/08/2020    BILITOT 0.76 12/30/2019    ALKPHOS 68 12/30/2019    AST 21 12/30/2019    ALT 21 12/30/2019       POC Tests:   Recent Labs     01/08/20  0243   POCGLU 175* Coags:   Lab Results   Component Value Date    PROTIME 10.5 12/25/2019    INR 1.0 12/25/2019    APTT 23.9 12/25/2019       HCG (If Applicable): No results found for: PREGTESTUR, PREGSERUM, HCG, HCGQUANT     ABGs: No results found for: PHART, PO2ART, SNF4LZU, HEX0FSB, BEART, F8NLPTRB     Type & Screen (If Applicable):  No results found for: [facility]    Anesthesia Evaluation  Patient summary reviewed no history of anesthetic complications:   Airway:         Dental:          Pulmonary:   (+) current smoker                           Cardiovascular:    (+) hypertension:, CAD:, CABG/stent (coronary artery disease status post non-STEMI, PCI x2 with 8, most recently June 2018):, dysrhythmias: atrial fibrillation, hyperlipidemia      ECG reviewed                        Neuro/Psych:   (+) depression/anxiety             GI/Hepatic/Renal:            ROS comment: Esophageal lesion. Endo/Other:    (+) Diabetes, . Abdominal:           Vascular:   + PVD, aortic or cerebral, . EKG 12/26/2019  Atrial fibrillation with premature ventricular or aberrantly conducted complexes  Right bundle branch block  Left anterior fascicular block  Inferior infarct , age undetermined  Abnormal ECG  No previous ECGs available    TTE 5/29/2019  · Estimated left ventricular ejection fraction is 55 - 60%. Normal left   ventricular ejection fraction. No regional wall motion abnormalities  · Normal right ventricular systolic function  · Mild mitral regurgitation. · No pericardial effusion         Anesthesia Plan      general     ASA 4 - emergent       Induction: intravenous. arterial line  MIPS: Postoperative opioids intended. Anesthetic plan and risks discussed with Unable to obtain due to emergent nature. Plan discussed with surgical team and CRNA.     Attending anesthesiologist reviewed and agrees with 2300 Lashay Turner MD   1/8/2020

## 2020-01-08 NOTE — SEDATION DOCUMENTATION
Pre procedure assessment:  Pt obtunded with best response as slight withdraw from noxious stimuli. Weak pulses x 4, cap refill < or = 3 sec. Pt vented and breathing with vent settings. Even rise / fall of chest.  Pupils 3 mm equal, round, slow but reactive / right forced upper gaze noted. Shaw draining mana/ clear urine. IV's running without obvious complication. Pt in no obvious discomfort.

## 2020-01-08 NOTE — PROGRESS NOTES
0010 - Upon assessment, patient requiring repeated deep painful stimulus to respond. Right upward gaze also noted. Vitals stable. Dr. Mary Beth Monteiro called to bedside to evaluate. 14 Whitley Montesinos ordered and completed. Patient assessment unchanged upon return from CT, and placed back on bipap. Will continue to monitor.   Devonte Rasmussen RN

## 2020-01-08 NOTE — PROGRESS NOTES
0867  Called to Interventional radiology to  patient. Patient placed on transport vent and returned to Room 529. Assisted by Antonio Katz RN and others from IR.

## 2020-01-08 NOTE — SIGNIFICANT EVENT
Called to bedside as patient became less responsive only arousal to pain which is change in his mental status. He had slight posturing of the BLE. All extremities withdraw to pain. Eyes are deviated up and to the right, no longer tracks. This may be due to the 1mg of Ativan that was given 2 hours ago, however unlikely. ABG and CXR reviewed and grossly unremarkable from earlier. Stat HCT ordered. Possible that patients sx may be due to seizure, will review HCT and consider giving 0.5mg Ativan although seizure is equally unlikely based on recent EEG from today that did not demonstrate epileptiform activity.

## 2020-01-08 NOTE — PROGRESS NOTES
The transport originated from rm. 529  Pt. was transported to CT Scan. Assisting with the transport was Jeannette Kirkpatrick RN. Appropriate devices were applied to monitor the patient's condition during transport. Patient transported  via 30% O2 via ventilator. Patient tolerated well.         Charli Borden  3:05 PM

## 2020-01-08 NOTE — PROGRESS NOTES
weakness. Taken emergently for R ica stenting. PLAN:  1. Traumatic subarachnoid hemorrhage management per neuro ICU. 2. Blood pressure systolic goal after the procedure is 100-140. 3. Continue dual antiplatelet therapy with aspirin 81 and Plavix 75 daily. 4. Smoking cessation, PT/OT evaluation. 5. Follow-up in 2 weeks in neuro device clinic, and with Dr. Aminta Larry in 3 months. 6. Would consider embolization for left subdural hematoma during follow-up. Please contact neuro endovascular team for any questions or concerns.     Jerica Nuñez MD  Stroke, [facility]  [id number] [address]  Electronically signed 1/8/2020 at 10:35 AM

## 2020-01-08 NOTE — PROGRESS NOTES
80 - Dr. Delmy Toledo to bedside to evaluate patient. Decision made to intubate at this time. 0159 - RT to bedside  0202 - SBP in 70s, fluids wide open, and order for Levo  0213 - etomidate pushed, VS stable  0213 - succinylcholine pushed, VS stable  0214 - glidescope in, positive color change, bilateral breath sounds. Stat CXR ordered. Will continue to monitor.   Jaylin Land RN

## 2020-01-08 NOTE — OP NOTE
[facility]    NEUROENDOVASCULAR SERVICE: POST-OP NOTE: 1/8/2020    Pt Name: Rachell Varma  MRN: [id number]  YOB: 1960  Date of Procedure: 1/8/2020  Primary Care Physician: Gordon Ashton MD     Pre-Procedural Diagnosis: Right gaze/right-sided weakness. Post-Procedural Diagnosis: Right ICA symptomatic critical stenosis. Procedure Performed: Right carotid artery stenting. Surgeon:   Geoff Montague MD    Fellow: MD Marisol Chambers MD    1st Assistant:  Jeppie Schilder    PRE-PROCEDURAL EXAM:  Prestroke baseline mRS MODIFIED JUS SCORE: 2 - Slight disability:  unable to carry out all previous activities, but able to look after own affairs without assistance. Neurological exam performed and unchanged from initial H&P or consult    Anesthesia: General Anesthesia  Complications: none    Intra-Operative EXAM:  Neurological exam performed and unchanged from initial H&P or consult    EBL: < Minimal      Cc            Specimens: Were not Obtained  Contrast:     Visipaque 270 low osmolar 98 Cc             Fluoro: 24.2 min    Findings:  Please see dictated Radiology note for further details  1. Left cervical stenosis measuring approximately 20% at the carotid ICA bulb with postsurgical changes noted from prior endarterectomy. 2.Diffuse atherosclerotic disease noted in the left cavernous ICA/left RUSTY and distal left MCA M1. 3.There is increased vascularity noted in the distal left RUSTY pericallosal branch with early venous drainage of unclear etiology. 4.There is decreased parenchymal blush in the left MCA parietal region corresponding to the hypodensity noted on prior CT head. 5. Right proximal cervical ICA critical stenosis measuring approximately 99% per NASCET criteria significantly delayed anterograde flow noted.   6. There is collateral flow noted to the distal right ICA and MCA territory arising from right internal maxillary artery branches filling the

## 2020-01-09 ENCOUNTER — APPOINTMENT (OUTPATIENT)
Dept: MRI IMAGING | Age: 60
DRG: 030 | End: 2020-01-09
Payer: MEDICAID

## 2020-01-09 LAB
ABSOLUTE EOS #: 0.12 K/UL (ref 0–0.44)
ABSOLUTE IMMATURE GRANULOCYTE: 0.04 K/UL (ref 0–0.3)
ABSOLUTE LYMPH #: 0.88 K/UL (ref 1.1–3.7)
ABSOLUTE MONO #: 0.28 K/UL (ref 0.1–1.2)
ALLEN TEST: POSITIVE
ANION GAP SERPL CALCULATED.3IONS-SCNC: 13 MMOL/L (ref 9–17)
BASOPHILS # BLD: 1 % (ref 0–2)
BASOPHILS ABSOLUTE: 0.04 K/UL (ref 0–0.2)
BUN BLDV-MCNC: 13 MG/DL (ref 6–20)
BUN/CREAT BLD: ABNORMAL (ref 9–20)
CALCIUM SERPL-MCNC: 8.2 MG/DL (ref 8.6–10.4)
CHLORIDE BLD-SCNC: 109 MMOL/L (ref 98–107)
CO2: 20 MMOL/L (ref 20–31)
CREAT SERPL-MCNC: 0.3 MG/DL (ref 0.7–1.2)
DIFFERENTIAL TYPE: ABNORMAL
EOSINOPHILS RELATIVE PERCENT: 3 % (ref 1–4)
FIO2: 30
GFR AFRICAN AMERICAN: >60 ML/MIN
GFR NON-AFRICAN AMERICAN: >60 ML/MIN
GFR SERPL CREATININE-BSD FRML MDRD: ABNORMAL ML/MIN/{1.73_M2}
GFR SERPL CREATININE-BSD FRML MDRD: ABNORMAL ML/MIN/{1.73_M2}
GLUCOSE BLD-MCNC: 111 MG/DL (ref 75–110)
GLUCOSE BLD-MCNC: 120 MG/DL (ref 75–110)
GLUCOSE BLD-MCNC: 123 MG/DL (ref 75–110)
GLUCOSE BLD-MCNC: 132 MG/DL (ref 75–110)
GLUCOSE BLD-MCNC: 140 MG/DL (ref 70–99)
HCT VFR BLD CALC: 34 % (ref 40.7–50.3)
HEMOGLOBIN: 9.7 G/DL (ref 13–17)
IMMATURE GRANULOCYTES: 1 %
LYMPHOCYTES # BLD: 22 % (ref 24–43)
MCH RBC QN AUTO: 23.3 PG (ref 25.2–33.5)
MCHC RBC AUTO-ENTMCNC: 28.5 G/DL (ref 28.4–34.8)
MCV RBC AUTO: 81.5 FL (ref 82.6–102.9)
MODE: ABNORMAL
MONOCYTES # BLD: 7 % (ref 3–12)
MORPHOLOGY: ABNORMAL
NEGATIVE BASE EXCESS, ART: ABNORMAL (ref 0–2)
NRBC AUTOMATED: 0 PER 100 WBC
O2 DEVICE/FLOW/%: ABNORMAL
PATIENT TEMP: ABNORMAL
PDW BLD-RTO: 20.9 % (ref 11.8–14.4)
PLATELET # BLD: 404 K/UL (ref 138–453)
PLATELET ESTIMATE: ABNORMAL
PMV BLD AUTO: 10.5 FL (ref 8.1–13.5)
POC HCO3: 23 MMOL/L (ref 21–28)
POC O2 SATURATION: 99 % (ref 94–98)
POC PCO2 TEMP: ABNORMAL MM HG
POC PCO2: 32 MM HG (ref 35–48)
POC PH TEMP: ABNORMAL
POC PH: 7.46 (ref 7.35–7.45)
POC PO2 TEMP: ABNORMAL MM HG
POC PO2: 130.5 MM HG (ref 83–108)
POSITIVE BASE EXCESS, ART: 0 (ref 0–3)
POTASSIUM SERPL-SCNC: 3.7 MMOL/L (ref 3.7–5.3)
RBC # BLD: 4.17 M/UL (ref 4.21–5.77)
RBC # BLD: ABNORMAL 10*6/UL
SAMPLE SITE: ABNORMAL
SEG NEUTROPHILS: 66 % (ref 36–65)
SEGMENTED NEUTROPHILS ABSOLUTE COUNT: 2.64 K/UL (ref 1.5–8.1)
SODIUM BLD-SCNC: 142 MMOL/L (ref 135–144)
TCO2 (CALC), ART: 24 MMOL/L (ref 22–29)
WBC # BLD: 4 K/UL (ref 3.5–11.3)
WBC # BLD: ABNORMAL 10*3/UL

## 2020-01-09 PROCEDURE — 2580000003 HC RX 258: Performed by: PSYCHIATRY & NEUROLOGY

## 2020-01-09 PROCEDURE — 6370000000 HC RX 637 (ALT 250 FOR IP): Performed by: STUDENT IN AN ORGANIZED HEALTH CARE EDUCATION/TRAINING PROGRAM

## 2020-01-09 PROCEDURE — 6360000002 HC RX W HCPCS: Performed by: PSYCHIATRY & NEUROLOGY

## 2020-01-09 PROCEDURE — 82947 ASSAY GLUCOSE BLOOD QUANT: CPT

## 2020-01-09 PROCEDURE — 6360000002 HC RX W HCPCS: Performed by: STUDENT IN AN ORGANIZED HEALTH CARE EDUCATION/TRAINING PROGRAM

## 2020-01-09 PROCEDURE — 99253 IP/OBS CNSLTJ NEW/EST LOW 45: CPT | Performed by: PHYSICAL MEDICINE & REHABILITATION

## 2020-01-09 PROCEDURE — 95720 EEG PHY/QHP EA INCR W/VEEG: CPT | Performed by: PSYCHIATRY & NEUROLOGY

## 2020-01-09 PROCEDURE — 2000000003 HC NEURO ICU R&B

## 2020-01-09 PROCEDURE — 94761 N-INVAS EAR/PLS OXIMETRY MLT: CPT

## 2020-01-09 PROCEDURE — 6370000000 HC RX 637 (ALT 250 FOR IP): Performed by: PSYCHIATRY & NEUROLOGY

## 2020-01-09 PROCEDURE — 36415 COLL VENOUS BLD VENIPUNCTURE: CPT

## 2020-01-09 PROCEDURE — 36600 WITHDRAWAL OF ARTERIAL BLOOD: CPT

## 2020-01-09 PROCEDURE — 6360000002 HC RX W HCPCS: Performed by: ANESTHESIOLOGY

## 2020-01-09 PROCEDURE — 94003 VENT MGMT INPAT SUBQ DAY: CPT

## 2020-01-09 PROCEDURE — 99291 CRITICAL CARE FIRST HOUR: CPT | Performed by: PSYCHIATRY & NEUROLOGY

## 2020-01-09 PROCEDURE — 2500000003 HC RX 250 WO HCPCS: Performed by: STUDENT IN AN ORGANIZED HEALTH CARE EDUCATION/TRAINING PROGRAM

## 2020-01-09 PROCEDURE — 99253 IP/OBS CNSLTJ NEW/EST LOW 45: CPT | Performed by: INTERNAL MEDICINE

## 2020-01-09 PROCEDURE — 82803 BLOOD GASES ANY COMBINATION: CPT

## 2020-01-09 PROCEDURE — 95708 EEG WO VID EA 12-26HR UNMNTR: CPT

## 2020-01-09 PROCEDURE — 76937 US GUIDE VASCULAR ACCESS: CPT

## 2020-01-09 PROCEDURE — 85025 COMPLETE CBC W/AUTO DIFF WBC: CPT

## 2020-01-09 PROCEDURE — 80048 BASIC METABOLIC PNL TOTAL CA: CPT

## 2020-01-09 PROCEDURE — 94770 HC ETCO2 MONITOR DAILY: CPT

## 2020-01-09 PROCEDURE — 70551 MRI BRAIN STEM W/O DYE: CPT

## 2020-01-09 PROCEDURE — 2700000000 HC OXYGEN THERAPY PER DAY

## 2020-01-09 PROCEDURE — 6370000000 HC RX 637 (ALT 250 FOR IP): Performed by: NURSE PRACTITIONER

## 2020-01-09 PROCEDURE — 2500000003 HC RX 250 WO HCPCS: Performed by: NURSE PRACTITIONER

## 2020-01-09 RX ORDER — LEVETIRACETAM 100 MG/ML
500 SOLUTION ORAL 2 TIMES DAILY
Status: DISCONTINUED | OUTPATIENT
Start: 2020-01-09 | End: 2020-01-17

## 2020-01-09 RX ORDER — ASPIRIN 81 MG/1
81 TABLET, CHEWABLE ORAL DAILY
Status: DISCONTINUED | OUTPATIENT
Start: 2020-01-09 | End: 2020-01-20

## 2020-01-09 RX ORDER — SENNA AND DOCUSATE SODIUM 50; 8.6 MG/1; MG/1
2 TABLET, FILM COATED ORAL DAILY
Status: DISCONTINUED | OUTPATIENT
Start: 2020-01-09 | End: 2020-01-12

## 2020-01-09 RX ORDER — CLOPIDOGREL BISULFATE 75 MG/1
75 TABLET ORAL DAILY
Status: DISCONTINUED | OUTPATIENT
Start: 2020-01-09 | End: 2020-01-20

## 2020-01-09 RX ORDER — ALBUTEROL SULFATE 2.5 MG/3ML
2.5 SOLUTION RESPIRATORY (INHALATION) EVERY 6 HOURS PRN
Status: DISCONTINUED | OUTPATIENT
Start: 2020-01-09 | End: 2020-01-27 | Stop reason: HOSPADM

## 2020-01-09 RX ORDER — MODAFINIL 100 MG/1
200 TABLET ORAL DAILY
Status: DISCONTINUED | OUTPATIENT
Start: 2020-01-10 | End: 2020-01-27 | Stop reason: HOSPADM

## 2020-01-09 RX ORDER — IPRATROPIUM BROMIDE AND ALBUTEROL SULFATE 2.5; .5 MG/3ML; MG/3ML
1 SOLUTION RESPIRATORY (INHALATION) EVERY 4 HOURS PRN
Status: DISCONTINUED | OUTPATIENT
Start: 2020-01-09 | End: 2020-01-24

## 2020-01-09 RX ORDER — AMANTADINE HYDROCHLORIDE 50 MG/5ML
100 SOLUTION ORAL 2 TIMES DAILY
Status: DISCONTINUED | OUTPATIENT
Start: 2020-01-09 | End: 2020-01-27 | Stop reason: HOSPADM

## 2020-01-09 RX ORDER — AMANTADINE HYDROCHLORIDE 100 MG/1
100 CAPSULE, GELATIN COATED ORAL 2 TIMES DAILY
Status: DISCONTINUED | OUTPATIENT
Start: 2020-01-09 | End: 2020-01-09

## 2020-01-09 RX ORDER — PROPRANOLOL HYDROCHLORIDE 10 MG/1
20 TABLET ORAL 2 TIMES DAILY
Status: COMPLETED | OUTPATIENT
Start: 2020-01-09 | End: 2020-01-11

## 2020-01-09 RX ORDER — MODAFINIL 100 MG/1
100 TABLET ORAL DAILY
Status: DISCONTINUED | OUTPATIENT
Start: 2020-01-09 | End: 2020-01-27 | Stop reason: HOSPADM

## 2020-01-09 RX ADMIN — Medication 20 MG: at 14:00

## 2020-01-09 RX ADMIN — MODAFINIL 100 MG: 100 TABLET ORAL at 08:10

## 2020-01-09 RX ADMIN — FENTANYL CITRATE 50 MCG: 50 INJECTION, SOLUTION INTRAMUSCULAR; INTRAVENOUS at 13:29

## 2020-01-09 RX ADMIN — AMANTADINE HYDROCHLORIDE 100 MG: 50 SOLUTION ORAL at 14:04

## 2020-01-09 RX ADMIN — INSULIN GLARGINE 25 UNITS: 100 INJECTION, SOLUTION SUBCUTANEOUS at 12:15

## 2020-01-09 RX ADMIN — DOCUSATE SODIUM 100 MG: 50 LIQUID ORAL at 08:10

## 2020-01-09 RX ADMIN — ROSUVASTATIN CALCIUM 5 MG: 5 TABLET, FILM COATED ORAL at 20:51

## 2020-01-09 RX ADMIN — SODIUM CHLORIDE TAB 1 GM 2 G: 1 TAB at 08:10

## 2020-01-09 RX ADMIN — CLOPIDOGREL 75 MG: 75 TABLET, FILM COATED ORAL at 12:15

## 2020-01-09 RX ADMIN — SOTALOL HYDROCHLORIDE 160 MG: 80 TABLET ORAL at 08:11

## 2020-01-09 RX ADMIN — MODAFINIL 100 MG: 100 TABLET ORAL at 14:01

## 2020-01-09 RX ADMIN — Medication 100 MG: at 08:10

## 2020-01-09 RX ADMIN — Medication 20 MG: at 15:37

## 2020-01-09 RX ADMIN — PROPRANOLOL HYDROCHLORIDE 20 MG: 10 TABLET ORAL at 20:50

## 2020-01-09 RX ADMIN — Medication 15 ML: at 08:10

## 2020-01-09 RX ADMIN — HYDRALAZINE HYDROCHLORIDE 10 MG: 20 INJECTION INTRAMUSCULAR; INTRAVENOUS at 17:04

## 2020-01-09 RX ADMIN — SODIUM CHLORIDE TAB 1 GM 2 G: 1 TAB at 14:00

## 2020-01-09 RX ADMIN — ASPIRIN 81 MG: 81 TABLET, CHEWABLE ORAL at 08:10

## 2020-01-09 RX ADMIN — Medication 15 ML: at 20:50

## 2020-01-09 RX ADMIN — SODIUM CHLORIDE TAB 1 GM 2 G: 1 TAB at 17:04

## 2020-01-09 RX ADMIN — SOTALOL HYDROCHLORIDE 160 MG: 80 TABLET ORAL at 20:50

## 2020-01-09 RX ADMIN — Medication 20 MG: at 00:07

## 2020-01-09 RX ADMIN — PROPRANOLOL HYDROCHLORIDE 40 MG: 40 TABLET ORAL at 08:10

## 2020-01-09 RX ADMIN — FAMOTIDINE 20 MG: 10 INJECTION, SOLUTION INTRAVENOUS at 20:50

## 2020-01-09 RX ADMIN — FOLIC ACID 1 MG: 1 TABLET ORAL at 08:10

## 2020-01-09 RX ADMIN — LEVETIRACETAM 500 MG: 500 SOLUTION ORAL at 20:50

## 2020-01-09 RX ADMIN — FAMOTIDINE 20 MG: 10 INJECTION, SOLUTION INTRAVENOUS at 08:10

## 2020-01-09 RX ADMIN — ENOXAPARIN SODIUM 40 MG: 40 INJECTION SUBCUTANEOUS at 08:13

## 2020-01-09 RX ADMIN — LEVETIRACETAM 750 MG: 100 INJECTION, SOLUTION INTRAVENOUS at 06:10

## 2020-01-09 RX ADMIN — HYDRALAZINE HYDROCHLORIDE 10 MG: 20 INJECTION INTRAMUSCULAR; INTRAVENOUS at 02:05

## 2020-01-09 RX ADMIN — HYDRALAZINE HYDROCHLORIDE 10 MG: 20 INJECTION INTRAMUSCULAR; INTRAVENOUS at 04:15

## 2020-01-09 ASSESSMENT — PULMONARY FUNCTION TESTS
PIF_VALUE: 23
PIF_VALUE: 20
PIF_VALUE: 20
PIF_VALUE: 19
PIF_VALUE: 20
PIF_VALUE: 19
PIF_VALUE: 23
PIF_VALUE: 18
PIF_VALUE: 19
PIF_VALUE: 21
PIF_VALUE: 14
PIF_VALUE: 19

## 2020-01-09 NOTE — PROGRESS NOTES
Occupational Therapy Not Seen Note    DATE: 2020  Name: Ramírez Chaney  : 1960  MRN: [id number]    Patient not available for Occupational Therapy due to:    Pt not appropriate to functionally participate in therapy d/t being intubated and not following commands. The following occurred on 19 causing pt to be intubated. Patient started becoming tachycardic with heart rate in 140s-150s,tachypneic RR 22-24, concern for DT's, patient on CIWA protocol at 4 mg of Ativan and still was agitated and restless. He received 5 mg of Zyprexa, which did not help therefore decision was made to intubate him before he deteriorates further. Patient also was in A. fib with RVR Lopressor was given 5 mg 3 doses 5 minutes apart which did not break the RVR and is started on Cardizem drip. He was placed on Versed drip for sedation after intubation. We will continue to monitor            Cosigned by: Cayla Moy MD at 2019 12:14 PM     Collaborated w/ Marcelo Ramirez about pt needing to be re-evaluated when pt is able to participate in therapy.            Next Scheduled Treatment:20    Electronically signed by RD Thomas on 2020 at 1:29 PM

## 2020-01-09 NOTE — PROGRESS NOTES
Physical Therapy  DATE: 2020    NAME: Karma Babb  MRN: [id number]   : 1960    Patient not seen this date for Physical Therapy due to:  [] Blood transfusion in progress  [] Hemodialysis  []  Patient Declined  [] Spine Precautions   [] Strict Bedrest  [] Surgery/ Procedure  [] Testing      [x] Other: Per RN pt not appropriate at this time. [] PT being discontinued at this time. Patient independent. No further needs. [] PT being discontinued at this time as the patient has been transferred to palliative care. No further needs.     Naga Guan, PTA

## 2020-01-09 NOTE — CARE COORDINATION
Transition planning:  Attempt to meet with pt and family to discuss transition plans. No family at bedside, pt is intubated. Pt extubated briefly on 1/7/2020 with reintubation later that evening. Pt continues intubated without sedation and not following commands per review of notes and bedside nurse report. Per review of CM notes referral has been made to [address] [place] [address] which   follows Milliman criteria - Prolonged mechanical ventilation present (21 consecutive days of mechanical  ventilation for at least 6 hours per day requiring 21days continuous on vent and will require trach (7 days) before considering admission to [facility]. Writer spoke with pt's mother, Keron Quan on the phone, she confirms she requested a referral to [facility] in [place], [place]. CM reviewed need for 21 days on vent as described above and she verbalizes understanding,   Continue to monitor as plan of care progresses.  Consult to Palliative Care today

## 2020-01-09 NOTE — CONSULTS
Physical Medicine & Rehabilitation  Consult Note      Admitting Physician:   Stephanie Pack MD    Primary Care Provider:   Chuy Sargent MD     Reason for Consult:  Acute Inpatient Rehabilitation    Chief Complaint: CVA    History of Present Illness:  Referring Provider is requesting an evaluation for appropriate placement upon discharge from acute care. History from chart review and staff. Mayra Bautista is a 61 y.o.  male admitted to [place] [address] on 12/25/2019. Patient admitted from [facility] after being found by family with c/o headache and vomiting. There was concern that patient fell. He had been on Eliquis for A Fib and was initially treated with [facility]. Treatment team managed BP with plan to avoid R MCA stroke initially. CTA found no aneurysm, but critical stenosis L vertebral artery, moderate stenosis L ICA and severe near critical stenosis R ICA. NIHSS 8. Patient was extubated briefly 1/7/2020 then reintubated overnight. EEG notable for mild-moderate encephalopathy, no epileptiform discharges or clinical seizures noted as of 1/8/2020 on LTME. Neuroendovascular: Dr. Shelbi Mai performed R carotid artery stenting 1/8/2020. Diagnostic studies also showed widening of scapholunate interval R wrist, remote history rib fracture and acute closed 1st MC proximal shaft fracture. Orthopedics managed conservatively with spica splint after closed reduction and NWB through L wrist.   They applied removable splint to R wrist - OK for ROM. Patient remains intubated but off sedation. Not following commands.  Withdraws LUE and LLE to stimuli but no response on RUE or RLE    Review of Systems:  SHA due to intubation     Premorbid function:  Independent    Current function:  12/27/19  PT:  Restrictions/Precautions: Fall Risk, Seizure, Weight Bearing  Other position/activity restrictions: okay to WB through the elbows   Right Upper Extremity Weight Bearing: (No heavy lifting, pushing, pulling, right wrist)  Left Upper Extremity Weight Bearing: Non Weight Bearing   Transfers  Sit to Stand: Minimal Assistance  Stand to sit: Minimal Assistance  Comment: Pt required frequent cueing to maintain NWB of BUE  Ambulation 1  Surface: level tile  Device: No Device  Assistance: Minimal assistance, 2 Person assistance  Gait Deviations: Shuffles  Distance: 3ft   Comments: Pt required mod VC's for sequencing of ambulation with fair return demo. Pt demonstrates short, shuffling gait and quickly returned to seated position once fatigued    Transfers  Sit to Stand: Minimal Assistance  Stand to sit: Minimal Assistance  Comment: Pt required frequent cueing to maintain NWB of BUE  Ambulation  Ambulation?: Yes  Ambulation 1  Surface: level tile  Device: No Device  Assistance: Minimal assistance, 2 Person assistance  Gait Deviations: Shuffles  Distance: 3ft   Comments: Pt required mod VC's for sequencing of ambulation with fair return demo. Pt demonstrates short, shuffling gait and quickly returned to seated position once fatigued    Surface: level tile  Ambulation 1  Surface: level tile  Device: No Device  Assistance: Minimal assistance, 2 Person assistance  Gait Deviations: Shuffles  Distance: 3ft   Comments: Pt required mod VC's for sequencing of ambulation with fair return demo. Pt demonstrates short, shuffling gait and quickly returned to seated position once fatigued      OT:   ADL  Feeding: Independent  Grooming: Stand by assistance(cues to wipe mouth off from drooling )  UE Bathing: Minimal assistance  LE Bathing: Maximum assistance  UE Dressing: Minimal assistance  LE Dressing: Maximum assistance(pt requiring Max A to don socks )  Toileting:  Moderate assistance         Balance  Sitting Balance: Contact guard assistance(CGA with periods of Min A sitting EOB)  Standing Balance: Minimal assistance(posterior lean noted during standing )   Standing Balance  Time: ~3 min   Activity: EOB         Bed mobility  Supine to Sit: Moderate assistance  Sit to Supine: Minimal assistance  Scooting: Minimal assistance  Comment: Pt required mod VC's for sequencing with fair return demo   Transfers  Sit to stand: Minimal assistance  Stand to sit: Minimal assistance  Transfer Comments: difficulty maintaining NWB status of B UEs                    Past Medical History:        Diagnosis Date    Alcohol abuse     [facility] [facility]     Diabetes ([facility]     Gastritis     Hyperlipidemia     Hypertension     Left ventricular hypertrophy     Neuropathy     Renal cyst        Past Surgical History:        Procedure Laterality Date    ANGIOPLASTY  2019    NO VASCULAR LEG STENTS PER DR. Dana Loya    CAROTID STENT PLACEMENT  01/08/2019    carotid wallstent 10mm. mri conditonal safe immediately.     CORONARY ANGIOPLASTY WITH STENT PLACEMENT  2019    PT HAS 7 CARDIAC STENTS UNKNOWN 1.5 T ONLY       Allergies:    No Known Allergies     Current Medications:   Current Facility-Administered Medications: aspirin chewable tablet 81 mg, 81 mg, Oral, Daily  clopidogrel (PLAVIX) tablet 75 mg, 75 mg, Oral, Daily  ipratropium-albuterol (DUONEB) nebulizer solution 1 ampule, 1 ampule, Inhalation, Q4H PRN  albuterol (PROVENTIL) nebulizer solution 2.5 mg, 2.5 mg, Nebulization, Q6H PRN  sennosides-docusate sodium (SENOKOT-S) 8.6-50 MG tablet 2 tablet, 2 tablet, Oral, Daily  fentaNYL (SUBLIMAZE) injection 50 mcg, 50 mcg, Intravenous, Q5 Min PRN  acetaminophen (TYLENOL) tablet 650 mg, 650 mg, Oral, Q4H PRN  levETIRAcetam (KEPPRA) 750 mg in sodium chloride 0.9 % 100 mL IVPB, 750 mg, Intravenous, Q12H  insulin glargine (LANTUS) injection vial 25 Units, 25 Units, Subcutaneous, Daily  hydrALAZINE (APRESOLINE) injection 10 mg, 10 mg, Intravenous, Q1H PRN  labetalol (NORMODYNE;TRANDATE) injection syringe 20 mg, 20 mg, Intravenous, Q2H PRN  chlordiazePOXIDE (LIBRIUM) capsule 20 mg, 20 mg, Per NG tube, QPM  0.9 % sodium chloride infusion, , Intravenous, Continuous  insulin lispro (HUMALOG) injection vial 0-18 Units, 0-18 Units, Subcutaneous, Q6H  modafinil (PROVIGIL) tablet 100 mg, 100 mg, Oral, BID  docusate (COLACE) 50 MG/5ML liquid 100 mg, 100 mg, Oral, Daily  propranolol (INDERAL) tablet 40 mg, 40 mg, Oral, BID  ibuprofen (ADVIL;MOTRIN) tablet 400 mg, 400 mg, Oral, Q6H PRN  acetaminophen (TYLENOL) 160 MG/5ML solution 650 mg, 650 mg, Oral, Q6H PRN  sotalol (BETAPACE) tablet 160 mg, 160 mg, Oral, BID  sodium chloride tablet 2 g, 2 g, Oral, TID WC  enoxaparin (LOVENOX) injection 40 mg, 40 mg, Subcutaneous, Daily  nicotine (NICODERM CQ) 21 MG/24HR 1 patch, 1 patch, Transdermal, Daily  folic acid (FOLVITE) tablet 1 mg, 1 mg, Oral, Daily  vitamin B-1 (THIAMINE) tablet 100 mg, 100 mg, Oral, Daily  sodium chloride flush 0.9 % injection 10 mL, 10 mL, Intravenous, BID  famotidine (PEPCID) injection 20 mg, 20 mg, Intravenous, BID  rosuvastatin (CRESTOR) tablet 5 mg, 5 mg, Oral, Nightly  chlorhexidine (PERIDEX) 0.12 % solution 15 mL, 15 mL, Mouth/Throat, BID  metoprolol (LOPRESSOR) injection 5 mg, 5 mg, Intravenous, Q5 Min PRN  vitamin D (ERGOCALCIFEROL) capsule 50,000 Units, 50,000 Units, Oral, Weekly  glucose (GLUTOSE) 40 % oral gel 15 g, 15 g, Oral, PRN  dextrose 50 % IV solution, 12.5 g, Intravenous, PRN  glucagon (rDNA) injection 1 mg, 1 mg, Intramuscular, PRN  dextrose 5 % solution, 100 mL/hr, Intravenous, PRN  magnesium sulfate 1 g in sodium chloride 0.9 % 100 mL IVPB, 1 g, Intravenous, PRN  sodium chloride flush 0.9 % injection 10 mL, 10 mL, Intravenous, 2 times per day  sodium chloride flush 0.9 % injection 10 mL, 10 mL, Intravenous, PRN  sodium chloride flush 0.9 % injection 10 mL, 10 mL, Intravenous, 2 times per day  sodium chloride flush 0.9 % injection 10 mL, 10 mL, Intravenous, PRN    Social History:  Lives with:   Alone  Home setup:   Floors in home:  #1 story apartment. Bed/bathroom on floor  1. Steps into home unknown .    Device:   None  Social History subdural fluid collection   that is similar compared to prior. Km Hoose is redemonstration of frontal and   parietal subarachnoid hemorrhage bilaterally as well as stable   intraventricular hemorrhagic products in the dependent portion bilaterally. There is mild stable rightward midline shift.  The infratentorial structures   are unremarkable.       ORBITS: The visualized portion of the orbits demonstrate no acute abnormality.       SINUSES: There is chronic sinusitis involving the paranasal sinuses diffusely   most pronounced in the right maxillary sinus and sphenoid sinus.       SOFT TISSUES/SKULL:  No acute abnormality of the visualized skull or soft   tissues.           Impression   Similar left subdural fluid collection with stable rightward midline shift.       Redemonstration of frontal and parietal subarachnoid hemorrhage bilaterally.       Redemonstration of mild intraventricular hemorrhagic products dependently. MRI OF THE BRAIN WITHOUT CONTRAST  1/9/2020 9:59 am       TECHNIQUE:   Multiplanar multisequence MRI of the brain was performed without the   administration of intravenous contrast.       COMPARISON:   CT brain performed 01/08/2020.       HISTORY:   ORDERING SYSTEM PROVIDED HISTORY: eval for ischemia   TECHNOLOGIST PROVIDED HISTORY:   eval for ischemia       FINDINGS:   INTRACRANIAL STRUCTURES/VENTRICLES: The sellar and suprasellar structures,   optic chiasm, corpus callosum, pineal gland, tectum, and midline brainstem   structures are unremarkable.  The craniocervical junction is unremarkable.    There is redemonstration of a subdural fluid collection adjacent to the left   cerebral hemisphere. Km Hoose is minimal rightward midline shift.  Scattered   FLAIR signal abnormalities with associated restricted diffusion are seen   throughout the frontal, parietal, and temporal lobes bilaterally consistent   with ischemia. Km Hoose is a mild amount of subarachnoid hemorrhage within the   right frontal,

## 2020-01-09 NOTE — CONSULTS
Normal turgor, no bleeding, no bruising     Palliative Performance Scale:  ___60%  Ambulation reduced; Significant disease; Can't do hobbies/housework; intake normal or reduced; occasional assist; LOC full/confusion  ___50%  Mainly sit/lie; Extensive disease; Can't do any work; Considerable assist; intake normal or reduced; LOC full/confusion  ___40%  Mainly in bed; Extensive disease; Mainly assist; intake normal or reduced; LOC full/confusion   ___30%  Bed Bound; Extensive disease; Total care; intake reduced; LOCfull/confusion  __X_20%  Bed Bound; Extensive disease; Total care; intake minimal; Drowsy/coma  ___10%  Bed Bound; Extensive disease; Total care; Mouth care only; Drowsy/coma  ___0       Death      Plan      Palliative Interaction:    Patient with diffuse BL SAH, complicated by intracranial, extracranial ahterosclerotic disease. Pt required emergent angio and Rt ICA stenting as pt became symptomatic. Currently reintubated and was unable to tolerate SBT. On tube feeding through NG. Patient is , has no children. Mother will be decision maker, who is currently sick and is unable to come to hospital. (will verify if father is in the picture, but likely )     Will likely have family meeting on Monday when mother comes. If meeting needed prior to Monday, will have phone conference depending on patient's progress. Discussed with primary team, neurocritical care NP Danita De León)     If patient declines or with no meaningful recovery, may need to discuss terminal extubation/comfort care.          Principle Problem/Diagnosis:  Active Problems:    SAH (subarachnoid hemorrhage) (HCC)    Subarachnoid bleed (HCC)    Traumatic subarachnoid hemorrhage with loss of consciousness of 1 hour to 5 hours 59 minutes (HCC)    Carotid stenosis, right    Asymptomatic stenosis of left vertebral artery    Intracranial atherosclerosis    History of CEA (carotid endarterectomy)    Ventilator dependence ([facility] [address] [facility]

## 2020-01-09 NOTE — PROGRESS NOTES
TODAY:      AWAKE & FOLLOWING COMMANDS:  [] No   [x] Yes    INTUBATED:   [] No   [x] Yes    SEDATION/ANALGESIA:    [] Propofol gtt  [] Versed gtt  [] Ativan gtt   [x] No Sedation  Pain medications:      FEEDING: Able to take PO?  [] No:  [] NPO for:  [x] NG/OG [] PEG  Tube Feeds:     [] Yes:  Diet: FEEDS THROUGH TUBE  DVT Prophylaxis:  [x] Yes:lovenox           [] No rationale:     Stress Ulcer Prophylaxis: [x] Yes:  pepcid  [] Not indicated    VASOPRESSORS:  [] No    [] Yes  [] Levophed [] Dopamine [] Vasopressin  [] Dobutamine [] Phenylephrine [] Epinephrine    CENTRAL/ARTERIAL LINES:  [x] No    [] Yes:  Location: , Date placed: , Indication:     BECERRIL CATHETER: [] No    [x] Yes:  Location penis      DRAINS: [x] No    [] Yes:  Location: , Date placed: , Output:     Head of Bed: [x] Elevated:          [] Flat    Glucose management: [] Not indicated, consistently less than 180 [x] Sliding Scale :            [x] Long Acting: HELD  Secondary Stroke PPX: [] Antiplatelet:  DAPT              [x] Statin:    5mg

## 2020-01-09 NOTE — PROGRESS NOTES
Daily Progress Note  Neuro Critical Care    Patient Name: Mayra Bautista  Patient : 1960  Room/Bed: 5919/6474-57  Code Status: Full  Allergies: No Known Allergies    CHIEF COMPLAINT:      SAH     INTERVAL HISTORY    Initial Presentation (Admitted 19): The patient is a 62 yo male with history of atrial fibrillation on Eliquis, bilateral ICA stenosis (right more than left), DM2, HLD, HTN, CAD s/p PCI stents, PVD, CEA, and ETOH abuse who presents as a transfer from [facility] as a trauma alert for CT head revealed diffuse bialteral SAH and left parietal SDH.  He was given [place] and transferred to [facility] for higher level of care.     Elina Pak was found confused by family at his home after being unable to reach him on the phone.  He was complaining of headache, and actively vomiting. Miguel Robles had bilateral arm scrapes and bruises concerning for recent fall, patient himself does not member falling, said around 4 PM he woke up and felt frontal and vertex headache and neck pain, and felt nauseous and started throwing up.  Endorses drinking alcohol last night states that he had 2 beers.  Per family has significant alcohol abuse history and has had multiple falls in the past.     On presentation in the ED [facility], patient mildly lethargic but oriented x4, complaining of headache, nausea, mild vertigo, left arm weaker than right, bilateral leg weakness.      Significant interdisciplinary discussion between the neurosurgeon, radiologist, trauma surgeon and stroke specialist about whether  bleed is consistent with traumatic versus spontaneous subarachnoid.  Stroke specialist has significant concern for severe stenosis, suspects traumatic bleed and that patient is at risk for vasospasm.  In addition has intracranial left vertebral artery athero-stenosis.  Consensus management strategy is to keep the patient 130-160 for blood pressure goals to avoid right MCA watershed stroke.     No aneurysm found on CTA or malformation-critical stenosis of the proximal left vertebral artery, critical stenosis of the proximal right cervical ICA, severe near critical stenosis of the right cavernous ICA, moderate stenosis of the left cavernous ICA. Non con CT head repeat at our emergency facility showed subarachnoid, predominantly in the bilateral sylvian fissures and bilateral cerebral hemisphere sulcal I, acute left parietal convexity subdural hematoma 4 mm in thickness without mass-effect. Hospital Course:   12/27: CT head stable  12/28: Intubated overnight for respiratory distress, started on Cardizem infusion for atrial fibrillation with RVR, weaned off and Sotalol started. Versed for sedation with concern for possible alcohol withdrawal.  12/29: Cdiff negative. patient noted to have left upper extremity tremor, placed on LTME. Left hand cool to touch with delayed capillary refill, thumb spica splint removed to assess neurovascular status. With splint off, radial and ulnar pulses palpable and assessed via doppler. Capillary refill improved as did warmth and color. Ortho called to replace splint. Febrile this AM - infectious workup sent. 12/30: LTME showing diffuse slowing and disorganized background suggesting moderate encephalopathy. Started on Vanc/zosyn  12/31: Versed started for sedation, bradycardia noted with Precedex. 1/1: TCD normal. Failed SBT due to tachypnea  1/2: Sotalol increased to 160 mg BID. Zosyn started for CXR concerning for RLL pneumonia. 1/3: Failed SBT.  1/4: Tolerated CPAP trial better. 1/5: 8 beats of V. Tach - resolved spontaneously. 1/7: Extubated to room air  1/8: Respiratory distress with tachypnea, re-intubated. Found to have right gaze with right sided weakness, loaded with 2 grams Keppra. Went to cerebral angiogram for right ICA stenting. Last 24h:   Overnight, patient required multiple prn antihypertensives to keep SBP less than 140.  EEG showing diffuse encephalopathy, no °C)  BP Range: Systolic (24XCZ), BOY:029 , Min:104 , ENR:764     Diastolic (74NZL), RYM:87, Min:58, Max:101    Pulse Range: Pulse  Av.7  Min: 75  Max: 97  Respiration Range: Resp  Av.3  Min: 15  Max: 45  Current Pulse Ox: SpO2: 99 %  24HR Pulse Ox Range: SpO2  Av.3 %  Min: 98 %  Max: 100 %  Patient Vitals for the past 12 hrs:   BP Temp Temp src Pulse Resp SpO2   20 1149 (!) 169/81 98.1 °F (36.7 °C) Axillary 88 29 99 %   20 1139 -- -- -- 89 (!) 39 99 %   20 1105 130/70 -- -- 87 (!) 45 99 %   20 1050 (!) 141/85 -- -- 90 30 99 %   20 1040 -- -- -- 84 26 99 %   20 0905 105/68 -- -- 96 30 98 %   20 0805 (!) 159/88 -- -- 97 25 99 %   20 0746 132/75 98.7 °F (37.1 °C) Axillary 91 27 99 %   20 0745 -- -- -- 95 21 99 %   20 0504 (!) 115/58 -- -- 93 28 99 %   20 0404 (!) 156/83 98.1 °F (36.7 °C) -- 92 24 100 %   20 0351 -- -- -- -- 23 100 %   20 0326 -- -- -- 97 25 100 %   20 0304 136/70 -- -- 92 28 100 %     Estimated body mass index is 26.17 kg/m² as calculated from the following:    Height as of this encounter: 5' 11\" (1.803 m).     Weight as of this encounter: 187 lb 9.8 oz (85.1 kg).  []<16 Severe malnutrition  []16-16.99 Moderate malnutrition  []17-18.49 Mild malnutrition  []18.5-24.9 Normal  [x]25-29.9 Overweight (not obese)  []30-34.9 Obese class 1 (Low Risk)  []35-39.9 Obese class 2 (Moderate Risk)  []?40 Obese class 3 (High Risk)    RECENT LABS:   Lab Results   Component Value Date    WBC 4.0 2020    HGB 9.7 (L) 2020    HCT 34.0 (L) 2020     2020    CHOL 105 2019    TRIG 60 2019    HDL 52 2019    ALT 21 2019    AST 21 2019     2020    K 3.7 2020     (H) 2020    CREATININE 0.30 (L) 2020    BUN 13 2020    CO2 20 2020    INR 1.0 2019    LABA1C 7.1 (H) 2019     24 HOUR INTAKE/OUTPUT:    Intake/Output

## 2020-01-10 ENCOUNTER — APPOINTMENT (OUTPATIENT)
Dept: GENERAL RADIOLOGY | Age: 60
DRG: 030 | End: 2020-01-10
Payer: MEDICAID

## 2020-01-10 LAB
ABSOLUTE EOS #: 0.17 K/UL (ref 0–0.44)
ABSOLUTE IMMATURE GRANULOCYTE: 0 K/UL (ref 0–0.3)
ABSOLUTE LYMPH #: 0.97 K/UL (ref 1.1–3.7)
ABSOLUTE MONO #: 0.34 K/UL (ref 0.1–1.2)
ALLEN TEST: ABNORMAL
ANION GAP SERPL CALCULATED.3IONS-SCNC: 12 MMOL/L (ref 9–17)
BASOPHILS # BLD: 1 % (ref 0–2)
BASOPHILS ABSOLUTE: 0.04 K/UL (ref 0–0.2)
BUN BLDV-MCNC: 11 MG/DL (ref 6–20)
BUN/CREAT BLD: ABNORMAL (ref 9–20)
CALCIUM SERPL-MCNC: 8.4 MG/DL (ref 8.6–10.4)
CHLORIDE BLD-SCNC: 109 MMOL/L (ref 98–107)
CO2: 21 MMOL/L (ref 20–31)
CREAT SERPL-MCNC: 0.34 MG/DL (ref 0.7–1.2)
DIFFERENTIAL TYPE: ABNORMAL
EOSINOPHILS RELATIVE PERCENT: 4 % (ref 1–4)
FIO2: 30
GFR AFRICAN AMERICAN: >60 ML/MIN
GFR NON-AFRICAN AMERICAN: >60 ML/MIN
GFR SERPL CREATININE-BSD FRML MDRD: ABNORMAL ML/MIN/{1.73_M2}
GFR SERPL CREATININE-BSD FRML MDRD: ABNORMAL ML/MIN/{1.73_M2}
GLUCOSE BLD-MCNC: 122 MG/DL (ref 75–110)
GLUCOSE BLD-MCNC: 150 MG/DL (ref 75–110)
GLUCOSE BLD-MCNC: 154 MG/DL (ref 70–99)
GLUCOSE BLD-MCNC: 157 MG/DL (ref 75–110)
HCT VFR BLD CALC: 35.4 % (ref 40.7–50.3)
HEMOGLOBIN: 10 G/DL (ref 13–17)
IMMATURE GRANULOCYTES: 0 %
LYMPHOCYTES # BLD: 23 % (ref 24–43)
MCH RBC QN AUTO: 23.5 PG (ref 25.2–33.5)
MCHC RBC AUTO-ENTMCNC: 28.2 G/DL (ref 28.4–34.8)
MCV RBC AUTO: 83.3 FL (ref 82.6–102.9)
MODE: ABNORMAL
MONOCYTES # BLD: 8 % (ref 3–12)
MORPHOLOGY: ABNORMAL
NEGATIVE BASE EXCESS, ART: ABNORMAL (ref 0–2)
NRBC AUTOMATED: 0 PER 100 WBC
O2 DEVICE/FLOW/%: ABNORMAL
PATIENT TEMP: ABNORMAL
PDW BLD-RTO: 21.4 % (ref 11.8–14.4)
PLATELET # BLD: 387 K/UL (ref 138–453)
PLATELET ESTIMATE: ABNORMAL
PMV BLD AUTO: 9.9 FL (ref 8.1–13.5)
POC HCO3: 24.3 MMOL/L (ref 21–28)
POC O2 SATURATION: 99 % (ref 94–98)
POC PCO2 TEMP: ABNORMAL MM HG
POC PCO2: 32.5 MM HG (ref 35–48)
POC PH TEMP: ABNORMAL
POC PH: 7.48 (ref 7.35–7.45)
POC PO2 TEMP: ABNORMAL MM HG
POC PO2: 120.8 MM HG (ref 83–108)
POSITIVE BASE EXCESS, ART: 1 (ref 0–3)
POTASSIUM SERPL-SCNC: 3.4 MMOL/L (ref 3.7–5.3)
RBC # BLD: 4.25 M/UL (ref 4.21–5.77)
RBC # BLD: ABNORMAL 10*6/UL
SAMPLE SITE: ABNORMAL
SEG NEUTROPHILS: 64 % (ref 36–65)
SEGMENTED NEUTROPHILS ABSOLUTE COUNT: 2.68 K/UL (ref 1.5–8.1)
SODIUM BLD-SCNC: 142 MMOL/L (ref 135–144)
TCO2 (CALC), ART: 25 MMOL/L (ref 22–29)
WBC # BLD: 4.2 K/UL (ref 3.5–11.3)
WBC # BLD: ABNORMAL 10*3/UL

## 2020-01-10 PROCEDURE — 6360000002 HC RX W HCPCS: Performed by: STUDENT IN AN ORGANIZED HEALTH CARE EDUCATION/TRAINING PROGRAM

## 2020-01-10 PROCEDURE — 80048 BASIC METABOLIC PNL TOTAL CA: CPT

## 2020-01-10 PROCEDURE — 6360000002 HC RX W HCPCS: Performed by: PSYCHIATRY & NEUROLOGY

## 2020-01-10 PROCEDURE — 2000000003 HC NEURO ICU R&B

## 2020-01-10 PROCEDURE — 6370000000 HC RX 637 (ALT 250 FOR IP): Performed by: NURSE PRACTITIONER

## 2020-01-10 PROCEDURE — 6370000000 HC RX 637 (ALT 250 FOR IP): Performed by: PSYCHIATRY & NEUROLOGY

## 2020-01-10 PROCEDURE — 36600 WITHDRAWAL OF ARTERIAL BLOOD: CPT

## 2020-01-10 PROCEDURE — 82803 BLOOD GASES ANY COMBINATION: CPT

## 2020-01-10 PROCEDURE — 36415 COLL VENOUS BLD VENIPUNCTURE: CPT

## 2020-01-10 PROCEDURE — 99024 POSTOP FOLLOW-UP VISIT: CPT | Performed by: PSYCHIATRY & NEUROLOGY

## 2020-01-10 PROCEDURE — 71045 X-RAY EXAM CHEST 1 VIEW: CPT

## 2020-01-10 PROCEDURE — 2500000003 HC RX 250 WO HCPCS: Performed by: STUDENT IN AN ORGANIZED HEALTH CARE EDUCATION/TRAINING PROGRAM

## 2020-01-10 PROCEDURE — 82947 ASSAY GLUCOSE BLOOD QUANT: CPT

## 2020-01-10 PROCEDURE — 85025 COMPLETE CBC W/AUTO DIFF WBC: CPT

## 2020-01-10 PROCEDURE — 2580000003 HC RX 258: Performed by: NURSE PRACTITIONER

## 2020-01-10 PROCEDURE — 2700000000 HC OXYGEN THERAPY PER DAY

## 2020-01-10 PROCEDURE — 51701 INSERT BLADDER CATHETER: CPT

## 2020-01-10 PROCEDURE — 94003 VENT MGMT INPAT SUBQ DAY: CPT

## 2020-01-10 PROCEDURE — 51798 US URINE CAPACITY MEASURE: CPT

## 2020-01-10 PROCEDURE — 99291 CRITICAL CARE FIRST HOUR: CPT | Performed by: PSYCHIATRY & NEUROLOGY

## 2020-01-10 PROCEDURE — 6370000000 HC RX 637 (ALT 250 FOR IP): Performed by: STUDENT IN AN ORGANIZED HEALTH CARE EDUCATION/TRAINING PROGRAM

## 2020-01-10 PROCEDURE — 2500000003 HC RX 250 WO HCPCS: Performed by: NURSE PRACTITIONER

## 2020-01-10 PROCEDURE — 94761 N-INVAS EAR/PLS OXIMETRY MLT: CPT

## 2020-01-10 PROCEDURE — 2580000003 HC RX 258: Performed by: STUDENT IN AN ORGANIZED HEALTH CARE EDUCATION/TRAINING PROGRAM

## 2020-01-10 PROCEDURE — 94770 HC ETCO2 MONITOR DAILY: CPT

## 2020-01-10 RX ORDER — SODIUM CHLORIDE 1000 MG
2 TABLET, SOLUBLE MISCELLANEOUS DAILY
Status: DISCONTINUED | OUTPATIENT
Start: 2020-01-11 | End: 2020-01-16

## 2020-01-10 RX ORDER — 0.9 % SODIUM CHLORIDE 0.9 %
500 INTRAVENOUS SOLUTION INTRAVENOUS ONCE
Status: DISCONTINUED | OUTPATIENT
Start: 2020-01-10 | End: 2020-01-10

## 2020-01-10 RX ADMIN — SODIUM CHLORIDE, PRESERVATIVE FREE 10 ML: 5 INJECTION INTRAVENOUS at 08:53

## 2020-01-10 RX ADMIN — POTASSIUM BICARBONATE 20 MEQ: 782 TABLET, EFFERVESCENT ORAL at 13:01

## 2020-01-10 RX ADMIN — POTASSIUM BICARBONATE 20 MEQ: 782 TABLET, EFFERVESCENT ORAL at 09:23

## 2020-01-10 RX ADMIN — FOLIC ACID 1 MG: 1 TABLET ORAL at 08:51

## 2020-01-10 RX ADMIN — LEVETIRACETAM 500 MG: 500 SOLUTION ORAL at 08:50

## 2020-01-10 RX ADMIN — PROPRANOLOL HYDROCHLORIDE 20 MG: 10 TABLET ORAL at 21:42

## 2020-01-10 RX ADMIN — MODAFINIL 100 MG: 100 TABLET ORAL at 14:49

## 2020-01-10 RX ADMIN — AMANTADINE HYDROCHLORIDE 100 MG: 50 SOLUTION ORAL at 14:49

## 2020-01-10 RX ADMIN — ENOXAPARIN SODIUM 40 MG: 40 INJECTION SUBCUTANEOUS at 08:51

## 2020-01-10 RX ADMIN — CLOPIDOGREL 75 MG: 75 TABLET, FILM COATED ORAL at 08:51

## 2020-01-10 RX ADMIN — FAMOTIDINE 20 MG: 10 INJECTION, SOLUTION INTRAVENOUS at 08:51

## 2020-01-10 RX ADMIN — SODIUM CHLORIDE TAB 1 GM 2 G: 1 TAB at 08:51

## 2020-01-10 RX ADMIN — PROPRANOLOL HYDROCHLORIDE 20 MG: 10 TABLET ORAL at 08:51

## 2020-01-10 RX ADMIN — LEVETIRACETAM 500 MG: 500 SOLUTION ORAL at 21:42

## 2020-01-10 RX ADMIN — ROSUVASTATIN CALCIUM 5 MG: 5 TABLET, FILM COATED ORAL at 21:42

## 2020-01-10 RX ADMIN — HYDRALAZINE HYDROCHLORIDE 10 MG: 20 INJECTION INTRAMUSCULAR; INTRAVENOUS at 16:16

## 2020-01-10 RX ADMIN — SOTALOL HYDROCHLORIDE 160 MG: 80 TABLET ORAL at 08:50

## 2020-01-10 RX ADMIN — INSULIN GLARGINE 25 UNITS: 100 INJECTION, SOLUTION SUBCUTANEOUS at 08:54

## 2020-01-10 RX ADMIN — MODAFINIL 200 MG: 100 TABLET ORAL at 08:50

## 2020-01-10 RX ADMIN — Medication 20 MG: at 01:03

## 2020-01-10 RX ADMIN — SOTALOL HYDROCHLORIDE 160 MG: 80 TABLET ORAL at 21:45

## 2020-01-10 RX ADMIN — ASPIRIN 81 MG: 81 TABLET, CHEWABLE ORAL at 08:51

## 2020-01-10 RX ADMIN — AMANTADINE HYDROCHLORIDE 100 MG: 50 SOLUTION ORAL at 08:52

## 2020-01-10 RX ADMIN — SODIUM CHLORIDE, PRESERVATIVE FREE 10 ML: 5 INJECTION INTRAVENOUS at 21:37

## 2020-01-10 RX ADMIN — Medication 20 MG: at 03:30

## 2020-01-10 RX ADMIN — HYDRALAZINE HYDROCHLORIDE 10 MG: 20 INJECTION INTRAMUSCULAR; INTRAVENOUS at 10:05

## 2020-01-10 RX ADMIN — HYDRALAZINE HYDROCHLORIDE 10 MG: 20 INJECTION INTRAMUSCULAR; INTRAVENOUS at 02:04

## 2020-01-10 RX ADMIN — Medication 15 ML: at 23:59

## 2020-01-10 RX ADMIN — FAMOTIDINE 20 MG: 10 INJECTION, SOLUTION INTRAVENOUS at 21:39

## 2020-01-10 RX ADMIN — Medication 20 MG: at 13:01

## 2020-01-10 RX ADMIN — Medication 100 MG: at 08:51

## 2020-01-10 RX ADMIN — Medication 15 ML: at 08:52

## 2020-01-10 RX ADMIN — INSULIN LISPRO 3 UNITS: 100 INJECTION, SOLUTION INTRAVENOUS; SUBCUTANEOUS at 13:01

## 2020-01-10 RX ADMIN — POTASSIUM BICARBONATE 20 MEQ: 782 TABLET, EFFERVESCENT ORAL at 14:49

## 2020-01-10 RX ADMIN — INSULIN LISPRO 3 UNITS: 100 INJECTION, SOLUTION INTRAVENOUS; SUBCUTANEOUS at 17:58

## 2020-01-10 ASSESSMENT — PULMONARY FUNCTION TESTS
PIF_VALUE: 19
PIF_VALUE: 14
PIF_VALUE: 18
PIF_VALUE: 22
PIF_VALUE: 18
PIF_VALUE: 14
PIF_VALUE: 14
PIF_VALUE: 20

## 2020-01-10 NOTE — PROGRESS NOTES
ENDOVASCULAR NEUROSURGERY PROGRESS NOTE  1/10/2020 9:57 AM  Subjective:   Admit Date: 12/25/2019  PCP: Analilia Leyva MD    Patient is intubated. Not following commands. No new acute events. Objective:   Vitals: BP (!) 162/94   Pulse 88   Temp 98.9 °F (37.2 °C) (Oral)   Resp 27   Ht 5' 11\" (1.803 m)   Wt 187 lb 9.8 oz (85.1 kg)   SpO2 98%   BMI 26.17 kg/m²   Did examine Precedex. General appearance: Intubated   HEENT: Atraumatic. Neck: Neck is supple. Lungs: Intubated  Abdomen: Soft nontender. Extremities: No lower limb edema noted. Neurologic:  He is intubated, did not follow commands, has right gaze noted and flaccid in right upper and right lower extremity. Grimace to pain to noxious stimuli. CN: Both are equal reactive to light at 3 mm, there is right gaze noted and not crossing midline. MOTOR:  Withdrawing left upper and left lower extremities. Flaccid on the right body side. SENSORY: Withdrawing left upper and left lower extremities. Flaccid on the right body side.     Medications and labs:   Scheduled Meds:   potassium bicarb-citric acid  20 mEq Oral Q3H (SCHEDULED)    aspirin  81 mg Oral Daily    clopidogrel  75 mg Oral Daily    sennosides-docusate sodium  2 tablet Oral Daily    levETIRAcetam  500 mg Oral BID    amantadine  100 mg Per G Tube BID- 8&2    modafinil  200 mg Oral Daily    And    modafinil  100 mg Oral Daily    propranolol  20 mg Oral BID    insulin glargine  25 Units Subcutaneous Daily    insulin lispro  0-18 Units Subcutaneous Q6H    docusate  100 mg Oral Daily    sotalol  160 mg Oral BID    sodium chloride  2 g Oral TID WC    enoxaparin  40 mg Subcutaneous Daily    nicotine  1 patch Transdermal Daily    folic acid  1 mg Oral Daily    thiamine  100 mg Oral Daily    sodium chloride flush  10 mL Intravenous BID    famotidine (PEPCID) injection  20 mg Intravenous BID    rosuvastatin  5 mg Oral Nightly    chlorhexidine  15 mL Mouth/Throat BID   

## 2020-01-10 NOTE — PROGRESS NOTES
Physical Therapy  DATE: 1/10/2020    NAME: Sulema Florian  MRN: [id number]   : 1960    Patient not seen this date for Physical Therapy due to:  [] Blood transfusion in progress  [] Hemodialysis  []  Patient Declined  [] Spine Precautions   [] Strict Bedrest  [] Surgery/ Procedure  [] Testing      [x] Other Per RN Pt BP is still unstable . Pt not appropriated at this time. [] PT being discontinued at this time. Patient independent. No further needs. [] PT being discontinued at this time as the patient has been transferred to palliative care. No further needs.     Petra Beebe, PTA

## 2020-01-10 NOTE — PLAN OF CARE
Problem: OXYGENATION/RESPIRATORY FUNCTION  Goal: Patient will maintain patent airway  Outcome: Ongoing     Problem: OXYGENATION/RESPIRATORY FUNCTION  Goal: Patient will achieve/maintain normal respiratory rate/effort  Description  Respiratory rate and effort will be within normal limits for the patient  Outcome: Ongoing     Problem: SKIN INTEGRITY  Goal: Skin integrity is maintained or improved  Outcome: Ongoing     Problem: MECHANICAL VENTILATION  Goal: Patient will maintain patent airway  Outcome: Ongoing     Problem: MECHANICAL VENTILATION  Goal: Oral health is maintained or improved  Outcome: Ongoing     Problem: MECHANICAL VENTILATION  Goal: ET tube will be managed safely  Outcome: Ongoing     Problem: MECHANICAL VENTILATION  Goal: Ability to express needs and understand communication  Outcome: Ongoing     Problem: MECHANICAL VENTILATION  Goal: Mobility/activity is maintained at optimum level for patient  Outcome: Ongoing

## 2020-01-10 NOTE — PLAN OF CARE
Problem: HEMODYNAMIC STATUS  Goal: Patient has stable vital signs and fluid balance  Outcome: Met This Shift     Problem: Falls - Risk of:  Goal: Will remain free from falls  Description  Will remain free from falls  Outcome: Met This Shift     Problem: Risk for Impaired Skin Integrity  Goal: Tissue integrity - skin and mucous membranes  Description  Structural intactness and normal physiological function of skin and  mucous membranes.   Outcome: Met This Shift

## 2020-01-10 NOTE — PROGRESS NOTES
Nutrition Assessment (Enteral Nutrition)    Type and Reason for Visit: Reassess    Nutrition Recommendations: Continue to increase diabetic TF to goal of 70 mL/hr as tolerated. Nutrition Assessment: TF resumed yesterday and currently running at 45 mL/hr. Tolerating well per RN. Loose stools yesterday. Nutrition Risk Level: High    Nutrition Needs:  · Estimated Daily Total Kcal: 9076-7848 kcals/day  · Estimated Daily Protein (g):  gm/day    Nutrition Diagnosis:   · Problem: Inadequate oral intake  · Etiology: related to Impaired respiratory function-inability to consume food     Signs and symptoms:  as evidenced by NPO status due to medical condition, Nutrition support - EN    Objective Information:  · Wound Type: Venous Stasis(L ankle - healed)  · Current Nutrition Therapies:  · Oral Diet Orders: NPO   · Tube Feeding (TF) Orders:   · Feeding Route: Nasogastric  · Formula: Diabetic  · Rate (ml/hr):45 mL/hr    · Volume (ml/day): 1080 mL/day  · Duration: Continuous  · Current TF & Flush Orders Provides: Glucerna (diabetic) at 45 mL/hr = 1296 kcals, 65 gm protein  · Goal TF & Flush Orders Provides: Glucerna @ 70ml/hr = 2016 kcal and 101g protein per day. · Anthropometric Measures:  · Ht: 5' 11\" (180.3 cm)   · Current Body Wt: 187 lb 9.8 oz (85.1 kg)  · Admission Body Wt: 210 lb 8.6 oz (95.5 kg)  · Weight Change: 11% weight loss x 2 weeks per admission and current weights. Likely does not reflect true weight loss, + edema noted. · Ideal Body Wt: 172 lb (78 kg), % Ideal Body 109%  · BMI Classification: BMI 25.0 - 29.9 Overweight    Nutrition Interventions:   Continue current Tube Feeding(Continue to advance TF to goal of 70 mL/hr as tolerated.)  Continued Inpatient Monitoring, Education Not Indicated    Nutrition Evaluation:   · Evaluation: Progressing toward goals   · Goals: Meet % of estimated nutrition needs.    · Monitoring: TF Intake, TF Tolerance, Diarrhea, Pertinent Labs, Weight,

## 2020-01-10 NOTE — PROGRESS NOTES
specialist about whether  bleed is consistent with traumatic versus spontaneous subarachnoid. Stroke specialist has significant concern for severe stenosis, suspects traumatic bleed and that patient is at risk for vasospasm. In addition has intracranial left vertebral artery athero-stenosis. Consensus management strategy is to keep the patient 130-160 for blood pressure goals to avoid right MCA watershed stroke. No aneurysm found on CTA or malformation-critical stenosis of the proximal left vertebral artery, critical stenosis of the proximal right cervical ICA, severe near critical stenosis of the right cavernous ICA, moderate stenosis of the left cavernous ICA. Non con CT head repeat at our emergency facility showed subarachnoid, predominantly in the bilateral sylvian fissures and bilateral cerebral hemisphere sulcal I, acute left parietal convexity subdural hematoma 4 mm in thickness without mass-effect. Hospital Course:   12/27: CT head stable  12/28: Intubated overnight for respiratory distress, started on Cardizem infusion for atrial fibrillation with RVR, weaned off and Sotalol started. Versed for sedation with concern for possible alcohol withdrawal.     12/29: loose foul smelling stool, c. Diff sent and was negative. Off Precdex,had left upper extremity tremor, placed on LTME. Febrile this morning - infectious workup sent. Will hold off on antibiotics with no leukocytosis - if spiked temp again will start Vancomycin and zosyn. 12/30:  On Keppra 500mg q12, LTM EEG shows diffuse slowing and disorganized background in theta frequency suggesting moderate encephalopathy. Before rounds patient became agitated and restless he was started on Precedex drip and Versed drip,He spiked a fever of 102.5 and is started on Vanco and Zosyn      12/31:   off Precedex on Versed running at 4 mcg/hr he had temperature spikes of T-max 102. 1/1: T max 101 at 21:00. Off antibiotics. TCD is normal, no vasospasm. Underwent spontaneous breathing trial this morning but didn't tolerate it as he became tachypneic. 1/2:  multiple spikes of fever T-max 103.1 at 8 PM.  Continue to be off antibiotics as septic work-up was negative. Still not following commands. Sotalol was increased to 160 twice daily in light of RVR. He had multiple spikes of temperature T-max 103. 1. Chest x-ray showed right lower zone consolidation concerning for aspiration pneumonia and he was started on Zosyn. Sputum sample was attempted but unsuccessful by RT. Patient is more tachypneic off propofol and has been maintained on propofol which was increased to 20 mics per hour. Heart rate is better controlled on sotalol 160 twice daily. 1/3 continues to be intubated on sedation 15 mics of propofol. Not following commands of propofol. Fever spike 101.1 overnight. CPAP trial was attempted unsuccessfully tidal volume decreased from 600 300 RSBI 120.  1/4: He remained on 15 mics per kilogram per hour propofol overnight. Propofol was weaned this morning. Better tolerating CPAP trial this morning. 1/5: he remained off sedation overnight. Had 8 beats run of V. tach, monomorphic, resolved spontaneously. Received 10 mg of labetalol and 10 mg of hydralazine. Had 2 bowel movements. T-max 100.9. EEG reported 3 questionable cardiac pauses on LTME, no telemetry correlation    1/7 has been off sedation. Alert but doesn't follow commands. Tolerated CPAP well. Extubated to room air. 1/8:  developed respiratory distress and tachypnea during the night. Received 1 mg of Ativan. Had right gaze preference and was not moving the right side to painful stimuli. Endovascular was called and he was taken for right ICA stenting this morning. He was loaded with 2 g of Keppra. Had successful stenting for right ICA critical stenosis. Over the last 24 hours, patient did not tolerate weaning trial yesterday.   This morning he was started on weaning trials that he tolerated well. However he continues not to follow commands. Planning for family meeting on .         MEDICATIONS:     CURRENT MEDICATIONS:  SCHEDULED MEDICATIONS:   potassium bicarb-citric acid  20 mEq Oral Q3H (SCHEDULED)    aspirin  81 mg Oral Daily    clopidogrel  75 mg Oral Daily    sennosides-docusate sodium  2 tablet Oral Daily    levETIRAcetam  500 mg Oral BID    amantadine  100 mg Per G Tube BID- 8&2    modafinil  200 mg Oral Daily    And    modafinil  100 mg Oral Daily    propranolol  20 mg Oral BID    insulin glargine  25 Units Subcutaneous Daily    insulin lispro  0-18 Units Subcutaneous Q6H    docusate  100 mg Oral Daily    sotalol  160 mg Oral BID    sodium chloride  2 g Oral TID WC    enoxaparin  40 mg Subcutaneous Daily    nicotine  1 patch Transdermal Daily    folic acid  1 mg Oral Daily    thiamine  100 mg Oral Daily    sodium chloride flush  10 mL Intravenous BID    famotidine (PEPCID) injection  20 mg Intravenous BID    rosuvastatin  5 mg Oral Nightly    chlorhexidine  15 mL Mouth/Throat BID    vitamin D  50,000 Units Oral Weekly    sodium chloride flush  10 mL Intravenous 2 times per day    sodium chloride flush  10 mL Intravenous 2 times per day     CONTINUOUS INFUSIONS:   sodium chloride 75 mL/hr at 20 1537    dextrose       PRN MEDICATIONS:   ipratropium-albuterol, albuterol, fentanNYL, acetaminophen, hydrALAZINE, labetalol, ibuprofen, acetaminophen, metoprolol, glucose, dextrose, glucagon (rDNA), dextrose, magnesium sulfate, sodium chloride flush, sodium chloride flush    VITALS 24 Hours     Temperature Range: Temp: 98.8 °F (37.1 °C) Temp  Av.3 °F (36.8 °C)  Min: 98 °F (36.7 °C)  Max: 98.8 °F (37.1 °C)  BP Range: Systolic (93SKQ), MLB:732 , Min:105 , DBT:874     Diastolic (89BRQ), RHR:81, Min:68, Max:96    Pulse Range: Pulse  Av  Min: 76  Max: 96  Respiration Range: Resp  Av.3  Min: 13  Max: 45  Current Pulse Ox: SpO2: 98 %  24HR Pulse Ox Range: SpO2  Av.4 %  Min: 98 %  Max: 100 %  Patient Vitals for the past 12 hrs:   BP Temp Temp src Pulse Resp SpO2   01/10/20 0705 137/81 -- -- 87 (!) 34 98 %   01/10/20 0605 132/75 -- -- 91 (!) 32 100 %   01/10/20 0505 (!) 145/73 -- -- 88 (!) 32 100 %   01/10/20 0405 (!) 145/84 98.8 °F (37.1 °C) Oral 86 (!) 35 100 %   01/10/20 0329 -- -- -- 91 28 --   01/10/20 0305 (!) 157/68 -- -- 87 29 --   01/10/20 0205 (!) 166/71 -- -- 82 29 --   01/10/20 0204 (!) 166/71 -- -- -- -- --   01/10/20 0105 (!) 143/82 -- -- 82 (!) 37 --   01/10/20 0033 -- -- -- 76 13 100 %   01/10/20 0005 (!) 146/79 98.4 °F (36.9 °C) Oral 77 (!) 36 100 %   20 2305 139/80 -- -- 76 (!) 35 100 %   20 (!) 141/76 -- -- 78 (!) 43 100 %   20 (!) 161/89 -- -- 89 (!) 36 100 %   20 (!) 143/84 -- -- 80 -- --   20 -- -- -- 92 (!) 33 100 %     Estimated body mass index is 26.17 kg/m² as calculated from the following:    Height as of this encounter: 5' 11\" (1.803 m). Weight as of this encounter: 187 lb 9.8 oz (85.1 kg). PHYSICAL EXAM       Physical Exam  HENT:      Head: Normocephalic and atraumatic. Eyes:      General:         Right eye: No foreign body, discharge or hordeolum. Left eye: No foreign body, discharge or hordeolum. Conjunctiva/sclera:      Right eye: Right conjunctiva is not injected. No chemosis, exudate or hemorrhage. Left eye: Left conjunctiva is injected. Hemorrhage present. No chemosis or exudate. Pupils: Pupils are equal, round, and reactive to light. Cardiovascular:      Rate and Rhythm: Normal rate. Rhythm irregular. Pulmonary:      Breath sounds: Normal breath sounds. Neurological:      Mental Status: He is alert. GCS: GCS eye subscore is 4. GCS verbal subscore is 1. GCS motor subscore is 5. Cranial Nerves: Cranial nerves are intact. Motor: No abnormal muscle tone.       Deep Tendon Reflexes:      Reflex Scores:       Bicep or dislocation. No acute osseous abnormality the right wrist. Widening of the scapholunate interval suggesting ligamentous injury. Radiocarpal joint osteoarthritis. Xr Hand Left (min 3 Views)    Result Date: 12/26/2019  EXAMINATION: THREE XRAY VIEWS OF THE LEFT HAND 12/26/2019 1:47 am COMPARISON: 12/25/2019 2353 hours HISTORY: ORDERING SYSTEM PROVIDED HISTORY: post splint TECHNOLOGIST PROVIDED HISTORY: post splint Reason for Exam: fall trauma/post splint Acuity: Acute FINDINGS: Overlying splint obscures osseous details. Again seen is acute traumatic closed proximal 1st metacarpal fracture. There appears to be greater fracture fragment displacement compared to prior study. No additional acute fracture or dislocation in the left hand. Overlying splint obscures osseous details. Acute traumatic closed angulated proximal 1st metacarpal fracture with greater angulation of fracture fragments compared the prior study. Xr Hand Left (min 3 Views)    Result Date: 12/26/2019  EXAMINATION: THREE XRAY VIEWS OF THE LEFT HAND 12/26/2019 1:47 am COMPARISON: Left wrist radiographs 12/25/2019 2131 hours HISTORY: ORDERING SYSTEM PROVIDED HISTORY: post reduction TECHNOLOGIST PROVIDED HISTORY: post reduction Reason for Exam: fall trauma Acuity: Acute FINDINGS: Acute traumatic closed comminuted mildly angulated proximal 1st metacarpal fracture. Joint alignment is maintained. No additional acute fracture or dislocation in the left hand. Acute traumatic closed comminuted mildly angulated proximal 1st metacarpal fracture. Xr Hand Right (min 3 Views)    Result Date: 12/26/2019  EXAMINATION: THREE XRAY VIEWS OF THE RIGHT HAND 12/26/2019 1:36 am COMPARISON: None. HISTORY: ORDERING SYSTEM PROVIDED HISTORY: Trauma/Fracture TECHNOLOGIST PROVIDED HISTORY: Include clenched fist view Trauma/Fracture Reason for Exam: fall FINDINGS: Joint alignment is maintained. No acute fracture or dislocation in the right hand. achievable. COMPARISON: None HISTORY: ORDERING SYSTEM PROVIDED HISTORY: trauma TECHNOLOGIST PROVIDED HISTORY: trauma Reason for Exam: fall from standing Acuity: Acute Type of Exam: Initial; ORDERING SYSTEM PROVIDED HISTORY: trauma TECHNOLOGIST PROVIDED HISTORY: trauma Reason for Exam: fall from standing Acuity: Acute Type of Exam: Initial; ORDERING SYSTEM PROVIDED HISTORY: trauma TECHNOLOGIST PROVIDED HISTORY: trauma Reason for Exam: fall from standing Acuity: Acute Type of Exam: Initial FINDINGS: CTA CHEST: Stable cardiomegaly. No pericardial effusion. No mediastinal hematoma or pneumomediastinum. No enlarged mediastinal or hilar lymph nodes. Main pulmonary artery is dilated, measuring up to 4 cm, suggesting pulmonary hypertension. Calcified and noncalcified atherosclerotic plaque of the thoracic aorta. Incidentally noted 4 vessel aortic arch with separate arch origin of the left vertebral artery. Ectatic ascending thoracic aorta measures up to 4 cm in diameter. No acute aortic abnormality. Central airways are clear. No pleural effusion or pneumothorax. No pulmonary contusion or pulmonary laceration. Mild bilateral lower lobe dependent atelectatic changes. Healing anterolateral left 6th rib fracture. No acute displaced rib fracture or chest wall hematoma. CTA ABDOMEN: No acute traumatic abnormality of the liver, spleen, pancreas, kidneys, or adrenal glands. Normal gallbladder. Stomach, small bowel, and colon are normal in caliber without evidence of obstruction. Normal appendix. Sigmoid diverticulosis without diverticulitis. Calcified and noncalcified aortoiliac atherosclerotic calcification. Abdominal aorta is normal in caliber. No free fluid in the abdomen. CTA PELVIS: Urinary bladder and pelvic organs are normal.  No free fluid in the pelvis. Bilateral common iliac stents are in position. THORACIC/LUMBAR SPINE: BONES/ALIGNMENT: Normal alignment of the thoracic and lumbar spine.  Vertebral body heights are maintained. No acute fracture. DEGENERATIVE CHANGES: Multilevel disc narrowing and endplate osteophyte formation. Mild multilevel facet joint degenerative changes. No high-grade, bony spinal canal stenosis. SOFT TISSUES: Paraspinal soft tissues are normal.     No acute traumatic abnormality of the chest, abdomen, or pelvis. Remote, healing anterolateral left 6th rib fracture. No acute osseous abnormality of the thoracic or lumbar spine. Ct Lumbar Spine Wo Contrast    Result Date: 12/26/2019  EXAMINATION: CT OF THE THORACIC SPINE WITHOUT CONTRAST; CT OF THE LUMBAR SPINE WITHOUT CONTRAST; CT OF THE CHEST, ABDOMEN, AND PELVIS WITH CONTRAST, 12/25/2019 9:04 pm; 12/25/2019 9:05 pm TECHNIQUE: CT of the thoracic and lumbar spine was performed without the administration of intravenous contrast.  CT of the chest, abdomen and pelvis was performed with the administration of intravenous contrast. Multiplanar reformatted images are provided for review. Dose modulation, iterative reconstruction, and/or weight based adjustment of the mA/kV was utilized to reduce the radiation dose to as low as reasonably achievable. COMPARISON: None HISTORY: ORDERING SYSTEM PROVIDED HISTORY: trauma TECHNOLOGIST PROVIDED HISTORY: trauma Reason for Exam: fall from standing Acuity: Acute Type of Exam: Initial; ORDERING SYSTEM PROVIDED HISTORY: trauma TECHNOLOGIST PROVIDED HISTORY: trauma Reason for Exam: fall from standing Acuity: Acute Type of Exam: Initial; ORDERING SYSTEM PROVIDED HISTORY: trauma TECHNOLOGIST PROVIDED HISTORY: trauma Reason for Exam: fall from standing Acuity: Acute Type of Exam: Initial FINDINGS: CTA CHEST: Stable cardiomegaly. No pericardial effusion. No mediastinal hematoma or pneumomediastinum. No enlarged mediastinal or hilar lymph nodes. Main pulmonary artery is dilated, measuring up to 4 cm, suggesting pulmonary hypertension. Calcified and noncalcified atherosclerotic plaque of the thoracic aorta. cerebral angiogram 4. Selective left common carotid artery angiogram 5. Selective left internal carotid artery cerebral angiogram 6. Selective left vertebral artery cerebral angiogram 7. Right common femoral artery angiogram Neurointerventionalist: Joline Habermann, MD [place]: Sawyer Luna MD Comparison: None Fluoroscopy time: 29.7 minutes Contrast: 100 cc Visipaque-270 Side of Access: Right femoral Closure device: Vascade Sedation: Conscious sedation Patient arrived to the angio suite: 1325 Puncture obtained at: 1400 Vascular access removed at: 1605 Consent:After explaining the risks and benefits to the patient and the patient's family, including but not limited to stroke, coma, death, vessel injury, dissection, tear, occlusion, and X-ray dye allergic type reaction, a signed consent form was obtained. Anesthesia: IV moderate sedation was supervised by Dr. Joline Habermann. The patient was independently monitored by a Registered Nurse assigned to the Department of Radiology?using automated blood pressure, EKG and pulse oximetry. ? The detailed Conscious Record is permanently stored in the [facility]. The following is the conscious sedation record including Start and End times: Fentanyl, propofol and Versed from 1400 to  1600 . Clinical History:59 y. o.?male?with past medical history including atrial fibrillation on Eliquis, diabetes mellitus, hypertension, hyperlipidemia, coronary artery disease s/p PCI stents, peripheral vascular disease, history of bilateral ICA stenosis with  right worse than left, history of CEA, alcohol abuse. ? Patient presented after a fall from outside facility as a trauma alert?and found to have bilateral subarachnoid hemorrhage/left parietal subdural hematoma. ? He received Kcentra in outside hospital. ? Neuro endovascular was consulted.  Description and findings: The patient's right groin was prepped and draped in standard sterile fashion and under local anesthesia with conscious selectively catheterized with roadmap guidance. Digital subtraction angiography of the intracranial left vertebrobasilar run-off  was obtained in frontal and lateral projections. Interpretation:The left vertebral artery injection demonstrates normal antegrade opacification of the left  vertebral artery, including the cervical segment, basilar artery, and respective branches. There is a left V4 moderate stenosis noted. Otherwise, there was no evidence of  cerebral aneurysm, arteriovenous malformation, or arterial stenosis. There is noted opacification of the distal right middle cerebral artery parieto-occipital territory from pial collaterals arising off the right posterior cerebral artery. Capillary and venous phase images were otherwise unremarkable. Right CFA technique: A right common femoral artery angiogram was performed and demonstrated arterial catheterization proximal to the bifurcation. There was no evidence of dissection or occlusion within the right common femoral artery. There is internal iliac artery stent noted. A 5F Vascade closure device was used to establish hemostasis at the right common femoral artery access site. No immediate complications were experienced. Patient was re-examined after the procedure with no change noted in their neurologic examination, with distal pulses present. The patient was subsequently discharged from the neurointerventional suite. Impression: 1. Critical right ICA stenosis with a string sign is noted measuring approximately 90-99% per NASCET criteria. Noted retrograde Filling of the right ophthalmic artery from the right internal maxillary artery branches with slow filling of the anterior cerebral artery, middle cerebral artery and the distal branches in addition to distal right mca vascular supply from the right pca due to previously noted critical proximal cervical ICA stenosis.  2.Bilateral cavernous internal carotid artery atherosclerotic disease noted with diffuse VERTEBRAL ARTERIES: No dissection, arterial injury, or significant stenosis. SOFT TISSUES: There is a thickened appearance to the mid esophagus. A small amount of layering debris is present within the trachea, likely reflecting pooled secretion. BONES: No acute osseous abnormality. CTA HEAD: ANTERIOR CIRCULATION: There is severely attenuated flow within the petrous and cavernous segments of the right internal carotid artery with severe, near critical stenoses of the cavernous ICA. There is also attenuated flow within the contralateral ICA, to a lesser degree, with moderate stenoses of the left cavernous ICA. There are moderate and severe multifocal stenoses of the MCA branch vessels, worse on the right-hand side. The right anterior cerebral artery is attenuated compared to the left. POSTERIOR CIRCULATION: There are mild-to-moderate stenoses of the distal right vertebral artery and a moderate to severe stenosis of the distal left. The basilar artery and PCAs appear to be slightly attenuated. OTHER: No dural venous sinus thrombosis on this non-dedicated study. BRAIN: See separately dictated noncontrast head CT report. No cerebral aneurysm or vascular malformation identified. Significant narrowing of the intracranial vessels, likely due to a combination of atherosclerotic disease as well as vasospasm related to subarachnoid hemorrhage. Critical stenosis of the proximal left vertebral artery. Critical stenosis of the proximal right cervical ICA with attenuated flow seen distally. Severe near critical stenoses of the right cavernous ICA. Moderate stenoses of the contralateral cavernous ICA.      Ct Chest Abdomen Pelvis W Contrast    Result Date: 12/26/2019  EXAMINATION: CT OF THE THORACIC SPINE WITHOUT CONTRAST; CT OF THE LUMBAR SPINE WITHOUT CONTRAST; CT OF THE CHEST, ABDOMEN, AND PELVIS WITH CONTRAST, 12/25/2019 9:04 pm; 12/25/2019 9:05 pm TECHNIQUE: CT of the thoracic and lumbar spine was performed without the 30%  Continue O2 monitor  Maintain saturation > 92%    Endo  Serum glucose is better controlled < 180  HB A1C: 7.1  Continue Lantus 25 units daily    GI  Milk of magnesia for bowel regimen  Pepcid 20 mg IV  BID for GI PPx  Continue tube feeds    Renal  Hyponatremia: 131->133 -> 137 -> 135 -> 142  Decrease salt tabs to 2 g daily  Intake 3.2, output 1, +2 L  BUN 17, creatinine 0.39    Heme/ID  Tmax 98   Hemoglobin hematocrit stable    MS  Nondisplaced mildly angulated left first metacarpal fracture. Splinted by orthopedics  Follow-up with orthopedics    LDAs:  NG tube, ETT, ext. Urinary     DVT PPX: Lovenox 40 mg daily  GI prophylaxis Pepcid 20 mg IV twice daily    Disposition: remains in NICU for vent management.  Stepdown after extubation      Florecita Castro MD  Neurology Resident  Neuro Critical Care   Phone: [phone]

## 2020-01-11 ENCOUNTER — APPOINTMENT (OUTPATIENT)
Dept: GENERAL RADIOLOGY | Age: 60
DRG: 030 | End: 2020-01-11
Payer: MEDICAID

## 2020-01-11 LAB
ABSOLUTE EOS #: 0.18 K/UL (ref 0–0.44)
ABSOLUTE IMMATURE GRANULOCYTE: 0 K/UL (ref 0–0.3)
ABSOLUTE LYMPH #: 1.15 K/UL (ref 1.1–3.7)
ABSOLUTE MONO #: 0.32 K/UL (ref 0.1–1.2)
ALLEN TEST: POSITIVE
ANION GAP SERPL CALCULATED.3IONS-SCNC: 10 MMOL/L (ref 9–17)
BASOPHILS # BLD: 1 % (ref 0–2)
BASOPHILS ABSOLUTE: 0.05 K/UL (ref 0–0.2)
BUN BLDV-MCNC: 9 MG/DL (ref 6–20)
BUN/CREAT BLD: ABNORMAL (ref 9–20)
CALCIUM SERPL-MCNC: 8.6 MG/DL (ref 8.6–10.4)
CHLORIDE BLD-SCNC: 104 MMOL/L (ref 98–107)
CO2: 26 MMOL/L (ref 20–31)
CREAT SERPL-MCNC: 0.28 MG/DL (ref 0.7–1.2)
DIFFERENTIAL TYPE: ABNORMAL
EOSINOPHILS RELATIVE PERCENT: 4 % (ref 1–4)
FIO2: 30
GFR AFRICAN AMERICAN: >60 ML/MIN
GFR NON-AFRICAN AMERICAN: >60 ML/MIN
GFR SERPL CREATININE-BSD FRML MDRD: ABNORMAL ML/MIN/{1.73_M2}
GFR SERPL CREATININE-BSD FRML MDRD: ABNORMAL ML/MIN/{1.73_M2}
GLUCOSE BLD-MCNC: 149 MG/DL (ref 75–110)
GLUCOSE BLD-MCNC: 150 MG/DL (ref 75–110)
GLUCOSE BLD-MCNC: 158 MG/DL (ref 75–110)
GLUCOSE BLD-MCNC: 164 MG/DL (ref 70–99)
GLUCOSE BLD-MCNC: 180 MG/DL (ref 75–110)
HCT VFR BLD CALC: 35.7 % (ref 40.7–50.3)
HEMOGLOBIN: 10.4 G/DL (ref 13–17)
IMMATURE GRANULOCYTES: 0 %
LYMPHOCYTES # BLD: 25 % (ref 24–43)
MCH RBC QN AUTO: 23.5 PG (ref 25.2–33.5)
MCHC RBC AUTO-ENTMCNC: 29.1 G/DL (ref 28.4–34.8)
MCV RBC AUTO: 80.8 FL (ref 82.6–102.9)
MODE: ABNORMAL
MONOCYTES # BLD: 7 % (ref 3–12)
MORPHOLOGY: ABNORMAL
NEGATIVE BASE EXCESS, ART: ABNORMAL (ref 0–2)
NRBC AUTOMATED: 0 PER 100 WBC
O2 DEVICE/FLOW/%: ABNORMAL
PATIENT TEMP: ABNORMAL
PDW BLD-RTO: 21.3 % (ref 11.8–14.4)
PLATELET # BLD: 446 K/UL (ref 138–453)
PLATELET ESTIMATE: ABNORMAL
PMV BLD AUTO: 9.7 FL (ref 8.1–13.5)
POC HCO3: 28 MMOL/L (ref 21–28)
POC O2 SATURATION: 99 % (ref 94–98)
POC PCO2 TEMP: ABNORMAL MM HG
POC PCO2: 36.8 MM HG (ref 35–48)
POC PH TEMP: ABNORMAL
POC PH: 7.49 (ref 7.35–7.45)
POC PO2 TEMP: ABNORMAL MM HG
POC PO2: 120.2 MM HG (ref 83–108)
POSITIVE BASE EXCESS, ART: 4 (ref 0–3)
POTASSIUM SERPL-SCNC: 3.8 MMOL/L (ref 3.7–5.3)
RBC # BLD: 4.42 M/UL (ref 4.21–5.77)
RBC # BLD: ABNORMAL 10*6/UL
SAMPLE SITE: ABNORMAL
SEG NEUTROPHILS: 63 % (ref 36–65)
SEGMENTED NEUTROPHILS ABSOLUTE COUNT: 2.9 K/UL (ref 1.5–8.1)
SODIUM BLD-SCNC: 140 MMOL/L (ref 135–144)
TCO2 (CALC), ART: 29 MMOL/L (ref 22–29)
WBC # BLD: 4.6 K/UL (ref 3.5–11.3)
WBC # BLD: ABNORMAL 10*3/UL

## 2020-01-11 PROCEDURE — 2000000003 HC NEURO ICU R&B

## 2020-01-11 PROCEDURE — 6370000000 HC RX 637 (ALT 250 FOR IP): Performed by: STUDENT IN AN ORGANIZED HEALTH CARE EDUCATION/TRAINING PROGRAM

## 2020-01-11 PROCEDURE — 94761 N-INVAS EAR/PLS OXIMETRY MLT: CPT

## 2020-01-11 PROCEDURE — 6360000002 HC RX W HCPCS: Performed by: STUDENT IN AN ORGANIZED HEALTH CARE EDUCATION/TRAINING PROGRAM

## 2020-01-11 PROCEDURE — 6370000000 HC RX 637 (ALT 250 FOR IP): Performed by: NURSE PRACTITIONER

## 2020-01-11 PROCEDURE — 82803 BLOOD GASES ANY COMBINATION: CPT

## 2020-01-11 PROCEDURE — 36415 COLL VENOUS BLD VENIPUNCTURE: CPT

## 2020-01-11 PROCEDURE — 2580000003 HC RX 258: Performed by: NURSE PRACTITIONER

## 2020-01-11 PROCEDURE — 99291 CRITICAL CARE FIRST HOUR: CPT | Performed by: PSYCHIATRY & NEUROLOGY

## 2020-01-11 PROCEDURE — 85025 COMPLETE CBC W/AUTO DIFF WBC: CPT

## 2020-01-11 PROCEDURE — 51798 US URINE CAPACITY MEASURE: CPT

## 2020-01-11 PROCEDURE — 80048 BASIC METABOLIC PNL TOTAL CA: CPT

## 2020-01-11 PROCEDURE — 6370000000 HC RX 637 (ALT 250 FOR IP): Performed by: PSYCHIATRY & NEUROLOGY

## 2020-01-11 PROCEDURE — 82947 ASSAY GLUCOSE BLOOD QUANT: CPT

## 2020-01-11 PROCEDURE — 2700000000 HC OXYGEN THERAPY PER DAY

## 2020-01-11 PROCEDURE — 99024 POSTOP FOLLOW-UP VISIT: CPT | Performed by: PSYCHIATRY & NEUROLOGY

## 2020-01-11 PROCEDURE — 71045 X-RAY EXAM CHEST 1 VIEW: CPT

## 2020-01-11 PROCEDURE — 6360000002 HC RX W HCPCS: Performed by: PSYCHIATRY & NEUROLOGY

## 2020-01-11 PROCEDURE — 2500000003 HC RX 250 WO HCPCS: Performed by: NURSE PRACTITIONER

## 2020-01-11 PROCEDURE — 36600 WITHDRAWAL OF ARTERIAL BLOOD: CPT

## 2020-01-11 PROCEDURE — 94770 HC ETCO2 MONITOR DAILY: CPT

## 2020-01-11 PROCEDURE — 94003 VENT MGMT INPAT SUBQ DAY: CPT

## 2020-01-11 RX ADMIN — LEVETIRACETAM 500 MG: 500 SOLUTION ORAL at 20:16

## 2020-01-11 RX ADMIN — PROPRANOLOL HYDROCHLORIDE 20 MG: 10 TABLET ORAL at 20:15

## 2020-01-11 RX ADMIN — LEVETIRACETAM 500 MG: 500 SOLUTION ORAL at 08:52

## 2020-01-11 RX ADMIN — SODIUM CHLORIDE, PRESERVATIVE FREE 10 ML: 5 INJECTION INTRAVENOUS at 22:02

## 2020-01-11 RX ADMIN — MODAFINIL 200 MG: 100 TABLET ORAL at 08:55

## 2020-01-11 RX ADMIN — SOTALOL HYDROCHLORIDE 160 MG: 80 TABLET ORAL at 08:51

## 2020-01-11 RX ADMIN — Medication 100 MG: at 08:50

## 2020-01-11 RX ADMIN — MODAFINIL 100 MG: 100 TABLET ORAL at 15:22

## 2020-01-11 RX ADMIN — Medication 15 ML: at 22:03

## 2020-01-11 RX ADMIN — ROSUVASTATIN CALCIUM 5 MG: 5 TABLET, FILM COATED ORAL at 20:16

## 2020-01-11 RX ADMIN — SODIUM CHLORIDE TAB 1 GM 2 G: 1 TAB at 08:47

## 2020-01-11 RX ADMIN — INSULIN LISPRO 3 UNITS: 100 INJECTION, SOLUTION INTRAVENOUS; SUBCUTANEOUS at 06:09

## 2020-01-11 RX ADMIN — AMANTADINE HYDROCHLORIDE 100 MG: 50 SOLUTION ORAL at 15:22

## 2020-01-11 RX ADMIN — FAMOTIDINE 20 MG: 10 INJECTION, SOLUTION INTRAVENOUS at 20:16

## 2020-01-11 RX ADMIN — AMANTADINE HYDROCHLORIDE 100 MG: 50 SOLUTION ORAL at 08:52

## 2020-01-11 RX ADMIN — DOCUSATE SODIUM 100 MG: 50 LIQUID ORAL at 08:52

## 2020-01-11 RX ADMIN — INSULIN LISPRO 3 UNITS: 100 INJECTION, SOLUTION INTRAVENOUS; SUBCUTANEOUS at 18:54

## 2020-01-11 RX ADMIN — HYDRALAZINE HYDROCHLORIDE 10 MG: 20 INJECTION INTRAMUSCULAR; INTRAVENOUS at 00:09

## 2020-01-11 RX ADMIN — FAMOTIDINE 20 MG: 10 INJECTION, SOLUTION INTRAVENOUS at 08:52

## 2020-01-11 RX ADMIN — INSULIN LISPRO 3 UNITS: 100 INJECTION, SOLUTION INTRAVENOUS; SUBCUTANEOUS at 12:55

## 2020-01-11 RX ADMIN — ASPIRIN 81 MG: 81 TABLET, CHEWABLE ORAL at 08:47

## 2020-01-11 RX ADMIN — SOTALOL HYDROCHLORIDE 160 MG: 80 TABLET ORAL at 20:15

## 2020-01-11 RX ADMIN — INSULIN GLARGINE 25 UNITS: 100 INJECTION, SOLUTION SUBCUTANEOUS at 12:55

## 2020-01-11 RX ADMIN — HYDRALAZINE HYDROCHLORIDE 10 MG: 20 INJECTION INTRAMUSCULAR; INTRAVENOUS at 22:06

## 2020-01-11 RX ADMIN — INSULIN LISPRO 3 UNITS: 100 INJECTION, SOLUTION INTRAVENOUS; SUBCUTANEOUS at 00:00

## 2020-01-11 RX ADMIN — PROPRANOLOL HYDROCHLORIDE 20 MG: 10 TABLET ORAL at 08:50

## 2020-01-11 RX ADMIN — ENOXAPARIN SODIUM 40 MG: 40 INJECTION SUBCUTANEOUS at 20:16

## 2020-01-11 RX ADMIN — CLOPIDOGREL 75 MG: 75 TABLET, FILM COATED ORAL at 08:50

## 2020-01-11 RX ADMIN — FOLIC ACID 1 MG: 1 TABLET ORAL at 08:50

## 2020-01-11 RX ADMIN — Medication 15 ML: at 08:55

## 2020-01-11 ASSESSMENT — PULMONARY FUNCTION TESTS
PIF_VALUE: 21
PIF_VALUE: 24
PIF_VALUE: 14
PIF_VALUE: 18
PIF_VALUE: 20
PIF_VALUE: 23
PIF_VALUE: 19
PIF_VALUE: 18

## 2020-01-11 NOTE — PROGRESS NOTES
Daily Progress Note  Neuro Critical Care        INTERVAL HISTORY    The patient is a 60 yo male with history of atrial fibrillation on Eliquis, bilateral ICA stenosis (right more than left), DM2, HLD, HTN, CAD s/p PCI stents, PVD, CEA, and ETOH abuse who presents as a transfer from [facility] as a trauma alert for CT head revealed diffuse bialteral SAH and left parietal SDH. [facility] [place] [facility] and transferred to [facility] for higher level of care. Patient was found confused by family at his home after being unable to reach him on the phone. He was complaining of headache, and actively vomiting. Also had bilateral arm scrapes and bruises concerning for recent fall, patient himself does not member falling, said around 4 PM he woke up and felt frontal and vertex headache and neck pain, and felt nauseous and started throwing up. Endorses drinking alcohol last night states that he had 2 beers. Per family has significant alcohol abuse history and has had multiple falls in the past.     On presentation in the ED [facility], patient mildly lethargic but oriented x4, complaining of headache, nausea, mild vertigo, left arm weaker than right, bilateral leg weakness. Significant interdisciplinary discussion between the neurosurgeon, radiologist, trauma surgeon and stroke specialist about whether  bleed is consistent with traumatic versus spontaneous subarachnoid. Stroke specialist has significant concern for severe stenosis, suspects traumatic bleed and that patient is at risk for vasospasm. In addition has intracranial left vertebral artery athero-stenosis. Consensus management strategy is to keep the patient 130-160 for blood pressure goals to avoid right MCA watershed stroke.      No aneurysm found on CTA or malformation-critical stenosis of the proximal left vertebral artery, critical stenosis of the proximal right cervical ICA, severe near critical stenosis of the right cavernous ICA, mics per hour. Heart rate is better controlled on sotalol 160 twice daily. 1/3 continues to be intubated on sedation 15 mics of propofol. Not following commands of propofol. Fever spike 101.1 overnight. CPAP trial was attempted unsuccessfully tidal volume decreased from 600 300 RSBI 120.  1/4: He remained on 15 mics per kilogram per hour propofol overnight. Propofol was weaned this morning. Better tolerating CPAP trial this morning. 1/5: he remained off sedation overnight. Had 8 beats run of V. tach, monomorphic, resolved spontaneously. Received 10 mg of labetalol and 10 mg of hydralazine. Had 2 bowel movements. T-max 100.9. EEG reported 3 questionable cardiac pauses on LTME, no telemetry correlation    1/7 has been off sedation. Alert but doesn't follow commands. Tolerated CPAP well. Extubated to room air. 1/8:  developed respiratory distress and tachypnea during the night. Received 1 mg of Ativan. Had right gaze preference and was not moving the right side to painful stimuli. Endovascular was called and he was taken for right ICA stenting this morning. He was loaded with 2 g of Keppra. Had successful stenting for right ICA critical stenosis. Over the last 24 hours, no AEO. Planning for family meeting on Sunday. Failed SBT.          MEDICATIONS:     CURRENT MEDICATIONS:  SCHEDULED MEDICATIONS:   sodium chloride  2 g Oral Daily    aspirin  81 mg Oral Daily    clopidogrel  75 mg Oral Daily    sennosides-docusate sodium  2 tablet Oral Daily    levETIRAcetam  500 mg Oral BID    amantadine  100 mg Per G Tube BID- 8&2    modafinil  200 mg Oral Daily    And    modafinil  100 mg Oral Daily    propranolol  20 mg Oral BID    insulin glargine  25 Units Subcutaneous Daily    insulin lispro  0-18 Units Subcutaneous Q6H    docusate  100 mg Oral Daily    sotalol  160 mg Oral BID    enoxaparin  40 mg Subcutaneous Daily    nicotine  1 patch Transdermal Daily    folic acid  1 mg Oral aspect of the proximal forearm. Xr Wrist Left (min 3 Views)    Result Date: 12/25/2019  EXAMINATION: 3 XRAY VIEWS OF THE LEFT WRIST 12/25/2019 9:45 pm COMPARISON: None. HISTORY: ORDERING SYSTEM PROVIDED HISTORY: fall TECHNOLOGIST PROVIDED HISTORY: fall Acuity: Acute Type of Exam: Initial FINDINGS: Overlying splint partially obscures osseous details. Acute traumatic closed proximal 1st metacarpal fracture. The carpal bones are intact. Carpal alignment is maintained soft tissues of the wrist are unremarkable. Acute traumatic closed 1st proximal metacarpal fracture. No acute osseous abnormality in the wrist.     Xr Wrist Right (min 3 Views)    Result Date: 12/25/2019  EXAMINATION: 3 XRAY VIEWS OF THE RIGHT WRIST 12/25/2019 9:45 pm COMPARISON: None. HISTORY: ORDERING SYSTEM PROVIDED HISTORY: fall TECHNOLOGIST PROVIDED HISTORY: fall Acuity: Acute Type of Exam: Initial FINDINGS: Overlying splint obscures osseous details. Carpal bones are intact. Advanced radiocarpal joint osteoarthritis with joint space narrowing and subchondral sclerosis. Widening of the scapholunate joint. Posttraumatic deformity of the ulnar styloid process. No acute fracture or dislocation. No acute osseous abnormality the right wrist. Widening of the scapholunate interval suggesting ligamentous injury. Radiocarpal joint osteoarthritis. Xr Hand Left (min 3 Views)    Result Date: 12/26/2019  EXAMINATION: THREE XRAY VIEWS OF THE LEFT HAND 12/26/2019 1:47 am COMPARISON: 12/25/2019 2353 hours HISTORY: ORDERING SYSTEM PROVIDED HISTORY: post splint TECHNOLOGIST PROVIDED HISTORY: post splint Reason for Exam: fall trauma/post splint Acuity: Acute FINDINGS: Overlying splint obscures osseous details. Again seen is acute traumatic closed proximal 1st metacarpal fracture. There appears to be greater fracture fragment displacement compared to prior study. No additional acute fracture or dislocation in the left hand.      Overlying splint obscures osseous details. Acute traumatic closed angulated proximal 1st metacarpal fracture with greater angulation of fracture fragments compared the prior study. Xr Hand Left (min 3 Views)    Result Date: 12/26/2019  EXAMINATION: THREE XRAY VIEWS OF THE LEFT HAND 12/26/2019 1:47 am COMPARISON: Left wrist radiographs 12/25/2019 2131 hours HISTORY: ORDERING SYSTEM PROVIDED HISTORY: post reduction TECHNOLOGIST PROVIDED HISTORY: post reduction Reason for Exam: fall trauma Acuity: Acute FINDINGS: Acute traumatic closed comminuted mildly angulated proximal 1st metacarpal fracture. Joint alignment is maintained. No additional acute fracture or dislocation in the left hand. Acute traumatic closed comminuted mildly angulated proximal 1st metacarpal fracture. Xr Hand Right (min 3 Views)    Result Date: 12/26/2019  EXAMINATION: THREE XRAY VIEWS OF THE RIGHT HAND 12/26/2019 1:36 am COMPARISON: None. HISTORY: ORDERING SYSTEM PROVIDED HISTORY: Trauma/Fracture TECHNOLOGIST PROVIDED HISTORY: Include clenched fist view Trauma/Fracture Reason for Exam: fall FINDINGS: Joint alignment is maintained. No acute fracture or dislocation in the right hand. Degenerative changes in the interphalangeal joints. Old ulnar styloid process fracture. Degenerative changes in the radiocarpal joint. No acute osseous abnormality in the right hand. Xr Hip Left (2-3 Views)    Result Date: 12/26/2019  EXAMINATION: ONE XRAY VIEW OF THE PELVIS AND TWO XRAY VIEWS RIGHT HIP; TWO XRAY VIEWS OF THE LEFT HIP 12/26/2019 1:37 am COMPARISON: None. HISTORY: ORDERING SYSTEM PROVIDED HISTORY: Trauma/Fracture TECHNOLOGIST PROVIDED HISTORY: AP and cross-table lateral of the hip please, thank you Trauma/Fracture Reason for Exam: fall/trauma Acuity: Acute FINDINGS: The bones are osteopenic. The femoral heads located bilateral.  No acute fracture or dislocation pelvis or hips bilaterally. SI joints are grossly intact.   Pubic symphysis is Trauma/Fracture FINDINGS: Diffuse osteopenia. No acute fracture or dislocation. Normal alignment of the ankle mortise. No focal osseous lesion. No joint effusion. No focal soft tissue abnormality. Status post 5th metatarsal ORIF with intact screw. No acute abnormality of the ankle. Ct Head Wo Contrast    Result Date: 12/27/2019  EXAMINATION: CT OF THE HEAD WITHOUT CONTRAST,  12/26/2019 11:08 pm TECHNIQUE: CT of the head was performed without the administration of intravenous contrast. Dose modulation, iterative reconstruction, and/or weight based adjustment of the mA/kV was utilized to reduce the radiation dose to as low as reasonably achievable. COMPARISON: 12/26/2019 5:10 a.m. HISTORY: ORDERING SYSTEM PROVIDED HISTORY: Reassess bleed for stability. TECHNOLOGIST PROVIDED HISTORY: Reassess bleed for stability. Reason for Exam: Reassess bleed for stability. Acuity: Unknown Type of Exam: Unknown FINDINGS: BRAIN/VENTRICLES: Again seen is extensive subarachnoid hemorrhage within the sylvian fissures as well as cerebral sulci bilaterally. Subdural hemorrhage overlies the left parietal convexity measures 4 mm in thickness. Hemorrhage in the prepontine cistern as well. Small amount of intraventricular hemorrhage layering in the occipital horns bilaterally. Ventricles are stable in caliber. No hydrocephalus. The gray-white matter differentiation is maintained. No midline shift. ORBITS: The visualized portion of the orbits demonstrate no acute abnormality. SINUSES: The visualized paranasal sinuses and mastoid air cells demonstrate no acute abnormality. SOFT TISSUES/SKULL:  No acute abnormality of the visualized skull or soft tissues. Stable head CT demonstrating extensive bilateral subarachnoid hemorrhage as well as intraventricular hemorrhage and left parietal convexity subdural hemorrhage. No new intracranial hemorrhage. No hydrocephalus.      Ct Head Wo Contrast    Result Date: 12/26/2019  EXAMINATION: CT OF THE HEAD WITHOUT CONTRAST  12/26/2019 4:51 am TECHNIQUE: CT of the head was performed without the administration of intravenous contrast. Dose modulation, iterative reconstruction, and/or weight based adjustment of the mA/kV was utilized to reduce the radiation dose to as low as reasonably achievable. COMPARISON: 12/25/2019 HISTORY: ORDERING SYSTEM PROVIDED HISTORY: worsening mentation TECHNOLOGIST PROVIDED HISTORY: worsening mentation FINDINGS: BRAIN/VENTRICLES: Diffuse subarachnoid hemorrhage within the cerebral sulci, sylvian fissures, anterior interhemispheric fissure, and prepontine cistern, similar in appearance to prior study. There is intraventricular hemorrhage layering in the occipital horns. No hydrocephalus. No mass effect or midline shift. Left parietal convexity subdural hemorrhage measures up to 5 mm in thickness. The basal cisterns are patent. The gray-white matter differentiation is maintained. ORBITS: The visualized portion of the orbits demonstrate no acute abnormality. SINUSES: The visualized paranasal sinuses and mastoid air cells demonstrate no acute abnormality. SOFT TISSUES/SKULL:  No acute abnormality of the visualized skull or soft tissues. 1. Stable head CT demonstrating diffuse moderate volume subarachnoid hemorrhage and left parietal convexity subdural hemorrhage measuring up to 5 mm in thickness. 2. No mass effect or midline shift. No hydrocephalus. The findings were sent to the Radiology Results [address] at 5:48 am on 12/26/2019to be communicated to a licensed caregiver.      Ct Head Wo Contrast    Result Date: 12/26/2019  EXAMINATION: CT OF THE HEAD WITHOUT CONTRAST  12/25/2019 9:04 pm TECHNIQUE: CT of the head was performed without the administration of intravenous contrast. Dose modulation, iterative reconstruction, and/or weight based adjustment of the mA/kV was utilized to reduce the radiation dose to as low as reasonably Calcified and noncalcified atherosclerotic plaque of the thoracic aorta. Incidentally noted 4 vessel aortic arch with separate arch origin of the left vertebral artery. Ectatic ascending thoracic aorta measures up to 4 cm in diameter. No acute aortic abnormality. Central airways are clear. No pleural effusion or pneumothorax. No pulmonary contusion or pulmonary laceration. Mild bilateral lower lobe dependent atelectatic changes. Healing anterolateral left 6th rib fracture. No acute displaced rib fracture or chest wall hematoma. CTA ABDOMEN: No acute traumatic abnormality of the liver, spleen, pancreas, kidneys, or adrenal glands. Normal gallbladder. Stomach, small bowel, and colon are normal in caliber without evidence of obstruction. Normal appendix. Sigmoid diverticulosis without diverticulitis. Calcified and noncalcified aortoiliac atherosclerotic calcification. Abdominal aorta is normal in caliber. No free fluid in the abdomen. CTA PELVIS: Urinary bladder and pelvic organs are normal.  No free fluid in the pelvis. Bilateral common iliac stents are in position. THORACIC/LUMBAR SPINE: BONES/ALIGNMENT: Normal alignment of the thoracic and lumbar spine. Vertebral body heights are maintained. No acute fracture. DEGENERATIVE CHANGES: Multilevel disc narrowing and endplate osteophyte formation. Mild multilevel facet joint degenerative changes. No high-grade, bony spinal canal stenosis. SOFT TISSUES: Paraspinal soft tissues are normal.     No acute traumatic abnormality of the chest, abdomen, or pelvis. Remote, healing anterolateral left 6th rib fracture. No acute osseous abnormality of the thoracic or lumbar spine.      Ct Lumbar Spine Wo Contrast    Result Date: 12/26/2019  EXAMINATION: CT OF THE THORACIC SPINE WITHOUT CONTRAST; CT OF THE LUMBAR SPINE WITHOUT CONTRAST; CT OF THE CHEST, ABDOMEN, AND PELVIS WITH CONTRAST, 12/25/2019 9:04 pm; 12/25/2019 9:05 pm TECHNIQUE: CT of the thoracic and lumbar spine and noncalcified aortoiliac atherosclerotic calcification. Abdominal aorta is normal in caliber. No free fluid in the abdomen. CTA PELVIS: Urinary bladder and pelvic organs are normal.  No free fluid in the pelvis. Bilateral common iliac stents are in position. THORACIC/LUMBAR SPINE: BONES/ALIGNMENT: Normal alignment of the thoracic and lumbar spine. Vertebral body heights are maintained. No acute fracture. DEGENERATIVE CHANGES: Multilevel disc narrowing and endplate osteophyte formation. Mild multilevel facet joint degenerative changes. No high-grade, bony spinal canal stenosis. SOFT TISSUES: Paraspinal soft tissues are normal.     No acute traumatic abnormality of the chest, abdomen, or pelvis. Remote, healing anterolateral left 6th rib fracture. No acute osseous abnormality of the thoracic or lumbar spine. Ir Angiogram Carotid C Erebral Bilateral    Result Date: 12/27/2019  Date of procedure: 12/26/2019 Procedure: Diagnostic cerebral angiogram Indication: Subarachnoid hemorrhage, evaluation for aneurysm. Procedures: 1. Selective right common carotid artery angiogram 2. Selective right internal carotid artery cerebral angiogram 3. Selective right vertebral artery cerebral angiogram 4. Selective left common carotid artery angiogram 5. Selective left internal carotid artery cerebral angiogram 6. Selective left vertebral artery cerebral angiogram 7.  Right common femoral artery angiogram Neurointerventionalist: Mariposa Rowan MD [place]: Wally Mccann MD Comparison: None Fluoroscopy time: 29.7 minutes Contrast: 100 cc Visipaque-270 Side of Access: Right femoral Closure device: [place] Sedation: Conscious sedation Patient arrived to the angio suite: 1325 Puncture obtained at: 1400 Vascular access removed at: 1605 Consent:After explaining the risks and benefits to the patient and the patient's family, including but not limited to stroke, coma, death, vessel injury, dissection, tear, occlusion, and X-ray dye normal filling of the external carotid artery branches. Course and caliber of the cervical portion of the right internal carotid artery revealed significant proximal right ICA stenosis/string sign measuring approximately 90-99% per NASCET criteria. Flow across the high-grade stenosis was delayed with earlier filling noted of the distal supraclinoid internal carotid artery through retrograde opacification of the right ophthalmic artery from the internal maxillary artery. Further inspection demonstrates no evidence of dissection, aneurysm. Right CCA technique: The right internal carotid artery run was performed from the common carotid artery due to severe stenosis. Digital subtraction angiography of the intracranial right internal carotid circulation was performed in frontal, and lateral projections. Interpretation:There is slow antegrade filling of the distal internal carotid artery from the cervical internal carotid artery. Noted retrograde Filling of the right ophthalmic artery from the right internal maxillary artery branches with slow filling anterior cerebral artery, middle cerebral artery and the distal branches due to previously noted critical proximal cervical ICA stenosis. Inspection of the remaining right internal carotid circulation revealed no other evidence of cerebral aneurysm, arteriovenous malformation. There is severe stenosis noted in the petrous/cavernous and supraclinoid right ICA noted with diminutive appearance. Capillary and venous phase images were also unremarkable, with no evidence of veno-occlusive disease. Left CCA technique: The left common carotid artery was selectively catheterized under fluoroscopic guidance and digital subtraction angiography images were obtained in biplane projections of the left cervical common carotid artery.  Interpretation: The left common carotid artery injection demonstrates normal antegrade flow into the external and internal carotid arteries with normal intravenous contrast. Multiplanar reformatted images are provided for review. MIP images are provided for review. Stenosis of the internal carotid arteries measured using NASCET criteria. Dose modulation, iterative reconstruction, and/or weight based adjustment of the mA/kV was utilized to reduce the radiation dose to as low as reasonably achievable. COMPARISON: None. HISTORY: ORDERING SYSTEM PROVIDED HISTORY: sah TECHNOLOGIST PROVIDED HISTORY: sah Reason for Exam: SAH after trauma Acuity: Acute Type of Exam: Initial FINDINGS: CTA NECK: AORTIC ARCH/ARCH VESSELS: There is a critical stenosis of the proximal left vertebral artery with moderate and severe stenoses of the V2 segment. There is a severe stenosis at the origin of the right vertebral artery. 66% stenosis of the left subclavian artery is noted. CAROTID ARTERIES: There is 25% stenosis of the proximal left common carotid artery and 50% stenosis of the mid and distal segments. The cervical portion of the left internal carotid artery is normal in course and caliber. There is 20% stenosis of the right common carotid artery. A critical stenosis of the proximal right cervical ICA is noted with attenuated flow seen distally. VERTEBRAL ARTERIES: No dissection, arterial injury, or significant stenosis. SOFT TISSUES: There is a thickened appearance to the mid esophagus. A small amount of layering debris is present within the trachea, likely reflecting pooled secretion. BONES: No acute osseous abnormality. CTA HEAD: ANTERIOR CIRCULATION: There is severely attenuated flow within the petrous and cavernous segments of the right internal carotid artery with severe, near critical stenoses of the cavernous ICA. There is also attenuated flow within the contralateral ICA, to a lesser degree, with moderate stenoses of the left cavernous ICA. There are moderate and severe multifocal stenoses of the MCA branch vessels, worse on the right-hand side.   The right anterior cerebral artery is attenuated compared to the left. POSTERIOR CIRCULATION: There are mild-to-moderate stenoses of the distal right vertebral artery and a moderate to severe stenosis of the distal left. The basilar artery and PCAs appear to be slightly attenuated. OTHER: No dural venous sinus thrombosis on this non-dedicated study. BRAIN: See separately dictated noncontrast head CT report. No cerebral aneurysm or vascular malformation identified. Significant narrowing of the intracranial vessels, likely due to a combination of atherosclerotic disease as well as vasospasm related to subarachnoid hemorrhage. Critical stenosis of the proximal left vertebral artery. Critical stenosis of the proximal right cervical ICA with attenuated flow seen distally. Severe near critical stenoses of the right cavernous ICA. Moderate stenoses of the contralateral cavernous ICA. Ct Chest Abdomen Pelvis W Contrast    Result Date: 12/26/2019  EXAMINATION: CT OF THE THORACIC SPINE WITHOUT CONTRAST; CT OF THE LUMBAR SPINE WITHOUT CONTRAST; CT OF THE CHEST, ABDOMEN, AND PELVIS WITH CONTRAST, 12/25/2019 9:04 pm; 12/25/2019 9:05 pm TECHNIQUE: CT of the thoracic and lumbar spine was performed without the administration of intravenous contrast.  CT of the chest, abdomen and pelvis was performed with the administration of intravenous contrast. Multiplanar reformatted images are provided for review. Dose modulation, iterative reconstruction, and/or weight based adjustment of the mA/kV was utilized to reduce the radiation dose to as low as reasonably achievable.  COMPARISON: None HISTORY: ORDERING SYSTEM PROVIDED HISTORY: trauma TECHNOLOGIST PROVIDED HISTORY: trauma Reason for Exam: fall from standing Acuity: Acute Type of Exam: Initial; ORDERING SYSTEM PROVIDED HISTORY: trauma TECHNOLOGIST PROVIDED HISTORY: trauma Reason for Exam: fall from standing Acuity: Acute Type of Exam: Initial; ORDERING SYSTEM PROVIDED HISTORY: trauma TECHNOLOGIST internal carotid artery. This extends into intracranial segments. 6. Postsurgical changes at the left carotid bifurcation, without significant flow-limiting stenosis. 7. Moderate stenosis at origin of right vertebral artery. Moderate to severe stenosis at the proximal left vertebral artery, just distal to the origin. 8. Approximately 50% stenosis involving the left proximal subclavian artery. 9. Scattered multifocal ground-glass pulmonary opacities at both lung apices. Fluid density seen within the trachea adjacent to the endotracheal tube. MRI Brain 1/9/2020      LTME:Diffuse encephalopathy. No epileptiform discharge          ASSESSMENT AND PLAN:     Assessment & Plan:  Neuro    Traumatic SAH  Acute ischemic stroke, bilateral temporoparietal  Global aphasia due to above   Continue aspirin 324 mg daily  Critical right ICA stenosis-post right ICA stenting  Systolic blood pressure goal 100-140  Modafinil 100 mg BID   Hold Eliquis for now. Resume after 3 weeks of ICH    Cardio  On sotalol 160 mg twice daily  Heart rate is better controlled overnight with no RVR  EF 45%  Continue cardiac monitor     Pulm  Intubated  ABG 7.4, 32, 120, 21  PRVC 12, 600, 5, 30%  Continue O2 monitor  Maintain saturation > 92%    Endo  Serum glucose is better controlled < 180  HB A1C: 7.1  Continue Lantus 25 units daily    GI  Milk of magnesia for bowel regimen  Pepcid 20 mg IV  BID for GI PPx  Continue tube feeds    Renal  Hyponatremia: 131->133 -> 137 -> 135 -> 142  Decrease salt tabs to 2 g daily  Intake 3.2, output 1, +2 L  BUN 17, creatinine 0.39    Heme/ID  Tmax 98   Hemoglobin hematocrit stable    MS  Nondisplaced mildly angulated left first metacarpal fracture. Splinted by orthopedics  Follow-up with orthopedics    LDAs:  NG tube, ETT, ext. Urinary     DVT PPX: Lovenox 40 mg daily  GI prophylaxis Pepcid 20 mg IV twice daily    Disposition: remains in NICU for vent management.  Stepdown after extubation      Luis Armando Gee

## 2020-01-12 ENCOUNTER — APPOINTMENT (OUTPATIENT)
Dept: GENERAL RADIOLOGY | Age: 60
DRG: 030 | End: 2020-01-12
Payer: MEDICAID

## 2020-01-12 LAB
ABSOLUTE EOS #: 0.13 K/UL (ref 0–0.44)
ABSOLUTE IMMATURE GRANULOCYTE: 0 K/UL (ref 0–0.3)
ABSOLUTE LYMPH #: 1.03 K/UL (ref 1.1–3.7)
ABSOLUTE MONO #: 0.26 K/UL (ref 0.1–1.2)
ALLEN TEST: ABNORMAL
ANION GAP SERPL CALCULATED.3IONS-SCNC: 9 MMOL/L (ref 9–17)
BASOPHILS # BLD: 1 % (ref 0–2)
BASOPHILS ABSOLUTE: 0.04 K/UL (ref 0–0.2)
BUN BLDV-MCNC: 10 MG/DL (ref 6–20)
BUN/CREAT BLD: ABNORMAL (ref 9–20)
CALCIUM SERPL-MCNC: 8.5 MG/DL (ref 8.6–10.4)
CHLORIDE BLD-SCNC: 102 MMOL/L (ref 98–107)
CO2: 28 MMOL/L (ref 20–31)
CREAT SERPL-MCNC: 0.38 MG/DL (ref 0.7–1.2)
DIFFERENTIAL TYPE: ABNORMAL
EOSINOPHILS RELATIVE PERCENT: 3 % (ref 1–4)
FIO2: 30
GFR AFRICAN AMERICAN: >60 ML/MIN
GFR NON-AFRICAN AMERICAN: >60 ML/MIN
GFR SERPL CREATININE-BSD FRML MDRD: ABNORMAL ML/MIN/{1.73_M2}
GFR SERPL CREATININE-BSD FRML MDRD: ABNORMAL ML/MIN/{1.73_M2}
GLUCOSE BLD-MCNC: 129 MG/DL (ref 70–99)
GLUCOSE BLD-MCNC: 134 MG/DL (ref 75–110)
GLUCOSE BLD-MCNC: 149 MG/DL (ref 75–110)
GLUCOSE BLD-MCNC: 176 MG/DL (ref 75–110)
GLUCOSE BLD-MCNC: 190 MG/DL (ref 75–110)
HCT VFR BLD CALC: 36.4 % (ref 40.7–50.3)
HEMOGLOBIN: 10.3 G/DL (ref 13–17)
IMMATURE GRANULOCYTES: 0 %
LYMPHOCYTES # BLD: 24 % (ref 24–43)
MCH RBC QN AUTO: 23.4 PG (ref 25.2–33.5)
MCHC RBC AUTO-ENTMCNC: 28.3 G/DL (ref 28.4–34.8)
MCV RBC AUTO: 82.5 FL (ref 82.6–102.9)
MODE: ABNORMAL
MONOCYTES # BLD: 6 % (ref 3–12)
MORPHOLOGY: ABNORMAL
NEGATIVE BASE EXCESS, ART: ABNORMAL (ref 0–2)
NRBC AUTOMATED: 0 PER 100 WBC
O2 DEVICE/FLOW/%: ABNORMAL
PATIENT TEMP: ABNORMAL
PDW BLD-RTO: 21.3 % (ref 11.8–14.4)
PLATELET # BLD: 448 K/UL (ref 138–453)
PLATELET ESTIMATE: ABNORMAL
PMV BLD AUTO: 10 FL (ref 8.1–13.5)
POC HCO3: 31 MMOL/L (ref 21–28)
POC O2 SATURATION: 99 % (ref 94–98)
POC PCO2 TEMP: ABNORMAL MM HG
POC PCO2: 38.3 MM HG (ref 35–48)
POC PH TEMP: ABNORMAL
POC PH: 7.52 (ref 7.35–7.45)
POC PO2 TEMP: ABNORMAL MM HG
POC PO2: 112 MM HG (ref 83–108)
POSITIVE BASE EXCESS, ART: 8 (ref 0–3)
POTASSIUM SERPL-SCNC: 3.8 MMOL/L (ref 3.7–5.3)
RBC # BLD: 4.41 M/UL (ref 4.21–5.77)
RBC # BLD: ABNORMAL 10*6/UL
SAMPLE SITE: ABNORMAL
SEG NEUTROPHILS: 66 % (ref 36–65)
SEGMENTED NEUTROPHILS ABSOLUTE COUNT: 2.84 K/UL (ref 1.5–8.1)
SODIUM BLD-SCNC: 139 MMOL/L (ref 135–144)
TCO2 (CALC), ART: 32 MMOL/L (ref 22–29)
WBC # BLD: 4.3 K/UL (ref 3.5–11.3)
WBC # BLD: ABNORMAL 10*3/UL

## 2020-01-12 PROCEDURE — 94770 HC ETCO2 MONITOR DAILY: CPT

## 2020-01-12 PROCEDURE — 99291 CRITICAL CARE FIRST HOUR: CPT | Performed by: PSYCHIATRY & NEUROLOGY

## 2020-01-12 PROCEDURE — 82803 BLOOD GASES ANY COMBINATION: CPT

## 2020-01-12 PROCEDURE — 80048 BASIC METABOLIC PNL TOTAL CA: CPT

## 2020-01-12 PROCEDURE — 2500000003 HC RX 250 WO HCPCS: Performed by: NURSE PRACTITIONER

## 2020-01-12 PROCEDURE — 6360000002 HC RX W HCPCS: Performed by: PSYCHIATRY & NEUROLOGY

## 2020-01-12 PROCEDURE — 36415 COLL VENOUS BLD VENIPUNCTURE: CPT

## 2020-01-12 PROCEDURE — 94003 VENT MGMT INPAT SUBQ DAY: CPT

## 2020-01-12 PROCEDURE — 85025 COMPLETE CBC W/AUTO DIFF WBC: CPT

## 2020-01-12 PROCEDURE — 6370000000 HC RX 637 (ALT 250 FOR IP): Performed by: STUDENT IN AN ORGANIZED HEALTH CARE EDUCATION/TRAINING PROGRAM

## 2020-01-12 PROCEDURE — 2580000003 HC RX 258: Performed by: NURSE PRACTITIONER

## 2020-01-12 PROCEDURE — 6370000000 HC RX 637 (ALT 250 FOR IP): Performed by: PSYCHIATRY & NEUROLOGY

## 2020-01-12 PROCEDURE — 51701 INSERT BLADDER CATHETER: CPT

## 2020-01-12 PROCEDURE — 71045 X-RAY EXAM CHEST 1 VIEW: CPT

## 2020-01-12 PROCEDURE — 51798 US URINE CAPACITY MEASURE: CPT

## 2020-01-12 PROCEDURE — 6360000002 HC RX W HCPCS: Performed by: STUDENT IN AN ORGANIZED HEALTH CARE EDUCATION/TRAINING PROGRAM

## 2020-01-12 PROCEDURE — 99024 POSTOP FOLLOW-UP VISIT: CPT | Performed by: PSYCHIATRY & NEUROLOGY

## 2020-01-12 PROCEDURE — 2000000003 HC NEURO ICU R&B

## 2020-01-12 PROCEDURE — 94761 N-INVAS EAR/PLS OXIMETRY MLT: CPT

## 2020-01-12 PROCEDURE — 36600 WITHDRAWAL OF ARTERIAL BLOOD: CPT

## 2020-01-12 PROCEDURE — 6370000000 HC RX 637 (ALT 250 FOR IP): Performed by: NURSE PRACTITIONER

## 2020-01-12 PROCEDURE — 2700000000 HC OXYGEN THERAPY PER DAY

## 2020-01-12 RX ORDER — FLUOXETINE 10 MG/1
10 CAPSULE ORAL DAILY
Status: DISCONTINUED | OUTPATIENT
Start: 2020-01-12 | End: 2020-01-27 | Stop reason: HOSPADM

## 2020-01-12 RX ADMIN — FOLIC ACID 1 MG: 1 TABLET ORAL at 08:06

## 2020-01-12 RX ADMIN — INSULIN LISPRO 3 UNITS: 100 INJECTION, SOLUTION INTRAVENOUS; SUBCUTANEOUS at 11:59

## 2020-01-12 RX ADMIN — FAMOTIDINE 20 MG: 10 INJECTION, SOLUTION INTRAVENOUS at 19:54

## 2020-01-12 RX ADMIN — ROSUVASTATIN CALCIUM 5 MG: 5 TABLET, FILM COATED ORAL at 19:55

## 2020-01-12 RX ADMIN — DOCUSATE SODIUM 100 MG: 50 LIQUID ORAL at 08:06

## 2020-01-12 RX ADMIN — MODAFINIL 200 MG: 100 TABLET ORAL at 08:13

## 2020-01-12 RX ADMIN — SODIUM CHLORIDE TAB 1 GM 2 G: 1 TAB at 08:06

## 2020-01-12 RX ADMIN — HYDRALAZINE HYDROCHLORIDE 10 MG: 20 INJECTION INTRAMUSCULAR; INTRAVENOUS at 10:08

## 2020-01-12 RX ADMIN — INSULIN LISPRO 3 UNITS: 100 INJECTION, SOLUTION INTRAVENOUS; SUBCUTANEOUS at 17:13

## 2020-01-12 RX ADMIN — ASPIRIN 81 MG: 81 TABLET, CHEWABLE ORAL at 08:13

## 2020-01-12 RX ADMIN — Medication 15 ML: at 08:15

## 2020-01-12 RX ADMIN — AMANTADINE HYDROCHLORIDE 100 MG: 50 SOLUTION ORAL at 15:09

## 2020-01-12 RX ADMIN — INSULIN GLARGINE 25 UNITS: 100 INJECTION, SOLUTION SUBCUTANEOUS at 08:15

## 2020-01-12 RX ADMIN — LEVETIRACETAM 500 MG: 500 SOLUTION ORAL at 19:54

## 2020-01-12 RX ADMIN — SODIUM CHLORIDE, PRESERVATIVE FREE 10 ML: 5 INJECTION INTRAVENOUS at 22:38

## 2020-01-12 RX ADMIN — MODAFINIL 100 MG: 100 TABLET ORAL at 15:09

## 2020-01-12 RX ADMIN — FLUOXETINE 10 MG: 10 CAPSULE ORAL at 19:54

## 2020-01-12 RX ADMIN — SOTALOL HYDROCHLORIDE 160 MG: 80 TABLET ORAL at 19:55

## 2020-01-12 RX ADMIN — SOTALOL HYDROCHLORIDE 160 MG: 80 TABLET ORAL at 08:07

## 2020-01-12 RX ADMIN — AMANTADINE HYDROCHLORIDE 100 MG: 50 SOLUTION ORAL at 08:06

## 2020-01-12 RX ADMIN — Medication 100 MG: at 08:06

## 2020-01-12 RX ADMIN — HYDRALAZINE HYDROCHLORIDE 10 MG: 20 INJECTION INTRAMUSCULAR; INTRAVENOUS at 17:04

## 2020-01-12 RX ADMIN — Medication 15 ML: at 19:56

## 2020-01-12 RX ADMIN — INSULIN LISPRO 3 UNITS: 100 INJECTION, SOLUTION INTRAVENOUS; SUBCUTANEOUS at 00:02

## 2020-01-12 RX ADMIN — ENOXAPARIN SODIUM 40 MG: 40 INJECTION SUBCUTANEOUS at 08:07

## 2020-01-12 RX ADMIN — FAMOTIDINE 20 MG: 10 INJECTION, SOLUTION INTRAVENOUS at 08:06

## 2020-01-12 RX ADMIN — LEVETIRACETAM 500 MG: 500 SOLUTION ORAL at 08:06

## 2020-01-12 RX ADMIN — CLOPIDOGREL 75 MG: 75 TABLET, FILM COATED ORAL at 08:06

## 2020-01-12 ASSESSMENT — PULMONARY FUNCTION TESTS
PIF_VALUE: 18
PIF_VALUE: 18
PIF_VALUE: 13
PIF_VALUE: 17
PIF_VALUE: 27
PIF_VALUE: 24
PIF_VALUE: 14

## 2020-01-12 NOTE — PROGRESS NOTES
insulin lispro  0-18 Units Subcutaneous Q6H    docusate  100 mg Oral Daily    sotalol  160 mg Oral BID    enoxaparin  40 mg Subcutaneous Daily    nicotine  1 patch Transdermal Daily    folic acid  1 mg Oral Daily    thiamine  100 mg Oral Daily    sodium chloride flush  10 mL Intravenous BID    famotidine (PEPCID) injection  20 mg Intravenous BID    rosuvastatin  5 mg Oral Nightly    chlorhexidine  15 mL Mouth/Throat BID    vitamin D  50,000 Units Oral Weekly    sodium chloride flush  10 mL Intravenous 2 times per day    sodium chloride flush  10 mL Intravenous 2 times per day     CONTINUOUS INFUSIONS:   sodium chloride 75 mL/hr at 20 1537    dextrose       PRN MEDICATIONS:   ipratropium-albuterol, albuterol, fentanNYL, acetaminophen, hydrALAZINE, labetalol, ibuprofen, acetaminophen, metoprolol, glucose, dextrose, glucagon (rDNA), dextrose, magnesium sulfate, sodium chloride flush, sodium chloride flush    VITALS 24 Hours     Temperature Range: Temp: 98.3 °F (36.8 °C) Temp  Av.6 °F (37 °C)  Min: 97.9 °F (36.6 °C)  Max: 99.4 °F (37.4 °C)  BP Range: Systolic (60BUV), IKL:757 , Min:94 , MTN:456     Diastolic (42RXU), QCY:13, Min:61, Max:101    Pulse Range: Pulse  Av.5  Min: 76  Max: 92  Respiration Range: Resp  Av  Min: 13  Max: 40  Current Pulse Ox: SpO2: 97 %  24HR Pulse Ox Range: SpO2  Av.6 %  Min: 95 %  Max: 100 %  Patient Vitals for the past 12 hrs:   BP Temp Temp src Pulse Resp SpO2   20 0747 -- -- -- -- 30 97 %   20 0743 -- -- -- 83 26 97 %   20 0702 (!) 180/94 -- -- 86 25 97 %   20 0602 135/84 -- -- 84 22 100 %   20 0502 (!) 142/89 -- -- 80 26 100 %   20 0402 (!) 142/89 98.3 °F (36.8 °C) Oral 79 24 100 %   20 0344 -- -- -- 80 13 95 %   20 0302 135/73 -- -- 79 26 99 %   20 0238 137/76 -- -- 83 28 99 %   20 0202 (!) 165/90 -- -- 76 25 99 %   20 0102 131/71 -- -- 76 25 97 %   20 0007 -- -- -- 82 13 98 % 01/12/20 0002 115/79 97.9 °F (36.6 °C) Oral 81 17 97 %   01/11/20 2202 (!) 153/66 -- -- 76 26 100 %   01/11/20 2102 (!) 152/100 -- -- 78 (!) 33 100 %     Estimated body mass index is 26.17 kg/m² as calculated from the following:    Height as of this encounter: 5' 11\" (1.803 m). Weight as of this encounter: 187 lb 9.8 oz (85.1 kg). PHYSICAL EXAM       Physical Exam  Vitals signs and nursing note reviewed. HENT:      Head: Normocephalic and atraumatic. Eyes:      General:         Right eye: No foreign body, discharge or hordeolum. Left eye: No foreign body, discharge or hordeolum. Conjunctiva/sclera:      Right eye: Right conjunctiva is not injected. No chemosis, exudate or hemorrhage. Left eye: Left conjunctiva is injected. Hemorrhage present. No chemosis or exudate. Pupils: Pupils are equal, round, and reactive to light. Cardiovascular:      Rate and Rhythm: Normal rate. Rhythm irregular. Pulmonary:      Breath sounds: Normal breath sounds. Neurological:      Mental Status: He is alert. GCS: GCS eye subscore is 4. GCS verbal subscore is 1. GCS motor subscore is 5. Cranial Nerves: Cranial nerves are intact. Motor: No abnormal muscle tone. Deep Tendon Reflexes:      Reflex Scores:       Bicep reflexes are 1+ on the right side and 1+ on the left side. Patellar reflexes are 1+ on the right side and 1+ on the left side. Comments: Intubated with no sedation  Moves all extremities to pain, left more than right  Tone is normal.  Right-sided gaze preference  No subtle signs for seizures or abnormal movements           INTAKE/OUTPUT & DRAINS   24 HOUR INTAKE/OUTPUT:    Intake/Output Summary (Last 24 hours) at 1/12/2020 0820  Last data filed at 1/12/2020 1287  Gross per 24 hour   Intake 1033 ml   Output 400 ml   Net 633 ml       DRAINS:  [x] There are no drains for Neuro Critical Care to monitor at this time.    LABS      Recent Results (from the past 24 >60 mL/min    GFR African American >60 >60 mL/min    GFR Comment          GFR Staging NOT REPORTED    Arterial Blood Gas, POC    Collection Time: 01/12/20  4:10 AM   Result Value Ref Range    POC pH 7.516 (H) 7.350 - 7.450    POC pCO2 38.3 35.0 - 48.0 mm Hg    POC PO2 112.0 (H) 83.0 - 108.0 mm Hg    POC HCO3 31.0 (H) 21.0 - 28.0 mmol/L    TCO2 (calc), Art 32 (H) 22.0 - 29.0 mmol/L    Negative Base Excess, Art NOT REPORTED 0.0 - 2.0    Positive Base Excess, Art 8 (H) 0.0 - 3.0    POC O2 SAT 99 (H) 94.0 - 98.0 %    O2 Device/Flow/% NOT REPORTED     Ozzie Test NOT REPORTED     Sample Site NOT REPORTED     Mode PRVC     FIO2 30.0     Pt Temp NOT REPORTED     POC pH Temp NOT REPORTED     POC pCO2 Temp NOT REPORTED mm Hg    POC pO2 Temp NOT REPORTED mm Hg   POC Glucose Fingerstick    Collection Time: 01/12/20  6:29 AM   Result Value Ref Range    POC Glucose 134 (H) 75 - 110 mg/dL           RADIOLOGY   Xr Radius Ulna Left (2 Views)    Result Date: 12/26/2019  EXAMINATION: TWO XRAY VIEWS OF THE LEFT FOREARM 12/26/2019 1:37 am COMPARISON: None. HISTORY: ORDERING SYSTEM PROVIDED HISTORY: Trauma/Fracture TECHNOLOGIST PROVIDED HISTORY: Trauma/Fracture Reason for Exam: fall/trauma Acuity: Acute FINDINGS: Fiberglass splint obscures osseous details of the distal forearm. The left radius and left are intact. No acute fracture or dislocation in the left forearm. Partial visualization of 1st proximal metacarpal fracture. Osteopenia. No acute osseous abnormality left radius or ulna. Xr Radius Ulna Right (2 Views)    Result Date: 12/26/2019  EXAMINATION: TWO XRAY VIEWS OF THE RIGHT FOREARM 12/26/2019 1:37 am COMPARISON: None. HISTORY: ORDERING SYSTEM PROVIDED HISTORY: Trauma/Fracture TECHNOLOGIST PROVIDED HISTORY: Trauma/Fracture Reason for Exam: fall/trauma Acuity: Acute FINDINGS: The bones are osteopenic. The right radius and right ulna are intact. No acute fracture or dislocation in the right forearm.   Along the ulnar aspect of the proximal forearm, there is soft tissue laceration. Antecubital fossa IV catheter. Osteopenia. No acute osseous abnormality right forearm. Soft tissue laceration ulnar aspect of the proximal forearm. Xr Wrist Left (min 3 Views)    Result Date: 12/25/2019  EXAMINATION: 3 XRAY VIEWS OF THE LEFT WRIST 12/25/2019 9:45 pm COMPARISON: None. HISTORY: ORDERING SYSTEM PROVIDED HISTORY: fall TECHNOLOGIST PROVIDED HISTORY: fall Acuity: Acute Type of Exam: Initial FINDINGS: Overlying splint partially obscures osseous details. Acute traumatic closed proximal 1st metacarpal fracture. The carpal bones are intact. Carpal alignment is maintained soft tissues of the wrist are unremarkable. Acute traumatic closed 1st proximal metacarpal fracture. No acute osseous abnormality in the wrist.     Xr Wrist Right (min 3 Views)    Result Date: 12/25/2019  EXAMINATION: 3 XRAY VIEWS OF THE RIGHT WRIST 12/25/2019 9:45 pm COMPARISON: None. HISTORY: ORDERING SYSTEM PROVIDED HISTORY: fall TECHNOLOGIST PROVIDED HISTORY: fall Acuity: Acute Type of Exam: Initial FINDINGS: Overlying splint obscures osseous details. Carpal bones are intact. Advanced radiocarpal joint osteoarthritis with joint space narrowing and subchondral sclerosis. Widening of the scapholunate joint. Posttraumatic deformity of the ulnar styloid process. No acute fracture or dislocation. No acute osseous abnormality the right wrist. Widening of the scapholunate interval suggesting ligamentous injury. Radiocarpal joint osteoarthritis. Xr Hand Left (min 3 Views)    Result Date: 12/26/2019  EXAMINATION: THREE XRAY VIEWS OF THE LEFT HAND 12/26/2019 1:47 am COMPARISON: 12/25/2019 2353 hours HISTORY: ORDERING SYSTEM PROVIDED HISTORY: post splint TECHNOLOGIST PROVIDED HISTORY: post splint Reason for Exam: fall trauma/post splint Acuity: Acute FINDINGS: Overlying splint obscures osseous details.   Again seen is acute traumatic closed proximal 1st 12/26/2019  EXAMINATION: THREE XRAY VIEWS OF THE RIGHT ANKLE 12/26/2019 1:36 am COMPARISON: None. HISTORY: ORDERING SYSTEM PROVIDED HISTORY: Trauma/Fracture TECHNOLOGIST PROVIDED HISTORY: Trauma/Fracture FINDINGS: Diffuse osteopenia. No acute fracture or dislocation. Normal alignment of the ankle mortise. No focal osseous lesion. No joint effusion. No focal soft tissue abnormality. Status post 5th metatarsal ORIF with intact screw. No acute abnormality of the ankle. Ct Head Wo Contrast    Result Date: 12/27/2019  EXAMINATION: CT OF THE HEAD WITHOUT CONTRAST,  12/26/2019 11:08 pm TECHNIQUE: CT of the head was performed without the administration of intravenous contrast. Dose modulation, iterative reconstruction, and/or weight based adjustment of the mA/kV was utilized to reduce the radiation dose to as low as reasonably achievable. COMPARISON: 12/26/2019 5:10 a.m. HISTORY: ORDERING SYSTEM PROVIDED HISTORY: Reassess bleed for stability. TECHNOLOGIST PROVIDED HISTORY: Reassess bleed for stability. Reason for Exam: Reassess bleed for stability. Acuity: Unknown Type of Exam: Unknown FINDINGS: BRAIN/VENTRICLES: Again seen is extensive subarachnoid hemorrhage within the sylvian fissures as well as cerebral sulci bilaterally. Subdural hemorrhage overlies the left parietal convexity measures 4 mm in thickness. Hemorrhage in the prepontine cistern as well. Small amount of intraventricular hemorrhage layering in the occipital horns bilaterally. Ventricles are stable in caliber. No hydrocephalus. The gray-white matter differentiation is maintained. No midline shift. ORBITS: The visualized portion of the orbits demonstrate no acute abnormality. SINUSES: The visualized paranasal sinuses and mastoid air cells demonstrate no acute abnormality. SOFT TISSUES/SKULL:  No acute abnormality of the visualized skull or soft tissues.      Stable head CT demonstrating extensive bilateral subarachnoid hemorrhage as well as intraventricular hemorrhage and left parietal convexity subdural hemorrhage. No new intracranial hemorrhage. No hydrocephalus. Ct Head Wo Contrast    Result Date: 12/26/2019  EXAMINATION: CT OF THE HEAD WITHOUT CONTRAST  12/26/2019 4:51 am TECHNIQUE: CT of the head was performed without the administration of intravenous contrast. Dose modulation, iterative reconstruction, and/or weight based adjustment of the mA/kV was utilized to reduce the radiation dose to as low as reasonably achievable. COMPARISON: 12/25/2019 HISTORY: ORDERING SYSTEM PROVIDED HISTORY: worsening mentation TECHNOLOGIST PROVIDED HISTORY: worsening mentation FINDINGS: BRAIN/VENTRICLES: Diffuse subarachnoid hemorrhage within the cerebral sulci, sylvian fissures, anterior interhemispheric fissure, and prepontine cistern, similar in appearance to prior study. There is intraventricular hemorrhage layering in the occipital horns. No hydrocephalus. No mass effect or midline shift. Left parietal convexity subdural hemorrhage measures up to 5 mm in thickness. The basal cisterns are patent. The gray-white matter differentiation is maintained. ORBITS: The visualized portion of the orbits demonstrate no acute abnormality. SINUSES: The visualized paranasal sinuses and mastoid air cells demonstrate no acute abnormality. SOFT TISSUES/SKULL:  No acute abnormality of the visualized skull or soft tissues. 1. Stable head CT demonstrating diffuse moderate volume subarachnoid hemorrhage and left parietal convexity subdural hemorrhage measuring up to 5 mm in thickness. 2. No mass effect or midline shift. No hydrocephalus. The findings were sent to the Radiology Results [address] at 5:48 am on 12/26/2019to be communicated to a licensed caregiver.      Ct Head Wo Contrast    Result Date: 12/26/2019  EXAMINATION: CT OF THE HEAD WITHOUT CONTRAST  12/25/2019 9:04 pm TECHNIQUE: CT of the head was performed without the administration of intravenous contrast. Dose modulation, iterative reconstruction, and/or weight based adjustment of the mA/kV was utilized to reduce the radiation dose to as low as reasonably achievable. COMPARISON: None HISTORY: ORDERING SYSTEM PROVIDED HISTORY: trauma TECHNOLOGIST PROVIDED HISTORY: trauma Multiple falls. On Eliquis. Subarachnoid hemorrhage on CT head performed at outside institution. FINDINGS: BRAIN/VENTRICLES: There is moderate subarachnoid hemorrhage in the bilateral sylvian fissures and bilateral frontal, temporal, and parietal lobe sulci. Additional subarachnoid hemorrhage is noted in the interhemispheric fissure and prepontine cistern. Gray-white matter differentiation is grossly maintained without CT evidence of acute, territorial infarct. Acute, left parietal convexity subdural hematoma measures up to 4 mm in thickness. No associated mass effect. No parenchymal hemorrhage. There is no hydrocephalus or midline shift. Basal cisterns are patent. ORBITS: The visualized portion of the orbits demonstrate no acute abnormality. SINUSES: The visualized paranasal sinuses and mastoid air cells demonstrate no acute abnormality. SOFT TISSUES/SKULL:  No acute abnormality of the visualized skull or soft tissues. Subarachnoid hemorrhage, predominantly in the bilateral sylvian fissures and bilateral cerebral hemisphere sulci. Acute left parietal convexity subdural hematoma measures up to 4 mm in thickness. No associated mass effect. No midline shift or evidence of intracranial herniation. Findings were discussed with Zully Cohn at 9:32 pm on 12/25/2019. Ct Cervical Spine Wo Contrast    Result Date: 12/26/2019  EXAMINATION: CT OF THE CERVICAL SPINE WITHOUT CONTRAST 12/25/2019 9:04 pm TECHNIQUE: CT of the cervical spine was performed without the administration of intravenous contrast. Multiplanar reformatted images are provided for review.  Dose modulation, iterative reconstruction, and/or weight based adjustment of the mA/kV was utilized to reduce the radiation dose to as low as reasonably achievable. COMPARISON: None HISTORY: ORDERING SYSTEM PROVIDED HISTORY: trauma TECHNOLOGIST PROVIDED HISTORY: trauma FINDINGS: BONES/ALIGNMENT: No traumatic malalignment. Vertebral body heights are maintained. No acute fracture. DEGENERATIVE CHANGES: Moderate multilevel disc narrowing and endplate osteophyte formation. Multilevel uncovertebral and facet joint degenerative changes. SOFT TISSUES: Paraspinal soft tissues are normal.  Partially visualized prepontine cistern subarachnoid hemorrhage is better evaluated on CT head performed concurrently. No acute abnormality of the cervical spine. Ct Thoracic Spine Wo Contrast    Result Date: 12/26/2019  EXAMINATION: CT OF THE THORACIC SPINE WITHOUT CONTRAST; CT OF THE LUMBAR SPINE WITHOUT CONTRAST; CT OF THE CHEST, ABDOMEN, AND PELVIS WITH CONTRAST, 12/25/2019 9:04 pm; 12/25/2019 9:05 pm TECHNIQUE: CT of the thoracic and lumbar spine was performed without the administration of intravenous contrast.  CT of the chest, abdomen and pelvis was performed with the administration of intravenous contrast. Multiplanar reformatted images are provided for review. Dose modulation, iterative reconstruction, and/or weight based adjustment of the mA/kV was utilized to reduce the radiation dose to as low as reasonably achievable. COMPARISON: None HISTORY: ORDERING SYSTEM PROVIDED HISTORY: trauma TECHNOLOGIST PROVIDED HISTORY: trauma Reason for Exam: fall from standing Acuity: Acute Type of Exam: Initial; ORDERING SYSTEM PROVIDED HISTORY: trauma TECHNOLOGIST PROVIDED HISTORY: trauma Reason for Exam: fall from standing Acuity: Acute Type of Exam: Initial; ORDERING SYSTEM PROVIDED HISTORY: trauma TECHNOLOGIST PROVIDED HISTORY: trauma Reason for Exam: fall from standing Acuity: Acute Type of Exam: Initial FINDINGS: CTA CHEST: Stable cardiomegaly. No pericardial effusion.   No mediastinal hematoma or pneumomediastinum. No enlarged mediastinal or hilar lymph nodes. Main pulmonary artery is dilated, measuring up to 4 cm, suggesting pulmonary hypertension. Calcified and noncalcified atherosclerotic plaque of the thoracic aorta. Incidentally noted 4 vessel aortic arch with separate arch origin of the left vertebral artery. Ectatic ascending thoracic aorta measures up to 4 cm in diameter. No acute aortic abnormality. Central airways are clear. No pleural effusion or pneumothorax. No pulmonary contusion or pulmonary laceration. Mild bilateral lower lobe dependent atelectatic changes. Healing anterolateral left 6th rib fracture. No acute displaced rib fracture or chest wall hematoma. CTA ABDOMEN: No acute traumatic abnormality of the liver, spleen, pancreas, kidneys, or adrenal glands. Normal gallbladder. Stomach, small bowel, and colon are normal in caliber without evidence of obstruction. Normal appendix. Sigmoid diverticulosis without diverticulitis. Calcified and noncalcified aortoiliac atherosclerotic calcification. Abdominal aorta is normal in caliber. No free fluid in the abdomen. CTA PELVIS: Urinary bladder and pelvic organs are normal.  No free fluid in the pelvis. Bilateral common iliac stents are in position. THORACIC/LUMBAR SPINE: BONES/ALIGNMENT: Normal alignment of the thoracic and lumbar spine. Vertebral body heights are maintained. No acute fracture. DEGENERATIVE CHANGES: Multilevel disc narrowing and endplate osteophyte formation. Mild multilevel facet joint degenerative changes. No high-grade, bony spinal canal stenosis. SOFT TISSUES: Paraspinal soft tissues are normal.     No acute traumatic abnormality of the chest, abdomen, or pelvis. Remote, healing anterolateral left 6th rib fracture. No acute osseous abnormality of the thoracic or lumbar spine.      Ct Lumbar Spine Wo Contrast    Result Date: 12/26/2019  EXAMINATION: CT OF THE THORACIC SPINE WITHOUT CONTRAST; CT OF THE LUMBAR SPINE WITHOUT CONTRAST; CT OF THE CHEST, ABDOMEN, AND PELVIS WITH CONTRAST, 12/25/2019 9:04 pm; 12/25/2019 9:05 pm TECHNIQUE: CT of the thoracic and lumbar spine was performed without the administration of intravenous contrast.  CT of the chest, abdomen and pelvis was performed with the administration of intravenous contrast. Multiplanar reformatted images are provided for review. Dose modulation, iterative reconstruction, and/or weight based adjustment of the mA/kV was utilized to reduce the radiation dose to as low as reasonably achievable. COMPARISON: None HISTORY: ORDERING SYSTEM PROVIDED HISTORY: trauma TECHNOLOGIST PROVIDED HISTORY: trauma Reason for Exam: fall from standing Acuity: Acute Type of Exam: Initial; ORDERING SYSTEM PROVIDED HISTORY: trauma TECHNOLOGIST PROVIDED HISTORY: trauma Reason for Exam: fall from standing Acuity: Acute Type of Exam: Initial; ORDERING SYSTEM PROVIDED HISTORY: trauma TECHNOLOGIST PROVIDED HISTORY: trauma Reason for Exam: fall from standing Acuity: Acute Type of Exam: Initial FINDINGS: CTA CHEST: Stable cardiomegaly. No pericardial effusion. No mediastinal hematoma or pneumomediastinum. No enlarged mediastinal or hilar lymph nodes. Main pulmonary artery is dilated, measuring up to 4 cm, suggesting pulmonary hypertension. Calcified and noncalcified atherosclerotic plaque of the thoracic aorta. Incidentally noted 4 vessel aortic arch with separate arch origin of the left vertebral artery. Ectatic ascending thoracic aorta measures up to 4 cm in diameter. No acute aortic abnormality. Central airways are clear. No pleural effusion or pneumothorax. No pulmonary contusion or pulmonary laceration. Mild bilateral lower lobe dependent atelectatic changes. Healing anterolateral left 6th rib fracture. No acute displaced rib fracture or chest wall hematoma. CTA ABDOMEN: No acute traumatic abnormality of the liver, spleen, pancreas, kidneys, or adrenal glands. Normal gallbladder. Stomach, small bowel, and colon are normal in caliber without evidence of obstruction. Normal appendix. Sigmoid diverticulosis without diverticulitis. Calcified and noncalcified aortoiliac atherosclerotic calcification. Abdominal aorta is normal in caliber. No free fluid in the abdomen. CTA PELVIS: Urinary bladder and pelvic organs are normal.  No free fluid in the pelvis. Bilateral common iliac stents are in position. THORACIC/LUMBAR SPINE: BONES/ALIGNMENT: Normal alignment of the thoracic and lumbar spine. Vertebral body heights are maintained. No acute fracture. DEGENERATIVE CHANGES: Multilevel disc narrowing and endplate osteophyte formation. Mild multilevel facet joint degenerative changes. No high-grade, bony spinal canal stenosis. SOFT TISSUES: Paraspinal soft tissues are normal.     No acute traumatic abnormality of the chest, abdomen, or pelvis. Remote, healing anterolateral left 6th rib fracture. No acute osseous abnormality of the thoracic or lumbar spine. Ir Angiogram Carotid C Erebral Bilateral    Result Date: 12/27/2019  Date of procedure: 12/26/2019 Procedure: Diagnostic cerebral angiogram Indication: Subarachnoid hemorrhage, evaluation for aneurysm. Procedures: 1. Selective right common carotid artery angiogram 2. Selective right internal carotid artery cerebral angiogram 3. Selective right vertebral artery cerebral angiogram 4. Selective left common carotid artery angiogram 5. Selective left internal carotid artery cerebral angiogram 6. Selective left vertebral artery cerebral angiogram 7.  Right common femoral artery angiogram Neurointerventionalist: Donny Douglas MD [place]: Macario Barlow MD Comparison: None Fluoroscopy time: 29.7 minutes Contrast: 100 cc Visipaque-270 Side of Access: Right femoral Closure device: Vascade Sedation: Conscious sedation Patient arrived to the angio suite: 1325 Puncture obtained at: 1400 Vascular access removed at: 1605 Consent:After explaining the risks and benefits to the patient and the patient's family, including but not limited to stroke, coma, death, vessel injury, dissection, tear, occlusion, and X-ray dye allergic type reaction, a signed consent form was obtained. Anesthesia: IV moderate sedation was supervised by Dr. Ravi Crow. The patient was independently monitored by a Registered Nurse assigned to the Department of Radiology?using automated blood pressure, EKG and pulse oximetry. ? The detailed Conscious Record is permanently stored in the [facility]. The following is the conscious sedation record including Start and End times: Fentanyl, propofol and Versed from 1400 to  1600 . Clinical History:59 y. o.?male?with past medical history including atrial fibrillation on Eliquis, diabetes mellitus, hypertension, hyperlipidemia, coronary artery disease s/p PCI stents, peripheral vascular disease, history of bilateral ICA stenosis with  right worse than left, history of CEA, alcohol abuse. ? Patient presented after a fall from outside facility as a trauma alert?and found to have bilateral subarachnoid hemorrhage/left parietal subdural hematoma. ? He received Kcentra in outside hospital. ? Neuro endovascular was consulted. Description and findings: The patient's right groin was prepped and draped in standard sterile fashion and under local anesthesia with conscious sedation, the right common femoral artery was accessed with a single-wall needle technique, and a 5 Turkmen intravascular sheath was placed within the right common femoral artery, establishing arterial access. A 5 Turkmen multipurpose catheter was then advanced over a guide wire to the level of the aortic arch, and used to selectively catheterize the right common carotid artery. Right CCA technique: The right common carotid artery was selectively catheterized under fluoroscopic guidance and digital subtraction angiography images were obtained in biplane projections of the right cervical carotid artery. Interpretation:The right common carotid artery injection demonstrates normal antegrade flow into the external and internal carotid arteries with normal filling of the external carotid artery branches. Course and caliber of the cervical portion of the right internal carotid artery revealed significant proximal right ICA stenosis/string sign measuring approximately 90-99% per NASCET criteria. Flow across the high-grade stenosis was delayed with earlier filling noted of the distal supraclinoid internal carotid artery through retrograde opacification of the right ophthalmic artery from the internal maxillary artery. Further inspection demonstrates no evidence of dissection, aneurysm. Right CCA technique: The right internal carotid artery run was performed from the common carotid artery due to severe stenosis. Digital subtraction angiography of the intracranial right internal carotid circulation was performed in frontal, and lateral projections. Interpretation:There is slow antegrade filling of the distal internal carotid artery from the cervical internal carotid artery. Noted retrograde Filling of the right ophthalmic artery from the right internal maxillary artery branches with slow filling anterior cerebral artery, middle cerebral artery and the distal branches due to previously noted critical proximal cervical ICA stenosis. Inspection of the remaining right internal carotid circulation revealed no other evidence of cerebral aneurysm, arteriovenous malformation. There is severe stenosis noted in the petrous/cavernous and supraclinoid right ICA noted with diminutive appearance. Capillary and venous phase images were also unremarkable, with no evidence of veno-occlusive disease. Left CCA technique: The left common carotid artery was selectively catheterized under fluoroscopic guidance and digital subtraction angiography images were obtained in biplane projections of the left cervical common carotid artery. Interpretation: The left common carotid artery injection demonstrates normal antegrade flow into the external and internal carotid arteries with normal filling of the external carotid artery branches. Caliber and lumen contour of the cervical portion of the left internal carotid artery noted to be large and irregular which is likely due to prior endarterectomy. Left ICA technique: The left internal carotid artery was then selectively catheterized, and digital subtraction angiography of the intracranial left internal carotid artery circulation was performed in frontal and lateral projections. Interpretation:There is normal antegrade filling of the distal internal carotid artery, ophthalmic artery, anterior cerebral artery, middle cerebral artery and distal branches. Inspection of the remaining left internal carotid artery circulation revealed  mild to moderate left cavernous ICA stenosis. Otherwise, no other evidence of cerebral aneurysm, arteriovenous malformation, or arterial stenosis. No vasospasm noted. Irregularity of the vessels without hemodynamic stenosis which could reflect intracranial atherosclerosis Capillary and venous phase images were also unremarkable, with no evidence of veno-occlusive disease. Left VA technique: The left vertebral artery noted to arise from the aortic arch. Left vertebral artery was then selectively catheterized with roadmap guidance. Digital subtraction angiography of the intracranial left vertebrobasilar run-off  was obtained in frontal and lateral projections. Interpretation:The left vertebral artery injection demonstrates normal antegrade opacification of the left  vertebral artery, including the cervical segment, basilar artery, and respective branches. There is a left V4 moderate stenosis noted. Otherwise, there was no evidence of  cerebral aneurysm, arteriovenous malformation, or arterial stenosis.  There is noted opacification of the distal right middle cerebral artery parieto-occipital territory from pial collaterals arising off the right posterior cerebral artery. Capillary and venous phase images were otherwise unremarkable. Right CFA technique: A right common femoral artery angiogram was performed and demonstrated arterial catheterization proximal to the bifurcation. There was no evidence of dissection or occlusion within the right common femoral artery. There is internal iliac artery stent noted. A 5F Vascade closure device was used to establish hemostasis at the right common femoral artery access site. No immediate complications were experienced. Patient was re-examined after the procedure with no change noted in their neurologic examination, with distal pulses present. The patient was subsequently discharged from the neurointerventional suite. Impression: 1. Critical right ICA stenosis with a string sign is noted measuring approximately 90-99% per NASCET criteria. Noted retrograde Filling of the right ophthalmic artery from the right internal maxillary artery branches with slow filling of the anterior cerebral artery, middle cerebral artery and the distal branches in addition to distal right mca vascular supply from the right pca due to previously noted critical proximal cervical ICA stenosis. 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 3.No evidence of clear discrete aneurysm, arteriovenous malformation, arterial dissection, or veno-occlusive disease. 4. 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 Allie King dictated this invasive procedure. Dr. Murlene Kawasaki was present for all procedural and imaging components of this case. Examination was reviewed and reported findings confirmed and edited by Dr. Murlene Kawasaki. Dr. Murlene Kawasaki supervised and interpreted this procedure.  Donny Douglas MD Final report electronically signed by Donny Douglas M.D. on 12/27/2019 4:00 PM    Cta Head Neck W Contrast    Result Date: 12/25/2019  EXAMINATION: CTA OF THE HEAD AND NECK WITH CONTRAST 12/25/2019 9:07 pm: TECHNIQUE: CTA of the head and neck was performed with the administration of intravenous contrast. Multiplanar reformatted images are provided for review. MIP images are provided for review. Stenosis of the internal carotid arteries measured using NASCET criteria. Dose modulation, iterative reconstruction, and/or weight based adjustment of the mA/kV was utilized to reduce the radiation dose to as low as reasonably achievable. COMPARISON: None. HISTORY: ORDERING SYSTEM PROVIDED HISTORY: sah TECHNOLOGIST PROVIDED HISTORY: sah Reason for Exam: SAH after trauma Acuity: Acute Type of Exam: Initial FINDINGS: CTA NECK: AORTIC ARCH/ARCH VESSELS: There is a critical stenosis of the proximal left vertebral artery with moderate and severe stenoses of the V2 segment. There is a severe stenosis at the origin of the right vertebral artery. 66% stenosis of the left subclavian artery is noted. CAROTID ARTERIES: There is 25% stenosis of the proximal left common carotid artery and 50% stenosis of the mid and distal segments. The cervical portion of the left internal carotid artery is normal in course and caliber. There is 20% stenosis of the right common carotid artery. A critical stenosis of the proximal right cervical ICA is noted with attenuated flow seen distally. VERTEBRAL ARTERIES: No dissection, arterial injury, or significant stenosis. SOFT TISSUES: There is a thickened appearance to the mid esophagus. A small amount of layering debris is present within the trachea, likely reflecting pooled secretion. BONES: No acute osseous abnormality. CTA HEAD: ANTERIOR CIRCULATION: There is severely attenuated flow within the petrous and cavernous segments of the right internal carotid artery with severe, near critical stenoses of the cavernous ICA.   There is also attenuated flow within the contralateral ICA, to a lesser degree, with moderate Paraspinal soft tissues are normal.     No acute traumatic abnormality of the chest, abdomen, or pelvis. Remote, healing anterolateral left 6th rib fracture. No acute osseous abnormality of the thoracic or lumbar spine. Xr Hip 2-3 Vw W Pelvis Right    Result Date: 12/26/2019  EXAMINATION: ONE XRAY VIEW OF THE PELVIS AND TWO XRAY VIEWS RIGHT HIP; TWO XRAY VIEWS OF THE LEFT HIP 12/26/2019 1:37 am COMPARISON: None. HISTORY: ORDERING SYSTEM PROVIDED HISTORY: Trauma/Fracture TECHNOLOGIST PROVIDED HISTORY: AP and cross-table lateral of the hip please, thank you Trauma/Fracture Reason for Exam: fall/trauma Acuity: Acute FINDINGS: The bones are osteopenic. The femoral heads located bilateral.  No acute fracture or dislocation pelvis or hips bilaterally. SI joints are grossly intact. Pubic symphysis is intact. The sacrum is partially obscured by contrast in the bladder. No acute osseous abnormality in the pelvis or hips bilaterally. Osteopenia. CTA H/N 1/6/2019  1. New low-density extra-axial collection along the left cerebral convexity measuring 14 mm, with new 4 mm midline shift to the right. This is partially evaluated due to lack of noncontrast CT images. 2. Moderate stenosis involving petrous segment of left internal carotid artery. Severe stenosis involving proximal cavernous segment of left internal carotid artery. Moderate stenosis involving clinoid and supraclinoid segment of left internal carotid artery. 3. Severe diffuse stenosis involving petrous segment of right internal carotid artery, and cavernous segment of right renal carotid artery. Moderate to severe stenosis involving clinoid and supraclinoid segment of the right internal carotid artery. 4. Moderate multifocal stenosis involving anterior cerebral arteries, middle cerebral arteries, and posterior cerebral arteries, without evidence of large vessel occlusion.  Appearance is reasonably similar to the previous examination on December 25, orthopedics    LDAs:  NG tube, ETT, ext. Urinary     DVT PPX: Lovenox 40 mg daily  GI prophylaxis Pepcid 20 mg IV twice daily    Disposition: remains in NICU for vent management.  Stepdown after extubation      Reena Terry MD  Neurology Resident  Neuro Critical Care   Phone: Vonda Quigley

## 2020-01-12 NOTE — CARE COORDINATION
Transition planning:    Per neuro crit care note family meeting for today was cancelled and moved to 1/14. Pt remains intubated and weaning as tolerated. [facility] LTACH following.

## 2020-01-12 NOTE — PROGRESS NOTES
The tube was secured with an endotracheal tube rogel. The endotracheal tube was moved and the skin assessed. Skin assessment revealed no problems. Endotracheal tube was taped at the  27 cm david. Oral gastric tube was not retaped to the endotracheal tube. Endotracheal tube rogel was applied on January 12.  Action steps for any skin breakdown: none noted    MARIANA HERNANDEZ  3:53 PM

## 2020-01-12 NOTE — PLAN OF CARE
Problem: OXYGENATION/RESPIRATORY FUNCTION  Goal: Patient will maintain patent airway  1/11/2020 1959 by Kimmie Márquez RCP  Outcome: Ongoing     Problem: OXYGENATION/RESPIRATORY FUNCTION  Goal: Patient will achieve/maintain normal respiratory rate/effort  Description  Respiratory rate and effort will be within normal limits for the patient  1/11/2020 1959 by Kimmie Márquez RCP  Outcome: Ongoing     Problem: MECHANICAL VENTILATION  Goal: Mobility/activity is maintained at optimum level for patient  1/11/2020 1959 by Kimmie Márquez RCP  Outcome: Ongoing     Problem: MECHANICAL VENTILATION  Goal: Patient will maintain patent airway  1/11/2020 1959 by Kimmie Márquez RCP  Outcome: Ongoing     Problem: MECHANICAL VENTILATION  Goal: Oral health is maintained or improved  1/11/2020 1959 by Kimmie Márquez RCP  Outcome: Ongoing     Problem: MECHANICAL VENTILATION  Goal: ET tube will be managed safely  1/11/2020 1959 by Kimmie Márquez RCP  Outcome: Ongoing     Problem: MECHANICAL VENTILATION  Goal: Ability to express needs and understand communication  1/11/2020 1959 by Kimmie Márquez RCP  Outcome: Ongoing     Problem: SKIN INTEGRITY  Goal: Skin integrity is maintained or improved  1/11/2020 1959 by Kimmie Márquez RCP  Outcome: Ongoing

## 2020-01-12 NOTE — PROGRESS NOTES
ENDOVASCULAR NEUROSURGERY PROGRESS NOTE  1/12/2020 1:38 PM  Subjective:   Admit Date: 12/25/2019  PCP: Brandon Patel MD    Patient is intubated. Not following commands. No new acute events. Objective:   Vitals: BP (!) 155/79   Pulse 101   Temp 98.8 °F (37.1 °C) (Oral)   Resp (!) 32   Ht 5' 11\" (1.803 m)   Wt 187 lb 9.8 oz (85.1 kg)   SpO2 96%   BMI 26.17 kg/m²   Did examine Precedex. General appearance: Intubated   HEENT: Atraumatic. Neck: Neck is supple. Lungs: Intubated  Abdomen: Soft nontender. Extremities: No lower limb edema noted. Neurologic:  He is intubated, did not follow commands, has right gaze noted and flaccid in right upper and right lower extremity. Grimace to pain to noxious stimuli. CN: Both are equal reactive to light at 3 mm, there is right gaze noted and not crossing midline. MOTOR:  Withdrawing left upper and left lower extremities. Flaccid on the right body side. SENSORY: Withdrawing left upper and left lower extremities. Flaccid on the right body side.     Medications and labs:   Scheduled Meds:   FLUoxetine  10 mg Oral Daily    sodium chloride  2 g Oral Daily    aspirin  81 mg Oral Daily    clopidogrel  75 mg Oral Daily    levETIRAcetam  500 mg Oral BID    amantadine  100 mg Per G Tube BID- 8&2    modafinil  200 mg Oral Daily    And    modafinil  100 mg Oral Daily    insulin glargine  25 Units Subcutaneous Daily    insulin lispro  0-18 Units Subcutaneous Q6H    docusate  100 mg Oral Daily    sotalol  160 mg Oral BID    enoxaparin  40 mg Subcutaneous Daily    nicotine  1 patch Transdermal Daily    folic acid  1 mg Oral Daily    thiamine  100 mg Oral Daily    sodium chloride flush  10 mL Intravenous BID    famotidine (PEPCID) injection  20 mg Intravenous BID    rosuvastatin  5 mg Oral Nightly    chlorhexidine  15 mL Mouth/Throat BID    vitamin D  50,000 Units Oral Weekly    sodium chloride flush  10 mL Intravenous 2 times per day    sodium

## 2020-01-12 NOTE — PLAN OF CARE
Problem: HEMODYNAMIC STATUS  Goal: Patient has stable vital signs and fluid balance  Outcome: Ongoing   Patient's assessment and vitals are maintained per protocol. NIH assessed every shift and as needed. Intake and output recorded per unit protocol. Problem: ACTIVITY INTOLERANCE/IMPAIRED MOBILITY  Goal: Mobility/activity is maintained at optimum level for patient       Problem: SKIN INTEGRITY  Goal: Skin integrity is maintained or improved  Patient remains free of new breakdown. Patient repositioned very 2 hours and as needed. Mepilex maintained to sacrum and changed per protocol. Heels floated off bed. Patient remains dry and free of soiling.

## 2020-01-12 NOTE — PLAN OF CARE
TODAY:      AWAKE & FOLLOWING COMMANDS:  [x] No  - does open eyes to noxious stimulation, does not follow commands [] Yes    INTUBATED:   [] No   [x] Yes    SEDATION/ANALGESIA:    [] Propofol gtt / fentanyl [] Versed gtt  [] Ativan gtt   [x] No Sedation    FEEDING: Able to take PO?  [] No:   [] NG/OG [] PEG  Tube Feeds:  NPO currently for possible extubation     DVT Prophylaxis:  [x] Yes:  Lovenox 40 mg QD         [] No rationale:     Stress Ulcer Prophylaxis: [x] Yes: Pepcid 20 mg BID   [] Not indicated    VASOPRESSORS:  [x] No   [] Yes    CENTRAL/ARTERIAL LINES:  [x] No  [] Yes    BECERRIL CATHETER: [x] No   [] Yes    DRAINS: [x] No  [] Yes    Head of Bed: [x] Elevated:          [] Flat    Glucose management:  [x] Sliding Scale :  High insulin dose          [x] Long Acting: Lantus 25 units    Cem Rodriguez  1/12/2020  11:33 AM

## 2020-01-12 NOTE — PLAN OF CARE
Problem: SKIN INTEGRITY  Goal: Skin integrity is maintained or improved  1/12/2020 0621 by Kvng Daly RN  Outcome: Ongoing  Note:   Skin assessment complete. No breakdown noted. Interventions in place. See doc flowsheet for interventions initiated. Pt turned every two hours to prevent skin breakdown. Will continue to monitor for additional needs and skin breakdown. Problem: Falls - Risk of:  Goal: Will remain free from falls  Description  Will remain free from falls  Outcome: Ongoing  Note:   No falls entire shift. Fall precautions in place. Continue to monitor.

## 2020-01-13 ENCOUNTER — APPOINTMENT (OUTPATIENT)
Dept: GENERAL RADIOLOGY | Age: 60
DRG: 030 | End: 2020-01-13
Payer: MEDICAID

## 2020-01-13 LAB
ABSOLUTE EOS #: 0.16 K/UL (ref 0–0.4)
ABSOLUTE IMMATURE GRANULOCYTE: 0 K/UL (ref 0–0.3)
ABSOLUTE LYMPH #: 1.62 K/UL (ref 1–4.8)
ABSOLUTE MONO #: 0.11 K/UL (ref 0.1–0.8)
ALLEN TEST: POSITIVE
ANION GAP SERPL CALCULATED.3IONS-SCNC: 9 MMOL/L (ref 9–17)
BASOPHILS # BLD: 0 % (ref 0–2)
BASOPHILS ABSOLUTE: 0 K/UL (ref 0–0.2)
BUN BLDV-MCNC: 10 MG/DL (ref 6–20)
BUN/CREAT BLD: ABNORMAL (ref 9–20)
CALCIUM IONIZED: 1.18 MMOL/L (ref 1.13–1.33)
CALCIUM SERPL-MCNC: 8.2 MG/DL (ref 8.6–10.4)
CHLORIDE BLD-SCNC: 102 MMOL/L (ref 98–107)
CO2: 27 MMOL/L (ref 20–31)
CREAT SERPL-MCNC: 0.31 MG/DL (ref 0.7–1.2)
DIFFERENTIAL TYPE: ABNORMAL
EOSINOPHILS RELATIVE PERCENT: 3 % (ref 1–4)
FIO2: 30
GFR AFRICAN AMERICAN: >60 ML/MIN
GFR NON-AFRICAN AMERICAN: >60 ML/MIN
GFR SERPL CREATININE-BSD FRML MDRD: ABNORMAL ML/MIN/{1.73_M2}
GFR SERPL CREATININE-BSD FRML MDRD: ABNORMAL ML/MIN/{1.73_M2}
GLUCOSE BLD-MCNC: 135 MG/DL (ref 75–110)
GLUCOSE BLD-MCNC: 141 MG/DL (ref 75–110)
GLUCOSE BLD-MCNC: 164 MG/DL (ref 75–110)
GLUCOSE BLD-MCNC: 165 MG/DL (ref 75–110)
GLUCOSE BLD-MCNC: 165 MG/DL (ref 75–110)
GLUCOSE BLD-MCNC: 174 MG/DL (ref 70–99)
HCT VFR BLD CALC: 32.4 % (ref 40.7–50.3)
HEMOGLOBIN: 9.4 G/DL (ref 13–17)
IMMATURE GRANULOCYTES: 0 %
LYMPHOCYTES # BLD: 30 % (ref 24–44)
MCH RBC QN AUTO: 24.9 PG (ref 25.2–33.5)
MCHC RBC AUTO-ENTMCNC: 29 G/DL (ref 28.4–34.8)
MCV RBC AUTO: 85.7 FL (ref 82.6–102.9)
MODE: ABNORMAL
MONOCYTES # BLD: 2 % (ref 1–7)
MORPHOLOGY: ABNORMAL
NEGATIVE BASE EXCESS, ART: ABNORMAL (ref 0–2)
NRBC AUTOMATED: 0 PER 100 WBC
O2 DEVICE/FLOW/%: ABNORMAL
PATIENT TEMP: ABNORMAL
PDW BLD-RTO: 23.2 % (ref 11.8–14.4)
PLATELET # BLD: 295 K/UL (ref 138–453)
PLATELET ESTIMATE: ABNORMAL
PMV BLD AUTO: 9.8 FL (ref 8.1–13.5)
POC HCO3: 33.1 MMOL/L (ref 21–28)
POC O2 SATURATION: 99 % (ref 94–98)
POC PCO2 TEMP: ABNORMAL MM HG
POC PCO2: 38.2 MM HG (ref 35–48)
POC PH TEMP: ABNORMAL
POC PH: 7.54 (ref 7.35–7.45)
POC PO2 TEMP: ABNORMAL MM HG
POC PO2: 114.2 MM HG (ref 83–108)
POSITIVE BASE EXCESS, ART: 10 (ref 0–3)
POTASSIUM SERPL-SCNC: 3.9 MMOL/L (ref 3.7–5.3)
RBC # BLD: 3.78 M/UL (ref 4.21–5.77)
RBC # BLD: ABNORMAL 10*6/UL
SAMPLE SITE: ABNORMAL
SEG NEUTROPHILS: 65 % (ref 36–66)
SEGMENTED NEUTROPHILS ABSOLUTE COUNT: 3.51 K/UL (ref 1.8–7.7)
SODIUM BLD-SCNC: 138 MMOL/L (ref 135–144)
TCO2 (CALC), ART: 34 MMOL/L (ref 22–29)
WBC # BLD: 5.4 K/UL (ref 3.5–11.3)
WBC # BLD: ABNORMAL 10*3/UL

## 2020-01-13 PROCEDURE — 94770 HC ETCO2 MONITOR DAILY: CPT

## 2020-01-13 PROCEDURE — 6360000002 HC RX W HCPCS: Performed by: STUDENT IN AN ORGANIZED HEALTH CARE EDUCATION/TRAINING PROGRAM

## 2020-01-13 PROCEDURE — 99024 POSTOP FOLLOW-UP VISIT: CPT | Performed by: PSYCHIATRY & NEUROLOGY

## 2020-01-13 PROCEDURE — 6370000000 HC RX 637 (ALT 250 FOR IP): Performed by: STUDENT IN AN ORGANIZED HEALTH CARE EDUCATION/TRAINING PROGRAM

## 2020-01-13 PROCEDURE — 71045 X-RAY EXAM CHEST 1 VIEW: CPT

## 2020-01-13 PROCEDURE — 2500000003 HC RX 250 WO HCPCS: Performed by: NURSE PRACTITIONER

## 2020-01-13 PROCEDURE — 82330 ASSAY OF CALCIUM: CPT

## 2020-01-13 PROCEDURE — 2500000003 HC RX 250 WO HCPCS: Performed by: STUDENT IN AN ORGANIZED HEALTH CARE EDUCATION/TRAINING PROGRAM

## 2020-01-13 PROCEDURE — 85025 COMPLETE CBC W/AUTO DIFF WBC: CPT

## 2020-01-13 PROCEDURE — 80048 BASIC METABOLIC PNL TOTAL CA: CPT

## 2020-01-13 PROCEDURE — 6370000000 HC RX 637 (ALT 250 FOR IP): Performed by: PSYCHIATRY & NEUROLOGY

## 2020-01-13 PROCEDURE — 51701 INSERT BLADDER CATHETER: CPT

## 2020-01-13 PROCEDURE — 94003 VENT MGMT INPAT SUBQ DAY: CPT

## 2020-01-13 PROCEDURE — 2580000003 HC RX 258: Performed by: STUDENT IN AN ORGANIZED HEALTH CARE EDUCATION/TRAINING PROGRAM

## 2020-01-13 PROCEDURE — 51798 US URINE CAPACITY MEASURE: CPT

## 2020-01-13 PROCEDURE — 82947 ASSAY GLUCOSE BLOOD QUANT: CPT

## 2020-01-13 PROCEDURE — 2000000003 HC NEURO ICU R&B

## 2020-01-13 PROCEDURE — 2700000000 HC OXYGEN THERAPY PER DAY

## 2020-01-13 PROCEDURE — 82803 BLOOD GASES ANY COMBINATION: CPT

## 2020-01-13 PROCEDURE — 36415 COLL VENOUS BLD VENIPUNCTURE: CPT

## 2020-01-13 PROCEDURE — 6370000000 HC RX 637 (ALT 250 FOR IP): Performed by: NURSE PRACTITIONER

## 2020-01-13 PROCEDURE — 6360000002 HC RX W HCPCS: Performed by: PSYCHIATRY & NEUROLOGY

## 2020-01-13 PROCEDURE — 97110 THERAPEUTIC EXERCISES: CPT

## 2020-01-13 PROCEDURE — 76937 US GUIDE VASCULAR ACCESS: CPT

## 2020-01-13 PROCEDURE — 99291 CRITICAL CARE FIRST HOUR: CPT | Performed by: PSYCHIATRY & NEUROLOGY

## 2020-01-13 PROCEDURE — 36600 WITHDRAWAL OF ARTERIAL BLOOD: CPT

## 2020-01-13 PROCEDURE — 94761 N-INVAS EAR/PLS OXIMETRY MLT: CPT

## 2020-01-13 PROCEDURE — 2580000003 HC RX 258: Performed by: NURSE PRACTITIONER

## 2020-01-13 RX ORDER — CALCIUM CHLORIDE 100 MG/ML
1 INJECTION INTRAVENOUS; INTRAVENTRICULAR ONCE
Status: DISCONTINUED | OUTPATIENT
Start: 2020-01-13 | End: 2020-01-13

## 2020-01-13 RX ADMIN — FAMOTIDINE 20 MG: 10 INJECTION, SOLUTION INTRAVENOUS at 08:28

## 2020-01-13 RX ADMIN — LEVETIRACETAM 500 MG: 500 SOLUTION ORAL at 20:35

## 2020-01-13 RX ADMIN — SOTALOL HYDROCHLORIDE 160 MG: 80 TABLET ORAL at 20:36

## 2020-01-13 RX ADMIN — HYDRALAZINE HYDROCHLORIDE 10 MG: 20 INJECTION INTRAMUSCULAR; INTRAVENOUS at 09:06

## 2020-01-13 RX ADMIN — Medication 15 ML: at 08:27

## 2020-01-13 RX ADMIN — AMANTADINE HYDROCHLORIDE 100 MG: 50 SOLUTION ORAL at 09:05

## 2020-01-13 RX ADMIN — HYDRALAZINE HYDROCHLORIDE 10 MG: 20 INJECTION INTRAMUSCULAR; INTRAVENOUS at 00:37

## 2020-01-13 RX ADMIN — ENOXAPARIN SODIUM 40 MG: 40 INJECTION SUBCUTANEOUS at 08:28

## 2020-01-13 RX ADMIN — ROSUVASTATIN CALCIUM 5 MG: 5 TABLET, FILM COATED ORAL at 20:35

## 2020-01-13 RX ADMIN — Medication 20 MG: at 16:09

## 2020-01-13 RX ADMIN — Medication 15 ML: at 20:33

## 2020-01-13 RX ADMIN — ASPIRIN 81 MG: 81 TABLET, CHEWABLE ORAL at 08:31

## 2020-01-13 RX ADMIN — INSULIN LISPRO 3 UNITS: 100 INJECTION, SOLUTION INTRAVENOUS; SUBCUTANEOUS at 06:30

## 2020-01-13 RX ADMIN — FLUOXETINE 10 MG: 10 CAPSULE ORAL at 08:31

## 2020-01-13 RX ADMIN — LEVETIRACETAM 500 MG: 500 SOLUTION ORAL at 08:31

## 2020-01-13 RX ADMIN — SODIUM CHLORIDE, PRESERVATIVE FREE 10 ML: 5 INJECTION INTRAVENOUS at 08:28

## 2020-01-13 RX ADMIN — SOTALOL HYDROCHLORIDE 160 MG: 80 TABLET ORAL at 08:31

## 2020-01-13 RX ADMIN — INSULIN LISPRO 3 UNITS: 100 INJECTION, SOLUTION INTRAVENOUS; SUBCUTANEOUS at 12:29

## 2020-01-13 RX ADMIN — FAMOTIDINE 20 MG: 10 INJECTION, SOLUTION INTRAVENOUS at 20:35

## 2020-01-13 RX ADMIN — CLOPIDOGREL 75 MG: 75 TABLET, FILM COATED ORAL at 08:31

## 2020-01-13 RX ADMIN — DOCUSATE SODIUM 100 MG: 50 LIQUID ORAL at 08:31

## 2020-01-13 RX ADMIN — SODIUM CHLORIDE, PRESERVATIVE FREE 10 ML: 5 INJECTION INTRAVENOUS at 20:36

## 2020-01-13 RX ADMIN — MODAFINIL 200 MG: 100 TABLET ORAL at 08:27

## 2020-01-13 RX ADMIN — INSULIN LISPRO 3 UNITS: 100 INJECTION, SOLUTION INTRAVENOUS; SUBCUTANEOUS at 17:55

## 2020-01-13 RX ADMIN — HYDRALAZINE HYDROCHLORIDE 10 MG: 20 INJECTION INTRAMUSCULAR; INTRAVENOUS at 17:30

## 2020-01-13 RX ADMIN — INSULIN LISPRO 3 UNITS: 100 INJECTION, SOLUTION INTRAVENOUS; SUBCUTANEOUS at 00:41

## 2020-01-13 RX ADMIN — AMANTADINE HYDROCHLORIDE 100 MG: 50 SOLUTION ORAL at 15:10

## 2020-01-13 RX ADMIN — CALCIUM CHLORIDE 1 G: 100 INJECTION INTRAVENOUS; INTRAVENTRICULAR at 15:02

## 2020-01-13 RX ADMIN — SODIUM CHLORIDE TAB 1 GM 2 G: 1 TAB at 08:31

## 2020-01-13 RX ADMIN — FOLIC ACID 1 MG: 1 TABLET ORAL at 08:31

## 2020-01-13 RX ADMIN — Medication 100 MG: at 08:31

## 2020-01-13 RX ADMIN — MODAFINIL 100 MG: 100 TABLET ORAL at 15:10

## 2020-01-13 RX ADMIN — INSULIN GLARGINE 25 UNITS: 100 INJECTION, SOLUTION SUBCUTANEOUS at 08:32

## 2020-01-13 ASSESSMENT — PULMONARY FUNCTION TESTS
PIF_VALUE: 15
PIF_VALUE: 18
PIF_VALUE: 17
PIF_VALUE: 18
PIF_VALUE: 17
PIF_VALUE: 17
PIF_VALUE: 20
PIF_VALUE: 21
PIF_VALUE: 18
PIF_VALUE: 13

## 2020-01-13 NOTE — PLAN OF CARE
PROVIDE ADEQUATE OXYGENATION WITH ACCEPTABLE SP02/ABG'S    [x]  IDENTIFY APPROPRIATE OXYGEN THERAPY  [x]   MONITOR SP02/ABG'S AS NEEDED   [x]   PATIENT EDUCATION AS NEEDED    Ventilator Bronchodilator assessment    Breath sounds: clear after suctioning  Inspiratory Pressure: 17  Plateau Pressure: 14    Patient assessed at level 1          []    Bronchodilator Assessment    BRONCHODILATOR ASSESSMENT SCORE  Score 0 (Home) 1 2 3 4   Breath Sounds   []  Chronic Ventilator: Patient at baseline [x]  Mild Wheezes/ Clear []  Intermittent wheezes with good air entry []  Bilateral/unilateral wheezing with diminished air entry []  Insp/Exp wheeze and/or poor aeration   Ventilator Pressures   []  Chronic Ventilator [x]  Insp. Pressure less than 25 cm H20 []  Insp. Pressure less than 25 cm H20 []  Insp. Pressure exceeds 25 cm H20 []  Insp.  Pressure exceeds 30 cm H20   Plateau Pressure []  NA   [x]  Plateau Pressure less than 4  []  Plateau Pressure less than or equal to 5 []  Plateau Pressure greater than or equal to 6 []  Plateau Pressure greater than or equal to [place]  7:30 AM

## 2020-01-13 NOTE — ADT AUTH CERT
Utilization Reviews         Subarachnoid Hemorrhage, Nonsurgical Treatment - Care Day 20 (1/13/2020) by Mian Jaeger RN         Review Status Review Entered   Completed 1/13/2020 16:50       Criteria Review      Care Day: 20 Care Date: 1/13/2020 Level of Care:    Guideline Day 2    Level Of Care    (X) ICU    Clinical Status    (X) * Surgical indications absent    (X) * Hemodynamic stability    Activity    (X) Bed rest    Routes    (X) IV fluids, medications    Interventions    (X) Neurologic checks    (X) DVT prophylaxis    (X) Possible mechanical ventilation    (X) Possible cardiac monitoring [D]    (X) Glucose control    Medications    (X) Possible sedation    (X) Nimodipine    (X) Anticonvulsants as appropriate    (X) Antihypertensive as indicated    * Milestone   Additional Notes   CARE DAY 20   1/13/2020      ENDO VASC NEURO NOTE      Patient still intubated.        Objective:   Vitals: /70   Pulse 76   Temp 97.3 °F (36.3 °C) (Axillary)   Resp 23   Ht 5' 11\" (1.803 m)   Wt 187 lb 9.8 oz (85.1 kg)   SpO2 99%   BMI 26.17 kg/m²    Did examine Precedex. General appearance: Intubated    HEENT: Atraumatic. Neck: Neck is supple. Lungs: Intubated   Abdomen: Soft nontender. Extremities: No lower limb edema noted.       General appearance: Lying in bed, intubated   Lungs: CTAB   Heart: RRR   Abdomen: soft, NTND, bowel sounds normal       Neuro exam: intubated and sedated and no commands.  OCR +, Corneal +, pupils equal and reactive, no withdraw or spontaneous movement in my exam Scheduled Medications    · FLUoxetine  10 mg Oral Daily   · sodium chloride  2 g Oral Daily   · aspirin  81 mg Oral Daily   · clopidogrel  75 mg Oral Daily   · levETIRAcetam  500 mg Oral BID   · amantadine  100 mg Per G Tube BID- 8&2   · modafinil  200 mg Oral Daily     And   · modafinil  100 mg Oral Daily   · insulin glargine  25 Units Subcutaneous Daily   · insulin lispro  0-18 Units Subcutaneous Q6H   · docusate  100 tissues demonstrate no acute abnormality.           Impression   Stable left subdural fluid collection and minimal rightward midline shift.       Scattered FLAIR signal abnormalities and restricted diffusion within the   frontal, parietal, and temporal lobes bilaterally consistent with ischemia.       Bilateral subarachnoid hemorrhage and intraventricular hemorrhagic products   as described above.  This is similar compared to prior exam.   1/13/2020 5:59 am       COMPARISON:   01/12/2020       HISTORY:   ORDERING SYSTEM PROVIDED HISTORY: intubated   TECHNOLOGIST PROVIDED HISTORY:   intubated   Reason for Exam: uprt port   Acuity: Unknown   Type of Exam: Unknown       FINDINGS:   Lines and tubes stable.       The lungs are without acute focal process.  There is no effusion or   pneumothorax. The cardiomediastinal silhouette is stable.  The osseous   structures are stable.           Impression   No acute process.       Stable exam.

## 2020-01-13 NOTE — PROGRESS NOTES
Occupational Therapy    Occupational Therapy Not Seen Note    DATE: 2020  Name: Krissy Desouza  : 1960  MRN: [id number]    Patient not available for Occupational Therapy due to: Other: Vented and not actively following commands at this time. Next Scheduled Treatment: Attempt on 1/15 as appropriate.     Electronically signed by Francois Monge OT on 2020 at 1:54 PM

## 2020-01-13 NOTE — PROGRESS NOTES
ENDOVASCULAR NEUROSURGERY PROGRESS NOTE  1/13/2020 8:50 AM  Subjective:   Admit Date: 12/25/2019  PCP: Faustino Cason MD    Patient still intubated. Objective:   Vitals: /70   Pulse 76   Temp 97.3 °F (36.3 °C) (Axillary)   Resp 23   Ht 5' 11\" (1.803 m)   Wt 187 lb 9.8 oz (85.1 kg)   SpO2 99%   BMI 26.17 kg/m²   Did examine Precedex. General appearance: Intubated   HEENT: Atraumatic. Neck: Neck is supple. Lungs: Intubated  Abdomen: Soft nontender. Extremities: No lower limb edema noted. General appearance: Lying in bed, intubated  Lungs: CTAB  Heart: RRR  Abdomen: soft, NTND, bowel sounds normal    Neuro exam: intubated and sedated and no commands.  OCR +, Corneal +, pupils equal and reactive, no withdraw or spontaneous movement in my exam       Medications and labs:   Scheduled Meds:   FLUoxetine  10 mg Oral Daily    sodium chloride  2 g Oral Daily    aspirin  81 mg Oral Daily    clopidogrel  75 mg Oral Daily    levETIRAcetam  500 mg Oral BID    amantadine  100 mg Per G Tube BID- 8&2    modafinil  200 mg Oral Daily    And    modafinil  100 mg Oral Daily    insulin glargine  25 Units Subcutaneous Daily    insulin lispro  0-18 Units Subcutaneous Q6H    docusate  100 mg Oral Daily    sotalol  160 mg Oral BID    enoxaparin  40 mg Subcutaneous Daily    nicotine  1 patch Transdermal Daily    folic acid  1 mg Oral Daily    thiamine  100 mg Oral Daily    sodium chloride flush  10 mL Intravenous BID    famotidine (PEPCID) injection  20 mg Intravenous BID    rosuvastatin  5 mg Oral Nightly    chlorhexidine  15 mL Mouth/Throat BID    vitamin D  50,000 Units Oral Weekly    sodium chloride flush  10 mL Intravenous 2 times per day    sodium chloride flush  10 mL Intravenous 2 times per day     Continuous Infusions:   sodium chloride 75 mL/hr at 01/09/20 1537    dextrose       CBC:   Recent Labs     01/11/20  0359 01/12/20  0348 01/13/20  0420   WBC 4.6 4.3 5.4   HGB 10.4* 10.3* region corresponding to the hypodensity noted on prior CT head. 5. Right proximal cervical ICA critical stenosis measuring approximately 99% per NASCET criteria significantly delayed anterograde flow noted. 6. There is collateral flow noted to the distal right ICA and MCA territory arising from right internal maxillary artery branches filling the ophthalmic artery via? retrograde fashion    7. Right MCA M1/right RUSTY intracranial atherosclerotic disease noted. 8. Successful carotid stenting using Wallstent 10x37 mm with pre and post balloon angioplasty Seth balloon with the use of distal embolic protection device.         MRI brain was obtained and showing bilateral ischemic strokes. Consider to discuss goals of care with family. PLAN:  1. Traumatic subarachnoid hemorrhage management per neuro ICU. 2. Blood pressure systolic goal after the procedure is 100-140. 3. Continue dual antiplatelet therapy with aspirin 81 and Plavix 75 daily. 4. Smoking cessation,   5. PT/OT evaluation. 6. Follow-up in 2 weeks in neuroendovascular, and with Dr. Tracey Snyder in 3 months. 7. Would consider embolization for left subdural hematoma during follow-up. Please contact neuro endovascular team for any questions or concerns.     Nancy Walker MD  Stroke, [facility]  [id number] [place]  Electronically signed 1/13/2020 at 8:50 AM

## 2020-01-13 NOTE — PLAN OF CARE
Problem: SKIN INTEGRITY  Goal: Skin integrity is maintained or improved  1/13/2020 0634 by Montrell Vsáquez RN  Outcome: Ongoing  Note:   Skin assessment complete. No breakdown noted. Interventions in place. See doc flowsheet for interventions initiated. Pt turned every two hours to prevent skin breakdown. Will continue to monitor for additional needs and skin breakdown. Problem: Falls - Risk of:  Goal: Will remain free from falls  Description  Will remain free from falls  Outcome: Ongoing  Note:   No falls entire shift. Fall precautions in place. Continue to monitor.

## 2020-01-13 NOTE — PROGRESS NOTES
provide 2160kcal and 108g protein per day  · Additional Calories: - Propofol discontinued  · Anthropometric Measures:  · Ht: 5' 11\" (180.3 cm)   · Current Body Wt: 187 lb 9.8 oz (85.1 kg)  · Admission Body Wt: 210 lb 8.6 oz (95.5 kg)  · Weight Change: 11% weight loss x 2 weeks per admission and current weights. Likely does not reflect true weight loss, + edema noted.    · Ideal Body Wt: 172 lb (78 kg), % Ideal Body 109%  · BMI Classification: BMI 25.0 - 29.9 Overweight    Nutrition Interventions:   Modify current Tube Feeding  Education Not Indicated, Continued Inpatient Monitoring    Nutrition Evaluation:   · Evaluation: Progressing toward goals   · Goals: Meet % of estimated nutrition needs   · Monitoring: TF Intake, TF Tolerance, Diarrhea, Pertinent Labs, Weight, Monitor Hemodynamic Status, I&O, Skin Integrity      Electronically signed by Deepika El RD, LD, CNSC on 1/13/20 at 1:12 PM    Contact Number: [id number]

## 2020-01-13 NOTE — PROGRESS NOTES
Physical Therapy  DATE: 2020  NAME: Surjit Watt  MRN: [id number]   : 1960    Discharge Recommendations: Continue to Assess (pending progress)     Subjective: RN agreeable to ROM. PainUTA  Patient follows: NO Commands  Is patient on ventilator: YES  Is patient on sedation: NO  Precautions: General;    Therapeutic exercises:  PROM x4 extremities , 15 reps all planes. Pt demo mild resistance with left elbow extension. Bilateral gastrocnemius stretching 3 reps x 30 seconds; Pt repositioned for comfort, all vitals remained stable throughout. Goals  Short Term Goals  Short term goal 1: Pt to perform bed mobility CGA  Short term goal 2: Demonstrate functional transfers CGA  Short term goal 3: Ambulate 100ft w/ least restrictive AD Desi with good safety awareness  Short term goal 4: Tolerate 30 minutes of therapy to demo increased endurance       Plan: Progress functional mobility as medically appropriate.    Time In: 1026  Time Out: 1038  Time Coded Minutes (treatment minutes): 12  Rehab Potential: Fair  Treatments/week: 2-3x/wk    Nicole Pizano, PTA

## 2020-01-13 NOTE — PROGRESS NOTES
Daily Progress Note  Neuro Critical Care        INTERVAL HISTORY    The patient is a 60 yo male with history of atrial fibrillation on Eliquis, bilateral ICA stenosis (right more than left), DM2, HLD, HTN, CAD s/p PCI stents, PVD, CEA, and ETOH abuse who presents as a transfer from [facility] as a trauma alert for CT head revealed diffuse bialteral SAH and left parietal SDH. He was given [place] and transferred to [facility] for higher level of care. Patient was found confused by family at his home after being unable to reach him on the phone. He was complaining of headache, and actively vomiting. Also had bilateral arm scrapes and bruises concerning for recent fall, patient himself does not member falling, said around 4 PM he woke up and felt frontal and vertex headache and neck pain, and felt nauseous and started throwing up. Endorses drinking alcohol last night states that he had 2 beers. Per family has significant alcohol abuse history and has had multiple falls in the past.     On presentation in the ED [facility], patient mildly lethargic but oriented x4, complaining of headache, nausea, mild vertigo, left arm weaker than right, bilateral leg weakness. Significant interdisciplinary discussion between the neurosurgeon, radiologist, trauma surgeon and stroke specialist about whether  bleed is consistent with traumatic versus spontaneous subarachnoid. Stroke specialist has significant concern for severe stenosis, suspects traumatic bleed and that patient is at risk for vasospasm. In addition has intracranial left vertebral artery athero-stenosis. Consensus management strategy is to keep the patient 130-160 for blood pressure goals to avoid right MCA watershed stroke.      No aneurysm found on CTA or malformation-critical stenosis of the proximal left vertebral artery, critical stenosis of the proximal right cervical ICA, severe near critical stenosis of the right cavernous ICA, moderate stenosis of the left cavernous ICA. Non con CT head repeat at our emergency facility showed subarachnoid, predominantly in the bilateral sylvian fissures and bilateral cerebral hemisphere sulcal I, acute left parietal convexity subdural hematoma 4 mm in thickness without mass-effect. Hospital Course:   12/27: CT head stable  12/28: Intubated overnight for respiratory distress, started on Cardizem infusion for atrial fibrillation with RVR, weaned off and Sotalol started. Versed for sedation with concern for possible alcohol withdrawal.     12/29: loose foul smelling stool, c. Diff sent and was negative. Off Precdex,had left upper extremity tremor, placed on LTME. Febrile this morning - infectious workup sent. Will hold off on antibiotics with no leukocytosis - if spiked temp again will start Vancomycin and zosyn. 12/30:  On Keppra 500mg q12, LTM EEG shows diffuse slowing and disorganized background in theta frequency suggesting moderate encephalopathy. Before rounds patient became agitated and restless he was started on Precedex drip and Versed drip,He spiked a fever of 102.5 and is started on Vanco and Zosyn      12/31:   off Precedex on Versed running at 4 mcg/hr he had temperature spikes of T-max 102. 1/1: T max 101 at 21:00. Off antibiotics. TCD is normal, no vasospasm. Underwent spontaneous breathing trial this morning but didn't tolerate it as he became tachypneic. 1/2:  multiple spikes of fever T-max 103.1 at 8 PM.  Continue to be off antibiotics as septic work-up was negative. Still not following commands. Sotalol was increased to 160 twice daily in light of RVR. He had multiple spikes of temperature T-max 103. 1. Chest x-ray showed right lower zone consolidation concerning for aspiration pneumonia and he was started on Zosyn. Sputum sample was attempted but unsuccessful by RT.   Patient is more tachypneic off propofol and has been maintained on propofol which was increased to 20 Q6H    docusate  100 mg Oral Daily    sotalol  160 mg Oral BID    enoxaparin  40 mg Subcutaneous Daily    nicotine  1 patch Transdermal Daily    folic acid  1 mg Oral Daily    thiamine  100 mg Oral Daily    sodium chloride flush  10 mL Intravenous BID    famotidine (PEPCID) injection  20 mg Intravenous BID    rosuvastatin  5 mg Oral Nightly    chlorhexidine  15 mL Mouth/Throat BID    vitamin D  50,000 Units Oral Weekly    sodium chloride flush  10 mL Intravenous 2 times per day    sodium chloride flush  10 mL Intravenous 2 times per day     CONTINUOUS INFUSIONS:   sodium chloride 75 mL/hr at 20 1537    dextrose       PRN MEDICATIONS:   ipratropium-albuterol, albuterol, fentanNYL, acetaminophen, hydrALAZINE, labetalol, ibuprofen, acetaminophen, metoprolol, glucose, dextrose, glucagon (rDNA), dextrose, magnesium sulfate, sodium chloride flush, sodium chloride flush    VITALS 24 Hours     Temperature Range: Temp: 97.3 °F (36.3 °C) Temp  Av.3 °F (36.8 °C)  Min: 97.3 °F (36.3 °C)  Max: 99.7 °F (37.6 °C)  BP Range: Systolic (47QTM), XB , Min:100 , YTK:072     Diastolic (27XRE), ALMA:01, Min:63, Max:97    Pulse Range: Pulse  Av.8  Min: 71  Max: 99  Respiration Range: Resp  Av.7  Min: 15  Max: 33  Current Pulse Ox: SpO2: 96 %  24HR Pulse Ox Range: SpO2  Av.9 %  Min: 94 %  Max: 100 %  Patient Vitals for the past 12 hrs:   BP Temp Temp src Pulse Resp SpO2   20 1145 -- -- -- 74 22 96 %   20 1143 100/79 97.3 °F (36.3 °C) Axillary 71 24 96 %   20 0750 -- 97.3 °F (36.3 °C) Axillary 76 23 99 %   20 0730 -- -- -- 75 15 100 %   20 0724 -- -- -- 72 24 99 %   20 0702 110/70 -- -- 74 27 100 %   20 0602 125/72 -- -- 76 28 98 %   20 0502 111/68 -- -- 75 (!) 31 97 %   20 0402 112/71 97.5 °F (36.4 °C) Oral 73 25 100 %   20 0330 -- -- -- 76 26 100 %   20 0302 121/73 -- -- 74 30 100 %     Estimated body mass index is 26.17 kg/m² as calculated from the following:    Height as of this encounter: 5' 11\" (1.803 m). Weight as of this encounter: 187 lb 9.8 oz (85.1 kg). PHYSICAL EXAM       Physical Exam  Vitals signs and nursing note reviewed. HENT:      Head: Normocephalic and atraumatic. Eyes:      General:         Right eye: No foreign body, discharge or hordeolum. Left eye: No foreign body, discharge or hordeolum. Conjunctiva/sclera:      Right eye: Right conjunctiva is not injected. No chemosis, exudate or hemorrhage. Left eye: Left conjunctiva is injected. Hemorrhage present. No chemosis or exudate. Pupils: Pupils are equal, round, and reactive to light. Cardiovascular:      Rate and Rhythm: Normal rate. Rhythm irregular. Pulmonary:      Breath sounds: Normal breath sounds. Neurological:      Mental Status: He is alert. GCS: GCS eye subscore is 4. GCS verbal subscore is 1. GCS motor subscore is 5. Cranial Nerves: Cranial nerves are intact. Motor: No abnormal muscle tone. Deep Tendon Reflexes:      Reflex Scores:       Bicep reflexes are 1+ on the right side and 1+ on the left side. Patellar reflexes are 1+ on the right side and 1+ on the left side. Comments: Intubated with no sedation  Moves all extremities to pain, left more than right  Tone is normal.  Right-sided gaze preference  No subtle signs for seizures or abnormal movements           INTAKE/OUTPUT & DRAINS   24 HOUR INTAKE/OUTPUT:    Intake/Output Summary (Last 24 hours) at 1/13/2020 1446  Last data filed at 1/13/2020 1200  Gross per 24 hour   Intake 3217 ml   Output 825 ml   Net 2392 ml       DRAINS:  [x] There are no drains for Neuro Critical Care to monitor at this time.    LABS      Recent Results (from the past 24 hour(s))   POC Glucose Fingerstick    Collection Time: 01/12/20  5:12 PM   Result Value Ref Range    POC Glucose 176 (H) 75 - 110 mg/dL   POC Glucose Fingerstick    Collection Time: 01/13/20 12:41 AM Result Value Ref Range    POC Glucose 165 (H) 75 - 110 mg/dL   CBC WITH AUTO DIFFERENTIAL    Collection Time: 01/13/20  4:20 AM   Result Value Ref Range    WBC 5.4 3.5 - 11.3 k/uL    RBC 3.78 (L) 4.21 - 5.77 m/uL    Hemoglobin 9.4 (L) 13.0 - 17.0 g/dL    Hematocrit 32.4 (L) 40.7 - 50.3 %    MCV 85.7 82.6 - 102.9 fL    MCH 24.9 (L) 25.2 - 33.5 pg    MCHC 29.0 28.4 - 34.8 g/dL    RDW 23.2 (H) 11.8 - 14.4 %    Platelets 631 818 - 767 k/uL    MPV 9.8 8.1 - 13.5 fL    NRBC Automated 0.0 0.0 per 100 WBC    Differential Type NOT REPORTED     WBC Morphology NOT REPORTED     RBC Morphology NOT REPORTED     Platelet Estimate NOT REPORTED     Immature Granulocytes 0 0 %    Seg Neutrophils 65 36 - 66 %    Lymphocytes 30 24 - 44 %    Monocytes 2 1 - 7 %    Eosinophils % 3 1 - 4 %    Basophils 0 0 - 2 %    Absolute Immature Granulocyte 0.00 0.00 - 0.30 k/uL    Segs Absolute 3.51 1.8 - 7.7 k/uL    Absolute Lymph # 1.62 1.0 - 4.8 k/uL    Absolute Mono # 0.11 0.1 - 0.8 k/uL    Absolute Eos # 0.16 0.0 - 0.4 k/uL    Basophils Absolute 0.00 0.0 - 0.2 k/uL    Morphology ANISOCYTOSIS PRESENT     Morphology MICROCYTOSIS PRESENT     Morphology ELLIPTOCYTES    BASIC METABOLIC PANEL    Collection Time: 01/13/20  4:20 AM   Result Value Ref Range    Glucose 174 (H) 70 - 99 mg/dL    BUN 10 6 - 20 mg/dL    CREATININE 0.31 (L) 0.70 - 1.20 mg/dL    Bun/Cre Ratio NOT REPORTED 9 - 20    Calcium 8.2 (L) 8.6 - 10.4 mg/dL    Sodium 138 135 - 144 mmol/L    Potassium 3.9 3.7 - 5.3 mmol/L    Chloride 102 98 - 107 mmol/L    CO2 27 20 - 31 mmol/L    Anion Gap 9 9 - 17 mmol/L    GFR Non-African American >60 >60 mL/min    GFR African American >60 >60 mL/min    GFR Comment          GFR Staging NOT REPORTED    Arterial Blood Gas, POC    Collection Time: 01/13/20  5:27 AM   Result Value Ref Range    POC pH 7.545 (H) 7.350 - 7.450    POC pCO2 38.2 35.0 - 48.0 mm Hg    POC PO2 114.2 (H) 83.0 - 108.0 mm Hg    POC HCO3 33.1 (H) 21.0 - 28.0 mmol/L    TCO2 (calc), hemorrhage. No hydrocephalus. Ct Head Wo Contrast    Result Date: 12/26/2019  EXAMINATION: CT OF THE HEAD WITHOUT CONTRAST  12/26/2019 4:51 am TECHNIQUE: CT of the head was performed without the administration of intravenous contrast. Dose modulation, iterative reconstruction, and/or weight based adjustment of the mA/kV was utilized to reduce the radiation dose to as low as reasonably achievable. COMPARISON: 12/25/2019 HISTORY: ORDERING SYSTEM PROVIDED HISTORY: worsening mentation TECHNOLOGIST PROVIDED HISTORY: worsening mentation FINDINGS: BRAIN/VENTRICLES: Diffuse subarachnoid hemorrhage within the cerebral sulci, sylvian fissures, anterior interhemispheric fissure, and prepontine cistern, similar in appearance to prior study. There is intraventricular hemorrhage layering in the occipital horns. No hydrocephalus. No mass effect or midline shift. Left parietal convexity subdural hemorrhage measures up to 5 mm in thickness. The basal cisterns are patent. The gray-white matter differentiation is maintained. ORBITS: The visualized portion of the orbits demonstrate no acute abnormality. SINUSES: The visualized paranasal sinuses and mastoid air cells demonstrate no acute abnormality. SOFT TISSUES/SKULL:  No acute abnormality of the visualized skull or soft tissues. 1. Stable head CT demonstrating diffuse moderate volume subarachnoid hemorrhage and left parietal convexity subdural hemorrhage measuring up to 5 mm in thickness. 2. No mass effect or midline shift. No hydrocephalus. The findings were sent to the Radiology Results [address] at 5:48 am on 12/26/2019to be communicated to a licensed caregiver.      Ct Head Wo Contrast    Result Date: 12/26/2019  EXAMINATION: CT OF THE HEAD WITHOUT CONTRAST  12/25/2019 9:04 pm TECHNIQUE: CT of the head was performed without the administration of intravenous contrast. Dose modulation, iterative reconstruction, and/or weight based adjustment of the mA/kV dilated, measuring up to 4 cm, suggesting pulmonary hypertension. Calcified and noncalcified atherosclerotic plaque of the thoracic aorta. Incidentally noted 4 vessel aortic arch with separate arch origin of the left vertebral artery. Ectatic ascending thoracic aorta measures up to 4 cm in diameter. No acute aortic abnormality. Central airways are clear. No pleural effusion or pneumothorax. No pulmonary contusion or pulmonary laceration. Mild bilateral lower lobe dependent atelectatic changes. Healing anterolateral left 6th rib fracture. No acute displaced rib fracture or chest wall hematoma. CTA ABDOMEN: No acute traumatic abnormality of the liver, spleen, pancreas, kidneys, or adrenal glands. Normal gallbladder. Stomach, small bowel, and colon are normal in caliber without evidence of obstruction. Normal appendix. Sigmoid diverticulosis without diverticulitis. Calcified and noncalcified aortoiliac atherosclerotic calcification. Abdominal aorta is normal in caliber. No free fluid in the abdomen. CTA PELVIS: Urinary bladder and pelvic organs are normal.  No free fluid in the pelvis. Bilateral common iliac stents are in position. THORACIC/LUMBAR SPINE: BONES/ALIGNMENT: Normal alignment of the thoracic and lumbar spine. Vertebral body heights are maintained. No acute fracture. DEGENERATIVE CHANGES: Multilevel disc narrowing and endplate osteophyte formation. Mild multilevel facet joint degenerative changes. No high-grade, bony spinal canal stenosis. SOFT TISSUES: Paraspinal soft tissues are normal.     No acute traumatic abnormality of the chest, abdomen, or pelvis. Remote, healing anterolateral left 6th rib fracture. No acute osseous abnormality of the thoracic or lumbar spine.      Ct Lumbar Spine Wo Contrast    Result Date: 12/26/2019  EXAMINATION: CT OF THE THORACIC SPINE WITHOUT CONTRAST; CT OF THE LUMBAR SPINE WITHOUT CONTRAST; CT OF THE CHEST, ABDOMEN, AND PELVIS WITH CONTRAST, 12/25/2019 9:04 pm; flow into the external and internal carotid arteries with normal filling of the external carotid artery branches. Caliber and lumen contour of the cervical portion of the left internal carotid artery noted to be large and irregular which is likely due to prior endarterectomy. Left ICA technique: The left internal carotid artery was then selectively catheterized, and digital subtraction angiography of the intracranial left internal carotid artery circulation was performed in frontal and lateral projections. Interpretation:There is normal antegrade filling of the distal internal carotid artery, ophthalmic artery, anterior cerebral artery, middle cerebral artery and distal branches. Inspection of the remaining left internal carotid artery circulation revealed  mild to moderate left cavernous ICA stenosis. Otherwise, no other evidence of cerebral aneurysm, arteriovenous malformation, or arterial stenosis. No vasospasm noted. Irregularity of the vessels without hemodynamic stenosis which could reflect intracranial atherosclerosis Capillary and venous phase images were also unremarkable, with no evidence of veno-occlusive disease. Left VA technique: The left vertebral artery noted to arise from the aortic arch. Left vertebral artery was then selectively catheterized with roadmap guidance. Digital subtraction angiography of the intracranial left vertebrobasilar run-off  was obtained in frontal and lateral projections. Interpretation:The left vertebral artery injection demonstrates normal antegrade opacification of the left  vertebral artery, including the cervical segment, basilar artery, and respective branches. There is a left V4 moderate stenosis noted. Otherwise, there was no evidence of  cerebral aneurysm, arteriovenous malformation, or arterial stenosis.  There is noted opacification of the distal right middle cerebral artery parieto-occipital territory from pial collaterals arising off the right posterior cerebral artery. Capillary and venous phase images were otherwise unremarkable. Right CFA technique: A right common femoral artery angiogram was performed and demonstrated arterial catheterization proximal to the bifurcation. There was no evidence of dissection or occlusion within the right common femoral artery. There is internal iliac artery stent noted. A 5F Vascade closure device was used to establish hemostasis at the right common femoral artery access site. No immediate complications were experienced. Patient was re-examined after the procedure with no change noted in their neurologic examination, with distal pulses present. The patient was subsequently discharged from the neurointerventional suite. Impression: 1. Critical right ICA stenosis with a string sign is noted measuring approximately 90-99% per NASCET criteria. Noted retrograde Filling of the right ophthalmic artery from the right internal maxillary artery branches with slow filling of the anterior cerebral artery, middle cerebral artery and the distal branches in addition to distal right mca vascular supply from the right pca due to previously noted critical proximal cervical ICA stenosis. 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 3.No evidence of clear discrete aneurysm, arteriovenous malformation, arterial dissection, or veno-occlusive disease. 4. 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 Mario Chahal dictated this invasive procedure. Dr. Emani Peña was present for all procedural and imaging components of this case. Examination was reviewed and reported findings confirmed and edited by Dr. Emani Peña. Dr. mEani Peña supervised and interpreted this procedure.  Yudelka Thornton MD Final report electronically signed by Yudelka Thornton M.D. on 12/27/2019 4:00 PM    Cta Head Neck W Contrast    Result Date: 12/25/2019  EXAMINATION: CTA OF THE HEAD AND NECK WITH CONTRAST 12/25/2019 9:07 pm: TECHNIQUE: CTA of the head and neck was performed with the administration of intravenous contrast. Multiplanar reformatted images are provided for review. MIP images are provided for review. Stenosis of the internal carotid arteries measured using NASCET criteria. Dose modulation, iterative reconstruction, and/or weight based adjustment of the mA/kV was utilized to reduce the radiation dose to as low as reasonably achievable. COMPARISON: None. HISTORY: ORDERING SYSTEM PROVIDED HISTORY: sah TECHNOLOGIST PROVIDED HISTORY: sah Reason for Exam: SAH after trauma Acuity: Acute Type of Exam: Initial FINDINGS: CTA NECK: AORTIC ARCH/ARCH VESSELS: There is a critical stenosis of the proximal left vertebral artery with moderate and severe stenoses of the V2 segment. There is a severe stenosis at the origin of the right vertebral artery. 66% stenosis of the left subclavian artery is noted. CAROTID ARTERIES: There is 25% stenosis of the proximal left common carotid artery and 50% stenosis of the mid and distal segments. The cervical portion of the left internal carotid artery is normal in course and caliber. There is 20% stenosis of the right common carotid artery. A critical stenosis of the proximal right cervical ICA is noted with attenuated flow seen distally. VERTEBRAL ARTERIES: No dissection, arterial injury, or significant stenosis. SOFT TISSUES: There is a thickened appearance to the mid esophagus. A small amount of layering debris is present within the trachea, likely reflecting pooled secretion. BONES: No acute osseous abnormality. CTA HEAD: ANTERIOR CIRCULATION: There is severely attenuated flow within the petrous and cavernous segments of the right internal carotid artery with severe, near critical stenoses of the cavernous ICA. There is also attenuated flow within the contralateral ICA, to a lesser degree, with moderate stenoses of the left cavernous ICA.  There are moderate and severe multifocal stenoses of the MCA branch vessels, ORDERING SYSTEM PROVIDED HISTORY: trauma TECHNOLOGIST PROVIDED HISTORY: trauma Reason for Exam: fall from standing Acuity: Acute Type of Exam: Initial FINDINGS: CTA CHEST: Stable cardiomegaly. No pericardial effusion. No mediastinal hematoma or pneumomediastinum. No enlarged mediastinal or hilar lymph nodes. Main pulmonary artery is dilated, measuring up to 4 cm, suggesting pulmonary hypertension. Calcified and noncalcified atherosclerotic plaque of the thoracic aorta. Incidentally noted 4 vessel aortic arch with separate arch origin of the left vertebral artery. Ectatic ascending thoracic aorta measures up to 4 cm in diameter. No acute aortic abnormality. Central airways are clear. No pleural effusion or pneumothorax. No pulmonary contusion or pulmonary laceration. Mild bilateral lower lobe dependent atelectatic changes. Healing anterolateral left 6th rib fracture. No acute displaced rib fracture or chest wall hematoma. CTA ABDOMEN: No acute traumatic abnormality of the liver, spleen, pancreas, kidneys, or adrenal glands. Normal gallbladder. Stomach, small bowel, and colon are normal in caliber without evidence of obstruction. Normal appendix. Sigmoid diverticulosis without diverticulitis. Calcified and noncalcified aortoiliac atherosclerotic calcification. Abdominal aorta is normal in caliber. No free fluid in the abdomen. CTA PELVIS: Urinary bladder and pelvic organs are normal.  No free fluid in the pelvis. Bilateral common iliac stents are in position. THORACIC/LUMBAR SPINE: BONES/ALIGNMENT: Normal alignment of the thoracic and lumbar spine. Vertebral body heights are maintained. No acute fracture. DEGENERATIVE CHANGES: Multilevel disc narrowing and endplate osteophyte formation. Mild multilevel facet joint degenerative changes. No high-grade, bony spinal canal stenosis. SOFT TISSUES: Paraspinal soft tissues are normal.     No acute traumatic abnormality of the chest, abdomen, or pelvis. artery, with diffuse severe narrowing of remainder of right internal carotid artery. This extends into intracranial segments. 6. Postsurgical changes at the left carotid bifurcation, without significant flow-limiting stenosis. 7. Moderate stenosis at origin of right vertebral artery. Moderate to severe stenosis at the proximal left vertebral artery, just distal to the origin. 8. Approximately 50% stenosis involving the left proximal subclavian artery. 9. Scattered multifocal ground-glass pulmonary opacities at both lung apices. Fluid density seen within the trachea adjacent to the endotracheal tube. MRI Brain 1/9/2020      LTME:Diffuse encephalopathy. No epileptiform discharge          ASSESSMENT AND PLAN:       49-year-old male presenting initially with critical multivessel CVA with critical stenosis of left proximal vert, critical stenosis of right cervical ICA, critical stenosis of right cavernous ICA, and moderate stenosis of left cavernous ICA with bilateral subdural hematoma right greater than left concern for infectious versus delirium tremens. Assessment & Plan:    Neuro    Traumatic SAH  Acute ischemic stroke, bilateral temporoparietal  Global aphasia due to above   Continue aspirin 324 mg daily  Critical right ICA stenosis-post right ICA stenting  Systolic blood pressure goal 100-140  Modafinil 100 mg BID   Hold Eliquis for now. Resume after 3 weeks of ICH  Palliative care following with family meeting 1/14/2020. Cardio  On sotalol 160 mg twice daily  Heart rate is better controlled overnight with no RVR  EF 45%  Continue cardiac monitor     Pulm  Intubated  ABG 7.545, 38.2, 33.1  PRVC 12, 600, 5, 30%  Continue O2 monitor  Maintain saturation > 92%  RSB I in the 80s today  This morning's ABG showed worsening metabolic alkalosis with hypocalcemia.   CaCl2 - 1g IVPB  Ionized calcium level 1.18  Repeat ABG tomorrow morning    Endo  Serum glucose is better controlled < 180  HB A1C: 7.1  Continue Lantus 25 units daily    GI  Milk of magnesia for bowel regimen  Pepcid 20 mg IV  BID for GI PPx  Continue tube feeds    Renal  Hyponatremia: 131->133 -> 137 -> 135 -> 142-->138  Decrease salt tabs to 2 g daily  Intake 3.2, output 1, +2 L  BUN 17, creatinine 0.39    Heme/ID  Tmax 98   Hemoglobin/hematocrit stable    MS  Nondisplaced mildly angulated left first metacarpal fracture. Splinted by orthopedics  Follow-up with orthopedics    LDAs:  NG tube, ETT, ext. Urinary     DVT PPX: Lovenox 40 mg daily  GI PPX: Pepcid 20 mg IV twice daily    Disposition: To remain neuro ICU.   Family meeting tomorrow 1/14/2020    Celso Villalobos MD  Neurology Resident  Neuro Critical Care   Phone: [address] [phone]

## 2020-01-13 NOTE — PROGRESS NOTES
01/13/20 1748   Vent Information   Vent Mode PRVC   Vt Ordered 530 mL   Rate Set 12 bmp   FiO2  30 %   PEEP/CPAP 5   Returned to [facility] for the night.

## 2020-01-14 ENCOUNTER — APPOINTMENT (OUTPATIENT)
Dept: GENERAL RADIOLOGY | Age: 60
DRG: 030 | End: 2020-01-14
Payer: MEDICAID

## 2020-01-14 LAB
ABSOLUTE EOS #: 0.33 K/UL (ref 0–0.4)
ABSOLUTE IMMATURE GRANULOCYTE: 0 K/UL (ref 0–0.3)
ABSOLUTE LYMPH #: 1.18 K/UL (ref 1–4.8)
ABSOLUTE MONO #: 0.28 K/UL (ref 0.1–0.8)
ANION GAP SERPL CALCULATED.3IONS-SCNC: 10 MMOL/L (ref 9–17)
BASOPHILS # BLD: 0 % (ref 0–2)
BASOPHILS ABSOLUTE: 0 K/UL (ref 0–0.2)
BUN BLDV-MCNC: 11 MG/DL (ref 6–20)
BUN/CREAT BLD: ABNORMAL (ref 9–20)
CALCIUM SERPL-MCNC: 8.8 MG/DL (ref 8.6–10.4)
CHLORIDE BLD-SCNC: 102 MMOL/L (ref 98–107)
CO2: 25 MMOL/L (ref 20–31)
CREAT SERPL-MCNC: 0.27 MG/DL (ref 0.7–1.2)
DIFFERENTIAL TYPE: ABNORMAL
EOSINOPHILS RELATIVE PERCENT: 7 % (ref 1–4)
GFR AFRICAN AMERICAN: >60 ML/MIN
GFR NON-AFRICAN AMERICAN: >60 ML/MIN
GFR SERPL CREATININE-BSD FRML MDRD: ABNORMAL ML/MIN/{1.73_M2}
GFR SERPL CREATININE-BSD FRML MDRD: ABNORMAL ML/MIN/{1.73_M2}
GLUCOSE BLD-MCNC: 143 MG/DL (ref 75–110)
GLUCOSE BLD-MCNC: 147 MG/DL (ref 75–110)
GLUCOSE BLD-MCNC: 151 MG/DL (ref 70–99)
HCT VFR BLD CALC: 33.2 % (ref 40.7–50.3)
HEMOGLOBIN: 9.5 G/DL (ref 13–17)
IMMATURE GRANULOCYTES: 0 %
LYMPHOCYTES # BLD: 25 % (ref 24–44)
MCH RBC QN AUTO: 24 PG (ref 25.2–33.5)
MCHC RBC AUTO-ENTMCNC: 28.6 G/DL (ref 28.4–34.8)
MCV RBC AUTO: 83.8 FL (ref 82.6–102.9)
MONOCYTES # BLD: 6 % (ref 1–7)
MORPHOLOGY: ABNORMAL
MORPHOLOGY: ABNORMAL
NRBC AUTOMATED: 0 PER 100 WBC
PDW BLD-RTO: 21 % (ref 11.8–14.4)
PLATELET # BLD: 346 K/UL (ref 138–453)
PLATELET ESTIMATE: ABNORMAL
PMV BLD AUTO: 10.1 FL (ref 8.1–13.5)
POTASSIUM SERPL-SCNC: 4.2 MMOL/L (ref 3.7–5.3)
RBC # BLD: 3.96 M/UL (ref 4.21–5.77)
RBC # BLD: ABNORMAL 10*6/UL
SEG NEUTROPHILS: 62 % (ref 36–66)
SEGMENTED NEUTROPHILS ABSOLUTE COUNT: 2.91 K/UL (ref 1.8–7.7)
SODIUM BLD-SCNC: 137 MMOL/L (ref 135–144)
WBC # BLD: 4.7 K/UL (ref 3.5–11.3)
WBC # BLD: ABNORMAL 10*3/UL

## 2020-01-14 PROCEDURE — 99232 SBSQ HOSP IP/OBS MODERATE 35: CPT | Performed by: INTERNAL MEDICINE

## 2020-01-14 PROCEDURE — 94003 VENT MGMT INPAT SUBQ DAY: CPT

## 2020-01-14 PROCEDURE — 71045 X-RAY EXAM CHEST 1 VIEW: CPT

## 2020-01-14 PROCEDURE — 6370000000 HC RX 637 (ALT 250 FOR IP): Performed by: NURSE PRACTITIONER

## 2020-01-14 PROCEDURE — 2000000003 HC NEURO ICU R&B

## 2020-01-14 PROCEDURE — 6370000000 HC RX 637 (ALT 250 FOR IP): Performed by: STUDENT IN AN ORGANIZED HEALTH CARE EDUCATION/TRAINING PROGRAM

## 2020-01-14 PROCEDURE — 2580000003 HC RX 258: Performed by: STUDENT IN AN ORGANIZED HEALTH CARE EDUCATION/TRAINING PROGRAM

## 2020-01-14 PROCEDURE — 36415 COLL VENOUS BLD VENIPUNCTURE: CPT

## 2020-01-14 PROCEDURE — 85025 COMPLETE CBC W/AUTO DIFF WBC: CPT

## 2020-01-14 PROCEDURE — 6360000002 HC RX W HCPCS: Performed by: STUDENT IN AN ORGANIZED HEALTH CARE EDUCATION/TRAINING PROGRAM

## 2020-01-14 PROCEDURE — 97110 THERAPEUTIC EXERCISES: CPT

## 2020-01-14 PROCEDURE — 2500000003 HC RX 250 WO HCPCS: Performed by: NURSE PRACTITIONER

## 2020-01-14 PROCEDURE — APPNB180 APP NON BILLABLE TIME > 60 MINS: Performed by: NURSE PRACTITIONER

## 2020-01-14 PROCEDURE — 82947 ASSAY GLUCOSE BLOOD QUANT: CPT

## 2020-01-14 PROCEDURE — 51798 US URINE CAPACITY MEASURE: CPT

## 2020-01-14 PROCEDURE — 94770 HC ETCO2 MONITOR DAILY: CPT

## 2020-01-14 PROCEDURE — 2700000000 HC OXYGEN THERAPY PER DAY

## 2020-01-14 PROCEDURE — 99212 OFFICE O/P EST SF 10 MIN: CPT

## 2020-01-14 PROCEDURE — 80048 BASIC METABOLIC PNL TOTAL CA: CPT

## 2020-01-14 PROCEDURE — 6370000000 HC RX 637 (ALT 250 FOR IP): Performed by: PSYCHIATRY & NEUROLOGY

## 2020-01-14 PROCEDURE — 99024 POSTOP FOLLOW-UP VISIT: CPT | Performed by: PSYCHIATRY & NEUROLOGY

## 2020-01-14 PROCEDURE — 6360000002 HC RX W HCPCS: Performed by: PSYCHIATRY & NEUROLOGY

## 2020-01-14 PROCEDURE — 2580000003 HC RX 258: Performed by: NURSE PRACTITIONER

## 2020-01-14 PROCEDURE — 51701 INSERT BLADDER CATHETER: CPT

## 2020-01-14 PROCEDURE — 99291 CRITICAL CARE FIRST HOUR: CPT | Performed by: PSYCHIATRY & NEUROLOGY

## 2020-01-14 PROCEDURE — 94761 N-INVAS EAR/PLS OXIMETRY MLT: CPT

## 2020-01-14 RX ORDER — LISINOPRIL 5 MG/1
5 TABLET ORAL DAILY
Status: DISCONTINUED | OUTPATIENT
Start: 2020-01-14 | End: 2020-01-15

## 2020-01-14 RX ADMIN — AMANTADINE HYDROCHLORIDE 100 MG: 50 SOLUTION ORAL at 09:31

## 2020-01-14 RX ADMIN — FAMOTIDINE 20 MG: 10 INJECTION, SOLUTION INTRAVENOUS at 09:31

## 2020-01-14 RX ADMIN — FOLIC ACID 1 MG: 1 TABLET ORAL at 09:31

## 2020-01-14 RX ADMIN — HYDRALAZINE HYDROCHLORIDE 10 MG: 20 INJECTION INTRAMUSCULAR; INTRAVENOUS at 19:47

## 2020-01-14 RX ADMIN — LEVETIRACETAM 500 MG: 500 SOLUTION ORAL at 19:47

## 2020-01-14 RX ADMIN — CLOPIDOGREL 75 MG: 75 TABLET, FILM COATED ORAL at 09:31

## 2020-01-14 RX ADMIN — ASPIRIN 81 MG: 81 TABLET, CHEWABLE ORAL at 09:31

## 2020-01-14 RX ADMIN — ROSUVASTATIN CALCIUM 5 MG: 5 TABLET, FILM COATED ORAL at 19:48

## 2020-01-14 RX ADMIN — Medication 100 MG: at 09:30

## 2020-01-14 RX ADMIN — ENOXAPARIN SODIUM 40 MG: 40 INJECTION SUBCUTANEOUS at 09:31

## 2020-01-14 RX ADMIN — INSULIN GLARGINE 25 UNITS: 100 INJECTION, SOLUTION SUBCUTANEOUS at 10:45

## 2020-01-14 RX ADMIN — SODIUM CHLORIDE TAB 1 GM 2 G: 1 TAB at 09:30

## 2020-01-14 RX ADMIN — LEVETIRACETAM 500 MG: 500 SOLUTION ORAL at 09:31

## 2020-01-14 RX ADMIN — Medication 15 ML: at 09:32

## 2020-01-14 RX ADMIN — INSULIN LISPRO 3 UNITS: 100 INJECTION, SOLUTION INTRAVENOUS; SUBCUTANEOUS at 10:44

## 2020-01-14 RX ADMIN — SODIUM CHLORIDE, PRESERVATIVE FREE 10 ML: 5 INJECTION INTRAVENOUS at 21:00

## 2020-01-14 RX ADMIN — INSULIN LISPRO 3 UNITS: 100 INJECTION, SOLUTION INTRAVENOUS; SUBCUTANEOUS at 05:19

## 2020-01-14 RX ADMIN — HYDRALAZINE HYDROCHLORIDE 10 MG: 20 INJECTION INTRAMUSCULAR; INTRAVENOUS at 16:04

## 2020-01-14 RX ADMIN — SOTALOL HYDROCHLORIDE 160 MG: 80 TABLET ORAL at 09:30

## 2020-01-14 RX ADMIN — FLUOXETINE 10 MG: 10 CAPSULE ORAL at 09:31

## 2020-01-14 RX ADMIN — HYDRALAZINE HYDROCHLORIDE 10 MG: 20 INJECTION INTRAMUSCULAR; INTRAVENOUS at 05:08

## 2020-01-14 RX ADMIN — LISINOPRIL 5 MG: 5 TABLET ORAL at 09:30

## 2020-01-14 RX ADMIN — FAMOTIDINE 20 MG: 10 INJECTION, SOLUTION INTRAVENOUS at 19:47

## 2020-01-14 RX ADMIN — INSULIN LISPRO 3 UNITS: 100 INJECTION, SOLUTION INTRAVENOUS; SUBCUTANEOUS at 17:13

## 2020-01-14 RX ADMIN — SODIUM CHLORIDE, PRESERVATIVE FREE 10 ML: 5 INJECTION INTRAVENOUS at 19:48

## 2020-01-14 RX ADMIN — Medication 15 ML: at 21:00

## 2020-01-14 RX ADMIN — MODAFINIL 200 MG: 100 TABLET ORAL at 10:44

## 2020-01-14 RX ADMIN — SOTALOL HYDROCHLORIDE 160 MG: 80 TABLET ORAL at 19:48

## 2020-01-14 RX ADMIN — MODAFINIL 100 MG: 100 TABLET ORAL at 16:04

## 2020-01-14 RX ADMIN — AMANTADINE HYDROCHLORIDE 100 MG: 50 SOLUTION ORAL at 16:04

## 2020-01-14 ASSESSMENT — PULMONARY FUNCTION TESTS
PIF_VALUE: 16
PIF_VALUE: 13
PIF_VALUE: 17
PIF_VALUE: 13
PIF_VALUE: 23
PIF_VALUE: 13
PIF_VALUE: 19
PIF_VALUE: 15
PIF_VALUE: 20
PIF_VALUE: 20
PIF_VALUE: 17
PIF_VALUE: 17
PIF_VALUE: 3
PIF_VALUE: 16
PIF_VALUE: 15
PIF_VALUE: 20
PIF_VALUE: 15
PIF_VALUE: 18
PIF_VALUE: 13
PIF_VALUE: 13
PIF_VALUE: 19
PIF_VALUE: 16
PIF_VALUE: 17
PIF_VALUE: 17

## 2020-01-14 NOTE — PATIENT CARE CONFERENCE
Palliative Care Family Conference    Patient: Ramírez Chaney  Room: 6449/4549-68    Attended By: myself, patient's family members, including mother, step dad, brother and niece. Reason for meeting:Routine meeting  Discuss goals of care  Treatment options/plans  Build trust  Poor prognosis is anticipated  Provide emotional support to the family     Conference Summary:    Met with family members in conference room in [facility]. Family stated that they were all very well updated on medical conditions by primary care team/neurocritical care. - patient likely need trach and peg vs withdrawal of support. Patient not likely to have much meaningful recovery at this point. Mother, Nba Mao, stated that patient would not wanted any heroic measures. However, she feels that she does not want to be the one deciding to stop the support. I explained to the family and Timoteoi Pod that she is not supposed to be making decisions but need to carry out what the patient would have wanted. Family seems clear that the patient would not want to have trach or peg. However, Deri Pod stated that she still needs to talk to other siblings before making final decision   Different types of code status was explained to the family. Terminal extubation/withrawal of life support and hospice care also explained to the family. Conclusion/Plan:  - primary team updated family on medical conditions. - family to make decisions by tomorrow on trach and peg vs withdrawal of support       Electronically signed by   Cameron Hernández MD  Palliative Care Team  on 1/14/2020 at 4:12 PM    I have discussed the care of Ramírez Chaney, including pertinent history and exam findings with the fellow. I have reviewed the key elements of all parts of the encounter with the fellow. I have seen and examined the patient with the fellow. I agree with the assessment and plan and status of the problem list as documented.      I have discussed with fellow Dr. Jihan Strickland in

## 2020-01-14 NOTE — PROGRESS NOTES
acetaminophen, hydrALAZINE, labetalol, ibuprofen, acetaminophen, metoprolol, glucose, dextrose, glucagon (rDNA), dextrose, magnesium sulfate, sodium chloride flush, sodium chloride flush    VITALS:  Temperature Range: Temp: 97.9 °F (36.6 °C) Temp  Av.2 °F (36.8 °C)  Min: 97.9 °F (36.6 °C)  Max: 98.8 °F (37.1 °C)  BP Range: Systolic (83WMV), DQY:376 , Min:128 , CIH:423     Diastolic (15ZSM), DW, Min:69, Max:86    Pulse Range: Pulse  Av.1  Min: 67  Max: 87  Respiration Range: Resp  Av  Min: 15  Max: 44  Current Pulse Ox: SpO2: 99 %  24HR Pulse Ox Range: SpO2  Av.8 %  Min: 97 %  Max: 100 %  Patient Vitals for the past 12 hrs:   BP Temp Temp src Pulse Resp SpO2   20 1156 -- -- -- 84 23 99 %   20 1038 -- -- -- 87 19 97 %   20 0930 139/81 -- -- -- -- --   20 0822 (!) 151/79 97.9 °F (36.6 °C) Axillary 78 29 98 %   20 0750 -- -- -- 77 27 99 %   20 0736 -- -- -- 78 (!) 38 97 %   20 0703 (!) 159/82 -- -- 75 23 98 %   20 0503 (!) 157/81 -- -- 71 15 100 %   20 0403 (!) 153/80 98 °F (36.7 °C) -- 71 (!) 41 100 %   20 0333 -- -- -- 70 22 100 %   20 0302 136/79 -- -- 71 (!) 44 99 %   20 0203 138/69 -- -- 70 27 --   20 0102 (!) 146/69 -- -- 74 27 --     Estimated body mass index is 26.17 kg/m² as calculated from the following:    Height as of this encounter: 5' 11\" (1.803 m).     Weight as of this encounter: 187 lb 9.8 oz (85.1 kg).  []<16 Severe malnutrition  []16-16.99 Moderate malnutrition  []17-18.49 Mild malnutrition  []18.5-24.9 Normal  [x]25-29.9 Overweight (not obese)  []30-34.9 Obese class 1 (Low Risk)  []35-39.9 Obese class 2 (Moderate Risk)  []?40 Obese class 3 (High Risk)    RECENT LABS:   Lab Results   Component Value Date    WBC 4.7 2020    HGB 9.5 (L) 2020    HCT 33.2 (L) 2020     2020    CHOL 105 2019    TRIG 60 2019    HDL 52 2019    ALT 21 2019    AST 21 12/30/2019     01/14/2020    K 4.2 01/14/2020     01/14/2020    CREATININE 0.27 (L) 01/14/2020    BUN 11 01/14/2020    CO2 25 01/14/2020    INR 1.0 12/25/2019    LABA1C 7.1 (H) 12/27/2019     24 HOUR INTAKE/OUTPUT:    Intake/Output Summary (Last 24 hours) at 1/14/2020 1243  Last data filed at 1/14/2020 0600  Gross per 24 hour   Intake 1812 ml   Output 1074 ml   Net 738 ml       Labs and Images reviewed with:  [x] Dr. Romaine Holter. Sanjay    [] Dr. Cayla Moy  [] Dr. Renay Maya  [] There are no new interval images to review. PHYSICAL EXAM       CONSTITUTIONAL:  Intubated. Off sedation, patient will open eyes spontaneously and tracks appropriately. Does not follow commands. Minimal spontaneous movement. HEAD:  normocephalic   EYES:  PERRL, left eye with subconjunctival hemorrhage improving   ENT:  moist mucous membranes   NECK:  supple, symmetric   LUNGS:  Equal air entry bilaterally, clear   CARDIOVASCULAR:  normal s1 / s2, RRR, distal pulses intact   MUSCULOSKELETAL: Left thumb spica splint applied   ABDOMEN:  Soft, no rigidity   NEUROLOGIC:  Mental Status: Intubated             Cranial Nerves:    III: Pupils:  equal, round, reactive to light  III,IV,VI: Extra Ocular Movements: intact, right gaze preference    Motor Exam:    Localizes with right upper extremity. Withdraw to pain left upper and bilateral lower extremities. DRAINS:  [x] There are no drains for Neuro Critical Care to monitor at this time.      ASSESSMENT AND PLAN:       60 yo male with history of HTN, HLD, Atrial fibrillation who presents with likely a traumatic subdural hematoma, Left parietal SDH, and critical right ICA stenosis    NEUROLOGIC:  - Traumatic SAH and SDH improving  - s/p Right ICA angioplasty and stenting  - continue aspirin 81 mg and plavix 75 mg QD  - MRI brain revealed bilateral frontal, parietal, and temporal ischemia  - EEG: diffuse encephalopathy, no seizures  - Continue Keppra 500 mg BID, seizure precautions  - Folic acid and thiamine   - Increase Provigil 200 mg AM, 100 mg PM and Amantadine 100 mg BID as neuro stimulant.   -  this morning, continue sodium chloride tablets 2 g QD  - Goal SBP < 140  - Prozac 10 mg QD  - Neuro checks per protocol    CARDIOVASCULAR:  - Goal SBP < 140  - History of atrial fibrillation, continue sotalol 160 mg BID  - Start Lisinopril 5 mg QD  - Lipid panel: Cholesterol 105, LDL 41  - Crestor 5 mg QHS  - Echocardiogram: EF 45%  - Continue telemetry    PULMONARY:  - Failed extubation trial 1/8  - Vent Settings: 30/12/530/5  - CXR no acute findings  - Wean as tolerated    RENAL/FLUID/ELECTROLYTE:  - BUN 11/ Creatinine 0.27  - I&Os: 2232/1424  - Total fluids @ 100 mL/hr  - Replace electrolytes PRN  - Daily BMP    GI/NUTRITION:  NUTRITION:  DIET TUBE FEED CONTINUOUS/CYCLIC NPO; Diabetic; Nasogastric; Continuous; 25; 75; 24  - Bowel regimen: senna-s daily  - Last BM this morning  - GI prophylaxis: Pepcid 20 mg BID    ID:  - Tmax 37.1  - WBC 4.7  - Continue to monitor for fevers  - Daily CBC    HEME:   - H&H 9.5/33.2  - Platelets 286  - Daily CBC    ENDOCRINE:  - Continue to monitor blood glucose, goal <180  - Hemoglobin A1C 7.1  - continue high ISS  - Lantus 25 units QD    OTHER:  - PT/OT/ST   - Code Status: Full  - Palliative care for family support, family meeting held today. PROPHYLAXIS:  Stress ulcer: H2 blocker    DVT PROPHYLAXIS:  - SCD sleeves - Thigh High   - Lovenox     DISPOSITION:  [x] To remain ICU: close neurological monitoring and vent management  [] OK for out of ICU from Neuro Critical Care standpoint    We will continue to follow along. For any changes in exam or patient status please contact Neuro Critical Care.       PAMELA Leroy - CNP  Neuro Critical Care  Pager [phone]  1/14/2020     12:43 PM

## 2020-01-14 NOTE — PROGRESS NOTES
[facility] Wound Ostomy  Nurse  Follow up Note       NAME:  Sulema Florian  MEDICAL RECORD NUMBER:  [id number]  AGE: 61 y.o. GENDER: male  : 1960  TODAY'S DATE:  2020    Subjective   Reason for [address] Nurse Evaluation and Assessment:   Left lateral ankle wound, present on admission, presumed to be traumatic. Objective    BP (!) 147/84   Pulse 81   Temp 98.3 °F (36.8 °C) (Oral)   Resp (!) 42   Ht 5' 11\" (1.803 m)   Wt 187 lb 9.8 oz (85.1 kg)   SpO2 99%   BMI 26.17 kg/m²     LABS:  WBC:    Lab Results   Component Value Date    WBC 4.7 2020     H/H:    Lab Results   Component Value Date    HGB 9.5 2020    HCT 33.2 2020     PTT:    Lab Results   Component Value Date    APTT 23.9 2019   [APTT}  PT/INR:    Lab Results   Component Value Date    PROTIME 10.5 2019    INR 1.0 2019     HgBA1c:    Lab Results   Component Value Date    LABA1C 7.1 2019       Assessment   Angel Risk Score: Angel Scale Score: 12    Patient Active Problem List   Diagnosis Code    SAH (subarachnoid hemorrhage) ([facility]) I60.9    Subarachnoid bleed ([facility] [address] I60.9    Traumatic subarachnoid hemorrhage with loss of consciousness of 1 hour to 5 hours 59 minutes ([facility] [address] S06. 6X3A    Carotid stenosis, right I65.21    Asymptomatic stenosis of left vertebral artery I65.02    Intracranial atherosclerosis I67.2    History of CEA (carotid endarterectomy) Z98.890    Ventilator dependence ([facility]) Z99.11    Delirium tremens ([facility]) F10.231    Chronic a-fib I48.20    On mechanically assisted ventilation ([facility]) Z99.11    Encephalopathy G93.40    Acute ischemic multifocal anterior circulation stroke ([facility] [address] I63.529       Measurements:     20 1009   Wound 19 Ankle Lateral;Left   Date First Assessed/Time First Assessed: 19 0800   Present on Hospital Admission: Yes  Primary Wound Type: Venous Ulcer  Location: Ankle  Wound Location Orientation: Lateral;Left  Wound Outcome: Healed   Wound Image Wound Traumatic   Dressing Status Changed   Dressing Changed Changed/New   Dressing/Treatment Honey gel/honey paste  (hydrogel sheet)   Wound Cleansed Rinsed/Irrigated with saline   Dressing Change Due 01/16/20   Wound Length (cm) 2.4 cm   Wound Width (cm) 1 cm   Wound Depth (cm) 0.2 cm   Wound Surface Area (cm^2) 2.4 cm^2   Change in Wound Size % (l*w) -60   Wound Volume (cm^3) 0.48 cm^3   Wound Healing % -60   Wound Assessment Dry;Yellow; Red   Drainage Amount None   Gisselle-wound Assessment Blanchable erythema     Wound appears dry with partial scab, partial yellow tissue; surrounding skin is erythematic  . Plan   Plan of Care: Wound 12/26/19 Ankle Lateral;Left-Dressing/Treatment: Honey gel/honey paste(hydrogel sheet)   Switched to [facility], every other day to restore moisture and encourage autolysis of dry scab.       Specialty Bed Required : Yes   [] Low Air Loss   [x] Pressure Redistribution  [place] IsoFlex  [] Fluid Immersion  [] Bariatric  [] Total Pressure Relief  [] Other:     Current Diet: DIET TUBE FEED CONTINUOUS/CYCLIC NPO; Diabetic; Nasogastric; Continuous; 25; 75; 24         SABIHA FREIREN, RN, [employer]

## 2020-01-14 NOTE — PLAN OF CARE
Problem: OXYGENATION/RESPIRATORY FUNCTION  Goal: Patient will maintain patent airway  1/14/2020 0623 by Alma Rush RCP  Outcome: Ongoing  1/13/2020 1804 by Nathen Hills RN  Outcome: Ongoing  Goal: Patient will achieve/maintain normal respiratory rate/effort  Description  Respiratory rate and effort will be within normal limits for the patient  1/14/2020 9363 by Alma Rush RCP  Outcome: Ongoing  1/13/2020 1804 by Nathen Hills RN  Outcome: Ongoing     Problem: MECHANICAL VENTILATION  Goal: Mobility/activity is maintained at optimum level for patient  1/14/2020 3783 by Alma Rush RCP  Outcome: Ongoing  1/13/2020 1804 by Nathen Hills RN  Outcome: Ongoing  Goal: Patient will maintain patent airway  Outcome: Ongoing  Goal: Oral health is maintained or improved  Outcome: Ongoing  Goal: ET tube will be managed safely  Outcome: Ongoing  Goal: Ability to express needs and understand communication  Outcome: Ongoing

## 2020-01-14 NOTE — PROGRESS NOTES
ENDOVASCULAR NEUROSURGERY PROGRESS NOTE  1/14/2020 9:11 AM  Subjective:   Admit Date: 12/25/2019  PCP: Pedro Hartman MD    Intubated . No events    Objective:   Vitals: BP (!) 151/79   Pulse 78   Temp 97.9 °F (36.6 °C) (Axillary)   Resp 29   Ht 5' 11\" (1.803 m)   Wt 187 lb 9.8 oz (85.1 kg)   SpO2 98%   BMI 26.17 kg/m²   Did examine Precedex. General appearance: Intubated   HEENT: Atraumatic. Neck: Neck is supple. Lungs: Intubated  Abdomen: Soft nontender. Extremities: No lower limb edema noted. General appearance: Lying in bed, intubated  Lungs: CTAB  Heart: RRR  Abdomen: soft, NTND, bowel sounds normal    Neuro exam: intubated and sedated and no commands but eyes open spontaneously .  OCR +, Corneal +, pupils equal and reactive, no withdraw or spontaneous movement in my exam       Medications and labs:   Scheduled Meds:   lisinopril  5 mg Oral Daily    FLUoxetine  10 mg Oral Daily    sodium chloride  2 g Oral Daily    aspirin  81 mg Oral Daily    clopidogrel  75 mg Oral Daily    levETIRAcetam  500 mg Oral BID    amantadine  100 mg Per G Tube BID- 8&2    modafinil  200 mg Oral Daily    And    modafinil  100 mg Oral Daily    insulin glargine  25 Units Subcutaneous Daily    insulin lispro  0-18 Units Subcutaneous Q6H    docusate  100 mg Oral Daily    sotalol  160 mg Oral BID    enoxaparin  40 mg Subcutaneous Daily    nicotine  1 patch Transdermal Daily    folic acid  1 mg Oral Daily    thiamine  100 mg Oral Daily    sodium chloride flush  10 mL Intravenous BID    famotidine (PEPCID) injection  20 mg Intravenous BID    rosuvastatin  5 mg Oral Nightly    chlorhexidine  15 mL Mouth/Throat BID    vitamin D  50,000 Units Oral Weekly    sodium chloride flush  10 mL Intravenous 2 times per day    sodium chloride flush  10 mL Intravenous 2 times per day     Continuous Infusions:   sodium chloride 75 mL/hr at 01/09/20 1537    dextrose       CBC:   Recent Labs     01/12/20  4668 01/13/20  0420 01/14/20  0402   WBC 4.3 5.4 4.7   HGB 10.3* 9.4* 9.5*    295 346     BMP:    Recent Labs     01/12/20  0348 01/13/20  0420 01/14/20  0402    138 137   K 3.8 3.9 4.2    102 102   CO2 28 27 25   BUN 10 10 11   CREATININE 0.38* 0.31* 0.27*   GLUCOSE 129* 174* 151*     Hepatic:   No results for input(s): AST, ALT, ALB, BILITOT, ALKPHOS in the last 72 hours. Troponin: No results for input(s): TROPONINI in the last 72 hours. BNP: No results for input(s): BNP in the last 72 hours. Lipids:   No results for input(s): CHOL, HDL in the last 72 hours. Invalid input(s): LDLCALCU  INR:   No results for input(s): INR in the last 72 hours. Assessment and Recommendations:     Traumatic subarachnoid hemorrhage.     s/p diagnostic cerebral angiogram in December 26, 2019  1. Critical right cervical ICA stenosis measuring approximately 90 to 99% per NASCET criteria. There is delayed anterograde filling of the cervical ica across the stenosis. There is Collateral filling of the distal right ica from the retrograde filling of the right ophthalmic artery from the right IMAX. In addition there is pial collaterals from the right PCA supplying the distal right mca territory parieto/occipital region. 2. No MCA vasospasm noted however there are irregularities suspected from diffuse intracranial athero including 50% left vert athero with stenosis in the v3 segment    Patient with possible alcohol withdrawal.      S/p cerebral angiogram on January 8  --Right ICA stent was placed with pre-and post balloon angioplasty. Impression: 1. Left cervical stenosis measuring approximately 20% at the carotid ICA bulb with postsurgical changes noted from prior endarterectomy. 2.Diffuse atherosclerotic disease noted in the left cavernous ICA/left RUSTY and?distal?left MCA M1. 3.There is increased vascularity noted in the distal left RUSTY pericallosal branch with early venous drainage of unclear etiology. 4.There is decreased parenchymal blush in the left MCA parietal region corresponding to the hypodensity noted on prior CT head. 5. Right proximal cervical ICA critical stenosis measuring approximately 99% per NASCET criteria significantly delayed anterograde flow noted. 6. There is collateral flow noted to the distal right ICA and MCA territory arising from right internal maxillary artery branches filling the ophthalmic artery via? retrograde fashion    7. Right MCA M1/right RUSTY intracranial atherosclerotic disease noted. 8. Successful carotid stenting using Wallstent 10x37 mm with pre and post balloon angioplasty Seth balloon with the use of distal embolic protection device.         MRI brain was obtained and showing bilateral ischemic strokes. Consider to discuss goals of care with family. PLAN:  1. Traumatic subarachnoid hemorrhage management per neuro ICU. 2. Blood pressure systolic goal after the procedure is 100-140. 3. Continue dual antiplatelet therapy with aspirin 81 and Plavix 75 daily. 4. PT/OT evaluation. 5. Follow-up in 2 weeks in neuroendovascular, and with Dr. Sindhu Cui in 3 months. 6. Would consider embolization for left subdural hematoma during follow-up. Please contact neuro endovascular team for any questions or concerns.     Jesse Duverney, MD  Stroke, [facility]  [address]  Electronically signed 1/14/2020 at 9:11 AM

## 2020-01-15 ENCOUNTER — APPOINTMENT (OUTPATIENT)
Dept: GENERAL RADIOLOGY | Age: 60
DRG: 030 | End: 2020-01-15
Payer: MEDICAID

## 2020-01-15 LAB
ABSOLUTE EOS #: 0.2 K/UL (ref 0–0.44)
ABSOLUTE EOS #: 0.24 K/UL (ref 0–0.4)
ABSOLUTE IMMATURE GRANULOCYTE: 0 K/UL (ref 0–0.3)
ABSOLUTE IMMATURE GRANULOCYTE: 0 K/UL (ref 0–0.3)
ABSOLUTE LYMPH #: 0.88 K/UL (ref 1.1–3.7)
ABSOLUTE LYMPH #: 0.91 K/UL (ref 1–4.8)
ABSOLUTE MONO #: 0.29 K/UL (ref 0.1–1.2)
ABSOLUTE MONO #: 0.43 K/UL (ref 0.1–0.8)
ANION GAP SERPL CALCULATED.3IONS-SCNC: 13 MMOL/L (ref 9–17)
BASOPHILS # BLD: 0 % (ref 0–2)
BASOPHILS # BLD: 1 % (ref 0–2)
BASOPHILS ABSOLUTE: 0 K/UL (ref 0–0.2)
BASOPHILS ABSOLUTE: 0.05 K/UL (ref 0–0.2)
BUN BLDV-MCNC: 10 MG/DL (ref 6–20)
BUN/CREAT BLD: ABNORMAL (ref 9–20)
CALCIUM SERPL-MCNC: 8.8 MG/DL (ref 8.6–10.4)
CHLORIDE BLD-SCNC: 100 MMOL/L (ref 98–107)
CO2: 27 MMOL/L (ref 20–31)
CREAT SERPL-MCNC: 0.32 MG/DL (ref 0.7–1.2)
DIFFERENTIAL TYPE: ABNORMAL
DIFFERENTIAL TYPE: ABNORMAL
EOSINOPHILS RELATIVE PERCENT: 4 % (ref 1–4)
EOSINOPHILS RELATIVE PERCENT: 5 % (ref 1–4)
GFR AFRICAN AMERICAN: >60 ML/MIN
GFR NON-AFRICAN AMERICAN: >60 ML/MIN
GFR SERPL CREATININE-BSD FRML MDRD: ABNORMAL ML/MIN/{1.73_M2}
GFR SERPL CREATININE-BSD FRML MDRD: ABNORMAL ML/MIN/{1.73_M2}
GLUCOSE BLD-MCNC: 113 MG/DL (ref 75–110)
GLUCOSE BLD-MCNC: 144 MG/DL (ref 70–99)
GLUCOSE BLD-MCNC: 145 MG/DL (ref 75–110)
GLUCOSE BLD-MCNC: 147 MG/DL (ref 75–110)
GLUCOSE BLD-MCNC: 148 MG/DL (ref 75–110)
HCT VFR BLD CALC: 32.5 % (ref 40.7–50.3)
HCT VFR BLD CALC: 33.5 % (ref 40.7–50.3)
HEMOGLOBIN: 9.5 G/DL (ref 13–17)
HEMOGLOBIN: 9.8 G/DL (ref 13–17)
IMMATURE GRANULOCYTES: 0 %
IMMATURE GRANULOCYTES: 0 %
LYMPHOCYTES # BLD: 18 % (ref 24–43)
LYMPHOCYTES # BLD: 19 % (ref 24–44)
MCH RBC QN AUTO: 23.8 PG (ref 25.2–33.5)
MCH RBC QN AUTO: 23.8 PG (ref 25.2–33.5)
MCHC RBC AUTO-ENTMCNC: 29.2 G/DL (ref 28.4–34.8)
MCHC RBC AUTO-ENTMCNC: 29.3 G/DL (ref 28.4–34.8)
MCV RBC AUTO: 81.5 FL (ref 82.6–102.9)
MCV RBC AUTO: 81.5 FL (ref 82.6–102.9)
MONOCYTES # BLD: 6 % (ref 3–12)
MONOCYTES # BLD: 9 % (ref 1–7)
MORPHOLOGY: ABNORMAL
NRBC AUTOMATED: 0 PER 100 WBC
NRBC AUTOMATED: 0 PER 100 WBC
PDW BLD-RTO: 21.1 % (ref 11.8–14.4)
PDW BLD-RTO: 21.2 % (ref 11.8–14.4)
PLATELET # BLD: 302 K/UL (ref 138–453)
PLATELET # BLD: 334 K/UL (ref 138–453)
PLATELET ESTIMATE: ABNORMAL
PLATELET ESTIMATE: ABNORMAL
PMV BLD AUTO: 10.5 FL (ref 8.1–13.5)
PMV BLD AUTO: 9.9 FL (ref 8.1–13.5)
POTASSIUM SERPL-SCNC: 4.2 MMOL/L (ref 3.7–5.3)
RBC # BLD: 3.99 M/UL (ref 4.21–5.77)
RBC # BLD: 4.11 M/UL (ref 4.21–5.77)
RBC # BLD: ABNORMAL 10*6/UL
RBC # BLD: ABNORMAL 10*6/UL
SEG NEUTROPHILS: 67 % (ref 36–66)
SEG NEUTROPHILS: 71 % (ref 36–65)
SEGMENTED NEUTROPHILS ABSOLUTE COUNT: 3.22 K/UL (ref 1.8–7.7)
SEGMENTED NEUTROPHILS ABSOLUTE COUNT: 3.48 K/UL (ref 1.5–8.1)
SODIUM BLD-SCNC: 140 MMOL/L (ref 135–144)
WBC # BLD: 4.8 K/UL (ref 3.5–11.3)
WBC # BLD: 4.9 K/UL (ref 3.5–11.3)
WBC # BLD: ABNORMAL 10*3/UL
WBC # BLD: ABNORMAL 10*3/UL

## 2020-01-15 PROCEDURE — 94770 HC ETCO2 MONITOR DAILY: CPT

## 2020-01-15 PROCEDURE — 6370000000 HC RX 637 (ALT 250 FOR IP): Performed by: NURSE PRACTITIONER

## 2020-01-15 PROCEDURE — 99291 CRITICAL CARE FIRST HOUR: CPT | Performed by: PSYCHIATRY & NEUROLOGY

## 2020-01-15 PROCEDURE — 2500000003 HC RX 250 WO HCPCS: Performed by: NURSE PRACTITIONER

## 2020-01-15 PROCEDURE — 2000000003 HC NEURO ICU R&B

## 2020-01-15 PROCEDURE — 6370000000 HC RX 637 (ALT 250 FOR IP): Performed by: PSYCHIATRY & NEUROLOGY

## 2020-01-15 PROCEDURE — 82947 ASSAY GLUCOSE BLOOD QUANT: CPT

## 2020-01-15 PROCEDURE — 76937 US GUIDE VASCULAR ACCESS: CPT

## 2020-01-15 PROCEDURE — 6360000002 HC RX W HCPCS: Performed by: PSYCHIATRY & NEUROLOGY

## 2020-01-15 PROCEDURE — 6360000002 HC RX W HCPCS: Performed by: STUDENT IN AN ORGANIZED HEALTH CARE EDUCATION/TRAINING PROGRAM

## 2020-01-15 PROCEDURE — 2700000000 HC OXYGEN THERAPY PER DAY

## 2020-01-15 PROCEDURE — 2500000003 HC RX 250 WO HCPCS: Performed by: STUDENT IN AN ORGANIZED HEALTH CARE EDUCATION/TRAINING PROGRAM

## 2020-01-15 PROCEDURE — 99024 POSTOP FOLLOW-UP VISIT: CPT | Performed by: PSYCHIATRY & NEUROLOGY

## 2020-01-15 PROCEDURE — APPNB45 APP NON BILLABLE 31-45 MINUTES: Performed by: NURSE PRACTITIONER

## 2020-01-15 PROCEDURE — 80048 BASIC METABOLIC PNL TOTAL CA: CPT

## 2020-01-15 PROCEDURE — 71045 X-RAY EXAM CHEST 1 VIEW: CPT

## 2020-01-15 PROCEDURE — 36415 COLL VENOUS BLD VENIPUNCTURE: CPT

## 2020-01-15 PROCEDURE — 94761 N-INVAS EAR/PLS OXIMETRY MLT: CPT

## 2020-01-15 PROCEDURE — 6370000000 HC RX 637 (ALT 250 FOR IP): Performed by: STUDENT IN AN ORGANIZED HEALTH CARE EDUCATION/TRAINING PROGRAM

## 2020-01-15 PROCEDURE — 2580000003 HC RX 258: Performed by: STUDENT IN AN ORGANIZED HEALTH CARE EDUCATION/TRAINING PROGRAM

## 2020-01-15 PROCEDURE — 94003 VENT MGMT INPAT SUBQ DAY: CPT

## 2020-01-15 PROCEDURE — 85025 COMPLETE CBC W/AUTO DIFF WBC: CPT

## 2020-01-15 PROCEDURE — 2580000003 HC RX 258: Performed by: NURSE PRACTITIONER

## 2020-01-15 RX ORDER — LISINOPRIL 10 MG/1
10 TABLET ORAL DAILY
Status: DISCONTINUED | OUTPATIENT
Start: 2020-01-15 | End: 2020-01-21

## 2020-01-15 RX ADMIN — INSULIN LISPRO 3 UNITS: 100 INJECTION, SOLUTION INTRAVENOUS; SUBCUTANEOUS at 10:58

## 2020-01-15 RX ADMIN — ENOXAPARIN SODIUM 40 MG: 40 INJECTION SUBCUTANEOUS at 08:32

## 2020-01-15 RX ADMIN — MODAFINIL 200 MG: 100 TABLET ORAL at 08:32

## 2020-01-15 RX ADMIN — Medication 15 ML: at 20:33

## 2020-01-15 RX ADMIN — SODIUM CHLORIDE TAB 1 GM 2 G: 1 TAB at 08:32

## 2020-01-15 RX ADMIN — SODIUM CHLORIDE, PRESERVATIVE FREE 10 ML: 5 INJECTION INTRAVENOUS at 20:34

## 2020-01-15 RX ADMIN — CLOPIDOGREL 75 MG: 75 TABLET, FILM COATED ORAL at 08:33

## 2020-01-15 RX ADMIN — AMANTADINE HYDROCHLORIDE 100 MG: 50 SOLUTION ORAL at 08:33

## 2020-01-15 RX ADMIN — FOLIC ACID 1 MG: 1 TABLET ORAL at 08:32

## 2020-01-15 RX ADMIN — AMANTADINE HYDROCHLORIDE 100 MG: 50 SOLUTION ORAL at 15:05

## 2020-01-15 RX ADMIN — LISINOPRIL 10 MG: 10 TABLET ORAL at 10:18

## 2020-01-15 RX ADMIN — SODIUM CHLORIDE, PRESERVATIVE FREE 10 ML: 5 INJECTION INTRAVENOUS at 20:33

## 2020-01-15 RX ADMIN — LEVETIRACETAM 500 MG: 500 SOLUTION ORAL at 20:34

## 2020-01-15 RX ADMIN — FAMOTIDINE 20 MG: 10 INJECTION, SOLUTION INTRAVENOUS at 20:33

## 2020-01-15 RX ADMIN — ROSUVASTATIN CALCIUM 5 MG: 5 TABLET, FILM COATED ORAL at 20:33

## 2020-01-15 RX ADMIN — FLUOXETINE 10 MG: 10 CAPSULE ORAL at 10:19

## 2020-01-15 RX ADMIN — Medication 20 MG: at 01:19

## 2020-01-15 RX ADMIN — HYDRALAZINE HYDROCHLORIDE 10 MG: 20 INJECTION INTRAMUSCULAR; INTRAVENOUS at 06:51

## 2020-01-15 RX ADMIN — Medication 100 MG: at 08:32

## 2020-01-15 RX ADMIN — SODIUM CHLORIDE, PRESERVATIVE FREE 10 ML: 5 INJECTION INTRAVENOUS at 21:00

## 2020-01-15 RX ADMIN — INSULIN LISPRO 3 UNITS: 100 INJECTION, SOLUTION INTRAVENOUS; SUBCUTANEOUS at 00:31

## 2020-01-15 RX ADMIN — SOTALOL HYDROCHLORIDE 160 MG: 80 TABLET ORAL at 08:32

## 2020-01-15 RX ADMIN — MODAFINIL 100 MG: 100 TABLET ORAL at 15:05

## 2020-01-15 RX ADMIN — INSULIN GLARGINE 25 UNITS: 100 INJECTION, SOLUTION SUBCUTANEOUS at 10:58

## 2020-01-15 RX ADMIN — FAMOTIDINE 20 MG: 10 INJECTION, SOLUTION INTRAVENOUS at 08:33

## 2020-01-15 RX ADMIN — ASPIRIN 81 MG: 81 TABLET, CHEWABLE ORAL at 10:18

## 2020-01-15 RX ADMIN — SODIUM CHLORIDE, PRESERVATIVE FREE 10 ML: 5 INJECTION INTRAVENOUS at 08:33

## 2020-01-15 RX ADMIN — HYDRALAZINE HYDROCHLORIDE 10 MG: 20 INJECTION INTRAMUSCULAR; INTRAVENOUS at 17:09

## 2020-01-15 RX ADMIN — SOTALOL HYDROCHLORIDE 160 MG: 80 TABLET ORAL at 20:33

## 2020-01-15 RX ADMIN — Medication 15 ML: at 08:33

## 2020-01-15 RX ADMIN — INSULIN LISPRO 3 UNITS: 100 INJECTION, SOLUTION INTRAVENOUS; SUBCUTANEOUS at 23:49

## 2020-01-15 RX ADMIN — LEVETIRACETAM 500 MG: 500 SOLUTION ORAL at 08:32

## 2020-01-15 RX ADMIN — INSULIN LISPRO 3 UNITS: 100 INJECTION, SOLUTION INTRAVENOUS; SUBCUTANEOUS at 06:53

## 2020-01-15 ASSESSMENT — PULMONARY FUNCTION TESTS
PIF_VALUE: 17
PIF_VALUE: 18
PIF_VALUE: 17
PIF_VALUE: 13
PIF_VALUE: 18
PIF_VALUE: 17
PIF_VALUE: 13
PIF_VALUE: 13
PIF_VALUE: 20
PIF_VALUE: 18
PIF_VALUE: 13
PIF_VALUE: 14
PIF_VALUE: 16
PIF_VALUE: 21
PIF_VALUE: 18
PIF_VALUE: 17
PIF_VALUE: 18
PIF_VALUE: 23
PIF_VALUE: 28
PIF_VALUE: 18
PIF_VALUE: 18
PIF_VALUE: 22
PIF_VALUE: 17
PIF_VALUE: 13
PIF_VALUE: 13
PIF_VALUE: 18
PIF_VALUE: 16
PIF_VALUE: 18

## 2020-01-15 NOTE — PROGRESS NOTES
°C)  BP Range: Systolic (74PHE), QFE:807 , Min:122 , OCE:640     Diastolic (27UCV), WNV:25, Min:73, Max:106    Pulse Range: Pulse  Av.9  Min: 73  Max: 92  Respiration Range: Resp  Av.2  Min: 14  Max: 57  Current Pulse Ox: SpO2: 100 %  24HR Pulse Ox Range: SpO2  Av.1 %  Min: 96 %  Max: 100 %  Patient Vitals for the past 12 hrs:   BP Temp Pulse Resp SpO2   01/15/20 0609 (!) 161/84 -- 75 (!) 47 100 %   01/15/20 0503 (!) 146/94 -- 74 (!) 39 100 %   01/15/20 0413 -- -- 73 16 100 %   01/15/20 0402 (!) 143/73 98.1 °F (36.7 °C) 74 (!) 42 100 %   01/15/20 0303 (!) 138/97 -- 76 (!) 56 98 %   01/15/20 0202 (!) 122/106 -- 76 (!) 35 100 %   01/15/20 0103 (!) 154/86 -- 78 (!) 57 98 %   01/15/20 0002 (!) 145/95 97.9 °F (36.6 °C) 74 (!) 53 100 %   20 2355 -- -- 75 18 100 %   20 2303 (!) 142/86 -- 78 (!) 42 100 %   20 2202 131/82 -- 80 (!) 41 100 %   20 136/77 -- 83 (!) 36 99 %   20 (!) 143/89 99.1 °F (37.3 °C) 92 (!) 45 99 %     Estimated body mass index is 26.17 kg/m² as calculated from the following:    Height as of this encounter: 5' 11\" (1.803 m).     Weight as of this encounter: 187 lb 9.8 oz (85.1 kg).  []<16 Severe malnutrition  []16-16.99 Moderate malnutrition  []17-18.49 Mild malnutrition  []18.5-24.9 Normal  [x]25-29.9 Overweight (not obese)  []30-34.9 Obese class 1 (Low Risk)  []35-39.9 Obese class 2 (Moderate Risk)  []?40 Obese class 3 (High Risk)    RECENT LABS:   Lab Results   Component Value Date    WBC 4.8 01/15/2020    HGB 9.8 (L) 01/15/2020    HCT 33.5 (L) 01/15/2020     01/15/2020    CHOL 105 2019    TRIG 60 2019    HDL 52 2019    ALT 21 2019    AST 21 2019     01/15/2020    K 4.2 01/15/2020     01/15/2020    CREATININE 0.32 (L) 01/15/2020    BUN 10 01/15/2020    CO2 27 01/15/2020    INR 1.0 2019    LABA1C 7.1 (H) 2019     24 HOUR INTAKE/OUTPUT:    Intake/Output Summary (Last 24 hours) at 1/15/2020

## 2020-01-15 NOTE — PROGRESS NOTES
1537    dextrose       CBC:   Recent Labs     01/13/20  0420 01/14/20  0402 01/15/20  0428   WBC 5.4 4.7 4.8   HGB 9.4* 9.5* 9.8*    346 334     BMP:    Recent Labs     01/13/20  0420 01/14/20  0402 01/15/20  0428    137 140   K 3.9 4.2 4.2    102 100   CO2 27 25 27   BUN 10 11 10   CREATININE 0.31* 0.27* 0.32*   GLUCOSE 174* 151* 144*     Hepatic:   No results for input(s): AST, ALT, ALB, BILITOT, ALKPHOS in the last 72 hours. Troponin: No results for input(s): TROPONINI in the last 72 hours. BNP: No results for input(s): BNP in the last 72 hours. Lipids:   No results for input(s): CHOL, HDL in the last 72 hours. Invalid input(s): LDLCALCU  INR:   No results for input(s): INR in the last 72 hours. Assessment and Recommendations:     Traumatic subarachnoid hemorrhage.     s/p diagnostic cerebral angiogram in December 26, 2019  1. Critical right cervical ICA stenosis measuring approximately 90 to 99% per NASCET criteria. There is delayed anterograde filling of the cervical ica across the stenosis. There is Collateral filling of the distal right ica from the retrograde filling of the right ophthalmic artery from the right IMAX. In addition there is pial collaterals from the right PCA supplying the distal right mca territory parieto/occipital region. 2. No MCA vasospasm noted however there are irregularities suspected from diffuse intracranial athero including 50% left vert athero with stenosis in the v3 segment    Patient with possible alcohol withdrawal.      S/p cerebral angiogram on January 8  --Right ICA stent was placed with pre-and post balloon angioplasty. Impression: 1. Left cervical stenosis measuring approximately 20% at the carotid ICA bulb with postsurgical changes noted from prior endarterectomy. 2.Diffuse atherosclerotic disease noted in the left cavernous ICA/left RUSTY and?distal?left MCA M1.    3.There is increased vascularity noted in the distal left RUSTY

## 2020-01-15 NOTE — PLAN OF CARE
Safety maintained throughout this shift . . bed in low position. Side rails up x 2. Bed alarm in place. Will continue to monitor.

## 2020-01-15 NOTE — CARE COORDINATION
Rober White from [facility] was updated on patient.   Once family has mad a decision will update her on decision

## 2020-01-15 NOTE — PROGRESS NOTES
01/15/20 0821   Vent Information   Vent Mode CPAP   Pressure Support 8 cmH20   FiO2  30 %   PEEP/CPAP 5   Vent Patient Data   Vt Exhaled 436 mL   Minute Volume 8.6 Liters

## 2020-01-15 NOTE — PLAN OF CARE
PROVIDE ADEQUATE OXYGENATION WITH ACCEPTABLE SP02/ABG'S    [x]  IDENTIFY APPROPRIATE OXYGEN THERAPY  [x]   MONITOR SP02/ABG'S AS NEEDED   [x]   PATIENT EDUCATION AS NEEDED    Ventilator Bronchodilator assessment    Breath sounds: clear after suctioing  Inspiratory Pressure: 17  Plateau Pressure: 13    Patient assessed at level 1          []    Bronchodilator Assessment    BRONCHODILATOR ASSESSMENT SCORE  Score 0 (Home) 1 2 3 4   Breath Sounds   []  Chronic Ventilator: Patient at baseline [x]  Mild Wheezes/ Clear []  Intermittent wheezes with good air entry []  Bilateral/unilateral wheezing with diminished air entry []  Insp/Exp wheeze and/or poor aeration   Ventilator Pressures   []  Chronic Ventilator [x]  Insp. Pressure less than 25 cm H20 []  Insp. Pressure less than 25 cm H20 []  Insp. Pressure exceeds 25 cm H20 []  Insp.  Pressure exceeds 30 cm H20   Plateau Pressure []  NA   [x]  Plateau Pressure less than 4  []  Plateau Pressure less than or equal to 5 []  Plateau Pressure greater than or equal to 6 []  Plateau Pressure greater than or equal to [place]  8:24 AM

## 2020-01-16 ENCOUNTER — APPOINTMENT (OUTPATIENT)
Dept: GENERAL RADIOLOGY | Age: 60
DRG: 030 | End: 2020-01-16
Payer: MEDICAID

## 2020-01-16 LAB
GLUCOSE BLD-MCNC: 113 MG/DL (ref 75–110)
GLUCOSE BLD-MCNC: 142 MG/DL (ref 75–110)
GLUCOSE BLD-MCNC: 142 MG/DL (ref 75–110)

## 2020-01-16 PROCEDURE — 73130 X-RAY EXAM OF HAND: CPT

## 2020-01-16 PROCEDURE — 6370000000 HC RX 637 (ALT 250 FOR IP): Performed by: PSYCHIATRY & NEUROLOGY

## 2020-01-16 PROCEDURE — 99024 POSTOP FOLLOW-UP VISIT: CPT | Performed by: PSYCHIATRY & NEUROLOGY

## 2020-01-16 PROCEDURE — 6370000000 HC RX 637 (ALT 250 FOR IP): Performed by: NURSE PRACTITIONER

## 2020-01-16 PROCEDURE — 6370000000 HC RX 637 (ALT 250 FOR IP): Performed by: STUDENT IN AN ORGANIZED HEALTH CARE EDUCATION/TRAINING PROGRAM

## 2020-01-16 PROCEDURE — 2000000003 HC NEURO ICU R&B

## 2020-01-16 PROCEDURE — 99232 SBSQ HOSP IP/OBS MODERATE 35: CPT | Performed by: INTERNAL MEDICINE

## 2020-01-16 PROCEDURE — 99291 CRITICAL CARE FIRST HOUR: CPT | Performed by: PSYCHIATRY & NEUROLOGY

## 2020-01-16 PROCEDURE — 94770 HC ETCO2 MONITOR DAILY: CPT

## 2020-01-16 PROCEDURE — 94003 VENT MGMT INPAT SUBQ DAY: CPT

## 2020-01-16 PROCEDURE — 2700000000 HC OXYGEN THERAPY PER DAY

## 2020-01-16 PROCEDURE — 71045 X-RAY EXAM CHEST 1 VIEW: CPT

## 2020-01-16 PROCEDURE — 94761 N-INVAS EAR/PLS OXIMETRY MLT: CPT

## 2020-01-16 PROCEDURE — 6360000002 HC RX W HCPCS: Performed by: STUDENT IN AN ORGANIZED HEALTH CARE EDUCATION/TRAINING PROGRAM

## 2020-01-16 PROCEDURE — 6360000002 HC RX W HCPCS: Performed by: PSYCHIATRY & NEUROLOGY

## 2020-01-16 PROCEDURE — 2500000003 HC RX 250 WO HCPCS: Performed by: NURSE PRACTITIONER

## 2020-01-16 PROCEDURE — 82947 ASSAY GLUCOSE BLOOD QUANT: CPT

## 2020-01-16 PROCEDURE — 2580000003 HC RX 258: Performed by: STUDENT IN AN ORGANIZED HEALTH CARE EDUCATION/TRAINING PROGRAM

## 2020-01-16 RX ADMIN — FOLIC ACID 1 MG: 1 TABLET ORAL at 08:53

## 2020-01-16 RX ADMIN — AMANTADINE HYDROCHLORIDE 100 MG: 50 SOLUTION ORAL at 13:45

## 2020-01-16 RX ADMIN — CLOPIDOGREL 75 MG: 75 TABLET, FILM COATED ORAL at 08:53

## 2020-01-16 RX ADMIN — Medication 15 ML: at 09:11

## 2020-01-16 RX ADMIN — HYDRALAZINE HYDROCHLORIDE 10 MG: 20 INJECTION INTRAMUSCULAR; INTRAVENOUS at 04:07

## 2020-01-16 RX ADMIN — MODAFINIL 200 MG: 100 TABLET ORAL at 09:08

## 2020-01-16 RX ADMIN — MODAFINIL 100 MG: 100 TABLET ORAL at 13:46

## 2020-01-16 RX ADMIN — LEVETIRACETAM 500 MG: 500 SOLUTION ORAL at 08:53

## 2020-01-16 RX ADMIN — INSULIN LISPRO 3 UNITS: 100 INJECTION, SOLUTION INTRAVENOUS; SUBCUTANEOUS at 23:24

## 2020-01-16 RX ADMIN — FLUOXETINE 10 MG: 10 CAPSULE ORAL at 08:53

## 2020-01-16 RX ADMIN — FAMOTIDINE 20 MG: 10 INJECTION, SOLUTION INTRAVENOUS at 20:21

## 2020-01-16 RX ADMIN — INSULIN LISPRO 3 UNITS: 100 INJECTION, SOLUTION INTRAVENOUS; SUBCUTANEOUS at 17:23

## 2020-01-16 RX ADMIN — LISINOPRIL 10 MG: 10 TABLET ORAL at 08:53

## 2020-01-16 RX ADMIN — LEVETIRACETAM 500 MG: 500 SOLUTION ORAL at 20:21

## 2020-01-16 RX ADMIN — Medication 100 MG: at 08:53

## 2020-01-16 RX ADMIN — Medication 15 ML: at 21:25

## 2020-01-16 RX ADMIN — AMANTADINE HYDROCHLORIDE 100 MG: 50 SOLUTION ORAL at 08:23

## 2020-01-16 RX ADMIN — FAMOTIDINE 20 MG: 10 INJECTION, SOLUTION INTRAVENOUS at 09:02

## 2020-01-16 RX ADMIN — SODIUM CHLORIDE, PRESERVATIVE FREE 10 ML: 5 INJECTION INTRAVENOUS at 20:22

## 2020-01-16 RX ADMIN — ENOXAPARIN SODIUM 40 MG: 40 INJECTION SUBCUTANEOUS at 08:53

## 2020-01-16 RX ADMIN — ROSUVASTATIN CALCIUM 5 MG: 5 TABLET, FILM COATED ORAL at 20:21

## 2020-01-16 RX ADMIN — ASPIRIN 81 MG: 81 TABLET, CHEWABLE ORAL at 08:53

## 2020-01-16 RX ADMIN — SOTALOL HYDROCHLORIDE 160 MG: 80 TABLET ORAL at 08:54

## 2020-01-16 RX ADMIN — SODIUM CHLORIDE, PRESERVATIVE FREE 10 ML: 5 INJECTION INTRAVENOUS at 09:01

## 2020-01-16 RX ADMIN — SOTALOL HYDROCHLORIDE 160 MG: 80 TABLET ORAL at 20:21

## 2020-01-16 RX ADMIN — DOCUSATE SODIUM 100 MG: 50 LIQUID ORAL at 08:53

## 2020-01-16 RX ADMIN — SODIUM CHLORIDE TAB 1 GM 2 G: 1 TAB at 08:54

## 2020-01-16 RX ADMIN — INSULIN GLARGINE 25 UNITS: 100 INJECTION, SOLUTION SUBCUTANEOUS at 09:21

## 2020-01-16 RX ADMIN — INSULIN LISPRO 3 UNITS: 100 INJECTION, SOLUTION INTRAVENOUS; SUBCUTANEOUS at 12:13

## 2020-01-16 ASSESSMENT — PULMONARY FUNCTION TESTS
PIF_VALUE: 17
PIF_VALUE: 18
PIF_VALUE: 15
PIF_VALUE: 20
PIF_VALUE: 15
PIF_VALUE: 8
PIF_VALUE: 23
PIF_VALUE: 15
PIF_VALUE: 14
PIF_VALUE: 17
PIF_VALUE: 15
PIF_VALUE: 14
PIF_VALUE: 16
PIF_VALUE: 16
PIF_VALUE: 15
PIF_VALUE: 16
PIF_VALUE: 21
PIF_VALUE: 15
PIF_VALUE: 15

## 2020-01-16 NOTE — CARE COORDINATION
Spoke with Fabian Chisholm from [facility], updated on patient. Family has not yet decided on plan of care. Will update Fabian Chisholm once decision has been made.

## 2020-01-16 NOTE — PROGRESS NOTES
Daily Progress Note  Neuro Critical Care        INTERVAL HISTORY    The patient is a 60 yo male with history of atrial fibrillation on Eliquis, bilateral ICA stenosis (right more than left), DM2, HLD, HTN, CAD s/p PCI stents, PVD, CEA, and ETOH abuse who presents as a transfer from [facility] as a trauma alert for CT head revealed diffuse bialteral SAH and left parietal SDH. He was given [place] and transferred to [facility] for higher level of care. Patient was found confused by family at his home after being unable to reach him on the phone. He was complaining of headache, and actively vomiting. Also had bilateral arm scrapes and bruises concerning for recent fall, patient himself does not member falling, said around 4 PM he woke up and felt frontal and vertex headache and neck pain, and felt nauseous and started throwing up. Endorses drinking alcohol last night states that he had 2 beers. Per family has significant alcohol abuse history and has had multiple falls in the past.     On presentation in the ED [facility], patient mildly lethargic but oriented x4, complaining of headache, nausea, mild vertigo, left arm weaker than right, bilateral leg weakness. Significant interdisciplinary discussion between the neurosurgeon, radiologist, trauma surgeon and stroke specialist about whether  bleed is consistent with traumatic versus spontaneous subarachnoid. Stroke specialist has significant concern for severe stenosis, suspects traumatic bleed and that patient is at risk for vasospasm. In addition has intracranial left vertebral artery athero-stenosis. Consensus management strategy is to keep the patient 130-160 for blood pressure goals to avoid right MCA watershed stroke.      No aneurysm found on CTA or malformation-critical stenosis of the proximal left vertebral artery, critical stenosis of the proximal right cervical ICA, severe near critical stenosis of the right cavernous ICA, moderate stenosis of the left cavernous ICA. Non con CT head repeat at our emergency facility showed subarachnoid, predominantly in the bilateral sylvian fissures and bilateral cerebral hemisphere sulcal I, acute left parietal convexity subdural hematoma 4 mm in thickness without mass-effect. Hospital Course:   12/27: CT head stable  12/28: Intubated overnight for respiratory distress, started on Cardizem infusion for atrial fibrillation with RVR, weaned off and Sotalol started. Versed for sedation with concern for possible alcohol withdrawal.  12/29: Cdiff negative. patient noted to have left upper extremity tremor, placed on LTME. Left hand cool to touch with delayed capillary refill, thumb spica splint removed to assess neurovascular status. With splint off, radial and ulnar pulses palpable and assessed via doppler. Capillary refill improved as did warmth and color. Ortho called to replace splint. Febrile this AM - infectious workup sent. 12/30: LTME showing diffuse slowing and disorganized background suggesting moderate encephalopathy. Started on Vanc/zosyn  12/31: Versed started for sedation, bradycardia noted with Precedex. 1/1: TCD normal. Failed SBT due to tachypnea  1/2: Sotalol increased to 160 mg BID. Zosyn started for CXR concerning for RLL pneumonia. 1/3: Failed SBT.  1/4: Tolerated CPAP trial better. 1/5: 8 beats of V. Tach - resolved spontaneously. 1/7: Extubated to room air  1/8: Respiratory distress with tachypnea, re-intubated. Found to have right gaze with right sided weakness, loaded with 2 grams Keppra. Went to cerebral angiogram for right ICA stenting. 1/9: EEG showing diffuse encephalopathy, no seizures. Will discontinue EEG and reduce Keppra to 500 mg BID. Will discontinue Librium today. MRI brain revealed bilateral MCA territory and left RUSTY territory infarcts.  Provigil dosing increased to bid  1/11: Failed SBT  1/12: Family meeting rescheduled to 1/14 1/13: failed weaning due to tachypnea. : Lisinopril started. Family meeting held, awaiting decision. 1/15: Awaiting on decision from family, palliative following. Over the last 24 hours, no AEO. Patient seems much more alert today. Nodding head, however doesn't seem to be understanding.           MEDICATIONS:     CURRENT MEDICATIONS:  SCHEDULED MEDICATIONS:   lisinopril  10 mg Oral Daily    FLUoxetine  10 mg Oral Daily    sodium chloride  2 g Oral Daily    aspirin  81 mg Oral Daily    clopidogrel  75 mg Oral Daily    levETIRAcetam  500 mg Oral BID    amantadine  100 mg Per G Tube BID- 8&2    modafinil  200 mg Oral Daily    And    modafinil  100 mg Oral Daily    insulin glargine  25 Units Subcutaneous Daily    insulin lispro  0-18 Units Subcutaneous Q6H    docusate  100 mg Oral Daily    sotalol  160 mg Oral BID    enoxaparin  40 mg Subcutaneous Daily    nicotine  1 patch Transdermal Daily    folic acid  1 mg Oral Daily    thiamine  100 mg Oral Daily    sodium chloride flush  10 mL Intravenous BID    famotidine (PEPCID) injection  20 mg Intravenous BID    rosuvastatin  5 mg Oral Nightly    chlorhexidine  15 mL Mouth/Throat BID    vitamin D  50,000 Units Oral Weekly    sodium chloride flush  10 mL Intravenous 2 times per day    sodium chloride flush  10 mL Intravenous 2 times per day     CONTINUOUS INFUSIONS:   sodium chloride 75 mL/hr at 20 1537    dextrose       PRN MEDICATIONS:   ipratropium-albuterol, albuterol, fentanNYL, acetaminophen, hydrALAZINE, labetalol, ibuprofen, acetaminophen, metoprolol, glucose, dextrose, glucagon (rDNA), dextrose, magnesium sulfate, sodium chloride flush, sodium chloride flush    VITALS 24 Hours     Temperature Range: Temp: 98.6 °F (37 °C) Temp  Av.7 °F (37.1 °C)  Min: 98.3 °F (36.8 °C)  Max: 99.3 °F (37.4 °C)  BP Range: Systolic (66MFS), UJS:573 , Min:112 , HVD:839     Diastolic (46EGB), TKY:28, Min:67, Max:105    Pulse Range: Pulse  Av.1  Min: 66  Max: 92  Respiration Range: Resp  Av.3  Min: 12  Max: 49  Current Pulse Ox: SpO2: 96 %  24HR Pulse Ox Range: SpO2  Av %  Min: 95 %  Max: 100 %  Patient Vitals for the past 12 hrs:   BP Temp Pulse Resp SpO2   20 1103 -- -- 83 24 96 %   20 1002 (!) 139/95 -- 79 19 --   20 0902 (!) 168/90 -- 82 21 --   20 0824 -- -- 83 28 97 %   20 0815 -- -- -- 27 97 %   20 0811 -- -- 79 25 98 %   20 0702 (!) 159/86 -- 81 (!) 31 98 %   20 0602 (!) 139/90 -- 78 (!) 32 97 %   20 0503 (!) 141/78 -- 76 21 96 %   20 0432 -- -- 76 14 95 %   20 0402 (!) 170/92 98.6 °F (37 °C) 71 21 99 %   20 0303 139/82 -- 72 19 97 %   20 0202 125/77 -- 73 (!) 49 99 %   20 0103 (!) 147/75 -- 69 (!) 37 99 %   20 0002 113/67 98.3 °F (36.8 °C) 72 (!) 44 97 %   01/15/20 2355 -- -- 72 12 97 %     Estimated body mass index is 26.17 kg/m² as calculated from the following:    Height as of this encounter: 5' 11\" (1.803 m). Weight as of this encounter: 187 lb 9.8 oz (85.1 kg). PHYSICAL EXAM       Physical Exam  Vitals signs and nursing note reviewed. HENT:      Head: Normocephalic and atraumatic. Eyes:      General:         Right eye: No foreign body, discharge or hordeolum. Left eye: No foreign body, discharge or hordeolum. Conjunctiva/sclera:      Right eye: Right conjunctiva is not injected. No chemosis, exudate or hemorrhage. Left eye: Left conjunctiva is injected. Hemorrhage present. No chemosis or exudate. Pupils: Pupils are equal, round, and reactive to light. Cardiovascular:      Rate and Rhythm: Normal rate. Rhythm irregular. Pulmonary:      Breath sounds: Normal breath sounds. Neurological:      Mental Status: He is alert. GCS: GCS eye subscore is 4. GCS verbal subscore is 1. GCS motor subscore is 5. Cranial Nerves: Cranial nerves are intact. Motor: No abnormal muscle tone.       Deep Tendon Reflexes:      Reflex mg/dL   POC Glucose Fingerstick    Collection Time: 01/15/20 11:26 PM   Result Value Ref Range    POC Glucose 147 (H) 75 - 110 mg/dL   POC Glucose Fingerstick    Collection Time: 01/16/20  5:02 AM   Result Value Ref Range    POC Glucose 113 (H) 75 - 110 mg/dL           RADIOLOGY   Xr Radius Ulna Left (2 Views)    Result Date: 12/26/2019  EXAMINATION: TWO XRAY VIEWS OF THE LEFT FOREARM 12/26/2019 1:37 am COMPARISON: None. HISTORY: ORDERING SYSTEM PROVIDED HISTORY: Trauma/Fracture TECHNOLOGIST PROVIDED HISTORY: Trauma/Fracture Reason for Exam: fall/trauma Acuity: Acute FINDINGS: Fiberglass splint obscures osseous details of the distal forearm. The left radius and left are intact. No acute fracture or dislocation in the left forearm. Partial visualization of 1st proximal metacarpal fracture. Osteopenia. No acute osseous abnormality left radius or ulna. Xr Radius Ulna Right (2 Views)    Result Date: 12/26/2019  EXAMINATION: TWO XRAY VIEWS OF THE RIGHT FOREARM 12/26/2019 1:37 am COMPARISON: None. HISTORY: ORDERING SYSTEM PROVIDED HISTORY: Trauma/Fracture TECHNOLOGIST PROVIDED HISTORY: Trauma/Fracture Reason for Exam: fall/trauma Acuity: Acute FINDINGS: The bones are osteopenic. The right radius and right ulna are intact. No acute fracture or dislocation in the right forearm. Along the ulnar aspect of the proximal forearm, there is soft tissue laceration. Antecubital fossa IV catheter. Osteopenia. No acute osseous abnormality right forearm. Soft tissue laceration ulnar aspect of the proximal forearm. Xr Wrist Left (min 3 Views)    Result Date: 12/25/2019  EXAMINATION: 3 XRAY VIEWS OF THE LEFT WRIST 12/25/2019 9:45 pm COMPARISON: None. HISTORY: ORDERING SYSTEM PROVIDED HISTORY: fall TECHNOLOGIST PROVIDED HISTORY: fall Acuity: Acute Type of Exam: Initial FINDINGS: Overlying splint partially obscures osseous details. Acute traumatic closed proximal 1st metacarpal fracture.   The carpal bones are post reduction Reason for Exam: fall trauma Acuity: Acute FINDINGS: Acute traumatic closed comminuted mildly angulated proximal 1st metacarpal fracture. Joint alignment is maintained. No additional acute fracture or dislocation in the left hand. Acute traumatic closed comminuted mildly angulated proximal 1st metacarpal fracture. Xr Hand Right (min 3 Views)    Result Date: 12/26/2019  EXAMINATION: THREE XRAY VIEWS OF THE RIGHT HAND 12/26/2019 1:36 am COMPARISON: None. HISTORY: ORDERING SYSTEM PROVIDED HISTORY: Trauma/Fracture TECHNOLOGIST PROVIDED HISTORY: Include clenched fist view Trauma/Fracture Reason for Exam: fall FINDINGS: Joint alignment is maintained. No acute fracture or dislocation in the right hand. Degenerative changes in the interphalangeal joints. Old ulnar styloid process fracture. Degenerative changes in the radiocarpal joint. No acute osseous abnormality in the right hand. Xr Hip Left (2-3 Views)    Result Date: 12/26/2019  EXAMINATION: ONE XRAY VIEW OF THE PELVIS AND TWO XRAY VIEWS RIGHT HIP; TWO XRAY VIEWS OF THE LEFT HIP 12/26/2019 1:37 am COMPARISON: None. HISTORY: ORDERING SYSTEM PROVIDED HISTORY: Trauma/Fracture TECHNOLOGIST PROVIDED HISTORY: AP and cross-table lateral of the hip please, thank you Trauma/Fracture Reason for Exam: fall/trauma Acuity: Acute FINDINGS: The bones are osteopenic. The femoral heads located bilateral.  No acute fracture or dislocation pelvis or hips bilaterally. SI joints are grossly intact. Pubic symphysis is intact. The sacrum is partially obscured by contrast in the bladder. No acute osseous abnormality in the pelvis or hips bilaterally. Osteopenia. Xr Knee Left (3 Views)    Result Date: 12/26/2019  EXAMINATION: THREE XRAY VIEWS OF THE LEFT KNEE 12/26/2019 1:36 am COMPARISON: None.  HISTORY: ORDERING SYSTEM PROVIDED HISTORY: Trauma/Fracture TECHNOLOGIST PROVIDED HISTORY: Trauma/Fracture Reason for Exam: fall/ trauma Acuity: Acute FINDINGS: Osteopenia. No acute fracture or dislocation. No focal osseous lesion. No joint effusion. No focal soft tissue abnormality. Vascular calcifications. No acute abnormality of the knee. Osteopenia. Xr Knee Right (3 Views)    Result Date: 12/26/2019  EXAMINATION: THREE XRAY VIEWS OF THE RIGHT KNEE 12/26/2019 1:36 am COMPARISON: None. HISTORY: ORDERING SYSTEM PROVIDED HISTORY: Trauma/Fracture TECHNOLOGIST PROVIDED HISTORY: Trauma/Fracture Reason for Exam: ,fall trauma,unable to get xtable on patient Acuity: Acute FINDINGS: The bones are osteopenic. No acute fracture or dislocation. No focal osseous lesion. No evidence of joint effusion. No focal soft tissue abnormality. Vascular calcifications. No acute abnormality of the knee. Osteopenia. Xr Ankle Left (min 3 Views)    Result Date: 12/26/2019  EXAMINATION: THREE XRAY VIEWS OF THE LEFT ANKLE 12/26/2019 1:36 am COMPARISON: None. HISTORY: ORDERING SYSTEM PROVIDED HISTORY: Trauma/Fracture TECHNOLOGIST PROVIDED HISTORY: Trauma/Fracture Acuity: Acute FINDINGS: No acute fracture or dislocation. Normal alignment of the ankle mortise. No focal osseous lesion. No joint effusion. No focal soft tissue abnormality. Vascular calcifications. No acute abnormality of the ankle. Xr Ankle Right (min 3 Views)    Result Date: 12/26/2019  EXAMINATION: THREE XRAY VIEWS OF THE RIGHT ANKLE 12/26/2019 1:36 am COMPARISON: None. HISTORY: ORDERING SYSTEM PROVIDED HISTORY: Trauma/Fracture TECHNOLOGIST PROVIDED HISTORY: Trauma/Fracture FINDINGS: Diffuse osteopenia. No acute fracture or dislocation. Normal alignment of the ankle mortise. No focal osseous lesion. No joint effusion. No focal soft tissue abnormality. Status post 5th metatarsal ORIF with intact screw. No acute abnormality of the ankle.      Ct Head Wo Contrast    Result Date: 12/27/2019  EXAMINATION: CT OF THE HEAD WITHOUT CONTRAST,  12/26/2019 11:08 pm TECHNIQUE: CT of the head was performed without the administration of intravenous contrast. Dose modulation, iterative reconstruction, and/or weight based adjustment of the mA/kV was utilized to reduce the radiation dose to as low as reasonably achievable. COMPARISON: 12/26/2019 5:10 a.m. HISTORY: ORDERING SYSTEM PROVIDED HISTORY: Reassess bleed for stability. TECHNOLOGIST PROVIDED HISTORY: Reassess bleed for stability. Reason for Exam: Reassess bleed for stability. Acuity: Unknown Type of Exam: Unknown FINDINGS: BRAIN/VENTRICLES: Again seen is extensive subarachnoid hemorrhage within the sylvian fissures as well as cerebral sulci bilaterally. Subdural hemorrhage overlies the left parietal convexity measures 4 mm in thickness. Hemorrhage in the prepontine cistern as well. Small amount of intraventricular hemorrhage layering in the occipital horns bilaterally. Ventricles are stable in caliber. No hydrocephalus. The gray-white matter differentiation is maintained. No midline shift. ORBITS: The visualized portion of the orbits demonstrate no acute abnormality. SINUSES: The visualized paranasal sinuses and mastoid air cells demonstrate no acute abnormality. SOFT TISSUES/SKULL:  No acute abnormality of the visualized skull or soft tissues. Stable head CT demonstrating extensive bilateral subarachnoid hemorrhage as well as intraventricular hemorrhage and left parietal convexity subdural hemorrhage. No new intracranial hemorrhage. No hydrocephalus. Ct Head Wo Contrast    Result Date: 12/26/2019  EXAMINATION: CT OF THE HEAD WITHOUT CONTRAST  12/26/2019 4:51 am TECHNIQUE: CT of the head was performed without the administration of intravenous contrast. Dose modulation, iterative reconstruction, and/or weight based adjustment of the mA/kV was utilized to reduce the radiation dose to as low as reasonably achievable.  COMPARISON: 12/25/2019 HISTORY: ORDERING SYSTEM PROVIDED HISTORY: worsening mentation TECHNOLOGIST PROVIDED HISTORY: worsening mentation FINDINGS: BRAIN/VENTRICLES: Diffuse subarachnoid hemorrhage within the cerebral sulci, sylvian fissures, anterior interhemispheric fissure, and prepontine cistern, similar in appearance to prior study. There is intraventricular hemorrhage layering in the occipital horns. No hydrocephalus. No mass effect or midline shift. Left parietal convexity subdural hemorrhage measures up to 5 mm in thickness. The basal cisterns are patent. The gray-white matter differentiation is maintained. ORBITS: The visualized portion of the orbits demonstrate no acute abnormality. SINUSES: The visualized paranasal sinuses and mastoid air cells demonstrate no acute abnormality. SOFT TISSUES/SKULL:  No acute abnormality of the visualized skull or soft tissues. 1. Stable head CT demonstrating diffuse moderate volume subarachnoid hemorrhage and left parietal convexity subdural hemorrhage measuring up to 5 mm in thickness. 2. No mass effect or midline shift. No hydrocephalus. The findings were sent to the Radiology Results [address] at 5:48 am on 12/26/2019to be communicated to a licensed caregiver. Ct Head Wo Contrast    Result Date: 12/26/2019  EXAMINATION: CT OF THE HEAD WITHOUT CONTRAST  12/25/2019 9:04 pm TECHNIQUE: CT of the head was performed without the administration of intravenous contrast. Dose modulation, iterative reconstruction, and/or weight based adjustment of the mA/kV was utilized to reduce the radiation dose to as low as reasonably achievable. COMPARISON: None HISTORY: ORDERING SYSTEM PROVIDED HISTORY: trauma TECHNOLOGIST PROVIDED HISTORY: trauma Multiple falls. On Eliquis. Subarachnoid hemorrhage on CT head performed at outside institution. FINDINGS: BRAIN/VENTRICLES: There is moderate subarachnoid hemorrhage in the bilateral sylvian fissures and bilateral frontal, temporal, and parietal lobe sulci.  Additional subarachnoid hemorrhage is noted in the interhemispheric fissure and concurrently. No acute abnormality of the cervical spine. Ct Thoracic Spine Wo Contrast    Result Date: 12/26/2019  EXAMINATION: CT OF THE THORACIC SPINE WITHOUT CONTRAST; CT OF THE LUMBAR SPINE WITHOUT CONTRAST; CT OF THE CHEST, ABDOMEN, AND PELVIS WITH CONTRAST, 12/25/2019 9:04 pm; 12/25/2019 9:05 pm TECHNIQUE: CT of the thoracic and lumbar spine was performed without the administration of intravenous contrast.  CT of the chest, abdomen and pelvis was performed with the administration of intravenous contrast. Multiplanar reformatted images are provided for review. Dose modulation, iterative reconstruction, and/or weight based adjustment of the mA/kV was utilized to reduce the radiation dose to as low as reasonably achievable. COMPARISON: None HISTORY: ORDERING SYSTEM PROVIDED HISTORY: trauma TECHNOLOGIST PROVIDED HISTORY: trauma Reason for Exam: fall from standing Acuity: Acute Type of Exam: Initial; ORDERING SYSTEM PROVIDED HISTORY: trauma TECHNOLOGIST PROVIDED HISTORY: trauma Reason for Exam: fall from standing Acuity: Acute Type of Exam: Initial; ORDERING SYSTEM PROVIDED HISTORY: trauma TECHNOLOGIST PROVIDED HISTORY: trauma Reason for Exam: fall from standing Acuity: Acute Type of Exam: Initial FINDINGS: CTA CHEST: Stable cardiomegaly. No pericardial effusion. No mediastinal hematoma or pneumomediastinum. No enlarged mediastinal or hilar lymph nodes. Main pulmonary artery is dilated, measuring up to 4 cm, suggesting pulmonary hypertension. Calcified and noncalcified atherosclerotic plaque of the thoracic aorta. Incidentally noted 4 vessel aortic arch with separate arch origin of the left vertebral artery. Ectatic ascending thoracic aorta measures up to 4 cm in diameter. No acute aortic abnormality. Central airways are clear. No pleural effusion or pneumothorax. No pulmonary contusion or pulmonary laceration. Mild bilateral lower lobe dependent atelectatic changes.  Healing anterolateral left PROVIDED HISTORY: trauma Reason for Exam: fall from standing Acuity: Acute Type of Exam: Initial; ORDERING SYSTEM PROVIDED HISTORY: trauma TECHNOLOGIST PROVIDED HISTORY: trauma Reason for Exam: fall from standing Acuity: Acute Type of Exam: Initial; ORDERING SYSTEM PROVIDED HISTORY: trauma TECHNOLOGIST PROVIDED HISTORY: trauma Reason for Exam: fall from standing Acuity: Acute Type of Exam: Initial FINDINGS: CTA CHEST: Stable cardiomegaly. No pericardial effusion. No mediastinal hematoma or pneumomediastinum. No enlarged mediastinal or hilar lymph nodes. Main pulmonary artery is dilated, measuring up to 4 cm, suggesting pulmonary hypertension. Calcified and noncalcified atherosclerotic plaque of the thoracic aorta. Incidentally noted 4 vessel aortic arch with separate arch origin of the left vertebral artery. Ectatic ascending thoracic aorta measures up to 4 cm in diameter. No acute aortic abnormality. Central airways are clear. No pleural effusion or pneumothorax. No pulmonary contusion or pulmonary laceration. Mild bilateral lower lobe dependent atelectatic changes. Healing anterolateral left 6th rib fracture. No acute displaced rib fracture or chest wall hematoma. CTA ABDOMEN: No acute traumatic abnormality of the liver, spleen, pancreas, kidneys, or adrenal glands. Normal gallbladder. Stomach, small bowel, and colon are normal in caliber without evidence of obstruction. Normal appendix. Sigmoid diverticulosis without diverticulitis. Calcified and noncalcified aortoiliac atherosclerotic calcification. Abdominal aorta is normal in caliber. No free fluid in the abdomen. CTA PELVIS: Urinary bladder and pelvic organs are normal.  No free fluid in the pelvis. Bilateral common iliac stents are in position. THORACIC/LUMBAR SPINE: BONES/ALIGNMENT: Normal alignment of the thoracic and lumbar spine. Vertebral body heights are maintained. No acute fracture.  DEGENERATIVE CHANGES: Multilevel disc narrowing and endplate osteophyte formation. Mild multilevel facet joint degenerative changes. No high-grade, bony spinal canal stenosis. SOFT TISSUES: Paraspinal soft tissues are normal.     No acute traumatic abnormality of the chest, abdomen, or pelvis. Remote, healing anterolateral left 6th rib fracture. No acute osseous abnormality of the thoracic or lumbar spine. Ir Angiogram Carotid C Erebral Bilateral    Result Date: 12/27/2019  Date of procedure: 12/26/2019 Procedure: Diagnostic cerebral angiogram Indication: Subarachnoid hemorrhage, evaluation for aneurysm. Procedures: 1. Selective right common carotid artery angiogram 2. Selective right internal carotid artery cerebral angiogram 3. Selective right vertebral artery cerebral angiogram 4. Selective left common carotid artery angiogram 5. Selective left internal carotid artery cerebral angiogram 6. Selective left vertebral artery cerebral angiogram 7. Right common femoral artery angiogram Neurointerventionalist: Jagdeep Lubin MD [place]: Percy He MD Comparison: None Fluoroscopy time: 29.7 minutes Contrast: 100 cc Visipaque-270 Side of Access: Right femoral Closure device: Vascade Sedation: Conscious sedation Patient arrived to the angio suite: 1325 Puncture obtained at: 1400 Vascular access removed at: 1605 Consent:After explaining the risks and benefits to the patient and the patient's family, including but not limited to stroke, coma, death, vessel injury, dissection, tear, occlusion, and X-ray dye allergic type reaction, a signed consent form was obtained. Anesthesia: IV moderate sedation was supervised by Dr. Jagdeep Lubin. The patient was independently monitored by a Registered Nurse assigned to the Department of Radiology?using automated blood pressure, EKG and pulse oximetry. ? The detailed Conscious Record is permanently stored in the [place] [id number].  The following is the conscious sedation record including Start and End times: Fentanyl, propofol and Versed from 1400 to  1600 . Clinical History:59 y. o.?male?with past medical history including atrial fibrillation on Eliquis, diabetes mellitus, hypertension, hyperlipidemia, coronary artery disease s/p PCI stents, peripheral vascular disease, history of bilateral ICA stenosis with  right worse than left, history of CEA, alcohol abuse. ? Patient presented after a fall from outside facility as a trauma alert?and found to have bilateral subarachnoid hemorrhage/left parietal subdural hematoma. ? He received Kcentra in outside hospital. ? Neuro endovascular was consulted. Description and findings: The patient's right groin was prepped and draped in standard sterile fashion and under local anesthesia with conscious sedation, the right common femoral artery was accessed with a single-wall needle technique, and a 5 [place] intravascular sheath was placed within the right common femoral artery, establishing arterial access. A 5 Uzbek multipurpose catheter was then advanced over a guide wire to the level of the aortic arch, and used to selectively catheterize the right common carotid artery. Right CCA technique: The right common carotid artery was selectively catheterized under fluoroscopic guidance and digital subtraction angiography images were obtained in biplane projections of the right cervical carotid artery. Interpretation:The right common carotid artery injection demonstrates normal antegrade flow into the external and internal carotid arteries with normal filling of the external carotid artery branches. Course and caliber of the cervical portion of the right internal carotid artery revealed significant proximal right ICA stenosis/string sign measuring approximately 90-99% per NASCET criteria. Flow across the high-grade stenosis was delayed with earlier filling noted of the distal supraclinoid internal carotid artery through retrograde opacification of the right ophthalmic artery from the internal maxillary artery. procedure with no change noted in their neurologic examination, with distal pulses present. The patient was subsequently discharged from the neurointerventional suite. Impression: 1. Critical right ICA stenosis with a string sign is noted measuring approximately 90-99% per NASCET criteria. Noted retrograde Filling of the right ophthalmic artery from the right internal maxillary artery branches with slow filling of the anterior cerebral artery, middle cerebral artery and the distal branches in addition to distal right mca vascular supply from the right pca due to previously noted critical proximal cervical ICA stenosis. 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 3.No evidence of clear discrete aneurysm, arteriovenous malformation, arterial dissection, or veno-occlusive disease. 4. 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 Percy He dictated this invasive procedure. Dr. Jenna Vicente was present for all procedural and imaging components of this case. Examination was reviewed and reported findings confirmed and edited by Dr. Jenna Vicente. Dr. Jenna Vicente supervised and interpreted this procedure. Jagdeep Lubin MD Final report electronically signed by Jagdeep Lubin M.D. on 12/27/2019 4:00 PM    Cta Head Neck W Contrast    Result Date: 12/25/2019  EXAMINATION: CTA OF THE HEAD AND NECK WITH CONTRAST 12/25/2019 9:07 pm: TECHNIQUE: CTA of the head and neck was performed with the administration of intravenous contrast. Multiplanar reformatted images are provided for review. MIP images are provided for review. Stenosis of the internal carotid arteries measured using NASCET criteria. Dose modulation, iterative reconstruction, and/or weight based adjustment of the mA/kV was utilized to reduce the radiation dose to as low as reasonably achievable. COMPARISON: None.  HISTORY: ORDERING SYSTEM PROVIDED HISTORY: sah TECHNOLOGIST PROVIDED HISTORY: sah Reason for Exam: SAH after trauma Acuity: Acute Type of Exam: Initial FINDINGS: CTA NECK: AORTIC ARCH/ARCH VESSELS: There is a critical stenosis of the proximal left vertebral artery with moderate and severe stenoses of the V2 segment. There is a severe stenosis at the origin of the right vertebral artery. 66% stenosis of the left subclavian artery is noted. CAROTID ARTERIES: There is 25% stenosis of the proximal left common carotid artery and 50% stenosis of the mid and distal segments. The cervical portion of the left internal carotid artery is normal in course and caliber. There is 20% stenosis of the right common carotid artery. A critical stenosis of the proximal right cervical ICA is noted with attenuated flow seen distally. VERTEBRAL ARTERIES: No dissection, arterial injury, or significant stenosis. SOFT TISSUES: There is a thickened appearance to the mid esophagus. A small amount of layering debris is present within the trachea, likely reflecting pooled secretion. BONES: No acute osseous abnormality. CTA HEAD: ANTERIOR CIRCULATION: There is severely attenuated flow within the petrous and cavernous segments of the right internal carotid artery with severe, near critical stenoses of the cavernous ICA. There is also attenuated flow within the contralateral ICA, to a lesser degree, with moderate stenoses of the left cavernous ICA. There are moderate and severe multifocal stenoses of the MCA branch vessels, worse on the right-hand side. The right anterior cerebral artery is attenuated compared to the left. POSTERIOR CIRCULATION: There are mild-to-moderate stenoses of the distal right vertebral artery and a moderate to severe stenosis of the distal left. The basilar artery and PCAs appear to be slightly attenuated. OTHER: No dural venous sinus thrombosis on this non-dedicated study. BRAIN: See separately dictated noncontrast head CT report. No cerebral aneurysm or vascular malformation identified.  Significant narrowing of the intracranial vessels, likely due to a combination of atherosclerotic disease as well as vasospasm related to subarachnoid hemorrhage. Critical stenosis of the proximal left vertebral artery. Critical stenosis of the proximal right cervical ICA with attenuated flow seen distally. Severe near critical stenoses of the right cavernous ICA. Moderate stenoses of the contralateral cavernous ICA. Ct Chest Abdomen Pelvis W Contrast    Result Date: 12/26/2019  EXAMINATION: CT OF THE THORACIC SPINE WITHOUT CONTRAST; CT OF THE LUMBAR SPINE WITHOUT CONTRAST; CT OF THE CHEST, ABDOMEN, AND PELVIS WITH CONTRAST, 12/25/2019 9:04 pm; 12/25/2019 9:05 pm TECHNIQUE: CT of the thoracic and lumbar spine was performed without the administration of intravenous contrast.  CT of the chest, abdomen and pelvis was performed with the administration of intravenous contrast. Multiplanar reformatted images are provided for review. Dose modulation, iterative reconstruction, and/or weight based adjustment of the mA/kV was utilized to reduce the radiation dose to as low as reasonably achievable. COMPARISON: None HISTORY: ORDERING SYSTEM PROVIDED HISTORY: trauma TECHNOLOGIST PROVIDED HISTORY: trauma Reason for Exam: fall from standing Acuity: Acute Type of Exam: Initial; ORDERING SYSTEM PROVIDED HISTORY: trauma TECHNOLOGIST PROVIDED HISTORY: trauma Reason for Exam: fall from standing Acuity: Acute Type of Exam: Initial; ORDERING SYSTEM PROVIDED HISTORY: trauma TECHNOLOGIST PROVIDED HISTORY: trauma Reason for Exam: fall from standing Acuity: Acute Type of Exam: Initial FINDINGS: CTA CHEST: Stable cardiomegaly. No pericardial effusion. No mediastinal hematoma or pneumomediastinum. No enlarged mediastinal or hilar lymph nodes. Main pulmonary artery is dilated, measuring up to 4 cm, suggesting pulmonary hypertension. Calcified and noncalcified atherosclerotic plaque of the thoracic aorta.  Incidentally noted 4 vessel aortic arch with separate arch While intending to generate a document that actually reflects the content of the visit, the document can still have some errors including those of syntax and sound like substitutions which may escape proof reading. In such instances, actual meaning can be extrapolated by contextual diversion.

## 2020-01-16 NOTE — PLAN OF CARE
Problem: HEMODYNAMIC STATUS  Goal: Patient has stable vital signs and fluid balance  1/16/2020 1022 by Emeterio Chin RN  Outcome: Ongoing  1/16/2020 0459 by Trinh Brownlee RN  Outcome: Ongoing     Problem: Pain:  Description  Pain management should include both nonpharmacologic and pharmacologic interventions.   Goal: Pain level will decrease  Description  Pain level will decrease  Outcome: Ongoing  Goal: Control of acute pain  Description  Control of acute pain  Outcome: Ongoing  Goal: Control of chronic pain  Description  Control of chronic pain  Outcome: Ongoing     Problem: Falls - Risk of:  Goal: Will remain free from falls  Description  Will remain free from falls  Outcome: Ongoing  Goal: Absence of physical injury  Description  Absence of physical injury  Outcome: Ongoing     Problem: OXYGENATION/RESPIRATORY FUNCTION  Goal: Patient will maintain patent airway  1/16/2020 1022 by Emeterio Chin RN  Outcome: Ongoing  1/16/2020 0908 by Nelsy Phillips RCP  Outcome: Ongoing  Goal: Patient will achieve/maintain normal respiratory rate/effort  Description  Respiratory rate and effort will be within normal limits for the patient  1/16/2020 1022 by Emeterio Chin RN  Outcome: Ongoing  1/16/2020 0908 by Nelsy Phillips RCP  Outcome: Ongoing     Problem: MECHANICAL VENTILATION  Goal: Mobility/activity is maintained at optimum level for patient  1/16/2020 1022 by Emeterio Chin RN  Outcome: Ongoing  1/16/2020 0908 by Nelsy Phillips RCP  Outcome: Ongoing  Goal: Patient will maintain patent airway  1/16/2020 1022 by Emeterio Chin RN  Outcome: Ongoing  1/16/2020 0908 by Nelsy Phillips RCP  Outcome: Ongoing  Goal: Oral health is maintained or improved  1/16/2020 1022 by Emeterio Chin RN  Outcome: Ongoing  1/16/2020 0908 by Nelsy Phillips RCP  Outcome: Ongoing  Goal: ET tube will be managed safely  1/16/2020 1022 by Emeterio Chin RN  Outcome: Ongoing  1/16/2020 0908 by Nelsy Phillips RCP  Outcome: Ongoing  Goal:

## 2020-01-16 NOTE — PROGRESS NOTES
ENDOVASCULAR NEUROSURGERY PROGRESS NOTE  1/16/2020 3:41 PM  Subjective:   Admit Date: 12/25/2019  PCP: Gigi Spears MD    Intubated, NICU team awaiting the     Objective:   Vitals: BP (!) 149/92   Pulse 95   Temp 98.6 °F (37 °C) (Oral)   Resp 29   Ht 5' 11\" (1.803 m)   Wt 187 lb 9.8 oz (85.1 kg)   SpO2 99%   BMI 26.17 kg/m²   Did examine Precedex. General appearance: Intubated   HEENT: Atraumatic. Neck: Neck is supple. Lungs: Intubated  Abdomen: Soft nontender. Extremities: No lower limb edema noted. General appearance: Lying in bed, intubated  Lungs: CTAB  Heart: RRR  Abdomen: soft, NTND, bowel sounds normal    Neuro exam: intubated and sedated and no commands but eyes open spontaneously .  OCR +, Corneal +, pupils equal and reactive, withdraw in all ext, no spontaneous movement in my exam       Medications and labs:   Scheduled Meds:   lisinopril  10 mg Oral Daily    FLUoxetine  10 mg Oral Daily    sodium chloride  2 g Oral Daily    aspirin  81 mg Oral Daily    clopidogrel  75 mg Oral Daily    levETIRAcetam  500 mg Oral BID    amantadine  100 mg Per G Tube BID- 8&2    modafinil  200 mg Oral Daily    And    modafinil  100 mg Oral Daily    insulin glargine  25 Units Subcutaneous Daily    insulin lispro  0-18 Units Subcutaneous Q6H    docusate  100 mg Oral Daily    sotalol  160 mg Oral BID    enoxaparin  40 mg Subcutaneous Daily    nicotine  1 patch Transdermal Daily    folic acid  1 mg Oral Daily    thiamine  100 mg Oral Daily    sodium chloride flush  10 mL Intravenous BID    famotidine (PEPCID) injection  20 mg Intravenous BID    rosuvastatin  5 mg Oral Nightly    chlorhexidine  15 mL Mouth/Throat BID    vitamin D  50,000 Units Oral Weekly    sodium chloride flush  10 mL Intravenous 2 times per day    sodium chloride flush  10 mL Intravenous 2 times per day     Continuous Infusions:   sodium chloride 75 mL/hr at 01/09/20 1537    dextrose       CBC:   Recent Labs

## 2020-01-16 NOTE — PROGRESS NOTES
Palliative Care Progress Note    NAME:  Rabia Meadows  MEDICAL RECORD NUMBER:  [id number]  AGE: 61 y.o. GENDER: male  : 1960  TODAY'S DATE:  2020    Reason for Consult:  goals of care  History of Present Illness     The patient is a 61 y.o. Non-/non  male who presents with Fall (multiple falls, on eliquis, scatered SAH, kcentra en route ) and Trauma      Referred to Palliative Care by  ? X Physician   ? Nursing  ? Family Request   ? Other:       He was admitted to the neuro critical care service for [facility] (subarachnoid hemorrhage) ([facility].) [I60.9]. His hospital course has been associated with bilateral temporoparietal ischemic stroke. The patient has a complicated medical history and has been hospitalized since 2019  8:47 PM.    OVERNIGHT EVENTS:  Still waiting for family's decision on goals of care. Withdrawal of support versus trach and PEG. Per RN, patient is tracking this morning, still does not follow commands     BP (!) 149/92   Pulse 95   Temp 98.6 °F (37 °C) (Oral)   Resp 29   Ht 5' 11\" (1.803 m)   Wt 187 lb 9.8 oz (85.1 kg)   SpO2 99%   BMI 26.17 kg/m²     Assessment        REVIEW OF SYSTEMS    ? X  UNABLE TO OBTAIN:         PHYSICAL ASSESSMENT:     General: ?  Oriented x3      ? well appearing      ? XIntubated      ? X ill appearing      ? Other:    Mental Status: ? normal mental status exam      ?X drowsy      ? Confused      ? Other: non verbal. Opens eyes when called intermittently     Cardiovascular: ?  Regular rate/rhythm      ? Arrhythmia      ? Other: irregular    Chest: ? Effort normal      ? lungs clear      ? respiratory distress      ? Tachypnea      ? Other: intubated. Abdomen: ? Soft/non-tender      ? XNormal appearance      ? Distended      ? Ascites      ? Other:    Neurological: ? Normal Speech      ? Normal Sensation      ? Deficits present:    -Patient opens his eyes when called. Patient was variably able to turn his head when called. further care. [facility] stated that they will be coming to hospital to see Shruthi Horton today. A/P  - will continue to follow  - family to make decision today. - if family decides to go comfort care route, they would want hospice in [place] [id number]. Principle Problem/Diagnosis:  Active Problems:    SAH (subarachnoid hemorrhage) (HCC)    Subarachnoid bleed (HCC)    Traumatic subarachnoid hemorrhage with loss of consciousness of 1 hour to 5 hours 59 minutes (HCC)    Carotid stenosis, right    Asymptomatic stenosis of left vertebral artery    Intracranial atherosclerosis    History of CEA (carotid endarterectomy)    Ventilator dependence (HCC)    Delirium tremens (HCC)    Chronic a-fib    On mechanically assisted ventilation (HCC)    Encephalopathy    Acute ischemic multifocal anterior circulation stroke [facility])    Palliative care encounter        Additional Assessments:    1- Symptom management/ pain control    Pt intubated. No acute symptoms to be managed at this time. We feel the patient symptoms are being controlled. his current regimen is reviewed by myself and discussed with the staff. 2- Goals of care evaluation   The patient goals of care are    Goals of care discussed with:    ? Patient independently    ? Patient and Family    ? Family or Healthcare DPOA independently    ? X Unable to discuss with patient, family/DPOA not present    3- Code Status  Full Code    4- Other recommendations  - We will continue to provide comfort and support to the patient and the family    Please call with any palliative questions or concerns. Palliative Care Team is available via perfect serve or via phone. Palliative Care will continue to follow Mr. Layne Corral care as needed. The note has been dictated by dragon, typing errors may be a possibility     Thank you for allowing Palliative Care to participate in the care of Mr. Lo Bacon .        Electronically signed by   Cameron Hernández MD  Palliative Care Team  on 1/16/2020 at 3:24 PM    Palliative care office: [phone]    Attending Physician Statement  I have discussed the care of Sulema Florian, including pertinent history and exam findings with the resident. I have reviewed the key elements of all parts of the encounter with the resident. I have seen and examined the patient with the resident. I agree with the assessment and plan and status of the problem list as documented. On my evaluation today, he has not been on any sedation he was continued on ventilator when I saw him he was on CPAP/pressure support of 10/5 and at that time no apnea was noted, today patient was slightly arousable with stimuli and on vocal stimuli does take time for him to wake up with eyes opening he actually turning his head purposefully on vocal commands but he was not able to move any extremities, family still had not arrived further decision for tracheostomy or PEG placement. Recommend to discuss with family again as patient is slightly more arousable although may not be ready for extubation as he may not be able to protect his airways for clearance of airways to maintain ventilation. We will be happy to discuss with family when family is available and if any assistance to neuro critical care team.    Discussed with nursing staff in detail. Please note that this chart was generated using voice recognition Dragon dictation software. Although every effort was made to ensure the accuracy of this automated transcription, some errors in transcription may have occurred.       Preet Spangler MD  1/16/2020 6:19 PM

## 2020-01-16 NOTE — PROGRESS NOTES
above  · Anthropometric Measures:  · Ht: 5' 11\" (180.3 cm)   · Current Body Wt: 187 lb 9.8 oz (85.1 kg)  · Admission Body Wt: 210 lb 8.6 oz (95.5 kg)  · Weight Change: 11% weight loss x 2 weeks per admission and current weights. Likely does not reflect true weight loss, + edema noted.    · Ideal Body Wt: 172 lb (78 kg), % Ideal Body 109%  · BMI Classification: BMI 25.0 - 29.9 Overweight    Nutrition Interventions:   Continue current Tube Feeding  Continued Inpatient Monitoring, Education Not Indicated    Nutrition Evaluation:   · Evaluation: Goal achieved   · Goals: Meet % of estimated nutrition needs   · Monitoring: TF Intake, TF Tolerance, Skin Integrity, Pertinent Labs, Weight, Monitor Bowel Function, Diarrhea      Electronically signed by Loly Stewart MS, RD, LD on 1/16/20 at 1:33 PM    Contact Number: [phone]

## 2020-01-17 LAB
ABSOLUTE EOS #: 0.15 K/UL (ref 0–0.44)
ABSOLUTE IMMATURE GRANULOCYTE: 0 K/UL (ref 0–0.3)
ABSOLUTE LYMPH #: 0.9 K/UL (ref 1.1–3.7)
ABSOLUTE MONO #: 0.3 K/UL (ref 0.1–1.2)
ANION GAP SERPL CALCULATED.3IONS-SCNC: 12 MMOL/L (ref 9–17)
BASOPHILS # BLD: 1 % (ref 0–2)
BASOPHILS ABSOLUTE: 0.05 K/UL (ref 0–0.2)
BUN BLDV-MCNC: 13 MG/DL (ref 6–20)
BUN/CREAT BLD: ABNORMAL (ref 9–20)
CALCIUM SERPL-MCNC: 9.2 MG/DL (ref 8.6–10.4)
CHLORIDE BLD-SCNC: 99 MMOL/L (ref 98–107)
CO2: 27 MMOL/L (ref 20–31)
CREAT SERPL-MCNC: 0.36 MG/DL (ref 0.7–1.2)
DIFFERENTIAL TYPE: ABNORMAL
EOSINOPHILS RELATIVE PERCENT: 3 % (ref 1–4)
GFR AFRICAN AMERICAN: >60 ML/MIN
GFR NON-AFRICAN AMERICAN: >60 ML/MIN
GFR SERPL CREATININE-BSD FRML MDRD: ABNORMAL ML/MIN/{1.73_M2}
GFR SERPL CREATININE-BSD FRML MDRD: ABNORMAL ML/MIN/{1.73_M2}
GLUCOSE BLD-MCNC: 136 MG/DL (ref 75–110)
GLUCOSE BLD-MCNC: 151 MG/DL (ref 75–110)
GLUCOSE BLD-MCNC: 166 MG/DL (ref 75–110)
GLUCOSE BLD-MCNC: 168 MG/DL (ref 70–99)
GLUCOSE BLD-MCNC: 93 MG/DL (ref 75–110)
HCT VFR BLD CALC: 35.3 % (ref 40.7–50.3)
HEMOGLOBIN: 10.1 G/DL (ref 13–17)
IMMATURE GRANULOCYTES: 0 %
LYMPHOCYTES # BLD: 18 % (ref 24–43)
MCH RBC QN AUTO: 23.3 PG (ref 25.2–33.5)
MCHC RBC AUTO-ENTMCNC: 28.6 G/DL (ref 28.4–34.8)
MCV RBC AUTO: 81.5 FL (ref 82.6–102.9)
MONOCYTES # BLD: 6 % (ref 3–12)
MORPHOLOGY: ABNORMAL
NRBC AUTOMATED: 0 PER 100 WBC
PDW BLD-RTO: 21.1 % (ref 11.8–14.4)
PLATELET # BLD: 289 K/UL (ref 138–453)
PLATELET ESTIMATE: ABNORMAL
PMV BLD AUTO: 11.1 FL (ref 8.1–13.5)
POTASSIUM SERPL-SCNC: 4.4 MMOL/L (ref 3.7–5.3)
RBC # BLD: 4.33 M/UL (ref 4.21–5.77)
RBC # BLD: ABNORMAL 10*6/UL
SEG NEUTROPHILS: 72 % (ref 36–65)
SEGMENTED NEUTROPHILS ABSOLUTE COUNT: 3.6 K/UL (ref 1.5–8.1)
SODIUM BLD-SCNC: 138 MMOL/L (ref 135–144)
WBC # BLD: 5 K/UL (ref 3.5–11.3)
WBC # BLD: ABNORMAL 10*3/UL

## 2020-01-17 PROCEDURE — 99232 SBSQ HOSP IP/OBS MODERATE 35: CPT | Performed by: INTERNAL MEDICINE

## 2020-01-17 PROCEDURE — 2500000003 HC RX 250 WO HCPCS: Performed by: NURSE PRACTITIONER

## 2020-01-17 PROCEDURE — 94770 HC ETCO2 MONITOR DAILY: CPT

## 2020-01-17 PROCEDURE — 6360000002 HC RX W HCPCS: Performed by: STUDENT IN AN ORGANIZED HEALTH CARE EDUCATION/TRAINING PROGRAM

## 2020-01-17 PROCEDURE — 97110 THERAPEUTIC EXERCISES: CPT

## 2020-01-17 PROCEDURE — 94003 VENT MGMT INPAT SUBQ DAY: CPT

## 2020-01-17 PROCEDURE — 36415 COLL VENOUS BLD VENIPUNCTURE: CPT

## 2020-01-17 PROCEDURE — 94761 N-INVAS EAR/PLS OXIMETRY MLT: CPT

## 2020-01-17 PROCEDURE — 82947 ASSAY GLUCOSE BLOOD QUANT: CPT

## 2020-01-17 PROCEDURE — 85025 COMPLETE CBC W/AUTO DIFF WBC: CPT

## 2020-01-17 PROCEDURE — 6360000002 HC RX W HCPCS: Performed by: PSYCHIATRY & NEUROLOGY

## 2020-01-17 PROCEDURE — 6370000000 HC RX 637 (ALT 250 FOR IP): Performed by: NURSE PRACTITIONER

## 2020-01-17 PROCEDURE — 2580000003 HC RX 258: Performed by: STUDENT IN AN ORGANIZED HEALTH CARE EDUCATION/TRAINING PROGRAM

## 2020-01-17 PROCEDURE — 2000000003 HC NEURO ICU R&B

## 2020-01-17 PROCEDURE — 99291 CRITICAL CARE FIRST HOUR: CPT | Performed by: PSYCHIATRY & NEUROLOGY

## 2020-01-17 PROCEDURE — 99024 POSTOP FOLLOW-UP VISIT: CPT | Performed by: PSYCHIATRY & NEUROLOGY

## 2020-01-17 PROCEDURE — 85730 THROMBOPLASTIN TIME PARTIAL: CPT

## 2020-01-17 PROCEDURE — 2700000000 HC OXYGEN THERAPY PER DAY

## 2020-01-17 PROCEDURE — 6370000000 HC RX 637 (ALT 250 FOR IP): Performed by: PSYCHIATRY & NEUROLOGY

## 2020-01-17 PROCEDURE — 6370000000 HC RX 637 (ALT 250 FOR IP): Performed by: STUDENT IN AN ORGANIZED HEALTH CARE EDUCATION/TRAINING PROGRAM

## 2020-01-17 PROCEDURE — 2580000003 HC RX 258: Performed by: NURSE PRACTITIONER

## 2020-01-17 PROCEDURE — 80048 BASIC METABOLIC PNL TOTAL CA: CPT

## 2020-01-17 RX ORDER — HEPARIN SODIUM 10000 [USP'U]/100ML
12 INJECTION, SOLUTION INTRAVENOUS CONTINUOUS
Status: DISCONTINUED | OUTPATIENT
Start: 2020-01-18 | End: 2020-01-25

## 2020-01-17 RX ORDER — HEPARIN SODIUM 10000 [USP'U]/100ML
12 INJECTION, SOLUTION INTRAVENOUS CONTINUOUS
Status: DISCONTINUED | OUTPATIENT
Start: 2020-01-17 | End: 2020-01-17

## 2020-01-17 RX ADMIN — ENOXAPARIN SODIUM 40 MG: 40 INJECTION SUBCUTANEOUS at 08:48

## 2020-01-17 RX ADMIN — FAMOTIDINE 20 MG: 10 INJECTION, SOLUTION INTRAVENOUS at 20:12

## 2020-01-17 RX ADMIN — HYDRALAZINE HYDROCHLORIDE 10 MG: 20 INJECTION INTRAMUSCULAR; INTRAVENOUS at 10:23

## 2020-01-17 RX ADMIN — AMANTADINE HYDROCHLORIDE 100 MG: 50 SOLUTION ORAL at 15:48

## 2020-01-17 RX ADMIN — FLUOXETINE 10 MG: 10 CAPSULE ORAL at 08:53

## 2020-01-17 RX ADMIN — FOLIC ACID 1 MG: 1 TABLET ORAL at 08:51

## 2020-01-17 RX ADMIN — MODAFINIL 100 MG: 100 TABLET ORAL at 15:55

## 2020-01-17 RX ADMIN — ROSUVASTATIN CALCIUM 5 MG: 5 TABLET, FILM COATED ORAL at 20:12

## 2020-01-17 RX ADMIN — HYDRALAZINE HYDROCHLORIDE 10 MG: 20 INJECTION INTRAMUSCULAR; INTRAVENOUS at 01:50

## 2020-01-17 RX ADMIN — SODIUM CHLORIDE, PRESERVATIVE FREE 10 ML: 5 INJECTION INTRAVENOUS at 20:12

## 2020-01-17 RX ADMIN — SODIUM CHLORIDE, PRESERVATIVE FREE 10 ML: 5 INJECTION INTRAVENOUS at 20:29

## 2020-01-17 RX ADMIN — SODIUM CHLORIDE, PRESERVATIVE FREE 10 ML: 5 INJECTION INTRAVENOUS at 08:52

## 2020-01-17 RX ADMIN — SODIUM CHLORIDE, PRESERVATIVE FREE 10 ML: 5 INJECTION INTRAVENOUS at 08:51

## 2020-01-17 RX ADMIN — ASPIRIN 81 MG: 81 TABLET, CHEWABLE ORAL at 08:50

## 2020-01-17 RX ADMIN — ERGOCALCIFEROL 50000 UNITS: 1.25 CAPSULE ORAL at 08:47

## 2020-01-17 RX ADMIN — Medication 15 ML: at 09:05

## 2020-01-17 RX ADMIN — SOTALOL HYDROCHLORIDE 160 MG: 80 TABLET ORAL at 08:52

## 2020-01-17 RX ADMIN — AMANTADINE HYDROCHLORIDE 100 MG: 50 SOLUTION ORAL at 08:47

## 2020-01-17 RX ADMIN — DOCUSATE SODIUM 100 MG: 50 LIQUID ORAL at 08:48

## 2020-01-17 RX ADMIN — Medication 15 ML: at 20:12

## 2020-01-17 RX ADMIN — LEVETIRACETAM 500 MG: 500 SOLUTION ORAL at 08:50

## 2020-01-17 RX ADMIN — MODAFINIL 200 MG: 100 TABLET ORAL at 08:58

## 2020-01-17 RX ADMIN — CLOPIDOGREL 75 MG: 75 TABLET, FILM COATED ORAL at 08:47

## 2020-01-17 RX ADMIN — LISINOPRIL 10 MG: 10 TABLET ORAL at 08:50

## 2020-01-17 RX ADMIN — FAMOTIDINE 20 MG: 10 INJECTION, SOLUTION INTRAVENOUS at 08:48

## 2020-01-17 RX ADMIN — INSULIN LISPRO 3 UNITS: 100 INJECTION, SOLUTION INTRAVENOUS; SUBCUTANEOUS at 05:36

## 2020-01-17 RX ADMIN — SOTALOL HYDROCHLORIDE 160 MG: 80 TABLET ORAL at 20:12

## 2020-01-17 RX ADMIN — INSULIN GLARGINE 25 UNITS: 100 INJECTION, SOLUTION SUBCUTANEOUS at 08:44

## 2020-01-17 RX ADMIN — Medication 100 MG: at 08:47

## 2020-01-17 ASSESSMENT — PAIN DESCRIPTION - DESCRIPTORS
DESCRIPTORS: CONSTANT
DESCRIPTORS: CONSTANT

## 2020-01-17 ASSESSMENT — PULMONARY FUNCTION TESTS
PIF_VALUE: 16
PIF_VALUE: 15
PIF_VALUE: 15
PIF_VALUE: 19
PIF_VALUE: 15
PIF_VALUE: 15
PIF_VALUE: 16
PIF_VALUE: 16
PIF_VALUE: 12
PIF_VALUE: 16
PIF_VALUE: 17
PIF_VALUE: 26
PIF_VALUE: 18
PIF_VALUE: 15
PIF_VALUE: 17
PIF_VALUE: 15
PIF_VALUE: 14
PIF_VALUE: 13
PIF_VALUE: 19
PIF_VALUE: 16
PIF_VALUE: 16
PIF_VALUE: 24

## 2020-01-17 ASSESSMENT — PAIN DESCRIPTION - PAIN TYPE
TYPE: ACUTE PAIN

## 2020-01-17 ASSESSMENT — PAIN SCALES - WONG BAKER

## 2020-01-17 ASSESSMENT — PAIN DESCRIPTION - ONSET
ONSET: ON-GOING
ONSET: ON-GOING

## 2020-01-17 ASSESSMENT — PAIN DESCRIPTION - LOCATION
LOCATION: ARM
LOCATION: ARM

## 2020-01-17 ASSESSMENT — PAIN DESCRIPTION - ORIENTATION
ORIENTATION: RIGHT;LEFT
ORIENTATION: RIGHT;LEFT

## 2020-01-17 ASSESSMENT — PAIN SCALES - GENERAL
PAINLEVEL_OUTOF10: 0
PAINLEVEL_OUTOF10: 0

## 2020-01-17 ASSESSMENT — PAIN DESCRIPTION - FREQUENCY
FREQUENCY: CONTINUOUS
FREQUENCY: CONTINUOUS

## 2020-01-17 NOTE — FLOWSHEET NOTE
Assessment: Pt is a 61 yr old male. Pt was visited per nurses request to be with family as they may discuss with doctor about extubation. Upon arrival pt's family had left to go home. Nurse stated that family is no longer considering extubation but opting for trach and peg. Intervention: I responded to perfect serve from nurse about consultation between family and doctor to discuss plan of care. I was not able to speak with family. Outcome: Pt is still intubated and unresponsive. Plan:  remain available to support family in decision making if and when they do.          01/16/20 4187   Encounter Summary   Services provided to: Patient   Referral/Consult From: Nurse   Support System Parent; Family members   Continue Visiting   (01/16/2020)   Complexity of Encounter Low   Length of Encounter 15 minutes   Spiritual Assessment Completed Yes   Routine   Type Follow up   Assessment Unable to respond   Intervention Sustaining presence/ Ministry of presence   Outcome Did not respond

## 2020-01-17 NOTE — CARE COORDINATION
Informed during this morning's quality flow rounds pt's family has decided to proceed with trach and PEG. Spoke with Petros Dorado at [facility] informed of family's decision to proceed with Trach and PEG, no date indicated at this time. [facility] will need to start percert, and Monday is a holiday, discussed with bedside nurse regarding timing to start precert for LTACH. Plan is not confirmed for time for Trach and PEG - anticipate it to be done today await Gen Surg discussion with family. Nancy bedside nurse will inform CM once determined. [id number] Received call from Fabian Chisholm from [address], review of Gen. Surgery notes indicates pt will need to be off ASA and Plavix for 5 days prior to trach and PEG. She informs they will have to wait to start precert until next week after trach is placed. Continue to follow.

## 2020-01-17 NOTE — PROGRESS NOTES
Physical Therapy  DATE: 2020  NAME: Yumiko Lucia  MRN: [id number]   : 1960    Discharge Recommendations: Continue to Assess (pending progress)   Subjective: RN agreeable to ROM. Pt sleeping upon arrival, eyes open open upon mention of name, but unable to follow any commands. PainUTA  Patient follows: NO Commands  Is patient on ventilator: YES  Is patient on sedation: NO  Precautions: General;     Therapeutic exercises:  PROM x4 extremities , 15 reps all planes. Pt demo mild resistance with left elbow extension. Bilateral gastrocnemius stretching 3 reps x 30 seconds; Pt repositioned for comfort, all vitals remained stable throughout    Goals  Short Term Goals  Short term goal 1: Pt to perform bed mobility CGA  Short term goal 2: Demonstrate functional transfers CGA  Short term goal 3: Ambulate 100ft w/ least restrictive AD Desi with good safety awareness  Short term goal 4: Tolerate 30 minutes of therapy to demo increased endurance       Plan: Progress functional mobility as medically appropriate.    Time In: 4  Time Out: 0261  Time Coded Minutes (treatment minutes): 16  Rehab Potential: Fair  Treatments/week:2-3x/wk    Denae Arriola PTA

## 2020-01-17 NOTE — CONSULTS
General Surgery:  Consult Note        PATIENT NAME: Rabia Meadows   YOB: 1960    ADMISSION DATE: 12/25/2019  8:47 PM     Admitting Provider: Dr Mark Tong Physician: Dr Fawn Duverney: 1/17/2020    Chief Complaint: [facility]  Consult Regarding:  Trach/PEG    HISTORY OF PRESENT ILLNESS:  The patient is a 61 y.o. male with extensive past medical history including atrial fibrillation on Eliquis, bilateral carotid stenosis, hypertension, diabetes, peripheral artery disease, EtOH abuse, esophageal lesionon who presented to the hospital on 12/25/2019 after sustaining a fall resulting in a subarachnoid hemorrhage. Received Kcentra. Initially, the patient was able to protect his airway however had acute respiratory failure on 12/28/2019 and was subsequently intubated. Since that time, the patient has required mechanical intubation and has been receiving nutrition via tube feeds. The patient was extubated briefly on 1/7/2020 with subsequent acute respiratory failure requiring reintubation. The patient has since failed any attempts to extubate. Did discuss the patient's past medical history with his mother and Katie Avalos. She states the patient has never had intra-abdominal surgery. Reports that he has peripheral vascular disease and has had several vascular procedures including a left-sided carotid endarterectomy. She also reports that the patient was worked up and found to have an esophageal lesion which she reports was [employer]" of esophageal carcinoma located at the GE junction. She reports that he is still being worked up for this by physicians at [facility] at [facility] in [place] without definitive plans for surgery or other therapy.       Past Medical History:        Diagnosis Date    Alcohol abuse     Atrial fibrillation ([facility] [address]     Diabetes ([facility] [address]     Gastritis     Hyperlipidemia     Hypertension     Left ventricular hypertrophy     Neuropathy     Renal cyst        Past Surgical History:        Procedure Laterality Date    ANGIOPLASTY  2019    NO VASCULAR LEG STENTS PER DR. Bentley Hammond    CAROTID STENT PLACEMENT  01/08/2019    carotid wallstent 10mm. mri conditonal safe immediately.  CORONARY ANGIOPLASTY WITH STENT PLACEMENT  2019    PT HAS 7 CARDIAC STENTS UNKNOWN 1.5 T ONLY       Medications Prior to Admission:   No medications prior to admission. Allergies:  Patient has no known allergies.     Social History:   Social History     Socioeconomic History    Marital status: Single     Spouse name: Not on file    Number of children: Not on file    Years of education: Not on file    Highest education level: Not on file   Occupational History    Not on file   Social Needs    Financial resource strain: Not on file    Food insecurity:     Worry: Not on file     Inability: Not on file    Transportation needs:     Medical: Not on file     Non-medical: Not on file   Tobacco Use    Smoking status: Current Every Day Smoker     Packs/day: 0.50     Types: Cigarettes    Smokeless tobacco: Current User   Substance and Sexual Activity    Alcohol use: Yes     Frequency: 4 or more times a week    Drug use: Never    Sexual activity: Not on file   Lifestyle    Physical activity:     Days per week: Not on file     Minutes per session: Not on file    Stress: Not on file   Relationships    Social connections:     Talks on phone: Not on file     Gets together: Not on file     Attends Anabaptism service: Not on file     Active member of club or organization: Not on file     Attends meetings of clubs or organizations: Not on file     Relationship status: Not on file    Intimate partner violence:     Fear of current or ex partner: Not on file     Emotionally abused: Not on file     Physically abused: Not on file     Forced sexual activity: Not on file   Other Topics Concern    Not on file   Social History Narrative    Not on file       Family History:   No family history kidneys, or adrenal glands. Normal gallbladder. Stomach, small bowel, and colon are normal in caliber without evidence of obstruction. Normal appendix. Sigmoid diverticulosis without diverticulitis. Calcified and noncalcified aortoiliac atherosclerotic calcification. Abdominal aorta is normal in caliber. No free fluid in the abdomen. CTA PELVIS: Urinary bladder and pelvic organs are normal.  No free fluid in the pelvis. Bilateral common iliac stents are in position. THORACIC/LUMBAR SPINE: BONES/ALIGNMENT: Normal alignment of the thoracic and lumbar spine. Vertebral body heights are maintained. No acute fracture. DEGENERATIVE CHANGES: Multilevel disc narrowing and endplate osteophyte formation. Mild multilevel facet joint degenerative changes. No high-grade, bony spinal canal stenosis. SOFT TISSUES: Paraspinal soft tissues are normal.     No acute traumatic abnormality of the chest, abdomen, or pelvis. Remote, healing anterolateral left 6th rib fracture. No acute osseous abnormality of the thoracic or lumbar spine.          ASSESSMENT:  Active Hospital Problems    Diagnosis Date Noted    Palliative care encounter [Z51.5]     Acute ischemic multifocal anterior circulation stroke (HCC) [I63.529]     Encephalopathy [G93.40]     On mechanically assisted ventilation (HCC) [Z99.11]     Chronic a-fib [I48.20]     Ventilator dependence ([address] [Z99.11]     Delirium tremens ([address] [F10.231]     Subarachnoid bleed ([address] [I60.9]     Traumatic subarachnoid hemorrhage with loss of consciousness of 1 hour to 5 hours 59 minutes ([address] [S06.6X3A]     Carotid stenosis, right [I65.21]     Asymptomatic stenosis of left vertebral artery [I65.02]     Intracranial atherosclerosis [I67.2]     History of CEA (carotid endarterectomy) [Z98.890]     SAH (subarachnoid hemorrhage) ([address] [I60.9] 12/25/2019     3 61year old male w multiple medical comorbidities s/p fall, SAH, bilateral ICA stenosis, failed

## 2020-01-17 NOTE — PROGRESS NOTES
Daily Progress Note  Neuro Critical Care    Patient Name: Luis Meyer  Patient : 1960  Room/Bed: 1211/6990-65  Code Status: Full  Allergies: No Known Allergies    CHIEF COMPLAINT:      SAH     INTERVAL HISTORY    Initial Presentation (Admitted 19): The patient is a 60 yo male with history of atrial fibrillation on Eliquis, bilateral ICA stenosis (right more than left), DM2, HLD, HTN, CAD s/p PCI stents, PVD, CEA, and ETOH abuse who presents as a transfer from [facility] as a trauma alert for CT head revealed diffuse bialteral SAH and left parietal SDH.  He was given [place] and transferred to [place] [address] for higher level of care.     Gallo Shultz was found confused by family at his home after being unable to reach him on the phone.  He was complaining of headache, and actively vomiting. Jayy Wood had bilateral arm scrapes and bruises concerning for recent fall, patient himself does not member falling, said around 4 PM he woke up and felt frontal and vertex headache and neck pain, and felt nauseous and started throwing up.  Endorses drinking alcohol last night states that he had 2 beers.  Per family has significant alcohol abuse history and has had multiple falls in the past.     On presentation in the ED [place] [address], patient mildly lethargic but oriented x4, complaining of headache, nausea, mild vertigo, left arm weaker than right, bilateral leg weakness.      Significant interdisciplinary discussion between the neurosurgeon, radiologist, trauma surgeon and stroke specialist about whether  bleed is consistent with traumatic versus spontaneous subarachnoid.  Stroke specialist has significant concern for severe stenosis, suspects traumatic bleed and that patient is at risk for vasospasm.  In addition has intracranial left vertebral artery athero-stenosis.  Consensus management strategy is to keep the patient 130-160 for blood pressure goals to avoid right MCA watershed stroke.     No aneurysm found on CTA or malformation-critical stenosis of the proximal left vertebral artery, critical stenosis of the proximal right cervical ICA, severe near critical stenosis of the right cavernous ICA, moderate stenosis of the left cavernous ICA. Non con CT head repeat at our emergency facility showed subarachnoid, predominantly in the bilateral sylvian fissures and bilateral cerebral hemisphere sulcal I, acute left parietal convexity subdural hematoma 4 mm in thickness without mass-effect. Hospital Course:   12/27: CT head stable  12/28: Intubated overnight for respiratory distress, started on Cardizem infusion for atrial fibrillation with RVR, weaned off and Sotalol started. Versed for sedation with concern for possible alcohol withdrawal.  12/29: Cdiff negative. patient noted to have left upper extremity tremor, placed on LTME. Left hand cool to touch with delayed capillary refill, thumb spica splint removed to assess neurovascular status. With splint off, radial and ulnar pulses palpable and assessed via doppler. Capillary refill improved as did warmth and color. Ortho called to replace splint. Febrile this AM - infectious workup sent. 12/30: LTME showing diffuse slowing and disorganized background suggesting moderate encephalopathy. Started on Vanc/zosyn  12/31: Versed started for sedation, bradycardia noted with Precedex. 1/1: TCD normal. Failed SBT due to tachypnea  1/2: Sotalol increased to 160 mg BID. Zosyn started for CXR concerning for RLL pneumonia. 1/3: Failed SBT.  1/4: Tolerated CPAP trial better. 1/5: 8 beats of V. Tach - resolved spontaneously. 1/7: Extubated to room air  1/8: Respiratory distress with tachypnea, re-intubated. Found to have right gaze with right sided weakness, loaded with 2 grams Keppra. Went to cerebral angiogram for right ICA stenting. 1/9: EEG showing diffuse encephalopathy, no seizures. Will discontinue EEG and reduce Keppra to 500 mg BID. Will discontinue Librium today.  MRI brain revealed bilateral MCA territory and left RUSTY territory infarcts. Provigil dosing increased to bid  : Failed SBT  : Family meeting rescheduled to : failed weaning due to tachypnea. : Lisinopril started. Family meeting held, awaiting decision. 1/15: Sodium chloride tablets discontinued  : Awaiting family decision. Exam improving. Last 24h:   No events overnight. Spoke to family this morning, both patient's mother, Marina Lara and patient's sister Yuly Vidales are both in agree ance they would like trach/peg. General surgery consulted.     CURRENT MEDICATIONS:  SCHEDULED MEDICATIONS:   lisinopril  10 mg Oral Daily    FLUoxetine  10 mg Oral Daily    aspirin  81 mg Oral Daily    clopidogrel  75 mg Oral Daily    amantadine  100 mg Per G Tube BID- 8&2    modafinil  200 mg Oral Daily    And    modafinil  100 mg Oral Daily    insulin glargine  25 Units Subcutaneous Daily    insulin lispro  0-18 Units Subcutaneous Q6H    docusate  100 mg Oral Daily    sotalol  160 mg Oral BID    enoxaparin  40 mg Subcutaneous Daily    nicotine  1 patch Transdermal Daily    folic acid  1 mg Oral Daily    thiamine  100 mg Oral Daily    sodium chloride flush  10 mL Intravenous BID    famotidine (PEPCID) injection  20 mg Intravenous BID    rosuvastatin  5 mg Oral Nightly    chlorhexidine  15 mL Mouth/Throat BID    vitamin D  50,000 Units Oral Weekly    sodium chloride flush  10 mL Intravenous 2 times per day    sodium chloride flush  10 mL Intravenous 2 times per day     CONTINUOUS INFUSIONS:   sodium chloride 75 mL/hr at 20 1537    dextrose       PRN MEDICATIONS:   ipratropium-albuterol, albuterol, fentanNYL, acetaminophen, hydrALAZINE, labetalol, ibuprofen, acetaminophen, metoprolol, glucose, dextrose, glucagon (rDNA), dextrose, magnesium sulfate, sodium chloride flush, sodium chloride flush    VITALS:  Temperature Range: Temp: 97.6 °F (36.4 °C) Temp  Av.1 °F (36.7 °C)  Min: 97.6 °F AM, 100 mg PM and Amantadine 100 mg BID as neuro stimulant.   - Goal SBP < 140  - Prozac 10 mg QD  - Neuro checks per protocol    CARDIOVASCULAR:  - Goal SBP < 140  - History of atrial fibrillation, continue sotalol 160 mg BID  - Lisinopril 10 mg QD  - Lipid panel: Cholesterol 105, LDL 41  - Crestor 5 mg QHS  - Echocardiogram: EF 45%  - Continue telemetry    PULMONARY:  - Failed extubation trial 1/8  - Vent Settings: 30/12/530/5  - CXR no acute findings  - Wean as tolerated    RENAL/FLUID/ELECTROLYTE:  - BUN 13/ Creatinine 0.36  - Total fluids @ 100 mL/hr  - Replace electrolytes PRN  - BMP M,W,F    GI/NUTRITION:  NUTRITION:  Diet NPO Effective Now   - Gen surgery consult for Trach/peg  - Bowel regimen: senna-s daily  - Last BM this morning  - GI prophylaxis: Pepcid 20 mg BID    ID:  - Tmax 36.8  - WBC 5.0  - Continue to monitor for fevers    HEME:   - H&H 10.1/35.3  - Platelets 585  - CBC -M,W,F    ENDOCRINE:  - Continue to monitor blood glucose, goal <180  - Hemoglobin A1C 7.1  - continue high ISS  - Lantus 25 units QD    OTHER:  - PT/OT/ST   - Code Status: Full  - Palliative care for family support and goal of care    PROPHYLAXIS:  Stress ulcer: H2 blocker    DVT PROPHYLAXIS:  - SCD sleeves - Thigh High   - Lovenox     DISPOSITION:  [x] To remain ICU: close neurological monitoring and vent management  [] OK for out of ICU from Neuro Critical Care standpoint    We will continue to follow along. For any changes in exam or patient status please contact Neuro Critical Care.       Norvell Mcardle, APRN - CNP  Neuro Critical Care  Pager [phone]  1/17/2020     5:04 PM

## 2020-01-17 NOTE — PROGRESS NOTES
Palliative Care Progress Note    NAME:  Germán Guo  MEDICAL RECORD NUMBER:  [id number]  AGE: 61 y.o. GENDER: male  : 1960  TODAY'S DATE:  2020    Reason for Consult:  goals of care  History of Present Illness     The patient is a 61 y.o. Non-/non  male who presents with Fall (multiple falls, on eliquis, scatered SAH, kcentra en route ) and Trauma      Referred to Palliative Care by  ? X Physician   ? Nursing  ? Family Request   ? Other:       He was admitted to the neuro critical care service for [facility] (subarachnoid hemorrhage) ([facility] [address] [I60.9]. His hospital course has been associated with bilateral temporoparietal ischemic stroke. The patient has a complicated medical history and has been hospitalized since 2019  8:47 PM.    OVERNIGHT EVENTS:  Family decided to proceed with trach and PEG. General surgery was consulted -concerned about esophageal lesion. Thoracic surgery note from [facility] stated likely to be superficial (high-grade dysplasia versus T1 a invasive adenocarcinoma). Patient was going to go for endoscopy. BP (!) 92/54   Pulse 72   Temp 97.7 °F (36.5 °C) (Axillary)   Resp (!) 41   Ht 5' 11\" (1.803 m)   Wt 187 lb 9.8 oz (85.1 kg)   SpO2 100%   BMI 26.17 kg/m²     Assessment        REVIEW OF SYSTEMS    ? X  UNABLE TO OBTAIN:         PHYSICAL ASSESSMENT:     General: ?  Oriented x3      ? well appearing      ? XIntubated      ? X ill appearing      ? Other:    Mental Status: ? normal mental status exam      ?X drowsy      ? Confused      ? Other: non verbal. Opens eyes when called intermittently     Cardiovascular: ?  Regular rate/rhythm      ? Arrhythmia      ? Other: irregular    Chest: ? Effort normal      ? lungs clear      ? respiratory distress      ? Tachypnea      ? Other: intubated. Abdomen: ? Soft/non-tender      ? XNormal appearance      ? Distended      ? Ascites      ? Other:    Neurological: ? Normal Speech      ?  Normal Sensation on calling his name. Family had discussion with neuro critical care and decided to proceed with tracheostomy and PEG. Currently he is on ventilator and general surgery was consulted for tracheostomy and PEG. His symptoms are controlled at this time. We will follow the patient if needed please call us if we will be of any further assistance.     Verna Oliveira   Attending palliative care team

## 2020-01-17 NOTE — PROGRESS NOTES
ENDOVASCULAR NEUROSURGERY PROGRESS NOTE  1/17/2020 10:34 AM  Subjective:   Admit Date: 12/25/2019  PCP: Yael Lee MD    Still intubated , family will proceed with Trach and PEG    Objective:   Vitals: /74   Pulse 81   Temp 98.5 °F (36.9 °C) (Axillary)   Resp 24   Ht 5' 11\" (1.803 m)   Wt 187 lb 9.8 oz (85.1 kg)   SpO2 100%   BMI 26.17 kg/m²   Did examine Precedex. General appearance: Intubated   HEENT: Atraumatic. Neck: Neck is supple. Lungs: Intubated  Abdomen: Soft nontender. Extremities: No lower limb edema noted. General appearance: Lying in bed, intubated  Lungs: CTAB  Heart: RRR  Abdomen: soft, NTND, bowel sounds normal    Neuro exam: intubated and sedated and no commands but eyes open spontaneously .  OCR +, Corneal +, pupils equal and reactive, withdraw in all ext, no spontaneous movement in my exam       Medications and labs:   Scheduled Meds:   lisinopril  10 mg Oral Daily    FLUoxetine  10 mg Oral Daily    aspirin  81 mg Oral Daily    clopidogrel  75 mg Oral Daily    levETIRAcetam  500 mg Oral BID    amantadine  100 mg Per G Tube BID- 8&2    modafinil  200 mg Oral Daily    And    modafinil  100 mg Oral Daily    insulin glargine  25 Units Subcutaneous Daily    insulin lispro  0-18 Units Subcutaneous Q6H    docusate  100 mg Oral Daily    sotalol  160 mg Oral BID    enoxaparin  40 mg Subcutaneous Daily    nicotine  1 patch Transdermal Daily    folic acid  1 mg Oral Daily    thiamine  100 mg Oral Daily    sodium chloride flush  10 mL Intravenous BID    famotidine (PEPCID) injection  20 mg Intravenous BID    rosuvastatin  5 mg Oral Nightly    chlorhexidine  15 mL Mouth/Throat BID    vitamin D  50,000 Units Oral Weekly    sodium chloride flush  10 mL Intravenous 2 times per day    sodium chloride flush  10 mL Intravenous 2 times per day     Continuous Infusions:   sodium chloride 75 mL/hr at 01/09/20 1537    dextrose       CBC:   Recent Labs     01/15/20  5480 01/15/20  1124 01/17/20  0436   WBC 4.8 4.9 5.0   HGB 9.8* 9.5* 10.1*    302 289     BMP:    Recent Labs     01/15/20  0428 01/17/20  0436    138   K 4.2 4.4    99   CO2 27 27   BUN 10 13   CREATININE 0.32* 0.36*   GLUCOSE 144* 168*     Hepatic:   No results for input(s): AST, ALT, ALB, BILITOT, ALKPHOS in the last 72 hours. Troponin: No results for input(s): TROPONINI in the last 72 hours. BNP: No results for input(s): BNP in the last 72 hours. Lipids:   No results for input(s): CHOL, HDL in the last 72 hours. Invalid input(s): LDLCALCU  INR:   No results for input(s): INR in the last 72 hours. Assessment and Recommendations:     Traumatic subarachnoid hemorrhage.     s/p diagnostic cerebral angiogram in December 26, 2019  1. Critical right cervical ICA stenosis measuring approximately 90 to 99% per NASCET criteria. There is delayed anterograde filling of the cervical ica across the stenosis. There is Collateral filling of the distal right ica from the retrograde filling of the right ophthalmic artery from the right IMAX. In addition there is pial collaterals from the right PCA supplying the distal right mca territory parieto/occipital region. 2. No MCA vasospasm noted however there are irregularities suspected from diffuse intracranial athero including 50% left vert athero with stenosis in the v3 segment    Patient with possible alcohol withdrawal.      S/p cerebral angiogram on January 8  --Right ICA stent was placed with pre-and post balloon angioplasty. Impression: 1. Left cervical stenosis measuring approximately 20% at the carotid ICA bulb with postsurgical changes noted from prior endarterectomy. 2.Diffuse atherosclerotic disease noted in the left cavernous ICA/left RUSTY and?distal?left MCA M1. 3.There is increased vascularity noted in the distal left RUSTY pericallosal branch with early venous drainage of unclear etiology.    4.There is decreased parenchymal blush in the

## 2020-01-18 LAB
GLUCOSE BLD-MCNC: 100 MG/DL (ref 75–110)
GLUCOSE BLD-MCNC: 151 MG/DL (ref 75–110)
GLUCOSE BLD-MCNC: 160 MG/DL (ref 75–110)
PARTIAL THROMBOPLASTIN TIME: 28.7 SEC (ref 20.5–30.5)
PARTIAL THROMBOPLASTIN TIME: 30.9 SEC (ref 20.5–30.5)

## 2020-01-18 PROCEDURE — 94761 N-INVAS EAR/PLS OXIMETRY MLT: CPT

## 2020-01-18 PROCEDURE — 6360000002 HC RX W HCPCS: Performed by: STUDENT IN AN ORGANIZED HEALTH CARE EDUCATION/TRAINING PROGRAM

## 2020-01-18 PROCEDURE — 6370000000 HC RX 637 (ALT 250 FOR IP): Performed by: PSYCHIATRY & NEUROLOGY

## 2020-01-18 PROCEDURE — 2580000003 HC RX 258: Performed by: STUDENT IN AN ORGANIZED HEALTH CARE EDUCATION/TRAINING PROGRAM

## 2020-01-18 PROCEDURE — 6370000000 HC RX 637 (ALT 250 FOR IP): Performed by: NURSE PRACTITIONER

## 2020-01-18 PROCEDURE — 6370000000 HC RX 637 (ALT 250 FOR IP): Performed by: STUDENT IN AN ORGANIZED HEALTH CARE EDUCATION/TRAINING PROGRAM

## 2020-01-18 PROCEDURE — 2580000003 HC RX 258: Performed by: NURSE PRACTITIONER

## 2020-01-18 PROCEDURE — 31720 CLEARANCE OF AIRWAYS: CPT

## 2020-01-18 PROCEDURE — 36415 COLL VENOUS BLD VENIPUNCTURE: CPT

## 2020-01-18 PROCEDURE — 99291 CRITICAL CARE FIRST HOUR: CPT | Performed by: PSYCHIATRY & NEUROLOGY

## 2020-01-18 PROCEDURE — 94770 HC ETCO2 MONITOR DAILY: CPT

## 2020-01-18 PROCEDURE — 2500000003 HC RX 250 WO HCPCS: Performed by: NURSE PRACTITIONER

## 2020-01-18 PROCEDURE — 94003 VENT MGMT INPAT SUBQ DAY: CPT

## 2020-01-18 PROCEDURE — 2000000003 HC NEURO ICU R&B

## 2020-01-18 PROCEDURE — 99024 POSTOP FOLLOW-UP VISIT: CPT | Performed by: PSYCHIATRY & NEUROLOGY

## 2020-01-18 PROCEDURE — 85730 THROMBOPLASTIN TIME PARTIAL: CPT

## 2020-01-18 PROCEDURE — 82947 ASSAY GLUCOSE BLOOD QUANT: CPT

## 2020-01-18 PROCEDURE — 2700000000 HC OXYGEN THERAPY PER DAY

## 2020-01-18 RX ADMIN — HEPARIN SODIUM 12 UNITS/KG/HR: 10000 INJECTION, SOLUTION INTRAVENOUS at 08:37

## 2020-01-18 RX ADMIN — ROSUVASTATIN CALCIUM 5 MG: 5 TABLET, FILM COATED ORAL at 22:10

## 2020-01-18 RX ADMIN — Medication 100 MG: at 08:40

## 2020-01-18 RX ADMIN — SOTALOL HYDROCHLORIDE 160 MG: 80 TABLET ORAL at 08:41

## 2020-01-18 RX ADMIN — LISINOPRIL 10 MG: 10 TABLET ORAL at 08:38

## 2020-01-18 RX ADMIN — FAMOTIDINE 20 MG: 10 INJECTION, SOLUTION INTRAVENOUS at 22:10

## 2020-01-18 RX ADMIN — FAMOTIDINE 20 MG: 10 INJECTION, SOLUTION INTRAVENOUS at 08:38

## 2020-01-18 RX ADMIN — SODIUM CHLORIDE, PRESERVATIVE FREE 10 ML: 5 INJECTION INTRAVENOUS at 08:47

## 2020-01-18 RX ADMIN — MODAFINIL 200 MG: 100 TABLET ORAL at 08:38

## 2020-01-18 RX ADMIN — FOLIC ACID 1 MG: 1 TABLET ORAL at 08:38

## 2020-01-18 RX ADMIN — AMANTADINE HYDROCHLORIDE 100 MG: 50 SOLUTION ORAL at 08:38

## 2020-01-18 RX ADMIN — AMANTADINE HYDROCHLORIDE 100 MG: 50 SOLUTION ORAL at 14:20

## 2020-01-18 RX ADMIN — INSULIN LISPRO 3 UNITS: 100 INJECTION, SOLUTION INTRAVENOUS; SUBCUTANEOUS at 22:28

## 2020-01-18 RX ADMIN — SODIUM CHLORIDE, PRESERVATIVE FREE 10 ML: 5 INJECTION INTRAVENOUS at 22:08

## 2020-01-18 RX ADMIN — Medication 15 ML: at 08:38

## 2020-01-18 RX ADMIN — INSULIN LISPRO 3 UNITS: 100 INJECTION, SOLUTION INTRAVENOUS; SUBCUTANEOUS at 18:30

## 2020-01-18 RX ADMIN — Medication 15 ML: at 22:14

## 2020-01-18 RX ADMIN — DOCUSATE SODIUM 100 MG: 50 LIQUID ORAL at 08:38

## 2020-01-18 RX ADMIN — FLUOXETINE 10 MG: 10 CAPSULE ORAL at 08:38

## 2020-01-18 RX ADMIN — HYDRALAZINE HYDROCHLORIDE 10 MG: 20 INJECTION INTRAMUSCULAR; INTRAVENOUS at 00:04

## 2020-01-18 RX ADMIN — SOTALOL HYDROCHLORIDE 160 MG: 80 TABLET ORAL at 22:10

## 2020-01-18 RX ADMIN — SODIUM CHLORIDE, PRESERVATIVE FREE 10 ML: 5 INJECTION INTRAVENOUS at 09:03

## 2020-01-18 RX ADMIN — HYDRALAZINE HYDROCHLORIDE 10 MG: 20 INJECTION INTRAMUSCULAR; INTRAVENOUS at 14:27

## 2020-01-18 RX ADMIN — SODIUM CHLORIDE, PRESERVATIVE FREE 10 ML: 5 INJECTION INTRAVENOUS at 22:07

## 2020-01-18 RX ADMIN — SODIUM CHLORIDE, PRESERVATIVE FREE 10 ML: 5 INJECTION INTRAVENOUS at 08:39

## 2020-01-18 RX ADMIN — MODAFINIL 100 MG: 100 TABLET ORAL at 14:20

## 2020-01-18 ASSESSMENT — PULMONARY FUNCTION TESTS
PIF_VALUE: 17
PIF_VALUE: 12
PIF_VALUE: 15
PIF_VALUE: 12
PIF_VALUE: 17
PIF_VALUE: 15

## 2020-01-18 NOTE — PROGRESS NOTES
General Surgery:  Daily Progress Note                   PATIENT NAME: Rogerio Urban     TODAY'S DATE: 1/18/2020, 5:11 AM  SUBJECTIVE:     Pt seen and examined at bedside. No acute events overnight. Does open eyes and track but does not follow commands. OBJECTIVE:   VITALS:  /78   Pulse 74   Temp 98.2 °F (36.8 °C) (Oral)   Resp 16   Ht 5' 11\" (1.803 m)   Wt 187 lb 9.8 oz (85.1 kg)   SpO2 100%   BMI 26.17 kg/m²      INTAKE/OUTPUT:      Intake/Output Summary (Last 24 hours) at 1/18/2020 0511  Last data filed at 1/17/2020 1558  Gross per 24 hour   Intake 1181 ml   Output 1775 ml   Net -594 ml       PHYSICAL EXAM:  General Appearance: awake, alert, oriented, in no acute distress  HEENT:  Normocephalic, atraumatic, mucus membranes moist   Heart: Heart regular rate and rhythm  Lungs: Intubated, bilateral breath sounds  Abdomen:Soft, nondistended, nontender   Extremities: No cyanosis, pitting edema, rashes noted.       Data:  CBC with Differential:    Lab Results   Component Value Date    WBC 5.0 01/17/2020    RBC 4.33 01/17/2020    HGB 10.1 01/17/2020    HCT 35.3 01/17/2020     01/17/2020    MCV 81.5 01/17/2020    MCH 23.3 01/17/2020    MCHC 28.6 01/17/2020    RDW 21.1 01/17/2020    LYMPHOPCT 18 01/17/2020    MONOPCT 6 01/17/2020    BASOPCT 1 01/17/2020    MONOSABS 0.30 01/17/2020    LYMPHSABS 0.90 01/17/2020    EOSABS 0.15 01/17/2020    BASOSABS 0.05 01/17/2020    DIFFTYPE NOT REPORTED 01/15/2020     BMP:    Lab Results   Component Value Date     01/17/2020    K 4.4 01/17/2020    CL 99 01/17/2020    CO2 27 01/17/2020    BUN 13 01/17/2020    LABALBU 3.0 12/30/2019    CREATININE 0.36 01/17/2020    CALCIUM 9.2 01/17/2020    GFRAA >60 01/17/2020    LABGLOM >60 01/17/2020    GLUCOSE 168 01/17/2020       ASSESSMENT:  Active Hospital Problems    Diagnosis Date Noted    Palliative care encounter [Z51.5]     Acute ischemic multifocal anterior circulation stroke (HCC) [I63.529]     Encephalopathy [G93.40]     On mechanically assisted ventilation (HCC) [Z99.11]     Chronic a-fib [I48.20]     Ventilator dependence ([address] [Z99.11]     Delirium tremens ([address] [F10.231]     Subarachnoid bleed ([address] [I60.9]     Traumatic subarachnoid hemorrhage with loss of consciousness of 1 hour to 5 hours 59 minutes ([address] [S06.6X3A]     Carotid stenosis, right [I65.21]     Asymptomatic stenosis of left vertebral artery [I65.02]     Intracranial atherosclerosis [I67.2]     History of CEA (carotid endarterectomy) [Z98.890]     SAH (subarachnoid hemorrhage) ([address] [I60.9] 12/25/2019       1. 61 y.o. male with SAH, failure to extubate    Plan:  1. Continue medical management per primary  2. Recommend off asa and plavix for 5 days prior to procedure  3. OK for heparin gtt when off asa/plavix  4. Will plan for Thursday 1/23 for trach/PEG    Electronically signed by Any Red MD  on 1/18/2020 at 5:11 AM     I have reviewed the resident's note and I either performed the key elements of the medical history and physical exam or was present with the resident when the key elements of the medical history and physical exam were performed. I have discussed the findings, established the care plan and recommendations with Resident.     Electronically signed by Brenda Robins IV, DO on 1/20/20 at 10:09 AM

## 2020-01-18 NOTE — PROGRESS NOTES
Daily Progress Note  Neuro Critical Care    Patient Name: Manju Solo  Patient : 1960  Room/Bed: 8634/7396-42  Code Status: Full  Allergies: No Known Allergies    CHIEF COMPLAINT:      SAH     INTERVAL HISTORY    Initial Presentation (Admitted 19): The patient is a 60 yo male with history of atrial fibrillation on Eliquis, bilateral ICA stenosis (right more than left), DM2, HLD, HTN, CAD s/p PCI stents, PVD, CEA, and ETOH abuse who presents as a transfer from [facility] as a trauma alert for CT head revealed diffuse bialteral SAH and left parietal SDH.  He was given [place] and transferred to [place] [id number] for higher level of care.     Simon Villa was found confused by family at his home after being unable to reach him on the phone.  He was complaining of headache, and actively vomiting. Hiral Faulkner had bilateral arm scrapes and bruises concerning for recent fall, patient himself does not member falling, said around 4 PM he woke up and felt frontal and vertex headache and neck pain, and felt nauseous and started throwing up.  Endorses drinking alcohol last night states that he had 2 beers.  Per family has significant alcohol abuse history and has had multiple falls in the past.     On presentation in the ED [address], patient mildly lethargic but oriented x4, complaining of headache, nausea, mild vertigo, left arm weaker than right, bilateral leg weakness.      Significant interdisciplinary discussion between the neurosurgeon, radiologist, trauma surgeon and stroke specialist about whether  bleed is consistent with traumatic versus spontaneous subarachnoid.  Stroke specialist has significant concern for severe stenosis, suspects traumatic bleed and that patient is at risk for vasospasm.  In addition has intracranial left vertebral artery athero-stenosis.  Consensus management strategy is to keep the patient 130-160 for blood pressure goals to avoid right MCA watershed stroke.     No aneurysm found on CTA or malformation-critical stenosis of the proximal left vertebral artery, critical stenosis of the proximal right cervical ICA, severe near critical stenosis of the right cavernous ICA, moderate stenosis of the left cavernous ICA. Non con CT head repeat at our emergency facility showed subarachnoid, predominantly in the bilateral sylvian fissures and bilateral cerebral hemisphere sulcal I, acute left parietal convexity subdural hematoma 4 mm in thickness without mass-effect. Hospital Course:   12/27: CT head stable  12/28: Intubated overnight for respiratory distress, started on Cardizem infusion for atrial fibrillation with RVR, weaned off and Sotalol started. Versed for sedation with concern for possible alcohol withdrawal.  12/29: Cdiff negative. patient noted to have left upper extremity tremor, placed on LTME. Left hand cool to touch with delayed capillary refill, thumb spica splint removed to assess neurovascular status. With splint off, radial and ulnar pulses palpable and assessed via doppler. Capillary refill improved as did warmth and color. Ortho called to replace splint. Febrile this AM - infectious workup sent. 12/30: LTME showing diffuse slowing and disorganized background suggesting moderate encephalopathy. Started on Vanc/zosyn  12/31: Versed started for sedation, bradycardia noted with Precedex. 1/1: TCD normal. Failed SBT due to tachypnea  1/2: Sotalol increased to 160 mg BID. Zosyn started for CXR concerning for RLL pneumonia. 1/3: Failed SBT.  1/4: Tolerated CPAP trial better. 1/5: 8 beats of V. Tach - resolved spontaneously. 1/7: Extubated to room air  1/8: Respiratory distress with tachypnea, re-intubated. Found to have right gaze with right sided weakness, loaded with 2 grams Keppra. Went to cerebral angiogram for right ICA stenting. 1/9: EEG showing diffuse encephalopathy, no seizures. Will discontinue EEG and reduce Keppra to 500 mg BID. Will discontinue Librium today.  MRI 01/17/2020    INR 1.0 12/25/2019    LABA1C 7.1 (H) 12/27/2019     24 HOUR INTAKE/OUTPUT:    Intake/Output Summary (Last 24 hours) at 1/18/2020 0758  Last data filed at 1/18/2020 0530  Gross per 24 hour   Intake 816.55 ml   Output 1625 ml   Net -808.45 ml       Labs and Images reviewed with:  [] Dr. Janet Grace    [x] Dr. Ruth Chappell  [] Dr. Hawk   [] There are no new interval images to review. PHYSICAL EXAM       Physical Exam  HENT:      Head: Normocephalic and atraumatic. Eyes:      Pupils: Pupils are equal, round, and reactive to light. Cardiovascular:      Rate and Rhythm: Normal rate. Rhythm irregular. Abdominal:      General: Abdomen is flat. Skin:     General: Skin is warm and dry. Neurological:      Mental Status: He is alert. GCS: GCS eye subscore is 4. GCS verbal subscore is 1. GCS motor subscore is 5. Cranial Nerves: No facial asymmetry. Deep Tendon Reflexes: Babinski sign absent on the right side. Babinski sign absent on the left side. Reflex Scores:       Bicep reflexes are 1+ on the right side and 1+ on the left side. Patellar reflexes are 1+ on the right side and 1+ on the left side. Comments: Midline gaze  Alert, not following commands   No abnormal movements or subtle signs of seizures   Increased tone over the RUE  Localizes to pain over all extremities          [x] There are no drains for Neuro Critical Care to monitor at this time.      ASSESSMENT AND PLAN:       61year old with history of atrial fibrillation on eliquis, who was admitted with diffuse SAH, non-aneurysmal, likely traumatic, SDH, and has been managed for DTs, acute ischemic stroke    Acute ischemic stroke, cardio-embolic origin   Traumatic SAH   Atrial fibrillation  Delirium tremens, resolved     NEUROLOGIC:  - Traumatic SAH and SDH improving  - s/p Right ICA angioplasty and stenting  -DC DAPT for possible trach and PEG  - MRI brain revealed bilateral frontal, parietal, and temporal ischemia  - EEG: diffuse encephalopathy, no seizures  - No seizures, discontinue Keppra  - Folic acid and thiamine   - Provigil 200 mg AM, 100 mg PM and Amantadine 100 mg BID as neuro stimulant.   - Goal SBP < 140  - Prozac 10 mg QD  - Neuro checks per protocol    CARDIOVASCULAR:  - Goal SBP < 140  - History of atrial fibrillation, continue sotalol 160 mg BID  - Lisinopril 10 mg QD  - Lipid panel: Cholesterol 105, LDL 41  - Crestor 5 mg QHS  - Echocardiogram: EF 45%  - Continue telemetry    PULMONARY:  - Failed extubation trial 1/8  - Tolerated SBT well  - Vent Settings: 30/12/530/5  - CXR no acute findings  - Extubation trial tomorrow     RENAL/FLUID/ELECTROLYTE:  - BUN 13/ Creatinine 0.36  - Total fluids @ 100 mL/hr  - Replace electrolytes PRN  - BMP M,W,F    GI/NUTRITION:  NUTRITION:  Diet NPO Effective Now   -Extubation trial tomorrow, NPO after midnight.  - Trach and Peg if extubation failed. - Bowel regimen: senna-s daily  - Last BM this morning  - GI prophylaxis: Pepcid 20 mg BID    ID:  - Tmax 98.2  - WBC 5.0  - Continue to monitor for fevers    HEME:   - H&H 10.1/35.3  - Platelets 310  - CBC -M,W,F    ENDOCRINE:  - Continue to monitor blood glucose, goal <180  - Hemoglobin A1C 7.1  - continue high ISS  - Lantus 25 units QD    OTHER:  - PT/OT/ST   - Code Status: Full  - Palliative care for family support and goal of care    PROPHYLAXIS:  Stress ulcer: H2 blocker    DVT PROPHYLAXIS:  - SCD sleeves - Thigh High   - Heparin infusion     DISPOSITION:  [x] To remain ICU: close neurological monitoring and vent management  [] OK for out of ICU from Neuro Critical Care standpoint    We will continue to follow along. For any changes in exam or patient status please contact Neuro Critical Care.       Destin Frey MD  Neuro Critical Care  Pager [phone]  1/18/2020     7:58 AM

## 2020-01-18 NOTE — FLOWSHEET NOTE
Dr. Blas Smith at bedside. Verified if Heparin drip is being started tomorrow as ordered because phlebotomist is asking if PTT needs drawn tonight. Dr. Danielito White stated that Heparin drip is not being run tonight so PTT is not needed. Phlebotomist Gerianne Canavan informed thereafter.

## 2020-01-18 NOTE — PLAN OF CARE
BRONCHOSPASM/BRONCHOCONSTRICTION     [x]         IMPROVE AERATION/BREATH SOUNDS  [x]   ADMINISTER BRONCHODILATOR THERAPY AS APPROPRIATE  [x]   ASSESS BREATH SOUNDS  [x]   IMPLEMENT AEROSOL/MDI PROTOCOL  [x]   PATIENT EDUCATION AS NEEDED    PROVIDE ADEQUATE OXYGENATION WITH ACCEPTABLE SP02/ABG'S    [x]  IDENTIFY APPROPRIATE OXYGEN THERAPY  [x]   MONITOR SP02/ABG'S AS NEEDED   [x]   PATIENT EDUCATION AS NEEDED    Ventilator Bronchodilator assessment    Breath sounds: clear  Inspiratory Pressure: 16  Plateau Pressure: 12    Patient assessed at level 1          []    Bronchodilator Assessment    BRONCHODILATOR ASSESSMENT SCORE  Score 0 (Home) 1 2 3 4   Breath Sounds   []  Chronic Ventilator: Patient at baseline [x]  Mild Wheezes/ Clear []  Intermittent wheezes with good air entry []  Bilateral/unilateral wheezing with diminished air entry []  Insp/Exp wheeze and/or poor aeration   Ventilator Pressures   []  Chronic Ventilator [x]  Insp. Pressure less than 25 cm H20 []  Insp. Pressure less than 25 cm H20 []  Insp. Pressure exceeds 25 cm H20 []  Insp.  Pressure exceeds 30 cm H20   Plateau Pressure []  NA   [x]  Plateau Pressure less than 4  []  Plateau Pressure less than or equal to 5 []  Plateau Pressure greater than or equal to 6 []  Plateau Pressure greater than or equal to [place]  8:11 AM

## 2020-01-18 NOTE — PROGRESS NOTES
ENDOVASCULAR NEUROSURGERY PROGRESS NOTE  1/18/2020 10:20 AM  Subjective:   Admit Date: 12/25/2019  PCP: Samantha Francis MD    Off ASA and Plavix for PEG and trach. On heparin drip      Objective:   Vitals: BP (!) 153/91   Pulse 74   Temp 99 °F (37.2 °C) (Oral)   Resp 21   Ht 5' 11\" (1.803 m)   Wt 187 lb 9.8 oz (85.1 kg)   SpO2 99%   BMI 26.17 kg/m²   Did examine Precedex. General appearance: Intubated   HEENT: Atraumatic. Neck: Neck is supple. Lungs: Intubated  Abdomen: Soft nontender. Extremities: No lower limb edema noted. General appearance: Lying in bed, intubated  Lungs: CTAB  Heart: RRR  Abdomen: soft, NTND, bowel sounds normal    Neuro exam: intubated and sedated and no commands but eyes open spontaneously, can track.  pupils equal and reactive, withdraw in all ext, no spontaneous movement in my exam       Medications and labs:   Scheduled Meds:   lisinopril  10 mg Oral Daily    FLUoxetine  10 mg Oral Daily    [Held by provider] aspirin  81 mg Oral Daily    [Held by provider] clopidogrel  75 mg Oral Daily    amantadine  100 mg Per G Tube BID- 8&2    modafinil  200 mg Oral Daily    And    modafinil  100 mg Oral Daily    insulin glargine  25 Units Subcutaneous Daily    insulin lispro  0-18 Units Subcutaneous Q6H    docusate  100 mg Oral Daily    sotalol  160 mg Oral BID    nicotine  1 patch Transdermal Daily    folic acid  1 mg Oral Daily    thiamine  100 mg Oral Daily    sodium chloride flush  10 mL Intravenous BID    famotidine (PEPCID) injection  20 mg Intravenous BID    rosuvastatin  5 mg Oral Nightly    chlorhexidine  15 mL Mouth/Throat BID    vitamin D  50,000 Units Oral Weekly    sodium chloride flush  10 mL Intravenous 2 times per day    sodium chloride flush  10 mL Intravenous 2 times per day     Continuous Infusions:   heparin (porcine) 12 Units/kg/hr (01/18/20 0837)    sodium chloride 75 mL/hr at 01/09/20 1537    dextrose       CBC:   Recent Labs 01/15/20  1124 01/17/20  0436   WBC 4.9 5.0   HGB 9.5* 10.1*    289     BMP:    Recent Labs     01/17/20  0436      K 4.4   CL 99   CO2 27   BUN 13   CREATININE 0.36*   GLUCOSE 168*     Hepatic:   No results for input(s): AST, ALT, ALB, BILITOT, ALKPHOS in the last 72 hours. Troponin: No results for input(s): TROPONINI in the last 72 hours. BNP: No results for input(s): BNP in the last 72 hours. Lipids:   No results for input(s): CHOL, HDL in the last 72 hours. Invalid input(s): LDLCALCU  INR:   No results for input(s): INR in the last 72 hours. Assessment and Recommendations:     Traumatic subarachnoid hemorrhage.     s/p diagnostic cerebral angiogram in December 26, 2019  1. Critical right cervical ICA stenosis measuring approximately 90 to 99% per NASCET criteria. There is delayed anterograde filling of the cervical ica across the stenosis. There is Collateral filling of the distal right ica from the retrograde filling of the right ophthalmic artery from the right IMAX. In addition there is pial collaterals from the right PCA supplying the distal right mca territory parieto/occipital region. 2. No MCA vasospasm noted however there are irregularities suspected from diffuse intracranial athero including 50% left vert athero with stenosis in the v3 segment    Patient with possible alcohol withdrawal.      S/p cerebral angiogram on January 8  --Right ICA stent was placed with pre-and post balloon angioplasty. Impression: 1. Left cervical stenosis measuring approximately 20% at the carotid ICA bulb with postsurgical changes noted from prior endarterectomy. 2.Diffuse atherosclerotic disease noted in the left cavernous ICA/left RUSTY and?distal?left MCA M1. 3.There is increased vascularity noted in the distal left RUSTY pericallosal branch with early venous drainage of unclear etiology.    4.There is decreased parenchymal blush in the left MCA parietal region corresponding to the hypodensity noted on prior CT head. 5. Right proximal cervical ICA critical stenosis measuring approximately 99% per NASCET criteria significantly delayed anterograde flow noted. 6. There is collateral flow noted to the distal right ICA and MCA territory arising from right internal maxillary artery branches filling the ophthalmic artery via? retrograde fashion    7. Right MCA M1/right RUSTY intracranial atherosclerotic disease noted. 8. Successful carotid stenting using Wallstent 10x37 mm with pre and post balloon angioplasty Seth balloon with the use of distal embolic protection device.         MRI brain was obtained and showing bilateral ischemic strokes. Consider to discuss goals of care with family. PLAN:  1. Traumatic subarachnoid hemorrhage management per neuro ICU. 2. Blood pressure systolic goal after the procedure is 100-140. 3. Off aspirin 81 and Plavix 75 - on heparin for PEG and trach    Please contact neuro endovascular team for any questions or concerns.     Ariel Serra MD  Stroke, [facility]  [address] [place]  Electronically signed 1/18/2020 at 10:20 AM

## 2020-01-18 NOTE — PROGRESS NOTES
01/18/20 0812   Vent Information   Vent Mode CPAP   Pressure Support 10 cmH20   Sensitivity 5   Vent Patient Data   Rate Measured 11 br/min   Minute Volume 6.1 Liters   Wean as tolerated.

## 2020-01-19 LAB
ANION GAP SERPL CALCULATED.3IONS-SCNC: 11 MMOL/L (ref 9–17)
BUN BLDV-MCNC: 14 MG/DL (ref 6–20)
BUN/CREAT BLD: ABNORMAL (ref 9–20)
CALCIUM SERPL-MCNC: 8.9 MG/DL (ref 8.6–10.4)
CHLORIDE BLD-SCNC: 99 MMOL/L (ref 98–107)
CO2: 24 MMOL/L (ref 20–31)
CREAT SERPL-MCNC: 0.35 MG/DL (ref 0.7–1.2)
GFR AFRICAN AMERICAN: >60 ML/MIN
GFR NON-AFRICAN AMERICAN: >60 ML/MIN
GFR SERPL CREATININE-BSD FRML MDRD: ABNORMAL ML/MIN/{1.73_M2}
GFR SERPL CREATININE-BSD FRML MDRD: ABNORMAL ML/MIN/{1.73_M2}
GLUCOSE BLD-MCNC: 129 MG/DL (ref 75–110)
GLUCOSE BLD-MCNC: 134 MG/DL (ref 70–99)
GLUCOSE BLD-MCNC: 138 MG/DL (ref 75–110)
GLUCOSE BLD-MCNC: 154 MG/DL (ref 75–110)
GLUCOSE BLD-MCNC: 160 MG/DL (ref 75–110)
GLUCOSE BLD-MCNC: 160 MG/DL (ref 75–110)
HCT VFR BLD CALC: 35.6 % (ref 40.7–50.3)
HEMOGLOBIN: 10.5 G/DL (ref 13–17)
MCH RBC QN AUTO: 23.8 PG (ref 25.2–33.5)
MCHC RBC AUTO-ENTMCNC: 29.5 G/DL (ref 28.4–34.8)
MCV RBC AUTO: 80.7 FL (ref 82.6–102.9)
NRBC AUTOMATED: 0 PER 100 WBC
PARTIAL THROMBOPLASTIN TIME: 30.6 SEC (ref 20.5–30.5)
PARTIAL THROMBOPLASTIN TIME: 33.4 SEC (ref 20.5–30.5)
PARTIAL THROMBOPLASTIN TIME: 42.8 SEC (ref 20.5–30.5)
PDW BLD-RTO: 20.9 % (ref 11.8–14.4)
PLATELET # BLD: 241 K/UL (ref 138–453)
PMV BLD AUTO: 10.4 FL (ref 8.1–13.5)
POTASSIUM SERPL-SCNC: 4.2 MMOL/L (ref 3.7–5.3)
RBC # BLD: 4.41 M/UL (ref 4.21–5.77)
SODIUM BLD-SCNC: 134 MMOL/L (ref 135–144)
WBC # BLD: 4.1 K/UL (ref 3.5–11.3)

## 2020-01-19 PROCEDURE — 2500000003 HC RX 250 WO HCPCS: Performed by: NURSE PRACTITIONER

## 2020-01-19 PROCEDURE — 82947 ASSAY GLUCOSE BLOOD QUANT: CPT

## 2020-01-19 PROCEDURE — 36415 COLL VENOUS BLD VENIPUNCTURE: CPT

## 2020-01-19 PROCEDURE — 94761 N-INVAS EAR/PLS OXIMETRY MLT: CPT

## 2020-01-19 PROCEDURE — 94003 VENT MGMT INPAT SUBQ DAY: CPT

## 2020-01-19 PROCEDURE — 6360000002 HC RX W HCPCS: Performed by: STUDENT IN AN ORGANIZED HEALTH CARE EDUCATION/TRAINING PROGRAM

## 2020-01-19 PROCEDURE — 2700000000 HC OXYGEN THERAPY PER DAY

## 2020-01-19 PROCEDURE — 99291 CRITICAL CARE FIRST HOUR: CPT | Performed by: PSYCHIATRY & NEUROLOGY

## 2020-01-19 PROCEDURE — 2580000003 HC RX 258: Performed by: NURSE PRACTITIONER

## 2020-01-19 PROCEDURE — 6370000000 HC RX 637 (ALT 250 FOR IP): Performed by: PSYCHIATRY & NEUROLOGY

## 2020-01-19 PROCEDURE — 6370000000 HC RX 637 (ALT 250 FOR IP): Performed by: NURSE PRACTITIONER

## 2020-01-19 PROCEDURE — 85730 THROMBOPLASTIN TIME PARTIAL: CPT

## 2020-01-19 PROCEDURE — 94770 HC ETCO2 MONITOR DAILY: CPT

## 2020-01-19 PROCEDURE — 6370000000 HC RX 637 (ALT 250 FOR IP): Performed by: STUDENT IN AN ORGANIZED HEALTH CARE EDUCATION/TRAINING PROGRAM

## 2020-01-19 PROCEDURE — 85027 COMPLETE CBC AUTOMATED: CPT

## 2020-01-19 PROCEDURE — 80048 BASIC METABOLIC PNL TOTAL CA: CPT

## 2020-01-19 PROCEDURE — 99024 POSTOP FOLLOW-UP VISIT: CPT | Performed by: PSYCHIATRY & NEUROLOGY

## 2020-01-19 PROCEDURE — 2580000003 HC RX 258: Performed by: STUDENT IN AN ORGANIZED HEALTH CARE EDUCATION/TRAINING PROGRAM

## 2020-01-19 PROCEDURE — 2000000003 HC NEURO ICU R&B

## 2020-01-19 RX ADMIN — Medication 100 MG: at 10:13

## 2020-01-19 RX ADMIN — SODIUM CHLORIDE, PRESERVATIVE FREE 10 ML: 5 INJECTION INTRAVENOUS at 09:00

## 2020-01-19 RX ADMIN — LISINOPRIL 10 MG: 10 TABLET ORAL at 10:13

## 2020-01-19 RX ADMIN — AMANTADINE HYDROCHLORIDE 100 MG: 50 SOLUTION ORAL at 16:12

## 2020-01-19 RX ADMIN — HYDRALAZINE HYDROCHLORIDE 10 MG: 20 INJECTION INTRAMUSCULAR; INTRAVENOUS at 16:20

## 2020-01-19 RX ADMIN — SOTALOL HYDROCHLORIDE 160 MG: 80 TABLET ORAL at 10:13

## 2020-01-19 RX ADMIN — FAMOTIDINE 20 MG: 10 INJECTION, SOLUTION INTRAVENOUS at 10:13

## 2020-01-19 RX ADMIN — HEPARIN SODIUM 20 UNITS/KG/HR: 10000 INJECTION, SOLUTION INTRAVENOUS at 17:31

## 2020-01-19 RX ADMIN — ROSUVASTATIN CALCIUM 5 MG: 5 TABLET, FILM COATED ORAL at 21:18

## 2020-01-19 RX ADMIN — SODIUM CHLORIDE: 9 INJECTION, SOLUTION INTRAVENOUS at 04:50

## 2020-01-19 RX ADMIN — SOTALOL HYDROCHLORIDE 160 MG: 80 TABLET ORAL at 21:18

## 2020-01-19 RX ADMIN — SODIUM CHLORIDE, PRESERVATIVE FREE 10 ML: 5 INJECTION INTRAVENOUS at 21:19

## 2020-01-19 RX ADMIN — SODIUM CHLORIDE, PRESERVATIVE FREE 10 ML: 5 INJECTION INTRAVENOUS at 12:21

## 2020-01-19 RX ADMIN — MODAFINIL 200 MG: 100 TABLET ORAL at 10:13

## 2020-01-19 RX ADMIN — MODAFINIL 100 MG: 100 TABLET ORAL at 16:12

## 2020-01-19 RX ADMIN — INSULIN LISPRO 3 UNITS: 100 INJECTION, SOLUTION INTRAVENOUS; SUBCUTANEOUS at 17:09

## 2020-01-19 RX ADMIN — Medication 15 ML: at 21:20

## 2020-01-19 RX ADMIN — INSULIN LISPRO 3 UNITS: 100 INJECTION, SOLUTION INTRAVENOUS; SUBCUTANEOUS at 23:07

## 2020-01-19 RX ADMIN — FAMOTIDINE 20 MG: 10 INJECTION, SOLUTION INTRAVENOUS at 21:18

## 2020-01-19 RX ADMIN — Medication 15 ML: at 10:00

## 2020-01-19 RX ADMIN — HEPARIN SODIUM 18 UNITS/KG/HR: 10000 INJECTION, SOLUTION INTRAVENOUS at 04:46

## 2020-01-19 RX ADMIN — AMANTADINE HYDROCHLORIDE 100 MG: 50 SOLUTION ORAL at 10:13

## 2020-01-19 RX ADMIN — FOLIC ACID 1 MG: 1 TABLET ORAL at 10:13

## 2020-01-19 RX ADMIN — FLUOXETINE 10 MG: 10 CAPSULE ORAL at 10:14

## 2020-01-19 ASSESSMENT — PULMONARY FUNCTION TESTS
PIF_VALUE: 11
PIF_VALUE: 15
PIF_VALUE: 24
PIF_VALUE: 18
PIF_VALUE: 16
PIF_VALUE: 11

## 2020-01-19 NOTE — PROGRESS NOTES
General Surgery:  Daily Progress Note                   PATIENT NAME: Gaby Stovall     TODAY'S DATE: 1/19/2020, 7:17 AM  SUBJECTIVE:     Pt seen and examined at bedside. No acute events overnight. No change in neuro status per RN. Medicine plans to extubate today. OBJECTIVE:   VITALS:  BP (!) 155/96   Pulse 82   Temp 98.3 °F (36.8 °C) (Oral)   Resp 20   Ht 5' 11\" (1.803 m)   Wt 187 lb 9.8 oz (85.1 kg)   SpO2 100%   BMI 26.17 kg/m²      INTAKE/OUTPUT:      Intake/Output Summary (Last 24 hours) at 1/19/2020 0717  Last data filed at 1/19/2020 0504  Gross per 24 hour   Intake 2757.94 ml   Output 400 ml   Net 2357.94 ml       PHYSICAL EXAM:  General Appearance: awake, alert, oriented, in no acute distress  HEENT:  Normocephalic, atraumatic, mucus membranes moist   Heart: Heart regular rate and rhythm  Lungs: Intubated, bilateral breath sounds  Abdomen:Soft, nondistended, nontender   Extremities: No cyanosis, pitting edema, rashes noted.       Data:  CBC with Differential:    Lab Results   Component Value Date    WBC 4.1 01/19/2020    RBC 4.41 01/19/2020    HGB 10.5 01/19/2020    HCT 35.6 01/19/2020     01/19/2020    MCV 80.7 01/19/2020    MCH 23.8 01/19/2020    MCHC 29.5 01/19/2020    RDW 20.9 01/19/2020    LYMPHOPCT 18 01/17/2020    MONOPCT 6 01/17/2020    BASOPCT 1 01/17/2020    MONOSABS 0.30 01/17/2020    LYMPHSABS 0.90 01/17/2020    EOSABS 0.15 01/17/2020    BASOSABS 0.05 01/17/2020    DIFFTYPE NOT REPORTED 01/15/2020     BMP:    Lab Results   Component Value Date     01/19/2020    K 4.2 01/19/2020    CL 99 01/19/2020    CO2 24 01/19/2020    BUN 14 01/19/2020    LABALBU 3.0 12/30/2019    CREATININE 0.35 01/19/2020    CALCIUM 8.9 01/19/2020    GFRAA >60 01/19/2020    LABGLOM >60 01/19/2020    GLUCOSE 134 01/19/2020       ASSESSMENT:  Active Hospital Problems    Diagnosis Date Noted    Palliative care encounter [Z51.5]     Acute ischemic multifocal anterior circulation stroke ([facility] [address] [I63.529]  Encephalopathy [G93.40]     On mechanically assisted ventilation (HCC) [Z99.11]     Chronic a-fib [I48.20]     Ventilator dependence (HCC) [Z99.11]     Delirium tremens ([facility] [address] [F10.231]     Subarachnoid bleed (HCC) [I60.9]     Traumatic subarachnoid hemorrhage with loss of consciousness of 1 hour to 5 hours 59 minutes ([facility] [address] [S06.6X3A]     Carotid stenosis, right [I65.21]     Asymptomatic stenosis of left vertebral artery [I65.02]     Intracranial atherosclerosis [I67.2]     History of CEA (carotid endarterectomy) [Z98.890]     SAH (subarachnoid hemorrhage) ([facility] [address] [I60.9] 12/25/2019       1. 61 y.o. male with SAH, failure to extubate    Plan:  1. Continue medical management per primary  2. Recommend off asa and plavix for 5 days prior to procedure  3. [facility] for extubation. If extubated will assess for PEG. If pt fails extubation will cont w/ planned trach/peg on wed vs thur of this week. 4. OK for heparin gtt when off asa/plavix    Electronically signed by Patrick Severance, DO  on 1/19/2020 at 7:17 AM     I have reviewed the resident's note and I either performed the key elements of the medical history and physical exam or was present with the resident when the key elements of the medical history and physical exam were performed. I have discussed the findings, established the care plan and recommendations with Resident.     Electronically signed by Devin Robins IV, DO on 1/20/20 at 10:09 AM

## 2020-01-19 NOTE — PROGRESS NOTES
Daily Progress Note  Neuro Critical Care        INTERVAL HISTORY    The patient is a 60 yo male with history of atrial fibrillation on Eliquis, bilateral ICA stenosis (right more than left), DM2, HLD, HTN, CAD s/p PCI stents, PVD, CEA, and ETOH abuse who presents as a transfer from [facility] as a trauma alert for CT head revealed diffuse bialteral SAH and left parietal SDH. He was given [facility] [place] and transferred to [facility] for higher level of care. Patient was found confused by family at his home after being unable to reach him on the phone. He was complaining of headache, and actively vomiting. Also had bilateral arm scrapes and bruises concerning for recent fall, patient himself does not member falling, said around 4 PM he woke up and felt frontal and vertex headache and neck pain, and felt nauseous and started throwing up. Endorses drinking alcohol last night states that he had 2 beers. Per family has significant alcohol abuse history and has had multiple falls in the past.     On presentation in the ED [facility], patient mildly lethargic but oriented x4, complaining of headache, nausea, mild vertigo, left arm weaker than right, bilateral leg weakness. Significant interdisciplinary discussion between the neurosurgeon, radiologist, trauma surgeon and stroke specialist about whether  bleed is consistent with traumatic versus spontaneous subarachnoid. Stroke specialist has significant concern for severe stenosis, suspects traumatic bleed and that patient is at risk for vasospasm. In addition has intracranial left vertebral artery athero-stenosis. Consensus management strategy is to keep the patient 130-160 for blood pressure goals to avoid right MCA watershed stroke.      No aneurysm found on CTA or malformation-critical stenosis of the proximal left vertebral artery, critical stenosis of the proximal right cervical ICA, severe near critical stenosis of the right cavernous ICA, moderate stenosis of the left cavernous ICA. Non con CT head repeat at our emergency facility showed subarachnoid, predominantly in the bilateral sylvian fissures and bilateral cerebral hemisphere sulcal I, acute left parietal convexity subdural hematoma 4 mm in thickness without mass-effect. Hospital Course:   12/27: CT head stable  12/28: Intubated overnight for respiratory distress, started on Cardizem infusion for atrial fibrillation with RVR, weaned off and Sotalol started. Versed for sedation with concern for possible alcohol withdrawal.  12/29: Cdiff negative. patient noted to have left upper extremity tremor, placed on LTME. Left hand cool to touch with delayed capillary refill, thumb spica splint removed to assess neurovascular status. With splint off, radial and ulnar pulses palpable and assessed via doppler. Capillary refill improved as did warmth and color. Ortho called to replace splint. Febrile this AM - infectious workup sent. 12/30: LTME showing diffuse slowing and disorganized background suggesting moderate encephalopathy. Started on Vanc/zosyn  12/31: Versed started for sedation, bradycardia noted with Precedex. 1/1: TCD normal. Failed SBT due to tachypnea  1/2: Sotalol increased to 160 mg BID. Zosyn started for CXR concerning for RLL pneumonia. 1/3: Failed SBT.  1/4: Tolerated CPAP trial better. 1/5: 8 beats of V. Tach - resolved spontaneously. 1/7: Extubated to room air  1/8: Respiratory distress with tachypnea, re-intubated. Found to have right gaze with right sided weakness, loaded with 2 grams Keppra. Went to cerebral angiogram for right ICA stenting. 1/9: EEG showing diffuse encephalopathy, no seizures. Will discontinue EEG and reduce Keppra to 500 mg BID. Will discontinue Librium today. MRI brain revealed bilateral MCA territory and left RUSTY territory infarcts.  Provigil dosing increased to bid  1/11: Failed SBT  1/12: Family meeting rescheduled to 1/14 1/13: failed weaning due to tachypnea. : Lisinopril started. Family meeting held, awaiting decision. 1/15: Awaiting on decision from family, palliative following. NaCl tabs d/c.  : Awaiting family decision. Exam improving. : Family would like trach/peg. General surgery consulted. Over the last 24 hours, no acute events. Seen by GS for trach and peg. Started on heparin infusion after DAPT were discontinued. Will attempt extubation today.          MEDICATIONS:     CURRENT MEDICATIONS:  SCHEDULED MEDICATIONS:   lisinopril  10 mg Oral Daily    FLUoxetine  10 mg Oral Daily    [Held by provider] aspirin  81 mg Oral Daily    [Held by provider] clopidogrel  75 mg Oral Daily    amantadine  100 mg Per G Tube BID- 8&2    modafinil  200 mg Oral Daily    And    modafinil  100 mg Oral Daily    insulin glargine  25 Units Subcutaneous Daily    insulin lispro  0-18 Units Subcutaneous Q6H    docusate  100 mg Oral Daily    sotalol  160 mg Oral BID    nicotine  1 patch Transdermal Daily    folic acid  1 mg Oral Daily    thiamine  100 mg Oral Daily    sodium chloride flush  10 mL Intravenous BID    famotidine (PEPCID) injection  20 mg Intravenous BID    rosuvastatin  5 mg Oral Nightly    chlorhexidine  15 mL Mouth/Throat BID    vitamin D  50,000 Units Oral Weekly    sodium chloride flush  10 mL Intravenous 2 times per day    sodium chloride flush  10 mL Intravenous 2 times per day     CONTINUOUS INFUSIONS:   heparin (porcine) 18 Units/kg/hr (20 0446)    sodium chloride 75 mL/hr at 20 0450    dextrose       PRN MEDICATIONS:   ipratropium-albuterol, albuterol, fentanNYL, acetaminophen, hydrALAZINE, labetalol, ibuprofen, acetaminophen, metoprolol, glucose, dextrose, glucagon (rDNA), dextrose, magnesium sulfate, sodium chloride flush, sodium chloride flush    VITALS 24 Hours     Temperature Range: Temp: 98.3 °F (36.8 °C) Temp  Av.3 °F (36.8 °C)  Min: 97.7 °F (36.5 °C)  Max: 98.8 °F (37.1 °C)  BP Range: Systolic (80RUR), TFR:904 , Min:121 , EEX:042     Diastolic (27KDR), MAGALY:38, Min:76, Max:110    Pulse Range: Pulse  Av.9  Min: 57  Max: 112  Respiration Range: Resp  Av.2  Min: 18  Max: 44  Current Pulse Ox: SpO2: 100 %  24HR Pulse Ox Range: SpO2  Av.5 %  Min: 97 %  Max: 100 %  Patient Vitals for the past 12 hrs:   BP Temp Temp src Pulse Resp SpO2   20 0717 -- -- -- 83 21 100 %   20 0703 (!) 155/96 -- -- 82 20 100 %   20 0603 (!) 163/93 -- -- 76 19 100 %   20 0528 -- -- -- 65 20 100 %   20 0503 (!) 145/78 98.3 °F (36.8 °C) Oral 59 18 100 %   20 0303 137/83 -- -- 60 20 100 %   20 0203 (!) 143/86 -- -- 63 20 100 %   20 0103 (!) 144/82 -- -- 57 27 100 %   20 0003 126/76 98.5 °F (36.9 °C) Oral 67 26 100 %   20 2303 (!) 164/107 -- -- 81 23 99 %   20 2302 -- -- -- 80 18 99 %   20 2203 132/77 -- -- 100 19 99 %   20 2103 137/86 -- -- 100 22 100 %   20 (!) 140/95 98.5 °F (36.9 °C) Oral 104 23 99 %     Estimated body mass index is 26.17 kg/m² as calculated from the following:    Height as of this encounter: 5' 11\" (1.803 m). Weight as of this encounter: 187 lb 9.8 oz (85.1 kg). PHYSICAL EXAM       Physical Exam  Vitals signs and nursing note reviewed. HENT:      Head: Normocephalic and atraumatic. Eyes:      General:         Right eye: No foreign body, discharge or hordeolum. Left eye: No foreign body, discharge or hordeolum. Conjunctiva/sclera:      Right eye: Right conjunctiva is not injected. No chemosis, exudate or hemorrhage. Left eye: Left conjunctiva is injected. Hemorrhage present. No chemosis or exudate. Pupils: Pupils are equal, round, and reactive to light. Cardiovascular:      Rate and Rhythm: Normal rate. Rhythm irregular. Pulmonary:      Breath sounds: Normal breath sounds. Neurological:      Mental Status: He is alert. GCS: GCS eye subscore is 4.  GCS verbal subscore 12/25/2019 9:45 pm COMPARISON: None. HISTORY: ORDERING SYSTEM PROVIDED HISTORY: fall TECHNOLOGIST PROVIDED HISTORY: fall Acuity: Acute Type of Exam: Initial FINDINGS: Overlying splint partially obscures osseous details. Acute traumatic closed proximal 1st metacarpal fracture. The carpal bones are intact. Carpal alignment is maintained soft tissues of the wrist are unremarkable. Acute traumatic closed 1st proximal metacarpal fracture. No acute osseous abnormality in the wrist.     Xr Wrist Right (min 3 Views)    Result Date: 12/25/2019  EXAMINATION: 3 XRAY VIEWS OF THE RIGHT WRIST 12/25/2019 9:45 pm COMPARISON: None. HISTORY: ORDERING SYSTEM PROVIDED HISTORY: fall TECHNOLOGIST PROVIDED HISTORY: fall Acuity: Acute Type of Exam: Initial FINDINGS: Overlying splint obscures osseous details. Carpal bones are intact. Advanced radiocarpal joint osteoarthritis with joint space narrowing and subchondral sclerosis. Widening of the scapholunate joint. Posttraumatic deformity of the ulnar styloid process. No acute fracture or dislocation. No acute osseous abnormality the right wrist. Widening of the scapholunate interval suggesting ligamentous injury. Radiocarpal joint osteoarthritis. Xr Hand Left (min 3 Views)    Result Date: 12/26/2019  EXAMINATION: THREE XRAY VIEWS OF THE LEFT HAND 12/26/2019 1:47 am COMPARISON: 12/25/2019 2353 hours HISTORY: ORDERING SYSTEM PROVIDED HISTORY: post splint TECHNOLOGIST PROVIDED HISTORY: post splint Reason for Exam: fall trauma/post splint Acuity: Acute FINDINGS: Overlying splint obscures osseous details. Again seen is acute traumatic closed proximal 1st metacarpal fracture. There appears to be greater fracture fragment displacement compared to prior study. No additional acute fracture or dislocation in the left hand. Overlying splint obscures osseous details.   Acute traumatic closed angulated proximal 1st metacarpal fracture with greater angulation of fracture fragments compared the prior study. Xr Hand Left (min 3 Views)    Result Date: 12/26/2019  EXAMINATION: THREE XRAY VIEWS OF THE LEFT HAND 12/26/2019 1:47 am COMPARISON: Left wrist radiographs 12/25/2019 2131 hours HISTORY: ORDERING SYSTEM PROVIDED HISTORY: post reduction TECHNOLOGIST PROVIDED HISTORY: post reduction Reason for Exam: fall trauma Acuity: Acute FINDINGS: Acute traumatic closed comminuted mildly angulated proximal 1st metacarpal fracture. Joint alignment is maintained. No additional acute fracture or dislocation in the left hand. Acute traumatic closed comminuted mildly angulated proximal 1st metacarpal fracture. Xr Hand Right (min 3 Views)    Result Date: 12/26/2019  EXAMINATION: THREE XRAY VIEWS OF THE RIGHT HAND 12/26/2019 1:36 am COMPARISON: None. HISTORY: ORDERING SYSTEM PROVIDED HISTORY: Trauma/Fracture TECHNOLOGIST PROVIDED HISTORY: Include clenched fist view Trauma/Fracture Reason for Exam: fall FINDINGS: Joint alignment is maintained. No acute fracture or dislocation in the right hand. Degenerative changes in the interphalangeal joints. Old ulnar styloid process fracture. Degenerative changes in the radiocarpal joint. No acute osseous abnormality in the right hand. Xr Hip Left (2-3 Views)    Result Date: 12/26/2019  EXAMINATION: ONE XRAY VIEW OF THE PELVIS AND TWO XRAY VIEWS RIGHT HIP; TWO XRAY VIEWS OF THE LEFT HIP 12/26/2019 1:37 am COMPARISON: None. HISTORY: ORDERING SYSTEM PROVIDED HISTORY: Trauma/Fracture TECHNOLOGIST PROVIDED HISTORY: AP and cross-table lateral of the hip please, thank you Trauma/Fracture Reason for Exam: fall/trauma Acuity: Acute FINDINGS: The bones are osteopenic. The femoral heads located bilateral.  No acute fracture or dislocation pelvis or hips bilaterally. SI joints are grossly intact. Pubic symphysis is intact. The sacrum is partially obscured by contrast in the bladder.      No acute osseous abnormality in the pelvis or hips bilaterally. Osteopenia. Xr Knee Left (3 Views)    Result Date: 12/26/2019  EXAMINATION: THREE XRAY VIEWS OF THE LEFT KNEE 12/26/2019 1:36 am COMPARISON: None. HISTORY: ORDERING SYSTEM PROVIDED HISTORY: Trauma/Fracture TECHNOLOGIST PROVIDED HISTORY: Trauma/Fracture Reason for Exam: fall/ trauma Acuity: Acute FINDINGS: Osteopenia. No acute fracture or dislocation. No focal osseous lesion. No joint effusion. No focal soft tissue abnormality. Vascular calcifications. No acute abnormality of the knee. Osteopenia. Xr Knee Right (3 Views)    Result Date: 12/26/2019  EXAMINATION: THREE XRAY VIEWS OF THE RIGHT KNEE 12/26/2019 1:36 am COMPARISON: None. HISTORY: ORDERING SYSTEM PROVIDED HISTORY: Trauma/Fracture TECHNOLOGIST PROVIDED HISTORY: Trauma/Fracture Reason for Exam: ,fall trauma,unable to get xtable on patient Acuity: Acute FINDINGS: The bones are osteopenic. No acute fracture or dislocation. No focal osseous lesion. No evidence of joint effusion. No focal soft tissue abnormality. Vascular calcifications. No acute abnormality of the knee. Osteopenia. Xr Ankle Left (min 3 Views)    Result Date: 12/26/2019  EXAMINATION: THREE XRAY VIEWS OF THE LEFT ANKLE 12/26/2019 1:36 am COMPARISON: None. HISTORY: ORDERING SYSTEM PROVIDED HISTORY: Trauma/Fracture TECHNOLOGIST PROVIDED HISTORY: Trauma/Fracture Acuity: Acute FINDINGS: No acute fracture or dislocation. Normal alignment of the ankle mortise. No focal osseous lesion. No joint effusion. No focal soft tissue abnormality. Vascular calcifications. No acute abnormality of the ankle. Xr Ankle Right (min 3 Views)    Result Date: 12/26/2019  EXAMINATION: THREE XRAY VIEWS OF THE RIGHT ANKLE 12/26/2019 1:36 am COMPARISON: None. HISTORY: ORDERING SYSTEM PROVIDED HISTORY: Trauma/Fracture TECHNOLOGIST PROVIDED HISTORY: Trauma/Fracture FINDINGS: Diffuse osteopenia. No acute fracture or dislocation. Normal alignment of the ankle mortise.   No focal osseous lesion. No joint effusion. No focal soft tissue abnormality. Status post 5th metatarsal ORIF with intact screw. No acute abnormality of the ankle. Ct Head Wo Contrast    Result Date: 12/27/2019  EXAMINATION: CT OF THE HEAD WITHOUT CONTRAST,  12/26/2019 11:08 pm TECHNIQUE: CT of the head was performed without the administration of intravenous contrast. Dose modulation, iterative reconstruction, and/or weight based adjustment of the mA/kV was utilized to reduce the radiation dose to as low as reasonably achievable. COMPARISON: 12/26/2019 5:10 a.m. HISTORY: ORDERING SYSTEM PROVIDED HISTORY: Reassess bleed for stability. TECHNOLOGIST PROVIDED HISTORY: Reassess bleed for stability. Reason for Exam: Reassess bleed for stability. Acuity: Unknown Type of Exam: Unknown FINDINGS: BRAIN/VENTRICLES: Again seen is extensive subarachnoid hemorrhage within the sylvian fissures as well as cerebral sulci bilaterally. Subdural hemorrhage overlies the left parietal convexity measures 4 mm in thickness. Hemorrhage in the prepontine cistern as well. Small amount of intraventricular hemorrhage layering in the occipital horns bilaterally. Ventricles are stable in caliber. No hydrocephalus. The gray-white matter differentiation is maintained. No midline shift. ORBITS: The visualized portion of the orbits demonstrate no acute abnormality. SINUSES: The visualized paranasal sinuses and mastoid air cells demonstrate no acute abnormality. SOFT TISSUES/SKULL:  No acute abnormality of the visualized skull or soft tissues. Stable head CT demonstrating extensive bilateral subarachnoid hemorrhage as well as intraventricular hemorrhage and left parietal convexity subdural hemorrhage. No new intracranial hemorrhage. No hydrocephalus.      Ct Head Wo Contrast    Result Date: 12/26/2019  EXAMINATION: CT OF THE HEAD WITHOUT CONTRAST  12/26/2019 4:51 am TECHNIQUE: CT of the head was performed without the administration of Subarachnoid hemorrhage on CT head performed at outside institution. FINDINGS: BRAIN/VENTRICLES: There is moderate subarachnoid hemorrhage in the bilateral sylvian fissures and bilateral frontal, temporal, and parietal lobe sulci. Additional subarachnoid hemorrhage is noted in the interhemispheric fissure and prepontine cistern. Gray-white matter differentiation is grossly maintained without CT evidence of acute, territorial infarct. Acute, left parietal convexity subdural hematoma measures up to 4 mm in thickness. No associated mass effect. No parenchymal hemorrhage. There is no hydrocephalus or midline shift. Basal cisterns are patent. ORBITS: The visualized portion of the orbits demonstrate no acute abnormality. SINUSES: The visualized paranasal sinuses and mastoid air cells demonstrate no acute abnormality. SOFT TISSUES/SKULL:  No acute abnormality of the visualized skull or soft tissues. Subarachnoid hemorrhage, predominantly in the bilateral sylvian fissures and bilateral cerebral hemisphere sulci. Acute left parietal convexity subdural hematoma measures up to 4 mm in thickness. No associated mass effect. No midline shift or evidence of intracranial herniation. Findings were discussed with Tommy Singerk at 9:32 pm on 12/25/2019. Ct Cervical Spine Wo Contrast    Result Date: 12/26/2019  EXAMINATION: CT OF THE CERVICAL SPINE WITHOUT CONTRAST 12/25/2019 9:04 pm TECHNIQUE: CT of the cervical spine was performed without the administration of intravenous contrast. Multiplanar reformatted images are provided for review. Dose modulation, iterative reconstruction, and/or weight based adjustment of the mA/kV was utilized to reduce the radiation dose to as low as reasonably achievable. COMPARISON: None HISTORY: ORDERING SYSTEM PROVIDED HISTORY: trauma TECHNOLOGIST PROVIDED HISTORY: trauma FINDINGS: BONES/ALIGNMENT: No traumatic malalignment. Vertebral body heights are maintained. No acute fracture. intravenous contrast. Multiplanar reformatted images are provided for review. Dose modulation, iterative reconstruction, and/or weight based adjustment of the mA/kV was utilized to reduce the radiation dose to as low as reasonably achievable. COMPARISON: None HISTORY: ORDERING SYSTEM PROVIDED HISTORY: trauma TECHNOLOGIST PROVIDED HISTORY: trauma Reason for Exam: fall from standing Acuity: Acute Type of Exam: Initial; ORDERING SYSTEM PROVIDED HISTORY: trauma TECHNOLOGIST PROVIDED HISTORY: trauma Reason for Exam: fall from standing Acuity: Acute Type of Exam: Initial; ORDERING SYSTEM PROVIDED HISTORY: trauma TECHNOLOGIST PROVIDED HISTORY: trauma Reason for Exam: fall from standing Acuity: Acute Type of Exam: Initial FINDINGS: CTA CHEST: Stable cardiomegaly. No pericardial effusion. No mediastinal hematoma or pneumomediastinum. No enlarged mediastinal or hilar lymph nodes. Main pulmonary artery is dilated, measuring up to 4 cm, suggesting pulmonary hypertension. Calcified and noncalcified atherosclerotic plaque of the thoracic aorta. Incidentally noted 4 vessel aortic arch with separate arch origin of the left vertebral artery. Ectatic ascending thoracic aorta measures up to 4 cm in diameter. No acute aortic abnormality. Central airways are clear. No pleural effusion or pneumothorax. No pulmonary contusion or pulmonary laceration. Mild bilateral lower lobe dependent atelectatic changes. Healing anterolateral left 6th rib fracture. No acute displaced rib fracture or chest wall hematoma. CTA ABDOMEN: No acute traumatic abnormality of the liver, spleen, pancreas, kidneys, or adrenal glands. Normal gallbladder. Stomach, small bowel, and colon are normal in caliber without evidence of obstruction. Normal appendix. Sigmoid diverticulosis without diverticulitis. Calcified and noncalcified aortoiliac atherosclerotic calcification. Abdominal aorta is normal in caliber. No free fluid in the abdomen.  CTA PELVIS: Urinary bladder and pelvic organs are normal.  No free fluid in the pelvis. Bilateral common iliac stents are in position. THORACIC/LUMBAR SPINE: BONES/ALIGNMENT: Normal alignment of the thoracic and lumbar spine. Vertebral body heights are maintained. No acute fracture. DEGENERATIVE CHANGES: Multilevel disc narrowing and endplate osteophyte formation. Mild multilevel facet joint degenerative changes. No high-grade, bony spinal canal stenosis. SOFT TISSUES: Paraspinal soft tissues are normal.     No acute traumatic abnormality of the chest, abdomen, or pelvis. Remote, healing anterolateral left 6th rib fracture. No acute osseous abnormality of the thoracic or lumbar spine. Ir Angiogram Carotid C Erebral Bilateral    Result Date: 12/27/2019  Date of procedure: 12/26/2019 Procedure: Diagnostic cerebral angiogram Indication: Subarachnoid hemorrhage, evaluation for aneurysm. Procedures: 1. Selective right common carotid artery angiogram 2. Selective right internal carotid artery cerebral angiogram 3. Selective right vertebral artery cerebral angiogram 4. Selective left common carotid artery angiogram 5. Selective left internal carotid artery cerebral angiogram 6. Selective left vertebral artery cerebral angiogram 7. Right common femoral artery angiogram Neurointerventionalist: Geoff Montague MD [place]: Marisol Dobson MD Comparison: None Fluoroscopy time: 29.7 minutes Contrast: 100 cc Visipaque-270 Side of Access: Right femoral Closure device: Vascade Sedation: Conscious sedation Patient arrived to the angio suite: 1325 Puncture obtained at: 1400 Vascular access removed at: 1605 Consent:After explaining the risks and benefits to the patient and the patient's family, including but not limited to stroke, coma, death, vessel injury, dissection, tear, occlusion, and X-ray dye allergic type reaction, a signed consent form was obtained. Anesthesia: IV moderate sedation was supervised by Dr. Geoff Montague.  The patient was independently monitored by a Registered Nurse assigned to the Department of Radiology?using automated blood pressure, EKG and pulse oximetry. ? The detailed Conscious Record is permanently stored in the [facility]. The following is the conscious sedation record including Start and End times: Fentanyl, propofol and Versed from 1400 to  1600 . Clinical History:59 y. o.?male?with past medical history including atrial fibrillation on Eliquis, diabetes mellitus, hypertension, hyperlipidemia, coronary artery disease s/p PCI stents, peripheral vascular disease, history of bilateral ICA stenosis with  right worse than left, history of CEA, alcohol abuse. ? Patient presented after a fall from outside facility as a trauma alert?and found to have bilateral subarachnoid hemorrhage/left parietal subdural hematoma. ? He received Kcentra in outside hospital. ? Neuro endovascular was consulted. Description and findings: The patient's right groin was prepped and draped in standard sterile fashion and under local anesthesia with conscious sedation, the right common femoral artery was accessed with a single-wall needle technique, and a 5 Dutch intravascular sheath was placed within the right common femoral artery, establishing arterial access. A 5 Dutch multipurpose catheter was then advanced over a guide wire to the level of the aortic arch, and used to selectively catheterize the right common carotid artery. Right CCA technique: The right common carotid artery was selectively catheterized under fluoroscopic guidance and digital subtraction angiography images were obtained in biplane projections of the right cervical carotid artery. Interpretation:The right common carotid artery injection demonstrates normal antegrade flow into the external and internal carotid arteries with normal filling of the external carotid artery branches.  Course and caliber of the cervical portion of the right internal carotid artery revealed significant proximal right ICA stenosis/string sign measuring approximately 90-99% per NASCET criteria. Flow across the high-grade stenosis was delayed with earlier filling noted of the distal supraclinoid internal carotid artery through retrograde opacification of the right ophthalmic artery from the internal maxillary artery. Further inspection demonstrates no evidence of dissection, aneurysm. Right CCA technique: The right internal carotid artery run was performed from the common carotid artery due to severe stenosis. Digital subtraction angiography of the intracranial right internal carotid circulation was performed in frontal, and lateral projections. Interpretation:There is slow antegrade filling of the distal internal carotid artery from the cervical internal carotid artery. Noted retrograde Filling of the right ophthalmic artery from the right internal maxillary artery branches with slow filling anterior cerebral artery, middle cerebral artery and the distal branches due to previously noted critical proximal cervical ICA stenosis. Inspection of the remaining right internal carotid circulation revealed no other evidence of cerebral aneurysm, arteriovenous malformation. There is severe stenosis noted in the petrous/cavernous and supraclinoid right ICA noted with diminutive appearance. Capillary and venous phase images were also unremarkable, with no evidence of veno-occlusive disease. Left CCA technique: The left common carotid artery was selectively catheterized under fluoroscopic guidance and digital subtraction angiography images were obtained in biplane projections of the left cervical common carotid artery. Interpretation: The left common carotid artery injection demonstrates normal antegrade flow into the external and internal carotid arteries with normal filling of the external carotid artery branches.  Caliber and lumen contour of the cervical portion of the left internal carotid artery noted to be bifurcation. There was no evidence of dissection or occlusion within the right common femoral artery. There is internal iliac artery stent noted. A 5F Vascade closure device was used to establish hemostasis at the right common femoral artery access site. No immediate complications were experienced. Patient was re-examined after the procedure with no change noted in their neurologic examination, with distal pulses present. The patient was subsequently discharged from the neurointerventional suite. Impression: 1. Critical right ICA stenosis with a string sign is noted measuring approximately 90-99% per NASCET criteria. Noted retrograde Filling of the right ophthalmic artery from the right internal maxillary artery branches with slow filling of the anterior cerebral artery, middle cerebral artery and the distal branches in addition to distal right mca vascular supply from the right pca due to previously noted critical proximal cervical ICA stenosis. 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 3.No evidence of clear discrete aneurysm, arteriovenous malformation, arterial dissection, or veno-occlusive disease. 4. 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 Dimple Willams dictated this invasive procedure. Dr. Ruddy Polanco was present for all procedural and imaging components of this case. Examination was reviewed and reported findings confirmed and edited by Dr. Ruddy Polanco. Dr. Ruddy Polanco supervised and interpreted this procedure. Issac Bryant MD Final report electronically signed by Issac Bryant M.D. on 12/27/2019 4:00 PM    Cta Head Neck W Contrast    Result Date: 12/25/2019  EXAMINATION: CTA OF THE HEAD AND NECK WITH CONTRAST 12/25/2019 9:07 pm: TECHNIQUE: CTA of the head and neck was performed with the administration of intravenous contrast. Multiplanar reformatted images are provided for review. MIP images are provided for review.  Stenosis of the internal carotid arteries measured using NASCET criteria. Dose modulation, iterative reconstruction, and/or weight based adjustment of the mA/kV was utilized to reduce the radiation dose to as low as reasonably achievable. COMPARISON: None. HISTORY: ORDERING SYSTEM PROVIDED HISTORY: sah TECHNOLOGIST PROVIDED HISTORY: sah Reason for Exam: SAH after trauma Acuity: Acute Type of Exam: Initial FINDINGS: CTA NECK: AORTIC ARCH/ARCH VESSELS: There is a critical stenosis of the proximal left vertebral artery with moderate and severe stenoses of the V2 segment. There is a severe stenosis at the origin of the right vertebral artery. 66% stenosis of the left subclavian artery is noted. CAROTID ARTERIES: There is 25% stenosis of the proximal left common carotid artery and 50% stenosis of the mid and distal segments. The cervical portion of the left internal carotid artery is normal in course and caliber. There is 20% stenosis of the right common carotid artery. A critical stenosis of the proximal right cervical ICA is noted with attenuated flow seen distally. VERTEBRAL ARTERIES: No dissection, arterial injury, or significant stenosis. SOFT TISSUES: There is a thickened appearance to the mid esophagus. A small amount of layering debris is present within the trachea, likely reflecting pooled secretion. BONES: No acute osseous abnormality. CTA HEAD: ANTERIOR CIRCULATION: There is severely attenuated flow within the petrous and cavernous segments of the right internal carotid artery with severe, near critical stenoses of the cavernous ICA. There is also attenuated flow within the contralateral ICA, to a lesser degree, with moderate stenoses of the left cavernous ICA. There are moderate and severe multifocal stenoses of the MCA branch vessels, worse on the right-hand side. The right anterior cerebral artery is attenuated compared to the left.  POSTERIOR CIRCULATION: There are mild-to-moderate stenoses of the distal right vertebral artery and a moderate to severe stenosis of the distal left. The basilar artery and PCAs appear to be slightly attenuated. OTHER: No dural venous sinus thrombosis on this non-dedicated study. BRAIN: See separately dictated noncontrast head CT report. No cerebral aneurysm or vascular malformation identified. Significant narrowing of the intracranial vessels, likely due to a combination of atherosclerotic disease as well as vasospasm related to subarachnoid hemorrhage. Critical stenosis of the proximal left vertebral artery. Critical stenosis of the proximal right cervical ICA with attenuated flow seen distally. Severe near critical stenoses of the right cavernous ICA. Moderate stenoses of the contralateral cavernous ICA. Ct Chest Abdomen Pelvis W Contrast    Result Date: 12/26/2019  EXAMINATION: CT OF THE THORACIC SPINE WITHOUT CONTRAST; CT OF THE LUMBAR SPINE WITHOUT CONTRAST; CT OF THE CHEST, ABDOMEN, AND PELVIS WITH CONTRAST, 12/25/2019 9:04 pm; 12/25/2019 9:05 pm TECHNIQUE: CT of the thoracic and lumbar spine was performed without the administration of intravenous contrast.  CT of the chest, abdomen and pelvis was performed with the administration of intravenous contrast. Multiplanar reformatted images are provided for review. Dose modulation, iterative reconstruction, and/or weight based adjustment of the mA/kV was utilized to reduce the radiation dose to as low as reasonably achievable. COMPARISON: None HISTORY: ORDERING SYSTEM PROVIDED HISTORY: trauma TECHNOLOGIST PROVIDED HISTORY: trauma Reason for Exam: fall from standing Acuity: Acute Type of Exam: Initial; ORDERING SYSTEM PROVIDED HISTORY: trauma TECHNOLOGIST PROVIDED HISTORY: trauma Reason for Exam: fall from standing Acuity: Acute Type of Exam: Initial; ORDERING SYSTEM PROVIDED HISTORY: trauma TECHNOLOGIST PROVIDED HISTORY: trauma Reason for Exam: fall from standing Acuity: Acute Type of Exam: Initial FINDINGS: CTA CHEST: Stable cardiomegaly.   No pericardial

## 2020-01-19 NOTE — PROGRESS NOTES
ENDOVASCULAR NEUROSURGERY PROGRESS NOTE  1/19/2020 6:07 PM  Subjective:   Admit Date: 12/25/2019  PCP: Jacy To MD  No acute events overnight  Patient on heparin drip of aspirin and Plavix    Objective:   Vitals: BP (!) 177/109   Pulse 103   Temp 98.3 °F (36.8 °C) (Oral)   Resp 20   Ht 5' 11\" (1.803 m)   Wt 187 lb 9.8 oz (85.1 kg)   SpO2 100%   BMI 26.17 kg/m²   Did examine Precedex. General appearance: Intubated   HEENT: Atraumatic. Neck: Neck is supple. Lungs: Intubated  Abdomen: Soft nontender. Extremities: No lower limb edema noted. General appearance: Lying in bed, intubated  Lungs: CTAB  Heart: RRR  Abdomen: soft, NTND, bowel sounds normal    Neuro exam: intubated and sedated and no commands but eyes open spontaneously, can track.  pupils equal and reactive, withdraw in all ext, no spontaneous movement in my exam       Medications and labs:   Scheduled Meds:   lisinopril  10 mg Oral Daily    FLUoxetine  10 mg Oral Daily    [Held by provider] aspirin  81 mg Oral Daily    [Held by provider] clopidogrel  75 mg Oral Daily    amantadine  100 mg Per G Tube BID- 8&2    modafinil  200 mg Oral Daily    And    modafinil  100 mg Oral Daily    insulin glargine  25 Units Subcutaneous Daily    insulin lispro  0-18 Units Subcutaneous Q6H    docusate  100 mg Oral Daily    sotalol  160 mg Oral BID    nicotine  1 patch Transdermal Daily    folic acid  1 mg Oral Daily    thiamine  100 mg Oral Daily    sodium chloride flush  10 mL Intravenous BID    famotidine (PEPCID) injection  20 mg Intravenous BID    rosuvastatin  5 mg Oral Nightly    chlorhexidine  15 mL Mouth/Throat BID    vitamin D  50,000 Units Oral Weekly    sodium chloride flush  10 mL Intravenous 2 times per day    sodium chloride flush  10 mL Intravenous 2 times per day     Continuous Infusions:   heparin (porcine) 20 Units/kg/hr (01/19/20 1395)    sodium chloride 75 mL/hr at 01/19/20 0450    dextrose       CBC:   Recent MCA parietal region corresponding to the hypodensity noted on prior CT head. 5. Right proximal cervical ICA critical stenosis measuring approximately 99% per NASCET criteria significantly delayed anterograde flow noted. 6. There is collateral flow noted to the distal right ICA and MCA territory arising from right internal maxillary artery branches filling the ophthalmic artery via? retrograde fashion    7. Right MCA M1/right RUSTY intracranial atherosclerotic disease noted. 8. Successful carotid stenting using Wallstent 10x37 mm with pre and post balloon angioplasty Seth balloon with the use of distal embolic protection device.         MRI brain was obtained and showing bilateral ischemic strokes. Consider to discuss goals of care with family. PLAN:  1. Traumatic subarachnoid hemorrhage management per neuro ICU. 2. Blood pressure systolic goal after the procedure is 100-140. 3. Off aspirin 81 and Plavix 75 - on heparin for PEG and trach -possible extubation today    Please contact neuro endovascular team for any questions or concerns.     Jesse Duverney, MD  Stroke, [facility]  [address]  Electronically signed 1/19/2020 at 6:07 PM

## 2020-01-20 LAB
ABSOLUTE EOS #: 0.29 K/UL (ref 0–0.44)
ABSOLUTE IMMATURE GRANULOCYTE: 0 K/UL (ref 0–0.3)
ABSOLUTE LYMPH #: 1.22 K/UL (ref 1.1–3.7)
ABSOLUTE MONO #: 0.34 K/UL (ref 0.1–1.2)
ANION GAP SERPL CALCULATED.3IONS-SCNC: 10 MMOL/L (ref 9–17)
BASOPHILS # BLD: 1 % (ref 0–2)
BASOPHILS ABSOLUTE: 0.04 K/UL (ref 0–0.2)
BUN BLDV-MCNC: 12 MG/DL (ref 6–20)
BUN/CREAT BLD: ABNORMAL (ref 9–20)
CALCIUM SERPL-MCNC: 8.6 MG/DL (ref 8.6–10.4)
CHLORIDE BLD-SCNC: 101 MMOL/L (ref 98–107)
CO2: 23 MMOL/L (ref 20–31)
CREAT SERPL-MCNC: 0.31 MG/DL (ref 0.7–1.2)
DIFFERENTIAL TYPE: ABNORMAL
EOSINOPHILS RELATIVE PERCENT: 7 % (ref 1–4)
GFR AFRICAN AMERICAN: >60 ML/MIN
GFR NON-AFRICAN AMERICAN: >60 ML/MIN
GFR SERPL CREATININE-BSD FRML MDRD: ABNORMAL ML/MIN/{1.73_M2}
GFR SERPL CREATININE-BSD FRML MDRD: ABNORMAL ML/MIN/{1.73_M2}
GLUCOSE BLD-MCNC: 104 MG/DL (ref 75–110)
GLUCOSE BLD-MCNC: 136 MG/DL (ref 75–110)
GLUCOSE BLD-MCNC: 146 MG/DL (ref 70–99)
GLUCOSE BLD-MCNC: 148 MG/DL (ref 75–110)
GLUCOSE BLD-MCNC: 150 MG/DL (ref 75–110)
GLUCOSE BLD-MCNC: 154 MG/DL (ref 75–110)
HCT VFR BLD CALC: 34.2 % (ref 40.7–50.3)
HEMOGLOBIN: 10.3 G/DL (ref 13–17)
IMMATURE GRANULOCYTES: 0 %
LYMPHOCYTES # BLD: 29 % (ref 24–43)
MCH RBC QN AUTO: 24.8 PG (ref 25.2–33.5)
MCHC RBC AUTO-ENTMCNC: 30.1 G/DL (ref 28.4–34.8)
MCV RBC AUTO: 82.4 FL (ref 82.6–102.9)
MONOCYTES # BLD: 8 % (ref 3–12)
MORPHOLOGY: ABNORMAL
NRBC AUTOMATED: 0 PER 100 WBC
PARTIAL THROMBOPLASTIN TIME: 42.9 SEC (ref 20.5–30.5)
PARTIAL THROMBOPLASTIN TIME: 61 SEC (ref 20.5–30.5)
PARTIAL THROMBOPLASTIN TIME: 61.3 SEC (ref 20.5–30.5)
PARTIAL THROMBOPLASTIN TIME: 85.2 SEC (ref 20.5–30.5)
PDW BLD-RTO: 21.2 % (ref 11.8–14.4)
PLATELET # BLD: 207 K/UL (ref 138–453)
PLATELET ESTIMATE: ABNORMAL
PMV BLD AUTO: 11.3 FL (ref 8.1–13.5)
POTASSIUM SERPL-SCNC: 4.3 MMOL/L (ref 3.7–5.3)
RBC # BLD: 4.15 M/UL (ref 4.21–5.77)
RBC # BLD: ABNORMAL 10*6/UL
SEG NEUTROPHILS: 55 % (ref 36–65)
SEGMENTED NEUTROPHILS ABSOLUTE COUNT: 2.31 K/UL (ref 1.5–8.1)
SODIUM BLD-SCNC: 134 MMOL/L (ref 135–144)
WBC # BLD: 4.2 K/UL (ref 3.5–11.3)
WBC # BLD: ABNORMAL 10*3/UL

## 2020-01-20 PROCEDURE — 94761 N-INVAS EAR/PLS OXIMETRY MLT: CPT

## 2020-01-20 PROCEDURE — 36415 COLL VENOUS BLD VENIPUNCTURE: CPT

## 2020-01-20 PROCEDURE — 94770 HC ETCO2 MONITOR DAILY: CPT

## 2020-01-20 PROCEDURE — 6370000000 HC RX 637 (ALT 250 FOR IP): Performed by: PSYCHIATRY & NEUROLOGY

## 2020-01-20 PROCEDURE — 99233 SBSQ HOSP IP/OBS HIGH 50: CPT | Performed by: PSYCHIATRY & NEUROLOGY

## 2020-01-20 PROCEDURE — 6360000002 HC RX W HCPCS: Performed by: STUDENT IN AN ORGANIZED HEALTH CARE EDUCATION/TRAINING PROGRAM

## 2020-01-20 PROCEDURE — 2500000003 HC RX 250 WO HCPCS: Performed by: NURSE PRACTITIONER

## 2020-01-20 PROCEDURE — 85730 THROMBOPLASTIN TIME PARTIAL: CPT

## 2020-01-20 PROCEDURE — 85025 COMPLETE CBC W/AUTO DIFF WBC: CPT

## 2020-01-20 PROCEDURE — 6370000000 HC RX 637 (ALT 250 FOR IP): Performed by: STUDENT IN AN ORGANIZED HEALTH CARE EDUCATION/TRAINING PROGRAM

## 2020-01-20 PROCEDURE — 97110 THERAPEUTIC EXERCISES: CPT

## 2020-01-20 PROCEDURE — 2700000000 HC OXYGEN THERAPY PER DAY

## 2020-01-20 PROCEDURE — 80048 BASIC METABOLIC PNL TOTAL CA: CPT

## 2020-01-20 PROCEDURE — 2580000003 HC RX 258: Performed by: NURSE PRACTITIONER

## 2020-01-20 PROCEDURE — 6370000000 HC RX 637 (ALT 250 FOR IP): Performed by: NURSE PRACTITIONER

## 2020-01-20 PROCEDURE — 2000000003 HC NEURO ICU R&B

## 2020-01-20 PROCEDURE — 2500000003 HC RX 250 WO HCPCS: Performed by: STUDENT IN AN ORGANIZED HEALTH CARE EDUCATION/TRAINING PROGRAM

## 2020-01-20 PROCEDURE — 2580000003 HC RX 258: Performed by: STUDENT IN AN ORGANIZED HEALTH CARE EDUCATION/TRAINING PROGRAM

## 2020-01-20 PROCEDURE — 94003 VENT MGMT INPAT SUBQ DAY: CPT

## 2020-01-20 RX ADMIN — MODAFINIL 100 MG: 100 TABLET ORAL at 13:58

## 2020-01-20 RX ADMIN — SODIUM CHLORIDE, PRESERVATIVE FREE 10 ML: 5 INJECTION INTRAVENOUS at 09:00

## 2020-01-20 RX ADMIN — HEPARIN SODIUM 24 UNITS/KG/HR: 10000 INJECTION, SOLUTION INTRAVENOUS at 08:45

## 2020-01-20 RX ADMIN — FAMOTIDINE 20 MG: 10 INJECTION, SOLUTION INTRAVENOUS at 20:53

## 2020-01-20 RX ADMIN — HEPARIN SODIUM 21 UNITS/KG/HR: 10000 INJECTION, SOLUTION INTRAVENOUS at 22:39

## 2020-01-20 RX ADMIN — HYDRALAZINE HYDROCHLORIDE 10 MG: 20 INJECTION INTRAMUSCULAR; INTRAVENOUS at 05:05

## 2020-01-20 RX ADMIN — HYDRALAZINE HYDROCHLORIDE 10 MG: 20 INJECTION INTRAMUSCULAR; INTRAVENOUS at 18:44

## 2020-01-20 RX ADMIN — FOLIC ACID 1 MG: 1 TABLET ORAL at 09:12

## 2020-01-20 RX ADMIN — SOTALOL HYDROCHLORIDE 160 MG: 80 TABLET ORAL at 09:12

## 2020-01-20 RX ADMIN — FAMOTIDINE 20 MG: 10 INJECTION, SOLUTION INTRAVENOUS at 09:12

## 2020-01-20 RX ADMIN — INSULIN GLARGINE 25 UNITS: 100 INJECTION, SOLUTION SUBCUTANEOUS at 09:17

## 2020-01-20 RX ADMIN — SODIUM CHLORIDE, PRESERVATIVE FREE 10 ML: 5 INJECTION INTRAVENOUS at 09:14

## 2020-01-20 RX ADMIN — Medication 15 ML: at 20:53

## 2020-01-20 RX ADMIN — ANTI-FUNGAL POWDER MICONAZOLE NITRATE TALC FREE: 1.42 POWDER TOPICAL at 00:10

## 2020-01-20 RX ADMIN — AMANTADINE HYDROCHLORIDE 100 MG: 50 SOLUTION ORAL at 09:13

## 2020-01-20 RX ADMIN — MODAFINIL 200 MG: 100 TABLET ORAL at 09:12

## 2020-01-20 RX ADMIN — LISINOPRIL 10 MG: 10 TABLET ORAL at 09:12

## 2020-01-20 RX ADMIN — ANTI-FUNGAL POWDER MICONAZOLE NITRATE TALC FREE: 1.42 POWDER TOPICAL at 20:54

## 2020-01-20 RX ADMIN — SOTALOL HYDROCHLORIDE 160 MG: 80 TABLET ORAL at 20:54

## 2020-01-20 RX ADMIN — ANTI-FUNGAL POWDER MICONAZOLE NITRATE TALC FREE: 1.42 POWDER TOPICAL at 09:12

## 2020-01-20 RX ADMIN — FLUOXETINE 10 MG: 10 CAPSULE ORAL at 09:12

## 2020-01-20 RX ADMIN — ROSUVASTATIN CALCIUM 5 MG: 5 TABLET, FILM COATED ORAL at 20:54

## 2020-01-20 RX ADMIN — INSULIN LISPRO 3 UNITS: 100 INJECTION, SOLUTION INTRAVENOUS; SUBCUTANEOUS at 05:31

## 2020-01-20 RX ADMIN — AMANTADINE HYDROCHLORIDE 100 MG: 50 SOLUTION ORAL at 13:58

## 2020-01-20 RX ADMIN — Medication 100 MG: at 09:12

## 2020-01-20 RX ADMIN — INSULIN LISPRO 3 UNITS: 100 INJECTION, SOLUTION INTRAVENOUS; SUBCUTANEOUS at 18:24

## 2020-01-20 RX ADMIN — INSULIN LISPRO 3 UNITS: 100 INJECTION, SOLUTION INTRAVENOUS; SUBCUTANEOUS at 11:35

## 2020-01-20 RX ADMIN — SODIUM CHLORIDE, PRESERVATIVE FREE 10 ML: 5 INJECTION INTRAVENOUS at 20:53

## 2020-01-20 RX ADMIN — Medication 15 ML: at 10:57

## 2020-01-20 ASSESSMENT — PULMONARY FUNCTION TESTS
PIF_VALUE: 18
PIF_VALUE: 20
PIF_VALUE: 19
PIF_VALUE: 19
PIF_VALUE: 21
PIF_VALUE: 22
PIF_VALUE: 20
PIF_VALUE: 18
PIF_VALUE: 13
PIF_VALUE: 17

## 2020-01-20 NOTE — ADT AUTH CERT
Utilization Reviews         Subarachnoid Hemorrhage, Nonsurgical Treatment - Care Day 27 (1/20/2020) by Aleksey Schultz RN         Review Status Review Entered   Completed 1/20/2020 16:51       Criteria Review      Care Day: 27 Care Date: 1/20/2020 Level of Care:    Guideline Day 3    Clinical Status    ( ) * Mental status stable or at baseline    ( ) * Neurologic deficit absent or stable    ( ) * Mechanical ventilation absent    ( ) * Cardiac monitoring absent    Activity    ( ) * Up to chair    Routes    ( ) * Clear liquid diet    Medications    ( ) * Sedation absent    (X) Antihypertensive as indicated    * Milestone

## 2020-01-20 NOTE — PROGRESS NOTES
Physical Therapy  DATE: 2020  NAME: Urmila Damon  MRN: [id number]   : 1960    Discharge Recommendations: Continue to Assess (pending progress)   Subjective: RN agreeable to ROM. Pt awake in bed upon arrival, eyes open, but unable to follow any commands. Pain ;SHA  Patient follows: NO Commands  Is patient on ventilator: YES  Is patient on sedation: NO  Precautions: General;     Therapeutic exercises:  PROM x4 extremities , 15 reps all planes. Pt demo mild resistance with left elbow extension. Pt resisted LLE ROM throughout. Bilateral gastrocnemius stretching 3 reps x 30 seconds; Pt repositioned for comfort, all vitals remained stable throughout     Goals  Short Term Goals  Short term goal 1: Pt to perform bed mobility CGA  Short term goal 2: Demonstrate functional transfers CGA  Short term goal 3: Ambulate 100ft w/ least restrictive AD Desi with good safety awareness  Short term goal 4: Tolerate 30 minutes of therapy to demo increased endurance       Plan: Progress functional mobility as medically appropriate.    Time In: 222  Time Out: 235  Time Coded Minutes (treatment minutes):13  Rehab Potential: Fair  Treatments/week: 2-3x/wk    Ajay Kaufman PTA

## 2020-01-20 NOTE — PROGRESS NOTES
General Surgery:  Daily Progress Note                   PATIENT NAME: Manju Solo     TODAY'S DATE: 1/20/2020, 6:37 AM  SUBJECTIVE:     Pt seen and examined at bedside. No acute events overnight. No change in neuro status per RN. No attempt at extubation yesterday. OBJECTIVE:   VITALS:  BP (!) 145/95   Pulse 97   Temp 98.2 °F (36.8 °C) (Oral)   Resp (!) 37   Ht 5' 11\" (1.803 m)   Wt 187 lb 9.8 oz (85.1 kg)   SpO2 100%   BMI 26.17 kg/m²      INTAKE/OUTPUT:      Intake/Output Summary (Last 24 hours) at 1/20/2020 9425  Last data filed at 1/20/2020 0536  Gross per 24 hour   Intake 2089.97 ml   Output --   Net 2089.97 ml       PHYSICAL EXAM:  General Appearance: awake, alert, oriented, in no acute distress  HEENT:  Normocephalic, atraumatic, mucus membranes moist   Heart: Heart regular rate and rhythm  Lungs: Intubated, bilateral breath sounds  Abdomen:Soft, nondistended, nontender   Extremities: No cyanosis, pitting edema, rashes noted.       Data:  CBC with Differential:    Lab Results   Component Value Date    WBC 4.1 01/19/2020    RBC 4.41 01/19/2020    HGB 10.5 01/19/2020    HCT 35.6 01/19/2020     01/19/2020    MCV 80.7 01/19/2020    MCH 23.8 01/19/2020    MCHC 29.5 01/19/2020    RDW 20.9 01/19/2020    LYMPHOPCT 18 01/17/2020    MONOPCT 6 01/17/2020    BASOPCT 1 01/17/2020    MONOSABS 0.30 01/17/2020    LYMPHSABS 0.90 01/17/2020    EOSABS 0.15 01/17/2020    BASOSABS 0.05 01/17/2020    DIFFTYPE NOT REPORTED 01/15/2020     BMP:    Lab Results   Component Value Date     01/20/2020    K 4.3 01/20/2020     01/20/2020    CO2 23 01/20/2020    BUN 12 01/20/2020    LABALBU 3.0 12/30/2019    CREATININE 0.31 01/20/2020    CALCIUM 8.6 01/20/2020    GFRAA >60 01/20/2020    LABGLOM >60 01/20/2020    GLUCOSE 146 01/20/2020       ASSESSMENT:  Active Hospital Problems    Diagnosis Date Noted    Palliative care encounter [Z51.5]     Acute ischemic multifocal anterior circulation stroke ([facility] [address] [I63.529]     Encephalopathy [G93.40]     On mechanically assisted ventilation (HCC) [Z99.11]     Chronic a-fib [I48.20]     Ventilator dependence (HCC) [Z99.11]     Delirium tremens ([facility] [address] [F10.231]     Subarachnoid bleed (HCC) [I60.9]     Traumatic subarachnoid hemorrhage with loss of consciousness of 1 hour to 5 hours 59 minutes ([facility] [address] [S06.6X3A]     Carotid stenosis, right [I65.21]     Asymptomatic stenosis of left vertebral artery [I65.02]     Intracranial atherosclerosis [I67.2]     History of CEA (carotid endarterectomy) [Z98.890]     SAH (subarachnoid hemorrhage) ([facility] [address] [I60.9] 12/25/2019       1. 61 y.o. male with SAH, failure to extubate    Plan:  1. Continue medical management per primary  2. Recommend off asa and plavix for 5 days prior to procedure  3. Will cont w/ planned trach/peg on wed vs thur of this week. 4. OK for heparin gtt when off asa/plavix    Electronically signed by Bhavya Hinds MD  on 1/20/2020 at 6:37 AM     I have reviewed the resident's note and I either performed the key elements of the medical history and physical exam or was present with the resident when the key elements of the medical history and physical exam were performed. I have discussed the findings, established the care plan and recommendations with Resident.     Electronically signed by Gael Robins IV, DO on 1/20/20 at 10:10 AM

## 2020-01-20 NOTE — PROGRESS NOTES
ENDOVASCULAR NEUROSURGERY PROGRESS NOTE  1/20/2020 8:38 AM  Subjective:   Admit Date: 12/25/2019  PCP: Vin Dyer MD    No acute events overnight    He is on heparin drip. Objective:   Vitals: BP (!) 143/84   Pulse 82   Temp 98.2 °F (36.8 °C) (Oral)   Resp (!) 33   Ht 5' 11\" (1.803 m)   Wt 187 lb 9.8 oz (85.1 kg)   SpO2 99%   BMI 26.17 kg/m²   Did examine Precedex. General appearance: Intubated   HEENT: Atraumatic. Neck: Neck is supple. Lungs: Intubated  Abdomen: Soft nontender. Extremities: No lower limb edema noted. General appearance: Lying in bed, intubated  Lungs: CTAB  Heart: RRR  Abdomen: soft, NTND    Neuro exam: intubated and sedated and no commands but eyes open spontaneously, he tracks minimally in the room, he is withdrawing both upper and lower extremities and noted to be request on right upper extremity, no spontaneous movements noted.       Medications and labs:   Scheduled Meds:   miconazole   Topical BID    lisinopril  10 mg Oral Daily    FLUoxetine  10 mg Oral Daily    [Held by provider] aspirin  81 mg Oral Daily    [Held by provider] clopidogrel  75 mg Oral Daily    amantadine  100 mg Per G Tube BID- 8&2    modafinil  200 mg Oral Daily    And    modafinil  100 mg Oral Daily    insulin glargine  25 Units Subcutaneous Daily    insulin lispro  0-18 Units Subcutaneous Q6H    docusate  100 mg Oral Daily    sotalol  160 mg Oral BID    nicotine  1 patch Transdermal Daily    folic acid  1 mg Oral Daily    thiamine  100 mg Oral Daily    sodium chloride flush  10 mL Intravenous BID    famotidine (PEPCID) injection  20 mg Intravenous BID    rosuvastatin  5 mg Oral Nightly    chlorhexidine  15 mL Mouth/Throat BID    vitamin D  50,000 Units Oral Weekly    sodium chloride flush  10 mL Intravenous 2 times per day    sodium chloride flush  10 mL Intravenous 2 times per day     Continuous Infusions:   heparin (porcine) 24 Units/kg/hr (01/20/20 9710)    sodium chloride decreased parenchymal blush in the left MCA parietal region corresponding to the hypodensity noted on prior CT head. 5. Right proximal cervical ICA critical stenosis measuring approximately 99% per NASCET criteria significantly delayed anterograde flow noted. 6. There is collateral flow noted to the distal right ICA and MCA territory arising from right internal maxillary artery branches filling the ophthalmic artery via? retrograde fashion    7. Right MCA M1/right RUSTY intracranial atherosclerotic disease noted. 8. Successful carotid stenting using Wallstent 10x37 mm with pre and post balloon angioplasty Seth balloon with the use of distal embolic protection device.         MRI brain was obtained and showing bilateral ischemic strokes. PLAN:  1. Traumatic subarachnoid hemorrhage management per neuro ICU. 2. Blood pressure systolic goal after the procedure is 100-140.  3. S/p right ICA Wallstent placement. 4. Off aspirin 81 and Plavix 75 - on heparin drip for PEG and trach    Please contact neuro endovascular team for any questions or concerns.     Percy He MD  Stroke, [facility]  [address] [place]  Electronically signed 1/20/2020 at 8:38 AM

## 2020-01-20 NOTE — PROGRESS NOTES
Daily Progress Note  Neuro Critical Care        INTERVAL HISTORY    The patient is a 60 yo male with history of atrial fibrillation on Eliquis, bilateral ICA stenosis (right more than left), DM2, HLD, HTN, CAD s/p PCI stents, PVD, CEA, and ETOH abuse who presents as a transfer from [facility] as a trauma alert for CT head revealed diffuse bialteral SAH and left parietal SDH. He was given [place] and transferred to [facility] for higher level of care. Patient was found confused by family at his home after being unable to reach him on the phone. He was complaining of headache, and actively vomiting. Also had bilateral arm scrapes and bruises concerning for recent fall, patient himself does not member falling, said around 4 PM he woke up and felt frontal and vertex headache and neck pain, and felt nauseous and started throwing up. Endorses drinking alcohol last night states that he had 2 beers. Per family has significant alcohol abuse history and has had multiple falls in the past.     On presentation in the ED [facility], patient mildly lethargic but oriented x4, complaining of headache, nausea, mild vertigo, left arm weaker than right, bilateral leg weakness. Significant interdisciplinary discussion between the neurosurgeon, radiologist, trauma surgeon and stroke specialist about whether  bleed is consistent with traumatic versus spontaneous subarachnoid. Stroke specialist has significant concern for severe stenosis, suspects traumatic bleed and that patient is at risk for vasospasm. In addition has intracranial left vertebral artery athero-stenosis. Consensus management strategy is to keep the patient 130-160 for blood pressure goals to avoid right MCA watershed stroke.      No aneurysm found on CTA or malformation-critical stenosis of the proximal left vertebral artery, critical stenosis of the proximal right cervical ICA, severe near critical stenosis of the right cavernous ICA, moderate stenosis of the left cavernous ICA. Non con CT head repeat at our emergency facility showed subarachnoid, predominantly in the bilateral sylvian fissures and bilateral cerebral hemisphere sulcal I, acute left parietal convexity subdural hematoma 4 mm in thickness without mass-effect. Hospital Course:   12/27: CT head stable  12/28: Intubated overnight for respiratory distress, started on Cardizem infusion for atrial fibrillation with RVR, weaned off and Sotalol started. Versed for sedation with concern for possible alcohol withdrawal.  12/29: Cdiff negative. patient noted to have left upper extremity tremor, placed on LTME. Left hand cool to touch with delayed capillary refill, thumb spica splint removed to assess neurovascular status. With splint off, radial and ulnar pulses palpable and assessed via doppler. Capillary refill improved as did warmth and color. Ortho called to replace splint. Febrile this AM - infectious workup sent. 12/30: LTME showing diffuse slowing and disorganized background suggesting moderate encephalopathy. Started on Vanc/zosyn  12/31: Versed started for sedation, bradycardia noted with Precedex. 1/1: TCD normal. Failed SBT due to tachypnea  1/2: Sotalol increased to 160 mg BID. Zosyn started for CXR concerning for RLL pneumonia. 1/3: Failed SBT.  1/4: Tolerated CPAP trial better. 1/5: 8 beats of V. Tach - resolved spontaneously. 1/7: Extubated to room air  1/8: Respiratory distress with tachypnea, re-intubated. Found to have right gaze with right sided weakness, loaded with 2 grams Keppra. Went to cerebral angiogram for right ICA stenting. 1/9: EEG showing diffuse encephalopathy, no seizures. Will discontinue EEG and reduce Keppra to 500 mg BID. Will discontinue Librium today. MRI brain revealed bilateral MCA territory and left RUSTY territory infarcts.  Provigil dosing increased to bid  1/11: Failed SBT  1/12: Family meeting rescheduled to 1/14 1/13: failed weaning due to tachypnea. : Lisinopril started. Family meeting held, awaiting decision. 1/15: Awaiting on decision from family, palliative following. NaCl tabs d/c.  : Awaiting family decision. Exam improving. : Family would like trach/peg. General surgery consulted. : no changes. Trach and PEG Wed vs Thursday this week per GS. COnt to be off AP x5 days prior to procedure       Over the last 24 hours, no acute events. Seen by GS for trach and peg. Started on heparin infusion after DAPT were discontinued. Will attempt extubation today.          MEDICATIONS:     CURRENT MEDICATIONS:  SCHEDULED MEDICATIONS:   miconazole   Topical BID    lisinopril  10 mg Oral Daily    FLUoxetine  10 mg Oral Daily    amantadine  100 mg Per G Tube BID- 8&2    modafinil  200 mg Oral Daily    And    modafinil  100 mg Oral Daily    insulin glargine  25 Units Subcutaneous Daily    insulin lispro  0-18 Units Subcutaneous Q6H    docusate  100 mg Oral Daily    sotalol  160 mg Oral BID    nicotine  1 patch Transdermal Daily    folic acid  1 mg Oral Daily    thiamine  100 mg Oral Daily    sodium chloride flush  10 mL Intravenous BID    famotidine (PEPCID) injection  20 mg Intravenous BID    rosuvastatin  5 mg Oral Nightly    chlorhexidine  15 mL Mouth/Throat BID    vitamin D  50,000 Units Oral Weekly    sodium chloride flush  10 mL Intravenous 2 times per day    sodium chloride flush  10 mL Intravenous 2 times per day     CONTINUOUS INFUSIONS:   heparin (porcine) 24 Units/kg/hr (20 0845)    sodium chloride 75 mL/hr at 20 0450    dextrose       PRN MEDICATIONS:   ipratropium-albuterol, albuterol, fentanNYL, acetaminophen, hydrALAZINE, labetalol, ibuprofen, acetaminophen, metoprolol, glucose, dextrose, glucagon (rDNA), dextrose, magnesium sulfate, sodium chloride flush, sodium chloride flush    VITALS 24 Hours     Temperature Range: Temp: 98.2 °F (36.8 °C) Temp  Av.4 °F (36.9 °C)  Min: 98 °F (36.7 °C) 11.3 k/uL    RBC 4.15 (L) 4.21 - 5.77 m/uL    Hemoglobin 10.3 (L) 13.0 - 17.0 g/dL    Hematocrit 34.2 (L) 40.7 - 50.3 %    MCV 82.4 (L) 82.6 - 102.9 fL    MCH 24.8 (L) 25.2 - 33.5 pg    MCHC 30.1 28.4 - 34.8 g/dL    RDW 21.2 (H) 11.8 - 14.4 %    Platelets 243 522 - 451 k/uL    MPV 11.3 8.1 - 13.5 fL    NRBC Automated 0.0 0.0 per 100 WBC    Differential Type      WBC Morphology      RBC Morphology      Platelet Estimate      Immature Granulocytes 0 0 %    Seg Neutrophils 55 36 - 65 %    Lymphocytes 29 24 - 43 %    Monocytes 8 3 - 12 %    Eosinophils % 7 (H) 1 - 4 %    Basophils 1 0 - 2 %    Absolute Immature Granulocyte 0.00 0.00 - 0.30 k/uL    Segs Absolute 2.31 1.50 - 8.10 k/uL    Absolute Lymph # 1.22 1.10 - 3.70 k/uL    Absolute Mono # 0.34 0.10 - 1.20 k/uL    Absolute Eos # 0.29 0.00 - 0.44 k/uL    Basophils Absolute 0.04 0.00 - 0.20 k/uL    Morphology ANISOCYTOSIS PRESENT     Morphology MICROCYTOSIS PRESENT     Morphology 1+ ELLIPTOCYTES    POC Glucose Fingerstick    Collection Time: 01/20/20 11:32 AM   Result Value Ref Range    POC Glucose 154 (H) 75 - 110 mg/dL           RADIOLOGY   Xr Radius Ulna Left (2 Views)    Result Date: 12/26/2019  EXAMINATION: TWO XRAY VIEWS OF THE LEFT FOREARM 12/26/2019 1:37 am COMPARISON: None. HISTORY: ORDERING SYSTEM PROVIDED HISTORY: Trauma/Fracture TECHNOLOGIST PROVIDED HISTORY: Trauma/Fracture Reason for Exam: fall/trauma Acuity: Acute FINDINGS: Fiberglass splint obscures osseous details of the distal forearm. The left radius and left are intact. No acute fracture or dislocation in the left forearm. Partial visualization of 1st proximal metacarpal fracture. Osteopenia. No acute osseous abnormality left radius or ulna. Xr Radius Ulna Right (2 Views)    Result Date: 12/26/2019  EXAMINATION: TWO XRAY VIEWS OF THE RIGHT FOREARM 12/26/2019 1:37 am COMPARISON: None.  HISTORY: ORDERING SYSTEM PROVIDED HISTORY: Trauma/Fracture TECHNOLOGIST PROVIDED HISTORY: Trauma/Fracture Reason for Exam: fall/trauma Acuity: Acute FINDINGS: The bones are osteopenic. The right radius and right ulna are intact. No acute fracture or dislocation in the right forearm. Along the ulnar aspect of the proximal forearm, there is soft tissue laceration. Antecubital fossa IV catheter. Osteopenia. No acute osseous abnormality right forearm. Soft tissue laceration ulnar aspect of the proximal forearm. Xr Wrist Left (min 3 Views)    Result Date: 12/25/2019  EXAMINATION: 3 XRAY VIEWS OF THE LEFT WRIST 12/25/2019 9:45 pm COMPARISON: None. HISTORY: ORDERING SYSTEM PROVIDED HISTORY: fall TECHNOLOGIST PROVIDED HISTORY: fall Acuity: Acute Type of Exam: Initial FINDINGS: Overlying splint partially obscures osseous details. Acute traumatic closed proximal 1st metacarpal fracture. The carpal bones are intact. Carpal alignment is maintained soft tissues of the wrist are unremarkable. Acute traumatic closed 1st proximal metacarpal fracture. No acute osseous abnormality in the wrist.     Xr Wrist Right (min 3 Views)    Result Date: 12/25/2019  EXAMINATION: 3 XRAY VIEWS OF THE RIGHT WRIST 12/25/2019 9:45 pm COMPARISON: None. HISTORY: ORDERING SYSTEM PROVIDED HISTORY: fall TECHNOLOGIST PROVIDED HISTORY: fall Acuity: Acute Type of Exam: Initial FINDINGS: Overlying splint obscures osseous details. Carpal bones are intact. Advanced radiocarpal joint osteoarthritis with joint space narrowing and subchondral sclerosis. Widening of the scapholunate joint. Posttraumatic deformity of the ulnar styloid process. No acute fracture or dislocation. No acute osseous abnormality the right wrist. Widening of the scapholunate interval suggesting ligamentous injury. Radiocarpal joint osteoarthritis.      Xr Hand Left (min 3 Views)    Result Date: 12/26/2019  EXAMINATION: THREE XRAY VIEWS OF THE LEFT HAND 12/26/2019 1:47 am COMPARISON: 12/25/2019 2353 hours HISTORY: ORDERING SYSTEM PROVIDED HISTORY: post splint TECHNOLOGIST PROVIDED HISTORY: post splint Reason for Exam: fall trauma/post splint Acuity: Acute FINDINGS: Overlying splint obscures osseous details. Again seen is acute traumatic closed proximal 1st metacarpal fracture. There appears to be greater fracture fragment displacement compared to prior study. No additional acute fracture or dislocation in the left hand. Overlying splint obscures osseous details. Acute traumatic closed angulated proximal 1st metacarpal fracture with greater angulation of fracture fragments compared the prior study. Xr Hand Left (min 3 Views)    Result Date: 12/26/2019  EXAMINATION: THREE XRAY VIEWS OF THE LEFT HAND 12/26/2019 1:47 am COMPARISON: Left wrist radiographs 12/25/2019 2131 hours HISTORY: ORDERING SYSTEM PROVIDED HISTORY: post reduction TECHNOLOGIST PROVIDED HISTORY: post reduction Reason for Exam: fall trauma Acuity: Acute FINDINGS: Acute traumatic closed comminuted mildly angulated proximal 1st metacarpal fracture. Joint alignment is maintained. No additional acute fracture or dislocation in the left hand. Acute traumatic closed comminuted mildly angulated proximal 1st metacarpal fracture. Xr Hand Right (min 3 Views)    Result Date: 12/26/2019  EXAMINATION: THREE XRAY VIEWS OF THE RIGHT HAND 12/26/2019 1:36 am COMPARISON: None. HISTORY: ORDERING SYSTEM PROVIDED HISTORY: Trauma/Fracture TECHNOLOGIST PROVIDED HISTORY: Include clenched fist view Trauma/Fracture Reason for Exam: fall FINDINGS: Joint alignment is maintained. No acute fracture or dislocation in the right hand. Degenerative changes in the interphalangeal joints. Old ulnar styloid process fracture. Degenerative changes in the radiocarpal joint. No acute osseous abnormality in the right hand. Xr Hip Left (2-3 Views)    Result Date: 12/26/2019  EXAMINATION: ONE XRAY VIEW OF THE PELVIS AND TWO XRAY VIEWS RIGHT HIP; TWO XRAY VIEWS OF THE LEFT HIP 12/26/2019 1:37 am COMPARISON: None. HISTORY: ORDERING SYSTEM PROVIDED HISTORY: Trauma/Fracture TECHNOLOGIST PROVIDED HISTORY: AP and cross-table lateral of the hip please, thank you Trauma/Fracture Reason for Exam: fall/trauma Acuity: Acute FINDINGS: The bones are osteopenic. The femoral heads located bilateral.  No acute fracture or dislocation pelvis or hips bilaterally. SI joints are grossly intact. Pubic symphysis is intact. The sacrum is partially obscured by contrast in the bladder. No acute osseous abnormality in the pelvis or hips bilaterally. Osteopenia. Xr Knee Left (3 Views)    Result Date: 12/26/2019  EXAMINATION: THREE XRAY VIEWS OF THE LEFT KNEE 12/26/2019 1:36 am COMPARISON: None. HISTORY: ORDERING SYSTEM PROVIDED HISTORY: Trauma/Fracture TECHNOLOGIST PROVIDED HISTORY: Trauma/Fracture Reason for Exam: fall/ trauma Acuity: Acute FINDINGS: Osteopenia. No acute fracture or dislocation. No focal osseous lesion. No joint effusion. No focal soft tissue abnormality. Vascular calcifications. No acute abnormality of the knee. Osteopenia. Xr Knee Right (3 Views)    Result Date: 12/26/2019  EXAMINATION: THREE XRAY VIEWS OF THE RIGHT KNEE 12/26/2019 1:36 am COMPARISON: None. HISTORY: ORDERING SYSTEM PROVIDED HISTORY: Trauma/Fracture TECHNOLOGIST PROVIDED HISTORY: Trauma/Fracture Reason for Exam: ,fall trauma,unable to get xtable on patient Acuity: Acute FINDINGS: The bones are osteopenic. No acute fracture or dislocation. No focal osseous lesion. No evidence of joint effusion. No focal soft tissue abnormality. Vascular calcifications. No acute abnormality of the knee. Osteopenia. Xr Ankle Left (min 3 Views)    Result Date: 12/26/2019  EXAMINATION: THREE XRAY VIEWS OF THE LEFT ANKLE 12/26/2019 1:36 am COMPARISON: None. HISTORY: ORDERING SYSTEM PROVIDED HISTORY: Trauma/Fracture TECHNOLOGIST PROVIDED HISTORY: Trauma/Fracture Acuity: Acute FINDINGS: No acute fracture or dislocation.   Normal alignment of the ankle demonstrate no acute abnormality. SOFT TISSUES/SKULL:  No acute abnormality of the visualized skull or soft tissues. Stable head CT demonstrating extensive bilateral subarachnoid hemorrhage as well as intraventricular hemorrhage and left parietal convexity subdural hemorrhage. No new intracranial hemorrhage. No hydrocephalus. Ct Head Wo Contrast    Result Date: 12/26/2019  EXAMINATION: CT OF THE HEAD WITHOUT CONTRAST  12/26/2019 4:51 am TECHNIQUE: CT of the head was performed without the administration of intravenous contrast. Dose modulation, iterative reconstruction, and/or weight based adjustment of the mA/kV was utilized to reduce the radiation dose to as low as reasonably achievable. COMPARISON: 12/25/2019 HISTORY: ORDERING SYSTEM PROVIDED HISTORY: worsening mentation TECHNOLOGIST PROVIDED HISTORY: worsening mentation FINDINGS: BRAIN/VENTRICLES: Diffuse subarachnoid hemorrhage within the cerebral sulci, sylvian fissures, anterior interhemispheric fissure, and prepontine cistern, similar in appearance to prior study. There is intraventricular hemorrhage layering in the occipital horns. No hydrocephalus. No mass effect or midline shift. Left parietal convexity subdural hemorrhage measures up to 5 mm in thickness. The basal cisterns are patent. The gray-white matter differentiation is maintained. ORBITS: The visualized portion of the orbits demonstrate no acute abnormality. SINUSES: The visualized paranasal sinuses and mastoid air cells demonstrate no acute abnormality. SOFT TISSUES/SKULL:  No acute abnormality of the visualized skull or soft tissues. 1. Stable head CT demonstrating diffuse moderate volume subarachnoid hemorrhage and left parietal convexity subdural hemorrhage measuring up to 5 mm in thickness. 2. No mass effect or midline shift. No hydrocephalus.  The findings were sent to the Radiology Results [address] at 5:48 am on 12/26/2019to be communicated to a licensed caregiver. Ct Head Wo Contrast    Result Date: 12/26/2019  EXAMINATION: CT OF THE HEAD WITHOUT CONTRAST  12/25/2019 9:04 pm TECHNIQUE: CT of the head was performed without the administration of intravenous contrast. Dose modulation, iterative reconstruction, and/or weight based adjustment of the mA/kV was utilized to reduce the radiation dose to as low as reasonably achievable. COMPARISON: None HISTORY: ORDERING SYSTEM PROVIDED HISTORY: trauma TECHNOLOGIST PROVIDED HISTORY: trauma Multiple falls. On Eliquis. Subarachnoid hemorrhage on CT head performed at outside institution. FINDINGS: BRAIN/VENTRICLES: There is moderate subarachnoid hemorrhage in the bilateral sylvian fissures and bilateral frontal, temporal, and parietal lobe sulci. Additional subarachnoid hemorrhage is noted in the interhemispheric fissure and prepontine cistern. Gray-white matter differentiation is grossly maintained without CT evidence of acute, territorial infarct. Acute, left parietal convexity subdural hematoma measures up to 4 mm in thickness. No associated mass effect. No parenchymal hemorrhage. There is no hydrocephalus or midline shift. Basal cisterns are patent. ORBITS: The visualized portion of the orbits demonstrate no acute abnormality. SINUSES: The visualized paranasal sinuses and mastoid air cells demonstrate no acute abnormality. SOFT TISSUES/SKULL:  No acute abnormality of the visualized skull or soft tissues. Subarachnoid hemorrhage, predominantly in the bilateral sylvian fissures and bilateral cerebral hemisphere sulci. Acute left parietal convexity subdural hematoma measures up to 4 mm in thickness. No associated mass effect. No midline shift or evidence of intracranial herniation. Findings were discussed with Steffany Engel at 9:32 pm on 12/25/2019.      Ct Cervical Spine Wo Contrast    Result Date: 12/26/2019  EXAMINATION: CT OF THE CERVICAL SPINE WITHOUT CONTRAST 12/25/2019 9:04 pm TECHNIQUE: CT of the cervical spine was performed without the administration of intravenous contrast. Multiplanar reformatted images are provided for review. Dose modulation, iterative reconstruction, and/or weight based adjustment of the mA/kV was utilized to reduce the radiation dose to as low as reasonably achievable. COMPARISON: None HISTORY: ORDERING SYSTEM PROVIDED HISTORY: trauma TECHNOLOGIST PROVIDED HISTORY: trauma FINDINGS: BONES/ALIGNMENT: No traumatic malalignment. Vertebral body heights are maintained. No acute fracture. DEGENERATIVE CHANGES: Moderate multilevel disc narrowing and endplate osteophyte formation. Multilevel uncovertebral and facet joint degenerative changes. SOFT TISSUES: Paraspinal soft tissues are normal.  Partially visualized prepontine cistern subarachnoid hemorrhage is better evaluated on CT head performed concurrently. No acute abnormality of the cervical spine. Ct Thoracic Spine Wo Contrast    Result Date: 12/26/2019  EXAMINATION: CT OF THE THORACIC SPINE WITHOUT CONTRAST; CT OF THE LUMBAR SPINE WITHOUT CONTRAST; CT OF THE CHEST, ABDOMEN, AND PELVIS WITH CONTRAST, 12/25/2019 9:04 pm; 12/25/2019 9:05 pm TECHNIQUE: CT of the thoracic and lumbar spine was performed without the administration of intravenous contrast.  CT of the chest, abdomen and pelvis was performed with the administration of intravenous contrast. Multiplanar reformatted images are provided for review. Dose modulation, iterative reconstruction, and/or weight based adjustment of the mA/kV was utilized to reduce the radiation dose to as low as reasonably achievable.  COMPARISON: None HISTORY: ORDERING SYSTEM PROVIDED HISTORY: trauma TECHNOLOGIST PROVIDED HISTORY: trauma Reason for Exam: fall from standing Acuity: Acute Type of Exam: Initial; ORDERING SYSTEM PROVIDED HISTORY: trauma TECHNOLOGIST PROVIDED HISTORY: trauma Reason for Exam: fall from standing Acuity: Acute Type of Exam: Initial; ORDERING SYSTEM PROVIDED HISTORY: trauma left 6th rib fracture. No acute displaced rib fracture or chest wall hematoma. CTA ABDOMEN: No acute traumatic abnormality of the liver, spleen, pancreas, kidneys, or adrenal glands. Normal gallbladder. Stomach, small bowel, and colon are normal in caliber without evidence of obstruction. Normal appendix. Sigmoid diverticulosis without diverticulitis. Calcified and noncalcified aortoiliac atherosclerotic calcification. Abdominal aorta is normal in caliber. No free fluid in the abdomen. CTA PELVIS: Urinary bladder and pelvic organs are normal.  No free fluid in the pelvis. Bilateral common iliac stents are in position. THORACIC/LUMBAR SPINE: BONES/ALIGNMENT: Normal alignment of the thoracic and lumbar spine. Vertebral body heights are maintained. No acute fracture. DEGENERATIVE CHANGES: Multilevel disc narrowing and endplate osteophyte formation. Mild multilevel facet joint degenerative changes. No high-grade, bony spinal canal stenosis. SOFT TISSUES: Paraspinal soft tissues are normal.     No acute traumatic abnormality of the chest, abdomen, or pelvis. Remote, healing anterolateral left 6th rib fracture. No acute osseous abnormality of the thoracic or lumbar spine. Ir Angiogram Carotid C Erebral Bilateral    Result Date: 12/27/2019  Date of procedure: 12/26/2019 Procedure: Diagnostic cerebral angiogram Indication: Subarachnoid hemorrhage, evaluation for aneurysm. Procedures: 1. Selective right common carotid artery angiogram 2. Selective right internal carotid artery cerebral angiogram 3. Selective right vertebral artery cerebral angiogram 4. Selective left common carotid artery angiogram 5. Selective left internal carotid artery cerebral angiogram 6. Selective left vertebral artery cerebral angiogram 7.  Right common femoral artery angiogram Neurointerventionalist: Ann Marie Gallo MD Delta: Justus Bennett MD Comparison: None Fluoroscopy time: 29.7 minutes Contrast: 100 cc Visipaque-270 Side of Access: Right technique: The right common carotid artery was selectively catheterized under fluoroscopic guidance and digital subtraction angiography images were obtained in biplane projections of the right cervical carotid artery. Interpretation:The right common carotid artery injection demonstrates normal antegrade flow into the external and internal carotid arteries with normal filling of the external carotid artery branches. Course and caliber of the cervical portion of the right internal carotid artery revealed significant proximal right ICA stenosis/string sign measuring approximately 90-99% per NASCET criteria. Flow across the high-grade stenosis was delayed with earlier filling noted of the distal supraclinoid internal carotid artery through retrograde opacification of the right ophthalmic artery from the internal maxillary artery. Further inspection demonstrates no evidence of dissection, aneurysm. Right CCA technique: The right internal carotid artery run was performed from the common carotid artery due to severe stenosis. Digital subtraction angiography of the intracranial right internal carotid circulation was performed in frontal, and lateral projections. Interpretation:There is slow antegrade filling of the distal internal carotid artery from the cervical internal carotid artery. Noted retrograde Filling of the right ophthalmic artery from the right internal maxillary artery branches with slow filling anterior cerebral artery, middle cerebral artery and the distal branches due to previously noted critical proximal cervical ICA stenosis. Inspection of the remaining right internal carotid circulation revealed no other evidence of cerebral aneurysm, arteriovenous malformation. There is severe stenosis noted in the petrous/cavernous and supraclinoid right ICA noted with diminutive appearance. Capillary and venous phase images were also unremarkable, with no evidence of veno-occlusive disease. Left CCA technique:  The left moderate stenosis noted. Otherwise, there was no evidence of  cerebral aneurysm, arteriovenous malformation, or arterial stenosis. There is noted opacification of the distal right middle cerebral artery parieto-occipital territory from pial collaterals arising off the right posterior cerebral artery. Capillary and venous phase images were otherwise unremarkable. Right CFA technique: A right common femoral artery angiogram was performed and demonstrated arterial catheterization proximal to the bifurcation. There was no evidence of dissection or occlusion within the right common femoral artery. There is internal iliac artery stent noted. A 5F Vascade closure device was used to establish hemostasis at the right common femoral artery access site. No immediate complications were experienced. Patient was re-examined after the procedure with no change noted in their neurologic examination, with distal pulses present. The patient was subsequently discharged from the neurointerventional suite. Impression: 1. Critical right ICA stenosis with a string sign is noted measuring approximately 90-99% per NASCET criteria. Noted retrograde Filling of the right ophthalmic artery from the right internal maxillary artery branches with slow filling of the anterior cerebral artery, middle cerebral artery and the distal branches in addition to distal right mca vascular supply from the right pca due to previously noted critical proximal cervical ICA stenosis. 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 3.No evidence of clear discrete aneurysm, arteriovenous malformation, arterial dissection, or veno-occlusive disease. 4. 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 Anderson Jarvis dictated this invasive procedure. Dr. Kristofer Zamora was present for all procedural and imaging components of this case. Examination was reviewed and reported findings confirmed and edited by Dr. Kristofer Zamora. DEGENERATIVE CHANGES: Multilevel disc narrowing and endplate osteophyte formation. Mild multilevel facet joint degenerative changes. No high-grade, bony spinal canal stenosis. SOFT TISSUES: Paraspinal soft tissues are normal.     No acute traumatic abnormality of the chest, abdomen, or pelvis. Remote, healing anterolateral left 6th rib fracture. No acute osseous abnormality of the thoracic or lumbar spine. Xr Hip 2-3 Vw W Pelvis Right    Result Date: 12/26/2019  EXAMINATION: ONE XRAY VIEW OF THE PELVIS AND TWO XRAY VIEWS RIGHT HIP; TWO XRAY VIEWS OF THE LEFT HIP 12/26/2019 1:37 am COMPARISON: None. HISTORY: ORDERING SYSTEM PROVIDED HISTORY: Trauma/Fracture TECHNOLOGIST PROVIDED HISTORY: AP and cross-table lateral of the hip please, thank you Trauma/Fracture Reason for Exam: fall/trauma Acuity: Acute FINDINGS: The bones are osteopenic. The femoral heads located bilateral.  No acute fracture or dislocation pelvis or hips bilaterally. SI joints are grossly intact. Pubic symphysis is intact. The sacrum is partially obscured by contrast in the bladder. No acute osseous abnormality in the pelvis or hips bilaterally. Osteopenia. CTA H/N 1/6/2019  1. New low-density extra-axial collection along the left cerebral convexity measuring 14 mm, with new 4 mm midline shift to the right. This is partially evaluated due to lack of noncontrast CT images. 2. Moderate stenosis involving petrous segment of left internal carotid artery. Severe stenosis involving proximal cavernous segment of left internal carotid artery. Moderate stenosis involving clinoid and supraclinoid segment of left internal carotid artery. 3. Severe diffuse stenosis involving petrous segment of right internal carotid artery, and cavernous segment of right renal carotid artery. Moderate to severe stenosis involving clinoid and supraclinoid segment of the right internal carotid artery.   4. Moderate multifocal stenosis involving anterior cerebral arteries, middle cerebral arteries, and posterior cerebral arteries, without evidence of large vessel occlusion. Appearance is reasonably similar to the previous examination on December 25, 2019.  5. Unchanged appearance of greater than 90% stenosis involving proximal right internal carotid artery, with diffuse severe narrowing of remainder of right internal carotid artery. This extends into intracranial segments. 6. Postsurgical changes at the left carotid bifurcation, without significant flow-limiting stenosis. 7. Moderate stenosis at origin of right vertebral artery. Moderate to severe stenosis at the proximal left vertebral artery, just distal to the origin. 8. Approximately 50% stenosis involving the left proximal subclavian artery. 9. Scattered multifocal ground-glass pulmonary opacities at both lung apices. Fluid density seen within the trachea adjacent to the endotracheal tube. MRI Brain 1/9/2020      LTME:Diffuse encephalopathy. No epileptiform discharge          ASSESSMENT AND PLAN:       60-year-old male presenting initially with critical multivessel CVA with critical stenosis of left proximal vert, critical stenosis of right cervical ICA, critical stenosis of right cavernous ICA, and moderate stenosis of left cavernous ICA with bilateral subdural hematoma right greater than left concern for infectious versus delirium tremens.     Assessment & Plan:    Neuro    Traumatic SAH  Acute ischemic stroke, bilateral temporoparietal  Global aphasia due to above   Hold ASA/Plavix for Trach/Peg Wed vs Thurs per GS  Critical right ICA stenosis-post right ICA stenting  Systolic blood pressure goal 100-140  Modafinil 100 mg BID   Amantadine 200 @ 0800 and 100 @ 1400  Heparin gtt in lieu of AP in anticipation of Trach/Peg  Palliative care following  Crestor 5 qHS      Cardio  On Sotalol 160 mg twice daily  Lisinopril 10 qD  EF 45%  Continue cardiac monitor     Pulm  Intubated  [facility] 12, 600, 5,

## 2020-01-21 ENCOUNTER — ANESTHESIA EVENT (OUTPATIENT)
Dept: OPERATING ROOM | Age: 60
DRG: 030 | End: 2020-01-21
Payer: MEDICAID

## 2020-01-21 ENCOUNTER — APPOINTMENT (OUTPATIENT)
Dept: GENERAL RADIOLOGY | Age: 60
DRG: 030 | End: 2020-01-21
Payer: MEDICAID

## 2020-01-21 LAB
ALLEN TEST: ABNORMAL
FIO2: 30
GLUCOSE BLD-MCNC: 147 MG/DL (ref 75–110)
GLUCOSE BLD-MCNC: 152 MG/DL (ref 75–110)
GLUCOSE BLD-MCNC: 97 MG/DL (ref 75–110)
MODE: ABNORMAL
NEGATIVE BASE EXCESS, ART: ABNORMAL (ref 0–2)
O2 DEVICE/FLOW/%: ABNORMAL
PARTIAL THROMBOPLASTIN TIME: 36.5 SEC (ref 20.5–30.5)
PARTIAL THROMBOPLASTIN TIME: 42.2 SEC (ref 20.5–30.5)
PARTIAL THROMBOPLASTIN TIME: 57.2 SEC (ref 20.5–30.5)
PATIENT TEMP: ABNORMAL
POC HCO3: 30.5 MMOL/L (ref 21–28)
POC O2 SATURATION: 99 % (ref 94–98)
POC PCO2 TEMP: ABNORMAL MM HG
POC PCO2: 39.3 MM HG (ref 35–48)
POC PH TEMP: ABNORMAL
POC PH: 7.5 (ref 7.35–7.45)
POC PO2 TEMP: ABNORMAL MM HG
POC PO2: 134.3 MM HG (ref 83–108)
POSITIVE BASE EXCESS, ART: 7 (ref 0–3)
SAMPLE SITE: ABNORMAL
TCO2 (CALC), ART: 32 MMOL/L (ref 22–29)

## 2020-01-21 PROCEDURE — 85730 THROMBOPLASTIN TIME PARTIAL: CPT

## 2020-01-21 PROCEDURE — 2580000003 HC RX 258: Performed by: STUDENT IN AN ORGANIZED HEALTH CARE EDUCATION/TRAINING PROGRAM

## 2020-01-21 PROCEDURE — 94761 N-INVAS EAR/PLS OXIMETRY MLT: CPT

## 2020-01-21 PROCEDURE — 76937 US GUIDE VASCULAR ACCESS: CPT

## 2020-01-21 PROCEDURE — 2580000003 HC RX 258: Performed by: NURSE PRACTITIONER

## 2020-01-21 PROCEDURE — 82947 ASSAY GLUCOSE BLOOD QUANT: CPT

## 2020-01-21 PROCEDURE — 6370000000 HC RX 637 (ALT 250 FOR IP): Performed by: PSYCHIATRY & NEUROLOGY

## 2020-01-21 PROCEDURE — 6370000000 HC RX 637 (ALT 250 FOR IP): Performed by: STUDENT IN AN ORGANIZED HEALTH CARE EDUCATION/TRAINING PROGRAM

## 2020-01-21 PROCEDURE — 6360000002 HC RX W HCPCS: Performed by: STUDENT IN AN ORGANIZED HEALTH CARE EDUCATION/TRAINING PROGRAM

## 2020-01-21 PROCEDURE — 94770 HC ETCO2 MONITOR DAILY: CPT

## 2020-01-21 PROCEDURE — 82803 BLOOD GASES ANY COMBINATION: CPT

## 2020-01-21 PROCEDURE — 6370000000 HC RX 637 (ALT 250 FOR IP): Performed by: NURSE PRACTITIONER

## 2020-01-21 PROCEDURE — 37799 UNLISTED PX VASCULAR SURGERY: CPT

## 2020-01-21 PROCEDURE — 99233 SBSQ HOSP IP/OBS HIGH 50: CPT | Performed by: PSYCHIATRY & NEUROLOGY

## 2020-01-21 PROCEDURE — 2000000003 HC NEURO ICU R&B

## 2020-01-21 PROCEDURE — 71045 X-RAY EXAM CHEST 1 VIEW: CPT

## 2020-01-21 PROCEDURE — 2500000003 HC RX 250 WO HCPCS: Performed by: NURSE PRACTITIONER

## 2020-01-21 PROCEDURE — 36600 WITHDRAWAL OF ARTERIAL BLOOD: CPT

## 2020-01-21 PROCEDURE — 36415 COLL VENOUS BLD VENIPUNCTURE: CPT

## 2020-01-21 PROCEDURE — 99024 POSTOP FOLLOW-UP VISIT: CPT | Performed by: PSYCHIATRY & NEUROLOGY

## 2020-01-21 PROCEDURE — 2700000000 HC OXYGEN THERAPY PER DAY

## 2020-01-21 PROCEDURE — 94003 VENT MGMT INPAT SUBQ DAY: CPT

## 2020-01-21 RX ORDER — GLYCOPYRROLATE 0.2 MG/ML
0.1 INJECTION INTRAMUSCULAR; INTRAVENOUS PRN
Status: COMPLETED | OUTPATIENT
Start: 2020-01-21 | End: 2020-01-22

## 2020-01-21 RX ORDER — FUROSEMIDE 10 MG/ML
10 INJECTION INTRAMUSCULAR; INTRAVENOUS ONCE
Status: COMPLETED | OUTPATIENT
Start: 2020-01-21 | End: 2020-01-21

## 2020-01-21 RX ORDER — LISINOPRIL 20 MG/1
20 TABLET ORAL DAILY
Status: DISCONTINUED | OUTPATIENT
Start: 2020-01-22 | End: 2020-01-27 | Stop reason: HOSPADM

## 2020-01-21 RX ADMIN — SODIUM CHLORIDE, PRESERVATIVE FREE 10 ML: 5 INJECTION INTRAVENOUS at 21:36

## 2020-01-21 RX ADMIN — AMANTADINE HYDROCHLORIDE 100 MG: 50 SOLUTION ORAL at 08:00

## 2020-01-21 RX ADMIN — INSULIN LISPRO 3 UNITS: 100 INJECTION, SOLUTION INTRAVENOUS; SUBCUTANEOUS at 11:35

## 2020-01-21 RX ADMIN — SODIUM CHLORIDE, PRESERVATIVE FREE 10 ML: 5 INJECTION INTRAVENOUS at 11:35

## 2020-01-21 RX ADMIN — HEPARIN SODIUM 23 UNITS/KG/HR: 10000 INJECTION, SOLUTION INTRAVENOUS at 15:49

## 2020-01-21 RX ADMIN — SODIUM CHLORIDE, PRESERVATIVE FREE 10 ML: 5 INJECTION INTRAVENOUS at 11:36

## 2020-01-21 RX ADMIN — ROSUVASTATIN CALCIUM 5 MG: 5 TABLET, FILM COATED ORAL at 21:36

## 2020-01-21 RX ADMIN — HYDRALAZINE HYDROCHLORIDE 10 MG: 20 INJECTION INTRAMUSCULAR; INTRAVENOUS at 21:21

## 2020-01-21 RX ADMIN — Medication 100 MG: at 08:24

## 2020-01-21 RX ADMIN — HYDRALAZINE HYDROCHLORIDE 10 MG: 20 INJECTION INTRAMUSCULAR; INTRAVENOUS at 01:10

## 2020-01-21 RX ADMIN — FAMOTIDINE 20 MG: 10 INJECTION, SOLUTION INTRAVENOUS at 21:22

## 2020-01-21 RX ADMIN — SOTALOL HYDROCHLORIDE 160 MG: 80 TABLET ORAL at 21:22

## 2020-01-21 RX ADMIN — INSULIN LISPRO 3 UNITS: 100 INJECTION, SOLUTION INTRAVENOUS; SUBCUTANEOUS at 05:29

## 2020-01-21 RX ADMIN — ANTI-FUNGAL POWDER MICONAZOLE NITRATE TALC FREE: 1.42 POWDER TOPICAL at 11:25

## 2020-01-21 RX ADMIN — HYDRALAZINE HYDROCHLORIDE 10 MG: 20 INJECTION INTRAMUSCULAR; INTRAVENOUS at 04:04

## 2020-01-21 RX ADMIN — FAMOTIDINE 20 MG: 10 INJECTION, SOLUTION INTRAVENOUS at 08:25

## 2020-01-21 RX ADMIN — FLUOXETINE 10 MG: 10 CAPSULE ORAL at 08:25

## 2020-01-21 RX ADMIN — DOCUSATE SODIUM 100 MG: 50 LIQUID ORAL at 08:25

## 2020-01-21 RX ADMIN — MODAFINIL 100 MG: 100 TABLET ORAL at 08:00

## 2020-01-21 RX ADMIN — SOTALOL HYDROCHLORIDE 160 MG: 80 TABLET ORAL at 08:24

## 2020-01-21 RX ADMIN — SODIUM CHLORIDE, PRESERVATIVE FREE 10 ML: 5 INJECTION INTRAVENOUS at 21:37

## 2020-01-21 RX ADMIN — AMANTADINE HYDROCHLORIDE 100 MG: 50 SOLUTION ORAL at 14:03

## 2020-01-21 RX ADMIN — FOLIC ACID 1 MG: 1 TABLET ORAL at 08:25

## 2020-01-21 RX ADMIN — ANTI-FUNGAL POWDER MICONAZOLE NITRATE TALC FREE: 1.42 POWDER TOPICAL at 21:32

## 2020-01-21 RX ADMIN — SODIUM CHLORIDE, PRESERVATIVE FREE 10 ML: 5 INJECTION INTRAVENOUS at 21:23

## 2020-01-21 RX ADMIN — Medication 15 ML: at 09:00

## 2020-01-21 RX ADMIN — FUROSEMIDE 10 MG: 10 INJECTION, SOLUTION INTRAMUSCULAR; INTRAVENOUS at 12:24

## 2020-01-21 RX ADMIN — LISINOPRIL 10 MG: 10 TABLET ORAL at 08:25

## 2020-01-21 RX ADMIN — Medication 15 ML: at 21:21

## 2020-01-21 RX ADMIN — INSULIN GLARGINE 25 UNITS: 100 INJECTION, SOLUTION SUBCUTANEOUS at 08:25

## 2020-01-21 RX ADMIN — MODAFINIL 100 MG: 100 TABLET ORAL at 14:03

## 2020-01-21 ASSESSMENT — PULMONARY FUNCTION TESTS
PIF_VALUE: 23
PIF_VALUE: 10
PIF_VALUE: 11
PIF_VALUE: 20
PIF_VALUE: 24
PIF_VALUE: 22
PIF_VALUE: 11
PIF_VALUE: 24
PIF_VALUE: 11
PIF_VALUE: 20
PIF_VALUE: 26
PIF_VALUE: 18
PIF_VALUE: 17
PIF_VALUE: 15
PIF_VALUE: 18

## 2020-01-21 ASSESSMENT — LIFESTYLE VARIABLES: SMOKING_STATUS: 1

## 2020-01-21 NOTE — PROGRESS NOTES
Pt found to have both legs over the side rail, scooted down in the bed, bed alarm not going off. Moving all extremities spontaneously, left arm and right leg less than right arm and left leg. Maintains eye contact but does not follow commands. Assisted back up in bed. Bed alarm sensitivity increased.

## 2020-01-21 NOTE — PROGRESS NOTES
Pt extubated at this time per order. RT, [place] NP and writer at bedside. 2L NC p[laced and will cont to monitor.

## 2020-01-21 NOTE — CARE COORDINATION
Spoke on the phone with mother Marina Lara, the transition plan remains [address] [place] [address] [facility] after trach/PEG placement tomorrow. Spoke with Bouchra Espino at [facility], discussed case and plan for trach and PEG tomorrow. Bouchra Espino with discuss starting precert with nurse and it will either be started today or early tomorrow morning.

## 2020-01-21 NOTE — ANESTHESIA PRE PROCEDURE
Department of Anesthesiology  Preprocedure Note       Name:  Rogerio Urban   Age:  61 y.o.  :  1960                                          MRN:  [id number]         Date:  2020      Surgeon: Naga Olivas):  Nelly Robins IV, DO    Procedure: TRACHEOTOMY (N/A )  EGD ESOPHAGOGASTRODUODENOSCOPY PEG TUBE INSERTION - GI UNIT SCHEDULED. (N/A )    Medications prior to admission:   Prior to Admission medications    Medication Sig Start Date End Date Taking?  Authorizing Provider   vitamin D (ERGOCALCIFEROL) 1.25 MG (96098 UT) CAPS capsule Take 1 capsule by mouth once a week for 8 doses 19 Yes Jarocho Copeland DO       Current medications:    Current Facility-Administered Medications   Medication Dose Route Frequency Provider Last Rate Last Dose    lisinopril (PRINIVIL;ZESTRIL) tablet 20 mg  20 mg Oral Daily Everardo See MD        glycopyrrolate injection 0.1 mg  0.1 mg Intravenous PRN PAMELA Kiran - CNP        miconazole (MICOTIN) 2 % powder   Topical BID Everardo See MD        heparin 25,000 units in dextrose 5% 250 mL infusion  12 Units/kg/hr Intravenous Continuous Juan Costa DO   Stopped at 20 0730    FLUoxetine (PROZAC) capsule 10 mg  10 mg Oral Daily Mitch Han MD   10 mg at 20 0825    ipratropium-albuterol (DUONEB) nebulizer solution 1 ampule  1 ampule Inhalation Q4H PRN Sabine Godfrey MD        albuterol (PROVENTIL) nebulizer solution 2.5 mg  2.5 mg Nebulization Q6H PRN Sabine Godfrey MD        amantadine (SYMMETREL) solution 100 mg  100 mg Per G Tube BID- 8&2 Mitch Han MD   100 mg at 20 1403    modafinil (PROVIGIL) tablet 200 mg  200 mg Oral Daily Sabine Godfrey MD   100 mg at 20 0800    And    modafinil (PROVIGIL) tablet 100 mg  100 mg Oral Daily Sabine Godfrey MD   100 mg at 20 1403    fentaNYL (SUBLIMAZE) injection 50 mcg  50 mcg Intravenous Q5 Min PRN Farideh Malcolm MD   50 mcg at 20 7660    acetaminophen (TYLENOL) tablet 650 mg  650 mg Oral Q4H PRN Iqra Henao MD        insulin glargine (LANTUS) injection vial 25 Units  25 Units Subcutaneous Daily Jolene Beltran MD   25 Units at 01/21/20 0825    hydrALAZINE (APRESOLINE) injection 10 mg  10 mg Intravenous Q1H PRN Redge Gell, DO   10 mg at 01/21/20 2121    labetalol (NORMODYNE;TRANDATE) injection syringe 20 mg  20 mg Intravenous Q2H PRN Americo Rich, DO   20 mg at 01/15/20 0119    0.9 % sodium chloride infusion   Intravenous Continuous Thersia Nikky, APRN - CNP 10 mL/hr at 01/20/20 2004      insulin lispro (HUMALOG) injection vial 0-18 Units  0-18 Units Subcutaneous Q6H Thersia Guthelga, APRN - CNP   3 Units at 01/21/20 1135    docusate (COLACE) 50 MG/5ML liquid 100 mg  100 mg Oral Daily Americo Rich, DO   100 mg at 01/21/20 0825    ibuprofen (ADVIL;MOTRIN) tablet 400 mg  400 mg Oral Q6H PRN Thersia Gutter, APRN - CNP        acetaminophen (TYLENOL) 160 MG/5ML solution 650 mg  650 mg Oral Q6H PRN Thersia Gutter, APRN - CNP   650 mg at 01/07/20 0024    sotalol (BETAPACE) tablet 160 mg  160 mg Oral BID Thersia Gutter, APRN - CNP   160 mg at 01/21/20 2122    nicotine (NICODERM CQ) 21 MG/24HR 1 patch  1 patch Transdermal Daily Thersia Guthelga, APRN - CNP   1 patch at 60/59/97 2088    folic acid (FOLVITE) tablet 1 mg  1 mg Oral Daily Thersia Gutter, APRN - CNP   1 mg at 01/21/20 0825    vitamin B-1 (THIAMINE) tablet 100 mg  100 mg Oral Daily Thersia Gutter, APRN - CNP   100 mg at 01/21/20 7454    sodium chloride flush 0.9 % injection 10 mL  10 mL Intravenous BID Thersia Guthelga, APRN - CNP   10 mL at 01/21/20 2123    famotidine (PEPCID) injection 20 mg  20 mg Intravenous BID Thersia Gutter, APRN - CNP   20 mg at 01/21/20 2122    rosuvastatin (CRESTOR) tablet 5 mg  5 mg Oral Nightly PAMELA Guidry CNP   5 mg at 01/21/20 2136    chlorhexidine (PERIDEX) 0.12 % solution 15 mL  15 mL Mouth/Throat BID Luis Armando Sifuentes Peterson June MD   15 mL at 01/21/20 2121    metoprolol (LOPRESSOR) injection 5 mg  5 mg Intravenous Q5 Min PRN Meet Galindo MD   5 mg at 01/02/20 1858    vitamin D (ERGOCALCIFEROL) capsule 50,000 Units  50,000 Units Oral Weekly Luisito Carlos DO   50,000 Units at 01/17/20 0847    glucose (GLUTOSE) 40 % oral gel 15 g  15 g Oral PRN Odalis Ortega MD        dextrose 50 % IV solution  12.5 g Intravenous PRN Odalis Ortega MD        glucagon (rDNA) injection 1 mg  1 mg Intramuscular PRN Odalis Otrega MD        dextrose 5 % solution  100 mL/hr Intravenous PRN Odalis Ortega MD        magnesium sulfate 1 g in sodium chloride 0.9 % 100 mL IVPB  1 g Intravenous PRN Sunny Dhaliwal MD        sodium chloride flush 0.9 % injection 10 mL  10 mL Intravenous 2 times per day Luisito Carlos DO   10 mL at 01/21/20 2136    sodium chloride flush 0.9 % injection 10 mL  10 mL Intravenous PRN Luisito Carlos DO        sodium chloride flush 0.9 % injection 10 mL  10 mL Intravenous 2 times per day Odalis Ortega MD   10 mL at 01/21/20 2137    sodium chloride flush 0.9 % injection 10 mL  10 mL Intravenous PRN Odalis Ortega MD           Allergies:  No Known Allergies    Problem List:    Patient Active Problem List   Diagnosis Code    SAH (subarachnoid hemorrhage) ([facility] [address] I60.9    Subarachnoid bleed ([facility] I60.9    Traumatic subarachnoid hemorrhage with loss of consciousness of 1 hour to 5 hours 59 minutes ([facility] [address] S06. 6X3A    Carotid stenosis, right I65.21    Asymptomatic stenosis of left vertebral artery I65.02    Intracranial atherosclerosis I67.2    History of CEA (carotid endarterectomy) Z98.890    Ventilator dependence (HCC) Z99.11    Delirium tremens (HCC) F10.231    Chronic a-fib I48.20    On mechanically assisted ventilation (HCC) Z99.11    Encephalopathy G93.40    Acute ischemic multifocal anterior circulation stroke (HCC) I63.529    Palliative care encounter Z51.5       Past Medical History:        Diagnosis Date  Alcohol abuse     Atrial fibrillation (HCC)     Diabetes ([facility]     Gastritis     Hyperlipidemia     Hypertension     Left ventricular hypertrophy     Neuropathy     Renal cyst        Past Surgical History:        Procedure Laterality Date    ANGIOPLASTY  2019    NO VASCULAR LEG STENTS PER DR. Bentley Hammond    CAROTID STENT PLACEMENT  01/08/2019    carotid wallstent 10mm. mri conditonal safe immediately.  CORONARY ANGIOPLASTY WITH STENT PLACEMENT  2019    PT HAS 7 CARDIAC STENTS UNKNOWN 1.5 T ONLY       Social History:    Social History     Tobacco Use    Smoking status: Current Every Day Smoker     Packs/day: 0.50     Types: Cigarettes    Smokeless tobacco: Current User   Substance Use Topics    Alcohol use: Yes     Frequency: 4 or more times a week                                Ready to quit: Not Answered  Counseling given: Not Answered      Vital Signs (Current):   Vitals:    01/22/20 0635 01/22/20 0640 01/22/20 0645 01/22/20 0650   BP:       Pulse: 81 75 71 78   Resp: 30 28 29 29   Temp:       TempSrc:       SpO2: 96% 97% 97% 97%   Weight:       Height:                                                  BP Readings from Last 3 Encounters:   01/22/20 (!) 148/93   01/08/20 (!) 127/110   12/26/19 (!) 145/97       NPO Status:                                                                                 BMI:   Wt Readings from Last 3 Encounters:   01/22/20 157 lb 3 oz (71.3 kg)     Body mass index is 21.92 kg/m².     CBC:   Lab Results   Component Value Date    WBC 4.2 01/20/2020    RBC 4.15 01/20/2020    HGB 10.3 01/20/2020    HCT 34.2 01/20/2020    MCV 82.4 01/20/2020    RDW 21.2 01/20/2020     01/20/2020       CMP:   Lab Results   Component Value Date     01/20/2020    K 4.3 01/20/2020     01/20/2020    CO2 23 01/20/2020    BUN 12 01/20/2020    CREATININE 0.31 01/20/2020    GFRAA >60 01/20/2020    LABGLOM >60 01/20/2020    GLUCOSE 146 01/20/2020    PROT 5.7 12/30/2019    CALCIUM 8.6 01/20/2020    BILITOT 0.76 12/30/2019    ALKPHOS 68 12/30/2019    AST 21 12/30/2019    ALT 21 12/30/2019       POC Tests:   Recent Labs     01/22/20  0702   POCGLU 86       Coags:   Lab Results   Component Value Date    PROTIME 10.5 12/25/2019    INR 1.0 12/25/2019    APTT 89.8 01/22/2020       HCG (If Applicable): No results found for: PREGTESTUR, PREGSERUM, HCG, HCGQUANT     ABGs: No results found for: PHART, PO2ART, ZQV1ORL, KOP8DWO, BEART, [id number]     Type & Screen (If Applicable):  No results found for: LABABO, [address]    Anesthesia Evaluation  Patient summary reviewed no history of anesthetic complications:   Airway: Mallampati: III       Comment: intubated   Dental:          Pulmonary:   (+) decreased breath sounds,  current smoker                           Cardiovascular:    (+) hypertension:, CAD:, CABG/stent (coronary artery disease status post non-STEMI, PCI x2 with 8, most recently June 2018):, dysrhythmias: atrial fibrillation, hyperlipidemia      ECG reviewed  Rhythm: regular  Rate: normal                    Neuro/Psych:   (+) CVA:, depression/anxiety             GI/Hepatic/Renal: Neg GI/Hepatic/Renal ROS           ROS comment: Esophageal lesion. Endo/Other:    (+) Diabetes, . Abdominal:           Vascular:   + PVD, aortic or cerebral, . EKG 12/26/2019  Atrial fibrillation with premature ventricular or aberrantly conducted complexes  Right bundle branch block  Left anterior fascicular block  Inferior infarct , age undetermined  Abnormal ECG  No previous ECGs available    TTE 5/29/2019  · Estimated left ventricular ejection fraction is 55 - 60%. Normal left   ventricular ejection fraction. No regional wall motion abnormalities  · Normal right ventricular systolic function  · Mild mitral regurgitation. · No pericardial effusion             Anesthesia Plan      MAC     ASA 4       Induction: intravenous. MIPS: Postoperative opioids intended.       Plan discussed with CRNA.               Jose D Lincoln MD   1/22/2020

## 2020-01-21 NOTE — PROGRESS NOTES
Daily Progress Note  Neuro Critical Care    Patient Name: Sola Shaw  Patient : 1960  Room/Bed: 9467/7231-16  Code Status: Full  Allergies: No Known Allergies    CHIEF COMPLAINT:      SAH     INTERVAL HISTORY    Initial Presentation (Admitted 19): The patient is a 60 yo male with history of atrial fibrillation on Eliquis, bilateral ICA stenosis (right more than left), DM2, HLD, HTN, CAD s/p PCI stents, PVD, CEA, and ETOH abuse who presents as a transfer from [facility] as a trauma alert for CT head revealed diffuse bialteral SAH and left parietal SDH.  He was given [place] and transferred to [facility] for higher level of care.     Burke Shaw was found confused by family at his home after being unable to reach him on the phone.  He was complaining of headache, and actively vomiting. Basilio Ramos had bilateral arm scrapes and bruises concerning for recent fall, patient himself does not member falling, said around 4 PM he woke up and felt frontal and vertex headache and neck pain, and felt nauseous and started throwing up.  Endorses drinking alcohol last night states that he had 2 beers.  Per family has significant alcohol abuse history and has had multiple falls in the past.     On presentation in the ED [facility], patient mildly lethargic but oriented x4, complaining of headache, nausea, mild vertigo, left arm weaker than right, bilateral leg weakness.      Significant interdisciplinary discussion between the neurosurgeon, radiologist, trauma surgeon and stroke specialist about whether  bleed is consistent with traumatic versus spontaneous subarachnoid.  Stroke specialist has significant concern for severe stenosis, suspects traumatic bleed and that patient is at risk for vasospasm.  In addition has intracranial left vertebral artery athero-stenosis.  Consensus management strategy is to keep the patient 130-160 for blood pressure goals to avoid right MCA watershed stroke.     No aneurysm found Av.4 °F (36.9 °C)  Min: 97.9 °F (36.6 °C)  Max: 99 °F (37.2 °C)  BP Range: Systolic (97RFG), MII:598 , Min:117 , RPU:137     Diastolic (26NZR), SSJ:22, Min:68, Max:99    Pulse Range: Pulse  Av.7  Min: 59  Max: 100  Respiration Range: Resp  Av.4  Min: 16  Max: 41  Current Pulse Ox: SpO2: 99 %  24HR Pulse Ox Range: SpO2  Av.6 %  Min: 95 %  Max: 100 %  Patient Vitals for the past 12 hrs:   BP Temp Temp src Pulse Resp SpO2   20 1056 -- -- -- 90 19 99 %   20 0903 139/87 -- -- 96 18 99 %   20 0803 (!) 158/78 -- -- 99 27 100 %   20 0755 -- -- -- 99 18 99 %   20 0703 (!) 145/87 -- -- 99 19 100 %   20 0605 130/69 -- -- 99 22 99 %   20 0505 (!) 142/75 -- -- 97 18 95 %   20 0405 (!) 163/93 98.8 °F (37.1 °C) Oral 95 19 100 %   20 0353 -- -- -- 84 23 100 %   20 0300 (!) 150/94 -- -- 89 17 99 %   20 0200 122/83 -- -- 75 23 99 %   20 0105 (!) 163/97 -- -- 81 (!) 31 100 %   20 0005 (!) 149/75 99 °F (37.2 °C) -- 59 24 100 %     Estimated body mass index is 26.17 kg/m² as calculated from the following:    Height as of this encounter: 5' 11\" (1.803 m).     Weight as of this encounter: 187 lb 9.8 oz (85.1 kg).  []<16 Severe malnutrition  []16-16.99 Moderate malnutrition  []17-18.49 Mild malnutrition  []18.5-24.9 Normal  [x]25-29.9 Overweight (not obese)  []30-34.9 Obese class 1 (Low Risk)  []35-39.9 Obese class 2 (Moderate Risk)  []?40 Obese class 3 (High Risk)    RECENT LABS:   Lab Results   Component Value Date    WBC 4.2 2020    HGB 10.3 (L) 2020    HCT 34.2 (L) 2020     2020    CHOL 105 2019    TRIG 60 2019    HDL 52 2019    ALT 21 2019    AST 21 2019     (L) 2020    K 4.3 2020     2020    CREATININE 0.31 (L) 2020    BUN 12 2020    CO2 23 2020    INR 1.0 2019    LABA1C 7.1 (H) 2019     24 HOUR INTAKE/OUTPUT:    Intake/Output Summary (Last 24 hours) at 1/21/2020 1143  Last data filed at 1/21/2020 0600  Gross per 24 hour   Intake 2568 ml   Output 2850 ml   Net -282 ml     CXR 1/20  Mild pulmonary edema.       Probable left lower lobe atelectasis.           Labs and Images reviewed with:  [x] Dr. Ishmael Grace      PHYSICAL EXAM       Physical Exam  HENT:      Head: Normocephalic and atraumatic. Eyes:      Pupils: Pupils are equal, round, and reactive to light. Cardiovascular:      Rate and Rhythm: Normal rate. Rhythm irregular. Abdominal:      General: Abdomen is flat. Skin:     General: Skin is warm and dry. Neurological:      Mental Status: He is alert. GCS: GCS eye subscore is 4. GCS verbal subscore is 1. GCS motor subscore is 5. Cranial Nerves: No facial asymmetry. Deep Tendon Reflexes: Babinski sign absent on the right side. Babinski sign absent on the left side. Reflex Scores:       Bicep reflexes are 1+ on the right side and 1+ on the left side. Patellar reflexes are 1+ on the right side and 1+ on the left side. Comments:     Alert, not following commands  Visual tracking is present  No abnormal movements or subtle signs of seizures   Moves right upper extremity spontaneously  Localizes to pain over all extremities          [x] There are no drains for Neuro Critical Care to monitor at this time.      ASSESSMENT AND PLAN:       61year old with history of atrial fibrillation on eliquis, who was admitted with diffuse SAH, non-aneurysmal, likely traumatic, SDH, and has been managed for DTs, acute ischemic stroke and acute hypoxic respiratory failure    Acute ischemic stroke, cardio-embolic origin   Traumatic SAH   Atrial fibrillation  Delirium tremens, resolved     NEUROLOGIC:  - Traumatic SAH and SDH improving  - s/p Right ICA angioplasty and stenting  -Dual antiplatelets are on hold  - MRI brain revealed bilateral frontal, parietal, and temporal ischemia  - EEG: diffuse encephalopathy, no seizures  - No seizures, discontinue Keppra  - Folic acid and thiamine   - Provigil 200 mg AM, 100 mg PM and Amantadine 100 mg BID as neuro stimulant.   - Goal SBP < 140  - Prozac 10 mg QD  - Neuro checks per protocol    CARDIOVASCULAR:  - Goal SBP < 140  - History of atrial fibrillation, continue sotalol 160 mg BID  -Increase lisinopril to 20 mg daily  - Lipid panel: Cholesterol 105, LDL 41  - Crestor 5 mg QHS  - Echocardiogram: EF 45%  - Continue telemetry    PULMONARY:  - Failed extubation trial 1/8  - Tolerated SBT well yesterday and this morning  - Vent Settings: PRVC, 12, 600, 5, 30%. Switch to BiPAP 5-10  -Mild pulmonary edema on chest x-ray  -Lasix 10 mg IV  - Hold tube feeds for extubation trial  -ABG      RENAL/FLUID/ELECTROLYTE:  -BUN 12, creatinine 0.3  -Sodium 134, potassium 4.3  - Total fluids @ 100 mL/hr  - Replace electrolytes PRN  - BMP M,W,F    GI/NUTRITION:  NUTRITION:  DIET TUBE FEED CONTINUOUS/CYCLIC NPO; Diabetic; Nasogastric; Continuous; 25; 75; 24   -N.p.o. after midnight for possible trach and PEG versus PEG  - Bowel regimen: senna-s daily  - Last BM yesterday  - GI prophylaxis: Pepcid 20 mg BID    ID:  - Tmax 98.2  -White blood cells 4.2, hemoglobin 10.3  - Continue to monitor for fevers    HEME:   - H&H 10.1/35.3  - Platelets 112  -On heparin infusion, PTT 57.2 this morning goal 50-70  - CBC -M,W,F    ENDOCRINE:  - Continue to monitor blood glucose, goal <180  - Hemoglobin A1C 7.1  - continue high ISS  - Lantus 25 units QD    OTHER:  - PT/OT/ST   - Code Status: Full  - Palliative care for family support and goal of care    PROPHYLAXIS:  Stress ulcer: H2 blocker    DVT PROPHYLAXIS:  - SCD sleeves - Thigh High   - Heparin infusion     DISPOSITION:  [x] To remain ICU: close neurological monitoring and vent management    We will continue to follow along. For any changes in exam or patient status please contact Neuro Critical Care.       [address]

## 2020-01-21 NOTE — PROGRESS NOTES
General Surgery:  Daily Progress Note                   PATIENT NAME: Michele Burns     TODAY'S DATE: 1/21/2020, 6:25 AM  SUBJECTIVE:     Pt seen and examined at bedside. Afebrile. Patient restless overnight. Did nod to questioning this AM. Continues on heparin gtt. OBJECTIVE:   VITALS:  /69   Pulse 99   Temp 98.8 °F (37.1 °C) (Oral)   Resp 22   Ht 5' 11\" (1.803 m)   Wt 187 lb 9.8 oz (85.1 kg)   SpO2 99%   BMI 26.17 kg/m²      INTAKE/OUTPUT:      Intake/Output Summary (Last 24 hours) at 1/21/2020 3659  Last data filed at 1/21/2020 0600  Gross per 24 hour   Intake 2568 ml   Output 2850 ml   Net -282 ml       PHYSICAL EXAM:  General Appearance: awake, alert, oriented, in no acute distress  HEENT:  Normocephalic, atraumatic, mucus membranes moist   Heart: Heart regular rate and rhythm  Lungs: Intubated, bilateral breath sounds  Abdomen:Soft, nondistended, nontender   Extremities: No cyanosis, pitting edema, rashes noted.       Data:  CBC with Differential:    Lab Results   Component Value Date    WBC 4.2 01/20/2020    RBC 4.15 01/20/2020    HGB 10.3 01/20/2020    HCT 34.2 01/20/2020     01/20/2020    MCV 82.4 01/20/2020    MCH 24.8 01/20/2020    MCHC 30.1 01/20/2020    RDW 21.2 01/20/2020    LYMPHOPCT 29 01/20/2020    MONOPCT 8 01/20/2020    BASOPCT 1 01/20/2020    MONOSABS 0.34 01/20/2020    LYMPHSABS 1.22 01/20/2020    EOSABS 0.29 01/20/2020    BASOSABS 0.04 01/20/2020    DIFFTYPE NOT REPORTED 01/15/2020     BMP:    Lab Results   Component Value Date     01/20/2020    K 4.3 01/20/2020     01/20/2020    CO2 23 01/20/2020    BUN 12 01/20/2020    LABALBU 3.0 12/30/2019    CREATININE 0.31 01/20/2020    CALCIUM 8.6 01/20/2020    GFRAA >60 01/20/2020    LABGLOM >60 01/20/2020    GLUCOSE 146 01/20/2020       ASSESSMENT:  Active Hospital Problems    Diagnosis Date Noted    Palliative care encounter [Z51.5]     Acute ischemic multifocal anterior circulation stroke ([facility] [address] [I63.529]    

## 2020-01-22 ENCOUNTER — ANESTHESIA (OUTPATIENT)
Dept: OPERATING ROOM | Age: 60
DRG: 030 | End: 2020-01-22
Payer: MEDICAID

## 2020-01-22 VITALS
OXYGEN SATURATION: 100 % | SYSTOLIC BLOOD PRESSURE: 127 MMHG | DIASTOLIC BLOOD PRESSURE: 89 MMHG | RESPIRATION RATE: 31 BRPM

## 2020-01-22 LAB
-: NORMAL
ABSOLUTE EOS #: 0.08 K/UL (ref 0–0.4)
ABSOLUTE IMMATURE GRANULOCYTE: 0 K/UL (ref 0–0.3)
ABSOLUTE LYMPH #: 0.94 K/UL (ref 1–4.8)
ABSOLUTE MONO #: 0.7 K/UL (ref 0.1–0.8)
ANION GAP SERPL CALCULATED.3IONS-SCNC: 11 MMOL/L (ref 9–17)
BASOPHILS # BLD: 0 % (ref 0–2)
BASOPHILS ABSOLUTE: 0 K/UL (ref 0–0.2)
BUN BLDV-MCNC: 8 MG/DL (ref 6–20)
BUN/CREAT BLD: ABNORMAL (ref 9–20)
CALCIUM SERPL-MCNC: 9.2 MG/DL (ref 8.6–10.4)
CHLORIDE BLD-SCNC: 98 MMOL/L (ref 98–107)
CO2: 28 MMOL/L (ref 20–31)
CREAT SERPL-MCNC: 0.33 MG/DL (ref 0.7–1.2)
DIFFERENTIAL TYPE: ABNORMAL
EOSINOPHILS RELATIVE PERCENT: 1 % (ref 1–4)
GFR AFRICAN AMERICAN: >60 ML/MIN
GFR NON-AFRICAN AMERICAN: >60 ML/MIN
GFR SERPL CREATININE-BSD FRML MDRD: ABNORMAL ML/MIN/{1.73_M2}
GFR SERPL CREATININE-BSD FRML MDRD: ABNORMAL ML/MIN/{1.73_M2}
GLUCOSE BLD-MCNC: 105 MG/DL (ref 75–110)
GLUCOSE BLD-MCNC: 107 MG/DL (ref 75–110)
GLUCOSE BLD-MCNC: 126 MG/DL (ref 75–110)
GLUCOSE BLD-MCNC: 84 MG/DL (ref 75–110)
GLUCOSE BLD-MCNC: 86 MG/DL (ref 75–110)
GLUCOSE BLD-MCNC: 89 MG/DL (ref 70–99)
GLUCOSE BLD-MCNC: 97 MG/DL (ref 75–110)
HCT VFR BLD CALC: 36.7 % (ref 40.7–50.3)
HEMOGLOBIN: 11.2 G/DL (ref 13–17)
IMMATURE GRANULOCYTES: 0 %
LYMPHOCYTES # BLD: 12 % (ref 24–44)
MCH RBC QN AUTO: 23.9 PG (ref 25.2–33.5)
MCHC RBC AUTO-ENTMCNC: 30.5 G/DL (ref 28.4–34.8)
MCV RBC AUTO: 78.3 FL (ref 82.6–102.9)
MONOCYTES # BLD: 9 % (ref 1–7)
MORPHOLOGY: ABNORMAL
NRBC AUTOMATED: 0 PER 100 WBC
PARTIAL THROMBOPLASTIN TIME: 25.4 SEC (ref 20.5–30.5)
PARTIAL THROMBOPLASTIN TIME: 25.6 SEC (ref 20.5–30.5)
PARTIAL THROMBOPLASTIN TIME: 44 SEC (ref 20.5–30.5)
PARTIAL THROMBOPLASTIN TIME: 89.8 SEC (ref 20.5–30.5)
PDW BLD-RTO: 20.6 % (ref 11.8–14.4)
PLATELET # BLD: 186 K/UL (ref 138–453)
PLATELET # BLD: 195 K/UL (ref 138–453)
PLATELET ESTIMATE: ABNORMAL
PMV BLD AUTO: 9.9 FL (ref 8.1–13.5)
POTASSIUM SERPL-SCNC: 4.2 MMOL/L (ref 3.7–5.3)
RBC # BLD: 4.69 M/UL (ref 4.21–5.77)
RBC # BLD: ABNORMAL 10*6/UL
REASON FOR REJECTION: NORMAL
SEG NEUTROPHILS: 78 % (ref 36–66)
SEGMENTED NEUTROPHILS ABSOLUTE COUNT: 6.08 K/UL (ref 1.8–7.7)
SODIUM BLD-SCNC: 137 MMOL/L (ref 135–144)
WBC # BLD: 7.8 K/UL (ref 3.5–11.3)
WBC # BLD: ABNORMAL 10*3/UL
ZZ NTE CLEAN UP: ORDERED TEST: NORMAL
ZZ NTE WITH NAME CLEAN UP: SPECIMEN SOURCE: NORMAL

## 2020-01-22 PROCEDURE — 2000000003 HC NEURO ICU R&B

## 2020-01-22 PROCEDURE — 99233 SBSQ HOSP IP/OBS HIGH 50: CPT | Performed by: PSYCHIATRY & NEUROLOGY

## 2020-01-22 PROCEDURE — 6360000002 HC RX W HCPCS: Performed by: STUDENT IN AN ORGANIZED HEALTH CARE EDUCATION/TRAINING PROGRAM

## 2020-01-22 PROCEDURE — 6370000000 HC RX 637 (ALT 250 FOR IP): Performed by: PSYCHIATRY & NEUROLOGY

## 2020-01-22 PROCEDURE — 85730 THROMBOPLASTIN TIME PARTIAL: CPT

## 2020-01-22 PROCEDURE — 2500000003 HC RX 250 WO HCPCS: Performed by: STUDENT IN AN ORGANIZED HEALTH CARE EDUCATION/TRAINING PROGRAM

## 2020-01-22 PROCEDURE — 3700000001 HC ADD 15 MINUTES (ANESTHESIA): Performed by: SURGERY

## 2020-01-22 PROCEDURE — 36415 COLL VENOUS BLD VENIPUNCTURE: CPT

## 2020-01-22 PROCEDURE — 0DH63UZ INSERTION OF FEEDING DEVICE INTO STOMACH, PERCUTANEOUS APPROACH: ICD-10-PCS | Performed by: SURGERY

## 2020-01-22 PROCEDURE — 6370000000 HC RX 637 (ALT 250 FOR IP): Performed by: STUDENT IN AN ORGANIZED HEALTH CARE EDUCATION/TRAINING PROGRAM

## 2020-01-22 PROCEDURE — 97110 THERAPEUTIC EXERCISES: CPT

## 2020-01-22 PROCEDURE — 99024 POSTOP FOLLOW-UP VISIT: CPT | Performed by: PSYCHIATRY & NEUROLOGY

## 2020-01-22 PROCEDURE — 85049 AUTOMATED PLATELET COUNT: CPT

## 2020-01-22 PROCEDURE — 6370000000 HC RX 637 (ALT 250 FOR IP): Performed by: NURSE PRACTITIONER

## 2020-01-22 PROCEDURE — 85025 COMPLETE CBC W/AUTO DIFF WBC: CPT

## 2020-01-22 PROCEDURE — 97164 PT RE-EVAL EST PLAN CARE: CPT

## 2020-01-22 PROCEDURE — 2580000003 HC RX 258: Performed by: NURSE PRACTITIONER

## 2020-01-22 PROCEDURE — 80048 BASIC METABOLIC PNL TOTAL CA: CPT

## 2020-01-22 PROCEDURE — 97530 THERAPEUTIC ACTIVITIES: CPT

## 2020-01-22 PROCEDURE — 3700000000 HC ANESTHESIA ATTENDED CARE: Performed by: SURGERY

## 2020-01-22 PROCEDURE — 2500000003 HC RX 250 WO HCPCS: Performed by: NURSE PRACTITIONER

## 2020-01-22 PROCEDURE — 2709999900 HC NON-CHARGEABLE SUPPLY: Performed by: SURGERY

## 2020-01-22 PROCEDURE — 7100000000 HC PACU RECOVERY - FIRST 15 MIN: Performed by: SURGERY

## 2020-01-22 PROCEDURE — 7100000001 HC PACU RECOVERY - ADDTL 15 MIN: Performed by: SURGERY

## 2020-01-22 PROCEDURE — 3609013300 HC EGD TUBE PLACEMENT: Performed by: SURGERY

## 2020-01-22 PROCEDURE — 82947 ASSAY GLUCOSE BLOOD QUANT: CPT

## 2020-01-22 PROCEDURE — 6360000002 HC RX W HCPCS: Performed by: NURSE ANESTHETIST, CERTIFIED REGISTERED

## 2020-01-22 RX ORDER — HYDRALAZINE HYDROCHLORIDE 20 MG/ML
5 INJECTION INTRAMUSCULAR; INTRAVENOUS EVERY 10 MIN PRN
Status: DISCONTINUED | OUTPATIENT
Start: 2020-01-22 | End: 2020-01-22 | Stop reason: HOSPADM

## 2020-01-22 RX ORDER — FENTANYL CITRATE 50 UG/ML
25 INJECTION, SOLUTION INTRAMUSCULAR; INTRAVENOUS EVERY 5 MIN PRN
Status: DISCONTINUED | OUTPATIENT
Start: 2020-01-22 | End: 2020-01-22 | Stop reason: HOSPADM

## 2020-01-22 RX ORDER — 0.9 % SODIUM CHLORIDE 0.9 %
500 INTRAVENOUS SOLUTION INTRAVENOUS
Status: DISCONTINUED | OUTPATIENT
Start: 2020-01-22 | End: 2020-01-22 | Stop reason: HOSPADM

## 2020-01-22 RX ORDER — PROPOFOL 10 MG/ML
INJECTION, EMULSION INTRAVENOUS PRN
Status: DISCONTINUED | OUTPATIENT
Start: 2020-01-22 | End: 2020-01-22 | Stop reason: SDUPTHER

## 2020-01-22 RX ORDER — DIPHENHYDRAMINE HYDROCHLORIDE 50 MG/ML
12.5 INJECTION INTRAMUSCULAR; INTRAVENOUS
Status: DISCONTINUED | OUTPATIENT
Start: 2020-01-22 | End: 2020-01-22 | Stop reason: HOSPADM

## 2020-01-22 RX ORDER — PROPOFOL 10 MG/ML
INJECTION, EMULSION INTRAVENOUS CONTINUOUS PRN
Status: DISCONTINUED | OUTPATIENT
Start: 2020-01-22 | End: 2020-01-22 | Stop reason: SDUPTHER

## 2020-01-22 RX ORDER — LABETALOL HYDROCHLORIDE 5 MG/ML
5 INJECTION, SOLUTION INTRAVENOUS EVERY 10 MIN PRN
Status: DISCONTINUED | OUTPATIENT
Start: 2020-01-22 | End: 2020-01-22 | Stop reason: HOSPADM

## 2020-01-22 RX ORDER — FENTANYL CITRATE 50 UG/ML
INJECTION, SOLUTION INTRAMUSCULAR; INTRAVENOUS PRN
Status: DISCONTINUED | OUTPATIENT
Start: 2020-01-22 | End: 2020-01-22 | Stop reason: SDUPTHER

## 2020-01-22 RX ORDER — PROMETHAZINE HYDROCHLORIDE 25 MG/ML
6.25 INJECTION, SOLUTION INTRAMUSCULAR; INTRAVENOUS
Status: DISCONTINUED | OUTPATIENT
Start: 2020-01-22 | End: 2020-01-22 | Stop reason: HOSPADM

## 2020-01-22 RX ADMIN — ANTI-FUNGAL POWDER MICONAZOLE NITRATE TALC FREE: 1.42 POWDER TOPICAL at 22:05

## 2020-01-22 RX ADMIN — PROPOFOL 100 MCG/KG/MIN: 10 INJECTION, EMULSION INTRAVENOUS at 12:27

## 2020-01-22 RX ADMIN — GLYCOPYRROLATE 0.1 MG: 0.2 INJECTION INTRAMUSCULAR; INTRAVENOUS at 09:24

## 2020-01-22 RX ADMIN — PROPOFOL 20 MG: 10 INJECTION, EMULSION INTRAVENOUS at 12:40

## 2020-01-22 RX ADMIN — PHENYLEPHRINE HYDROCHLORIDE 100 MCG: 10 INJECTION INTRAVENOUS at 12:53

## 2020-01-22 RX ADMIN — SOTALOL HYDROCHLORIDE 160 MG: 80 TABLET ORAL at 22:06

## 2020-01-22 RX ADMIN — FAMOTIDINE 20 MG: 10 INJECTION, SOLUTION INTRAVENOUS at 22:05

## 2020-01-22 RX ADMIN — PROPOFOL 20 MG: 10 INJECTION, EMULSION INTRAVENOUS at 12:32

## 2020-01-22 RX ADMIN — Medication 15 ML: at 22:13

## 2020-01-22 RX ADMIN — ANTI-FUNGAL POWDER MICONAZOLE NITRATE TALC FREE: 1.42 POWDER TOPICAL at 09:33

## 2020-01-22 RX ADMIN — Medication 20 MG: at 17:54

## 2020-01-22 RX ADMIN — FAMOTIDINE 20 MG: 10 INJECTION, SOLUTION INTRAVENOUS at 09:25

## 2020-01-22 RX ADMIN — FENTANYL CITRATE 25 MCG: 50 INJECTION INTRAMUSCULAR; INTRAVENOUS at 12:27

## 2020-01-22 RX ADMIN — Medication 20 MG: at 11:29

## 2020-01-22 RX ADMIN — HEPARIN SODIUM 22 UNITS/KG/HR: 10000 INJECTION, SOLUTION INTRAVENOUS at 04:58

## 2020-01-22 RX ADMIN — HYDRALAZINE HYDROCHLORIDE 10 MG: 20 INJECTION INTRAMUSCULAR; INTRAVENOUS at 22:20

## 2020-01-22 RX ADMIN — SODIUM CHLORIDE, PRESERVATIVE FREE 10 ML: 5 INJECTION INTRAVENOUS at 09:35

## 2020-01-22 RX ADMIN — ROSUVASTATIN CALCIUM 5 MG: 5 TABLET, FILM COATED ORAL at 22:05

## 2020-01-22 RX ADMIN — PHENYLEPHRINE HYDROCHLORIDE 100 MCG: 10 INJECTION INTRAVENOUS at 12:52

## 2020-01-22 RX ADMIN — Medication 15 ML: at 09:37

## 2020-01-22 ASSESSMENT — PULMONARY FUNCTION TESTS
PIF_VALUE: 0
PIF_VALUE: 4
PIF_VALUE: 0
PIF_VALUE: 1
PIF_VALUE: 0

## 2020-01-22 NOTE — PROGRESS NOTES
Physical Therapy    Facility/Department: [address]  Reassessment    NAME: Abe Waller  : 1960  MRN: [id number]    Date of Service: 2020  Chief Complaint   Patient presents with    Fall       multiple falls, on eliquis, scatered SAH, kcentra en route     Trauma   Pt with decline in status, had to be intubated 19; extubated 20. Pt to have PEG placement later today. Splint L wrist; NWB B wrists, able to bear weight through elbows per ortho. Discharge Recommendations:  Further therapy recommended at discharge. PT Equipment Recommendations  Equipment Needed: (TBD)    Assessment   Body structures, Functions, Activity limitations: Decreased functional mobility ; Decreased strength;Decreased safe awareness;Decreased cognition;Decreased endurance;Decreased balance  Prognosis: Guarded  Decision Making: Medium Complexity  PT Education: Goals;PT Role;Plan of Care  Barriers to Learning: cognition/lethargy  REQUIRES PT FOLLOW UP: No  Activity Tolerance  Activity Tolerance: Patient limited by fatigue;Patient limited by endurance; Patient limited by cognitive status       Patient Diagnosis(es): The encounter diagnosis was Subarachnoid bleed ([facility] [address] has a past medical history of Alcohol abuse, Atrial fibrillation ([facility] [address] Diabetes ([facility] [address] Gastritis, Hyperlipidemia, Hypertension, Left ventricular hypertrophy, Neuropathy, and Renal cyst.   has a past surgical history that includes Coronary angioplasty with stent (); angioplasty (); and Carotid stent placement (2019).     Restrictions  Restrictions/Precautions  Restrictions/Precautions: Fall Risk, Seizure, Weight Bearing, General Precautions, Up as Tolerated  Required Braces or Orthoses?: No  Lower Extremity Weight Bearing Restrictions  Right Lower Extremity Weight Bearing: Weight Bearing As Tolerated  Left Lower Extremity Weight Bearing: Weight Bearing As Tolerated  Upper Extremity Weight Bearing Restrictions  Right Upper Extremity Weight Bearing: Non Weight Bearing  Left Upper Extremity Weight Bearing: Non Weight Bearing  Position Activity Restriction  Other position/activity restrictions: okay to WB through the elbows   Vision/Hearing  Vision: (SHA)  Hearing: Exceptions to [facility]  Hearing Exceptions: Hard of hearing/hearing concerns     Subjective  General  Patient assessed for rehabilitation services?: Yes  Response To Previous Treatment: Patient unable to report, no changes reported from family or staff  Family / Caregiver Present: No  Follows Commands: Impaired  Pain Screening  Patient Currently in Pain: (SHA--pt withdrawing with ROM fingers L hand)  Vital Signs  Patient Currently in Pain: (SHA--pt withdrawing with ROM fingers L hand)  Pre Treatment Pain Screening  Intervention List: Patient able to continue with treatment  Comments / Details: pt able to tolerate slow, prolonged stretch of fingers L hand    Orientation  Orientation  Overall Orientation Status: Impaired  Orientation Level: Unable to assess(pt didn't answer any questions, didn't follow any commands)  Social/Functional History  Social/Functional History  Lives With: Alone  Type of Home: Apartment  Home Layout: One level  Bathroom Shower/Tub: Tub/Shower unit  Bathroom Toilet: Standard  Bathroom Accessibility: Accessible  ADL Assistance: Independent  Homemaking Assistance: Independent  Homemaking Responsibilities: Yes  Meal Prep Responsibility: Primary  Laundry Responsibility: Primary  Cleaning Responsibility: Primary  Shopping Responsibility: Primary  Ambulation Assistance: Independent  Transfer Assistance: Independent  Active : No  Mode of Transportation: Family  Occupation: On disability  Additional Comments: info taken from OT note--pt unable to answer questions       Objective     Observation/Palpation  Posture: Poor    PROM RLE (degrees)  RLE PROM: WFL  PROM LLE (degrees)  LLE PROM: WFL  PROM RUE (degrees)  RUE PROM: WFL  PROM LUE (degrees)  LUE PROM: Exceptions--wrist/forearm limited d/t splint  Strength   Pt not following any commands; spontaneously moving RUE, LLE; no active movement noted GRISELDA/RLE's  Tone RLE  RLE Tone: Normotonic  Tone LLE  LLE Tone: Hypertonic  Sensation  Overall Sensation Status: (SHA)  Bed mobility  Rolling to Left: Dependent/Total;2 Person assistance  Rolling to Right: Dependent/Total;2 Person assistance  Supine to Sit: Dependent/Total  Sit to Supine: Dependent/Total;2 Person assistance  Scooting: Dependent/Total;2 Person assistance  Comment: (pt very lethargic, not following commands)   Pt dangled at EOB 10 minutes with max A+1; RR increased to upper 20's, HR in 80's.   BP supine 138/80; dangling 115/57   Transfers  Sit to Stand: Unable to assess  Stand to sit: Unable to assess  Bed to Chair: Unable to assess  Stand Pivot Transfers: Unable to assess  Squat Pivot Transfers: Unable to assess  Comment: pt not even sitting without max support; unsafe to attempt standing at this time  Ambulation  Ambulation?: No  Stairs/Curb  Stairs?: No     Balance  Posture: Poor  Sitting - Static: Poor  Sitting - Dynamic: Poor  Other exercises  Other exercises 1: PROM x 4  Other exercises 2: cervical ROM: rotation to the L; lateral flexion to the L x 10 reps   Worked on head/trunk control in sitting and side-lying/rolling with max A+1     Plan   Plan  Times per week: 5 visits weekly  Times per day: Daily  Current Treatment Recommendations: Strengthening, Transfer Training, Endurance Training, Patient/Caregiver Education & Training, ROM, Balance Training, Gait Training, Home Exercise Program, Functional Mobility Training, Safety Education & Training, Wheelchair Mobility Training, Positioning  Safety Devices  Type of devices: Call light within reach, Patient at risk for falls, Left in bed, Nurse notified(chair position not working--RN notified; HOB up and head positioned to avoid R rotation)  Restraints  Initially in place: No    AM-PAC Score  AM-PAC Inpatient Mobility Raw Score : 8 (01/22/20 0936)  AM-PAC Inpatient T-Scale Score : 28.52 (01/22/20 0936)  Mobility Inpatient CMS 0-100% Score: 86.62 (01/22/20 0936)  Mobility Inpatient CMS G-Code Modifier : CM (01/22/20 8189)          Goals  Short term goals  Time Frame for Short term goals: 12 visits  Short term goal 1: prevent contractures x 4  Short term goal 2: progress to standing/gait activities with mod A+2 as appropriate   Short term goal 3: bed mobility with mod A+1  Short term goal 4: transfers with mod A+2  Short term goal 5: WC mobility x 25' with mod A+1   Patient Goals   Patient goals : pt unable to state goal       Therapy Time   Individual Concurrent Group Co-treatment   Time In 0822         Time Out 0930         Minutes 76                 Misti Duval [place]

## 2020-01-22 NOTE — PLAN OF CARE
Zenon Sharpe AWAKE & FOLLOWING COMMANDS:  [x] No   [] Yes    INTUBATED:   [x] No   [] Yes    SEDATION/ANALGESIA:    [] Propofol gtt  [] Versed gtt  [] Ativan gtt   [x] No Sedation  Pain medications:     FEEDING:  [] NPO  [x] NG/OG [] PEG  [x]Tube Feeds:    [] Yes:  Diet:   DVT Prophylaxis:  [x] Yes:           [] No rationale:      Stress Ulcer Prophylaxis: [x] Yes pepcid [] Not indicated    VASOPRESSORS:  [] No    [] Yes  [] Levophed [] Dopamine [] Vasopressin  [] Dobutamine [] Phenylephrine [] Epinephrine    CENTRAL/ARTERIAL LINES:  [x] No    [] Yes    BECERIRL CATHETER: [x] No    [] Yes    DRAINS: [x] No    [] Yes    Head of Bed: [x] Elevated:          [] Flat    Glucose management: [] Not indicated, consistently less than 180   [] Sliding Scale :           [x] Long Acting      Parish Clayton MD  PGY-3, Neurology   01/22/20

## 2020-01-22 NOTE — FLOWSHEET NOTE
Assessment:   made pre-surgery visit with 61 y.o. male while rounding.  just happened to arrive while doctors were discussing with patient's mother and siblings the risks involved in the procedure tomorrow. Patient is unresponsive, but his eyes were opened. Patient's mother is strong and said she is used to dealing with patient's health challenges. Intervention:   provided a compassionate listening presence and offered words of hope and encouragement to family. Family and  gathered around  to offer prayer. Outcome:  Patient could not respond, but family was very appreciative of 's visit, prayer and support. Chaplains will remain available for further support as needed. [facility]     01/21/20 5820   Encounter Summary   Services provided to: Patient and family together   Referral/Consult From: [facility] Parent; Family members   Continue Visiting   (1/21/2020)   Complexity of Encounter Moderate   Length of Encounter 30 minutes   Spiritual Assessment Completed Yes   Routine   Type Pre-procedure   Assessment Unable to respond   Intervention Active listening;Nurtured hope;Prayer   Outcome Did not respond

## 2020-01-22 NOTE — PROCEDURES
Order obtained for extubation. SpO2 of 99 on 30% FiO2. Patient extubated and placed on 3   liters/min via nasal cannula. Post extubation SpO2 is 98% with HR  108 bpm and RR 22 breaths/min. Patient had moderate cough that was productive of clear  sputum. Extubation Well tolerated by patient. .   Breath Sounds: clear    LUCIUS EVELYN CHAPITO   7:14 AM

## 2020-01-22 NOTE — OP NOTE
PROCEDURE NOTE    DATE OF PROCEDURE: 1/22/2020     SURGEON: Dr. Herlinda Rosenberg: Rosa Castro 2    PREOPERATIVE DIAGNOSIS: dysphagia    POSTOPERATIVE DIAGNOSIS: Same     OPERATION: Upper GI endoscopy and G tube Placement 20 Fr    ANESTHESIA: MAC    ESTIMATED BLOOD LOSS: None. COMPLICATIONS: None. SPECIMENS: were not obtained    HISTORY: The patient is a 61y.o. year old male with history of above preop diagnosis. I recommended esophagogastroduodenoscopy with placement of G tube with possible biopsy and I explained the risk, benefits, expected outcome, and alternatives to the procedure. Risks included but are not limited to bleeding, infection, respiratory distress, hypotension, and perforation of the esophagus, stomach, or duodenum. Patient understands and is in agreement. PROCEDURE: The pt was brought to the OR. A time out was made to verify correct pt and procedure type. The patient was given sedation per anesthesia. The patient's SPO2 remained above 90% throughout the procedure. A mouthpiece was placed in pt's mouth. The endoscope was inserted orally and advanced under direct vision through the esophagus, through the stomach, through the pylorus, and into the descending duodenum. The stomach was then insufflated with air and positioned in the midportion and directed towards the anterior abdominal wall. With the room darkened and intensity turned up on the endoscope, a good light reflex was noted on the skin of the abdominal wall in the left upper quadrant. Finger pressure was applied at the light reflex with adequate indentation on the stomach wall on endoscopy. A polypectomy snare was passed into the stomach, opened fully, and positioned to loop encircle the point of demonstrated finger indentation. The overlying skin was anesthetized with lidocaine and a 1.0 cm incision was made at the chosen site.   The introducer needle with overlying catheter was passed through this incision and needle and catheter were gently captured by the endoscopic snare. The guide wire was passed and snared. The endoscope, snare, and guide wire were then withdrawn and pulled back out of the mouth. The gastrostomy tube was attached to the loop of the guide wire and the whole thing pulled back into the stomach until the 3cm david of the gastrostomy tube was noted at skin level. The gastroscope was reintroduced and adequate placement of the gastrostomy tube was identified. The gastrostomy tube end was cut and the cramping appendage was placed. The tube was then taped to skin. Abdominal binder was ordered. The pt tolerated the procedure well and was taken back to PACU in stable condition. Findings:  Duodenum:     Not examined     Stomach:    Antrum: normal    Body: normal    Fundus: normal    Esophagus: limited examination. Larynx: normal    The scope was removed and the patient tolerated the procedure well. IMPRESSION/PLAN:   1. G tube to gravity  2. Abdominal binder  3. Ok for meds at 19:00  4. Ok to restart heparin at 1900  5. No tube feeds for 24 hours.     Electronically signed by Alberto Cason DO  on 1/22/2020 at 1:43 PM

## 2020-01-22 NOTE — PROGRESS NOTES
69 24 94 % --   01/22/20 0340 -- -- -- 62 -- 96 % --   01/22/20 0335 -- -- -- 63 21 99 % --   01/22/20 0330 -- -- -- 60 13 100 % --   01/22/20 0325 -- -- -- 64 25 100 % --   01/22/20 0320 -- -- -- 62 20 100 % --   01/22/20 0315 -- -- -- 59 24 100 % --   01/22/20 0310 -- -- -- 60 27 100 % --   01/22/20 0305 -- -- -- 59 (!) 32 100 % --   01/22/20 0300 130/75 -- -- 63 26 100 % --   01/22/20 0255 -- -- -- 59 22 100 % --   01/22/20 0250 -- -- -- 60 21 100 % --   01/22/20 0245 -- -- -- 63 27 99 % --   01/22/20 0240 -- -- -- 59 23 98 % --   01/22/20 0235 -- -- -- 62 19 98 % --   01/22/20 0230 -- -- -- 60 22 98 % --   01/22/20 0225 -- -- -- 59 24 98 % --   01/22/20 0220 -- -- -- 60 19 98 % --   01/22/20 0215 -- -- -- 59 27 98 % --   01/22/20 0210 -- -- -- 59 30 97 % --   01/22/20 0205 -- -- -- 59 25 99 % --   01/22/20 0200 118/73 98.5 °F (36.9 °C) Oral 60 19 99 % --   01/22/20 0125 -- -- -- 59 22 100 % --   01/22/20 0120 -- -- -- 59 23 100 % --   01/22/20 0115 -- -- -- 63 23 99 % --   01/22/20 0110 -- -- -- 60 25 99 % --   01/22/20 0105 -- -- -- 60 22 100 % --   01/22/20 0100 117/72 -- -- 60 21 99 % --   01/22/20 0055 -- -- -- 62 22 99 % --   01/22/20 0050 -- -- -- 61 21 99 % --   01/22/20 0045 -- -- -- 60 22 100 % --   01/22/20 0040 -- -- -- 60 23 100 % --   01/22/20 0035 -- -- -- 60 19 100 % --   01/22/20 0030 -- -- -- 71 24 100 % --   01/22/20 0025 -- -- -- 69 21 100 % --   01/22/20 0020 -- -- -- 61 22 100 % --   01/22/20 0015 -- -- -- 64 21 100 % --   01/22/20 0010 -- -- -- 61 21 100 % --   01/22/20 0005 -- -- -- 68 20 100 % --   01/22/20 0000 121/63 -- -- 71 20 100 % --   01/21/20 2355 -- -- -- 83 18 99 % --   01/21/20 2350 -- -- -- 91 21 99 % --   01/21/20 2345 -- -- -- 96 22 99 % --   01/21/20 2340 -- -- -- 96 (!) 31 100 % --   01/21/20 2335 -- -- -- 84 23 100 % --   01/21/20 2330 -- -- -- 68 24 100 % --   01/21/20 2325 -- -- -- 73 21 100 % --   01/21/20 2320 -- -- -- 60 24 100 % --   01/21/20 2315 -- -- -- 61 23 100 % --   01/21/20 2310 -- -- -- 65 25 99 % --   01/21/20 2305 120/68 -- -- 69 26 99 % --   01/21/20 2300 -- -- -- 60 19 100 % --   01/21/20 2255 -- -- -- 60 22 100 % --   01/21/20 2250 -- -- -- 66 25 100 % --   01/21/20 2245 -- -- -- 69 20 100 % --   01/21/20 2240 -- -- -- 88 26 100 % --   01/21/20 2235 -- -- -- 68 22 100 % --   01/21/20 2230 -- -- -- 92 21 99 % --   01/21/20 2225 -- -- -- 70 21 99 % --   01/21/20 2220 -- -- -- 88 22 99 % --   01/21/20 2215 -- -- -- 97 22 98 % --   01/21/20 2210 -- -- -- 94 21 98 % --   01/21/20 2205 (!) 153/78 -- -- 97 21 99 % --   01/21/20 2200 -- -- -- 98 (!) 42 99 % --   01/21/20 2155 -- -- -- 99 19 100 % --   01/21/20 2150 -- -- -- 100 22 100 % --   01/21/20 2145 -- -- -- 99 22 100 % --   01/21/20 2140 -- -- -- 102 21 100 % --   01/21/20 2135 -- -- -- 101 22 100 % --   01/21/20 2130 -- -- -- 101 22 100 % --   01/21/20 2125 -- -- -- 100 22 100 % --   01/21/20 2121 (!) 174/88 -- -- -- -- -- --   01/21/20 2120 -- -- -- 99 23 100 % --   01/21/20 2115 -- -- -- 99 23 100 % --   01/21/20 2110 -- -- -- 101 22 100 % --   01/21/20 2105 (!) 174/88 -- -- 101 23 100 % --     Estimated body mass index is 21.92 kg/m² as calculated from the following:    Height as of this encounter: 5' 11\" (1.803 m). Weight as of this encounter: 157 lb 3 oz (71.3 kg). PHYSICAL EXAM       Physical Exam  Vitals signs and nursing note reviewed. HENT:      Head: Normocephalic and atraumatic. Eyes:      General:         Right eye: No foreign body, discharge or hordeolum. Left eye: No foreign body, discharge or hordeolum. Conjunctiva/sclera:      Right eye: Right conjunctiva is not injected. No chemosis, exudate or hemorrhage. Left eye: Left conjunctiva is injected. Hemorrhage present. No chemosis or exudate. Pupils: Pupils are equal, round, and reactive to light. Cardiovascular:      Rate and Rhythm: Normal rate. Rhythm irregular.    Pulmonary:      Breath sounds: Normal breath sounds. Neurological:      Mental Status: He is alert. GCS: GCS eye subscore is 4. GCS verbal subscore is 1. GCS motor subscore is 5. Cranial Nerves: Cranial nerves are intact. Motor: No abnormal muscle tone. Deep Tendon Reflexes:      Reflex Scores:       Bicep reflexes are 1+ on the right side and 1+ on the left side. Patellar reflexes are 1+ on the right side and 1+ on the left side. Comments: Intubated with no sedation  Moves all extremities to pain, left more than right  Tone is normal.  Right-sided gaze preference  No subtle signs for seizures or abnormal movements           INTAKE/OUTPUT & DRAINS   24 HOUR INTAKE/OUTPUT:    Intake/Output Summary (Last 24 hours) at 1/22/2020 0902  Last data filed at 1/22/2020 0430  Gross per 24 hour   Intake 845 ml   Output --   Net 845 ml       DRAINS:  [x] There are no drains for Neuro Critical Care to monitor at this time.    LABS      Recent Results (from the past 24 hour(s))   POC Glucose Fingerstick    Collection Time: 01/21/20 11:27 AM   Result Value Ref Range    POC Glucose 147 (H) 75 - 110 mg/dL   APTT    Collection Time: 01/21/20 11:45 AM   Result Value Ref Range    PTT 36.5 (H) 20.5 - 30.5 sec   Arterial Blood Gas, POC    Collection Time: 01/21/20 11:57 AM   Result Value Ref Range    POC pH 7.497 (H) 7.350 - 7.450    POC pCO2 39.3 35.0 - 48.0 mm Hg    POC PO2 134.3 (H) 83.0 - 108.0 mm Hg    POC HCO3 30.5 (H) 21.0 - 28.0 mmol/L    TCO2 (calc), Art 32 (H) 22.0 - 29.0 mmol/L    Negative Base Excess, Art NOT REPORTED 0.0 - 2.0    Positive Base Excess, Art 7 (H) 0.0 - 3.0    POC O2 SAT 99 (H) 94.0 - 98.0 %    O2 Device/Flow/% NOT REPORTED     Ozzie Test NOT REPORTED     Sample Site NOT REPORTED     Mode NOT REPORTED     FIO2 30.0     Pt Temp NOT REPORTED     POC pH Temp NOT REPORTED     POC pCO2 Temp NOT REPORTED mm Hg    POC pO2 Temp NOT REPORTED mm Hg   POC Glucose Fingerstick    Collection Time: 01/21/20  4:49 PM   Result Value Ref Range POC Glucose 97 75 - 110 mg/dL   APTT    Collection Time: 01/21/20  5:28 PM   Result Value Ref Range    PTT 42.2 (H) 20.5 - 30.5 sec   APTT    Collection Time: 01/22/20  1:17 AM   Result Value Ref Range    PTT 89.8 (HH) 20.5 - 30.5 sec   POC Glucose Fingerstick    Collection Time: 01/22/20  1:29 AM   Result Value Ref Range    POC Glucose 107 75 - 110 mg/dL   POC Glucose Fingerstick    Collection Time: 01/22/20  7:02 AM   Result Value Ref Range    POC Glucose 86 75 - 110 mg/dL   Platelet count    Collection Time: 01/22/20  8:16 AM   Result Value Ref Range    Platelets 184 377 - 989 k/uL           RADIOLOGY   Xr Radius Ulna Left (2 Views)    Result Date: 12/26/2019  EXAMINATION: TWO XRAY VIEWS OF THE LEFT FOREARM 12/26/2019 1:37 am COMPARISON: None. HISTORY: ORDERING SYSTEM PROVIDED HISTORY: Trauma/Fracture TECHNOLOGIST PROVIDED HISTORY: Trauma/Fracture Reason for Exam: fall/trauma Acuity: Acute FINDINGS: Fiberglass splint obscures osseous details of the distal forearm. The left radius and left are intact. No acute fracture or dislocation in the left forearm. Partial visualization of 1st proximal metacarpal fracture. Osteopenia. No acute osseous abnormality left radius or ulna. Xr Radius Ulna Right (2 Views)    Result Date: 12/26/2019  EXAMINATION: TWO XRAY VIEWS OF THE RIGHT FOREARM 12/26/2019 1:37 am COMPARISON: None. HISTORY: ORDERING SYSTEM PROVIDED HISTORY: Trauma/Fracture TECHNOLOGIST PROVIDED HISTORY: Trauma/Fracture Reason for Exam: fall/trauma Acuity: Acute FINDINGS: The bones are osteopenic. The right radius and right ulna are intact. No acute fracture or dislocation in the right forearm. Along the ulnar aspect of the proximal forearm, there is soft tissue laceration. Antecubital fossa IV catheter. Osteopenia. No acute osseous abnormality right forearm. Soft tissue laceration ulnar aspect of the proximal forearm.      Xr Wrist Left (min 3 Views)    Result Date: 12/25/2019  EXAMINATION: angulation of fracture fragments compared the prior study. Xr Hand Left (min 3 Views)    Result Date: 12/26/2019  EXAMINATION: THREE XRAY VIEWS OF THE LEFT HAND 12/26/2019 1:47 am COMPARISON: Left wrist radiographs 12/25/2019 2131 hours HISTORY: ORDERING SYSTEM PROVIDED HISTORY: post reduction TECHNOLOGIST PROVIDED HISTORY: post reduction Reason for Exam: fall trauma Acuity: Acute FINDINGS: Acute traumatic closed comminuted mildly angulated proximal 1st metacarpal fracture. Joint alignment is maintained. No additional acute fracture or dislocation in the left hand. Acute traumatic closed comminuted mildly angulated proximal 1st metacarpal fracture. Xr Hand Right (min 3 Views)    Result Date: 12/26/2019  EXAMINATION: THREE XRAY VIEWS OF THE RIGHT HAND 12/26/2019 1:36 am COMPARISON: None. HISTORY: ORDERING SYSTEM PROVIDED HISTORY: Trauma/Fracture TECHNOLOGIST PROVIDED HISTORY: Include clenched fist view Trauma/Fracture Reason for Exam: fall FINDINGS: Joint alignment is maintained. No acute fracture or dislocation in the right hand. Degenerative changes in the interphalangeal joints. Old ulnar styloid process fracture. Degenerative changes in the radiocarpal joint. No acute osseous abnormality in the right hand. Xr Hip Left (2-3 Views)    Result Date: 12/26/2019  EXAMINATION: ONE XRAY VIEW OF THE PELVIS AND TWO XRAY VIEWS RIGHT HIP; TWO XRAY VIEWS OF THE LEFT HIP 12/26/2019 1:37 am COMPARISON: None. HISTORY: ORDERING SYSTEM PROVIDED HISTORY: Trauma/Fracture TECHNOLOGIST PROVIDED HISTORY: AP and cross-table lateral of the hip please, thank you Trauma/Fracture Reason for Exam: fall/trauma Acuity: Acute FINDINGS: The bones are osteopenic. The femoral heads located bilateral.  No acute fracture or dislocation pelvis or hips bilaterally. SI joints are grossly intact. Pubic symphysis is intact. The sacrum is partially obscured by contrast in the bladder.      No acute osseous abnormality in the pelvis or hips bilaterally. Osteopenia. Xr Knee Left (3 Views)    Result Date: 12/26/2019  EXAMINATION: THREE XRAY VIEWS OF THE LEFT KNEE 12/26/2019 1:36 am COMPARISON: None. HISTORY: ORDERING SYSTEM PROVIDED HISTORY: Trauma/Fracture TECHNOLOGIST PROVIDED HISTORY: Trauma/Fracture Reason for Exam: fall/ trauma Acuity: Acute FINDINGS: Osteopenia. No acute fracture or dislocation. No focal osseous lesion. No joint effusion. No focal soft tissue abnormality. Vascular calcifications. No acute abnormality of the knee. Osteopenia. Xr Knee Right (3 Views)    Result Date: 12/26/2019  EXAMINATION: THREE XRAY VIEWS OF THE RIGHT KNEE 12/26/2019 1:36 am COMPARISON: None. HISTORY: ORDERING SYSTEM PROVIDED HISTORY: Trauma/Fracture TECHNOLOGIST PROVIDED HISTORY: Trauma/Fracture Reason for Exam: ,fall trauma,unable to get xtable on patient Acuity: Acute FINDINGS: The bones are osteopenic. No acute fracture or dislocation. No focal osseous lesion. No evidence of joint effusion. No focal soft tissue abnormality. Vascular calcifications. No acute abnormality of the knee. Osteopenia. Xr Ankle Left (min 3 Views)    Result Date: 12/26/2019  EXAMINATION: THREE XRAY VIEWS OF THE LEFT ANKLE 12/26/2019 1:36 am COMPARISON: None. HISTORY: ORDERING SYSTEM PROVIDED HISTORY: Trauma/Fracture TECHNOLOGIST PROVIDED HISTORY: Trauma/Fracture Acuity: Acute FINDINGS: No acute fracture or dislocation. Normal alignment of the ankle mortise. No focal osseous lesion. No joint effusion. No focal soft tissue abnormality. Vascular calcifications. No acute abnormality of the ankle. Xr Ankle Right (min 3 Views)    Result Date: 12/26/2019  EXAMINATION: THREE XRAY VIEWS OF THE RIGHT ANKLE 12/26/2019 1:36 am COMPARISON: None. HISTORY: ORDERING SYSTEM PROVIDED HISTORY: Trauma/Fracture TECHNOLOGIST PROVIDED HISTORY: Trauma/Fracture FINDINGS: Diffuse osteopenia. No acute fracture or dislocation.   Normal alignment of the ankle mortise. No focal osseous lesion. No joint effusion. No focal soft tissue abnormality. Status post 5th metatarsal ORIF with intact screw. No acute abnormality of the ankle. Ct Head Wo Contrast    Result Date: 12/27/2019  EXAMINATION: CT OF THE HEAD WITHOUT CONTRAST,  12/26/2019 11:08 pm TECHNIQUE: CT of the head was performed without the administration of intravenous contrast. Dose modulation, iterative reconstruction, and/or weight based adjustment of the mA/kV was utilized to reduce the radiation dose to as low as reasonably achievable. COMPARISON: 12/26/2019 5:10 a.m. HISTORY: ORDERING SYSTEM PROVIDED HISTORY: Reassess bleed for stability. TECHNOLOGIST PROVIDED HISTORY: Reassess bleed for stability. Reason for Exam: Reassess bleed for stability. Acuity: Unknown Type of Exam: Unknown FINDINGS: BRAIN/VENTRICLES: Again seen is extensive subarachnoid hemorrhage within the sylvian fissures as well as cerebral sulci bilaterally. Subdural hemorrhage overlies the left parietal convexity measures 4 mm in thickness. Hemorrhage in the prepontine cistern as well. Small amount of intraventricular hemorrhage layering in the occipital horns bilaterally. Ventricles are stable in caliber. No hydrocephalus. The gray-white matter differentiation is maintained. No midline shift. ORBITS: The visualized portion of the orbits demonstrate no acute abnormality. SINUSES: The visualized paranasal sinuses and mastoid air cells demonstrate no acute abnormality. SOFT TISSUES/SKULL:  No acute abnormality of the visualized skull or soft tissues. Stable head CT demonstrating extensive bilateral subarachnoid hemorrhage as well as intraventricular hemorrhage and left parietal convexity subdural hemorrhage. No new intracranial hemorrhage. No hydrocephalus.      Ct Head Wo Contrast    Result Date: 12/26/2019  EXAMINATION: CT OF THE HEAD WITHOUT CONTRAST  12/26/2019 4:51 am TECHNIQUE: CT of the head was performed without the On Eliquis. Subarachnoid hemorrhage on CT head performed at outside institution. FINDINGS: BRAIN/VENTRICLES: There is moderate subarachnoid hemorrhage in the bilateral sylvian fissures and bilateral frontal, temporal, and parietal lobe sulci. Additional subarachnoid hemorrhage is noted in the interhemispheric fissure and prepontine cistern. Gray-white matter differentiation is grossly maintained without CT evidence of acute, territorial infarct. Acute, left parietal convexity subdural hematoma measures up to 4 mm in thickness. No associated mass effect. No parenchymal hemorrhage. There is no hydrocephalus or midline shift. Basal cisterns are patent. ORBITS: The visualized portion of the orbits demonstrate no acute abnormality. SINUSES: The visualized paranasal sinuses and mastoid air cells demonstrate no acute abnormality. SOFT TISSUES/SKULL:  No acute abnormality of the visualized skull or soft tissues. Subarachnoid hemorrhage, predominantly in the bilateral sylvian fissures and bilateral cerebral hemisphere sulci. Acute left parietal convexity subdural hematoma measures up to 4 mm in thickness. No associated mass effect. No midline shift or evidence of intracranial herniation. Findings were discussed with Sarika Barrett at 9:32 pm on 12/25/2019. Ct Cervical Spine Wo Contrast    Result Date: 12/26/2019  EXAMINATION: CT OF THE CERVICAL SPINE WITHOUT CONTRAST 12/25/2019 9:04 pm TECHNIQUE: CT of the cervical spine was performed without the administration of intravenous contrast. Multiplanar reformatted images are provided for review. Dose modulation, iterative reconstruction, and/or weight based adjustment of the mA/kV was utilized to reduce the radiation dose to as low as reasonably achievable. COMPARISON: None HISTORY: ORDERING SYSTEM PROVIDED HISTORY: trauma TECHNOLOGIST PROVIDED HISTORY: trauma FINDINGS: BONES/ALIGNMENT: No traumatic malalignment. Vertebral body heights are maintained.   No acute fracture. DEGENERATIVE CHANGES: Moderate multilevel disc narrowing and endplate osteophyte formation. Multilevel uncovertebral and facet joint degenerative changes. SOFT TISSUES: Paraspinal soft tissues are normal.  Partially visualized prepontine cistern subarachnoid hemorrhage is better evaluated on CT head performed concurrently. No acute abnormality of the cervical spine. Ct Thoracic Spine Wo Contrast    Result Date: 12/26/2019  EXAMINATION: CT OF THE THORACIC SPINE WITHOUT CONTRAST; CT OF THE LUMBAR SPINE WITHOUT CONTRAST; CT OF THE CHEST, ABDOMEN, AND PELVIS WITH CONTRAST, 12/25/2019 9:04 pm; 12/25/2019 9:05 pm TECHNIQUE: CT of the thoracic and lumbar spine was performed without the administration of intravenous contrast.  CT of the chest, abdomen and pelvis was performed with the administration of intravenous contrast. Multiplanar reformatted images are provided for review. Dose modulation, iterative reconstruction, and/or weight based adjustment of the mA/kV was utilized to reduce the radiation dose to as low as reasonably achievable. COMPARISON: None HISTORY: ORDERING SYSTEM PROVIDED HISTORY: trauma TECHNOLOGIST PROVIDED HISTORY: trauma Reason for Exam: fall from standing Acuity: Acute Type of Exam: Initial; ORDERING SYSTEM PROVIDED HISTORY: trauma TECHNOLOGIST PROVIDED HISTORY: trauma Reason for Exam: fall from standing Acuity: Acute Type of Exam: Initial; ORDERING SYSTEM PROVIDED HISTORY: trauma TECHNOLOGIST PROVIDED HISTORY: trauma Reason for Exam: fall from standing Acuity: Acute Type of Exam: Initial FINDINGS: CTA CHEST: Stable cardiomegaly. No pericardial effusion. No mediastinal hematoma or pneumomediastinum. No enlarged mediastinal or hilar lymph nodes. Main pulmonary artery is dilated, measuring up to 4 cm, suggesting pulmonary hypertension. Calcified and noncalcified atherosclerotic plaque of the thoracic aorta.  Incidentally noted 4 vessel aortic arch with separate arch origin of the independently monitored by a Registered Nurse assigned to the Department of Radiology?using automated blood pressure, EKG and pulse oximetry. ? The detailed Conscious Record is permanently stored in the [id number]. The following is the conscious sedation record including Start and End times: Fentanyl, propofol and Versed from 1400 to  1600 . Clinical History:59 y. o.?male?with past medical history including atrial fibrillation on Eliquis, diabetes mellitus, hypertension, hyperlipidemia, coronary artery disease s/p PCI stents, peripheral vascular disease, history of bilateral ICA stenosis with  right worse than left, history of CEA, alcohol abuse. ? Patient presented after a fall from outside facility as a trauma alert?and found to have bilateral subarachnoid hemorrhage/left parietal subdural hematoma. ? He received Kcentra in outside hospital. ? Neuro endovascular was consulted. Description and findings: The patient's right groin was prepped and draped in standard sterile fashion and under local anesthesia with conscious sedation, the right common femoral artery was accessed with a single-wall needle technique, and a 5 Citizen of the [place] intravascular sheath was placed within the right common femoral artery, establishing arterial access. A 5 Citizen of the [place] multipurpose catheter was then advanced over a guide wire to the level of the aortic arch, and used to selectively catheterize the right common carotid artery. Right CCA technique: The right common carotid artery was selectively catheterized under fluoroscopic guidance and digital subtraction angiography images were obtained in biplane projections of the right cervical carotid artery. Interpretation:The right common carotid artery injection demonstrates normal antegrade flow into the external and internal carotid arteries with normal filling of the external carotid artery branches.  Course and caliber of the cervical portion of the right internal carotid artery revealed large and irregular which is likely due to prior endarterectomy. Left ICA technique: The left internal carotid artery was then selectively catheterized, and digital subtraction angiography of the intracranial left internal carotid artery circulation was performed in frontal and lateral projections. Interpretation:There is normal antegrade filling of the distal internal carotid artery, ophthalmic artery, anterior cerebral artery, middle cerebral artery and distal branches. Inspection of the remaining left internal carotid artery circulation revealed  mild to moderate left cavernous ICA stenosis. Otherwise, no other evidence of cerebral aneurysm, arteriovenous malformation, or arterial stenosis. No vasospasm noted. Irregularity of the vessels without hemodynamic stenosis which could reflect intracranial atherosclerosis Capillary and venous phase images were also unremarkable, with no evidence of veno-occlusive disease. Left VA technique: The left vertebral artery noted to arise from the aortic arch. Left vertebral artery was then selectively catheterized with roadmap guidance. Digital subtraction angiography of the intracranial left vertebrobasilar run-off  was obtained in frontal and lateral projections. Interpretation:The left vertebral artery injection demonstrates normal antegrade opacification of the left  vertebral artery, including the cervical segment, basilar artery, and respective branches. There is a left V4 moderate stenosis noted. Otherwise, there was no evidence of  cerebral aneurysm, arteriovenous malformation, or arterial stenosis. There is noted opacification of the distal right middle cerebral artery parieto-occipital territory from pial collaterals arising off the right posterior cerebral artery. Capillary and venous phase images were otherwise unremarkable.  Right CFA technique: A right common femoral artery angiogram was performed and demonstrated arterial catheterization proximal to the bifurcation. There was no evidence of dissection or occlusion within the right common femoral artery. There is internal iliac artery stent noted. A 5F Vascade closure device was used to establish hemostasis at the right common femoral artery access site. No immediate complications were experienced. Patient was re-examined after the procedure with no change noted in their neurologic examination, with distal pulses present. The patient was subsequently discharged from the neurointerventional suite. Impression: 1. Critical right ICA stenosis with a string sign is noted measuring approximately 90-99% per NASCET criteria. Noted retrograde Filling of the right ophthalmic artery from the right internal maxillary artery branches with slow filling of the anterior cerebral artery, middle cerebral artery and the distal branches in addition to distal right mca vascular supply from the right pca due to previously noted critical proximal cervical ICA stenosis. 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 3.No evidence of clear discrete aneurysm, arteriovenous malformation, arterial dissection, or veno-occlusive disease. 4. 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 Zan Castillo dictated this invasive procedure. Dr. Jenna Vicente was present for all procedural and imaging components of this case. Examination was reviewed and reported findings confirmed and edited by Dr. Jenna Vicente. Dr. Jenna Vicente supervised and interpreted this procedure. Jagdeep Lubin MD Final report electronically signed by Jagdeep Lubin M.D. on 12/27/2019 4:00 PM    Cta Head Neck W Contrast    Result Date: 12/25/2019  EXAMINATION: CTA OF THE HEAD AND NECK WITH CONTRAST 12/25/2019 9:07 pm: TECHNIQUE: CTA of the head and neck was performed with the administration of intravenous contrast. Multiplanar reformatted images are provided for review. MIP images are provided for review.  Stenosis of the internal carotid arteries measured using NASCET criteria. Dose modulation, iterative reconstruction, and/or weight based adjustment of the mA/kV was utilized to reduce the radiation dose to as low as reasonably achievable. COMPARISON: None. HISTORY: ORDERING SYSTEM PROVIDED HISTORY: sah TECHNOLOGIST PROVIDED HISTORY: sah Reason for Exam: SAH after trauma Acuity: Acute Type of Exam: Initial FINDINGS: CTA NECK: AORTIC ARCH/ARCH VESSELS: There is a critical stenosis of the proximal left vertebral artery with moderate and severe stenoses of the V2 segment. There is a severe stenosis at the origin of the right vertebral artery. 66% stenosis of the left subclavian artery is noted. CAROTID ARTERIES: There is 25% stenosis of the proximal left common carotid artery and 50% stenosis of the mid and distal segments. The cervical portion of the left internal carotid artery is normal in course and caliber. There is 20% stenosis of the right common carotid artery. A critical stenosis of the proximal right cervical ICA is noted with attenuated flow seen distally. VERTEBRAL ARTERIES: No dissection, arterial injury, or significant stenosis. SOFT TISSUES: There is a thickened appearance to the mid esophagus. A small amount of layering debris is present within the trachea, likely reflecting pooled secretion. BONES: No acute osseous abnormality. CTA HEAD: ANTERIOR CIRCULATION: There is severely attenuated flow within the petrous and cavernous segments of the right internal carotid artery with severe, near critical stenoses of the cavernous ICA. There is also attenuated flow within the contralateral ICA, to a lesser degree, with moderate stenoses of the left cavernous ICA. There are moderate and severe multifocal stenoses of the MCA branch vessels, worse on the right-hand side. The right anterior cerebral artery is attenuated compared to the left.  POSTERIOR CIRCULATION: There are mild-to-moderate stenoses of the distal right vertebral artery and a moderate to severe stenosis of the distal left. The basilar artery and PCAs appear to be slightly attenuated. OTHER: No dural venous sinus thrombosis on this non-dedicated study. BRAIN: See separately dictated noncontrast head CT report. No cerebral aneurysm or vascular malformation identified. Significant narrowing of the intracranial vessels, likely due to a combination of atherosclerotic disease as well as vasospasm related to subarachnoid hemorrhage. Critical stenosis of the proximal left vertebral artery. Critical stenosis of the proximal right cervical ICA with attenuated flow seen distally. Severe near critical stenoses of the right cavernous ICA. Moderate stenoses of the contralateral cavernous ICA. Ct Chest Abdomen Pelvis W Contrast    Result Date: 12/26/2019  EXAMINATION: CT OF THE THORACIC SPINE WITHOUT CONTRAST; CT OF THE LUMBAR SPINE WITHOUT CONTRAST; CT OF THE CHEST, ABDOMEN, AND PELVIS WITH CONTRAST, 12/25/2019 9:04 pm; 12/25/2019 9:05 pm TECHNIQUE: CT of the thoracic and lumbar spine was performed without the administration of intravenous contrast.  CT of the chest, abdomen and pelvis was performed with the administration of intravenous contrast. Multiplanar reformatted images are provided for review. Dose modulation, iterative reconstruction, and/or weight based adjustment of the mA/kV was utilized to reduce the radiation dose to as low as reasonably achievable. COMPARISON: None HISTORY: ORDERING SYSTEM PROVIDED HISTORY: trauma TECHNOLOGIST PROVIDED HISTORY: trauma Reason for Exam: fall from standing Acuity: Acute Type of Exam: Initial; ORDERING SYSTEM PROVIDED HISTORY: trauma TECHNOLOGIST PROVIDED HISTORY: trauma Reason for Exam: fall from standing Acuity: Acute Type of Exam: Initial; ORDERING SYSTEM PROVIDED HISTORY: trauma TECHNOLOGIST PROVIDED HISTORY: trauma Reason for Exam: fall from standing Acuity: Acute Type of Exam: Initial FINDINGS: CTA CHEST: Stable cardiomegaly.   No pericardial effusion. No mediastinal hematoma or pneumomediastinum. No enlarged mediastinal or hilar lymph nodes. Main pulmonary artery is dilated, measuring up to 4 cm, suggesting pulmonary hypertension. Calcified and noncalcified atherosclerotic plaque of the thoracic aorta. Incidentally noted 4 vessel aortic arch with separate arch origin of the left vertebral artery. Ectatic ascending thoracic aorta measures up to 4 cm in diameter. No acute aortic abnormality. Central airways are clear. No pleural effusion or pneumothorax. No pulmonary contusion or pulmonary laceration. Mild bilateral lower lobe dependent atelectatic changes. Healing anterolateral left 6th rib fracture. No acute displaced rib fracture or chest wall hematoma. CTA ABDOMEN: No acute traumatic abnormality of the liver, spleen, pancreas, kidneys, or adrenal glands. Normal gallbladder. Stomach, small bowel, and colon are normal in caliber without evidence of obstruction. Normal appendix. Sigmoid diverticulosis without diverticulitis. Calcified and noncalcified aortoiliac atherosclerotic calcification. Abdominal aorta is normal in caliber. No free fluid in the abdomen. CTA PELVIS: Urinary bladder and pelvic organs are normal.  No free fluid in the pelvis. Bilateral common iliac stents are in position. THORACIC/LUMBAR SPINE: BONES/ALIGNMENT: Normal alignment of the thoracic and lumbar spine. Vertebral body heights are maintained. No acute fracture. DEGENERATIVE CHANGES: Multilevel disc narrowing and endplate osteophyte formation. Mild multilevel facet joint degenerative changes. No high-grade, bony spinal canal stenosis. SOFT TISSUES: Paraspinal soft tissues are normal.     No acute traumatic abnormality of the chest, abdomen, or pelvis. Remote, healing anterolateral left 6th rib fracture. No acute osseous abnormality of the thoracic or lumbar spine.      Xr Hip 2-3 Vw W Pelvis Right    Result Date: 12/26/2019  EXAMINATION: ONE XRAY VIEW OF THE PELVIS AND TWO XRAY VIEWS RIGHT HIP; TWO XRAY VIEWS OF THE LEFT HIP 12/26/2019 1:37 am COMPARISON: None. HISTORY: ORDERING SYSTEM PROVIDED HISTORY: Trauma/Fracture TECHNOLOGIST PROVIDED HISTORY: AP and cross-table lateral of the hip please, thank you Trauma/Fracture Reason for Exam: fall/trauma Acuity: Acute FINDINGS: The bones are osteopenic. The femoral heads located bilateral.  No acute fracture or dislocation pelvis or hips bilaterally. SI joints are grossly intact. Pubic symphysis is intact. The sacrum is partially obscured by contrast in the bladder. No acute osseous abnormality in the pelvis or hips bilaterally. Osteopenia. CTA H/N 1/6/2019  1. New low-density extra-axial collection along the left cerebral convexity measuring 14 mm, with new 4 mm midline shift to the right. This is partially evaluated due to lack of noncontrast CT images. 2. Moderate stenosis involving petrous segment of left internal carotid artery. Severe stenosis involving proximal cavernous segment of left internal carotid artery. Moderate stenosis involving clinoid and supraclinoid segment of left internal carotid artery. 3. Severe diffuse stenosis involving petrous segment of right internal carotid artery, and cavernous segment of right renal carotid artery. Moderate to severe stenosis involving clinoid and supraclinoid segment of the right internal carotid artery. 4. Moderate multifocal stenosis involving anterior cerebral arteries, middle cerebral arteries, and posterior cerebral arteries, without evidence of large vessel occlusion. Appearance is reasonably similar to the previous examination on December 25, 2019.  5. Unchanged appearance of greater than 90% stenosis involving proximal right internal carotid artery, with diffuse severe narrowing of remainder of right internal carotid artery. This extends into intracranial segments.   6. Postsurgical changes at the left carotid bifurcation, without significant flow-limiting stenosis. 7. Moderate stenosis at origin of right vertebral artery. Moderate to severe stenosis at the proximal left vertebral artery, just distal to the origin. 8. Approximately 50% stenosis involving the left proximal subclavian artery. 9. Scattered multifocal ground-glass pulmonary opacities at both lung apices. Fluid density seen within the trachea adjacent to the endotracheal tube. MRI Brain 1/9/2020      LTME:Diffuse encephalopathy. No epileptiform discharge          ASSESSMENT AND PLAN:       55-year-old male presenting initially with critical multivessel CVA with critical stenosis of left proximal vert, critical stenosis of right cervical ICA, critical stenosis of right cavernous ICA, and moderate stenosis of left cavernous ICA with bilateral subdural hematoma right greater than left concern for infectious versus delirium tremens. Assessment & Plan:    Neuro    Traumatic SAH  Acute ischemic stroke, bilateral temporoparietal  Global aphasia due to above   Hold ASA/Plavix for Trach/Peg Wed vs Thurs per GS  Critical right ICA stenosis-post right ICA stenting  Systolic blood pressure goal 100-140  Modafinil 100 mg BID - will wean off in x7 days  Amantadine 200 @ 0800 and 100 @ 1400  Heparin gtt in lieu of AP in anticipation of Trach/Peg  Palliative care following  Crestor 5 qHS      Cardio  On Sotalol 160 mg twice daily  Lisinopril 10 qD  EF 45%  Continue cardiac monitor     Pulm  Intubated  PRVC 12, 600, 5, 30%  Continue O2 monitor  Maintain saturation > 92%      Endo  Serum glucose is better controlled < 180  HB A1C: 7.1  Continue Lantus 25 units daily    GI  Milk of magnesia for bowel regimen  Colace 100 qD  Pepcid 20 mg IV  BID for GI PPx  Continue tube feeds  PEG placement today per GS    Renal  output +17 L  No labs today    Heme/ID  Tmax 98.9 °F  Hgb 11.2 and stable    MS  Nondisplaced mildly angulated left first metacarpal fracture.    Splinted by orthopedics  Follow-up with

## 2020-01-22 NOTE — CARE COORDINATION
Spoke with Cameron Esparza at [facility], precert was started yesterday.  Pt is planned for PEG today and was extubated yesterday

## 2020-01-22 NOTE — PROGRESS NOTES
stenosis measuring approximately 99% per NASCET criteria significantly delayed anterograde flow noted. 6. There is collateral flow noted to the distal right ICA and MCA territory arising from right internal maxillary artery branches filling the ophthalmic artery via? retrograde fashion    7. Right MCA M1/right RUSTY intracranial atherosclerotic disease noted. 8. Successful carotid stenting using Wallstent 10x37 mm with pre and post balloon angioplasty Seth balloon with the use of distal embolic protection device.         MRI brain was obtained and showing bilateral ischemic strokes. PLAN:  1. Traumatic subarachnoid hemorrhage management per neuro ICU. 2. Blood pressure systolic goal after the procedure is 100-140.  3. S/p right ICA Wallstent placement. 4. Off aspirin 81 and Plavix 75 -  heparin drip for PEG and trach    Please contact neuro endovascular team for any questions or concerns.     Meagan Moreno MD  Stroke, [facility]  [address] [place]  Electronically signed 1/22/2020 at 9:01 AM

## 2020-01-23 LAB
GLUCOSE BLD-MCNC: 124 MG/DL (ref 75–110)
GLUCOSE BLD-MCNC: 132 MG/DL (ref 75–110)
GLUCOSE BLD-MCNC: 138 MG/DL (ref 75–110)
GLUCOSE BLD-MCNC: 87 MG/DL (ref 75–110)
GLUCOSE BLD-MCNC: 97 MG/DL (ref 75–110)
PARTIAL THROMBOPLASTIN TIME: 102.5 SEC (ref 20.5–30.5)
PARTIAL THROMBOPLASTIN TIME: 20.1 SEC (ref 20.5–30.5)
PARTIAL THROMBOPLASTIN TIME: 78.8 SEC (ref 20.5–30.5)
PARTIAL THROMBOPLASTIN TIME: 89.5 SEC (ref 20.5–30.5)

## 2020-01-23 PROCEDURE — 6370000000 HC RX 637 (ALT 250 FOR IP): Performed by: STUDENT IN AN ORGANIZED HEALTH CARE EDUCATION/TRAINING PROGRAM

## 2020-01-23 PROCEDURE — 2000000003 HC NEURO ICU R&B

## 2020-01-23 PROCEDURE — 92610 EVALUATE SWALLOWING FUNCTION: CPT

## 2020-01-23 PROCEDURE — 36415 COLL VENOUS BLD VENIPUNCTURE: CPT

## 2020-01-23 PROCEDURE — 2500000003 HC RX 250 WO HCPCS: Performed by: STUDENT IN AN ORGANIZED HEALTH CARE EDUCATION/TRAINING PROGRAM

## 2020-01-23 PROCEDURE — 82947 ASSAY GLUCOSE BLOOD QUANT: CPT

## 2020-01-23 PROCEDURE — 6360000002 HC RX W HCPCS: Performed by: STUDENT IN AN ORGANIZED HEALTH CARE EDUCATION/TRAINING PROGRAM

## 2020-01-23 PROCEDURE — 85730 THROMBOPLASTIN TIME PARTIAL: CPT

## 2020-01-23 PROCEDURE — 2580000003 HC RX 258: Performed by: STUDENT IN AN ORGANIZED HEALTH CARE EDUCATION/TRAINING PROGRAM

## 2020-01-23 PROCEDURE — 99024 POSTOP FOLLOW-UP VISIT: CPT | Performed by: PSYCHIATRY & NEUROLOGY

## 2020-01-23 PROCEDURE — 99233 SBSQ HOSP IP/OBS HIGH 50: CPT | Performed by: PSYCHIATRY & NEUROLOGY

## 2020-01-23 PROCEDURE — 97110 THERAPEUTIC EXERCISES: CPT

## 2020-01-23 PROCEDURE — 92523 SPEECH SOUND LANG COMPREHEN: CPT

## 2020-01-23 RX ORDER — CYCLOBENZAPRINE HCL 5 MG
5 TABLET ORAL 3 TIMES DAILY
Status: DISCONTINUED | OUTPATIENT
Start: 2020-01-23 | End: 2020-01-27 | Stop reason: HOSPADM

## 2020-01-23 RX ADMIN — SOTALOL HYDROCHLORIDE 160 MG: 80 TABLET ORAL at 08:01

## 2020-01-23 RX ADMIN — MODAFINIL 100 MG: 100 TABLET ORAL at 13:34

## 2020-01-23 RX ADMIN — DOCUSATE SODIUM 100 MG: 50 LIQUID ORAL at 07:59

## 2020-01-23 RX ADMIN — HEPARIN SODIUM 20 UNITS/KG/HR: 10000 INJECTION, SOLUTION INTRAVENOUS at 17:36

## 2020-01-23 RX ADMIN — SOTALOL HYDROCHLORIDE 160 MG: 80 TABLET ORAL at 20:57

## 2020-01-23 RX ADMIN — Medication 100 MG: at 08:02

## 2020-01-23 RX ADMIN — LISINOPRIL 20 MG: 20 TABLET ORAL at 08:00

## 2020-01-23 RX ADMIN — SODIUM CHLORIDE, PRESERVATIVE FREE 10 ML: 5 INJECTION INTRAVENOUS at 20:59

## 2020-01-23 RX ADMIN — HEPARIN SODIUM 26 UNITS/KG/HR: 10000 INJECTION, SOLUTION INTRAVENOUS at 07:07

## 2020-01-23 RX ADMIN — MODAFINIL 200 MG: 100 TABLET ORAL at 08:06

## 2020-01-23 RX ADMIN — CYCLOBENZAPRINE HYDROCHLORIDE 5 MG: 5 TABLET, FILM COATED ORAL at 20:58

## 2020-01-23 RX ADMIN — METOPROLOL TARTRATE 5 MG: 5 INJECTION, SOLUTION INTRAVENOUS at 20:39

## 2020-01-23 RX ADMIN — SODIUM CHLORIDE, PRESERVATIVE FREE 10 ML: 5 INJECTION INTRAVENOUS at 08:02

## 2020-01-23 RX ADMIN — HEPARIN SODIUM 22 UNITS/KG/HR: 10000 INJECTION, SOLUTION INTRAVENOUS at 10:04

## 2020-01-23 RX ADMIN — AMANTADINE HYDROCHLORIDE 100 MG: 50 SOLUTION ORAL at 13:34

## 2020-01-23 RX ADMIN — HYDRALAZINE HYDROCHLORIDE 10 MG: 20 INJECTION INTRAMUSCULAR; INTRAVENOUS at 03:08

## 2020-01-23 RX ADMIN — Medication 15 ML: at 07:58

## 2020-01-23 RX ADMIN — FLUOXETINE 10 MG: 10 CAPSULE ORAL at 08:00

## 2020-01-23 RX ADMIN — FAMOTIDINE 20 MG: 10 INJECTION, SOLUTION INTRAVENOUS at 10:04

## 2020-01-23 RX ADMIN — CYCLOBENZAPRINE HYDROCHLORIDE 5 MG: 5 TABLET, FILM COATED ORAL at 13:34

## 2020-01-23 RX ADMIN — ROSUVASTATIN CALCIUM 5 MG: 5 TABLET, FILM COATED ORAL at 20:57

## 2020-01-23 RX ADMIN — AMANTADINE HYDROCHLORIDE 100 MG: 50 SOLUTION ORAL at 07:59

## 2020-01-23 RX ADMIN — ANTI-FUNGAL POWDER MICONAZOLE NITRATE TALC FREE: 1.42 POWDER TOPICAL at 08:01

## 2020-01-23 RX ADMIN — FAMOTIDINE 20 MG: 10 INJECTION, SOLUTION INTRAVENOUS at 20:57

## 2020-01-23 RX ADMIN — Medication 15 ML: at 20:58

## 2020-01-23 RX ADMIN — ANTI-FUNGAL POWDER MICONAZOLE NITRATE TALC FREE: 1.42 POWDER TOPICAL at 20:58

## 2020-01-23 RX ADMIN — FOLIC ACID 1 MG: 1 TABLET ORAL at 08:00

## 2020-01-23 RX ADMIN — INSULIN GLARGINE 25 UNITS: 100 INJECTION, SOLUTION SUBCUTANEOUS at 08:03

## 2020-01-23 RX ADMIN — HYDRALAZINE HYDROCHLORIDE 10 MG: 20 INJECTION INTRAMUSCULAR; INTRAVENOUS at 12:34

## 2020-01-23 NOTE — PROGRESS NOTES
Speech Language Pathology  [address]  Initial Speech/Language/Cognitive Assessment    NAME: Rabia Meadows  : 1960   MRN: [id number]  ADMISSION DATE: 2019  ADMITTING DIAGNOSIS: has SAH (subarachnoid hemorrhage) ([address] Subarachnoid bleed ([address] Traumatic subarachnoid hemorrhage with loss of consciousness of 1 hour to 5 hours 59 minutes ([address] Carotid stenosis, right; Asymptomatic stenosis of left vertebral artery; Intracranial atherosclerosis; History of CEA (carotid endarterectomy); Ventilator dependence ([address] Delirium tremens ([address] Chronic a-fib; On mechanically assisted ventilation ([facility] Encephalopathy; Acute ischemic multifocal anterior circulation stroke ([facility] and Palliative care encounter on their problem list.       Date of Eval: 2020   Evaluating Therapist: LANDON Ramos    RECENT RESULTS  CT OF HEAD/MRI: 2020     Impression   Stable left subdural fluid collection and minimal rightward midline shift.       Scattered FLAIR signal abnormalities and restricted diffusion within the   frontal, parietal, and temporal lobes bilaterally consistent with ischemia.       Bilateral subarachnoid hemorrhage and intraventricular hemorrhagic products   as described above.  This is similar compared to prior exam.         Primary Complaint: The patient is a 62 yo male with history of atrial fibrillation on Eliquis, bilateral ICA stenosis (right more than left), DM2, HLD, HTN, CAD s/p PCI stents, PVD, CEA, and ETOH abuse who presents as a transfer from [facility] as a trauma alert for CT head revealed diffuse bialteral SAH and left parietal SDH.  He was given [facility] [place] and transferred to [facility] for higher level of care.        Patient was found confused by family at his home after being unable to reach him on the phone.  He was complaining of headache, and actively vomiting.  Also had bilateral arm scrapes and bruises concerning for recent fall, Impaired  Hearing  Hearing: Exceptions to [facility]           Objective:     Oral/Motor  Oral Motor: Exceptions to WFL(+ R lingual and labial weakness noted.   Pt did not follow any o/m commands)    Auditory Comprehension  Comprehension: Exceptions  Yes/No Questions: Severe  Basic Questions: Severe  One Step Basic Commands: Severe  Conversation: Severe         Expression  Primary Mode of Expression: Verbal(No verbalizations noted with max cues)    Verbal Expression  Verbal Expression: Exceptions to functional limits  Initiation: Severe  Repetition: Severe  Automatic Speech: Severe  Conversation: Severe         Motor Speech  Motor Speech: Unable to assess(Pt. did not verbalize)      Prognosis:  Speech Therapy Prognosis  Prognosis: Guarded  Individuals consulted  Consulted and agree with results and recommendations: Patient    Education:  Patient Education: yes  Patient Education Response: No evidence of learning          Therapy Time:   Individual Concurrent Group Co-treatment   Time In 0925         Time Out 0933         Minutes LANDON Cooper  1/23/2020 11:00 AM

## 2020-01-23 NOTE — PROGRESS NOTES
General Surgery:  Daily Progress Note                 PATIENT NAME: Pal Ospina     TODAY'S DATE: 1/23/2020, 6:19 AM  CC:  Dysphagia     SUBJECTIVE:     Pt seen and examined at bedside. No acute events overnight. Tolerated meds per PEG. OBJECTIVE:   VITALS:  /83   Pulse 96   Temp 98.9 °F (37.2 °C) (Oral)   Resp (!) 34   Ht 5' 11\" (1.803 m)   Wt 157 lb 3 oz (71.3 kg)   SpO2 94%   BMI 21.92 kg/m²      INTAKE/OUTPUT:      Intake/Output Summary (Last 24 hours) at 1/23/2020 0151  Last data filed at 1/22/2020 2316  Gross per 24 hour   Intake 920 ml   Output --   Net 920 ml       PHYSICAL EXAM:  General Appearance:sleeping, nad  HEENT:  Normocephalic, atraumatic, mucus membranes moist   Heart: s1+s2  Lungs:equal and symmetric chest rise/fall, non-labored  Abdomen:soft, nd, nttp. PEG intact bumper at 3cm at skin. No bleeding. Extremities: No cyanosis, pitting edema, rashes noted. Skin: Skin color, texture, turgor normal. No rashes or lesions. ASSESSMENT:  Active Hospital Problems    Diagnosis Date Noted    Palliative care encounter [Z51.5]     Acute ischemic multifocal anterior circulation stroke (HCC) [I63.529]     Encephalopathy [G93.40]     On mechanically assisted ventilation (HCC) [Z99.11]     Chronic a-fib [I48.20]     Ventilator dependence ([address] [Z99.11]     Delirium tremens ([address] [F10.231]     Subarachnoid bleed ([address] [I60.9]     Traumatic subarachnoid hemorrhage with loss of consciousness of 1 hour to 5 hours 59 minutes ([address] [S06.6X3A]     Carotid stenosis, right [I65.21]     Asymptomatic stenosis of left vertebral artery [I65.02]     Intracranial atherosclerosis [I67.2]     History of CEA (carotid endarterectomy) [Z98.890]     SAH (subarachnoid hemorrhage) ([address] [I60.9] 12/25/2019       1. 61 y.o. male POD#1 PEG    Plan:  [facility] for TF via PEG  Abdominal binder   Medical and supportive care per primary     General surgery to sign off at this time.  Thank you for the consult. Electronically signed by Ion Greer DO  on 1/23/2020 at 6:19 AM     I have reviewed the resident's note and I either performed the key elements of the medical history and physical exam or was present with the resident when the key elements of the medical history and physical exam were performed. I have discussed the findings, established the care plan and recommendations with Resident.     Electronically signed by Ewell Spatz Canos IV, DO on 1/23/20 at 10:12 AM

## 2020-01-23 NOTE — PROGRESS NOTES
WBC  --  7.8   HGB  --  11.2*    195     BMP:    Recent Labs     01/22/20  0816      K 4.2   CL 98   CO2 28   BUN 8   CREATININE 0.33*   GLUCOSE 89     Hepatic:   No results for input(s): AST, ALT, ALB, BILITOT, ALKPHOS in the last 72 hours. Troponin: No results for input(s): TROPONINI in the last 72 hours. BNP: No results for input(s): BNP in the last 72 hours. Lipids:   No results for input(s): CHOL, HDL in the last 72 hours. Invalid input(s): LDLCALCU  INR:   No results for input(s): INR in the last 72 hours. Assessment and Recommendations:     Traumatic subarachnoid hemorrhage.     s/p diagnostic cerebral angiogram in December 26, 2019  1. Critical right cervical ICA stenosis measuring approximately 90 to 99% per NASCET criteria. There is delayed anterograde filling of the cervical ica across the stenosis. There is Collateral filling of the distal right ica from the retrograde filling of the right ophthalmic artery from the right IMAX. In addition there is pial collaterals from the right PCA supplying the distal right mca territory parieto/occipital region. 2. No MCA vasospasm noted however there are irregularities suspected from diffuse intracranial athero including 50% left vert athero with stenosis in the v3 segment    Patient with possible alcohol withdrawal.      S/p cerebral angiogram on January 8  --Right ICA stent was placed with pre-and post balloon angioplasty. Impression: 1. Left cervical stenosis measuring approximately 20% at the carotid ICA bulb with postsurgical changes noted from prior endarterectomy. 2.Diffuse atherosclerotic disease noted in the left cavernous ICA/left RUSTY and?distal?left MCA M1. 3.There is increased vascularity noted in the distal left RUSTY pericallosal branch with early venous drainage of unclear etiology. 4.There is decreased parenchymal blush in the left MCA parietal region corresponding to the hypodensity noted on prior CT head.    5.

## 2020-01-23 NOTE — PLAN OF CARE
TODAY:      AWAKE & FOLLOWING COMMANDS:  [x] No   [] Yes    INTUBATED:   [x] No   [] Yes    SEDATION/ANALGESIA:    [] Propofol gtt  [] Versed gtt  [] Ativan gtt   [x] No Sedation    FEEDING: Able to take PO?  [] No:   [] NG/OG [x] PEG  Tube Feeds: Start today       DVT Prophylaxis:  [x] Yes:   Heparin infusion        [] No rationale:     Stress Ulcer Prophylaxis: [x] Yes: Pepcid 20 mg BID  [] Not indicated    VASOPRESSORS:  [x] No    [] Yes    CENTRAL/ARTERIAL LINES:  [x] No  [] Yes    BECERRIL CATHETER: [x] No   [] Yes    DRAINS: [x] No    [] Yes    Head of Bed: [x] Elevated:          [] Flat    Glucose management:  [x] Sliding Scale : High dose           [x] Long Acting: Lantus 25 units    Mauricio Mauricio  1/23/2020

## 2020-01-23 NOTE — PROGRESS NOTES
Nutrition Assessment    Type and Reason for Visit: Reassess    Nutrition Recommendations:    - Suggest diabetic formula (Glucerna) at goal of 80 mL/hr to provide 2304 kcals, 115 gm protein per day   - Recommend daily weights to monitor weight/fluid fluctuations. Nutrition Assessment: TF currently on hold for recent PEG placement. Noted OK to restart TF today per gen sx. Malnutrition Assessment:  · Malnutrition Status: Meets the criteria for moderate malnutrition  · Context: Acute illness or injury  · Findings of the 6 clinical characteristics of malnutrition (Minimum of 2 out of 6 clinical characteristics is required to make the diagnosis of moderate or severe Protein Calorie Malnutrition based on AND/ASPEN Guidelines):  1. Energy Intake-Greater than 75% of estimated energy requirement(with nutrition support), Greater than or equal to 1 month    2. Weight Loss-20% loss or greater, in 1 month(?accuracy of true weight loss d/t edema/fluid)  3. Fat Loss-Moderate subcutaneous fat loss, Orbital  4. Muscle Loss-Mild muscle mass loss(Mild to Moderate muscle mass loss), Clavicles (pectoralis and deltoids), Interosseous  5. Fluid Accumulation-Mild fluid accumulation, Extremities, Generalized  6.   Strength-Not measured    Nutrition Risk Level: High    Nutrient Needs:  · Estimated Daily Total Kcal: 28-30 kcal/kg IBW = 5844-9852 kcals/day  · Estimated Daily Protein (g): 1.3-1.5 gm/kg IBW = 100-115 gm/day    Nutrition Diagnosis:   · Problem: Inadequate oral intake  · Etiology: related to Difficulty swallowing     Signs and symptoms:  as evidenced by NPO status due to medical condition    Objective Information:  · Nutrition-Focused Physical Findings: +BM  · Wound Type: Venous Stasis(L ankle; excoriation on coccyx noted)  · Current Nutrition Therapies:  · Oral Diet Orders: NPO   · Anthropometric Measures:  · Ht: 5' 11\" (180.3 cm)   · Current Body Wt: 157 lb 3 oz (71.3 kg)  · Admission Body Wt: 210 lb 8.6 oz (95.5

## 2020-01-23 NOTE — PROGRESS NOTES
Daily Progress Note  Neuro Critical Care        INTERVAL HISTORY    The patient is a 62 yo male with history of atrial fibrillation on Eliquis, bilateral ICA stenosis (right more than left), DM2, HLD, HTN, CAD s/p PCI stents, PVD, CEA, and ETOH abuse who presents as a transfer from [facility] as a trauma alert for CT head revealed diffuse bialteral SAH and left parietal SDH. He was given [place] and transferred to [facility] for higher level of care. Patient was found confused by family at his home after being unable to reach him on the phone. He was complaining of headache, and actively vomiting. Also had bilateral arm scrapes and bruises concerning for recent fall, patient himself does not member falling, said around 4 PM he woke up and felt frontal and vertex headache and neck pain, and felt nauseous and started throwing up. Endorses drinking alcohol last night states that he had 2 beers. Per family has significant alcohol abuse history and has had multiple falls in the past.     On presentation in the ED [facility], patient mildly lethargic but oriented x4, complaining of headache, nausea, mild vertigo, left arm weaker than right, bilateral leg weakness. Significant interdisciplinary discussion between the neurosurgeon, radiologist, trauma surgeon and stroke specialist about whether  bleed is consistent with traumatic versus spontaneous subarachnoid. Stroke specialist has significant concern for severe stenosis, suspects traumatic bleed and that patient is at risk for vasospasm. In addition has intracranial left vertebral artery athero-stenosis. Consensus management strategy is to keep the patient 130-160 for blood pressure goals to avoid right MCA watershed stroke.      No aneurysm found on CTA or malformation-critical stenosis of the proximal left vertebral artery, critical stenosis of the proximal right cervical ICA, severe near critical stenosis of the right cavernous ICA, to tachypnea. 1/14: Lisinopril started. Family meeting held, awaiting decision. 1/15: Awaiting on decision from family, palliative following. NaCl tabs d/c.  1/16: Awaiting family decision. Exam improving. 1/17: Family would like trach/peg. General surgery consulted. 1/20: no changes. Trach and PEG Wed vs Thursday this week per GS. COnt to be off AP x5 days prior to procedure   1/22: PEG placement   1/23: resume Heparin x1 more day. S/p PEG. Over the last 24 hours, no acute events. Seen by GS for trach and peg. Started on heparin infusion after DAPT were discontinued. Will attempt extubation today.          MEDICATIONS:     CURRENT MEDICATIONS:  SCHEDULED MEDICATIONS:   cyclobenzaprine  5 mg Oral TID    lisinopril  20 mg Oral Daily    miconazole   Topical BID    FLUoxetine  10 mg Oral Daily    amantadine  100 mg Per G Tube BID- 8&2    modafinil  200 mg Oral Daily    And    modafinil  100 mg Oral Daily    insulin glargine  25 Units Subcutaneous Daily    insulin lispro  0-18 Units Subcutaneous Q6H    docusate  100 mg Oral Daily    sotalol  160 mg Oral BID    nicotine  1 patch Transdermal Daily    folic acid  1 mg Oral Daily    thiamine  100 mg Oral Daily    sodium chloride flush  10 mL Intravenous BID    famotidine (PEPCID) injection  20 mg Intravenous BID    rosuvastatin  5 mg Oral Nightly    chlorhexidine  15 mL Mouth/Throat BID    vitamin D  50,000 Units Oral Weekly    sodium chloride flush  10 mL Intravenous 2 times per day    sodium chloride flush  10 mL Intravenous 2 times per day     CONTINUOUS INFUSIONS:   heparin (porcine) 20 Units/kg/hr (01/23/20 1736)    sodium chloride 10 mL/hr at 01/20/20 2004    dextrose       PRN MEDICATIONS:   ipratropium-albuterol, albuterol, acetaminophen, hydrALAZINE, labetalol, ibuprofen, acetaminophen, metoprolol, glucose, dextrose, glucagon (rDNA), dextrose, magnesium sulfate, sodium chloride flush, sodium chloride flush    VITALS 24 Hours Fingerstick    Collection Time: 01/23/20  5:41 PM   Result Value Ref Range    POC Glucose 132 (H) 75 - 110 mg/dL           RADIOLOGY   Xr Radius Ulna Left (2 Views)    Result Date: 12/26/2019  EXAMINATION: TWO XRAY VIEWS OF THE LEFT FOREARM 12/26/2019 1:37 am COMPARISON: None. HISTORY: ORDERING SYSTEM PROVIDED HISTORY: Trauma/Fracture TECHNOLOGIST PROVIDED HISTORY: Trauma/Fracture Reason for Exam: fall/trauma Acuity: Acute FINDINGS: Fiberglass splint obscures osseous details of the distal forearm. The left radius and left are intact. No acute fracture or dislocation in the left forearm. Partial visualization of 1st proximal metacarpal fracture. Osteopenia. No acute osseous abnormality left radius or ulna. Xr Radius Ulna Right (2 Views)    Result Date: 12/26/2019  EXAMINATION: TWO XRAY VIEWS OF THE RIGHT FOREARM 12/26/2019 1:37 am COMPARISON: None. HISTORY: ORDERING SYSTEM PROVIDED HISTORY: Trauma/Fracture TECHNOLOGIST PROVIDED HISTORY: Trauma/Fracture Reason for Exam: fall/trauma Acuity: Acute FINDINGS: The bones are osteopenic. The right radius and right ulna are intact. No acute fracture or dislocation in the right forearm. Along the ulnar aspect of the proximal forearm, there is soft tissue laceration. Antecubital fossa IV catheter. Osteopenia. No acute osseous abnormality right forearm. Soft tissue laceration ulnar aspect of the proximal forearm. Xr Wrist Left (min 3 Views)    Result Date: 12/25/2019  EXAMINATION: 3 XRAY VIEWS OF THE LEFT WRIST 12/25/2019 9:45 pm COMPARISON: None. HISTORY: ORDERING SYSTEM PROVIDED HISTORY: fall TECHNOLOGIST PROVIDED HISTORY: fall Acuity: Acute Type of Exam: Initial FINDINGS: Overlying splint partially obscures osseous details. Acute traumatic closed proximal 1st metacarpal fracture. The carpal bones are intact. Carpal alignment is maintained soft tissues of the wrist are unremarkable. Acute traumatic closed 1st proximal metacarpal fracture.  No acute osseous abnormality in the wrist.     Xr Wrist Right (min 3 Views)    Result Date: 12/25/2019  EXAMINATION: 3 XRAY VIEWS OF THE RIGHT WRIST 12/25/2019 9:45 pm COMPARISON: None. HISTORY: ORDERING SYSTEM PROVIDED HISTORY: fall TECHNOLOGIST PROVIDED HISTORY: fall Acuity: Acute Type of Exam: Initial FINDINGS: Overlying splint obscures osseous details. Carpal bones are intact. Advanced radiocarpal joint osteoarthritis with joint space narrowing and subchondral sclerosis. Widening of the scapholunate joint. Posttraumatic deformity of the ulnar styloid process. No acute fracture or dislocation. No acute osseous abnormality the right wrist. Widening of the scapholunate interval suggesting ligamentous injury. Radiocarpal joint osteoarthritis. Xr Hand Left (min 3 Views)    Result Date: 12/26/2019  EXAMINATION: THREE XRAY VIEWS OF THE LEFT HAND 12/26/2019 1:47 am COMPARISON: 12/25/2019 2353 hours HISTORY: ORDERING SYSTEM PROVIDED HISTORY: post splint TECHNOLOGIST PROVIDED HISTORY: post splint Reason for Exam: fall trauma/post splint Acuity: Acute FINDINGS: Overlying splint obscures osseous details. Again seen is acute traumatic closed proximal 1st metacarpal fracture. There appears to be greater fracture fragment displacement compared to prior study. No additional acute fracture or dislocation in the left hand. Overlying splint obscures osseous details. Acute traumatic closed angulated proximal 1st metacarpal fracture with greater angulation of fracture fragments compared the prior study.      Xr Hand Left (min 3 Views)    Result Date: 12/26/2019  EXAMINATION: THREE XRAY VIEWS OF THE LEFT HAND 12/26/2019 1:47 am COMPARISON: Left wrist radiographs 12/25/2019 2131 hours HISTORY: ORDERING SYSTEM PROVIDED HISTORY: post reduction TECHNOLOGIST PROVIDED HISTORY: post reduction Reason for Exam: fall trauma Acuity: Acute FINDINGS: Acute traumatic closed comminuted mildly angulated proximal 1st metacarpal fracture. Joint alignment is maintained. No additional acute fracture or dislocation in the left hand. Acute traumatic closed comminuted mildly angulated proximal 1st metacarpal fracture. Xr Hand Right (min 3 Views)    Result Date: 12/26/2019  EXAMINATION: THREE XRAY VIEWS OF THE RIGHT HAND 12/26/2019 1:36 am COMPARISON: None. HISTORY: ORDERING SYSTEM PROVIDED HISTORY: Trauma/Fracture TECHNOLOGIST PROVIDED HISTORY: Include clenched fist view Trauma/Fracture Reason for Exam: fall FINDINGS: Joint alignment is maintained. No acute fracture or dislocation in the right hand. Degenerative changes in the interphalangeal joints. Old ulnar styloid process fracture. Degenerative changes in the radiocarpal joint. No acute osseous abnormality in the right hand. Xr Hip Left (2-3 Views)    Result Date: 12/26/2019  EXAMINATION: ONE XRAY VIEW OF THE PELVIS AND TWO XRAY VIEWS RIGHT HIP; TWO XRAY VIEWS OF THE LEFT HIP 12/26/2019 1:37 am COMPARISON: None. HISTORY: ORDERING SYSTEM PROVIDED HISTORY: Trauma/Fracture TECHNOLOGIST PROVIDED HISTORY: AP and cross-table lateral of the hip please, thank you Trauma/Fracture Reason for Exam: fall/trauma Acuity: Acute FINDINGS: The bones are osteopenic. The femoral heads located bilateral.  No acute fracture or dislocation pelvis or hips bilaterally. SI joints are grossly intact. Pubic symphysis is intact. The sacrum is partially obscured by contrast in the bladder. No acute osseous abnormality in the pelvis or hips bilaterally. Osteopenia. Xr Knee Left (3 Views)    Result Date: 12/26/2019  EXAMINATION: THREE XRAY VIEWS OF THE LEFT KNEE 12/26/2019 1:36 am COMPARISON: None. HISTORY: ORDERING SYSTEM PROVIDED HISTORY: Trauma/Fracture TECHNOLOGIST PROVIDED HISTORY: Trauma/Fracture Reason for Exam: fall/ trauma Acuity: Acute FINDINGS: Osteopenia. No acute fracture or dislocation. No focal osseous lesion. No joint effusion. No focal soft tissue abnormality.   Vascular calcifications. No acute abnormality of the knee. Osteopenia. Xr Knee Right (3 Views)    Result Date: 12/26/2019  EXAMINATION: THREE XRAY VIEWS OF THE RIGHT KNEE 12/26/2019 1:36 am COMPARISON: None. HISTORY: ORDERING SYSTEM PROVIDED HISTORY: Trauma/Fracture TECHNOLOGIST PROVIDED HISTORY: Trauma/Fracture Reason for Exam: ,fall trauma,unable to get xtable on patient Acuity: Acute FINDINGS: The bones are osteopenic. No acute fracture or dislocation. No focal osseous lesion. No evidence of joint effusion. No focal soft tissue abnormality. Vascular calcifications. No acute abnormality of the knee. Osteopenia. Xr Ankle Left (min 3 Views)    Result Date: 12/26/2019  EXAMINATION: THREE XRAY VIEWS OF THE LEFT ANKLE 12/26/2019 1:36 am COMPARISON: None. HISTORY: ORDERING SYSTEM PROVIDED HISTORY: Trauma/Fracture TECHNOLOGIST PROVIDED HISTORY: Trauma/Fracture Acuity: Acute FINDINGS: No acute fracture or dislocation. Normal alignment of the ankle mortise. No focal osseous lesion. No joint effusion. No focal soft tissue abnormality. Vascular calcifications. No acute abnormality of the ankle. Xr Ankle Right (min 3 Views)    Result Date: 12/26/2019  EXAMINATION: THREE XRAY VIEWS OF THE RIGHT ANKLE 12/26/2019 1:36 am COMPARISON: None. HISTORY: ORDERING SYSTEM PROVIDED HISTORY: Trauma/Fracture TECHNOLOGIST PROVIDED HISTORY: Trauma/Fracture FINDINGS: Diffuse osteopenia. No acute fracture or dislocation. Normal alignment of the ankle mortise. No focal osseous lesion. No joint effusion. No focal soft tissue abnormality. Status post 5th metatarsal ORIF with intact screw. No acute abnormality of the ankle.      Ct Head Wo Contrast    Result Date: 12/27/2019  EXAMINATION: CT OF THE HEAD WITHOUT CONTRAST,  12/26/2019 11:08 pm TECHNIQUE: CT of the head was performed without the administration of intravenous contrast. Dose modulation, iterative reconstruction, and/or weight based adjustment of the mA/kV was subdural hematoma measures up to 4 mm in thickness. No associated mass effect. No parenchymal hemorrhage. There is no hydrocephalus or midline shift. Basal cisterns are patent. ORBITS: The visualized portion of the orbits demonstrate no acute abnormality. SINUSES: The visualized paranasal sinuses and mastoid air cells demonstrate no acute abnormality. SOFT TISSUES/SKULL:  No acute abnormality of the visualized skull or soft tissues. Subarachnoid hemorrhage, predominantly in the bilateral sylvian fissures and bilateral cerebral hemisphere sulci. Acute left parietal convexity subdural hematoma measures up to 4 mm in thickness. No associated mass effect. No midline shift or evidence of intracranial herniation. Findings were discussed with Gia Esquivel at 9:32 pm on 12/25/2019. Ct Cervical Spine Wo Contrast    Result Date: 12/26/2019  EXAMINATION: CT OF THE CERVICAL SPINE WITHOUT CONTRAST 12/25/2019 9:04 pm TECHNIQUE: CT of the cervical spine was performed without the administration of intravenous contrast. Multiplanar reformatted images are provided for review. Dose modulation, iterative reconstruction, and/or weight based adjustment of the mA/kV was utilized to reduce the radiation dose to as low as reasonably achievable. COMPARISON: None HISTORY: ORDERING SYSTEM PROVIDED HISTORY: trauma TECHNOLOGIST PROVIDED HISTORY: trauma FINDINGS: BONES/ALIGNMENT: No traumatic malalignment. Vertebral body heights are maintained. No acute fracture. DEGENERATIVE CHANGES: Moderate multilevel disc narrowing and endplate osteophyte formation. Multilevel uncovertebral and facet joint degenerative changes. SOFT TISSUES: Paraspinal soft tissues are normal.  Partially visualized prepontine cistern subarachnoid hemorrhage is better evaluated on CT head performed concurrently. No acute abnormality of the cervical spine.      Ct Thoracic Spine Wo Contrast    Result Date: 12/26/2019  EXAMINATION: CT OF THE THORACIC SPINE HISTORY: trauma Reason for Exam: fall from standing Acuity: Acute Type of Exam: Initial; ORDERING SYSTEM PROVIDED HISTORY: trauma TECHNOLOGIST PROVIDED HISTORY: trauma Reason for Exam: fall from standing Acuity: Acute Type of Exam: Initial FINDINGS: CTA CHEST: Stable cardiomegaly. No pericardial effusion. No mediastinal hematoma or pneumomediastinum. No enlarged mediastinal or hilar lymph nodes. Main pulmonary artery is dilated, measuring up to 4 cm, suggesting pulmonary hypertension. Calcified and noncalcified atherosclerotic plaque of the thoracic aorta. Incidentally noted 4 vessel aortic arch with separate arch origin of the left vertebral artery. Ectatic ascending thoracic aorta measures up to 4 cm in diameter. No acute aortic abnormality. Central airways are clear. No pleural effusion or pneumothorax. No pulmonary contusion or pulmonary laceration. Mild bilateral lower lobe dependent atelectatic changes. Healing anterolateral left 6th rib fracture. No acute displaced rib fracture or chest wall hematoma. CTA ABDOMEN: No acute traumatic abnormality of the liver, spleen, pancreas, kidneys, or adrenal glands. Normal gallbladder. Stomach, small bowel, and colon are normal in caliber without evidence of obstruction. Normal appendix. Sigmoid diverticulosis without diverticulitis. Calcified and noncalcified aortoiliac atherosclerotic calcification. Abdominal aorta is normal in caliber. No free fluid in the abdomen. CTA PELVIS: Urinary bladder and pelvic organs are normal.  No free fluid in the pelvis. Bilateral common iliac stents are in position. THORACIC/LUMBAR SPINE: BONES/ALIGNMENT: Normal alignment of the thoracic and lumbar spine. Vertebral body heights are maintained. No acute fracture. DEGENERATIVE CHANGES: Multilevel disc narrowing and endplate osteophyte formation. Mild multilevel facet joint degenerative changes. No high-grade, bony spinal canal stenosis.  SOFT TISSUES: Paraspinal soft tissues are normal.     No acute traumatic abnormality of the chest, abdomen, or pelvis. Remote, healing anterolateral left 6th rib fracture. No acute osseous abnormality of the thoracic or lumbar spine. Ir Angiogram Carotid C Erebral Bilateral    Result Date: 12/27/2019  Date of procedure: 12/26/2019 Procedure: Diagnostic cerebral angiogram Indication: Subarachnoid hemorrhage, evaluation for aneurysm. Procedures: 1. Selective right common carotid artery angiogram 2. Selective right internal carotid artery cerebral angiogram 3. Selective right vertebral artery cerebral angiogram 4. Selective left common carotid artery angiogram 5. Selective left internal carotid artery cerebral angiogram 6. Selective left vertebral artery cerebral angiogram 7. Right common femoral artery angiogram Neurointerventionalist: Kenn Boyce MD [place]: Steffany Lund MD Comparison: None Fluoroscopy time: 29.7 minutes Contrast: 100 cc Visipaque-270 Side of Access: Right femoral Closure device: Vascade Sedation: Conscious sedation Patient arrived to the angio suite: 1325 Puncture obtained at: 1400 Vascular access removed at: 1605 Consent:After explaining the risks and benefits to the patient and the patient's family, including but not limited to stroke, coma, death, vessel injury, dissection, tear, occlusion, and X-ray dye allergic type reaction, a signed consent form was obtained. Anesthesia: IV moderate sedation was supervised by Dr. Kenn Boyce. The patient was independently monitored by a Registered Nurse assigned to the Department of Radiology?using automated blood pressure, EKG and pulse oximetry. ? The detailed Conscious Record is permanently stored in the [place] [id number]. The following is the conscious sedation record including Start and End times: Fentanyl, propofol and Versed from 1400 to  1600 . Clinical History:59 y. o.?male?with past medical history including atrial fibrillation on Eliquis, diabetes mellitus, hypertension, hyperlipidemia, coronary artery disease s/p PCI stents, peripheral vascular disease, history of bilateral ICA stenosis with  right worse than left, history of CEA, alcohol abuse. ? Patient presented after a fall from outside facility as a trauma alert?and found to have bilateral subarachnoid hemorrhage/left parietal subdural hematoma. ? He received Kcentra in outside hospital. ? Neuro endovascular was consulted. Description and findings: The patient's right groin was prepped and draped in standard sterile fashion and under local anesthesia with conscious sedation, the right common femoral artery was accessed with a single-wall needle technique, and a 5 Faroese intravascular sheath was placed within the right common femoral artery, establishing arterial access. A 5 [place]oese multipurpose catheter was then advanced over a guide wire to the level of the aortic arch, and used to selectively catheterize the right common carotid artery. Right CCA technique: The right common carotid artery was selectively catheterized under fluoroscopic guidance and digital subtraction angiography images were obtained in biplane projections of the right cervical carotid artery. Interpretation:The right common carotid artery injection demonstrates normal antegrade flow into the external and internal carotid arteries with normal filling of the external carotid artery branches. Course and caliber of the cervical portion of the right internal carotid artery revealed significant proximal right ICA stenosis/string sign measuring approximately 90-99% per NASCET criteria. Flow across the high-grade stenosis was delayed with earlier filling noted of the distal supraclinoid internal carotid artery through retrograde opacification of the right ophthalmic artery from the internal maxillary artery. Further inspection demonstrates no evidence of dissection, aneurysm. Right CCA technique:  The right internal carotid artery run was performed from the common carotid artery due to severe stenosis. Digital subtraction angiography of the intracranial right internal carotid circulation was performed in frontal, and lateral projections. Interpretation:There is slow antegrade filling of the distal internal carotid artery from the cervical internal carotid artery. Noted retrograde Filling of the right ophthalmic artery from the right internal maxillary artery branches with slow filling anterior cerebral artery, middle cerebral artery and the distal branches due to previously noted critical proximal cervical ICA stenosis. Inspection of the remaining right internal carotid circulation revealed no other evidence of cerebral aneurysm, arteriovenous malformation. There is severe stenosis noted in the petrous/cavernous and supraclinoid right ICA noted with diminutive appearance. Capillary and venous phase images were also unremarkable, with no evidence of veno-occlusive disease. Left CCA technique: The left common carotid artery was selectively catheterized under fluoroscopic guidance and digital subtraction angiography images were obtained in biplane projections of the left cervical common carotid artery. Interpretation: The left common carotid artery injection demonstrates normal antegrade flow into the external and internal carotid arteries with normal filling of the external carotid artery branches. Caliber and lumen contour of the cervical portion of the left internal carotid artery noted to be large and irregular which is likely due to prior endarterectomy. Left ICA technique: The left internal carotid artery was then selectively catheterized, and digital subtraction angiography of the intracranial left internal carotid artery circulation was performed in frontal and lateral projections. Interpretation:There is normal antegrade filling of the distal internal carotid artery, ophthalmic artery, anterior cerebral artery, middle cerebral artery and distal branches. neurointerventional suite. Impression: 1. Critical right ICA stenosis with a string sign is noted measuring approximately 90-99% per NASCET criteria. Noted retrograde Filling of the right ophthalmic artery from the right internal maxillary artery branches with slow filling of the anterior cerebral artery, middle cerebral artery and the distal branches in addition to distal right mca vascular supply from the right pca due to previously noted critical proximal cervical ICA stenosis. 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 3.No evidence of clear discrete aneurysm, arteriovenous malformation, arterial dissection, or veno-occlusive disease. 4. 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 Fazal Alcantara dictated this invasive procedure. Dr. Parker Varela was present for all procedural and imaging components of this case. Examination was reviewed and reported findings confirmed and edited by Dr. Parker Varela. Dr. Parker Varela supervised and interpreted this procedure. Atif Evans MD Final report electronically signed by Atif Evans M.D. on 12/27/2019 4:00 PM    Cta Head Neck W Contrast    Result Date: 12/25/2019  EXAMINATION: CTA OF THE HEAD AND NECK WITH CONTRAST 12/25/2019 9:07 pm: TECHNIQUE: CTA of the head and neck was performed with the administration of intravenous contrast. Multiplanar reformatted images are provided for review. MIP images are provided for review. Stenosis of the internal carotid arteries measured using NASCET criteria. Dose modulation, iterative reconstruction, and/or weight based adjustment of the mA/kV was utilized to reduce the radiation dose to as low as reasonably achievable. COMPARISON: None.  HISTORY: ORDERING SYSTEM PROVIDED HISTORY: sah TECHNOLOGIST PROVIDED HISTORY: sah Reason for Exam: SAH after trauma Acuity: Acute Type of Exam: Initial FINDINGS: CTA NECK: AORTIC ARCH/ARCH VESSELS: There is a critical stenosis of the proximal left vertebral artery with moderate and severe stenoses of the V2 segment. There is a severe stenosis at the origin of the right vertebral artery. 66% stenosis of the left subclavian artery is noted. CAROTID ARTERIES: There is 25% stenosis of the proximal left common carotid artery and 50% stenosis of the mid and distal segments. The cervical portion of the left internal carotid artery is normal in course and caliber. There is 20% stenosis of the right common carotid artery. A critical stenosis of the proximal right cervical ICA is noted with attenuated flow seen distally. VERTEBRAL ARTERIES: No dissection, arterial injury, or significant stenosis. SOFT TISSUES: There is a thickened appearance to the mid esophagus. A small amount of layering debris is present within the trachea, likely reflecting pooled secretion. BONES: No acute osseous abnormality. CTA HEAD: ANTERIOR CIRCULATION: There is severely attenuated flow within the petrous and cavernous segments of the right internal carotid artery with severe, near critical stenoses of the cavernous ICA. There is also attenuated flow within the contralateral ICA, to a lesser degree, with moderate stenoses of the left cavernous ICA. There are moderate and severe multifocal stenoses of the MCA branch vessels, worse on the right-hand side. The right anterior cerebral artery is attenuated compared to the left. POSTERIOR CIRCULATION: There are mild-to-moderate stenoses of the distal right vertebral artery and a moderate to severe stenosis of the distal left. The basilar artery and PCAs appear to be slightly attenuated. OTHER: No dural venous sinus thrombosis on this non-dedicated study. BRAIN: See separately dictated noncontrast head CT report. No cerebral aneurysm or vascular malformation identified. Significant narrowing of the intracranial vessels, likely due to a combination of atherosclerotic disease as well as vasospasm related to subarachnoid hemorrhage.  Critical airways are clear. No pleural effusion or pneumothorax. No pulmonary contusion or pulmonary laceration. Mild bilateral lower lobe dependent atelectatic changes. Healing anterolateral left 6th rib fracture. No acute displaced rib fracture or chest wall hematoma. CTA ABDOMEN: No acute traumatic abnormality of the liver, spleen, pancreas, kidneys, or adrenal glands. Normal gallbladder. Stomach, small bowel, and colon are normal in caliber without evidence of obstruction. Normal appendix. Sigmoid diverticulosis without diverticulitis. Calcified and noncalcified aortoiliac atherosclerotic calcification. Abdominal aorta is normal in caliber. No free fluid in the abdomen. CTA PELVIS: Urinary bladder and pelvic organs are normal.  No free fluid in the pelvis. Bilateral common iliac stents are in position. THORACIC/LUMBAR SPINE: BONES/ALIGNMENT: Normal alignment of the thoracic and lumbar spine. Vertebral body heights are maintained. No acute fracture. DEGENERATIVE CHANGES: Multilevel disc narrowing and endplate osteophyte formation. Mild multilevel facet joint degenerative changes. No high-grade, bony spinal canal stenosis. SOFT TISSUES: Paraspinal soft tissues are normal.     No acute traumatic abnormality of the chest, abdomen, or pelvis. Remote, healing anterolateral left 6th rib fracture. No acute osseous abnormality of the thoracic or lumbar spine. Xr Hip 2-3 Vw W Pelvis Right    Result Date: 12/26/2019  EXAMINATION: ONE XRAY VIEW OF THE PELVIS AND TWO XRAY VIEWS RIGHT HIP; TWO XRAY VIEWS OF THE LEFT HIP 12/26/2019 1:37 am COMPARISON: None. HISTORY: ORDERING SYSTEM PROVIDED HISTORY: Trauma/Fracture TECHNOLOGIST PROVIDED HISTORY: AP and cross-table lateral of the hip please, thank you Trauma/Fracture Reason for Exam: fall/trauma Acuity: Acute FINDINGS: The bones are osteopenic. The femoral heads located bilateral.  No acute fracture or dislocation pelvis or hips bilaterally. SI joints are grossly intact. Pubic symphysis is intact. The sacrum is partially obscured by contrast in the bladder. No acute osseous abnormality in the pelvis or hips bilaterally. Osteopenia. CTA H/N 1/6/2019  1. New low-density extra-axial collection along the left cerebral convexity measuring 14 mm, with new 4 mm midline shift to the right. This is partially evaluated due to lack of noncontrast CT images. 2. Moderate stenosis involving petrous segment of left internal carotid artery. Severe stenosis involving proximal cavernous segment of left internal carotid artery. Moderate stenosis involving clinoid and supraclinoid segment of left internal carotid artery. 3. Severe diffuse stenosis involving petrous segment of right internal carotid artery, and cavernous segment of right renal carotid artery. Moderate to severe stenosis involving clinoid and supraclinoid segment of the right internal carotid artery. 4. Moderate multifocal stenosis involving anterior cerebral arteries, middle cerebral arteries, and posterior cerebral arteries, without evidence of large vessel occlusion. Appearance is reasonably similar to the previous examination on December 25, 2019.  5. Unchanged appearance of greater than 90% stenosis involving proximal right internal carotid artery, with diffuse severe narrowing of remainder of right internal carotid artery. This extends into intracranial segments. 6. Postsurgical changes at the left carotid bifurcation, without significant flow-limiting stenosis. 7. Moderate stenosis at origin of right vertebral artery. Moderate to severe stenosis at the proximal left vertebral artery, just distal to the origin. 8. Approximately 50% stenosis involving the left proximal subclavian artery. 9. Scattered multifocal ground-glass pulmonary opacities at both lung apices. Fluid density seen within the trachea adjacent to the endotracheal tube. MRI Brain 1/9/2020      LTME:Diffuse encephalopathy.  No epileptiform

## 2020-01-23 NOTE — CARE COORDINATION
Spoke with patients mother Ladonna Finders she would rather have patient come to a SNF in [facility] as it is closer to her if appropriate, her choice is [facility]. Will make referral    [phone] with Som Dodson at [facility], she relates Papito Pardo in admissions is on phone and she will give her a message to call back, will verify referral and insurance    [phone] with Papito Pardo at [facility] they have received referral, they do take his insurance and they have beds, Liudmila will do onsite today    1400 Per LAUREN Infante from [facility] she would like patient to go to [facility], [facility] if possible, concern for airway management, patient had a lot of secretions yesterday. Will notify admissions to continue with precert. Message left for admissions to call. [facility] notified to put precert on hold for now. If Peer to peer needs done Dr Trey Jones will do it per Neptali Infante NP    [phone] with Kim Rivas at [email or web address] [facility]), she will start precert today.

## 2020-01-24 ENCOUNTER — APPOINTMENT (OUTPATIENT)
Dept: GENERAL RADIOLOGY | Age: 60
DRG: 030 | End: 2020-01-24
Payer: MEDICAID

## 2020-01-24 LAB
-: ABNORMAL
ABSOLUTE EOS #: 0.17 K/UL (ref 0–0.44)
ABSOLUTE EOS #: 0.25 K/UL (ref 0–0.44)
ABSOLUTE IMMATURE GRANULOCYTE: 0 K/UL (ref 0–0.3)
ABSOLUTE IMMATURE GRANULOCYTE: 0.05 K/UL (ref 0–0.3)
ABSOLUTE LYMPH #: 1.28 K/UL (ref 1.1–3.7)
ABSOLUTE LYMPH #: 1.39 K/UL (ref 1.1–3.7)
ABSOLUTE MONO #: 0.41 K/UL (ref 0.1–1.2)
ABSOLUTE MONO #: 0.55 K/UL (ref 0.1–1.2)
AMORPHOUS: ABNORMAL
ANION GAP SERPL CALCULATED.3IONS-SCNC: 14 MMOL/L (ref 9–17)
BACTERIA: ABNORMAL
BASOPHILS # BLD: 0 % (ref 0–2)
BASOPHILS # BLD: 1 % (ref 0–2)
BASOPHILS ABSOLUTE: 0 K/UL (ref 0–0.2)
BASOPHILS ABSOLUTE: 0.04 K/UL (ref 0–0.2)
BILIRUBIN URINE: ABNORMAL
BUN BLDV-MCNC: 14 MG/DL (ref 6–20)
BUN/CREAT BLD: ABNORMAL (ref 9–20)
CALCIUM SERPL-MCNC: 9.1 MG/DL (ref 8.6–10.4)
CASTS UA: ABNORMAL /LPF (ref 0–8)
CHLORIDE BLD-SCNC: 97 MMOL/L (ref 98–107)
CO2: 25 MMOL/L (ref 20–31)
COLOR: ABNORMAL
CREAT SERPL-MCNC: 0.4 MG/DL (ref 0.7–1.2)
CRYSTALS, UA: ABNORMAL /HPF
DIFFERENTIAL TYPE: ABNORMAL
DIFFERENTIAL TYPE: ABNORMAL
EOSINOPHILS RELATIVE PERCENT: 3 % (ref 1–4)
EOSINOPHILS RELATIVE PERCENT: 4 % (ref 1–4)
EPITHELIAL CELLS UA: ABNORMAL /HPF (ref 0–5)
GFR AFRICAN AMERICAN: >60 ML/MIN
GFR NON-AFRICAN AMERICAN: >60 ML/MIN
GFR SERPL CREATININE-BSD FRML MDRD: ABNORMAL ML/MIN/{1.73_M2}
GFR SERPL CREATININE-BSD FRML MDRD: ABNORMAL ML/MIN/{1.73_M2}
GLUCOSE BLD-MCNC: 162 MG/DL (ref 75–110)
GLUCOSE BLD-MCNC: 172 MG/DL (ref 70–99)
GLUCOSE BLD-MCNC: 174 MG/DL (ref 75–110)
GLUCOSE BLD-MCNC: 184 MG/DL (ref 75–110)
GLUCOSE URINE: NEGATIVE
HCT VFR BLD CALC: 37.4 % (ref 40.7–50.3)
HCT VFR BLD CALC: 38.2 % (ref 40.7–50.3)
HEMOGLOBIN: 11.2 G/DL (ref 13–17)
HEMOGLOBIN: 11.6 G/DL (ref 13–17)
IMMATURE GRANULOCYTES: 0 %
IMMATURE GRANULOCYTES: 1 %
KETONES, URINE: NEGATIVE
LEUKOCYTE ESTERASE, URINE: ABNORMAL
LYMPHOCYTES # BLD: 22 % (ref 24–43)
LYMPHOCYTES # BLD: 24 % (ref 24–43)
MCH RBC QN AUTO: 23.7 PG (ref 25.2–33.5)
MCH RBC QN AUTO: 24.5 PG (ref 25.2–33.5)
MCHC RBC AUTO-ENTMCNC: 29.9 G/DL (ref 28.4–34.8)
MCHC RBC AUTO-ENTMCNC: 30.4 G/DL (ref 28.4–34.8)
MCV RBC AUTO: 78 FL (ref 82.6–102.9)
MCV RBC AUTO: 81.7 FL (ref 82.6–102.9)
MONOCYTES # BLD: 10 % (ref 3–12)
MONOCYTES # BLD: 7 % (ref 3–12)
MORPHOLOGY: ABNORMAL
MUCUS: ABNORMAL
NITRITE, URINE: POSITIVE
NRBC AUTOMATED: 0 PER 100 WBC
NRBC AUTOMATED: 0 PER 100 WBC
OTHER OBSERVATIONS UA: ABNORMAL
PARTIAL THROMBOPLASTIN TIME: 52.2 SEC (ref 20.5–30.5)
PDW BLD-RTO: 20.8 % (ref 11.8–14.4)
PDW BLD-RTO: 21 % (ref 11.8–14.4)
PH UA: 5 (ref 5–8)
PLATELET # BLD: 180 K/UL (ref 138–453)
PLATELET # BLD: 183 K/UL (ref 138–453)
PLATELET # BLD: ABNORMAL K/UL (ref 138–453)
PLATELET ESTIMATE: ABNORMAL
PLATELET ESTIMATE: ABNORMAL
PLATELET, FLUORESCENCE: 158 K/UL (ref 138–453)
PLATELET, IMMATURE FRACTION: 2.5 % (ref 1.1–10.3)
PMV BLD AUTO: 11.4 FL (ref 8.1–13.5)
PMV BLD AUTO: ABNORMAL FL (ref 8.1–13.5)
POTASSIUM SERPL-SCNC: 4.2 MMOL/L (ref 3.7–5.3)
PROTEIN UA: ABNORMAL
RBC # BLD: 4.58 M/UL (ref 4.21–5.77)
RBC # BLD: 4.9 M/UL (ref 4.21–5.77)
RBC # BLD: ABNORMAL 10*6/UL
RBC # BLD: ABNORMAL 10*6/UL
RBC UA: ABNORMAL /HPF (ref 0–4)
RENAL EPITHELIAL, UA: ABNORMAL /HPF
SEG NEUTROPHILS: 61 % (ref 36–65)
SEG NEUTROPHILS: 68 % (ref 36–65)
SEGMENTED NEUTROPHILS ABSOLUTE COUNT: 3.48 K/UL (ref 1.5–8.1)
SEGMENTED NEUTROPHILS ABSOLUTE COUNT: 3.94 K/UL (ref 1.5–8.1)
SODIUM BLD-SCNC: 136 MMOL/L (ref 135–144)
SPECIFIC GRAVITY UA: 1.02 (ref 1–1.03)
TRICHOMONAS: ABNORMAL
TURBIDITY: ABNORMAL
URINE HGB: ABNORMAL
UROBILINOGEN, URINE: NORMAL
WBC # BLD: 5.8 K/UL (ref 3.5–11.3)
WBC # BLD: 5.8 K/UL (ref 3.5–11.3)
WBC # BLD: ABNORMAL 10*3/UL
WBC # BLD: ABNORMAL 10*3/UL
WBC UA: ABNORMAL /HPF (ref 0–5)
YEAST: ABNORMAL

## 2020-01-24 PROCEDURE — 80048 BASIC METABOLIC PNL TOTAL CA: CPT

## 2020-01-24 PROCEDURE — 36415 COLL VENOUS BLD VENIPUNCTURE: CPT

## 2020-01-24 PROCEDURE — 31720 CLEARANCE OF AIRWAYS: CPT

## 2020-01-24 PROCEDURE — 81001 URINALYSIS AUTO W/SCOPE: CPT

## 2020-01-24 PROCEDURE — 6370000000 HC RX 637 (ALT 250 FOR IP): Performed by: STUDENT IN AN ORGANIZED HEALTH CARE EDUCATION/TRAINING PROGRAM

## 2020-01-24 PROCEDURE — 71045 X-RAY EXAM CHEST 1 VIEW: CPT

## 2020-01-24 PROCEDURE — 99233 SBSQ HOSP IP/OBS HIGH 50: CPT | Performed by: PSYCHIATRY & NEUROLOGY

## 2020-01-24 PROCEDURE — 85025 COMPLETE CBC W/AUTO DIFF WBC: CPT

## 2020-01-24 PROCEDURE — 85055 RETICULATED PLATELET ASSAY: CPT

## 2020-01-24 PROCEDURE — 85730 THROMBOPLASTIN TIME PARTIAL: CPT

## 2020-01-24 PROCEDURE — 6360000002 HC RX W HCPCS: Performed by: STUDENT IN AN ORGANIZED HEALTH CARE EDUCATION/TRAINING PROGRAM

## 2020-01-24 PROCEDURE — 2500000003 HC RX 250 WO HCPCS: Performed by: STUDENT IN AN ORGANIZED HEALTH CARE EDUCATION/TRAINING PROGRAM

## 2020-01-24 PROCEDURE — 94640 AIRWAY INHALATION TREATMENT: CPT

## 2020-01-24 PROCEDURE — 85049 AUTOMATED PLATELET COUNT: CPT

## 2020-01-24 PROCEDURE — 2000000003 HC NEURO ICU R&B

## 2020-01-24 PROCEDURE — 97110 THERAPEUTIC EXERCISES: CPT

## 2020-01-24 PROCEDURE — 82947 ASSAY GLUCOSE BLOOD QUANT: CPT

## 2020-01-24 PROCEDURE — 94660 CPAP INITIATION&MGMT: CPT

## 2020-01-24 PROCEDURE — 2700000000 HC OXYGEN THERAPY PER DAY

## 2020-01-24 RX ORDER — ALBUTEROL SULFATE 2.5 MG/3ML
2.5 SOLUTION RESPIRATORY (INHALATION) 2 TIMES DAILY
Status: DISCONTINUED | OUTPATIENT
Start: 2020-01-24 | End: 2020-01-25

## 2020-01-24 RX ORDER — CEPHALEXIN 500 MG/1
500 CAPSULE ORAL EVERY 6 HOURS SCHEDULED
Status: DISCONTINUED | OUTPATIENT
Start: 2020-01-24 | End: 2020-01-27 | Stop reason: HOSPADM

## 2020-01-24 RX ORDER — ACETYLCYSTEINE 200 MG/ML
600 SOLUTION ORAL; RESPIRATORY (INHALATION) 2 TIMES DAILY
Status: DISCONTINUED | OUTPATIENT
Start: 2020-01-24 | End: 2020-01-25

## 2020-01-24 RX ADMIN — CYCLOBENZAPRINE HYDROCHLORIDE 5 MG: 5 TABLET, FILM COATED ORAL at 12:38

## 2020-01-24 RX ADMIN — CEPHALEXIN 500 MG: 500 CAPSULE ORAL at 17:19

## 2020-01-24 RX ADMIN — AMANTADINE HYDROCHLORIDE 100 MG: 50 SOLUTION ORAL at 08:25

## 2020-01-24 RX ADMIN — FOLIC ACID 1 MG: 1 TABLET ORAL at 08:25

## 2020-01-24 RX ADMIN — HEPARIN SODIUM 18 UNITS/KG/HR: 10000 INJECTION, SOLUTION INTRAVENOUS at 14:42

## 2020-01-24 RX ADMIN — CYCLOBENZAPRINE HYDROCHLORIDE 5 MG: 5 TABLET, FILM COATED ORAL at 08:24

## 2020-01-24 RX ADMIN — ANTI-FUNGAL POWDER MICONAZOLE NITRATE TALC FREE: 1.42 POWDER TOPICAL at 08:33

## 2020-01-24 RX ADMIN — LISINOPRIL 20 MG: 20 TABLET ORAL at 08:24

## 2020-01-24 RX ADMIN — DOCUSATE SODIUM 100 MG: 50 LIQUID ORAL at 08:24

## 2020-01-24 RX ADMIN — AMANTADINE HYDROCHLORIDE 100 MG: 50 SOLUTION ORAL at 12:39

## 2020-01-24 RX ADMIN — Medication 15 ML: at 08:32

## 2020-01-24 RX ADMIN — SOTALOL HYDROCHLORIDE 160 MG: 80 TABLET ORAL at 08:26

## 2020-01-24 RX ADMIN — MODAFINIL 200 MG: 100 TABLET ORAL at 08:32

## 2020-01-24 RX ADMIN — INSULIN LISPRO 3 UNITS: 100 INJECTION, SOLUTION INTRAVENOUS; SUBCUTANEOUS at 05:29

## 2020-01-24 RX ADMIN — Medication 100 MG: at 08:24

## 2020-01-24 RX ADMIN — FAMOTIDINE 20 MG: 10 INJECTION, SOLUTION INTRAVENOUS at 08:24

## 2020-01-24 RX ADMIN — INSULIN LISPRO 3 UNITS: 100 INJECTION, SOLUTION INTRAVENOUS; SUBCUTANEOUS at 17:19

## 2020-01-24 RX ADMIN — CEPHALEXIN 500 MG: 500 CAPSULE ORAL at 14:04

## 2020-01-24 RX ADMIN — INSULIN LISPRO 3 UNITS: 100 INJECTION, SOLUTION INTRAVENOUS; SUBCUTANEOUS at 12:38

## 2020-01-24 RX ADMIN — ROSUVASTATIN CALCIUM 5 MG: 5 TABLET, FILM COATED ORAL at 21:30

## 2020-01-24 RX ADMIN — CYCLOBENZAPRINE HYDROCHLORIDE 5 MG: 5 TABLET, FILM COATED ORAL at 21:30

## 2020-01-24 RX ADMIN — Medication 15 ML: at 21:31

## 2020-01-24 RX ADMIN — ACETYLCYSTEINE 600 MG: 200 INHALANT RESPIRATORY (INHALATION) at 22:04

## 2020-01-24 RX ADMIN — INSULIN GLARGINE 25 UNITS: 100 INJECTION, SOLUTION SUBCUTANEOUS at 08:25

## 2020-01-24 RX ADMIN — FLUOXETINE 10 MG: 10 CAPSULE ORAL at 08:32

## 2020-01-24 RX ADMIN — METOPROLOL TARTRATE 5 MG: 5 INJECTION, SOLUTION INTRAVENOUS at 21:16

## 2020-01-24 RX ADMIN — ANTI-FUNGAL POWDER MICONAZOLE NITRATE TALC FREE: 1.42 POWDER TOPICAL at 21:31

## 2020-01-24 RX ADMIN — SOTALOL HYDROCHLORIDE 160 MG: 80 TABLET ORAL at 21:30

## 2020-01-24 RX ADMIN — ALBUTEROL SULFATE 2.5 MG: 2.5 SOLUTION RESPIRATORY (INHALATION) at 22:04

## 2020-01-24 ASSESSMENT — PAIN SCALES - WONG BAKER
WONGBAKER_NUMERICALRESPONSE: 0

## 2020-01-24 ASSESSMENT — PULMONARY FUNCTION TESTS
PIF_VALUE: 17
PIF_VALUE: 17

## 2020-01-24 ASSESSMENT — PAIN SCALES - GENERAL: PAINLEVEL_OUTOF10: 0

## 2020-01-24 NOTE — PROGRESS NOTES
to tachypnea. : Lisinopril started. Family meeting held, awaiting decision. 1/15: Awaiting on decision from family, palliative following. NaCl tabs d/c.  : Awaiting family decision. Exam improving. : Family would like trach/peg. General surgery consulted. : no changes. Trach and PEG Wed vs Thursday this week per GS. COnt to be off AP x5 days prior to procedure   : PEG placement   : resume Heparin x1 more day. S/p PEG.   : Awaiting placement. Denied LTAC.        MEDICATIONS:     CURRENT MEDICATIONS:  SCHEDULED MEDICATIONS:   cephALEXin  500 mg Oral 4 times per day    cyclobenzaprine  5 mg Oral TID    lisinopril  20 mg Oral Daily    miconazole   Topical BID    FLUoxetine  10 mg Oral Daily    amantadine  100 mg Per G Tube BID- 8&2    modafinil  200 mg Oral Daily    And    modafinil  100 mg Oral Daily    insulin glargine  25 Units Subcutaneous Daily    insulin lispro  0-18 Units Subcutaneous Q6H    docusate  100 mg Oral Daily    sotalol  160 mg Oral BID    nicotine  1 patch Transdermal Daily    folic acid  1 mg Oral Daily    thiamine  100 mg Oral Daily    sodium chloride flush  10 mL Intravenous BID    rosuvastatin  5 mg Oral Nightly    chlorhexidine  15 mL Mouth/Throat BID    vitamin D  50,000 Units Oral Weekly    sodium chloride flush  10 mL Intravenous 2 times per day    sodium chloride flush  10 mL Intravenous 2 times per day     CONTINUOUS INFUSIONS:   heparin (porcine) 18 Units/kg/hr (20 1442)    sodium chloride 10 mL/hr at 20    dextrose       PRN MEDICATIONS:   ipratropium-albuterol, albuterol, acetaminophen, hydrALAZINE, labetalol, ibuprofen, acetaminophen, metoprolol, glucose, dextrose, glucagon (rDNA), dextrose, magnesium sulfate, sodium chloride flush, sodium chloride flush    VITALS 24 Hours     Temperature Range: Temp: 98.6 °F (37 °C) Temp  Av.9 °F (36.6 °C)  Min: 97.4 °F (36.3 °C)  Max: 98.6 °F (37 °C)  BP Range: Systolic (24hrs), Av , Min:106 , CHQ:638     Diastolic (27LDX), FLB:43, Min:69, Max:98    Pulse Range: Pulse  Av.4  Min: 83  Max: 126  Respiration Range: Resp  Av.7  Min: 24  Max: 67  Current Pulse Ox: SpO2: 95 %  24HR Pulse Ox Range: SpO2  Av.2 %  Min: 91 %  Max: 100 %  Patient Vitals for the past 12 hrs:   BP Temp Temp src Pulse Resp SpO2   20 1700 (!) 132/90 -- -- 102 (!) 32 95 %   20 1638 -- 98.6 °F (37 °C) Oral -- -- --   20 1600 113/80 -- -- 102 (!) 32 95 %   20 1500 (!) 150/86 -- -- 104 (!) 39 97 %   20 1300 (!) 134/90 -- -- 86 (!) 39 96 %   20 1200 (!) 115/90 97.4 °F (36.3 °C) Oral 100 (!) 46 91 %   20 1135 -- -- -- 94 -- --   20 1100 (!) 130/96 -- -- 94 (!) 67 97 %   20 0924 -- -- -- -- 26 97 %   20 0905 129/85 -- -- 100 (!) 35 96 %   20 0800 (!) 146/87 97.6 °F (36.4 °C) Axillary 99 (!) 33 97 %   20 0703 120/81 97.9 °F (36.6 °C) Axillary 118 28 95 %   20 0603 (!) 155/82 -- -- 103 (!) 32 98 %     Estimated body mass index is 21.92 kg/m² as calculated from the following:    Height as of this encounter: 5' 11\" (1.803 m). Weight as of this encounter: 157 lb 3 oz (71.3 kg). PHYSICAL EXAM       Physical Exam  Vitals signs and nursing note reviewed. HENT:      Head: Normocephalic and atraumatic. Eyes:      General:         Right eye: No foreign body, discharge or hordeolum. Left eye: No foreign body, discharge or hordeolum. Conjunctiva/sclera:      Right eye: Right conjunctiva is not injected. No chemosis, exudate or hemorrhage. Left eye: Left conjunctiva is injected. Hemorrhage present. No chemosis or exudate. Pupils: Pupils are equal, round, and reactive to light. Cardiovascular:      Rate and Rhythm: Normal rate. Rhythm irregular. Pulmonary:      Breath sounds: Normal breath sounds. Neurological:      Mental Status: He is alert. GCS: GCS eye subscore is 4.  GCS verbal subscore is (L) 40.7 - 50.3 %    MCV 81.7 (L) 82.6 - 102.9 fL    MCH 24.5 (L) 25.2 - 33.5 pg    MCHC 29.9 28.4 - 34.8 g/dL    RDW 21.0 (H) 11.8 - 14.4 %    Platelets 440 002 - 133 k/uL    MPV 11.4 8.1 - 13.5 fL    NRBC Automated 0.0 0.0 per 100 WBC    Differential Type NOT REPORTED     WBC Morphology NOT REPORTED     RBC Morphology NOT REPORTED     Platelet Estimate NOT REPORTED     Immature Granulocytes 0 0 %    Seg Neutrophils 68 (H) 36 - 65 %    Lymphocytes 22 (L) 24 - 43 %    Monocytes 7 3 - 12 %    Eosinophils % 3 1 - 4 %    Basophils 0 0 - 2 %    Absolute Immature Granulocyte 0.00 0.00 - 0.30 k/uL    Segs Absolute 3.94 1.50 - 8.10 k/uL    Absolute Lymph # 1.28 1.10 - 3.70 k/uL    Absolute Mono # 0.41 0.10 - 1.20 k/uL    Absolute Eos # 0.17 0.00 - 0.44 k/uL    Basophils Absolute 0.00 0.00 - 0.20 k/uL    Morphology ANISOCYTOSIS PRESENT     Morphology MICROCYTOSIS PRESENT     Morphology 1+ ELLIPTOCYTES    POC Glucose Fingerstick    Collection Time: 01/24/20  5:25 AM   Result Value Ref Range    POC Glucose 162 (H) 75 - 110 mg/dL   APTT    Collection Time: 01/24/20  7:25 AM   Result Value Ref Range    PTT 52.2 (H) 20.5 - 30.5 sec   CBC WITH AUTO DIFFERENTIAL    Collection Time: 01/24/20 11:11 AM   Result Value Ref Range    WBC 5.8 3.5 - 11.3 k/uL    RBC 4.90 4.21 - 5.77 m/uL    Hemoglobin 11.6 (L) 13.0 - 17.0 g/dL    Hematocrit 38.2 (L) 40.7 - 50.3 %    MCV 78.0 (L) 82.6 - 102.9 fL    MCH 23.7 (L) 25.2 - 33.5 pg    MCHC 30.4 28.4 - 34.8 g/dL    RDW 20.8 (H) 11.8 - 14.4 %    Platelets See Reflexed IPF Result 138 - 453 k/uL    MPV NOT REPORTED 8.1 - 13.5 fL    NRBC Automated 0.0 0.0 per 100 WBC    Differential Type NOT REPORTED     WBC Morphology NOT REPORTED     RBC Morphology ANISOCYTOSIS PRESENT     Platelet Estimate NOT REPORTED     Seg Neutrophils 61 36 - 65 %    Lymphocytes 24 24 - 43 %    Monocytes 10 3 - 12 %    Eosinophils % 4 1 - 4 %    Basophils 1 0 - 2 %    Immature Granulocytes 1 (H) 0 %    Segs Absolute 3.48 1.50 - radiocarpal joint osteoarthritis with joint space narrowing and subchondral sclerosis. Widening of the scapholunate joint. Posttraumatic deformity of the ulnar styloid process. No acute fracture or dislocation. No acute osseous abnormality the right wrist. Widening of the scapholunate interval suggesting ligamentous injury. Radiocarpal joint osteoarthritis. Xr Hand Left (min 3 Views)    Result Date: 12/26/2019  EXAMINATION: THREE XRAY VIEWS OF THE LEFT HAND 12/26/2019 1:47 am COMPARISON: 12/25/2019 2353 hours HISTORY: ORDERING SYSTEM PROVIDED HISTORY: post splint TECHNOLOGIST PROVIDED HISTORY: post splint Reason for Exam: fall trauma/post splint Acuity: Acute FINDINGS: Overlying splint obscures osseous details. Again seen is acute traumatic closed proximal 1st metacarpal fracture. There appears to be greater fracture fragment displacement compared to prior study. No additional acute fracture or dislocation in the left hand. Overlying splint obscures osseous details. Acute traumatic closed angulated proximal 1st metacarpal fracture with greater angulation of fracture fragments compared the prior study. Xr Hand Left (min 3 Views)    Result Date: 12/26/2019  EXAMINATION: THREE XRAY VIEWS OF THE LEFT HAND 12/26/2019 1:47 am COMPARISON: Left wrist radiographs 12/25/2019 2131 hours HISTORY: ORDERING SYSTEM PROVIDED HISTORY: post reduction TECHNOLOGIST PROVIDED HISTORY: post reduction Reason for Exam: fall trauma Acuity: Acute FINDINGS: Acute traumatic closed comminuted mildly angulated proximal 1st metacarpal fracture. Joint alignment is maintained. No additional acute fracture or dislocation in the left hand. Acute traumatic closed comminuted mildly angulated proximal 1st metacarpal fracture. Xr Hand Right (min 3 Views)    Result Date: 12/26/2019  EXAMINATION: THREE XRAY VIEWS OF THE RIGHT HAND 12/26/2019 1:36 am COMPARISON: None.  HISTORY: ORDERING SYSTEM PROVIDED HISTORY: Trauma/Fracture TECHNOLOGIST PROVIDED HISTORY: Include clenched fist view Trauma/Fracture Reason for Exam: fall FINDINGS: Joint alignment is maintained. No acute fracture or dislocation in the right hand. Degenerative changes in the interphalangeal joints. Old ulnar styloid process fracture. Degenerative changes in the radiocarpal joint. No acute osseous abnormality in the right hand. Xr Hip Left (2-3 Views)    Result Date: 12/26/2019  EXAMINATION: ONE XRAY VIEW OF THE PELVIS AND TWO XRAY VIEWS RIGHT HIP; TWO XRAY VIEWS OF THE LEFT HIP 12/26/2019 1:37 am COMPARISON: None. HISTORY: ORDERING SYSTEM PROVIDED HISTORY: Trauma/Fracture TECHNOLOGIST PROVIDED HISTORY: AP and cross-table lateral of the hip please, thank you Trauma/Fracture Reason for Exam: fall/trauma Acuity: Acute FINDINGS: The bones are osteopenic. The femoral heads located bilateral.  No acute fracture or dislocation pelvis or hips bilaterally. SI joints are grossly intact. Pubic symphysis is intact. The sacrum is partially obscured by contrast in the bladder. No acute osseous abnormality in the pelvis or hips bilaterally. Osteopenia. Xr Knee Left (3 Views)    Result Date: 12/26/2019  EXAMINATION: THREE XRAY VIEWS OF THE LEFT KNEE 12/26/2019 1:36 am COMPARISON: None. HISTORY: ORDERING SYSTEM PROVIDED HISTORY: Trauma/Fracture TECHNOLOGIST PROVIDED HISTORY: Trauma/Fracture Reason for Exam: fall/ trauma Acuity: Acute FINDINGS: Osteopenia. No acute fracture or dislocation. No focal osseous lesion. No joint effusion. No focal soft tissue abnormality. Vascular calcifications. No acute abnormality of the knee. Osteopenia. Xr Knee Right (3 Views)    Result Date: 12/26/2019  EXAMINATION: THREE XRAY VIEWS OF THE RIGHT KNEE 12/26/2019 1:36 am COMPARISON: None.  HISTORY: ORDERING SYSTEM PROVIDED HISTORY: Trauma/Fracture TECHNOLOGIST PROVIDED HISTORY: Trauma/Fracture Reason for Exam: ,fall trauma,unable to get xtable on patient Acuity: Acute subarachnoid hemorrhage within the sylvian fissures as well as cerebral sulci bilaterally. Subdural hemorrhage overlies the left parietal convexity measures 4 mm in thickness. Hemorrhage in the prepontine cistern as well. Small amount of intraventricular hemorrhage layering in the occipital horns bilaterally. Ventricles are stable in caliber. No hydrocephalus. The gray-white matter differentiation is maintained. No midline shift. ORBITS: The visualized portion of the orbits demonstrate no acute abnormality. SINUSES: The visualized paranasal sinuses and mastoid air cells demonstrate no acute abnormality. SOFT TISSUES/SKULL:  No acute abnormality of the visualized skull or soft tissues. Stable head CT demonstrating extensive bilateral subarachnoid hemorrhage as well as intraventricular hemorrhage and left parietal convexity subdural hemorrhage. No new intracranial hemorrhage. No hydrocephalus. Ct Head Wo Contrast    Result Date: 12/26/2019  EXAMINATION: CT OF THE HEAD WITHOUT CONTRAST  12/26/2019 4:51 am TECHNIQUE: CT of the head was performed without the administration of intravenous contrast. Dose modulation, iterative reconstruction, and/or weight based adjustment of the mA/kV was utilized to reduce the radiation dose to as low as reasonably achievable. COMPARISON: 12/25/2019 HISTORY: ORDERING SYSTEM PROVIDED HISTORY: worsening mentation TECHNOLOGIST PROVIDED HISTORY: worsening mentation FINDINGS: BRAIN/VENTRICLES: Diffuse subarachnoid hemorrhage within the cerebral sulci, sylvian fissures, anterior interhemispheric fissure, and prepontine cistern, similar in appearance to prior study. There is intraventricular hemorrhage layering in the occipital horns. No hydrocephalus. No mass effect or midline shift. Left parietal convexity subdural hemorrhage measures up to 5 mm in thickness. The basal cisterns are patent. The gray-white matter differentiation is maintained.  ORBITS: The visualized portion of the orbits demonstrate no acute abnormality. SINUSES: The visualized paranasal sinuses and mastoid air cells demonstrate no acute abnormality. SOFT TISSUES/SKULL:  No acute abnormality of the visualized skull or soft tissues. 1. Stable head CT demonstrating diffuse moderate volume subarachnoid hemorrhage and left parietal convexity subdural hemorrhage measuring up to 5 mm in thickness. 2. No mass effect or midline shift. No hydrocephalus. The findings were sent to the Radiology Results [address] at 5:48 am on 12/26/2019to be communicated to a licensed caregiver. Ct Head Wo Contrast    Result Date: 12/26/2019  EXAMINATION: CT OF THE HEAD WITHOUT CONTRAST  12/25/2019 9:04 pm TECHNIQUE: CT of the head was performed without the administration of intravenous contrast. Dose modulation, iterative reconstruction, and/or weight based adjustment of the mA/kV was utilized to reduce the radiation dose to as low as reasonably achievable. COMPARISON: None HISTORY: ORDERING SYSTEM PROVIDED HISTORY: trauma TECHNOLOGIST PROVIDED HISTORY: trauma Multiple falls. On Eliquis. Subarachnoid hemorrhage on CT head performed at outside institution. FINDINGS: BRAIN/VENTRICLES: There is moderate subarachnoid hemorrhage in the bilateral sylvian fissures and bilateral frontal, temporal, and parietal lobe sulci. Additional subarachnoid hemorrhage is noted in the interhemispheric fissure and prepontine cistern. Gray-white matter differentiation is grossly maintained without CT evidence of acute, territorial infarct. Acute, left parietal convexity subdural hematoma measures up to 4 mm in thickness. No associated mass effect. No parenchymal hemorrhage. There is no hydrocephalus or midline shift. Basal cisterns are patent. ORBITS: The visualized portion of the orbits demonstrate no acute abnormality. SINUSES: The visualized paranasal sinuses and mastoid air cells demonstrate no acute abnormality.  SOFT TISSUES/SKULL:  No reformatted images are provided for review. Dose modulation, iterative reconstruction, and/or weight based adjustment of the mA/kV was utilized to reduce the radiation dose to as low as reasonably achievable. COMPARISON: None HISTORY: ORDERING SYSTEM PROVIDED HISTORY: trauma TECHNOLOGIST PROVIDED HISTORY: trauma Reason for Exam: fall from standing Acuity: Acute Type of Exam: Initial; ORDERING SYSTEM PROVIDED HISTORY: trauma TECHNOLOGIST PROVIDED HISTORY: trauma Reason for Exam: fall from standing Acuity: Acute Type of Exam: Initial; ORDERING SYSTEM PROVIDED HISTORY: trauma TECHNOLOGIST PROVIDED HISTORY: trauma Reason for Exam: fall from standing Acuity: Acute Type of Exam: Initial FINDINGS: CTA CHEST: Stable cardiomegaly. No pericardial effusion. No mediastinal hematoma or pneumomediastinum. No enlarged mediastinal or hilar lymph nodes. Main pulmonary artery is dilated, measuring up to 4 cm, suggesting pulmonary hypertension. Calcified and noncalcified atherosclerotic plaque of the thoracic aorta. Incidentally noted 4 vessel aortic arch with separate arch origin of the left vertebral artery. Ectatic ascending thoracic aorta measures up to 4 cm in diameter. No acute aortic abnormality. Central airways are clear. No pleural effusion or pneumothorax. No pulmonary contusion or pulmonary laceration. Mild bilateral lower lobe dependent atelectatic changes. Healing anterolateral left 6th rib fracture. No acute displaced rib fracture or chest wall hematoma. CTA ABDOMEN: No acute traumatic abnormality of the liver, spleen, pancreas, kidneys, or adrenal glands. Normal gallbladder. Stomach, small bowel, and colon are normal in caliber without evidence of obstruction. Normal appendix. Sigmoid diverticulosis without diverticulitis. Calcified and noncalcified aortoiliac atherosclerotic calcification. Abdominal aorta is normal in caliber. No free fluid in the abdomen.  CTA PELVIS: Urinary bladder and pelvic organs are normal.  No free fluid in the pelvis. Bilateral common iliac stents are in position. THORACIC/LUMBAR SPINE: BONES/ALIGNMENT: Normal alignment of the thoracic and lumbar spine. Vertebral body heights are maintained. No acute fracture. DEGENERATIVE CHANGES: Multilevel disc narrowing and endplate osteophyte formation. Mild multilevel facet joint degenerative changes. No high-grade, bony spinal canal stenosis. SOFT TISSUES: Paraspinal soft tissues are normal.     No acute traumatic abnormality of the chest, abdomen, or pelvis. Remote, healing anterolateral left 6th rib fracture. No acute osseous abnormality of the thoracic or lumbar spine. Ct Lumbar Spine Wo Contrast    Result Date: 12/26/2019  EXAMINATION: CT OF THE THORACIC SPINE WITHOUT CONTRAST; CT OF THE LUMBAR SPINE WITHOUT CONTRAST; CT OF THE CHEST, ABDOMEN, AND PELVIS WITH CONTRAST, 12/25/2019 9:04 pm; 12/25/2019 9:05 pm TECHNIQUE: CT of the thoracic and lumbar spine was performed without the administration of intravenous contrast.  CT of the chest, abdomen and pelvis was performed with the administration of intravenous contrast. Multiplanar reformatted images are provided for review. Dose modulation, iterative reconstruction, and/or weight based adjustment of the mA/kV was utilized to reduce the radiation dose to as low as reasonably achievable. COMPARISON: None HISTORY: ORDERING SYSTEM PROVIDED HISTORY: trauma TECHNOLOGIST PROVIDED HISTORY: trauma Reason for Exam: fall from standing Acuity: Acute Type of Exam: Initial; ORDERING SYSTEM PROVIDED HISTORY: trauma TECHNOLOGIST PROVIDED HISTORY: trauma Reason for Exam: fall from standing Acuity: Acute Type of Exam: Initial; ORDERING SYSTEM PROVIDED HISTORY: trauma TECHNOLOGIST PROVIDED HISTORY: trauma Reason for Exam: fall from standing Acuity: Acute Type of Exam: Initial FINDINGS: CTA CHEST: Stable cardiomegaly. No pericardial effusion. No mediastinal hematoma or pneumomediastinum.   No enlarged mediastinal or hilar lymph nodes. Main pulmonary artery is dilated, measuring up to 4 cm, suggesting pulmonary hypertension. Calcified and noncalcified atherosclerotic plaque of the thoracic aorta. Incidentally noted 4 vessel aortic arch with separate arch origin of the left vertebral artery. Ectatic ascending thoracic aorta measures up to 4 cm in diameter. No acute aortic abnormality. Central airways are clear. No pleural effusion or pneumothorax. No pulmonary contusion or pulmonary laceration. Mild bilateral lower lobe dependent atelectatic changes. Healing anterolateral left 6th rib fracture. No acute displaced rib fracture or chest wall hematoma. CTA ABDOMEN: No acute traumatic abnormality of the liver, spleen, pancreas, kidneys, or adrenal glands. Normal gallbladder. Stomach, small bowel, and colon are normal in caliber without evidence of obstruction. Normal appendix. Sigmoid diverticulosis without diverticulitis. Calcified and noncalcified aortoiliac atherosclerotic calcification. Abdominal aorta is normal in caliber. No free fluid in the abdomen. CTA PELVIS: Urinary bladder and pelvic organs are normal.  No free fluid in the pelvis. Bilateral common iliac stents are in position. THORACIC/LUMBAR SPINE: BONES/ALIGNMENT: Normal alignment of the thoracic and lumbar spine. Vertebral body heights are maintained. No acute fracture. DEGENERATIVE CHANGES: Multilevel disc narrowing and endplate osteophyte formation. Mild multilevel facet joint degenerative changes. No high-grade, bony spinal canal stenosis. SOFT TISSUES: Paraspinal soft tissues are normal.     No acute traumatic abnormality of the chest, abdomen, or pelvis. Remote, healing anterolateral left 6th rib fracture. No acute osseous abnormality of the thoracic or lumbar spine.      Ir Angiogram Carotid C Erebral Bilateral    Result Date: 12/27/2019  Date of procedure: 12/26/2019 Procedure: Diagnostic cerebral angiogram Indication: Subarachnoid hemorrhage, caliber. There is 20% stenosis of the right common carotid artery. A critical stenosis of the proximal right cervical ICA is noted with attenuated flow seen distally. VERTEBRAL ARTERIES: No dissection, arterial injury, or significant stenosis. SOFT TISSUES: There is a thickened appearance to the mid esophagus. A small amount of layering debris is present within the trachea, likely reflecting pooled secretion. BONES: No acute osseous abnormality. CTA HEAD: ANTERIOR CIRCULATION: There is severely attenuated flow within the petrous and cavernous segments of the right internal carotid artery with severe, near critical stenoses of the cavernous ICA. There is also attenuated flow within the contralateral ICA, to a lesser degree, with moderate stenoses of the left cavernous ICA. There are moderate and severe multifocal stenoses of the MCA branch vessels, worse on the right-hand side. The right anterior cerebral artery is attenuated compared to the left. POSTERIOR CIRCULATION: There are mild-to-moderate stenoses of the distal right vertebral artery and a moderate to severe stenosis of the distal left. The basilar artery and PCAs appear to be slightly attenuated. OTHER: No dural venous sinus thrombosis on this non-dedicated study. BRAIN: See separately dictated noncontrast head CT report. No cerebral aneurysm or vascular malformation identified. Significant narrowing of the intracranial vessels, likely due to a combination of atherosclerotic disease as well as vasospasm related to subarachnoid hemorrhage. Critical stenosis of the proximal left vertebral artery. Critical stenosis of the proximal right cervical ICA with attenuated flow seen distally. Severe near critical stenoses of the right cavernous ICA. Moderate stenoses of the contralateral cavernous ICA.      Ct Chest Abdomen Pelvis W Contrast    Result Date: 12/26/2019  EXAMINATION: CT OF THE THORACIC SPINE WITHOUT CONTRAST; CT OF THE LUMBAR SPINE WITHOUT bowel, and colon are normal in caliber without evidence of obstruction. Normal appendix. Sigmoid diverticulosis without diverticulitis. Calcified and noncalcified aortoiliac atherosclerotic calcification. Abdominal aorta is normal in caliber. No free fluid in the abdomen. CTA PELVIS: Urinary bladder and pelvic organs are normal.  No free fluid in the pelvis. Bilateral common iliac stents are in position. THORACIC/LUMBAR SPINE: BONES/ALIGNMENT: Normal alignment of the thoracic and lumbar spine. Vertebral body heights are maintained. No acute fracture. DEGENERATIVE CHANGES: Multilevel disc narrowing and endplate osteophyte formation. Mild multilevel facet joint degenerative changes. No high-grade, bony spinal canal stenosis. SOFT TISSUES: Paraspinal soft tissues are normal.     No acute traumatic abnormality of the chest, abdomen, or pelvis. Remote, healing anterolateral left 6th rib fracture. No acute osseous abnormality of the thoracic or lumbar spine. Xr Hip 2-3 Vw W Pelvis Right    Result Date: 12/26/2019  EXAMINATION: ONE XRAY VIEW OF THE PELVIS AND TWO XRAY VIEWS RIGHT HIP; TWO XRAY VIEWS OF THE LEFT HIP 12/26/2019 1:37 am COMPARISON: None. HISTORY: ORDERING SYSTEM PROVIDED HISTORY: Trauma/Fracture TECHNOLOGIST PROVIDED HISTORY: AP and cross-table lateral of the hip please, thank you Trauma/Fracture Reason for Exam: fall/trauma Acuity: Acute FINDINGS: The bones are osteopenic. The femoral heads located bilateral.  No acute fracture or dislocation pelvis or hips bilaterally. SI joints are grossly intact. Pubic symphysis is intact. The sacrum is partially obscured by contrast in the bladder. No acute osseous abnormality in the pelvis or hips bilaterally. Osteopenia. CTA H/N 1/6/2019  1. New low-density extra-axial collection along the left cerebral convexity measuring 14 mm, with new 4 mm midline shift to the right. This is partially evaluated due to lack of noncontrast CT images.   2. Moderate stenosis involving petrous segment of left internal carotid artery. Severe stenosis involving proximal cavernous segment of left internal carotid artery. Moderate stenosis involving clinoid and supraclinoid segment of left internal carotid artery. 3. Severe diffuse stenosis involving petrous segment of right internal carotid artery, and cavernous segment of right renal carotid artery. Moderate to severe stenosis involving clinoid and supraclinoid segment of the right internal carotid artery. 4. Moderate multifocal stenosis involving anterior cerebral arteries, middle cerebral arteries, and posterior cerebral arteries, without evidence of large vessel occlusion. Appearance is reasonably similar to the previous examination on December 25, 2019.  5. Unchanged appearance of greater than 90% stenosis involving proximal right internal carotid artery, with diffuse severe narrowing of remainder of right internal carotid artery. This extends into intracranial segments. 6. Postsurgical changes at the left carotid bifurcation, without significant flow-limiting stenosis. 7. Moderate stenosis at origin of right vertebral artery. Moderate to severe stenosis at the proximal left vertebral artery, just distal to the origin. 8. Approximately 50% stenosis involving the left proximal subclavian artery. 9. Scattered multifocal ground-glass pulmonary opacities at both lung apices. Fluid density seen within the trachea adjacent to the endotracheal tube. MRI Brain 1/9/2020      LTME:Diffuse encephalopathy.  No epileptiform discharge          ASSESSMENT AND PLAN:       66-year-old male presenting initially with critical multivessel CVA with critical stenosis of left proximal vert, critical stenosis of right cervical ICA, critical stenosis of right cavernous ICA, and moderate stenosis of left cavernous ICA with bilateral subdural hematoma right greater than left concern for infectious versus delirium leann. Assessment & Plan:    Neuro    Traumatic SAH  Acute ischemic stroke, bilateral temporoparietal  Global aphasia due to above   Hold ASA/Plavix for Trach/Peg Wed vs Thurs per GS  Critical right ICA stenosis-S/P right ICA stenting  Systolic blood pressure goal 100-140  Modafinil 100 mg BID - will wean off in x7 days  Amantadine 200 @ 0800 and 100 @ 1400  Heparin gtt in lieu of AP for PEG. Will continue heparin x1 more day then switch back to DAPT tomorrow  Palliative care following  Crestor 5 qHS      Cardio  On Sotalol 160 mg twice daily  Lisinopril 10 qD  EF 45%  Continue cardiac monitor     Pulm  Intubated  PRVC 12, 600, 5, 30%  Continue O2 monitor  Maintain saturation > 92%      Endo  Serum glucose is better controlled < 180  HB A1C: 7.1  Continue Lantus 25 units daily    GI  Milk of magnesia for bowel regimen  Colace 100 qD  Pepcid 20 mg IV  BID for GI PPx  Continue tube feeds  PEG placement  TF started 1/23    Renal  output +17 L  No labs today  UA for cloudy urine positive for UTI    Heme/ID  Tmax 98.9 °F  Hgb 11.9 and stable  Positive for UTI, Keflex started 1/24/2020    MS  Nondisplaced mildly angulated left first metacarpal fracture. Splinted by orthopedics  Follow-up with orthopedics    LDAs:  NG tube, ETT, ext. Urinary     DVT PPX: Heparin gtt 12u/kg  GI PPX: Pepcid 20 mg IV twice daily    Disposition: To remain neuro ICU. Army Dash MD, RACHEL  Neuro Critical Care Service  1/24/2020 at 5:45 PM         This note is created with the assistance of a speech recognition program.  While intending to generate a document that actually reflects the content of the visit, the document can still have some errors including those of syntax and sound like substitutions which may escape proof reading. In such instances, actual meaning can be extrapolated by contextual diversion.

## 2020-01-24 NOTE — PLAN OF CARE
TODAY:      AWAKE & FOLLOWING COMMANDS:  [x] No   [] Yes    INTUBATED:   [x] No   [] Yes    SEDATION/ANALGESIA:    [] Propofol gtt  [] Versed gtt  [] Ativan gtt   [x] No Sedation    FEEDING: Able to take PO?  [] No:   [] NG/OG [x] PEG  Tube Feeds: Start today       DVT Prophylaxis:  [x] Yes:   Heparin infusion        [] No rationale:     Stress Ulcer Prophylaxis: [] Yes:   [x] Not indicated    VASOPRESSORS:  [x] No    [] Yes    CENTRAL/ARTERIAL LINES:  [x] No  [] Yes    BECERRIL CATHETER: [x] No   [] Yes    DRAINS: [x] No    [] Yes    Head of Bed: [x] Elevated:          [] Flat    Glucose management:  [x] Sliding Scale : High dose           [x] Long Acting: Lantus 25 units    [facility]  1/24/2020

## 2020-01-24 NOTE — CARE COORDINATION
Voicemail left with Dixie Martinez at [facility] to check on status of precert, per T.N. the precert was sent to physician review by Miriam. Voicemail also left with Quin Oconnell in admissions at [facility]. Awaiting return call from both [facility] and [address] with Sharon Clark at [facility], precert was denied. There is a peer to peer available until 3pm today. [phone] case reference [id number]. Notified Dr. Luh Roberson, neuro critical care, he will notify Dr. Gen Rothman. Discussed with Dr. Luh Roberson that it is very unlikely that the peer to peer will be overturned. Awaiting Dr. Gen Rothman decision on if he will attempt the peer to peer. Still waiting on [facility] run to return call    [address]  with Quin Oconnell at [facility], they do not have 24 hour respiratory but the sister building. [facility] does. Spoke with mother, Julian Novoa, and notified of this. She is agreeable to [facility]. Jeff. Spoke again with Quin Oconnell, notified that [facility] is the choice, she will send info to that facility and contact the liaison to start working on precert. 36 Spoke with Dr. Gen Rothman and Dr. Luh Roberson, they are agreeable with the discharge plan to SNF with 24 hour respiratory. Will continue with planned start of precert at [place] [address]. Left voicemail with Sharon Clark at [facility] to notify. Also left voicemail with Quin Oconnell to see if they were able to expedite the precert since there was previously a denial. Awaiting return call. [facility] with Quin Oconnell, she will request an expedited precert. [address] from [place] [address] at bedside, states they are able to accept patient and have an available bed Monday. Precert was started.

## 2020-01-24 NOTE — PROGRESS NOTES
Physical Therapy  DATE: 2020  NAME: Iman Fernandez  MRN: [id number]   : 1960    Discharge Recommendations: Continue to Assess (pending progress)   Subjective: RN agreeable to ROM;Pt eyes open throughout but unable to follow any commands. Pain:SHA  Patient follows:NO Commands  Is patient on ventilator:NO  Is patient on sedation:YES  Precautions: General     Therapeutic exercises:  PROM to BUE and BLE x 20 reps all planes . Bilateral gastrocnemius stretching 3 reps x 30 seconds   Pt reposition for comfort. Goals  Short Term Goals  Short term goal 1: prevent contractures x 4  Short term goal 2: progress to standing/gait activities with mod A+2 as appropriate   Short term goal 3: bed mobility with mod A+1  Short term goal 4: transfers with mod A+2  Short term goal 5: WC mobility x 25' with mod A+1     Plan: Progress functional mobility as medically appropriate.    Time In: 132  Time Out: 148  Time Coded Minutes (treatment minutes): 16  Rehab Potential: Fair  Treatments/week:5x/wk    [place], PTA

## 2020-01-25 LAB
GLUCOSE BLD-MCNC: 161 MG/DL (ref 75–110)
GLUCOSE BLD-MCNC: 167 MG/DL (ref 75–110)
HCT VFR BLD CALC: 40.2 % (ref 40.7–50.3)
HEMOGLOBIN: 11.7 G/DL (ref 13–17)
MCH RBC QN AUTO: 24 PG (ref 25.2–33.5)
MCHC RBC AUTO-ENTMCNC: 29.1 G/DL (ref 28.4–34.8)
MCV RBC AUTO: 82.4 FL (ref 82.6–102.9)
NRBC AUTOMATED: 0 PER 100 WBC
PDW BLD-RTO: 21 % (ref 11.8–14.4)
PLATELET # BLD: 173 K/UL (ref 138–453)
PMV BLD AUTO: 10.1 FL (ref 8.1–13.5)
RBC # BLD: 4.88 M/UL (ref 4.21–5.77)
WBC # BLD: 6.8 K/UL (ref 3.5–11.3)

## 2020-01-25 PROCEDURE — 99233 SBSQ HOSP IP/OBS HIGH 50: CPT | Performed by: PSYCHIATRY & NEUROLOGY

## 2020-01-25 PROCEDURE — 6370000000 HC RX 637 (ALT 250 FOR IP): Performed by: STUDENT IN AN ORGANIZED HEALTH CARE EDUCATION/TRAINING PROGRAM

## 2020-01-25 PROCEDURE — 36415 COLL VENOUS BLD VENIPUNCTURE: CPT

## 2020-01-25 PROCEDURE — 94660 CPAP INITIATION&MGMT: CPT

## 2020-01-25 PROCEDURE — 2500000003 HC RX 250 WO HCPCS: Performed by: STUDENT IN AN ORGANIZED HEALTH CARE EDUCATION/TRAINING PROGRAM

## 2020-01-25 PROCEDURE — 82947 ASSAY GLUCOSE BLOOD QUANT: CPT

## 2020-01-25 PROCEDURE — 2580000003 HC RX 258: Performed by: STUDENT IN AN ORGANIZED HEALTH CARE EDUCATION/TRAINING PROGRAM

## 2020-01-25 PROCEDURE — 85027 COMPLETE CBC AUTOMATED: CPT

## 2020-01-25 PROCEDURE — 2060000000 HC ICU INTERMEDIATE R&B

## 2020-01-25 PROCEDURE — 51798 US URINE CAPACITY MEASURE: CPT

## 2020-01-25 RX ORDER — CLOPIDOGREL BISULFATE 75 MG/1
75 TABLET ORAL DAILY
Status: DISCONTINUED | OUTPATIENT
Start: 2020-01-25 | End: 2020-01-27 | Stop reason: HOSPADM

## 2020-01-25 RX ADMIN — DOCUSATE SODIUM 100 MG: 50 LIQUID ORAL at 08:49

## 2020-01-25 RX ADMIN — SODIUM CHLORIDE: 9 INJECTION, SOLUTION INTRAVENOUS at 20:17

## 2020-01-25 RX ADMIN — CYCLOBENZAPRINE HYDROCHLORIDE 5 MG: 5 TABLET, FILM COATED ORAL at 08:50

## 2020-01-25 RX ADMIN — SODIUM CHLORIDE, PRESERVATIVE FREE 10 ML: 5 INJECTION INTRAVENOUS at 09:06

## 2020-01-25 RX ADMIN — SOTALOL HYDROCHLORIDE 160 MG: 80 TABLET ORAL at 19:43

## 2020-01-25 RX ADMIN — CEPHALEXIN 500 MG: 500 CAPSULE ORAL at 08:50

## 2020-01-25 RX ADMIN — FOLIC ACID 1 MG: 1 TABLET ORAL at 08:50

## 2020-01-25 RX ADMIN — CEPHALEXIN 500 MG: 500 CAPSULE ORAL at 00:59

## 2020-01-25 RX ADMIN — MODAFINIL 100 MG: 100 TABLET ORAL at 15:49

## 2020-01-25 RX ADMIN — Medication 15 ML: at 09:36

## 2020-01-25 RX ADMIN — CLOPIDOGREL 75 MG: 75 TABLET, FILM COATED ORAL at 08:49

## 2020-01-25 RX ADMIN — CEPHALEXIN 500 MG: 500 CAPSULE ORAL at 12:00

## 2020-01-25 RX ADMIN — METOPROLOL TARTRATE 5 MG: 5 INJECTION, SOLUTION INTRAVENOUS at 01:22

## 2020-01-25 RX ADMIN — SODIUM CHLORIDE, PRESERVATIVE FREE 10 ML: 5 INJECTION INTRAVENOUS at 19:40

## 2020-01-25 RX ADMIN — ROSUVASTATIN CALCIUM 5 MG: 5 TABLET, FILM COATED ORAL at 19:42

## 2020-01-25 RX ADMIN — FLUOXETINE 10 MG: 10 CAPSULE ORAL at 08:50

## 2020-01-25 RX ADMIN — INSULIN GLARGINE 25 UNITS: 100 INJECTION, SOLUTION SUBCUTANEOUS at 09:07

## 2020-01-25 RX ADMIN — Medication 100 MG: at 08:50

## 2020-01-25 RX ADMIN — ANTI-FUNGAL POWDER MICONAZOLE NITRATE TALC FREE: 1.42 POWDER TOPICAL at 19:44

## 2020-01-25 RX ADMIN — AMANTADINE HYDROCHLORIDE 100 MG: 50 SOLUTION ORAL at 08:49

## 2020-01-25 RX ADMIN — INSULIN LISPRO 3 UNITS: 100 INJECTION, SOLUTION INTRAVENOUS; SUBCUTANEOUS at 19:55

## 2020-01-25 RX ADMIN — CEPHALEXIN 500 MG: 500 CAPSULE ORAL at 19:46

## 2020-01-25 RX ADMIN — SODIUM CHLORIDE, PRESERVATIVE FREE 10 ML: 5 INJECTION INTRAVENOUS at 19:39

## 2020-01-25 RX ADMIN — CYCLOBENZAPRINE HYDROCHLORIDE 5 MG: 5 TABLET, FILM COATED ORAL at 15:50

## 2020-01-25 RX ADMIN — AMANTADINE HYDROCHLORIDE 100 MG: 50 SOLUTION ORAL at 15:50

## 2020-01-25 RX ADMIN — MODAFINIL 200 MG: 100 TABLET ORAL at 09:06

## 2020-01-25 RX ADMIN — Medication 15 ML: at 19:46

## 2020-01-25 RX ADMIN — SOTALOL HYDROCHLORIDE 160 MG: 80 TABLET ORAL at 08:49

## 2020-01-25 RX ADMIN — LISINOPRIL 20 MG: 20 TABLET ORAL at 09:36

## 2020-01-25 RX ADMIN — INSULIN LISPRO 3 UNITS: 100 INJECTION, SOLUTION INTRAVENOUS; SUBCUTANEOUS at 06:15

## 2020-01-25 RX ADMIN — ASPIRIN 325 MG: 325 TABLET, COATED ORAL at 08:49

## 2020-01-25 RX ADMIN — ANTI-FUNGAL POWDER MICONAZOLE NITRATE TALC FREE: 1.42 POWDER TOPICAL at 08:51

## 2020-01-25 RX ADMIN — CYCLOBENZAPRINE HYDROCHLORIDE 5 MG: 5 TABLET, FILM COATED ORAL at 19:46

## 2020-01-25 ASSESSMENT — PULMONARY FUNCTION TESTS
PIF_VALUE: 16
PIF_VALUE: 19

## 2020-01-25 NOTE — PLAN OF CARE
BRONCHOSPASM/BRONCHOCONSTRICTION     [x]         IMPROVE AERATION/BREATH SOUNDS  [x]   ADMINISTER BRONCHODILATOR THERAPY AS APPROPRIATE  [x]   ASSESS BREATH SOUNDS  [x]   IMPLEMENT AEROSOL/MDI PROTOCOL  [x]   PATIENT EDUCATION AS NEEDED   NONINVASIVE VENTILATION    PROVIDE OPTIMAL VENTILATION/ACCEPTABLE SPO2   IMPLEMENT NONINVASIVE VENTILATION PROTOCOL   MAINTAIN ACCEPTABLE SPO2   ASSESS SKIN INTEGRITY/BREAKDOWN SCORE   PATIENT EDUCATION AS NEEDED   BIPAP AS NEEDED

## 2020-01-25 NOTE — PROGRESS NOTES
to tachypnea. : Lisinopril started. Family meeting held, awaiting decision. 1/15: Awaiting on decision from family, palliative following. NaCl tabs d/c.  : Awaiting family decision. Exam improving. : Family would like trach/peg. General surgery consulted. : no changes. Trach and PEG Wed vs Thursday this week per GS. COnt to be off AP x5 days prior to procedure   : PEG placement   : resume Heparin x1 more day. S/p PEG.   : Awaiting placement. Denied LTAC.   : desat overnight, required NT suction.        MEDICATIONS:     CURRENT MEDICATIONS:  SCHEDULED MEDICATIONS:   aspirin  325 mg Oral Daily    clopidogrel  75 mg Oral Daily    cephALEXin  500 mg Oral 4 times per day    cyclobenzaprine  5 mg Oral TID    lisinopril  20 mg Oral Daily    miconazole   Topical BID    FLUoxetine  10 mg Oral Daily    amantadine  100 mg Per G Tube BID- 8&2    modafinil  200 mg Oral Daily    And    modafinil  100 mg Oral Daily    insulin glargine  25 Units Subcutaneous Daily    insulin lispro  0-18 Units Subcutaneous Q6H    docusate  100 mg Oral Daily    sotalol  160 mg Oral BID    nicotine  1 patch Transdermal Daily    folic acid  1 mg Oral Daily    thiamine  100 mg Oral Daily    sodium chloride flush  10 mL Intravenous BID    rosuvastatin  5 mg Oral Nightly    chlorhexidine  15 mL Mouth/Throat BID    vitamin D  50,000 Units Oral Weekly    sodium chloride flush  10 mL Intravenous 2 times per day    sodium chloride flush  10 mL Intravenous 2 times per day     CONTINUOUS INFUSIONS:   sodium chloride 10 mL/hr at 20    dextrose       PRN MEDICATIONS:   albuterol, acetaminophen, hydrALAZINE, labetalol, ibuprofen, acetaminophen, metoprolol, glucose, dextrose, glucagon (rDNA), dextrose, magnesium sulfate, sodium chloride flush, sodium chloride flush    VITALS 24 Hours     Temperature Range: Temp: 98.1 °F (36.7 °C) Temp  Av.8 °F (36.6 °C)  Min: 97.7 °F (36.5 °C)  Max: 98.1 hours) at 1/25/2020 1802  Last data filed at 1/24/2020 2000  Gross per 24 hour   Intake 30 ml   Output --   Net 30 ml       DRAINS:  [x] There are no drains for Neuro Critical Care to monitor at this time. LABS      Recent Results (from the past 24 hour(s))   POC Glucose Fingerstick    Collection Time: 01/25/20  5:33 AM   Result Value Ref Range    POC Glucose 161 (H) 75 - 110 mg/dL   CBC    Collection Time: 01/25/20  8:49 AM   Result Value Ref Range    WBC 6.8 3.5 - 11.3 k/uL    RBC 4.88 4.21 - 5.77 m/uL    Hemoglobin 11.7 (L) 13.0 - 17.0 g/dL    Hematocrit 40.2 (L) 40.7 - 50.3 %    MCV 82.4 (L) 82.6 - 102.9 fL    MCH 24.0 (L) 25.2 - 33.5 pg    MCHC 29.1 28.4 - 34.8 g/dL    RDW 21.0 (H) 11.8 - 14.4 %    Platelets 815 169 - 313 k/uL    MPV 10.1 8.1 - 13.5 fL    NRBC Automated 0.0 0.0 per 100 WBC           RADIOLOGY   Xr Radius Ulna Left (2 Views)    Result Date: 12/26/2019  EXAMINATION: TWO XRAY VIEWS OF THE LEFT FOREARM 12/26/2019 1:37 am COMPARISON: None. HISTORY: ORDERING SYSTEM PROVIDED HISTORY: Trauma/Fracture TECHNOLOGIST PROVIDED HISTORY: Trauma/Fracture Reason for Exam: fall/trauma Acuity: Acute FINDINGS: Fiberglass splint obscures osseous details of the distal forearm. The left radius and left are intact. No acute fracture or dislocation in the left forearm. Partial visualization of 1st proximal metacarpal fracture. Osteopenia. No acute osseous abnormality left radius or ulna. Xr Radius Ulna Right (2 Views)    Result Date: 12/26/2019  EXAMINATION: TWO XRAY VIEWS OF THE RIGHT FOREARM 12/26/2019 1:37 am COMPARISON: None. HISTORY: ORDERING SYSTEM PROVIDED HISTORY: Trauma/Fracture TECHNOLOGIST PROVIDED HISTORY: Trauma/Fracture Reason for Exam: fall/trauma Acuity: Acute FINDINGS: The bones are osteopenic. The right radius and right ulna are intact. No acute fracture or dislocation in the right forearm. Along the ulnar aspect of the proximal forearm, there is soft tissue laceration.   Antecubital compared to prior study. No additional acute fracture or dislocation in the left hand. Overlying splint obscures osseous details. Acute traumatic closed angulated proximal 1st metacarpal fracture with greater angulation of fracture fragments compared the prior study. Xr Hand Left (min 3 Views)    Result Date: 12/26/2019  EXAMINATION: THREE XRAY VIEWS OF THE LEFT HAND 12/26/2019 1:47 am COMPARISON: Left wrist radiographs 12/25/2019 2131 hours HISTORY: ORDERING SYSTEM PROVIDED HISTORY: post reduction TECHNOLOGIST PROVIDED HISTORY: post reduction Reason for Exam: fall trauma Acuity: Acute FINDINGS: Acute traumatic closed comminuted mildly angulated proximal 1st metacarpal fracture. Joint alignment is maintained. No additional acute fracture or dislocation in the left hand. Acute traumatic closed comminuted mildly angulated proximal 1st metacarpal fracture. Xr Hand Right (min 3 Views)    Result Date: 12/26/2019  EXAMINATION: THREE XRAY VIEWS OF THE RIGHT HAND 12/26/2019 1:36 am COMPARISON: None. HISTORY: ORDERING SYSTEM PROVIDED HISTORY: Trauma/Fracture TECHNOLOGIST PROVIDED HISTORY: Include clenched fist view Trauma/Fracture Reason for Exam: fall FINDINGS: Joint alignment is maintained. No acute fracture or dislocation in the right hand. Degenerative changes in the interphalangeal joints. Old ulnar styloid process fracture. Degenerative changes in the radiocarpal joint. No acute osseous abnormality in the right hand. Xr Hip Left (2-3 Views)    Result Date: 12/26/2019  EXAMINATION: ONE XRAY VIEW OF THE PELVIS AND TWO XRAY VIEWS RIGHT HIP; TWO XRAY VIEWS OF THE LEFT HIP 12/26/2019 1:37 am COMPARISON: None. HISTORY: ORDERING SYSTEM PROVIDED HISTORY: Trauma/Fracture TECHNOLOGIST PROVIDED HISTORY: AP and cross-table lateral of the hip please, thank you Trauma/Fracture Reason for Exam: fall/trauma Acuity: Acute FINDINGS: The bones are osteopenic.   The femoral heads located bilateral.  No acute the mA/kV was utilized to reduce the radiation dose to as low as reasonably achievable. COMPARISON: None HISTORY: ORDERING SYSTEM PROVIDED HISTORY: trauma TECHNOLOGIST PROVIDED HISTORY: trauma Multiple falls. On Eliquis. Subarachnoid hemorrhage on CT head performed at outside institution. FINDINGS: BRAIN/VENTRICLES: There is moderate subarachnoid hemorrhage in the bilateral sylvian fissures and bilateral frontal, temporal, and parietal lobe sulci. Additional subarachnoid hemorrhage is noted in the interhemispheric fissure and prepontine cistern. Gray-white matter differentiation is grossly maintained without CT evidence of acute, territorial infarct. Acute, left parietal convexity subdural hematoma measures up to 4 mm in thickness. No associated mass effect. No parenchymal hemorrhage. There is no hydrocephalus or midline shift. Basal cisterns are patent. ORBITS: The visualized portion of the orbits demonstrate no acute abnormality. SINUSES: The visualized paranasal sinuses and mastoid air cells demonstrate no acute abnormality. SOFT TISSUES/SKULL:  No acute abnormality of the visualized skull or soft tissues. Subarachnoid hemorrhage, predominantly in the bilateral sylvian fissures and bilateral cerebral hemisphere sulci. Acute left parietal convexity subdural hematoma measures up to 4 mm in thickness. No associated mass effect. No midline shift or evidence of intracranial herniation. Findings were discussed with Boyd Mora at 9:32 pm on 12/25/2019. Ct Cervical Spine Wo Contrast    Result Date: 12/26/2019  EXAMINATION: CT OF THE CERVICAL SPINE WITHOUT CONTRAST 12/25/2019 9:04 pm TECHNIQUE: CT of the cervical spine was performed without the administration of intravenous contrast. Multiplanar reformatted images are provided for review. Dose modulation, iterative reconstruction, and/or weight based adjustment of the mA/kV was utilized to reduce the radiation dose to as low as reasonably achievable. 12/25/2019 9:05 pm TECHNIQUE: CT of the thoracic and lumbar spine was performed without the administration of intravenous contrast.  CT of the chest, abdomen and pelvis was performed with the administration of intravenous contrast. Multiplanar reformatted images are provided for review. Dose modulation, iterative reconstruction, and/or weight based adjustment of the mA/kV was utilized to reduce the radiation dose to as low as reasonably achievable. COMPARISON: None HISTORY: ORDERING SYSTEM PROVIDED HISTORY: trauma TECHNOLOGIST PROVIDED HISTORY: trauma Reason for Exam: fall from standing Acuity: Acute Type of Exam: Initial; ORDERING SYSTEM PROVIDED HISTORY: trauma TECHNOLOGIST PROVIDED HISTORY: trauma Reason for Exam: fall from standing Acuity: Acute Type of Exam: Initial; ORDERING SYSTEM PROVIDED HISTORY: trauma TECHNOLOGIST PROVIDED HISTORY: trauma Reason for Exam: fall from standing Acuity: Acute Type of Exam: Initial FINDINGS: CTA CHEST: Stable cardiomegaly. No pericardial effusion. No mediastinal hematoma or pneumomediastinum. No enlarged mediastinal or hilar lymph nodes. Main pulmonary artery is dilated, measuring up to 4 cm, suggesting pulmonary hypertension. Calcified and noncalcified atherosclerotic plaque of the thoracic aorta. Incidentally noted 4 vessel aortic arch with separate arch origin of the left vertebral artery. Ectatic ascending thoracic aorta measures up to 4 cm in diameter. No acute aortic abnormality. Central airways are clear. No pleural effusion or pneumothorax. No pulmonary contusion or pulmonary laceration. Mild bilateral lower lobe dependent atelectatic changes. Healing anterolateral left 6th rib fracture. No acute displaced rib fracture or chest wall hematoma. CTA ABDOMEN: No acute traumatic abnormality of the liver, spleen, pancreas, kidneys, or adrenal glands. Normal gallbladder. Stomach, small bowel, and colon are normal in caliber without evidence of obstruction.   Normal appendix. Sigmoid diverticulosis without diverticulitis. Calcified and noncalcified aortoiliac atherosclerotic calcification. Abdominal aorta is normal in caliber. No free fluid in the abdomen. CTA PELVIS: Urinary bladder and pelvic organs are normal.  No free fluid in the pelvis. Bilateral common iliac stents are in position. THORACIC/LUMBAR SPINE: BONES/ALIGNMENT: Normal alignment of the thoracic and lumbar spine. Vertebral body heights are maintained. No acute fracture. DEGENERATIVE CHANGES: Multilevel disc narrowing and endplate osteophyte formation. Mild multilevel facet joint degenerative changes. No high-grade, bony spinal canal stenosis. SOFT TISSUES: Paraspinal soft tissues are normal.     No acute traumatic abnormality of the chest, abdomen, or pelvis. Remote, healing anterolateral left 6th rib fracture. No acute osseous abnormality of the thoracic or lumbar spine. Ir Angiogram Carotid C Erebral Bilateral    Result Date: 12/27/2019  Date of procedure: 12/26/2019 Procedure: Diagnostic cerebral angiogram Indication: Subarachnoid hemorrhage, evaluation for aneurysm. Procedures: 1. Selective right common carotid artery angiogram 2. Selective right internal carotid artery cerebral angiogram 3. Selective right vertebral artery cerebral angiogram 4. Selective left common carotid artery angiogram 5. Selective left internal carotid artery cerebral angiogram 6. Selective left vertebral artery cerebral angiogram 7.  Right common femoral artery angiogram Neurointerventionalist: Mindy Romero MD [place]: Rex Chambers MD Comparison: None Fluoroscopy time: 29.7 minutes Contrast: 100 cc Visipaque-270 Side of Access: Right femoral Closure device: Vascade Sedation: Conscious sedation Patient arrived to the angio suite: 1325 Puncture obtained at: 1400 Vascular access removed at: 1605 Consent:After explaining the risks and benefits to the patient and the patient's family, including but not limited to stroke, coma, death, vessel injury, dissection, tear, occlusion, and X-ray dye allergic type reaction, a signed consent form was obtained. Anesthesia: IV moderate sedation was supervised by Dr. Mariposa Rowan. The patient was independently monitored by a Registered Nurse assigned to the Department of Radiology?using automated blood pressure, EKG and pulse oximetry. ? The detailed Conscious Record is permanently stored in the [facility] . The following is the conscious sedation record including Start and End times: Fentanyl, propofol and Versed from 1400 to  1600 . Clinical History:59 y. o.?male?with past medical history including atrial fibrillation on Eliquis, diabetes mellitus, hypertension, hyperlipidemia, coronary artery disease s/p PCI stents, peripheral vascular disease, history of bilateral ICA stenosis with  right worse than left, history of CEA, alcohol abuse. ? Patient presented after a fall from outside facility as a trauma alert?and found to have bilateral subarachnoid hemorrhage/left parietal subdural hematoma. ? He received Kcentra in outside hospital. ? Neuro endovascular was consulted. Description and findings: The patient's right groin was prepped and draped in standard sterile fashion and under local anesthesia with conscious sedation, the right common femoral artery was accessed with a single-wall needle technique, and a 5 [place]anian intravascular sheath was placed within the right common femoral artery, establishing arterial access. A 5 [place]anian multipurpose catheter was then advanced over a guide wire to the level of the aortic arch, and used to selectively catheterize the right common carotid artery. Right CCA technique: The right common carotid artery was selectively catheterized under fluoroscopic guidance and digital subtraction angiography images were obtained in biplane projections of the right cervical carotid artery.  Interpretation:The right common carotid artery injection demonstrates normal antegrade flow into the external and internal carotid arteries with normal filling of the external carotid artery branches. Course and caliber of the cervical portion of the right internal carotid artery revealed significant proximal right ICA stenosis/string sign measuring approximately 90-99% per NASCET criteria. Flow across the high-grade stenosis was delayed with earlier filling noted of the distal supraclinoid internal carotid artery through retrograde opacification of the right ophthalmic artery from the internal maxillary artery. Further inspection demonstrates no evidence of dissection, aneurysm. Right CCA technique: The right internal carotid artery run was performed from the common carotid artery due to severe stenosis. Digital subtraction angiography of the intracranial right internal carotid circulation was performed in frontal, and lateral projections. Interpretation:There is slow antegrade filling of the distal internal carotid artery from the cervical internal carotid artery. Noted retrograde Filling of the right ophthalmic artery from the right internal maxillary artery branches with slow filling anterior cerebral artery, middle cerebral artery and the distal branches due to previously noted critical proximal cervical ICA stenosis. Inspection of the remaining right internal carotid circulation revealed no other evidence of cerebral aneurysm, arteriovenous malformation. There is severe stenosis noted in the petrous/cavernous and supraclinoid right ICA noted with diminutive appearance. Capillary and venous phase images were also unremarkable, with no evidence of veno-occlusive disease. Left CCA technique: The left common carotid artery was selectively catheterized under fluoroscopic guidance and digital subtraction angiography images were obtained in biplane projections of the left cervical common carotid artery.  Interpretation: The left common carotid artery injection demonstrates normal antegrade flow into the external and internal carotid arteries with normal filling of the external carotid artery branches. Caliber and lumen contour of the cervical portion of the left internal carotid artery noted to be large and irregular which is likely due to prior endarterectomy. Left ICA technique: The left internal carotid artery was then selectively catheterized, and digital subtraction angiography of the intracranial left internal carotid artery circulation was performed in frontal and lateral projections. Interpretation:There is normal antegrade filling of the distal internal carotid artery, ophthalmic artery, anterior cerebral artery, middle cerebral artery and distal branches. Inspection of the remaining left internal carotid artery circulation revealed  mild to moderate left cavernous ICA stenosis. Otherwise, no other evidence of cerebral aneurysm, arteriovenous malformation, or arterial stenosis. No vasospasm noted. Irregularity of the vessels without hemodynamic stenosis which could reflect intracranial atherosclerosis Capillary and venous phase images were also unremarkable, with no evidence of veno-occlusive disease. Left VA technique: The left vertebral artery noted to arise from the aortic arch. Left vertebral artery was then selectively catheterized with roadmap guidance. Digital subtraction angiography of the intracranial left vertebrobasilar run-off  was obtained in frontal and lateral projections. Interpretation:The left vertebral artery injection demonstrates normal antegrade opacification of the left  vertebral artery, including the cervical segment, basilar artery, and respective branches. There is a left V4 moderate stenosis noted. Otherwise, there was no evidence of  cerebral aneurysm, arteriovenous malformation, or arterial stenosis.  There is noted opacification of the distal right middle cerebral artery parieto-occipital territory from pial collaterals arising off the right posterior cerebral head and neck was performed with the administration of intravenous contrast. Multiplanar reformatted images are provided for review. MIP images are provided for review. Stenosis of the internal carotid arteries measured using NASCET criteria. Dose modulation, iterative reconstruction, and/or weight based adjustment of the mA/kV was utilized to reduce the radiation dose to as low as reasonably achievable. COMPARISON: None. HISTORY: ORDERING SYSTEM PROVIDED HISTORY: sah TECHNOLOGIST PROVIDED HISTORY: sah Reason for Exam: SAH after trauma Acuity: Acute Type of Exam: Initial FINDINGS: CTA NECK: AORTIC ARCH/ARCH VESSELS: There is a critical stenosis of the proximal left vertebral artery with moderate and severe stenoses of the V2 segment. There is a severe stenosis at the origin of the right vertebral artery. 66% stenosis of the left subclavian artery is noted. CAROTID ARTERIES: There is 25% stenosis of the proximal left common carotid artery and 50% stenosis of the mid and distal segments. The cervical portion of the left internal carotid artery is normal in course and caliber. There is 20% stenosis of the right common carotid artery. A critical stenosis of the proximal right cervical ICA is noted with attenuated flow seen distally. VERTEBRAL ARTERIES: No dissection, arterial injury, or significant stenosis. SOFT TISSUES: There is a thickened appearance to the mid esophagus. A small amount of layering debris is present within the trachea, likely reflecting pooled secretion. BONES: No acute osseous abnormality. CTA HEAD: ANTERIOR CIRCULATION: There is severely attenuated flow within the petrous and cavernous segments of the right internal carotid artery with severe, near critical stenoses of the cavernous ICA. There is also attenuated flow within the contralateral ICA, to a lesser degree, with moderate stenoses of the left cavernous ICA.  There are moderate and severe multifocal stenoses of the MCA branch vessels, ORDERING SYSTEM PROVIDED HISTORY: trauma TECHNOLOGIST PROVIDED HISTORY: trauma Reason for Exam: fall from standing Acuity: Acute Type of Exam: Initial FINDINGS: CTA CHEST: Stable cardiomegaly. No pericardial effusion. No mediastinal hematoma or pneumomediastinum. No enlarged mediastinal or hilar lymph nodes. Main pulmonary artery is dilated, measuring up to 4 cm, suggesting pulmonary hypertension. Calcified and noncalcified atherosclerotic plaque of the thoracic aorta. Incidentally noted 4 vessel aortic arch with separate arch origin of the left vertebral artery. Ectatic ascending thoracic aorta measures up to 4 cm in diameter. No acute aortic abnormality. Central airways are clear. No pleural effusion or pneumothorax. No pulmonary contusion or pulmonary laceration. Mild bilateral lower lobe dependent atelectatic changes. Healing anterolateral left 6th rib fracture. No acute displaced rib fracture or chest wall hematoma. CTA ABDOMEN: No acute traumatic abnormality of the liver, spleen, pancreas, kidneys, or adrenal glands. Normal gallbladder. Stomach, small bowel, and colon are normal in caliber without evidence of obstruction. Normal appendix. Sigmoid diverticulosis without diverticulitis. Calcified and noncalcified aortoiliac atherosclerotic calcification. Abdominal aorta is normal in caliber. No free fluid in the abdomen. CTA PELVIS: Urinary bladder and pelvic organs are normal.  No free fluid in the pelvis. Bilateral common iliac stents are in position. THORACIC/LUMBAR SPINE: BONES/ALIGNMENT: Normal alignment of the thoracic and lumbar spine. Vertebral body heights are maintained. No acute fracture. DEGENERATIVE CHANGES: Multilevel disc narrowing and endplate osteophyte formation. Mild multilevel facet joint degenerative changes. No high-grade, bony spinal canal stenosis. SOFT TISSUES: Paraspinal soft tissues are normal.     No acute traumatic abnormality of the chest, abdomen, or pelvis. Remote, healing anterolateral left 6th rib fracture. No acute osseous abnormality of the thoracic or lumbar spine. Xr Hip 2-3 Vw W Pelvis Right    Result Date: 12/26/2019  EXAMINATION: ONE XRAY VIEW OF THE PELVIS AND TWO XRAY VIEWS RIGHT HIP; TWO XRAY VIEWS OF THE LEFT HIP 12/26/2019 1:37 am COMPARISON: None. HISTORY: ORDERING SYSTEM PROVIDED HISTORY: Trauma/Fracture TECHNOLOGIST PROVIDED HISTORY: AP and cross-table lateral of the hip please, thank you Trauma/Fracture Reason for Exam: fall/trauma Acuity: Acute FINDINGS: The bones are osteopenic. The femoral heads located bilateral.  No acute fracture or dislocation pelvis or hips bilaterally. SI joints are grossly intact. Pubic symphysis is intact. The sacrum is partially obscured by contrast in the bladder. No acute osseous abnormality in the pelvis or hips bilaterally. Osteopenia. CTA H/N 1/6/2019  1. New low-density extra-axial collection along the left cerebral convexity measuring 14 mm, with new 4 mm midline shift to the right. This is partially evaluated due to lack of noncontrast CT images. 2. Moderate stenosis involving petrous segment of left internal carotid artery. Severe stenosis involving proximal cavernous segment of left internal carotid artery. Moderate stenosis involving clinoid and supraclinoid segment of left internal carotid artery. 3. Severe diffuse stenosis involving petrous segment of right internal carotid artery, and cavernous segment of right renal carotid artery. Moderate to severe stenosis involving clinoid and supraclinoid segment of the right internal carotid artery. 4. Moderate multifocal stenosis involving anterior cerebral arteries, middle cerebral arteries, and posterior cerebral arteries, without evidence of large vessel occlusion.  Appearance is reasonably similar to the previous examination on December 25, 2019.  5. Unchanged appearance of greater than 90% stenosis involving proximal right internal carotid artery, with diffuse severe narrowing of remainder of right internal carotid artery. This extends into intracranial segments. 6. Postsurgical changes at the left carotid bifurcation, without significant flow-limiting stenosis. 7. Moderate stenosis at origin of right vertebral artery. Moderate to severe stenosis at the proximal left vertebral artery, just distal to the origin. 8. Approximately 50% stenosis involving the left proximal subclavian artery. 9. Scattered multifocal ground-glass pulmonary opacities at both lung apices. Fluid density seen within the trachea adjacent to the endotracheal tube. MRI Brain 1/9/2020      LTME:Diffuse encephalopathy. No epileptiform discharge          ASSESSMENT AND PLAN:       70-year-old male presenting initially with critical multivessel CVA with critical stenosis of left proximal vert, critical stenosis of right cervical ICA, critical stenosis of right cavernous ICA, and moderate stenosis of left cavernous ICA with bilateral subdural hematoma right greater than left concern for infectious versus delirium tremens. Assessment & Plan:    Neuro    Traumatic SAH  Acute ischemic stroke, bilateral temporoparietal  Global aphasia due to above   Hold ASA/Plavix for Trach/Peg Wed vs Thurs per GS  Critical right ICA stenosis-S/P right ICA stenting  Systolic blood pressure goal 100-140  Modafinil 100 mg BID - will wean off in x7 days  Amantadine 200 @ 0800 and 100 @ 1400  Heparin gtt in lieu of AP for PEG.  Will continue heparin x1 more day then switch back to DAPT tomorrow  Palliative care following  Crestor 5 qHS       Cardio  On Sotalol 160 mg twice daily  Lisinopril 10 qD  EF 45%  Continue cardiac monitor     Pulm  Intubated  PRVC 12, 600, 5, 30%  Continue O2 monitor  Maintain saturation > 92%      Endo  Serum glucose is better controlled < 180  HB A1C: 7.1  Continue Lantus 25 units daily    GI  Milk of magnesia for bowel regimen  Colace 100 qD  Pepcid 20 mg IV  BID for GI

## 2020-01-26 LAB
ANION GAP SERPL CALCULATED.3IONS-SCNC: 12 MMOL/L (ref 9–17)
BUN BLDV-MCNC: 25 MG/DL (ref 6–20)
BUN/CREAT BLD: ABNORMAL (ref 9–20)
CALCIUM SERPL-MCNC: 9.4 MG/DL (ref 8.6–10.4)
CHLORIDE BLD-SCNC: 102 MMOL/L (ref 98–107)
CO2: 29 MMOL/L (ref 20–31)
CREAT SERPL-MCNC: 0.35 MG/DL (ref 0.7–1.2)
GFR AFRICAN AMERICAN: >60 ML/MIN
GFR NON-AFRICAN AMERICAN: >60 ML/MIN
GFR SERPL CREATININE-BSD FRML MDRD: ABNORMAL ML/MIN/{1.73_M2}
GFR SERPL CREATININE-BSD FRML MDRD: ABNORMAL ML/MIN/{1.73_M2}
GLUCOSE BLD-MCNC: 136 MG/DL (ref 75–110)
GLUCOSE BLD-MCNC: 139 MG/DL (ref 75–110)
GLUCOSE BLD-MCNC: 165 MG/DL (ref 75–110)
GLUCOSE BLD-MCNC: 166 MG/DL (ref 70–99)
HCT VFR BLD CALC: 38.9 % (ref 40.7–50.3)
HEMOGLOBIN: 11 G/DL (ref 13–17)
MCH RBC QN AUTO: 23.6 PG (ref 25.2–33.5)
MCHC RBC AUTO-ENTMCNC: 28.3 G/DL (ref 28.4–34.8)
MCV RBC AUTO: 83.3 FL (ref 82.6–102.9)
NRBC AUTOMATED: 0 PER 100 WBC
PDW BLD-RTO: 21.2 % (ref 11.8–14.4)
PLATELET # BLD: 121 K/UL (ref 138–453)
PLATELET # BLD: 163 K/UL (ref 138–453)
PMV BLD AUTO: 10.2 FL (ref 8.1–13.5)
POTASSIUM SERPL-SCNC: 4 MMOL/L (ref 3.7–5.3)
RBC # BLD: 4.67 M/UL (ref 4.21–5.77)
SODIUM BLD-SCNC: 143 MMOL/L (ref 135–144)
WBC # BLD: 6.2 K/UL (ref 3.5–11.3)

## 2020-01-26 PROCEDURE — 80048 BASIC METABOLIC PNL TOTAL CA: CPT

## 2020-01-26 PROCEDURE — 6370000000 HC RX 637 (ALT 250 FOR IP): Performed by: STUDENT IN AN ORGANIZED HEALTH CARE EDUCATION/TRAINING PROGRAM

## 2020-01-26 PROCEDURE — 2700000000 HC OXYGEN THERAPY PER DAY

## 2020-01-26 PROCEDURE — 2580000003 HC RX 258: Performed by: STUDENT IN AN ORGANIZED HEALTH CARE EDUCATION/TRAINING PROGRAM

## 2020-01-26 PROCEDURE — 85027 COMPLETE CBC AUTOMATED: CPT

## 2020-01-26 PROCEDURE — 99232 SBSQ HOSP IP/OBS MODERATE 35: CPT | Performed by: PSYCHIATRY & NEUROLOGY

## 2020-01-26 PROCEDURE — 6360000002 HC RX W HCPCS: Performed by: STUDENT IN AN ORGANIZED HEALTH CARE EDUCATION/TRAINING PROGRAM

## 2020-01-26 PROCEDURE — 36415 COLL VENOUS BLD VENIPUNCTURE: CPT

## 2020-01-26 PROCEDURE — 85049 AUTOMATED PLATELET COUNT: CPT

## 2020-01-26 PROCEDURE — 2060000000 HC ICU INTERMEDIATE R&B

## 2020-01-26 PROCEDURE — 94660 CPAP INITIATION&MGMT: CPT

## 2020-01-26 PROCEDURE — 31720 CLEARANCE OF AIRWAYS: CPT

## 2020-01-26 PROCEDURE — 94761 N-INVAS EAR/PLS OXIMETRY MLT: CPT

## 2020-01-26 RX ADMIN — AMANTADINE HYDROCHLORIDE 100 MG: 50 SOLUTION ORAL at 16:15

## 2020-01-26 RX ADMIN — MODAFINIL 200 MG: 100 TABLET ORAL at 09:01

## 2020-01-26 RX ADMIN — MODAFINIL 100 MG: 100 TABLET ORAL at 16:15

## 2020-01-26 RX ADMIN — CEPHALEXIN 500 MG: 500 CAPSULE ORAL at 09:02

## 2020-01-26 RX ADMIN — Medication 15 ML: at 16:16

## 2020-01-26 RX ADMIN — SOTALOL HYDROCHLORIDE 160 MG: 80 TABLET ORAL at 21:10

## 2020-01-26 RX ADMIN — CEPHALEXIN 500 MG: 500 CAPSULE ORAL at 00:48

## 2020-01-26 RX ADMIN — FOLIC ACID 1 MG: 1 TABLET ORAL at 09:01

## 2020-01-26 RX ADMIN — CYCLOBENZAPRINE HYDROCHLORIDE 5 MG: 5 TABLET, FILM COATED ORAL at 16:14

## 2020-01-26 RX ADMIN — CEPHALEXIN 500 MG: 500 CAPSULE ORAL at 18:59

## 2020-01-26 RX ADMIN — SOTALOL HYDROCHLORIDE 160 MG: 80 TABLET ORAL at 09:02

## 2020-01-26 RX ADMIN — AMANTADINE HYDROCHLORIDE 100 MG: 50 SOLUTION ORAL at 09:00

## 2020-01-26 RX ADMIN — CYCLOBENZAPRINE HYDROCHLORIDE 5 MG: 5 TABLET, FILM COATED ORAL at 09:02

## 2020-01-26 RX ADMIN — CYCLOBENZAPRINE HYDROCHLORIDE 5 MG: 5 TABLET, FILM COATED ORAL at 21:09

## 2020-01-26 RX ADMIN — ANTI-FUNGAL POWDER MICONAZOLE NITRATE TALC FREE: 1.42 POWDER TOPICAL at 09:33

## 2020-01-26 RX ADMIN — INSULIN LISPRO 3 UNITS: 100 INJECTION, SOLUTION INTRAVENOUS; SUBCUTANEOUS at 05:57

## 2020-01-26 RX ADMIN — Medication 100 MG: at 09:01

## 2020-01-26 RX ADMIN — LISINOPRIL 20 MG: 20 TABLET ORAL at 09:01

## 2020-01-26 RX ADMIN — CLOPIDOGREL 75 MG: 75 TABLET, FILM COATED ORAL at 09:02

## 2020-01-26 RX ADMIN — ENOXAPARIN SODIUM 40 MG: 40 INJECTION SUBCUTANEOUS at 09:01

## 2020-01-26 RX ADMIN — Medication 15 ML: at 21:09

## 2020-01-26 RX ADMIN — ANTI-FUNGAL POWDER MICONAZOLE NITRATE TALC FREE: 1.42 POWDER TOPICAL at 22:09

## 2020-01-26 RX ADMIN — ASPIRIN 325 MG: 325 TABLET, COATED ORAL at 09:02

## 2020-01-26 RX ADMIN — FLUOXETINE 10 MG: 10 CAPSULE ORAL at 09:01

## 2020-01-26 RX ADMIN — SODIUM CHLORIDE, PRESERVATIVE FREE 10 ML: 5 INJECTION INTRAVENOUS at 22:09

## 2020-01-26 RX ADMIN — CEPHALEXIN 500 MG: 500 CAPSULE ORAL at 16:13

## 2020-01-26 RX ADMIN — INSULIN GLARGINE 25 UNITS: 100 INJECTION, SOLUTION SUBCUTANEOUS at 09:04

## 2020-01-26 RX ADMIN — ROSUVASTATIN CALCIUM 5 MG: 5 TABLET, FILM COATED ORAL at 21:10

## 2020-01-26 RX ADMIN — SODIUM CHLORIDE, PRESERVATIVE FREE 10 ML: 5 INJECTION INTRAVENOUS at 21:10

## 2020-01-26 ASSESSMENT — PULMONARY FUNCTION TESTS
PIF_VALUE: 17
PIF_VALUE: 18

## 2020-01-26 NOTE — PROGRESS NOTES
Daily Progress Note  Neuro Critical Care        INTERVAL HISTORY    The patient is a 60 yo male with history of atrial fibrillation on Eliquis, bilateral ICA stenosis (right more than left), DM2, HLD, HTN, CAD s/p PCI stents, PVD, CEA, and ETOH abuse who presents as a transfer from [facility] as a trauma alert for CT head revealed diffuse bialteral SAH and left parietal SDH. He was given [place] and transferred to [facility] for higher level of care. Patient was found confused by family at his home after being unable to reach him on the phone. He was complaining of headache, and actively vomiting. Also had bilateral arm scrapes and bruises concerning for recent fall, patient himself does not member falling, said around 4 PM he woke up and felt frontal and vertex headache and neck pain, and felt nauseous and started throwing up. Endorses drinking alcohol last night states that he had 2 beers. Per family has significant alcohol abuse history and has had multiple falls in the past.     On presentation in the ED [facility], patient mildly lethargic but oriented x4, complaining of headache, nausea, mild vertigo, left arm weaker than right, bilateral leg weakness. Significant interdisciplinary discussion between the neurosurgeon, radiologist, trauma surgeon and stroke specialist about whether  bleed is consistent with traumatic versus spontaneous subarachnoid. Stroke specialist has significant concern for severe stenosis, suspects traumatic bleed and that patient is at risk for vasospasm. In addition has intracranial left vertebral artery athero-stenosis. Consensus management strategy is to keep the patient 130-160 for blood pressure goals to avoid right MCA watershed stroke.      No aneurysm found on CTA or malformation-critical stenosis of the proximal left vertebral artery, critical stenosis of the proximal right cervical ICA, severe near critical stenosis of the right cavernous ICA, to tachypnea. 1/14: Lisinopril started. Family meeting held, awaiting decision. 1/15: Awaiting on decision from family, palliative following. NaCl tabs d/c.  1/16: Awaiting family decision. Exam improving. 1/17: Family would like trach/peg. General surgery consulted. 1/20: no changes. Trach and PEG Wed vs Thursday this week per GS. COnt to be off AP x5 days prior to procedure   1/22: PEG placement   1/23: resume Heparin x1 more day. S/p PEG.   1/24: Awaiting placement. Denied LTAC.   1/25: desat overnight, required NT suction. 1/26: No AEO.        MEDICATIONS:     CURRENT MEDICATIONS:  SCHEDULED MEDICATIONS:   enoxaparin  40 mg Subcutaneous Daily    aspirin  325 mg Oral Daily    clopidogrel  75 mg Oral Daily    cephALEXin  500 mg Oral 4 times per day    cyclobenzaprine  5 mg Oral TID    lisinopril  20 mg Oral Daily    miconazole   Topical BID    FLUoxetine  10 mg Oral Daily    amantadine  100 mg Per G Tube BID- 8&2    modafinil  200 mg Oral Daily    And    modafinil  100 mg Oral Daily    insulin glargine  25 Units Subcutaneous Daily    insulin lispro  0-18 Units Subcutaneous Q6H    docusate  100 mg Oral Daily    sotalol  160 mg Oral BID    nicotine  1 patch Transdermal Daily    folic acid  1 mg Oral Daily    thiamine  100 mg Oral Daily    sodium chloride flush  10 mL Intravenous BID    rosuvastatin  5 mg Oral Nightly    chlorhexidine  15 mL Mouth/Throat BID    vitamin D  50,000 Units Oral Weekly    sodium chloride flush  10 mL Intravenous 2 times per day    sodium chloride flush  10 mL Intravenous 2 times per day     CONTINUOUS INFUSIONS:   sodium chloride 75 mL/hr at 01/25/20 2017    dextrose       PRN MEDICATIONS:   albuterol, acetaminophen, hydrALAZINE, labetalol, ibuprofen, acetaminophen, metoprolol, glucose, dextrose, glucagon (rDNA), dextrose, magnesium sulfate, sodium chloride flush, sodium chloride flush    VITALS 24 Hours     Temperature Range: Temp: 97.7 °F (36.5 °C) Temp Av °F (36.7 °C)  Min: 97.7 °F (36.5 °C)  Max: 98.4 °F (36.9 °C)  BP Range: Systolic (79TCA), CDH:760 , Min:97 , WKL:478     Diastolic (45ETK), LUT:66, Min:74, Max:106    Pulse Range: Pulse  Av.2  Min: 97  Max: 125  Respiration Range: Resp  Av.7  Min: 21  Max: 47  Current Pulse Ox: SpO2: 97 %  24HR Pulse Ox Range: SpO2  Av %  Min: 95 %  Max: 97 %  Patient Vitals for the past 12 hrs:   BP Temp Temp src Pulse Resp SpO2   20 0912 -- -- -- -- 28 97 %   20 0547 (!) 125/99 97.7 °F (36.5 °C) -- 113 24 95 %   20 0002 97/74 98.4 °F (36.9 °C) Oral 106 21 --     Estimated body mass index is 21.92 kg/m² as calculated from the following:    Height as of this encounter: 5' 11\" (1.803 m). Weight as of this encounter: 157 lb 3 oz (71.3 kg). PHYSICAL EXAM       Physical Exam  Vitals signs and nursing note reviewed. HENT:      Head: Normocephalic and atraumatic. Eyes:      General:         Right eye: No foreign body, discharge or hordeolum. Left eye: No foreign body, discharge or hordeolum. Conjunctiva/sclera:      Right eye: Right conjunctiva is not injected. No chemosis, exudate or hemorrhage. Left eye: Left conjunctiva is injected. Hemorrhage present. No chemosis or exudate. Pupils: Pupils are equal, round, and reactive to light. Cardiovascular:      Rate and Rhythm: Normal rate. Rhythm irregular. Pulmonary:      Breath sounds: Normal breath sounds. Neurological:      Mental Status: He is alert. GCS: GCS eye subscore is 4. GCS verbal subscore is 1. GCS motor subscore is 5. Cranial Nerves: Cranial nerves are intact. Motor: No abnormal muscle tone. Deep Tendon Reflexes:      Reflex Scores:       Bicep reflexes are 1+ on the right side and 1+ on the left side. Patellar reflexes are 1+ on the right side and 1+ on the left side.      Comments: Intubated with no sedation  Moves all extremities to pain, left more than right  Tone is 1st metacarpal fracture. Xr Hand Right (min 3 Views)    Result Date: 12/26/2019  EXAMINATION: THREE XRAY VIEWS OF THE RIGHT HAND 12/26/2019 1:36 am COMPARISON: None. HISTORY: ORDERING SYSTEM PROVIDED HISTORY: Trauma/Fracture TECHNOLOGIST PROVIDED HISTORY: Include clenched fist view Trauma/Fracture Reason for Exam: fall FINDINGS: Joint alignment is maintained. No acute fracture or dislocation in the right hand. Degenerative changes in the interphalangeal joints. Old ulnar styloid process fracture. Degenerative changes in the radiocarpal joint. No acute osseous abnormality in the right hand. Xr Hip Left (2-3 Views)    Result Date: 12/26/2019  EXAMINATION: ONE XRAY VIEW OF THE PELVIS AND TWO XRAY VIEWS RIGHT HIP; TWO XRAY VIEWS OF THE LEFT HIP 12/26/2019 1:37 am COMPARISON: None. HISTORY: ORDERING SYSTEM PROVIDED HISTORY: Trauma/Fracture TECHNOLOGIST PROVIDED HISTORY: AP and cross-table lateral of the hip please, thank you Trauma/Fracture Reason for Exam: fall/trauma Acuity: Acute FINDINGS: The bones are osteopenic. The femoral heads located bilateral.  No acute fracture or dislocation pelvis or hips bilaterally. SI joints are grossly intact. Pubic symphysis is intact. The sacrum is partially obscured by contrast in the bladder. No acute osseous abnormality in the pelvis or hips bilaterally. Osteopenia. Xr Knee Left (3 Views)    Result Date: 12/26/2019  EXAMINATION: THREE XRAY VIEWS OF THE LEFT KNEE 12/26/2019 1:36 am COMPARISON: None. HISTORY: ORDERING SYSTEM PROVIDED HISTORY: Trauma/Fracture TECHNOLOGIST PROVIDED HISTORY: Trauma/Fracture Reason for Exam: fall/ trauma Acuity: Acute FINDINGS: Osteopenia. No acute fracture or dislocation. No focal osseous lesion. No joint effusion. No focal soft tissue abnormality. Vascular calcifications. No acute abnormality of the knee. Osteopenia.      Xr Knee Right (3 Views)    Result Date: 12/26/2019  EXAMINATION: THREE XRAY VIEWS OF THE RIGHT KNEE 12/26/2019 1:36 am COMPARISON: None. HISTORY: ORDERING SYSTEM PROVIDED HISTORY: Trauma/Fracture TECHNOLOGIST PROVIDED HISTORY: Trauma/Fracture Reason for Exam: ,fall trauma,unable to get xtable on patient Acuity: Acute FINDINGS: The bones are osteopenic. No acute fracture or dislocation. No focal osseous lesion. No evidence of joint effusion. No focal soft tissue abnormality. Vascular calcifications. No acute abnormality of the knee. Osteopenia. Xr Ankle Left (min 3 Views)    Result Date: 12/26/2019  EXAMINATION: THREE XRAY VIEWS OF THE LEFT ANKLE 12/26/2019 1:36 am COMPARISON: None. HISTORY: ORDERING SYSTEM PROVIDED HISTORY: Trauma/Fracture TECHNOLOGIST PROVIDED HISTORY: Trauma/Fracture Acuity: Acute FINDINGS: No acute fracture or dislocation. Normal alignment of the ankle mortise. No focal osseous lesion. No joint effusion. No focal soft tissue abnormality. Vascular calcifications. No acute abnormality of the ankle. Xr Ankle Right (min 3 Views)    Result Date: 12/26/2019  EXAMINATION: THREE XRAY VIEWS OF THE RIGHT ANKLE 12/26/2019 1:36 am COMPARISON: None. HISTORY: ORDERING SYSTEM PROVIDED HISTORY: Trauma/Fracture TECHNOLOGIST PROVIDED HISTORY: Trauma/Fracture FINDINGS: Diffuse osteopenia. No acute fracture or dislocation. Normal alignment of the ankle mortise. No focal osseous lesion. No joint effusion. No focal soft tissue abnormality. Status post 5th metatarsal ORIF with intact screw. No acute abnormality of the ankle. Ct Head Wo Contrast    Result Date: 12/27/2019  EXAMINATION: CT OF THE HEAD WITHOUT CONTRAST,  12/26/2019 11:08 pm TECHNIQUE: CT of the head was performed without the administration of intravenous contrast. Dose modulation, iterative reconstruction, and/or weight based adjustment of the mA/kV was utilized to reduce the radiation dose to as low as reasonably achievable. COMPARISON: 12/26/2019 5:10 a.m.  HISTORY: ORDERING SYSTEM PROVIDED HISTORY: Reassess bleed for stability. TECHNOLOGIST PROVIDED HISTORY: Reassess bleed for stability. Reason for Exam: Reassess bleed for stability. Acuity: Unknown Type of Exam: Unknown FINDINGS: BRAIN/VENTRICLES: Again seen is extensive subarachnoid hemorrhage within the sylvian fissures as well as cerebral sulci bilaterally. Subdural hemorrhage overlies the left parietal convexity measures 4 mm in thickness. Hemorrhage in the prepontine cistern as well. Small amount of intraventricular hemorrhage layering in the occipital horns bilaterally. Ventricles are stable in caliber. No hydrocephalus. The gray-white matter differentiation is maintained. No midline shift. ORBITS: The visualized portion of the orbits demonstrate no acute abnormality. SINUSES: The visualized paranasal sinuses and mastoid air cells demonstrate no acute abnormality. SOFT TISSUES/SKULL:  No acute abnormality of the visualized skull or soft tissues. Stable head CT demonstrating extensive bilateral subarachnoid hemorrhage as well as intraventricular hemorrhage and left parietal convexity subdural hemorrhage. No new intracranial hemorrhage. No hydrocephalus. Ct Head Wo Contrast    Result Date: 12/26/2019  EXAMINATION: CT OF THE HEAD WITHOUT CONTRAST  12/26/2019 4:51 am TECHNIQUE: CT of the head was performed without the administration of intravenous contrast. Dose modulation, iterative reconstruction, and/or weight based adjustment of the mA/kV was utilized to reduce the radiation dose to as low as reasonably achievable. COMPARISON: 12/25/2019 HISTORY: ORDERING SYSTEM PROVIDED HISTORY: worsening mentation TECHNOLOGIST PROVIDED HISTORY: worsening mentation FINDINGS: BRAIN/VENTRICLES: Diffuse subarachnoid hemorrhage within the cerebral sulci, sylvian fissures, anterior interhemispheric fissure, and prepontine cistern, similar in appearance to prior study. There is intraventricular hemorrhage layering in the occipital horns. No hydrocephalus.   No mass effect cisterns are patent. ORBITS: The visualized portion of the orbits demonstrate no acute abnormality. SINUSES: The visualized paranasal sinuses and mastoid air cells demonstrate no acute abnormality. SOFT TISSUES/SKULL:  No acute abnormality of the visualized skull or soft tissues. Subarachnoid hemorrhage, predominantly in the bilateral sylvian fissures and bilateral cerebral hemisphere sulci. Acute left parietal convexity subdural hematoma measures up to 4 mm in thickness. No associated mass effect. No midline shift or evidence of intracranial herniation. Findings were discussed with Alexey Mohan at 9:32 pm on 12/25/2019. Ct Cervical Spine Wo Contrast    Result Date: 12/26/2019  EXAMINATION: CT OF THE CERVICAL SPINE WITHOUT CONTRAST 12/25/2019 9:04 pm TECHNIQUE: CT of the cervical spine was performed without the administration of intravenous contrast. Multiplanar reformatted images are provided for review. Dose modulation, iterative reconstruction, and/or weight based adjustment of the mA/kV was utilized to reduce the radiation dose to as low as reasonably achievable. COMPARISON: None HISTORY: ORDERING SYSTEM PROVIDED HISTORY: trauma TECHNOLOGIST PROVIDED HISTORY: trauma FINDINGS: BONES/ALIGNMENT: No traumatic malalignment. Vertebral body heights are maintained. No acute fracture. DEGENERATIVE CHANGES: Moderate multilevel disc narrowing and endplate osteophyte formation. Multilevel uncovertebral and facet joint degenerative changes. SOFT TISSUES: Paraspinal soft tissues are normal.  Partially visualized prepontine cistern subarachnoid hemorrhage is better evaluated on CT head performed concurrently. No acute abnormality of the cervical spine.      Ct Thoracic Spine Wo Contrast    Result Date: 12/26/2019  EXAMINATION: CT OF THE THORACIC SPINE WITHOUT CONTRAST; CT OF THE LUMBAR SPINE WITHOUT CONTRAST; CT OF THE CHEST, ABDOMEN, AND PELVIS WITH CONTRAST, 12/25/2019 9:04 pm; 12/25/2019 9:05 pm TECHNIQUE: CT of the thoracic and lumbar spine was performed without the administration of intravenous contrast.  CT of the chest, abdomen and pelvis was performed with the administration of intravenous contrast. Multiplanar reformatted images are provided for review. Dose modulation, iterative reconstruction, and/or weight based adjustment of the mA/kV was utilized to reduce the radiation dose to as low as reasonably achievable. COMPARISON: None HISTORY: ORDERING SYSTEM PROVIDED HISTORY: trauma TECHNOLOGIST PROVIDED HISTORY: trauma Reason for Exam: fall from standing Acuity: Acute Type of Exam: Initial; ORDERING SYSTEM PROVIDED HISTORY: trauma TECHNOLOGIST PROVIDED HISTORY: trauma Reason for Exam: fall from standing Acuity: Acute Type of Exam: Initial; ORDERING SYSTEM PROVIDED HISTORY: trauma TECHNOLOGIST PROVIDED HISTORY: trauma Reason for Exam: fall from standing Acuity: Acute Type of Exam: Initial FINDINGS: CTA CHEST: Stable cardiomegaly. No pericardial effusion. No mediastinal hematoma or pneumomediastinum. No enlarged mediastinal or hilar lymph nodes. Main pulmonary artery is dilated, measuring up to 4 cm, suggesting pulmonary hypertension. Calcified and noncalcified atherosclerotic plaque of the thoracic aorta. Incidentally noted 4 vessel aortic arch with separate arch origin of the left vertebral artery. Ectatic ascending thoracic aorta measures up to 4 cm in diameter. No acute aortic abnormality. Central airways are clear. No pleural effusion or pneumothorax. No pulmonary contusion or pulmonary laceration. Mild bilateral lower lobe dependent atelectatic changes. Healing anterolateral left 6th rib fracture. No acute displaced rib fracture or chest wall hematoma. CTA ABDOMEN: No acute traumatic abnormality of the liver, spleen, pancreas, kidneys, or adrenal glands. Normal gallbladder. Stomach, small bowel, and colon are normal in caliber without evidence of obstruction. Normal appendix.   Sigmoid diverticulosis without diverticulitis. Calcified and noncalcified aortoiliac atherosclerotic calcification. Abdominal aorta is normal in caliber. No free fluid in the abdomen. CTA PELVIS: Urinary bladder and pelvic organs are normal.  No free fluid in the pelvis. Bilateral common iliac stents are in position. THORACIC/LUMBAR SPINE: BONES/ALIGNMENT: Normal alignment of the thoracic and lumbar spine. Vertebral body heights are maintained. No acute fracture. DEGENERATIVE CHANGES: Multilevel disc narrowing and endplate osteophyte formation. Mild multilevel facet joint degenerative changes. No high-grade, bony spinal canal stenosis. SOFT TISSUES: Paraspinal soft tissues are normal.     No acute traumatic abnormality of the chest, abdomen, or pelvis. Remote, healing anterolateral left 6th rib fracture. No acute osseous abnormality of the thoracic or lumbar spine. Ct Lumbar Spine Wo Contrast    Result Date: 12/26/2019  EXAMINATION: CT OF THE THORACIC SPINE WITHOUT CONTRAST; CT OF THE LUMBAR SPINE WITHOUT CONTRAST; CT OF THE CHEST, ABDOMEN, AND PELVIS WITH CONTRAST, 12/25/2019 9:04 pm; 12/25/2019 9:05 pm TECHNIQUE: CT of the thoracic and lumbar spine was performed without the administration of intravenous contrast.  CT of the chest, abdomen and pelvis was performed with the administration of intravenous contrast. Multiplanar reformatted images are provided for review. Dose modulation, iterative reconstruction, and/or weight based adjustment of the mA/kV was utilized to reduce the radiation dose to as low as reasonably achievable.  COMPARISON: None HISTORY: ORDERING SYSTEM PROVIDED HISTORY: trauma TECHNOLOGIST PROVIDED HISTORY: trauma Reason for Exam: fall from standing Acuity: Acute Type of Exam: Initial; ORDERING SYSTEM PROVIDED HISTORY: trauma TECHNOLOGIST PROVIDED HISTORY: trauma Reason for Exam: fall from standing Acuity: Acute Type of Exam: Initial; ORDERING SYSTEM PROVIDED HISTORY: trauma TECHNOLOGIST PROVIDED abnormality of the thoracic or lumbar spine. Ir Angiogram Carotid C Erebral Bilateral    Result Date: 12/27/2019  Date of procedure: 12/26/2019 Procedure: Diagnostic cerebral angiogram Indication: Subarachnoid hemorrhage, evaluation for aneurysm. Procedures: 1. Selective right common carotid artery angiogram 2. Selective right internal carotid artery cerebral angiogram 3. Selective right vertebral artery cerebral angiogram 4. Selective left common carotid artery angiogram 5. Selective left internal carotid artery cerebral angiogram 6. Selective left vertebral artery cerebral angiogram 7. Right common femoral artery angiogram Neurointerventionalist: Linh Crowell MD [place]: Tomy Bledsoe MD Comparison: None Fluoroscopy time: 29.7 minutes Contrast: 100 cc Visipaque-270 Side of Access: Right femoral Closure device: [place] Sedation: Conscious sedation Patient arrived to the angio suite: 1325 Puncture obtained at: 1400 Vascular access removed at: 1605 Consent:After explaining the risks and benefits to the patient and the patient's family, including but not limited to stroke, coma, death, vessel injury, dissection, tear, occlusion, and X-ray dye allergic type reaction, a signed consent form was obtained. Anesthesia: IV moderate sedation was supervised by Dr. Linh Crowell. The patient was independently monitored by a Registered Nurse assigned to the Department of Radiology?using automated blood pressure, EKG and pulse oximetry. ? The detailed Conscious Record is permanently stored in the [place] [id number]. The following is the conscious sedation record including Start and End times: Fentanyl, propofol and Versed from 1400 to  1600 . Clinical History:59 y. o.?male?with past medical history including atrial fibrillation on Eliquis, diabetes mellitus, hypertension, hyperlipidemia, coronary artery disease s/p PCI stents, peripheral vascular disease, history of bilateral ICA stenosis with  right worse than left, history of CEA, alcohol abuse. ? Patient presented after a fall from outside facility as a trauma alert?and found to have bilateral subarachnoid hemorrhage/left parietal subdural hematoma. ? He received Kcentra in outside hospital. ? Neuro endovascular was consulted. Description and findings: The patient's right groin was prepped and draped in standard sterile fashion and under local anesthesia with conscious sedation, the right common femoral artery was accessed with a single-wall needle technique, and a 5 Afghan intravascular sheath was placed within the right common femoral artery, establishing arterial access. A 5 Afghan multipurpose catheter was then advanced over a guide wire to the level of the aortic arch, and used to selectively catheterize the right common carotid artery. Right CCA technique: The right common carotid artery was selectively catheterized under fluoroscopic guidance and digital subtraction angiography images were obtained in biplane projections of the right cervical carotid artery. Interpretation:The right common carotid artery injection demonstrates normal antegrade flow into the external and internal carotid arteries with normal filling of the external carotid artery branches. Course and caliber of the cervical portion of the right internal carotid artery revealed significant proximal right ICA stenosis/string sign measuring approximately 90-99% per NASCET criteria. Flow across the high-grade stenosis was delayed with earlier filling noted of the distal supraclinoid internal carotid artery through retrograde opacification of the right ophthalmic artery from the internal maxillary artery. Further inspection demonstrates no evidence of dissection, aneurysm. Right CCA technique: The right internal carotid artery run was performed from the common carotid artery due to severe stenosis.  Digital subtraction angiography of the intracranial right internal carotid circulation was performed in frontal, and lateral projections. Interpretation:There is slow antegrade filling of the distal internal carotid artery from the cervical internal carotid artery. Noted retrograde Filling of the right ophthalmic artery from the right internal maxillary artery branches with slow filling anterior cerebral artery, middle cerebral artery and the distal branches due to previously noted critical proximal cervical ICA stenosis. Inspection of the remaining right internal carotid circulation revealed no other evidence of cerebral aneurysm, arteriovenous malformation. There is severe stenosis noted in the petrous/cavernous and supraclinoid right ICA noted with diminutive appearance. Capillary and venous phase images were also unremarkable, with no evidence of veno-occlusive disease. Left CCA technique: The left common carotid artery was selectively catheterized under fluoroscopic guidance and digital subtraction angiography images were obtained in biplane projections of the left cervical common carotid artery. Interpretation: The left common carotid artery injection demonstrates normal antegrade flow into the external and internal carotid arteries with normal filling of the external carotid artery branches. Caliber and lumen contour of the cervical portion of the left internal carotid artery noted to be large and irregular which is likely due to prior endarterectomy. Left ICA technique: The left internal carotid artery was then selectively catheterized, and digital subtraction angiography of the intracranial left internal carotid artery circulation was performed in frontal and lateral projections. Interpretation:There is normal antegrade filling of the distal internal carotid artery, ophthalmic artery, anterior cerebral artery, middle cerebral artery and distal branches. Inspection of the remaining left internal carotid artery circulation revealed  mild to moderate left cavernous ICA stenosis.  Otherwise, no other evidence of cerebral aneurysm, arteriovenous malformation, or arterial stenosis. No vasospasm noted. Irregularity of the vessels without hemodynamic stenosis which could reflect intracranial atherosclerosis Capillary and venous phase images were also unremarkable, with no evidence of veno-occlusive disease. Left VA technique: The left vertebral artery noted to arise from the aortic arch. Left vertebral artery was then selectively catheterized with roadmap guidance. Digital subtraction angiography of the intracranial left vertebrobasilar run-off  was obtained in frontal and lateral projections. Interpretation:The left vertebral artery injection demonstrates normal antegrade opacification of the left  vertebral artery, including the cervical segment, basilar artery, and respective branches. There is a left V4 moderate stenosis noted. Otherwise, there was no evidence of  cerebral aneurysm, arteriovenous malformation, or arterial stenosis. There is noted opacification of the distal right middle cerebral artery parieto-occipital territory from pial collaterals arising off the right posterior cerebral artery. Capillary and venous phase images were otherwise unremarkable. Right CFA technique: A right common femoral artery angiogram was performed and demonstrated arterial catheterization proximal to the bifurcation. There was no evidence of dissection or occlusion within the right common femoral artery. There is internal iliac artery stent noted. A 5F Vascade closure device was used to establish hemostasis at the right common femoral artery access site. No immediate complications were experienced. Patient was re-examined after the procedure with no change noted in their neurologic examination, with distal pulses present. The patient was subsequently discharged from the neurointerventional suite. Impression: 1. Critical right ICA stenosis with a string sign is noted measuring approximately 90-99% per NASCET criteria.  Noted retrograde Filling of the right ophthalmic artery from the right internal maxillary artery branches with slow filling of the anterior cerebral artery, middle cerebral artery and the distal branches in addition to distal right mca vascular supply from the right pca due to previously noted critical proximal cervical ICA stenosis. 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 3.No evidence of clear discrete aneurysm, arteriovenous malformation, arterial dissection, or veno-occlusive disease. 4. 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 Keli Steele dictated this invasive procedure. Dr. Moy Wagner was present for all procedural and imaging components of this case. Examination was reviewed and reported findings confirmed and edited by Dr. Moy Wagner. Dr. Moy Wagner supervised and interpreted this procedure. Jane Carter MD Final report electronically signed by Jane Carter M.D. on 12/27/2019 4:00 PM    Cta Head Neck W Contrast    Result Date: 12/25/2019  EXAMINATION: CTA OF THE HEAD AND NECK WITH CONTRAST 12/25/2019 9:07 pm: TECHNIQUE: CTA of the head and neck was performed with the administration of intravenous contrast. Multiplanar reformatted images are provided for review. MIP images are provided for review. Stenosis of the internal carotid arteries measured using NASCET criteria. Dose modulation, iterative reconstruction, and/or weight based adjustment of the mA/kV was utilized to reduce the radiation dose to as low as reasonably achievable. COMPARISON: None. HISTORY: ORDERING SYSTEM PROVIDED HISTORY: sah TECHNOLOGIST PROVIDED HISTORY: sah Reason for Exam: SAH after trauma Acuity: Acute Type of Exam: Initial FINDINGS: CTA NECK: AORTIC ARCH/ARCH VESSELS: There is a critical stenosis of the proximal left vertebral artery with moderate and severe stenoses of the V2 segment. There is a severe stenosis at the origin of the right vertebral artery.   66% stenosis of the left subclavian artery is noted. CAROTID ARTERIES: There is 25% stenosis of the proximal left common carotid artery and 50% stenosis of the mid and distal segments. The cervical portion of the left internal carotid artery is normal in course and caliber. There is 20% stenosis of the right common carotid artery. A critical stenosis of the proximal right cervical ICA is noted with attenuated flow seen distally. VERTEBRAL ARTERIES: No dissection, arterial injury, or significant stenosis. SOFT TISSUES: There is a thickened appearance to the mid esophagus. A small amount of layering debris is present within the trachea, likely reflecting pooled secretion. BONES: No acute osseous abnormality. CTA HEAD: ANTERIOR CIRCULATION: There is severely attenuated flow within the petrous and cavernous segments of the right internal carotid artery with severe, near critical stenoses of the cavernous ICA. There is also attenuated flow within the contralateral ICA, to a lesser degree, with moderate stenoses of the left cavernous ICA. There are moderate and severe multifocal stenoses of the MCA branch vessels, worse on the right-hand side. The right anterior cerebral artery is attenuated compared to the left. POSTERIOR CIRCULATION: There are mild-to-moderate stenoses of the distal right vertebral artery and a moderate to severe stenosis of the distal left. The basilar artery and PCAs appear to be slightly attenuated. OTHER: No dural venous sinus thrombosis on this non-dedicated study. BRAIN: See separately dictated noncontrast head CT report. No cerebral aneurysm or vascular malformation identified. Significant narrowing of the intracranial vessels, likely due to a combination of atherosclerotic disease as well as vasospasm related to subarachnoid hemorrhage. Critical stenosis of the proximal left vertebral artery. Critical stenosis of the proximal right cervical ICA with attenuated flow seen distally.  Severe near critical stenoses of the right cavernous ICA. Moderate stenoses of the contralateral cavernous ICA. Ct Chest Abdomen Pelvis W Contrast    Result Date: 12/26/2019  EXAMINATION: CT OF THE THORACIC SPINE WITHOUT CONTRAST; CT OF THE LUMBAR SPINE WITHOUT CONTRAST; CT OF THE CHEST, ABDOMEN, AND PELVIS WITH CONTRAST, 12/25/2019 9:04 pm; 12/25/2019 9:05 pm TECHNIQUE: CT of the thoracic and lumbar spine was performed without the administration of intravenous contrast.  CT of the chest, abdomen and pelvis was performed with the administration of intravenous contrast. Multiplanar reformatted images are provided for review. Dose modulation, iterative reconstruction, and/or weight based adjustment of the mA/kV was utilized to reduce the radiation dose to as low as reasonably achievable. COMPARISON: None HISTORY: ORDERING SYSTEM PROVIDED HISTORY: trauma TECHNOLOGIST PROVIDED HISTORY: trauma Reason for Exam: fall from standing Acuity: Acute Type of Exam: Initial; ORDERING SYSTEM PROVIDED HISTORY: trauma TECHNOLOGIST PROVIDED HISTORY: trauma Reason for Exam: fall from standing Acuity: Acute Type of Exam: Initial; ORDERING SYSTEM PROVIDED HISTORY: trauma TECHNOLOGIST PROVIDED HISTORY: trauma Reason for Exam: fall from standing Acuity: Acute Type of Exam: Initial FINDINGS: CTA CHEST: Stable cardiomegaly. No pericardial effusion. No mediastinal hematoma or pneumomediastinum. No enlarged mediastinal or hilar lymph nodes. Main pulmonary artery is dilated, measuring up to 4 cm, suggesting pulmonary hypertension. Calcified and noncalcified atherosclerotic plaque of the thoracic aorta. Incidentally noted 4 vessel aortic arch with separate arch origin of the left vertebral artery. Ectatic ascending thoracic aorta measures up to 4 cm in diameter. No acute aortic abnormality. Central airways are clear. No pleural effusion or pneumothorax. No pulmonary contusion or pulmonary laceration. Mild bilateral lower lobe dependent atelectatic changes.  Healing anterolateral left 6th rib fracture. No acute displaced rib fracture or chest wall hematoma. CTA ABDOMEN: No acute traumatic abnormality of the liver, spleen, pancreas, kidneys, or adrenal glands. Normal gallbladder. Stomach, small bowel, and colon are normal in caliber without evidence of obstruction. Normal appendix. Sigmoid diverticulosis without diverticulitis. Calcified and noncalcified aortoiliac atherosclerotic calcification. Abdominal aorta is normal in caliber. No free fluid in the abdomen. CTA PELVIS: Urinary bladder and pelvic organs are normal.  No free fluid in the pelvis. Bilateral common iliac stents are in position. THORACIC/LUMBAR SPINE: BONES/ALIGNMENT: Normal alignment of the thoracic and lumbar spine. Vertebral body heights are maintained. No acute fracture. DEGENERATIVE CHANGES: Multilevel disc narrowing and endplate osteophyte formation. Mild multilevel facet joint degenerative changes. No high-grade, bony spinal canal stenosis. SOFT TISSUES: Paraspinal soft tissues are normal.     No acute traumatic abnormality of the chest, abdomen, or pelvis. Remote, healing anterolateral left 6th rib fracture. No acute osseous abnormality of the thoracic or lumbar spine. Xr Hip 2-3 Vw W Pelvis Right    Result Date: 12/26/2019  EXAMINATION: ONE XRAY VIEW OF THE PELVIS AND TWO XRAY VIEWS RIGHT HIP; TWO XRAY VIEWS OF THE LEFT HIP 12/26/2019 1:37 am COMPARISON: None. HISTORY: ORDERING SYSTEM PROVIDED HISTORY: Trauma/Fracture TECHNOLOGIST PROVIDED HISTORY: AP and cross-table lateral of the hip please, thank you Trauma/Fracture Reason for Exam: fall/trauma Acuity: Acute FINDINGS: The bones are osteopenic. The femoral heads located bilateral.  No acute fracture or dislocation pelvis or hips bilaterally. SI joints are grossly intact. Pubic symphysis is intact. The sacrum is partially obscured by contrast in the bladder. No acute osseous abnormality in the pelvis or hips bilaterally. Osteopenia.

## 2020-01-27 ENCOUNTER — APPOINTMENT (OUTPATIENT)
Dept: CT IMAGING | Age: 60
DRG: 030 | End: 2020-01-27
Payer: MEDICAID

## 2020-01-27 VITALS
HEART RATE: 102 BPM | BODY MASS INDEX: 22.01 KG/M2 | RESPIRATION RATE: 28 BRPM | OXYGEN SATURATION: 100 % | TEMPERATURE: 98 F | WEIGHT: 157.19 LBS | DIASTOLIC BLOOD PRESSURE: 95 MMHG | HEIGHT: 71 IN | SYSTOLIC BLOOD PRESSURE: 143 MMHG

## 2020-01-27 LAB
ABSOLUTE EOS #: 0.15 K/UL (ref 0–0.4)
ABSOLUTE IMMATURE GRANULOCYTE: 0 K/UL (ref 0–0.3)
ABSOLUTE LYMPH #: 1.62 K/UL (ref 1–4.8)
ABSOLUTE MONO #: 0.34 K/UL (ref 0.1–0.8)
ANION GAP SERPL CALCULATED.3IONS-SCNC: 11 MMOL/L (ref 9–17)
BASOPHILS # BLD: 0 % (ref 0–2)
BASOPHILS ABSOLUTE: 0 K/UL (ref 0–0.2)
BUN BLDV-MCNC: 22 MG/DL (ref 6–20)
BUN/CREAT BLD: ABNORMAL (ref 9–20)
CALCIUM SERPL-MCNC: 9.2 MG/DL (ref 8.6–10.4)
CHLORIDE BLD-SCNC: 106 MMOL/L (ref 98–107)
CO2: 28 MMOL/L (ref 20–31)
CREAT SERPL-MCNC: 0.28 MG/DL (ref 0.7–1.2)
DIFFERENTIAL TYPE: ABNORMAL
EOSINOPHILS RELATIVE PERCENT: 3 % (ref 1–4)
GFR AFRICAN AMERICAN: >60 ML/MIN
GFR NON-AFRICAN AMERICAN: >60 ML/MIN
GFR SERPL CREATININE-BSD FRML MDRD: ABNORMAL ML/MIN/{1.73_M2}
GFR SERPL CREATININE-BSD FRML MDRD: ABNORMAL ML/MIN/{1.73_M2}
GLUCOSE BLD-MCNC: 114 MG/DL (ref 75–110)
GLUCOSE BLD-MCNC: 114 MG/DL (ref 75–110)
GLUCOSE BLD-MCNC: 127 MG/DL (ref 70–99)
GLUCOSE BLD-MCNC: 127 MG/DL (ref 75–110)
HCT VFR BLD CALC: 38.3 % (ref 40.7–50.3)
HEMOGLOBIN: 10.9 G/DL (ref 13–17)
IMMATURE GRANULOCYTES: 0 %
LYMPHOCYTES # BLD: 33 % (ref 24–44)
MCH RBC QN AUTO: 24.2 PG (ref 25.2–33.5)
MCHC RBC AUTO-ENTMCNC: 28.5 G/DL (ref 28.4–34.8)
MCV RBC AUTO: 84.9 FL (ref 82.6–102.9)
MONOCYTES # BLD: 7 % (ref 1–7)
MORPHOLOGY: ABNORMAL
MORPHOLOGY: ABNORMAL
NRBC AUTOMATED: 0 PER 100 WBC
PDW BLD-RTO: 20.8 % (ref 11.8–14.4)
PLATELET # BLD: 162 K/UL (ref 138–453)
PLATELET ESTIMATE: ABNORMAL
PMV BLD AUTO: 9.6 FL (ref 8.1–13.5)
POTASSIUM SERPL-SCNC: 4.1 MMOL/L (ref 3.7–5.3)
RBC # BLD: 4.51 M/UL (ref 4.21–5.77)
RBC # BLD: ABNORMAL 10*6/UL
SEG NEUTROPHILS: 57 % (ref 36–66)
SEGMENTED NEUTROPHILS ABSOLUTE COUNT: 2.79 K/UL (ref 1.8–7.7)
SODIUM BLD-SCNC: 145 MMOL/L (ref 135–144)
WBC # BLD: 4.9 K/UL (ref 3.5–11.3)
WBC # BLD: ABNORMAL 10*3/UL

## 2020-01-27 PROCEDURE — 97110 THERAPEUTIC EXERCISES: CPT

## 2020-01-27 PROCEDURE — 70450 CT HEAD/BRAIN W/O DYE: CPT

## 2020-01-27 PROCEDURE — 94761 N-INVAS EAR/PLS OXIMETRY MLT: CPT

## 2020-01-27 PROCEDURE — 2580000003 HC RX 258: Performed by: STUDENT IN AN ORGANIZED HEALTH CARE EDUCATION/TRAINING PROGRAM

## 2020-01-27 PROCEDURE — 97530 THERAPEUTIC ACTIVITIES: CPT

## 2020-01-27 PROCEDURE — 94660 CPAP INITIATION&MGMT: CPT

## 2020-01-27 PROCEDURE — 6370000000 HC RX 637 (ALT 250 FOR IP): Performed by: STUDENT IN AN ORGANIZED HEALTH CARE EDUCATION/TRAINING PROGRAM

## 2020-01-27 PROCEDURE — 36415 COLL VENOUS BLD VENIPUNCTURE: CPT

## 2020-01-27 PROCEDURE — 2700000000 HC OXYGEN THERAPY PER DAY

## 2020-01-27 PROCEDURE — 6360000002 HC RX W HCPCS: Performed by: STUDENT IN AN ORGANIZED HEALTH CARE EDUCATION/TRAINING PROGRAM

## 2020-01-27 PROCEDURE — 2500000003 HC RX 250 WO HCPCS: Performed by: STUDENT IN AN ORGANIZED HEALTH CARE EDUCATION/TRAINING PROGRAM

## 2020-01-27 PROCEDURE — 31720 CLEARANCE OF AIRWAYS: CPT

## 2020-01-27 PROCEDURE — 82947 ASSAY GLUCOSE BLOOD QUANT: CPT

## 2020-01-27 PROCEDURE — 80048 BASIC METABOLIC PNL TOTAL CA: CPT

## 2020-01-27 PROCEDURE — 99232 SBSQ HOSP IP/OBS MODERATE 35: CPT | Performed by: PSYCHIATRY & NEUROLOGY

## 2020-01-27 PROCEDURE — 51701 INSERT BLADDER CATHETER: CPT

## 2020-01-27 PROCEDURE — 99024 POSTOP FOLLOW-UP VISIT: CPT | Performed by: PSYCHIATRY & NEUROLOGY

## 2020-01-27 PROCEDURE — 51798 US URINE CAPACITY MEASURE: CPT

## 2020-01-27 PROCEDURE — 85025 COMPLETE CBC W/AUTO DIFF WBC: CPT

## 2020-01-27 RX ORDER — GABAPENTIN 300 MG/1
300 CAPSULE ORAL 2 TIMES DAILY
Status: ON HOLD | COMMUNITY
End: 2020-02-12 | Stop reason: HOSPADM

## 2020-01-27 RX ORDER — CLOPIDOGREL BISULFATE 75 MG/1
75 TABLET ORAL DAILY
Status: ON HOLD | COMMUNITY
End: 2020-01-27 | Stop reason: HOSPADM

## 2020-01-27 RX ORDER — CILOSTAZOL 100 MG/1
100 TABLET ORAL 2 TIMES DAILY
Status: ON HOLD | COMMUNITY
End: 2020-02-12 | Stop reason: HOSPADM

## 2020-01-27 RX ORDER — DULOXETIN HYDROCHLORIDE 60 MG/1
60 CAPSULE, DELAYED RELEASE ORAL NIGHTLY
Status: ON HOLD | COMMUNITY
End: 2020-02-12 | Stop reason: HOSPADM

## 2020-01-27 RX ORDER — SOTALOL HYDROCHLORIDE 160 MG/1
160 TABLET ORAL 2 TIMES DAILY
Qty: 60 TABLET | Refills: 3 | Status: ON HOLD | OUTPATIENT
Start: 2020-01-27 | End: 2020-02-12 | Stop reason: HOSPADM

## 2020-01-27 RX ORDER — AMLODIPINE BESYLATE 5 MG/1
5 TABLET ORAL DAILY
Status: ON HOLD | COMMUNITY
End: 2020-02-12 | Stop reason: HOSPADM

## 2020-01-27 RX ORDER — ROSUVASTATIN CALCIUM 5 MG/1
5 TABLET, COATED ORAL NIGHTLY
Qty: 30 TABLET | Refills: 3 | Status: SHIPPED | OUTPATIENT
Start: 2020-01-27 | End: 2020-01-27 | Stop reason: HOSPADM

## 2020-01-27 RX ORDER — TRAMADOL HYDROCHLORIDE 50 MG/1
50 TABLET ORAL EVERY 6 HOURS PRN
Status: ON HOLD | COMMUNITY
End: 2020-02-12 | Stop reason: HOSPADM

## 2020-01-27 RX ORDER — OMEPRAZOLE 20 MG/1
20 CAPSULE, DELAYED RELEASE ORAL DAILY
Status: ON HOLD | COMMUNITY
End: 2020-02-12 | Stop reason: HOSPADM

## 2020-01-27 RX ORDER — METOPROLOL TARTRATE 100 MG/1
100 TABLET ORAL 2 TIMES DAILY
Status: ON HOLD | COMMUNITY
End: 2020-01-27 | Stop reason: HOSPADM

## 2020-01-27 RX ORDER — ALBUTEROL SULFATE 90 UG/1
2 AEROSOL, METERED RESPIRATORY (INHALATION) EVERY 4 HOURS PRN
Status: ON HOLD | COMMUNITY
End: 2020-02-12 | Stop reason: HOSPADM

## 2020-01-27 RX ORDER — SODIUM CHLORIDE 450 MG/100ML
INJECTION, SOLUTION INTRAVENOUS CONTINUOUS
Status: DISCONTINUED | OUTPATIENT
Start: 2020-01-27 | End: 2020-01-27

## 2020-01-27 RX ORDER — ATORVASTATIN CALCIUM 40 MG/1
40 TABLET, FILM COATED ORAL DAILY
Qty: 30 TABLET | Refills: 3 | Status: ON HOLD | OUTPATIENT
Start: 2020-01-27 | End: 2020-02-12 | Stop reason: HOSPADM

## 2020-01-27 RX ORDER — M-VIT,TX,IRON,MINS/CALC/FOLIC 27MG-0.4MG
1 TABLET ORAL DAILY
Status: ON HOLD | COMMUNITY
End: 2020-02-12 | Stop reason: HOSPADM

## 2020-01-27 RX ORDER — FOLIC ACID 1 MG/1
1 TABLET ORAL DAILY
Status: ON HOLD | COMMUNITY
End: 2020-02-12 | Stop reason: HOSPADM

## 2020-01-27 RX ORDER — ROSUVASTATIN CALCIUM 5 MG/1
5 TABLET, COATED ORAL NIGHTLY
Qty: 30 TABLET | Refills: 3 | Status: CANCELLED | OUTPATIENT
Start: 2020-01-27

## 2020-01-27 RX ORDER — CEPHALEXIN 500 MG/1
500 CAPSULE ORAL 2 TIMES DAILY
Qty: 1 CAPSULE | Refills: 0 | Status: ON HOLD | OUTPATIENT
Start: 2020-01-27 | End: 2020-02-12 | Stop reason: HOSPADM

## 2020-01-27 RX ORDER — FLUOXETINE 10 MG/1
10 CAPSULE ORAL DAILY
Qty: 30 CAPSULE | Refills: 3 | Status: CANCELLED | OUTPATIENT
Start: 2020-01-28

## 2020-01-27 RX ORDER — ATORVASTATIN CALCIUM 40 MG/1
40 TABLET, FILM COATED ORAL NIGHTLY
Status: ON HOLD | COMMUNITY
End: 2020-01-27 | Stop reason: HOSPADM

## 2020-01-27 RX ORDER — AMANTADINE HYDROCHLORIDE 50 MG/5ML
100 SOLUTION ORAL 2 TIMES DAILY
Qty: 300 ML | Refills: 3 | Status: ON HOLD | OUTPATIENT
Start: 2020-01-27 | End: 2020-02-12 | Stop reason: HOSPADM

## 2020-01-27 RX ADMIN — MODAFINIL 200 MG: 100 TABLET ORAL at 08:54

## 2020-01-27 RX ADMIN — SOTALOL HYDROCHLORIDE 160 MG: 80 TABLET ORAL at 08:50

## 2020-01-27 RX ADMIN — ENOXAPARIN SODIUM 40 MG: 40 INJECTION SUBCUTANEOUS at 08:50

## 2020-01-27 RX ADMIN — CLOPIDOGREL 75 MG: 75 TABLET, FILM COATED ORAL at 08:50

## 2020-01-27 RX ADMIN — Medication 15 ML: at 09:00

## 2020-01-27 RX ADMIN — METOPROLOL TARTRATE 5 MG: 5 INJECTION, SOLUTION INTRAVENOUS at 06:56

## 2020-01-27 RX ADMIN — SODIUM CHLORIDE, PRESERVATIVE FREE 10 ML: 5 INJECTION INTRAVENOUS at 08:50

## 2020-01-27 RX ADMIN — CEPHALEXIN 500 MG: 500 CAPSULE ORAL at 05:19

## 2020-01-27 RX ADMIN — SODIUM CHLORIDE, PRESERVATIVE FREE 10 ML: 5 INJECTION INTRAVENOUS at 09:22

## 2020-01-27 RX ADMIN — AMANTADINE HYDROCHLORIDE 100 MG: 50 SOLUTION ORAL at 14:03

## 2020-01-27 RX ADMIN — CYCLOBENZAPRINE HYDROCHLORIDE 5 MG: 5 TABLET, FILM COATED ORAL at 08:50

## 2020-01-27 RX ADMIN — FLUOXETINE 10 MG: 10 CAPSULE ORAL at 08:50

## 2020-01-27 RX ADMIN — CYCLOBENZAPRINE HYDROCHLORIDE 5 MG: 5 TABLET, FILM COATED ORAL at 14:22

## 2020-01-27 RX ADMIN — CEPHALEXIN 500 MG: 500 CAPSULE ORAL at 00:53

## 2020-01-27 RX ADMIN — AMANTADINE HYDROCHLORIDE 100 MG: 50 SOLUTION ORAL at 08:50

## 2020-01-27 RX ADMIN — ASPIRIN 325 MG: 325 TABLET, COATED ORAL at 08:50

## 2020-01-27 RX ADMIN — CEPHALEXIN 500 MG: 500 CAPSULE ORAL at 12:03

## 2020-01-27 RX ADMIN — SODIUM CHLORIDE: 4.5 INJECTION, SOLUTION INTRAVENOUS at 09:21

## 2020-01-27 RX ADMIN — HYDRALAZINE HYDROCHLORIDE 10 MG: 20 INJECTION INTRAMUSCULAR; INTRAVENOUS at 04:10

## 2020-01-27 RX ADMIN — ANTI-FUNGAL POWDER MICONAZOLE NITRATE TALC FREE: 1.42 POWDER TOPICAL at 09:23

## 2020-01-27 RX ADMIN — INSULIN GLARGINE 25 UNITS: 100 INJECTION, SOLUTION SUBCUTANEOUS at 08:55

## 2020-01-27 RX ADMIN — Medication 100 MG: at 08:49

## 2020-01-27 RX ADMIN — MODAFINIL 100 MG: 100 TABLET ORAL at 16:42

## 2020-01-27 RX ADMIN — LISINOPRIL 20 MG: 20 TABLET ORAL at 08:49

## 2020-01-27 RX ADMIN — FOLIC ACID 1 MG: 1 TABLET ORAL at 08:49

## 2020-01-27 ASSESSMENT — PULMONARY FUNCTION TESTS: PIF_VALUE: 13

## 2020-01-27 NOTE — PROGRESS NOTES
needs   · Monitoring: I&O, Monitor Hemodynamic Status, Weight, Pertinent Labs, TF Intake, TF Tolerance      Electronically signed by Talon Katz MS, RD, LD on 1/27/20 at 12:10 PM    Contact Number: [phone]

## 2020-01-27 NOTE — DISCHARGE SUMMARY
PHYSICAL EXAMINATION        Discharge Vitals:  height is 5' 11\" (1.803 m) and weight is 157 lb 3 oz (71.3 kg). His oral temperature is 98 °F (36.7 °C). His blood pressure is 143/95 (abnormal) and his pulse is 102. His respiration is 28 and oxygen saturation is 100%. Physical Exam  Vitals signs and nursing note reviewed. HENT:      Head: Normocephalic and atraumatic. Eyes:      General:         Right eye: No foreign body, discharge or hordeolum. Left eye: No foreign body, discharge or hordeolum. Conjunctiva/sclera:      Right eye: Right conjunctiva is not injected. No chemosis, exudate or hemorrhage. Left eye: Left conjunctiva is injected. Hemorrhage present. No chemosis or exudate. Pupils: Pupils are equal, round, and reactive to light. Cardiovascular:      Rate and Rhythm: Normal rate. Rhythm irregular. Pulmonary:      Breath sounds: Normal breath sounds. Neurological:      Mental Status: He is alert. GCS: GCS eye subscore is 4. GCS verbal subscore is 1. GCS motor subscore is 5. Cranial Nerves: Cranial nerves are intact. Motor: No abnormal muscle tone. Deep Tendon Reflexes:      Reflex Scores:       Bicep reflexes are 1+ on the right side and 1+ on the left side. Patellar reflexes are 1+ on the right side and 1+ on the left side.      Comments: Intubated with no sedation  Moves all extremities to pain, left more than right  Tone is normal.  Right-sided gaze preference  No subtle signs for seizures or abnormal movements    LABS/IMAGING     Recent Labs     01/25/20  0849 01/26/20  0644 01/27/20  0436 01/27/20  1022   WBC 6.8 6.2  --  4.9   HGB 11.7* 11.0*  --  10.9*   HCT 40.2* 38.9*  --  38.3*    163  121*  --  162   NA  --  143 145*  --    K  --  4.0 4.1  --    CL  --  102 106  --    CO2  --  29 28  --    BUN  --  25* 22*  --    CREATININE  --  0.35* 0.28*  --        Xr Chest (single View Frontal)    Result Date: 1/24/2020  EXAMINATION: ONE XRAY VIEW OF THE CHEST 1/24/2020 11:08 pm COMPARISON: 01/21/2020. HISTORY: ORDERING SYSTEM PROVIDED HISTORY: Short of breath TECHNOLOGIST PROVIDED HISTORY: Short of breath Reason for Exam: upright, sob Acuity: Unknown FINDINGS: The cardiomediastinal silhouette is unremarkable. Aortic vascular calcification. Minimal dependent left basilar atelectasis with interval improvement. The lungs otherwise are clear. No pleural fluid, overt failure or pneumothorax. No acute cardiopulmonary disease. Xr Wrist Right (min 3 Views)    Result Date: 1/3/2020  EXAMINATION: 3 XRAY VIEWS OF THE RIGHT WRIST 1/3/2020 11:21 am COMPARISON: December 25, 2019 HISTORY: ORDERING SYSTEM PROVIDED HISTORY: Trauma/Fracture TECHNOLOGIST PROVIDED HISTORY: Trauma/Fracture Reason for Exam: port trauma FINDINGS: Scapholunate widening again demonstrated measuring 38 mm. Nondisplaced distal radius fracture with involvement of the articular surface and radial styloid is noted. Well corticated ossicles about the ulnar styloid are noted. Mild soft tissue swelling. Nondisplaced fracture involving the radial styloid and articular surface. Unchanged scapholunate widening suggestive of ligamentous injury. Xr Hand Left (2 Views)    Result Date: 12/29/2019  EXAMINATION: TWO XRAY VIEWS OF THE LEFT HAND 12/29/2019 2:01 pm COMPARISON: December 25, 2019 HISTORY: ORDERING SYSTEM PROVIDED HISTORY: Left 1st metacarpal fracture. TECHNOLOGIST PROVIDED HISTORY: Left 1st metacarpal fracture. FINDINGS: The splint has been removed. Slight improved alignment of the comminuted mildly displaced fracture in the base of the 1st metacarpal.  No new osseous abnormality identified. Mild soft tissue swelling is noted.      Slight improved aeration of the comminuted mildly displaced fracture of the base of the 1st metacarpal.     Xr Hand Left (min 3 Views)    Result Date: 1/17/2020  EXAMINATION: THREE XRAY VIEWS OF THE LEFT HAND 1/16/2020 11:56 pm COMPARISON: Left Dose modulation, iterative reconstruction, and/or weight based adjustment of the mA/kV was utilized to reduce the radiation dose to as low as reasonably achievable. COMPARISON: January 8, 2020 HISTORY: ORDERING SYSTEM PROVIDED HISTORY: Stability CTH TECHNOLOGIST PROVIDED HISTORY: Stability CTH FINDINGS: Chronic left subdural collection may be minimally increased in size now measuring up to 19 mm in thickness compared with 14 mm on the previous study. Small focus of acute left subdural hemorrhage is suspected in the left frontal region series 2, image 40. Midline shift towards the right of approximately 4 mm is similar to the previous study. Moderate prominence of the ventricles and sulci is consistent with atrophy. Scattered areas of hypodensity consistent with subacute infarcts are again seen. No acute orbital abnormality. No acute soft tissue abnormality. No acute skull fracture. No significant paranasal sinus disease. Chronic left subdural collection has slightly increased. Small component of acute blood products are seen within the left frontal region. Mild mass effect and midline shift are similar to the previous study. Evolving subacute infarcts. Ct Head Wo Contrast    Result Date: 1/8/2020  EXAMINATION: CT OF THE HEAD WITHOUT CONTRAST  1/8/2020 2:27 pm TECHNIQUE: CT of the head was performed without the administration of intravenous contrast. Dose modulation, iterative reconstruction, and/or weight based adjustment of the mA/kV was utilized to reduce the radiation dose to as low as reasonably achievable. COMPARISON: CT brain performed 01/08/2020. HISTORY: ORDERING SYSTEM PROVIDED HISTORY: mental status change, focal weakness, post ICA stenting TECHNOLOGIST PROVIDED HISTORY: mental status change, focal weakness, post ICA stenting FINDINGS: BRAIN/VENTRICLES: There is redemonstration of left subdural fluid collection that is similar compared to prior.   There is redemonstration of frontal and parietal Circumferential mucosal thickening right maxillary sinus. No fluid levels. SOFT TISSUES/SKULL:  No acute osseous abnormality or focal soft tissue lesion/abnormality. Ongoing maturation of subarachnoid and subdural blood products without new hemorrhage. Minimal intraventricular blood products, with expected maturation and slight decrease in overall volume. Ir Angiogram Carotid C Erebral Bilateral    Result Date: 1/12/2020  Date of Service: 1/8/2020 Diagnosis: Symptomatic critical right ICA stenosis Patient arrived to the angio suite at: 0710 Puncture obtained at: [id number] Vascular access was removed at: 0910  Procedure:  1. Transarterial right carotid artery pre and post balloon angioplasty and stenting for symptomatic stenosis 2. Selective right common carotid artery cerebral angiograms 3. Selective right common carotid artery cervical angiograms 4. Right common femoral artery angiogram 5. Continuous IA nicardipine infusion throughout the case to reduce the likelihood of spasm. Vessels catheterized: 1. Right common carotid artery 2. Right internal carotid artery 3. Left common carotid artery 4. Left internal carotid artery 5. Right common femoral artery Neurointerventionalist:  Michael Bonilla MD. Cody: Bam James Indication and Clinical History: 60 yo male with history of atrial fibrillation on Eliquis, bilateral ICA stenosis (right more than left), DM2, HLD, HTN, CAD s/p PCI stents, PVD, CEA, and ETOH abuse who presented a week ago as a transfer from [facility] as a trauma alert for CT head revealed diffuse bilateral SAH and left parietal SDH. He was given [facility] and [place] and transferred to [facility] for higher level of care. ?recent cerebral angiogram showed ICAD disease and severe right ICA stenosis. This am he had an event with hypotension  to the 70s SBP and change in the mental status and then early morning had a right gaze and right sided weakness. Contrast:  98 cc of Visipaque 270. Fluoroscopy time: 24.2 minutes Consent: After explaining the risks and benefits to the patient and the family including but not limited to stroke, death, visceral injury, dissection, tear, occlusion, x-ray dye allergic reaction, infection, consent form was obtained. Anesthesia:  General anesthesia Local Anesthesia with Lidocaine. Procedure and findings: The patient was brought to the interventional suite and placed in the supine position on the angio table. The right groin region was prepped, and cleaned in sterile fashion. The right common femoral artery was accessed using a micropuncture kit and 6 [place] x 90 cm shuttle sheath was placed. 70 units/kg of Heparin was administered for a target ACT of 250-300. 10 mg of nicardipine was infused through the Shuttle sheath to reduce the likelihood of spasm. A select cathter was advanced inside the 90 cm sheath under fluoroscopic guidance into the left common carotid artery and images were obtained in biplane projection of the cervical carotid. Left CCA technique: The left common carotid artery was selectively catheterized under fluoroscopic guidance and digital subtraction angiography images were obtained in biplane projections of the left cervical common carotid artery. Interpretation: The left common carotid artery injection demonstrates Left cervical stenosis measuring approximately 20% at the carotid ICA bulb with postsurgical changes noted from prior endarterectomy. Otherwise normal antegrade flow into the external and internal carotid arteries with normal filling of the external carotid artery branches. Left ICA technique: The left internal carotid artery was then selectively catheterized, and digital subtraction angiography of the intracranial left internal carotid artery circulation was performed in frontal and lateral projections. Interpretation: There is diffuse atherosclerotic disease noted in the left cavernous ICA/left RUSTY and?distal?left MCA M1.  There is increased?vascularity?noted in the distal left RUSTY pericallosal branch with early venous drainage of unclear etiology. There is decreased parenchymal blush in the left MCA parietal region corresponding to the hypodensity noted on prior CT head. Inspection of the remaining left internal carotid artery circulation revealed no other evidence of cerebral aneurysm, arteriovenous malformation. Capillary and venous phase images were also unremarkable, with no evidence of veno-occlusive disease. A select catheter was advanced inside the 90cm sheath, under fluoroscopic guidance into the right common carotid artery and images were obtained in biplane projection of the cervical carotid. Baseline images of the right intracranial circulation were obtained in standard anterior and lateral projections. The 6F long sheath was advanced over the catheter into the distal right common carotid artery under fluoroscopic guidance and the select catheter was removed. After measuring the carotid artery distal to the sub-occlusive stenosis which is measuring approximately 99% stenosis, pre balloon angioplasty with digna 3 x 20 mm was performed up to 6 ELOINA across the stenosis. Then an Accunet was advanced through the  high grade stenosis and deployed for distal embolic protection. The Wallstent was then advanced across the stenotic lesion and deployed successfully. Postplasty was performed using 4.5 x 20 mm Digna balloon. Final images were reviewed. The follow-up post procedure common carotid artery cerebral angiogram showed no evidence of distal embolization. The 6 [place] shuttle sheath was then repositioned within the right external iliac artery, and digital subtraction angiography of the right common femoral artery was performed in a frontal oblique projection. Arterial phase images were unremarkable, with no evidence of arterial stenosis, dissection, or pseudoaneurysm.  The arterial puncture site was well placed above the femoral Heart size is top-normal.  No vascular congestion, effusion or pneumothorax is noted. Mild bibasilar atelectasis persists. An intestinal tube extends beyond the body of the stomach, tip not included. Osseous and mediastinal structures are age-appropriate. Continued bibasilar atelectasis. Support tubes and lines as above. Xr Chest Portable    Result Date: 1/15/2020  EXAMINATION: ONE XRAY VIEW OF THE CHEST 1/15/2020 5:03 am COMPARISON: Chest radiograph 01/14/2020 HISTORY: ORDERING SYSTEM PROVIDED HISTORY: intubated TECHNOLOGIST PROVIDED HISTORY: intubated Reason for Exam: uprt port Acuity: Unknown Type of Exam: Unknown FINDINGS: Multiple monitor wires overlie the patient's chest.  Endotracheal and nasogastric tube are in unchanged position. Normal heart size. Mild bibasilar atelectasis. No pleural effusion or pneumothorax. Stable support tubes. Mild bibasilar atelectasis. Xr Chest Portable    Result Date: 1/14/2020  EXAMINATION: ONE XRAY VIEW OF THE CHEST 1/14/2020 5:58 am COMPARISON: Chest radiograph 01/13/2020 HISTORY: ORDERING SYSTEM PROVIDED HISTORY: intubated TECHNOLOGIST PROVIDED HISTORY: intubated FINDINGS: Endotracheal tube is in unchanged position. Normal heart size. Normal pulmonary vascularity. Mild bibasilar atelectasis is unchanged. No evidence of pleural effusion or pneumothorax. Endotracheal tube is in unchanged position. Mild bibasilar atelectasis. Xr Chest Portable    Result Date: 1/13/2020  EXAMINATION: ONE XRAY VIEW OF THE CHEST 1/13/2020 5:59 am COMPARISON: 01/12/2020 HISTORY: ORDERING SYSTEM PROVIDED HISTORY: intubated TECHNOLOGIST PROVIDED HISTORY: intubated Reason for Exam: uprt port Acuity: Unknown Type of Exam: Unknown FINDINGS: Lines and tubes stable. The lungs are without acute focal process. There is no effusion or pneumothorax. The cardiomediastinal silhouette is stable. The osseous structures are stable. No acute process.  Stable exam.     Xr Chest Portable    Result Date: 1/12/2020  EXAMINATION: ONE XRAY VIEW OF THE CHEST 1/12/2020 6:06 am COMPARISON: Chest radiograph 01/11/2020 HISTORY: ORDERING SYSTEM PROVIDED HISTORY: intubated TECHNOLOGIST PROVIDED HISTORY: intubated Reason for Exam: [facility] care   upright port FINDINGS: Endotracheal tube and nasogastric tube are in unchanged position. Normal heart size. Normal pulmonary vascularity. No focal consolidation. No pleural effusion or pneumothorax. Stable support tubes. No acute cardiopulmonary process. Xr Chest Portable    Result Date: 1/11/2020  EXAMINATION: ONE XRAY VIEW OF THE CHEST 1/11/2020 5:41 am COMPARISON: Chest radiograph 01/10/2020 HISTORY: ORDERING SYSTEM PROVIDED HISTORY: intubated TECHNOLOGIST PROVIDED HISTORY: intubated FINDINGS: Multiple monitor wires overlie the patient's chest.  Endotracheal tube is in unchanged position. Normal heart size. Normal pulmonary vascularity. No focal consolidation. Previously noted right basilar atelectasis has improved. No pleural effusion or pneumothorax. Previously noted right basilar atelectasis has improved. No acute cardiopulmonary process. Xr Chest Portable    Result Date: 1/10/2020  EXAMINATION: ONE XRAY VIEW OF THE CHEST 1/10/2020 5:47 am COMPARISON: Chest portable January 8, 2020 at 0237 hours. HISTORY: ORDERING SYSTEM PROVIDED HISTORY: intubated TECHNOLOGIST PROVIDED HISTORY: intubated Reason for Exam: uprt port Acuity: Unknown Type of Exam: Unknown FINDINGS: Endotracheal tube is noted in place with the distal tip located 5.7 cm superior to the alexey. Nasogastric tube is noted with distal tip coiling within the region of the body of the stomach. The heart is normal in size and configuration. The mediastinal contours are within normal limits. Mild right basilar subsegmental atelectasis versus airspace disease is present. The lungs are otherwise well aerated.   The pleural surfaces are normal and no evidence of a pleural effusion is seen. Bones and soft tissues are unremarkable. 1. Recommend advancement of endotracheal tube 1.0 cm. 2. Mild right basilar subsegmental atelectasis versus airspace disease. Xr Chest Portable    Result Date: 1/8/2020  EXAMINATION: ONE XRAY VIEW OF THE CHEST 1/8/2020 2:35 am COMPARISON: 01/07/2020 HISTORY: ORDERING SYSTEM PROVIDED HISTORY: new intubation TECHNOLOGIST PROVIDED HISTORY: new intubation Reason for Exam: port Upright /  post intubation Acuity: Acute FINDINGS: An endotracheal tube has been placed with the tip 5 cm above the alexey. Enteric tube traverses the esophagus with the tip and side hole in the stomach. There is minimal left basilar airspace disease. The lungs are otherwise clear. The heart size is within normal limits. There is no discernible pneumothorax. 1. The endotracheal tube tip is 5 cm above the alexey. 2. Left basilar atelectasis or pneumonia. Xr Chest Portable    Result Date: 1/8/2020  EXAMINATION: ONE XRAY VIEW OF THE CHEST 1/7/2020 10:56 pm COMPARISON: January 7, 2020, 09:31 HISTORY: ORDERING SYSTEM PROVIDED HISTORY: respiratory distress TECHNOLOGIST PROVIDED HISTORY: respiratory distress Reason for Exam: upr,resp distress FINDINGS: Enteric tube with the tip within the stomach. No focal consolidation. Minimal pleural effusions are suspected. Borderline cardiomegaly. No pulmonary edema. Minimal pleural effusions are suspected. Xr Chest Portable    Result Date: 1/7/2020  EXAMINATION: ONE XRAY VIEW OF THE CHEST 1/7/2020 9:39 am COMPARISON: Chest radiograph performed 01/04/2020. HISTORY: ORDERING SYSTEM PROVIDED HISTORY: intubated TECHNOLOGIST PROVIDED HISTORY: intubated FINDINGS: There is no acute consolidation or effusion. There is no pneumothorax. Mediastinal structures are unremarkable. The upper abdomen is unremarkable. The extrathoracic soft tissues are unremarkable. There is an endotracheal tube with the tip in the midtrachea.   There is a gastric tube December 27, 2019 HISTORY: ORDERING SYSTEM PROVIDED HISTORY: intubated, eval acute process TECHNOLOGIST PROVIDED HISTORY: intubated, eval acute process FINDINGS: The endotracheal tube terminates 4 cm above the alexey. Overall improved aeration of the lungs with minimal linear opacities remaining. Relative elevation of the left hemidiaphragm is noted. Small left effusion may be present. No pneumothorax identified. Surgical clips project in the left neck. Overall improved aeration of the lungs. Unchanged positioning of the endotracheal tube. Cta Head Neck W Contrast    Result Date: 1/8/2020  EXAMINATION: CTA OF THE HEAD AND NECK WITH CONTRAST 1/8/2020 5:19 am: TECHNIQUE: CTA of the head and neck was performed with the administration of intravenous contrast. Multiplanar reformatted images are provided for review. MIP images are provided for review. Stenosis of the internal carotid arteries measured using NASCET criteria. Dose modulation, iterative reconstruction, and/or weight based adjustment of the mA/kV was utilized to reduce the radiation dose to as low as reasonably achievable. COMPARISON: CT head without contrast January 8, 2020 at 1226 hours. CT angiogram head and neck with contrast January 6, 2020. MRI brain without and with contrast December 30, 2019. HISTORY: ORDERING SYSTEM PROVIDED HISTORY: r/o CVA, GCS < 6 gaze palsy TECHNOLOGIST PROVIDED HISTORY: r/o CVA, GCS < 6 gaze palsy Reason for Exam: cva, GCS Acuity: Unknown Type of Exam: Unknown FINDINGS: CTA NECK: AORTIC ARCH/ARCH VESSELS: No dissection or arterial injury. No significant stenosis of the brachiocephalic or subclavian arteries. CAROTID ARTERIES: Long segment atherosclerotic plaque is seen involving the common carotid arteries resulting in up to 50% left and 30% right luminal stenosis.   Atherosclerotic plaque and calcification is seen involving the right carotid bifurcation resulting in approximately 90% luminal stenosis of the origin of the right internal carotid artery which extends throughout the length of the vessel to the supraclinoid segment. Multifocal surgical clips are seen surrounding the left internal carotid artery. Focal narrowing of the left ICA cavernous and supraclinoid segment is seen with approximately 70% narrowing of the vessel. VERTEBRAL ARTERIES: Focal high-grade stenosis of the distal left vertebral artery near the confluence of the basilar artery is seen secondary to encroachment by atherosclerotic calcification. Suspected focal high-grade stenosis secondary to atherosclerotic calcification is noted involving the origin of the right vertebral artery. SOFT TISSUES: The lung apices are clear. No cervical or superior mediastinal lymphadenopathy. The larynx and pharynx are unremarkable. No acute abnormality of the salivary and thyroid glands. BONES: No acute osseous abnormality. CTA HEAD: ANTERIOR CIRCULATION and POSTERIOR CIRCULATION: Diffuse hypoplastic appearance of the anterior cerebral arteries, middle cerebral arteries, posterior cerebral arteries and branches is noted, possibly congenital in etiology. OTHER: No dural venous sinus thrombosis on this non-dedicated study. BRAIN: Left greater than right acute on subacute subdural hemorrhages are seen without interval change in volume noted. Posterior left temporal multifocal encephalomalacia is suspected. Scattered bilateral cerebral multifocal acute subarachnoid hemorrhage is again visualized without interval change. 1. Unchanged diffuse approximately 90% stenosis of the right internal carotid artery stenting from its origin to the supraclinoid segment, without interval change. Differential diagnostic considerations include sequela of severe atherosclerotic disease versus a remote dissection.  2. Focal narrowing of the left internal carotid artery involving the cavernous and supraclinoid segment approximately 70% luminal stenosis, without significant of Exam: Unknown FINDINGS: CTA NECK: AORTIC ARCH/ARCH VESSELS: Atherosclerotic disease involving aortic arch and bilateral subclavian arteries. Approximately 50% stenosis involving the left proximal subclavian artery. No flow-limiting stenosis involving the right subclavian artery. CAROTID ARTERIES: Atherosclerotic disease involving bilateral common carotid arteries, left greater than right. Atherosclerotic disease at bilateral carotid bifurcation. There is a severe greater than 90% stenosis involving the proximal right internal carotid artery, with diffuse narrowing of the entire right internal carotid artery. Unchanged. Postsurgical changes at the left carotid bifurcation, with no significant stenosis by NASCET criteria. Unchanged. No discrete dissection flap is identified. VERTEBRAL ARTERIES: Moderate stenosis involving origin of right vertebral artery. Moderate to severe stenosis involving the proximal left vertebral artery just distal to the origin. No additional significant stenosis involving the vertebral artery within the cervical segment. Atherosclerotic calcifications are present bilaterally, left greater than right. No acute dissection flap is identified. SOFT TISSUES: Scattered multifocal ground-glass pulmonary opacities at the lung apices. Fluid and secretions within the trachea adjacent to endotracheal tube. Limited evaluation of soft tissue structures of the neck in presence of endotracheal tube and enteric tube. BONES: Multilevel degenerative changes of the cervical spine. Reversal of the cervical lordosis. CTA HEAD: ANTERIOR CIRCULATION: Moderate stenosis involving petrous segment of left internal carotid artery. Severe stenosis involving proximal cavernous segment of the left internal carotid artery. Moderate stenosis involving the clinoid and supraclinoid segment of the left internal carotid artery. Distal cervical segment of the right internal carotid artery is extremely small.   Severe diffuse stenosis involving the petrous segment of the right internal carotid artery, and cavernous segment of the right internal carotid artery. Moderate to severe stenosis involving the clinoid and supraclinoid segment of the right internal carotid artery. Multifocal stenosis involving the bilateral middle cerebral arteries, without large vessel occlusion. Multifocal moderate stenosis involving the bilateral anterior cerebral arteries. No evidence of aneurysm. POSTERIOR CIRCULATION: Moderate multifocal stenosis involving bilateral distal vertebral arteries. Mild diffuse narrowing of the basilar artery. Multifocal moderate stenosis involving bilateral posterior cerebral arteries. No evidence of focal occlusion. No evidence of aneurysm. BRAIN: Low-density collection along the left cerebral convexity measures 17 mm. Mild mass effect on the left cerebral convexity. There is 4 mm midline shift to the right. Nonspecific bilateral mastoid opacification. Severe mucosal thickening within right maxillary sinus. Moderate mucosal thickening within remainder of paranasal sinuses. The orbits are unremarkable. 1. New low-density extra-axial collection along the left cerebral convexity measuring 14 mm, with new 4 mm midline shift to the right. This is partially evaluated due to lack of noncontrast CT images. 2. Moderate stenosis involving petrous segment of left internal carotid artery. Severe stenosis involving proximal cavernous segment of left internal carotid artery. Moderate stenosis involving clinoid and supraclinoid segment of left internal carotid artery. 3. Severe diffuse stenosis involving petrous segment of right internal carotid artery, and cavernous segment of right renal carotid artery. Moderate to severe stenosis involving clinoid and supraclinoid segment of the right internal carotid artery.  4. Moderate multifocal stenosis involving anterior cerebral arteries, middle cerebral arteries, and posterior cerebral arteries, without evidence of large vessel occlusion. Appearance is reasonably similar to the previous examination on December 25, 2019. 5. Unchanged appearance of greater than 90% stenosis involving proximal right internal carotid artery, with diffuse severe narrowing of remainder of right internal carotid artery. This extends into intracranial segments. 6. Postsurgical changes at the left carotid bifurcation, without significant flow-limiting stenosis. 7. Moderate stenosis at origin of right vertebral artery. Moderate to severe stenosis at the proximal left vertebral artery, just distal to the origin. 8. Approximately 50% stenosis involving the left proximal subclavian artery. 9. Scattered multifocal ground-glass pulmonary opacities at both lung apices. Fluid density seen within the trachea adjacent to the endotracheal tube. The findings were sent to the Radiology Results [address] to be communicated to a licensed caregiver. RECOMMENDATIONS: Follow-up CT head without contrast. Follow-up conventional angiogram, if treatment for stenosis is planned. Mri Brain W Wo Contrast    Result Date: 12/30/2019  EXAMINATION: MRI OF THE BRAIN WITHOUT AND WITH CONTRAST  12/30/2019 3:10 pm TECHNIQUE: Multiplanar multisequence MRI of the head/brain was performed without and with the administration of intravenous contrast. COMPARISON: None. HISTORY: ORDERING SYSTEM PROVIDED HISTORY: SAH, r/o CVA TECHNOLOGIST PROVIDED HISTORY: SWI SAH, r/o CVA FINDINGS: INTRACRANIAL STRUCTURES/VENTRICLES:  Ventricles are mildly prominent. Ventricles are midline. Intraventricular heterogeneous signal is noted compatible with intraventricular blood. This is greatest on the right. Small foci of high T2 and FLAIR signal are noted in the frontal periventricular and subcortical white matter bilaterally. Multifocal sulcal high FLAIR signal is noted compatible with diffuse subarachnoid hemorrhage. This is greater on the right. 1/9/2020  EXAMINATION: MRI OF THE BRAIN WITHOUT CONTRAST  1/9/2020 9:59 am TECHNIQUE: Multiplanar multisequence MRI of the brain was performed without the administration of intravenous contrast. COMPARISON: CT brain performed 01/08/2020. HISTORY: ORDERING SYSTEM PROVIDED HISTORY: eval for ischemia TECHNOLOGIST PROVIDED HISTORY: eval for ischemia FINDINGS: INTRACRANIAL STRUCTURES/VENTRICLES: The sellar and suprasellar structures, optic chiasm, corpus callosum, pineal gland, tectum, and midline brainstem structures are unremarkable. The craniocervical junction is unremarkable. There is redemonstration of a subdural fluid collection adjacent to the left cerebral hemisphere. There is minimal rightward midline shift. Scattered FLAIR signal abnormalities with associated restricted diffusion are seen throughout the frontal, parietal, and temporal lobes bilaterally consistent with ischemia. There is a mild amount of subarachnoid hemorrhage within the right frontal, parietal, and temporal lobes. There is mild subarachnoid hemorrhage in the left parietal and temporal lobes. A small amount of hemorrhagic products are seen in the dependent portion of the ventricles. The infratentorial structures are otherwise unremarkable. ORBITS: The visualized portion of the orbits demonstrate no acute abnormality. SINUSES: There is chronic sinusitis most pronounced in the right maxillary sinus and right sphenoid sinus. BONES/SOFT TISSUES: The bone marrow signal intensity appears normal. The soft tissues demonstrate no acute abnormality. Stable left subdural fluid collection and minimal rightward midline shift. Scattered FLAIR signal abnormalities and restricted diffusion within the frontal, parietal, and temporal lobes bilaterally consistent with ischemia. Bilateral subarachnoid hemorrhage and intraventricular hemorrhagic products as described above.   This is similar compared to prior exam.     Vl Transcranial Doppler ERGOCALCIFEROL  Take 1 capsule by mouth once a week for 8 doses        CHANGE how you take these medications    atorvastatin 40 MG tablet  Commonly known as:  Lipitor  Take 1 tablet by mouth daily  What changed:  when to take this        CONTINUE taking these medications    albuterol sulfate  (90 Base) MCG/ACT inhaler     amLODIPine 5 MG tablet  Commonly known as:  NORVASC     apixaban 5 MG Tabs tablet  Commonly known as:  ELIQUIS     Basaglar KwikPen 100 UNIT/ML injection pen  Generic drug:  insulin glargine     cilostazol 100 MG tablet  Commonly known as:  PLETAL     DULoxetine 60 MG extended release capsule  Commonly known as:  CYMBALTA     folic acid 1 MG tablet  Commonly known as:  FOLVITE     gabapentin 300 MG capsule  Commonly known as:  NEURONTIN     omeprazole 20 MG delayed release capsule  Commonly known as:  PRILOSEC     therapeutic multivitamin-minerals tablet     traMADol 50 MG tablet  Commonly known as:  ULTRAM        STOP taking these medications    clopidogrel 75 MG tablet  Commonly known as:  PLAVIX     metoprolol 100 MG tablet  Commonly known as:  LOPRESSOR           Where to Get Your Medications      These medications were sent to [facility], [address] [place] [address] [phone] - F [phone]  [address] [address]    Phone:  [phone]   · amantadine 50 MG/5ML solution  · aspirin 325 MG EC tablet  · atorvastatin 40 MG tablet  · cephALEXin 500 MG capsule  · sotalol 160 MG tablet     You can get these medications from any pharmacy    Bring a paper prescription for each of these medications  · vitamin D 1.25 MG (86334 UT) Caps capsule       Diet: DIET TUBE FEED CONTINUOUS/CYCLIC NPO; Diabetic; Nasogastric; Continuous; 25; 80; 24 diet as tolerated  Activity: Provided in AVS  Follow-up:  Neurosurgery 2 weeks, PCP  Time Spent for discharge: 45 minutes    Dirk Bautista MD  1/27/2020, 5:40 PM

## 2020-01-27 NOTE — ADT AUTH CERT
Utilization Reviews         Subarachnoid Hemorrhage, Nonsurgical Treatment - Care Day 29 (1/27/2020) by Sarah Rowe RN         Review Status Review Entered   Completed 1/27/2020 15:28       Criteria Review      Care Day: 34 Care Date: 1/27/2020 Level of Care:    Guideline Day 3    Clinical Status    (X) * Mental status stable or at baseline    (X) * Neurologic deficit absent or stable    (X) * Mechanical ventilation absent    ( ) * Cardiac monitoring absent    Activity    ( ) * Up to chair    Routes    (X) * Clear liquid diet    (X) IV or oral hydration, medications    Interventions    (X) DVT prophylaxis    (X) Glucose control    (X) Physical therapy [E]    Medications    ( ) * Sedation absent    (X) Antihypertensive as indicated    * Milestone   Additional Notes   CARE DAY 34    1/27/20         NEURO CRITICAL CARE      INTERVAL HISTORY    The patient is a 62 yo male with history of atrial fibrillation on Eliquis, bilateral ICA stenosis (right more than left), DM2, HLD, HTN, CAD s/p PCI stents, PVD, CEA, and ETOH abuse who presents as a transfer from [facility] as a trauma alert for CT head revealed diffuse bialteral SAH and left parietal SDH.  He was given [place] and transferred to [facility] for higher level of care.          Patient was found confused by family at his home after being unable to reach him on the phone.  He was complaining of headache, and actively vomiting.  Also had bilateral arm scrapes and bruises concerning for recent fall, patient himself does not member falling, said around 4 PM he woke up and felt frontal and vertex headache and neck pain, and felt nauseous and started throwing up.  Endorses drinking alcohol last night states that he had 2 beers.  Per family has significant alcohol abuse history and has had multiple falls in the past.       On presentation in the ED [facility], patient mildly lethargic but oriented x4, complaining of headache, nausea, mild vertigo, left arm Renal   output +12 L       Heme/ID   Afebrile    Positive for UTI, Keflex started 1/24/2020 x5 days.        MS   Nondisplaced mildly angulated left first metacarpal fracture. Splinted by orthopedics   Follow-up with orthopedics       LDAs:   PEG, PIV       DVT PPX: Lovenox 40 SQ qD   GI PPX: Pepcid 20 mg IV twice daily         ENDOVASC NEUROLOGY NOTE          PLAN:   1. Traumatic subarachnoid hemorrhage management per neuro ICU. 2. Blood pressure systolic goal after the procedure is 100-140.   3. S/p right ICA Wallstent placement. 4. Continue dual antiplatelet therapy with aspirin 325 mg, and Plavix 75 mg daily. 5. Follow-up in the neuro endovascular ending in 2 weeks after discharge and with Dr. Carrie Lock in 3 months.    6. Outpatient CTA neck at 3 months.       01/26/20 0901   Vitals   Pulse 110   Resp (!) 32   BP (!) 142/92   MAP (mmHg) 106      01/26/20 1201   Vitals   Temp 97.9 °F (36.6 °C)   Pulse 88   Resp (!) 37      01/27/20 0804   Vitals   Temp 97.7 °F (36.5 °C)   Temp Source Oral   Pulse 110   Heart Rate Source Monitor   Resp 27   BP (!) 123/93   MAP (mmHg) 97   BP Location Left upper arm   Patient Position Supine   Level of Consciousness 0   MEWS Score 3   Patient Currently in Pain Other (comment)   Oxygen Therapy   SpO2 97 %   Pulse Oximeter Device Mode Continuous   O2 Device Nasal cannula   FiO2 30 %   O2 Flow Rate (L/min) 4 L/min          TRANSITIONAL PLANNING: SNF PLACEMENT

## 2020-01-27 NOTE — PROGRESS NOTES
with mod A+1   Patient Goals   Patient goals : pt unable to state goal    Plan    Plan  Times per week: 5 visits weekly  Times per day: Daily  Current Treatment Recommendations: Strengthening, Transfer Training, Endurance Training, Patient/Caregiver Education & Training, ROM, Balance Training, Gait Training, Home Exercise Program, Functional Mobility Training, Safety Education & Training, Wheelchair Mobility Training, Positioning  Safety Devices  Type of devices: Call light within reach, Patient at risk for falls, Left in bed, Nurse notified  Restraints  Initially in place: No     Therapy Time   Individual Concurrent Group Co-treatment   Time In 0845         Time Out 0910         Minutes 25         Timed Code Treatment Minutes: Sarai 0950, PT

## 2020-01-27 NOTE — PROGRESS NOTES
Daily Progress Note  Neuro Critical Care        INTERVAL HISTORY    The patient is a 62 yo male with history of atrial fibrillation on Eliquis, bilateral ICA stenosis (right more than left), DM2, HLD, HTN, CAD s/p PCI stents, PVD, CEA, and ETOH abuse who presents as a transfer from [facility] as a trauma alert for CT head revealed diffuse bialteral SAH and left parietal SDH. He was given [place] and transferred to [facility] for higher level of care. Patient was found confused by family at his home after being unable to reach him on the phone. He was complaining of headache, and actively vomiting. Also had bilateral arm scrapes and bruises concerning for recent fall, patient himself does not member falling, said around 4 PM he woke up and felt frontal and vertex headache and neck pain, and felt nauseous and started throwing up. Endorses drinking alcohol last night states that he had 2 beers. Per family has significant alcohol abuse history and has had multiple falls in the past.     On presentation in the ED [facility], patient mildly lethargic but oriented x4, complaining of headache, nausea, mild vertigo, left arm weaker than right, bilateral leg weakness. Significant interdisciplinary discussion between the neurosurgeon, radiologist, trauma surgeon and stroke specialist about whether  bleed is consistent with traumatic versus spontaneous subarachnoid. Stroke specialist has significant concern for severe stenosis, suspects traumatic bleed and that patient is at risk for vasospasm. In addition has intracranial left vertebral artery athero-stenosis. Consensus management strategy is to keep the patient 130-160 for blood pressure goals to avoid right MCA watershed stroke.      No aneurysm found on CTA or malformation-critical stenosis of the proximal left vertebral artery, critical stenosis of the proximal right cervical ICA, severe near critical stenosis of the right cavernous ICA, moderate stenosis of the left cavernous ICA. Non con CT head repeat at our emergency facility showed subarachnoid, predominantly in the bilateral sylvian fissures and bilateral cerebral hemisphere sulcal I, acute left parietal convexity subdural hematoma 4 mm in thickness without mass-effect. Hospital Course:   12/27: CT head stable  12/28: Intubated overnight for respiratory distress, started on Cardizem infusion for atrial fibrillation with RVR, weaned off and Sotalol started. Versed for sedation with concern for possible alcohol withdrawal.  12/29: Cdiff negative. patient noted to have left upper extremity tremor, placed on LTME. Left hand cool to touch with delayed capillary refill, thumb spica splint removed to assess neurovascular status. With splint off, radial and ulnar pulses palpable and assessed via doppler. Capillary refill improved as did warmth and color. Ortho called to replace splint. Febrile this AM - infectious workup sent. 12/30: LTME showing diffuse slowing and disorganized background suggesting moderate encephalopathy. Started on Vanc/zosyn  12/31: Versed started for sedation, bradycardia noted with Precedex. 1/1: TCD normal. Failed SBT due to tachypnea  1/2: Sotalol increased to 160 mg BID. Zosyn started for CXR concerning for RLL pneumonia. 1/3: Failed SBT.  1/4: Tolerated CPAP trial better. 1/5: 8 beats of V. Tach - resolved spontaneously. 1/7: Extubated to room air  1/8: Respiratory distress with tachypnea, re-intubated. Found to have right gaze with right sided weakness, loaded with 2 grams Keppra. Went to cerebral angiogram for right ICA stenting. 1/9: EEG showing diffuse encephalopathy, no seizures. Will discontinue EEG and reduce Keppra to 500 mg BID. Will discontinue Librium today. MRI brain revealed bilateral MCA territory and left RUSTY territory infarcts.  Provigil dosing increased to bid  1/11: Failed SBT  1/12: Family meeting rescheduled to 1/14 1/13: failed weaning due no sedation  Moves all extremities to pain, left more than right  Tone is normal.  Right-sided gaze preference  No subtle signs for seizures or abnormal movements           INTAKE/OUTPUT & DRAINS   24 HOUR INTAKE/OUTPUT:    Intake/Output Summary (Last 24 hours) at 1/27/2020 1438  Last data filed at 1/27/2020 0800  Gross per 24 hour   Intake --   Output 0 ml   Net 0 ml       DRAINS:  [x] There are no drains for Neuro Critical Care to monitor at this time.    LABS      Recent Results (from the past 24 hour(s))   POC Glucose Fingerstick    Collection Time: 01/26/20  4:25 PM   Result Value Ref Range    POC Glucose 136 (H) 75 - 110 mg/dL   POC Glucose Fingerstick    Collection Time: 01/27/20 12:21 AM   Result Value Ref Range    POC Glucose 114 (H) 75 - 110 mg/dL   BASIC METABOLIC PANEL    Collection Time: 01/27/20  4:36 AM   Result Value Ref Range    Glucose 127 (H) 70 - 99 mg/dL    BUN 22 (H) 6 - 20 mg/dL    CREATININE 0.28 (L) 0.70 - 1.20 mg/dL    Bun/Cre Ratio NOT REPORTED 9 - 20    Calcium 9.2 8.6 - 10.4 mg/dL    Sodium 145 (H) 135 - 144 mmol/L    Potassium 4.1 3.7 - 5.3 mmol/L    Chloride 106 98 - 107 mmol/L    CO2 28 20 - 31 mmol/L    Anion Gap 11 9 - 17 mmol/L    GFR Non-African American >60 >60 mL/min    GFR African American >60 >60 mL/min    GFR Comment          GFR Staging NOT REPORTED    POC Glucose Fingerstick    Collection Time: 01/27/20  5:15 AM   Result Value Ref Range    POC Glucose 127 (H) 75 - 110 mg/dL   CBC WITH AUTO DIFFERENTIAL    Collection Time: 01/27/20 10:22 AM   Result Value Ref Range    WBC 4.9 3.5 - 11.3 k/uL    RBC 4.51 4.21 - 5.77 m/uL    Hemoglobin 10.9 (L) 13.0 - 17.0 g/dL    Hematocrit 38.3 (L) 40.7 - 50.3 %    MCV 84.9 82.6 - 102.9 fL    MCH 24.2 (L) 25.2 - 33.5 pg    MCHC 28.5 28.4 - 34.8 g/dL    RDW 20.8 (H) 11.8 - 14.4 %    Platelets 601 219 - 534 k/uL    MPV 9.6 8.1 - 13.5 fL    NRBC Automated 0.0 0.0 per 100 WBC    Differential Type NOT REPORTED     WBC Morphology NOT REPORTED RBC Morphology NOT REPORTED     Platelet Estimate NOT REPORTED     Immature Granulocytes 0 0 %    Seg Neutrophils 57 36 - 66 %    Lymphocytes 33 24 - 44 %    Monocytes 7 1 - 7 %    Eosinophils % 3 1 - 4 %    Basophils 0 0 - 2 %    Absolute Immature Granulocyte 0.00 0.00 - 0.30 k/uL    Segs Absolute 2.79 1.8 - 7.7 k/uL    Absolute Lymph # 1.62 1.0 - 4.8 k/uL    Absolute Mono # 0.34 0.1 - 0.8 k/uL    Absolute Eos # 0.15 0.0 - 0.4 k/uL    Basophils Absolute 0.00 0.0 - 0.2 k/uL    Morphology ANISOCYTOSIS PRESENT     Morphology HYPOCHROMIA PRESENT    POC Glucose Fingerstick    Collection Time: 01/27/20 11:20 AM   Result Value Ref Range    POC Glucose 114 (H) 75 - 110 mg/dL           RADIOLOGY       Cta Head Neck W Contrast    Result Date: 1/8/2020  1. Unchanged diffuse approximately 90% stenosis of the right internal carotid artery stenting from its origin to the supraclinoid segment, without interval change. Differential diagnostic considerations include sequela of severe atherosclerotic disease versus a remote dissection. 2. Focal narrowing of the left internal carotid artery involving the cavernous and supraclinoid segment approximately 70% luminal stenosis, without significant interval change. 3. Bilateral common carotid artery diffuse atherosclerotic plaque resulting in approximately 50% left and 30% right diffuse arterial stenosis. 4. Suspected focal high-grade stenosis of the distal left vertebral artery proximal to the basilar confluence secondary to encroachment by marked focal atherosclerotic calcification. 5. Left greater than right acute on subacute subdural hemorrhage without significant interval change. 6. Stable scattered bilateral cerebral multifocal acute subarachnoid hemorrhage. 7. No evidence of active extravasation of contrast in region of above intracerebral hemorrhage. 8. Diffuse hypoplastic appearance of the anterior posterior cerebral arterial circulation, possibly congenital in etiology.   Can lung apices. Fluid density seen within the trachea adjacent to the endotracheal tube. The findings were sent to the Radiology Results [address] to be communicated to a licensed caregiver. RECOMMENDATIONS: Follow-up CT head without contrast. Follow-up conventional angiogram, if treatment for stenosis is planned. Mri Brain W Wo Contrast    Result Date: 12/30/2019  Diffuse subarachnoid hemorrhage Left larger than right subdural hemorrhages Intraventricular blood, right greater than left Hazy increased diffusion signal in the right perisylvian region and left posterior temporal region. This could be artifactual from the adjacent hemorrhage. Subtle recent ischemic change in the parenchyma less likely. Heterogeneous signal in the right distal cervical internal carotid artery flow void extending into the proximal cavernous region. This has heterogeneous enhancement as well. This raises the question of slow flow or dissection. Mri Brain Wo Contrast    Result Date: 1/9/2020  Stable left subdural fluid collection and minimal rightward midline shift. Scattered FLAIR signal abnormalities and restricted diffusion within the frontal, parietal, and temporal lobes bilaterally consistent with ischemia. Bilateral subarachnoid hemorrhage and intraventricular hemorrhagic products as described above. This is similar compared to prior exam.       MRI Brain 1/9/2020      LTME:Diffuse encephalopathy. No epileptiform discharge          ASSESSMENT AND PLAN:       51-year-old male presenting initially with critical multivessel CVA with critical stenosis of left proximal vert, critical stenosis of right cervical ICA, critical stenosis of right cavernous ICA, and moderate stenosis of left cavernous ICA with bilateral subdural hematoma right greater than left concern for infectious versus delirium tremens.     Assessment & Plan:    Neuro    Traumatic SAH  Acute ischemic stroke, bilateral temporoparietal  Global aphasia due

## 2020-01-27 NOTE — DISCHARGE INSTR - COC
Continuity of Care Form    Patient Name: Rola Ferguson   :  1960  MRN:  [id number]    Admit date:  2019  Discharge date:  ***    Code Status Order: Full Code   Advance Directives:   Advance Care Flowsheet Documentation     Date/Time Healthcare Directive Type of Healthcare Directive Copy in [address] Agent's Name Healthcare Agent's Phone Number    [phone]  No, patient does not have an advance directive for healthcare treatment -- -- -- -- --          Admitting Physician:  Shaji Nolan MD  PCP: Jaswant Lazo MD    Discharging Nurse: [facility] Unit/Room#: 2131/2811-87  Discharging Unit Phone Number: ***    Emergency Contact:   Extended Emergency Contact Information  Primary Emergency Contact: Ada Leonardo, [address] Phone: [phone]  Mobile Phone: [phone]  Relation: Parent   needed? No  Secondary Emergency Contact: Karina Parnell  [place] Phone: [phone]  Relation: Brother/Sister   needed? No    Past Surgical History:  Past Surgical History:   Procedure Laterality Date    ANGIOPLASTY      NO VASCULAR LEG STENTS PER DR. Musa Echeverria    CAROTID STENT PLACEMENT  2019    carotid wallstent 10mm. mri conditonal safe immediately.  CORONARY ANGIOPLASTY WITH STENT PLACEMENT      PT HAS 7 CARDIAC STENTS UNKNOWN 1.5 T ONLY    GASTROSTOMY TUBE PLACEMENT  2020    ESOPHAGOGASTRODUODENOSCOPY PEG TUBE INSERTION     UPPER GASTROINTESTINAL ENDOSCOPY N/A 2020    EGD ESOPHAGOGASTRODUODENOSCOPY PEG TUBE INSERTION performed by Dandy Robins IV, DO at [place] [address]       Immunization History: There is no immunization history on file for this patient. Active Problems:  Patient Active Problem List   Diagnosis Code    SAH (subarachnoid hemorrhage) ([facility]) I60.9    Subarachnoid bleed ([facility] I60.9    Traumatic subarachnoid hemorrhage with loss of consciousness of 1 hour to 5 hours 59 minutes ([facility] S06. 6X3A    DME order):  {EQUIPMENT:[id number]}  Other Treatments: ***    Patient's personal belongings (please select all that are sent with patient):  None    RN SIGNATURE:  Electronically signed by Shiv Ann RN on 1/27/20 at 5:28 PM    CASE MANAGEMENT/SOCIAL WORK SECTION    Inpatient Status Date: ***    Readmission Risk Assessment Score:  Readmission Risk              Risk of Unplanned Readmission:        17           Discharging to Facility/ Agency   · Name: [facility] Patient of [address] [address]         Phone: [phone]       Fax: [phone]        ·     Dialysis Facility (if applicable)   · Name:  · Address:  · Dialysis Schedule:  · Phone:  · Fax:    / signature: Electronically signed by Myron High RN on 1/27/20 at 2:26 PM    PHYSICIAN SECTION    Prognosis: Fair    Condition at Discharge: Stable    Rehab Potential (if transferring to Rehab): Fair    Recommended Labs or Other Treatments After Discharge: Follow up in endovascular and neurosurgery clinic    Physician Certification: I certify the above information and transfer of Rogerio Urban  is necessary for the continuing treatment of the diagnosis listed and that he requires [facility] for greater 30 days.      Update Admission H&P: No change in H&P    PHYSICIAN SIGNATURE:  Electronically signed by Alex Bar MD on 1/27/20 at 5:31 PM

## 2020-01-28 ENCOUNTER — APPOINTMENT (OUTPATIENT)
Dept: CT IMAGING | Age: 60
DRG: 005 | End: 2020-01-28
Payer: MEDICAID

## 2020-01-28 ENCOUNTER — HOSPITAL ENCOUNTER (INPATIENT)
Age: 60
LOS: 15 days | Discharge: HOSPICE | DRG: 005 | End: 2020-02-12
Attending: EMERGENCY MEDICINE | Admitting: PSYCHIATRY & NEUROLOGY
Payer: MEDICAID

## 2020-01-28 PROBLEM — R41.82 ALTERED MENTAL STATUS: Status: ACTIVE | Noted: 2020-01-28

## 2020-01-28 LAB
-: ABNORMAL
ABSOLUTE EOS #: 0 K/UL (ref 0–0.44)
ABSOLUTE IMMATURE GRANULOCYTE: 0 K/UL (ref 0–0.3)
ABSOLUTE LYMPH #: 0.77 K/UL (ref 1.1–3.7)
ABSOLUTE MONO #: 0.35 K/UL (ref 0.1–1.2)
ALBUMIN SERPL-MCNC: 2.8 G/DL (ref 3.5–5.2)
ALBUMIN/GLOBULIN RATIO: 1 (ref 1–2.5)
ALLEN TEST: ABNORMAL
ALP BLD-CCNC: 110 U/L (ref 40–129)
ALT SERPL-CCNC: 291 U/L (ref 5–41)
AMMONIA: 35 UMOL/L (ref 16–60)
AMORPHOUS: ABNORMAL
ANION GAP SERPL CALCULATED.3IONS-SCNC: 15 MMOL/L (ref 9–17)
AST SERPL-CCNC: 285 U/L
BACTERIA: ABNORMAL
BASOPHILS # BLD: 0 % (ref 0–2)
BASOPHILS ABSOLUTE: 0 K/UL (ref 0–0.2)
BILIRUB SERPL-MCNC: 0.7 MG/DL (ref 0.3–1.2)
BILIRUBIN URINE: NEGATIVE
BUN BLDV-MCNC: 27 MG/DL (ref 6–20)
BUN/CREAT BLD: ABNORMAL (ref 9–20)
C-REACTIVE PROTEIN: 65.3 MG/L (ref 0–5)
CALCIUM SERPL-MCNC: 8.6 MG/DL (ref 8.6–10.4)
CASTS UA: ABNORMAL /LPF (ref 0–2)
CASTS UA: ABNORMAL /LPF (ref 0–2)
CHLORIDE BLD-SCNC: 102 MMOL/L (ref 98–107)
CO2: 26 MMOL/L (ref 20–31)
COLOR: ABNORMAL
CREAT SERPL-MCNC: 0.52 MG/DL (ref 0.7–1.2)
CRYSTALS, UA: ABNORMAL /HPF
DIFFERENTIAL TYPE: ABNORMAL
EOSINOPHILS RELATIVE PERCENT: 0 % (ref 1–4)
EPITHELIAL CELLS UA: ABNORMAL /HPF (ref 0–5)
FIO2: 50
GFR AFRICAN AMERICAN: >60 ML/MIN
GFR NON-AFRICAN AMERICAN: >60 ML/MIN
GFR SERPL CREATININE-BSD FRML MDRD: ABNORMAL ML/MIN/{1.73_M2}
GFR SERPL CREATININE-BSD FRML MDRD: ABNORMAL ML/MIN/{1.73_M2}
GLUCOSE BLD-MCNC: 141 MG/DL (ref 75–110)
GLUCOSE BLD-MCNC: 175 MG/DL (ref 75–110)
GLUCOSE BLD-MCNC: 261 MG/DL (ref 70–99)
GLUCOSE URINE: NEGATIVE
HCT VFR BLD CALC: 34.4 % (ref 40.7–50.3)
HEMOGLOBIN: 9.8 G/DL (ref 13–17)
IMMATURE GRANULOCYTES: 0 %
INR BLD: 1.4
KETONES, URINE: ABNORMAL
LACTIC ACID, SEPSIS WHOLE BLOOD: 2.2 MMOL/L (ref 0.5–1.9)
LACTIC ACID, SEPSIS WHOLE BLOOD: 2.6 MMOL/L (ref 0.5–1.9)
LACTIC ACID, SEPSIS: ABNORMAL MMOL/L (ref 0.5–1.9)
LACTIC ACID, SEPSIS: ABNORMAL MMOL/L (ref 0.5–1.9)
LEUKOCYTE ESTERASE, URINE: NEGATIVE
LYMPHOCYTES # BLD: 11 % (ref 24–43)
MAGNESIUM: 1.9 MG/DL (ref 1.6–2.6)
MAGNESIUM: 2 MG/DL (ref 1.6–2.6)
MCH RBC QN AUTO: 24 PG (ref 25.2–33.5)
MCHC RBC AUTO-ENTMCNC: 28.5 G/DL (ref 28.4–34.8)
MCV RBC AUTO: 84.3 FL (ref 82.6–102.9)
MODE: AC
MONOCYTES # BLD: 5 % (ref 3–12)
MORPHOLOGY: ABNORMAL
MUCUS: ABNORMAL
NEGATIVE BASE EXCESS, ART: ABNORMAL (ref 0–2)
NITRITE, URINE: NEGATIVE
NRBC AUTOMATED: 0 PER 100 WBC
O2 DEVICE/FLOW/%: ABNORMAL
OTHER OBSERVATIONS UA: ABNORMAL
PARTIAL THROMBOPLASTIN TIME: 25.7 SEC (ref 20.5–30.5)
PATIENT TEMP: ABNORMAL
PDW BLD-RTO: 20.4 % (ref 11.8–14.4)
PH UA: 7 (ref 5–8)
PHOSPHORUS: 4.6 MG/DL (ref 2.5–4.5)
PLATELET # BLD: 160 K/UL (ref 138–453)
PLATELET ESTIMATE: ABNORMAL
PMV BLD AUTO: 10.3 FL (ref 8.1–13.5)
POC HCO3: 28.1 MMOL/L (ref 21–28)
POC O2 SATURATION: 99 % (ref 94–98)
POC PCO2 TEMP: ABNORMAL MM HG
POC PCO2: 30.6 MM HG (ref 35–48)
POC PH TEMP: ABNORMAL
POC PH: 7.57 (ref 7.35–7.45)
POC PO2 TEMP: ABNORMAL MM HG
POC PO2: 112.3 MM HG (ref 83–108)
POSITIVE BASE EXCESS, ART: 6 (ref 0–3)
POTASSIUM SERPL-SCNC: 4.4 MMOL/L (ref 3.7–5.3)
PROCALCITONIN: 0.88 NG/ML
PROTEIN UA: ABNORMAL
PROTHROMBIN TIME: 14.2 SEC (ref 9–12)
RBC # BLD: 4.08 M/UL (ref 4.21–5.77)
RBC # BLD: ABNORMAL 10*6/UL
RBC UA: ABNORMAL /HPF (ref 0–2)
RENAL EPITHELIAL, UA: ABNORMAL /HPF
SAMPLE SITE: ABNORMAL
SEG NEUTROPHILS: 84 % (ref 36–65)
SEGMENTED NEUTROPHILS ABSOLUTE COUNT: 5.88 K/UL (ref 1.5–8.1)
SERUM OSMOLALITY: 314 MOSM/KG (ref 275–295)
SODIUM BLD-SCNC: 143 MMOL/L (ref 135–144)
SPECIFIC GRAVITY UA: 1.07 (ref 1–1.03)
TCO2 (CALC), ART: 29 MMOL/L (ref 22–29)
TOTAL PROTEIN: 5.7 G/DL (ref 6.4–8.3)
TRICHOMONAS: ABNORMAL
TROPONIN INTERP: ABNORMAL
TROPONIN T: ABNORMAL NG/ML
TROPONIN, HIGH SENSITIVITY: 57 NG/L (ref 0–22)
TURBIDITY: CLEAR
URINE HGB: ABNORMAL
UROBILINOGEN, URINE: NORMAL
WBC # BLD: 7 K/UL (ref 3.5–11.3)
WBC # BLD: ABNORMAL 10*3/UL
WBC UA: ABNORMAL /HPF (ref 0–5)
YEAST: ABNORMAL

## 2020-01-28 PROCEDURE — 84100 ASSAY OF PHOSPHORUS: CPT

## 2020-01-28 PROCEDURE — 2000000003 HC NEURO ICU R&B

## 2020-01-28 PROCEDURE — 87040 BLOOD CULTURE FOR BACTERIA: CPT

## 2020-01-28 PROCEDURE — 36600 WITHDRAWAL OF ARTERIAL BLOOD: CPT

## 2020-01-28 PROCEDURE — 81001 URINALYSIS AUTO W/SCOPE: CPT

## 2020-01-28 PROCEDURE — 6370000000 HC RX 637 (ALT 250 FOR IP)

## 2020-01-28 PROCEDURE — 2580000003 HC RX 258: Performed by: STUDENT IN AN ORGANIZED HEALTH CARE EDUCATION/TRAINING PROGRAM

## 2020-01-28 PROCEDURE — 96365 THER/PROPH/DIAG IV INF INIT: CPT

## 2020-01-28 PROCEDURE — 83930 ASSAY OF BLOOD OSMOLALITY: CPT

## 2020-01-28 PROCEDURE — 6360000004 HC RX CONTRAST MEDICATION: Performed by: STUDENT IN AN ORGANIZED HEALTH CARE EDUCATION/TRAINING PROGRAM

## 2020-01-28 PROCEDURE — 2500000003 HC RX 250 WO HCPCS: Performed by: STUDENT IN AN ORGANIZED HEALTH CARE EDUCATION/TRAINING PROGRAM

## 2020-01-28 PROCEDURE — 36556 INSERT NON-TUNNEL CV CATH: CPT

## 2020-01-28 PROCEDURE — 36620 INSERTION CATHETER ARTERY: CPT

## 2020-01-28 PROCEDURE — 84484 ASSAY OF TROPONIN QUANT: CPT

## 2020-01-28 PROCEDURE — 5A1955Z RESPIRATORY VENTILATION, GREATER THAN 96 CONSECUTIVE HOURS: ICD-10-PCS | Performed by: PSYCHIATRY & NEUROLOGY

## 2020-01-28 PROCEDURE — 93005 ELECTROCARDIOGRAM TRACING: CPT | Performed by: EMERGENCY MEDICINE

## 2020-01-28 PROCEDURE — 03HY32Z INSERTION OF MONITORING DEVICE INTO UPPER ARTERY, PERCUTANEOUS APPROACH: ICD-10-PCS | Performed by: EMERGENCY MEDICINE

## 2020-01-28 PROCEDURE — 83605 ASSAY OF LACTIC ACID: CPT

## 2020-01-28 PROCEDURE — 94761 N-INVAS EAR/PLS OXIMETRY MLT: CPT

## 2020-01-28 PROCEDURE — 86140 C-REACTIVE PROTEIN: CPT

## 2020-01-28 PROCEDURE — 71260 CT THORAX DX C+: CPT

## 2020-01-28 PROCEDURE — 6370000000 HC RX 637 (ALT 250 FOR IP): Performed by: STUDENT IN AN ORGANIZED HEALTH CARE EDUCATION/TRAINING PROGRAM

## 2020-01-28 PROCEDURE — 36415 COLL VENOUS BLD VENIPUNCTURE: CPT

## 2020-01-28 PROCEDURE — 85730 THROMBOPLASTIN TIME PARTIAL: CPT

## 2020-01-28 PROCEDURE — 85610 PROTHROMBIN TIME: CPT

## 2020-01-28 PROCEDURE — 85025 COMPLETE CBC W/AUTO DIFF WBC: CPT

## 2020-01-28 PROCEDURE — 84145 PROCALCITONIN (PCT): CPT

## 2020-01-28 PROCEDURE — 99221 1ST HOSP IP/OBS SF/LOW 40: CPT | Performed by: NEUROLOGICAL SURGERY

## 2020-01-28 PROCEDURE — 74177 CT ABD & PELVIS W/CONTRAST: CPT

## 2020-01-28 PROCEDURE — 83735 ASSAY OF MAGNESIUM: CPT

## 2020-01-28 PROCEDURE — 99291 CRITICAL CARE FIRST HOUR: CPT

## 2020-01-28 PROCEDURE — 87086 URINE CULTURE/COLONY COUNT: CPT

## 2020-01-28 PROCEDURE — 82803 BLOOD GASES ANY COMBINATION: CPT

## 2020-01-28 PROCEDURE — 80053 COMPREHEN METABOLIC PANEL: CPT

## 2020-01-28 PROCEDURE — 6360000002 HC RX W HCPCS: Performed by: STUDENT IN AN ORGANIZED HEALTH CARE EDUCATION/TRAINING PROGRAM

## 2020-01-28 PROCEDURE — 82947 ASSAY GLUCOSE BLOOD QUANT: CPT

## 2020-01-28 PROCEDURE — 70450 CT HEAD/BRAIN W/O DYE: CPT

## 2020-01-28 PROCEDURE — 94002 VENT MGMT INPAT INIT DAY: CPT

## 2020-01-28 PROCEDURE — 94770 HC ETCO2 MONITOR DAILY: CPT

## 2020-01-28 PROCEDURE — 2700000000 HC OXYGEN THERAPY PER DAY

## 2020-01-28 PROCEDURE — 82140 ASSAY OF AMMONIA: CPT

## 2020-01-28 PROCEDURE — 02HV33Z INSERTION OF INFUSION DEVICE INTO SUPERIOR VENA CAVA, PERCUTANEOUS APPROACH: ICD-10-PCS | Performed by: EMERGENCY MEDICINE

## 2020-01-28 RX ORDER — ACETAMINOPHEN 650 MG/1
650 SUPPOSITORY RECTAL ONCE
Status: COMPLETED | OUTPATIENT
Start: 2020-01-28 | End: 2020-01-28

## 2020-01-28 RX ORDER — MAGNESIUM SULFATE 1 G/100ML
1 INJECTION INTRAVENOUS
Status: COMPLETED | OUTPATIENT
Start: 2020-01-28 | End: 2020-01-28

## 2020-01-28 RX ORDER — GABAPENTIN 300 MG/1
300 CAPSULE ORAL 2 TIMES DAILY
Status: DISCONTINUED | OUTPATIENT
Start: 2020-01-28 | End: 2020-02-12

## 2020-01-28 RX ORDER — SODIUM CHLORIDE, SODIUM LACTATE, POTASSIUM CHLORIDE, CALCIUM CHLORIDE 600; 310; 30; 20 MG/100ML; MG/100ML; MG/100ML; MG/100ML
INJECTION, SOLUTION INTRAVENOUS CONTINUOUS
Status: DISCONTINUED | OUTPATIENT
Start: 2020-01-28 | End: 2020-02-02

## 2020-01-28 RX ORDER — FOLIC ACID 1 MG/1
1 TABLET ORAL DAILY
Status: DISCONTINUED | OUTPATIENT
Start: 2020-01-28 | End: 2020-02-12

## 2020-01-28 RX ORDER — KETOROLAC TROMETHAMINE 15 MG/ML
15 INJECTION, SOLUTION INTRAMUSCULAR; INTRAVENOUS ONCE
Status: DISCONTINUED | OUTPATIENT
Start: 2020-01-28 | End: 2020-01-28

## 2020-01-28 RX ORDER — ATORVASTATIN CALCIUM 40 MG/1
40 TABLET, FILM COATED ORAL NIGHTLY
Status: DISCONTINUED | OUTPATIENT
Start: 2020-01-28 | End: 2020-02-12

## 2020-01-28 RX ORDER — ACETAMINOPHEN 650 MG/1
SUPPOSITORY RECTAL
Status: COMPLETED
Start: 2020-01-28 | End: 2020-01-28

## 2020-01-28 RX ORDER — INSULIN GLARGINE 100 [IU]/ML
5 INJECTION, SOLUTION SUBCUTANEOUS NIGHTLY
Status: DISCONTINUED | OUTPATIENT
Start: 2020-01-28 | End: 2020-02-07

## 2020-01-28 RX ORDER — SOTALOL HYDROCHLORIDE 80 MG/1
160 TABLET ORAL 2 TIMES DAILY
Status: DISCONTINUED | OUTPATIENT
Start: 2020-01-28 | End: 2020-02-12

## 2020-01-28 RX ORDER — DULOXETIN HYDROCHLORIDE 30 MG/1
60 CAPSULE, DELAYED RELEASE ORAL NIGHTLY
Status: DISCONTINUED | OUTPATIENT
Start: 2020-01-28 | End: 2020-02-12

## 2020-01-28 RX ORDER — ONDANSETRON 2 MG/ML
4 INJECTION INTRAMUSCULAR; INTRAVENOUS EVERY 6 HOURS PRN
Status: DISCONTINUED | OUTPATIENT
Start: 2020-01-28 | End: 2020-02-12

## 2020-01-28 RX ORDER — ACETAMINOPHEN 325 MG/1
650 TABLET ORAL EVERY 4 HOURS PRN
Status: DISCONTINUED | OUTPATIENT
Start: 2020-01-28 | End: 2020-02-13 | Stop reason: HOSPADM

## 2020-01-28 RX ORDER — CEPHALEXIN 500 MG/1
500 CAPSULE ORAL 2 TIMES DAILY
Status: DISCONTINUED | OUTPATIENT
Start: 2020-01-28 | End: 2020-01-29

## 2020-01-28 RX ORDER — AMLODIPINE BESYLATE 5 MG/1
5 TABLET ORAL DAILY
Status: DISCONTINUED | OUTPATIENT
Start: 2020-01-28 | End: 2020-02-13 | Stop reason: HOSPADM

## 2020-01-28 RX ORDER — 0.9 % SODIUM CHLORIDE 0.9 %
30 INTRAVENOUS SOLUTION INTRAVENOUS ONCE
Status: DISCONTINUED | OUTPATIENT
Start: 2020-01-28 | End: 2020-01-28

## 2020-01-28 RX ORDER — SODIUM CHLORIDE 0.9 % (FLUSH) 0.9 %
10 SYRINGE (ML) INJECTION PRN
Status: DISCONTINUED | OUTPATIENT
Start: 2020-01-28 | End: 2020-02-12

## 2020-01-28 RX ORDER — CILOSTAZOL 100 MG/1
100 TABLET ORAL 2 TIMES DAILY
Status: DISCONTINUED | OUTPATIENT
Start: 2020-01-28 | End: 2020-02-08

## 2020-01-28 RX ORDER — SODIUM CHLORIDE 0.9 % (FLUSH) 0.9 %
10 SYRINGE (ML) INJECTION EVERY 12 HOURS SCHEDULED
Status: DISCONTINUED | OUTPATIENT
Start: 2020-01-28 | End: 2020-02-12

## 2020-01-28 RX ADMIN — CEPHALEXIN 500 MG: 500 CAPSULE ORAL at 21:16

## 2020-01-28 RX ADMIN — MAGNESIUM SULFATE HEPTAHYDRATE 1 G: 1 INJECTION, SOLUTION INTRAVENOUS at 18:27

## 2020-01-28 RX ADMIN — SODIUM CHLORIDE, POTASSIUM CHLORIDE, SODIUM LACTATE AND CALCIUM CHLORIDE: 600; 310; 30; 20 INJECTION, SOLUTION INTRAVENOUS at 17:11

## 2020-01-28 RX ADMIN — CILOSTAZOL 100 MG: 100 TABLET ORAL at 21:17

## 2020-01-28 RX ADMIN — SODIUM CHLORIDE 4 MCG/MIN: 0.9 INJECTION, SOLUTION INTRAVENOUS at 20:58

## 2020-01-28 RX ADMIN — DESMOPRESSIN ACETATE 40 MG: 0.2 TABLET ORAL at 21:16

## 2020-01-28 RX ADMIN — MAGNESIUM SULFATE HEPTAHYDRATE 1 G: 1 INJECTION, SOLUTION INTRAVENOUS at 16:52

## 2020-01-28 RX ADMIN — SODIUM CHLORIDE, POTASSIUM CHLORIDE, SODIUM LACTATE AND CALCIUM CHLORIDE: 600; 310; 30; 20 INJECTION, SOLUTION INTRAVENOUS at 20:58

## 2020-01-28 RX ADMIN — SOTALOL HYDROCHLORIDE 160 MG: 80 TABLET ORAL at 21:14

## 2020-01-28 RX ADMIN — INSULIN GLARGINE 5 UNITS: 100 INJECTION, SOLUTION SUBCUTANEOUS at 22:28

## 2020-01-28 RX ADMIN — IOHEXOL 75 ML: 350 INJECTION, SOLUTION INTRAVENOUS at 16:48

## 2020-01-28 RX ADMIN — ACETAMINOPHEN 650 MG: 650 SUPPOSITORY RECTAL at 17:10

## 2020-01-28 RX ADMIN — SODIUM CHLORIDE 2136 ML: 900 INJECTION, SOLUTION INTRAVENOUS at 16:07

## 2020-01-28 RX ADMIN — DULOXETINE HYDROCHLORIDE 60 MG: 30 CAPSULE, DELAYED RELEASE ORAL at 21:16

## 2020-01-28 RX ADMIN — DEXTROSE MONOHYDRATE 8 MCG/MIN: 50 INJECTION, SOLUTION INTRAVENOUS at 15:36

## 2020-01-28 ASSESSMENT — PULMONARY FUNCTION TESTS
PIF_VALUE: 18
PIF_VALUE: 19
PIF_VALUE: 18
PIF_VALUE: 20

## 2020-01-28 ASSESSMENT — PAIN SCALES - GENERAL: PAINLEVEL_OUTOF10: 2

## 2020-01-28 NOTE — H&P
Neuro ICU History & Physical    Patient Name: Dirk Calzada  Patient : 1960  Room/Bed:   Code Status: Full   Allergies: No Known Allergies    CHIEF COMPLAINT     AMS    HPI    History Obtained From: Chart Review    The patient is a 61 y.o. male presented with AMS and was febrile. He was taken to [facility] and then promptly transferred here. He was discharged yesterday from a SNF. Patient was found unresponsive by staff today at the SNF and had a GCS of 4 per EMS and was intubated. Additionally he was determined to be in A. fib with RVR and was cardioverted. Upon review of patient in the emergency department he was intubated without sedation, white count of 7, elevated LFTs, CT head is stable and unchanged with chronic subdural.    Negative UA  ABG PF > 200  WBC 7   Ammonia 35  Hb 9.8  Cr 0.52  LA 2.6  Serum Os[place] 314  Trop 57  CTH stable SDH  CT Chest - LLL atelectasis and PNA    The prior hospital course from admission is contained below. The patient is a 62 yo male with history of atrial fibrillation on Eliquis, bilateral ICA stenosis (right more than left), DM2, HLD, HTN, CAD s/p PCI stents, PVD, CEA, and ETOH abuse who presents as a transfer from [facility] as a trauma alert for CT head revealed diffuse bialteral SAH and left parietal SDH.  He was given [place] and transferred to [facility] for higher level of care.        Patient was found confused by family at his home after being unable to reach him on the phone.  He was complaining of headache, and actively vomiting.  Also had bilateral arm scrapes and bruises concerning for recent fall, patient himself does not member falling, said around 4 PM he woke up and felt frontal and vertex headache and neck pain, and felt nauseous and started throwing up.  Endorses drinking alcohol last night states that he had 2 beers.  Per family has significant alcohol abuse history and has had multiple falls in the past.     On suggesting moderate encephalopathy. Started on Vanc/zosyn  12/31: Versed started for sedation, bradycardia noted with Precedex. 1/1: TCD normal. Failed SBT due to tachypnea  1/2: Sotalol increased to 160 mg BID. Zosyn started for CXR concerning for RLL pneumonia. 1/3: Failed SBT.  1/4: Tolerated CPAP trial better. 1/5: 8 beats of V. Tach - resolved spontaneously. 1/7: Extubated to room air  1/8: Respiratory distress with tachypnea, re-intubated. Found to have right gaze with right sided weakness, loaded with 2 grams Keppra. Went to cerebral angiogram for right ICA stenting. 1/9: EEG showing diffuse encephalopathy, no seizures. Will discontinue EEG and reduce Keppra to 500 mg BID. Will discontinue Librium today. MRI brain revealed bilateral MCA territory and left RUSTY territory infarcts. Provigil dosing increased to bid  1/11: Failed SBT  1/12: Family meeting rescheduled to 1/14 1/13: failed weaning due to tachypnea. 1/14: Lisinopril started.  Family meeting held, awaiting decision. 1/15: Awaiting on decision from family, palliative following. NaCl tabs d/c.  1/16: Awaiting family decision. Exam improving. 1/17: Family would like trach/peg. General surgery consulted. 1/20: no changes. Trach and PEG Wed vs Thursday this week per GS. COnt to be off AP x5 days prior to procedure   1/22: PEG placement   1/23: resume Heparin x1 more day. S/p PEG.   1/24: Awaiting placement.  Denied LTAC.   1/25: desat overnight, required NT suction. 1/26: No AEO.   1/27: No AEO. precert aquired. Eliquis restarted per NS with 2 week CTH and follow up with Dr. Ruthie Riggs in the clinic.  [address]    Admitted to ICU From: ED   Reason for ICU Admission: AMS and Septic        PATIENT HISTORY   Past Medical History:        Diagnosis Date    Alcohol abuse     Atrial fibrillation ([facility]     Diabetes ([facility]     Gastritis     Hyperlipidemia     Hypertension     Left ventricular hypertrophy     Neuropathy     Renal cyst        Past Surgical History:        Procedure Laterality Date    ANGIOPLASTY  2019    NO VASCULAR LEG STENTS PER DR. Bentley Hammond    CAROTID STENT PLACEMENT  01/08/2019    carotid wallstent 10mm. mri conditonal safe immediately.     CORONARY ANGIOPLASTY WITH STENT PLACEMENT  2019    PT HAS 7 CARDIAC STENTS UNKNOWN 1.5 T ONLY    GASTROSTOMY TUBE PLACEMENT  01/22/2020    ESOPHAGOGASTRODUODENOSCOPY PEG TUBE INSERTION     UPPER GASTROINTESTINAL ENDOSCOPY N/A 1/22/2020    EGD ESOPHAGOGASTRODUODENOSCOPY PEG TUBE INSERTION performed by Romy Robins IV, DO at [address] History:   Social History     Socioeconomic History    Marital status: Single     Spouse name: Not on file    Number of children: Not on file    Years of education: Not on file    Highest education level: Not on file   Occupational History    Not on file   Social Needs    Financial resource strain: Not on file    Food insecurity:     Worry: Not on file     Inability: Not on file    Transportation needs:     Medical: Not on file     Non-medical: Not on file   Tobacco Use    Smoking status: Current Every Day Smoker     Packs/day: 0.50     Types: Cigarettes    Smokeless tobacco: Current User   Substance and Sexual Activity    Alcohol use: Yes     Frequency: 4 or more times a week    Drug use: Never    Sexual activity: Not on file   Lifestyle    Physical activity:     Days per week: Not on file     Minutes per session: Not on file    Stress: Not on file   Relationships    Social connections:     Talks on phone: Not on file     Gets together: Not on file     Attends Caoda[employer] service: Not on file     Active member of club or organization: Not on file     Attends meetings of clubs or organizations: Not on file     Relationship status: Not on file    Intimate partner violence:     Fear of current or ex partner: Not on file     Emotionally abused: Not on file     Physically abused: Not on file     Forced sexual activity: Not on file   Other Topics Concern    Not on file   Social History Narrative    Not on file       Family History:   History reviewed. No pertinent family history. Allergies:    Patient has no known allergies. Medications Prior to Admission:    Not in a hospital admission.     Current Medications:  Current Facility-Administered Medications: norepinephrine (LEVOPHED) 16 mg in dextrose 5 % 250 mL infusion, 2 mcg/min, Intravenous, Continuous  magnesium sulfate 1 g in dextrose 5% 100 mL IVPB, 1 g, Intravenous, Q1H  lactated ringers infusion, , Intravenous, Continuous    REVIEW OF SYSTEMS     Unable to obtain secondary to patient condition  PHYSICAL EXAM:     /81   Pulse 82   Temp 102.7 °F (39.3 °C) (Core)   Resp 19   Wt 157 lb (71.2 kg)   SpO2 100%   BMI 21.90 kg/m²     PHYSICAL EXAM:  GENERAL: Cachectic appearing with GCS 3 T  HENT: normocephalic , nose normal  EYES: no occular discharge, no scleral icterus  NECK: no JVD, no tracheal deviation  CV: Normal S1 S2, no MRG  ABDOMEN: soft, no rigidity, PEG tube    NEURO:   Pupils equal round reactive to light bilaterally sluggish 2 mm  Oculocephalics reflex present  Positive Solorzano  Negative Babinski, negative clonus  Muscle tone in bilateral upper and bilateral lower extremities  Diminished reflexes in bilateral lower extremities  Corneal reflex present        LABS AND IMAGING:     RECENT LABS:  CBC with Differential:    Lab Results   Component Value Date    WBC 7.0 01/28/2020    RBC 4.08 01/28/2020    HGB 9.8 01/28/2020    HCT 34.4 01/28/2020     01/28/2020    MCV 84.3 01/28/2020    MCH 24.0 01/28/2020    MCHC 28.5 01/28/2020    RDW 20.4 01/28/2020    LYMPHOPCT 11 01/28/2020    MONOPCT 5 01/28/2020    BASOPCT 0 01/28/2020    MONOSABS 0.35 01/28/2020    LYMPHSABS 0.77 01/28/2020    EOSABS 0.00 01/28/2020    BASOSABS 0.00 01/28/2020    DIFFTYPE NOT REPORTED 01/28/2020     BMP:    Lab Results   Component Value Date     01/28/2020    K 4.4 01/28/2020     01/28/2020    CO2 26 01/28/2020    BUN 27 01/28/2020    LABALBU 2.8 01/28/2020    CREATININE 0.52 01/28/2020    CALCIUM 8.6 01/28/2020    GFRAA >60 01/28/2020    LABGLOM >60 01/28/2020    GLUCOSE 261 01/28/2020       RADIOLOGY:     Chronic SDH unchanged CTH  PEG tube in place  LLL PNA        Labs and Images reviewed with:    [] Darrius Desai MD    [] Dragan Garcia MD  [] Nico Pack MD  --[] there are no new interval images to review. DR CASTELLANOS x  ASSESSMENT AND PLAN:         ASSESSMENT:     This is a 61 y.o. male with AMS and Afib RvR s/p cardioversion    Patient care will be discussed with attending, will reevaluate patient along with attending. PLAN/MEDICAL DECISION MAKING:      Neuro    1. AMS  - Stroke Alert paged  - CTH stable SDH with slight midline shift remaining  - Intubated due to GCS 4T  - f/u MRI brain      Cardio  1. Afib RvR  - s/p cardioversion likely resulting in elevated trop  - R/S home meds  - R/S eliquis and ASA  - LVEF 45%  - Continue cardiac monitor     Pulm  1. Impending Airway compromise resulting in intubation  - Intubated 1/28  - PRVC  - ABG PRN Intubated  - CXR AM    2. LLL PNA      Endo  Serum glucose is better controlled < 180  HB A1C: 7.1  Continue Lantus 25 units daily     GI  1. Constipation   PEG tube  Senna Colace Milk of molassas enema     Renal  Impending HAO from contract  - LR @ 125 cc/hr  - Colin for critical I/O monitoring     Heme/ID  1.  LLL PNA  - Febrile Tmax 39 C, WBC 7  - LA 2.6   - Procal f/u  - VANC AND ZOSYN DAY 1/7    LINES / PPX / CONSULTS  - PIV , R FEM CVC, RAD AA LINE        DISPOSITION: vern Rowland, DO  Neuro Critical Care Service   Pager [phone]  1/28/2020     5:21 PM

## 2020-01-28 NOTE — ED NOTES
Pt transferred to the ED from [facility]. Pt admitted here at [facility] on 12/25 when he had a stroke. Pt then admitted here for about a month until he was discharged back to a SNF yesterday. Per EMS, staff at [facility] reported that pt was awake and had purposeful movement last night- received poor history from outlying facility. Pt then found unresponsive by staff at [facility] today. Pt taken to [facility] then where he had been cardioverted (d/t unresponsiveness and afib with RVR.). Pt intubated, dotson and NG placed. Pt given vancomycin, zosyn, etomidate, fentanyl PTA. Pt arrival to ED intubated, unresponsive, left sided gaze noted, pupils equal and reactive, pt hypotensive, pt febrile.       Colleen Arroyo RN  01/28/20 [id number]

## 2020-01-28 NOTE — ED NOTES
Dr. Brenda Weit, neurology,  at [facility], [address] [place] [address]  01/28/20 [id number]

## 2020-01-28 NOTE — CONSULTS
[facility]  [facility].    Department of Neurosurgery  Resident Consult Note      Reason for Consult:  SDH  Requesting Physician:  Dr. Charissa Ventura:   [x]Dr. Wyatt Fan  []Dr. Anny Huber  []Dr. Edward Gonzales  []Dr. Aniceto Keys  []Dr. Octavia Acosta  []Dr. Nick Baez    History Obtained From:  patient, electronic medical record    CHIEF COMPLAINT:         SDH    HISTORY OF PRESENT ILLNESS:       The patient is a 61 y.o. male who presents with her mental status with fevers. Patient taken to [facility] after being found unresponsive at skilled nursing facility. Patient was discharged yesterday. Patient was started on Eliquis and continued aspirin yesterday. Prior to discharge stability CT showing hygroma with no acute blood products. Repeat CT head today showing identical exam.  Patient had previous bilateral MCA infarcts with endovascular intervention placing right carotid stent. Patient severely a phasic and does not follow commands. Patient is limited tracking with eyes only when eyelids are manually opened or deep sternal rub administered. This exam has not changed. Patient is being admitted to neuro critical care for possible septic work-up. PAST MEDICAL HISTORY :       Past Medical History:        Diagnosis Date    Alcohol abuse     Atrial fibrillation ([facility]     Diabetes ([facility] [address]     Gastritis     Hyperlipidemia     Hypertension     Left ventricular hypertrophy     Neuropathy     Renal cyst        Past Surgical History:        Procedure Laterality Date    ANGIOPLASTY  2019    NO VASCULAR LEG STENTS PER DR. Keon Solorzano    CAROTID STENT PLACEMENT  01/08/2019    carotid wallstent 10mm. mri conditonal safe immediately.     CORONARY ANGIOPLASTY WITH STENT PLACEMENT  2019    PT HAS 7 CARDIAC STENTS UNKNOWN 1.5 T ONLY    GASTROSTOMY TUBE PLACEMENT  01/22/2020    ESOPHAGOGASTRODUODENOSCOPY PEG TUBE INSERTION     UPPER GASTROINTESTINAL ENDOSCOPY N/A 1/22/2020    EGD ESOPHAGOGASTRODUODENOSCOPY Intravenous, Q1H  lactated ringers infusion, , Intravenous, Continuous    REVIEW OF SYSTEMS:       General ROS - No fevers, no chills  Ophthalmic ROS - No changes in vision from baseline  ENT ROS -  No sore throat, no rhinorrhea  Respiratory ROS - no cough, no shortness of breath  Cardiovascular ROS - No chest pain  Gastrointestinal ROS - No abdominal pain, no nausea, no vomiting  Genito-Urinary ROS - No dysuria  Musculoskeletal ROS - No neck pain, no back pain  Neurological ROS - No focal weakness or loss of sensation, no headache  Dermatological ROS - No lesions, no rash  Vascular/Lymphatic ROS - No edema    PHYSICAL EXAM:       Vitals:    01/28/20 1729   BP: 97/72   Pulse: 74   Resp:    Temp:    SpO2: 100%     Vitals signs and nursing note reviewed. HENT:      Head: Normocephalic and atraumatic. Eyes:      General:         Right eye: No foreign body, discharge or hordeolum.         Left eye: No foreign body, discharge or hordeolum.      Conjunctiva/sclera:      Right eye: Right conjunctiva is not injected. No chemosis, exudate or hemorrhage.     Left eye: Left conjunctiva is injected. Hemorrhage present. No chemosis or exudate.     Pupils: Pupils are equal, round, and reactive to light. Cardiovascular:      Rate and Rhythm: Normal rate. Rhythm irregular. Pulmonary:      Breath sounds: Normal breath sounds. Neurological:      Mental Status: He is alert.      GCS: GCS eye subscore is 4. GCS verbal subscore is 1.  GCS motor subscore is 5.      Cranial Nerves: Cranial nerves are intact.      Motor: No abnormal muscle tone.      Deep Tendon Reflexes:      Reflex Scores:       Bicep reflexes are 1+ on the right side and 1+ on the left side.       Patellar reflexes are 1+ on the right side and 1+ on the left side.     Comments: Intubated with no sedation  Moves all extremities to pain, left more than right  Tone is normal.  Right-sided gaze preference  No subtle signs for seizures or abnormal movements    LABS AND IMAGING:     Labs:  CBC with Differential:    Lab Results   Component Value Date    WBC 7.0 01/28/2020    RBC 4.08 01/28/2020    HGB 9.8 01/28/2020    HCT 34.4 01/28/2020     01/28/2020    MCV 84.3 01/28/2020    MCH 24.0 01/28/2020    MCHC 28.5 01/28/2020    RDW 20.4 01/28/2020    LYMPHOPCT 11 01/28/2020    MONOPCT 5 01/28/2020    BASOPCT 0 01/28/2020    MONOSABS 0.35 01/28/2020    LYMPHSABS 0.77 01/28/2020    EOSABS 0.00 01/28/2020    BASOSABS 0.00 01/28/2020    DIFFTYPE NOT REPORTED 01/28/2020     BMP:    Lab Results   Component Value Date     01/28/2020    K 4.4 01/28/2020     01/28/2020    CO2 26 01/28/2020    BUN 27 01/28/2020    LABALBU 2.8 01/28/2020    CREATININE 0.52 01/28/2020    CALCIUM 8.6 01/28/2020    GFRAA >60 01/28/2020    LABGLOM >60 01/28/2020    GLUCOSE 261 01/28/2020       Radiology Review:      Ct Head Wo Contrast    Result Date: 1/28/2020  Persistent extra-axial fluid collection within the left frontal parietal/temporal region maximally measuring 2 cm in width with approximately 4 mm of midline shift. Scattered subcortical and periventricular white matter hypodensities are most compatible with chronic small vessel disease. Ct Head Wo Contrast    Result Date: 1/27/2020  Chronic left subdural collection has slightly increased. Small component of acute blood products are seen within the left frontal region. Mild mass effect and midline shift are similar to the previous study. Evolving subacute infarcts. Ct Abdomen Pelvis W Iv Contrast Additional Contrast? None    Result Date: 1/28/2020  1. Although degraded by motion artifact there appears to be extensive tree-in-bud micro nodules in the visualized right lower lobe indicative of infectious process. Posterior basal consolidation left lower lobe probably combination of atelectasis and pneumonia. Subsegmental atelectasis posterior basal right lower lobe.   Findings have developed since the prior exam. 2. Percutaneous gastrostomy tube in place which appears to be in satisfactory position in the gastric antrum but definitive confirmation may be accomplished with dedicated tube position radiograph with utilizing contrast.     Ct Chest Pulmonary Embolism W Contrast    Result Date: 1/28/2020  1. No evidence of pulmonary embolism. 2.  Slightly prominent ascending aorta of 4.2 cm. 3.  Scattered tree-in-bud and ground-glass infiltrates throughout the lungs. 4.  Consolidation left lower lobe. 5.  Trace pericardial effusion. 6.  Gastrostomy tube in situ. Other findings as above. ASSESSMENT AND PLAN:       Patient Active Problem List   Diagnosis    SAH (subarachnoid hemorrhage) ([facility])    Subarachnoid bleed ([facility])    Traumatic subarachnoid hemorrhage with loss of consciousness of 1 hour to 5 hours 59 minutes ([address]    Carotid stenosis, right    Asymptomatic stenosis of left vertebral artery    Intracranial atherosclerosis    History of CEA (carotid endarterectomy)    Ventilator dependence ([address]    Delirium tremens ([address]    Chronic a-fib    On mechanically assisted ventilation ([facility])    Encephalopathy    Acute ischemic multifocal anterior circulation stroke ([address]    Palliative care encounter    Subdural hematoma ([address]    Altered mental status       A/P:  Rabia Meadows is a 61 y.o. male who presents with altered mental status and fevers after being found unresponsive at SNF. Patient is taking Eliquis and aspirin for history of atrial fibrillation and carotid stenting. Patient care will be discussed with attending, will reevaluate patient along with attending     - No neurosurgical interventions planned for now, patient [address] looks unchanged from prior to discharge on last admission.  - CTLS recommendations: clear  - HOB: 30 degrees   - Obtain CT head in AM   - Neuro checks per protocol  - Ok to begin prophylactic anticoagulation from neurosurgery stand point.  However, we recommend careful evaluation of all other risk factors associated with anticoagulation therapy as applied to this patient's medical condition  - We recommend SBP < 140   - Determine the lower limit of SBP clinically based on mentation      Additional recommendations may follow    Please contact neurosurgery with any changes in patient's neurologic status. Thank you for your consult. Natalya He MD, [phone]  Neurosurgery/Neuro Critical Care Service  Pager [phone]  1/28/2020 5:32 PM      Please note that this chart was generated using voice recognition Dragon dictation software. Although every effort was made to ensure the accuracy of this automated transcription, some errors in transcription may have occurred.

## 2020-01-28 NOTE — CONSULTS
Department of Endovascular Neurosurgery                                         Resident Consult Note    Reason for Consult: Altered mental status  Endovascular Neurosurgeon:   []Dr. Nilay Markham  [x]Dr. Rivera Lai  []Dr. Reed Campbell  []Dr. Kaylin Jacobsen  []     History Obtained From:  electronic medical record    CHIEF COMPLAINT:       Altered mental status  HISTORY OF PRESENT ILLNESS:       The patient is a 61 y.o. male who presents with altered mental status and patient was febrile, patient was taken to [facility] and then promptly transferred here, he was discharged yesterday from SNF. Patient was found unresponsive by staff today at the SNF and had a [place] score of 4. EMS and was intubated, he was determined to be in A. fib with RVR and was cardioverted, upon review of patient's in the emergency department he was intubated without sedation, WBC: 7, LFT was elevated, CT head is stable and unchanged with a chronic subdural.    At home, patient was on:    PMH: Atrial fibrillation on Eliquis, bilateral ICA stenosis, DM2, HD LD, HTN, CAD, status post PCI stents, PVD, CVA, ETOH abuse presented as a transfer as a trauma alert for CT head revealed diffuse bilateral SAH and left parietal SDH, patient was admitted to neuro ICU       PAST MEDICAL HISTORY :       Past Medical History:        Diagnosis Date    Alcohol abuse     Atrial fibrillation ([address]     Diabetes [facility]     Gastritis     Hyperlipidemia     Hypertension     Left ventricular hypertrophy     Neuropathy     Renal cyst        Past Surgical History:        Procedure Laterality Date    ANGIOPLASTY  2019    NO VASCULAR LEG STENTS PER DR. Jean Hsu    CAROTID STENT PLACEMENT  01/08/2019    carotid wallstent 10mm. mri conditonal safe immediately.     CORONARY ANGIOPLASTY WITH STENT PLACEMENT  2019    PT HAS 7 CARDIAC STENTS UNKNOWN 1.5 T ONLY    GASTROSTOMY TUBE PLACEMENT  01/22/2020    ESOPHAGOGASTRODUODENOSCOPY PEG TUBE INSERTION     UPPER GASTROINTESTINAL solution 10 mLs by Per G Tube route 2 times daily at 0800 and 1400 1/27/20   Christie Epstein MD   sotalol (BETAPACE) 160 MG tablet Take 1 tablet by mouth 2 times daily 1/27/20   Christie Epstein MD   cephALEXin (KEFLEX) 500 MG capsule Take 1 capsule by mouth 2 times daily for 1 dose 1/27/20 1/28/20  Christie Epstein MD   gabapentin (NEURONTIN) 300 MG capsule Take 300 mg by mouth 2 times daily. Historical Provider, MD   cilostazol (PLETAL) 100 MG tablet Take 100 mg by mouth 2 times daily    Historical Provider, MD   amLODIPine (NORVASC) 5 MG tablet Take 5 mg by mouth daily    Historical Provider, MD   folic acid (FOLVITE) 1 MG tablet Take 1 mg by mouth daily    Historical Provider, MD   albuterol sulfate  (90 Base) MCG/ACT inhaler Inhale 2 puffs into the lungs every 4 hours as needed for Wheezing    Historical Provider, MD   Multiple Vitamins-Minerals (THERAPEUTIC MULTIVITAMIN-MINERALS) tablet Take 1 tablet by mouth daily    Historical Provider, MD   DULoxetine (CYMBALTA) 60 MG extended release capsule Take 60 mg by mouth nightly    Historical Provider, MD   omeprazole (PRILOSEC) 20 MG delayed release capsule Take 20 mg by mouth daily    Historical Provider, MD   traMADol (ULTRAM) 50 MG tablet Take 50 mg by mouth every 6 hours as needed for Pain.     Historical Provider, MD   insulin glargine (BASAGLAR KWIKPEN) 100 UNIT/ML injection pen Inject 5 Units into the skin nightly    Historical Provider, MD   apixaban (ELIQUIS) 5 MG TABS tablet Take 5 mg by mouth 2 times daily    Historical Provider, MD   atorvastatin (LIPITOR) 40 MG tablet Take 1 tablet by mouth daily 1/27/20   Christie Epstein MD   vitamin D (ERGOCALCIFEROL) 1.25 MG (51108 UT) CAPS capsule Take 1 capsule by mouth once a week for 8 doses 12/28/19 2/16/20  Nish Riggs DO       Current Medications:   Current Facility-Administered Medications: norepinephrine (LEVOPHED) 16 mg in dextrose 5 % 250 mL infusion, 2 mcg/min, Intravenous, 24-hour ago with a chronic left sided hygroma with minimal shift and some mass-effect   -No stroke suspected, patient will be transferred to the neuro ICU for further management since he was neuro ICU patient for the past 24-hour      Additional recommendations may follow    Please contact [facility] with any changes in patients neurologic status.            Panchito Nair MD   1/28/2020  6:37 PM

## 2020-01-28 NOTE — ED PROVIDER NOTES
[facility] ED  eMERGENCY dEPARTMENT eNCOUnter   Attending Attestation     Pt Name: Manju Solo  MRN: [id number]  [place] 1960  Date of evaluation: 1/28/20       Manju Solo is a 61 y.o. male who presents with Altered Mental Status (transferred from [facility]. Pt found unresponsive today )      History: Patient presents with unresponsiveness after being intubated at outlying facility. Patient sent here for concern for expanding subdural hematoma, fever, hypotension. Patient came with blood pressure in the 80s which he has been having since departure from the other hospital.  Patient is diaphoretic, patient is unresponsive. Patient did receive etomidate, fentanyl, Versed prior to departure but nothing during departure and patient is still intubated and not having any issues with sedation at this time. Exam: Heart rate and rhythm are regular. Lungs are clear to auscultation bilaterally with intubation. Abdomen is soft, nontender. Patient is diaphoretic, patient's pupils are equal and reactive to light. Patient does have some spontaneous movements. Plan for sepsis work-up, CT of the head, neuro consult, sepsis work-up, will treat, will give fluids, Levophed started, central line placed. Critical    Care time 30 minutes. EKG shows left axis deviation, normal sinus rhythm with rate of 98 bpm.  No ST elevations or depressions, no blocks arrhythmias, nonspecific EKG. I performed a history and physical examination of the patient and discussed management with the resident. I reviewed the residents note and agree with the documented findings and plan of care. Any areas of disagreement are noted on the chart. I was personally present for the key portions of any procedures. I have documented in the chart those procedures where I was not present during the key portions. I have personally reviewed all images and agree with the resident's interpretation.  I have reviewed the emergency

## 2020-01-28 NOTE — ED NOTES
Dr. Ariana Brewer, neurology, at [facility], [address] [place] [address]  01/28/20 7200

## 2020-01-28 NOTE — PLAN OF CARE
Ventilator Bronchodilator assessment    Breath sounds: clear throughout  Inspiratory Pressure: 19  Plateau Pressure: 17    Patient assessed at level 1          [x]    Bronchodilator Assessment    BRONCHODILATOR ASSESSMENT SCORE  Score 0 (Home) 1 2 3 4   Breath Sounds   []  Chronic Ventilator: Patient at baseline [x]  Mild Wheezes/ Clear []  Intermittent wheezes with good air entry []  Bilateral/unilateral wheezing with diminished air entry []  Insp/Exp wheeze and/or poor aeration   Ventilator Pressures   []  Chronic Ventilator [x]  Insp. Pressure less than 25 cm H20 []  Insp. Pressure less than 25 cm H20 []  Insp. Pressure exceeds 25 cm H20 []  Insp.  Pressure exceeds 30 cm H20   Plateau Pressure []  NA   [x]  Plateau Pressure less than 4  []  Plateau Pressure less than or equal to 5 []  Plateau Pressure greater than or equal to 6 []  Plateau Pressure greater than or equal to 8       [employer]  6:37 PM

## 2020-01-28 NOTE — DISCHARGE INSTR - COC
Continuity of Care Form    Patient Name: Ramírez Chaney   :  1960  MRN:  [id number]    Admit date:  2020  Discharge date:  2020    Code Status Order: Prior   Advance Directives:     Admitting Physician:  No admitting provider for patient encounter. PCP: Shakira Schwartz MD    Discharging Nurse: [facility] Unit/Room#:   Discharging Unit Phone Number: ***    Emergency Contact:   Extended Emergency Contact Information  Primary Emergency Contact: Lo Bacon, [address] Phone: [phone]  Mobile Phone: [phone]  Relation: Parent   needed? No  Secondary Emergency Contact: [facility]  Home Phone: [phone]  Relation: Brother/Sister   needed? No    Past Surgical History:  Past Surgical History:   Procedure Laterality Date    ANGIOPLASTY      NO VASCULAR LEG STENTS PER DR. Bentley Hammond    CAROTID STENT PLACEMENT  2019    carotid wallstent 10mm. mri conditonal safe immediately.  CORONARY ANGIOPLASTY WITH STENT PLACEMENT      PT HAS 7 CARDIAC STENTS UNKNOWN 1.5 T ONLY    GASTROSTOMY TUBE PLACEMENT  2020    ESOPHAGOGASTRODUODENOSCOPY PEG TUBE INSERTION     UPPER GASTROINTESTINAL ENDOSCOPY N/A 2020    EGD ESOPHAGOGASTRODUODENOSCOPY PEG TUBE INSERTION performed by Romy Robins IV, DO at [facility]       Immunization History: There is no immunization history on file for this patient. Active Problems:  Patient Active Problem List   Diagnosis Code    SAH (subarachnoid hemorrhage) ([facility]) I60.9    Subarachnoid bleed ([facility] I60.9    Traumatic subarachnoid hemorrhage with loss of consciousness of 1 hour to 5 hours 59 minutes ([facility] [facility] S06. 6X3A    Carotid stenosis, right I65.21    Asymptomatic stenosis of left vertebral artery I65.02    Intracranial atherosclerosis I67.2    History of CEA (carotid endarterectomy) Z98.890    Ventilator dependence ([facility]) Z99.11    Delirium tremens ([facility]) F10.231    Chronic a-fib I48.20    On

## 2020-01-28 NOTE — ED NOTES
Bed: 13  Expected date:   Expected time:   Means of arrival:   Comments:  Sravani Beyer RN  01/28/20 0748

## 2020-01-28 NOTE — ED NOTES
Dr. Santo Sosa and Dr. Kade Khan at bedside      [facility]  01/28/20 3635

## 2020-01-28 NOTE — ED PROVIDER NOTES
[facility] ED  Emergency Department Encounter  EmergencyMedicine Resident     Pt Name:Laron Stone  MRN: [id number]  [place] 1960  Date of evaluation: 1/28/20  PCP:  Hermilo Greene MD    [address] [place] [address]       Chief Complaint   Patient presents with    Altered Mental Status     transferred from [facility]. Pt found unresponsive today        HISTORY OF PRESENT ILLNESS  (Location/Symptom, Timing/Onset, Context/Setting, Quality, Duration, Modifying Factors, Severity.)      Urmila Damon is a 61 y.o. male who presents with altered mental status, low GCS of 4. [facility] On arrival, patient intubated without sedation, minimally responsive to the painful stimuli. Patient with equal pupils bilaterally with partial gaze to the left. No new reported falls. Patient was just discharged from [facility]'s after long hospital stay from a fall, chronic subdural hematoma, alcohol withdrawal.  Was given [address] as he is on anticoagulation with Eliquis for A. fib. Patient was reportedly in A. fib prior to today's arrival and [facility] No reported fevers from EMS, however patient 39 °C on arrival with hypotension, rate controlled A. fib. Patient was not placed on rate control meds. ET scan of the head prior to transfer showed possible interval worsening of subdural with midline shift. PAST MEDICAL / SURGICAL / SOCIAL / FAMILY HISTORY      has a past medical history of Alcohol abuse, Atrial fibrillation ([facility] [address] Diabetes ([facility] [address] Gastritis, Hyperlipidemia, Hypertension, Left ventricular hypertrophy, Neuropathy, and Renal cyst.     has a past surgical history that includes Coronary angioplasty with stent (2019); angioplasty (2019); Carotid stent placement (01/08/2019); Gastrostomy tube placement (01/22/2020); and Upper gastrointestinal endoscopy (N/A, 1/22/2020).     Social History     Socioeconomic History    Marital status: Single     Spouse name: Not on times daily for 1 dose 1/27/20 1/28/20  Rosy Jeffery MD   gabapentin (NEURONTIN) 300 MG capsule Take 300 mg by mouth 2 times daily. Historical Provider, MD   cilostazol (PLETAL) 100 MG tablet Take 100 mg by mouth 2 times daily    Historical Provider, MD   amLODIPine (NORVASC) 5 MG tablet Take 5 mg by mouth daily    Historical Provider, MD   folic acid (FOLVITE) 1 MG tablet Take 1 mg by mouth daily    Historical Provider, MD   albuterol sulfate  (90 Base) MCG/ACT inhaler Inhale 2 puffs into the lungs every 4 hours as needed for Wheezing    Historical Provider, MD   Multiple Vitamins-Minerals (THERAPEUTIC MULTIVITAMIN-MINERALS) tablet Take 1 tablet by mouth daily    Historical Provider, MD   DULoxetine (CYMBALTA) 60 MG extended release capsule Take 60 mg by mouth nightly    Historical Provider, MD   omeprazole (PRILOSEC) 20 MG delayed release capsule Take 20 mg by mouth daily    Historical Provider, MD   traMADol (ULTRAM) 50 MG tablet Take 50 mg by mouth every 6 hours as needed for Pain. Historical Provider, MD   insulin glargine (BASAGLAR KWIKPEN) 100 UNIT/ML injection pen Inject 5 Units into the skin nightly    Historical Provider, MD   apixaban (ELIQUIS) 5 MG TABS tablet Take 5 mg by mouth 2 times daily    Historical Provider, MD   atorvastatin (LIPITOR) 40 MG tablet Take 1 tablet by mouth daily 1/27/20   Rosy Jeffery MD   vitamin D (ERGOCALCIFEROL) 1.25 MG (16804 UT) CAPS capsule Take 1 capsule by mouth once a week for 8 doses 12/28/19 2/16/20  Jarocho Copeland, DO       REVIEW OF SYSTEMS    (2-9 systems for level 4, 10 or more for level 5)      Review of Systems   Unable to perform ROS: Other   Patient nonverbal at baseline.       PHYSICAL EXAM   (up to 7 for level 4, 8 or more for level 5)      INITIAL VITALS:   /81   Pulse 82   Temp 102.7 °F (39.3 °C) (Core)   Resp 19   Wt 157 lb (71.2 kg)   SpO2 100%   BMI 21.90 kg/m²       CONSTITUTIONAL: GCS 3 T  HEAD: atraumatic, Moisés Hobbs: cabinet override    iohexol (OMNIPAQUE 350) solution 75 mL    lactated ringers infusion    acetaminophen (TYLENOL) suppository 650 mg         DIAGNOSTIC RESULTS / EMERGENCY DEPARTMENT COURSE / MDM     LABS:  Results for orders placed or performed during the hospital encounter of 01/28/20   Ammonia   Result Value Ref Range    Ammonia 35 16 - 60 umol/L   CBC Auto Differential   Result Value Ref Range    WBC 7.0 3.5 - 11.3 k/uL    RBC 4.08 (L) 4.21 - 5.77 m/uL    Hemoglobin 9.8 (L) 13.0 - 17.0 g/dL    Hematocrit 34.4 (L) 40.7 - 50.3 %    MCV 84.3 82.6 - 102.9 fL    MCH 24.0 (L) 25.2 - 33.5 pg    MCHC 28.5 28.4 - 34.8 g/dL    RDW 20.4 (H) 11.8 - 14.4 %    Platelets 899 028 - 538 k/uL    MPV 10.3 8.1 - 13.5 fL    NRBC Automated 0.0 0.0 per 100 WBC    Differential Type NOT REPORTED     WBC Morphology NOT REPORTED     RBC Morphology NOT REPORTED     Platelet Estimate NOT REPORTED     Immature Granulocytes 0 0 %    Seg Neutrophils 84 (H) 36 - 65 %    Lymphocytes 11 (L) 24 - 43 %    Monocytes 5 3 - 12 %    Eosinophils % 0 (L) 1 - 4 %    Basophils 0 0 - 2 %    Absolute Immature Granulocyte 0.00 0.00 - 0.30 k/uL    Segs Absolute 5.88 1.50 - 8.10 k/uL    Absolute Lymph # 0.77 (L) 1.10 - 3.70 k/uL    Absolute Mono # 0.35 0.10 - 1.20 k/uL    Absolute Eos # 0.00 0.00 - 0.44 k/uL    Basophils Absolute 0.00 0.00 - 0.20 k/uL    Morphology ANISOCYTOSIS PRESENT    Comprehensive Metabolic Panel   Result Value Ref Range    Glucose 261 (H) 70 - 99 mg/dL    BUN 27 (H) 6 - 20 mg/dL    CREATININE 0.52 (L) 0.70 - 1.20 mg/dL    Bun/Cre Ratio NOT REPORTED 9 - 20    Calcium 8.6 8.6 - 10.4 mg/dL    Sodium 143 135 - 144 mmol/L    Potassium 4.4 3.7 - 5.3 mmol/L    Chloride 102 98 - 107 mmol/L    CO2 26 20 - 31 mmol/L    Anion Gap 15 9 - 17 mmol/L    Alkaline Phosphatase 110 40 - 129 U/L     (H) 5 - 41 U/L     (H) <40 U/L    Total Bilirubin 0.70 0.3 - 1.2 mg/dL    Total Protein 5.7 (L) 6.4 - 8.3 g/dL    Alb 2.8 cm.      3.  Scattered tree-in-bud and ground-glass infiltrates throughout the lungs. 4.  Consolidation left lower lobe. 5.  Trace pericardial effusion. 6.  Gastrostomy tube in situ. Other findings as above. CT Head WO Contrast   Final Result   Persistent extra-axial fluid collection within the left frontal   parietal/temporal region maximally measuring 2 cm in width with approximately   4 mm of midline shift. Scattered subcortical and periventricular white matter hypodensities are most   compatible with chronic small vessel disease. CT ABDOMEN PELVIS W IV CONTRAST Additional Contrast? None    (Results Pending)           EMERGENCY DEPARTMENT COURSE:   Patient hypotensive, intubated with rate controlled A. fib on arrival.  Core temp noted to be 39 °C.  Patient was placed on a Levophed drip, CVC catheter was placed in right moral vein and labs obtained for possible septic infection. Patient with multiple risk factors for infection and concern for possible aspiration the setting of dysphasia, recent SDH, recent PEG placement. Lung sounds equal bilaterally with ET tube in appropriate position, which was confirmed on repeat chest x-ray shortly after arrival.  Patient had mild elevation in lactate, baseline transaminitis, normal white blood cell count. EKG did show prolonged QTC as compared to last EKG. 2 mg IV magnesium was administered. No signs of infection surrounding PEG site, abdomen soft. CT scan of the abdomen obtained as well as CT PE study, repeat head CT. No signs of worsening SDH or midline shift on repeat head CT. Spoke with critical care attending who is part of management team prior to recent discharge. No significant focal changes were noted on neuro exam today.   Neuro critical care attending agreeable to accepting patient      DISPOSITION:       PROCEDURES:  None    CONSULTS:  IP CONSULT TO STROKE TEAM  IP CONSULT TO CRITICAL CARE  IP CONSULT TO

## 2020-01-28 NOTE — ED NOTES
Pt to CT via stretcher with writer, Dr. Jeannie Singleton, and Samy Garcia- RRT      Darryle Olive, RN  01/28/20 0736

## 2020-01-28 NOTE — PROCEDURES
Central Line Placement Procedure Note    Indication: hypovolemia    Consent: Unable to be obtained due to patient's condition. Procedure: The patient was positioned appropriately and the skin over the right femoral vein was prepped with chlorhexidine and draped in a sterile fashion. Local anesthesia was obtained by infiltration using 1% Lidocaine without epinephrine. A large bore needle was inserted into the vein under ultrasound guidance returning dark non-pulsatile blood. A guide wire was then inserted into the vein through the needle. Placement was confirmed with ultrasound. A small incision was made in the skin with a #11 scalpel. The dilator was inserted into the skin and vein using the Seldinger technique. A triple lumen catheter was then inserted into the vessel over the guide wire using the Seldinger technique. The guidewire was then removed and all ports showed free flowing blood return and were flushed with saline solution. The catheter was then securely fastened to the skin using silk suture and covered with a sterile dressing. A post procedure X-ray was not indicated. The patient tolerated the procedure well.     Complications: None

## 2020-01-29 ENCOUNTER — APPOINTMENT (OUTPATIENT)
Dept: MRI IMAGING | Age: 60
DRG: 005 | End: 2020-01-29
Payer: MEDICAID

## 2020-01-29 ENCOUNTER — APPOINTMENT (OUTPATIENT)
Dept: CT IMAGING | Age: 60
DRG: 005 | End: 2020-01-29
Payer: MEDICAID

## 2020-01-29 ENCOUNTER — APPOINTMENT (OUTPATIENT)
Dept: ULTRASOUND IMAGING | Age: 60
DRG: 005 | End: 2020-01-29
Payer: MEDICAID

## 2020-01-29 ENCOUNTER — APPOINTMENT (OUTPATIENT)
Dept: GENERAL RADIOLOGY | Age: 60
DRG: 005 | End: 2020-01-29
Payer: MEDICAID

## 2020-01-29 LAB
ALBUMIN SERPL-MCNC: 2.7 G/DL (ref 3.5–5.2)
ALBUMIN/GLOBULIN RATIO: 1 (ref 1–2.5)
ALLEN TEST: ABNORMAL
ALP BLD-CCNC: 93 U/L (ref 40–129)
ALT SERPL-CCNC: 317 U/L (ref 5–41)
ANION GAP SERPL CALCULATED.3IONS-SCNC: 9 MMOL/L (ref 9–17)
AST SERPL-CCNC: 200 U/L
BILIRUB SERPL-MCNC: 0.67 MG/DL (ref 0.3–1.2)
BILIRUBIN DIRECT: 0.21 MG/DL
BILIRUBIN, INDIRECT: 0.46 MG/DL (ref 0–1)
BUN BLDV-MCNC: 20 MG/DL (ref 6–20)
BUN/CREAT BLD: ABNORMAL (ref 9–20)
C-REACTIVE PROTEIN: 99 MG/L (ref 0–5)
CALCIUM SERPL-MCNC: 8.8 MG/DL (ref 8.6–10.4)
CHLORIDE BLD-SCNC: 109 MMOL/L (ref 98–107)
CHOLESTEROL/HDL RATIO: 3.5
CHOLESTEROL: 92 MG/DL
CO2: 27 MMOL/L (ref 20–31)
CREAT SERPL-MCNC: 0.3 MG/DL (ref 0.7–1.2)
CULTURE: NO GROWTH
EKG ATRIAL RATE: 98 BPM
EKG P AXIS: 89 DEGREES
EKG P-R INTERVAL: 196 MS
EKG Q-T INTERVAL: 412 MS
EKG QRS DURATION: 148 MS
EKG QTC CALCULATION (BAZETT): 525 MS
EKG R AXIS: -80 DEGREES
EKG T AXIS: 73 DEGREES
EKG VENTRICULAR RATE: 98 BPM
ESTIMATED AVERAGE GLUCOSE: 120 MG/DL
FIO2: 30
GFR AFRICAN AMERICAN: >60 ML/MIN
GFR NON-AFRICAN AMERICAN: >60 ML/MIN
GFR SERPL CREATININE-BSD FRML MDRD: ABNORMAL ML/MIN/{1.73_M2}
GFR SERPL CREATININE-BSD FRML MDRD: ABNORMAL ML/MIN/{1.73_M2}
GLOBULIN: ABNORMAL G/DL (ref 1.5–3.8)
GLUCOSE BLD-MCNC: 101 MG/DL (ref 75–110)
GLUCOSE BLD-MCNC: 103 MG/DL (ref 70–99)
GLUCOSE BLD-MCNC: 114 MG/DL (ref 74–100)
HBA1C MFR BLD: 5.8 % (ref 4–6)
HCT VFR BLD CALC: 31.9 % (ref 40.7–50.3)
HDLC SERPL-MCNC: 26 MG/DL
HEMOGLOBIN: 9.2 G/DL (ref 13–17)
LACTIC ACID, WHOLE BLOOD: 0.9 MMOL/L (ref 0.7–2.1)
LACTIC ACID, WHOLE BLOOD: 1.2 MMOL/L (ref 0.7–2.1)
LACTIC ACID: NORMAL MMOL/L
LACTIC ACID: NORMAL MMOL/L
LDL CHOLESTEROL: 49 MG/DL (ref 0–130)
Lab: NORMAL
MAGNESIUM: 1.7 MG/DL (ref 1.6–2.6)
MAGNESIUM: 1.8 MG/DL (ref 1.6–2.6)
MCH RBC QN AUTO: 24 PG (ref 25.2–33.5)
MCHC RBC AUTO-ENTMCNC: 28.8 G/DL (ref 28.4–34.8)
MCV RBC AUTO: 83.3 FL (ref 82.6–102.9)
MODE: ABNORMAL
NEGATIVE BASE EXCESS, ART: ABNORMAL (ref 0–2)
NRBC AUTOMATED: 0 PER 100 WBC
O2 DEVICE/FLOW/%: ABNORMAL
PATIENT TEMP: 37.5
PDW BLD-RTO: 20.8 % (ref 11.8–14.4)
PHOSPHORUS: 3.1 MG/DL (ref 2.5–4.5)
PHOSPHORUS: 3.9 MG/DL (ref 2.5–4.5)
PLATELET # BLD: 173 K/UL (ref 138–453)
PMV BLD AUTO: 11.6 FL (ref 8.1–13.5)
POC HCO3: 32.6 MMOL/L (ref 21–28)
POC O2 SATURATION: 97 % (ref 94–98)
POC PCO2 TEMP: 39 MM HG
POC PCO2: 37.8 MM HG (ref 35–48)
POC PH TEMP: 7.54
POC PH: 7.54 (ref 7.35–7.45)
POC PO2 TEMP: 83 MM HG
POC PO2: 79.9 MM HG (ref 83–108)
POSITIVE BASE EXCESS, ART: 9 (ref 0–3)
POTASSIUM SERPL-SCNC: 3.9 MMOL/L (ref 3.7–5.3)
RBC # BLD: 3.83 M/UL (ref 4.21–5.77)
SAMPLE SITE: ABNORMAL
SODIUM BLD-SCNC: 145 MMOL/L (ref 135–144)
SPECIMEN DESCRIPTION: NORMAL
TCO2 (CALC), ART: 34 MMOL/L (ref 22–29)
TOTAL PROTEIN: 5.3 G/DL (ref 6.4–8.3)
TRIGL SERPL-MCNC: 84 MG/DL
VANCOMYCIN TROUGH DATE LAST DOSE: ABNORMAL
VANCOMYCIN TROUGH DOSE AMOUNT: ABNORMAL
VANCOMYCIN TROUGH TIME LAST DOSE: ABNORMAL
VANCOMYCIN TROUGH: <4 UG/ML (ref 10–20)
VLDLC SERPL CALC-MCNC: ABNORMAL MG/DL (ref 1–30)
WBC # BLD: 4 K/UL (ref 3.5–11.3)

## 2020-01-29 PROCEDURE — 85027 COMPLETE CBC AUTOMATED: CPT

## 2020-01-29 PROCEDURE — 86140 C-REACTIVE PROTEIN: CPT

## 2020-01-29 PROCEDURE — 94770 HC ETCO2 MONITOR DAILY: CPT

## 2020-01-29 PROCEDURE — 84100 ASSAY OF PHOSPHORUS: CPT

## 2020-01-29 PROCEDURE — 76705 ECHO EXAM OF ABDOMEN: CPT

## 2020-01-29 PROCEDURE — 87070 CULTURE OTHR SPECIMN AEROBIC: CPT

## 2020-01-29 PROCEDURE — 2000000003 HC NEURO ICU R&B

## 2020-01-29 PROCEDURE — 6360000002 HC RX W HCPCS: Performed by: STUDENT IN AN ORGANIZED HEALTH CARE EDUCATION/TRAINING PROGRAM

## 2020-01-29 PROCEDURE — 83735 ASSAY OF MAGNESIUM: CPT

## 2020-01-29 PROCEDURE — 70450 CT HEAD/BRAIN W/O DYE: CPT

## 2020-01-29 PROCEDURE — 51798 US URINE CAPACITY MEASURE: CPT

## 2020-01-29 PROCEDURE — 94761 N-INVAS EAR/PLS OXIMETRY MLT: CPT

## 2020-01-29 PROCEDURE — 6370000000 HC RX 637 (ALT 250 FOR IP): Performed by: STUDENT IN AN ORGANIZED HEALTH CARE EDUCATION/TRAINING PROGRAM

## 2020-01-29 PROCEDURE — 2580000003 HC RX 258: Performed by: STUDENT IN AN ORGANIZED HEALTH CARE EDUCATION/TRAINING PROGRAM

## 2020-01-29 PROCEDURE — 36620 INSERTION CATHETER ARTERY: CPT

## 2020-01-29 PROCEDURE — 80061 LIPID PANEL: CPT

## 2020-01-29 PROCEDURE — 51701 INSERT BLADDER CATHETER: CPT

## 2020-01-29 PROCEDURE — 37799 UNLISTED PX VASCULAR SURGERY: CPT

## 2020-01-29 PROCEDURE — 82803 BLOOD GASES ANY COMBINATION: CPT

## 2020-01-29 PROCEDURE — 71045 X-RAY EXAM CHEST 1 VIEW: CPT

## 2020-01-29 PROCEDURE — 83036 HEMOGLOBIN GLYCOSYLATED A1C: CPT

## 2020-01-29 PROCEDURE — 2700000000 HC OXYGEN THERAPY PER DAY

## 2020-01-29 PROCEDURE — 87205 SMEAR GRAM STAIN: CPT

## 2020-01-29 PROCEDURE — 80048 BASIC METABOLIC PNL TOTAL CA: CPT

## 2020-01-29 PROCEDURE — 94003 VENT MGMT INPAT SUBQ DAY: CPT

## 2020-01-29 PROCEDURE — 80202 ASSAY OF VANCOMYCIN: CPT

## 2020-01-29 PROCEDURE — 99291 CRITICAL CARE FIRST HOUR: CPT | Performed by: PSYCHIATRY & NEUROLOGY

## 2020-01-29 PROCEDURE — 80076 HEPATIC FUNCTION PANEL: CPT

## 2020-01-29 PROCEDURE — 70551 MRI BRAIN STEM W/O DYE: CPT

## 2020-01-29 PROCEDURE — 6360000002 HC RX W HCPCS: Performed by: PSYCHIATRY & NEUROLOGY

## 2020-01-29 PROCEDURE — 93010 ELECTROCARDIOGRAM REPORT: CPT | Performed by: INTERNAL MEDICINE

## 2020-01-29 PROCEDURE — 2580000003 HC RX 258: Performed by: PSYCHIATRY & NEUROLOGY

## 2020-01-29 PROCEDURE — 83605 ASSAY OF LACTIC ACID: CPT

## 2020-01-29 PROCEDURE — 82947 ASSAY GLUCOSE BLOOD QUANT: CPT

## 2020-01-29 RX ORDER — VANCOMYCIN HYDROCHLORIDE 1 G/200ML
1000 INJECTION, SOLUTION INTRAVENOUS EVERY 12 HOURS
Status: DISCONTINUED | OUTPATIENT
Start: 2020-01-29 | End: 2020-01-29

## 2020-01-29 RX ORDER — HEPARIN SODIUM 5000 [USP'U]/ML
5000 INJECTION, SOLUTION INTRAVENOUS; SUBCUTANEOUS EVERY 8 HOURS SCHEDULED
Status: DISCONTINUED | OUTPATIENT
Start: 2020-01-29 | End: 2020-01-30

## 2020-01-29 RX ORDER — ASPIRIN 81 MG/1
81 TABLET, CHEWABLE ORAL DAILY
Status: DISCONTINUED | OUTPATIENT
Start: 2020-01-29 | End: 2020-01-30

## 2020-01-29 RX ADMIN — GABAPENTIN 300 MG: 300 CAPSULE ORAL at 21:33

## 2020-01-29 RX ADMIN — GABAPENTIN 300 MG: 300 CAPSULE ORAL at 08:35

## 2020-01-29 RX ADMIN — SODIUM CHLORIDE, PRESERVATIVE FREE 10 ML: 5 INJECTION INTRAVENOUS at 21:31

## 2020-01-29 RX ADMIN — DESMOPRESSIN ACETATE 40 MG: 0.2 TABLET ORAL at 21:33

## 2020-01-29 RX ADMIN — CILOSTAZOL 100 MG: 100 TABLET ORAL at 21:33

## 2020-01-29 RX ADMIN — VANCOMYCIN HYDROCHLORIDE 1250 MG: 10 INJECTION, POWDER, LYOPHILIZED, FOR SOLUTION INTRAVENOUS at 13:44

## 2020-01-29 RX ADMIN — INSULIN GLARGINE 5 UNITS: 100 INJECTION, SOLUTION SUBCUTANEOUS at 21:58

## 2020-01-29 RX ADMIN — HEPARIN SODIUM 5000 UNITS: 5000 INJECTION INTRAVENOUS; SUBCUTANEOUS at 13:44

## 2020-01-29 RX ADMIN — SOTALOL HYDROCHLORIDE 160 MG: 80 TABLET ORAL at 08:35

## 2020-01-29 RX ADMIN — PIPERACILLIN AND TAZOBACTAM 3.38 G: 3; .375 INJECTION, POWDER, FOR SOLUTION INTRAVENOUS at 17:07

## 2020-01-29 RX ADMIN — AMLODIPINE BESYLATE 5 MG: 5 TABLET ORAL at 08:35

## 2020-01-29 RX ADMIN — HEPARIN SODIUM 5000 UNITS: 5000 INJECTION INTRAVENOUS; SUBCUTANEOUS at 21:58

## 2020-01-29 RX ADMIN — VANCOMYCIN HYDROCHLORIDE 1250 MG: 10 INJECTION, POWDER, LYOPHILIZED, FOR SOLUTION INTRAVENOUS at 21:20

## 2020-01-29 RX ADMIN — FOLIC ACID 1 MG: 1 TABLET ORAL at 08:35

## 2020-01-29 RX ADMIN — ASPIRIN 81 MG: 81 TABLET, CHEWABLE ORAL at 13:44

## 2020-01-29 RX ADMIN — PIPERACILLIN AND TAZOBACTAM 3.38 G: 3; .375 INJECTION, POWDER, FOR SOLUTION INTRAVENOUS at 08:35

## 2020-01-29 RX ADMIN — DULOXETINE HYDROCHLORIDE 60 MG: 30 CAPSULE, DELAYED RELEASE ORAL at 21:33

## 2020-01-29 RX ADMIN — CILOSTAZOL 100 MG: 100 TABLET ORAL at 08:35

## 2020-01-29 ASSESSMENT — PULMONARY FUNCTION TESTS
PIF_VALUE: 27
PIF_VALUE: 20
PIF_VALUE: 26

## 2020-01-29 NOTE — PROGRESS NOTES
Insert Arterial Line  Date/Time:  01/29/20, 4:39 AM  Performed by: Gill Drake    Patient identity confirmed: arm band and provided demographic data   Time out: Immediately prior to procedure a \"time out\" was called to verify the correct patient, procedure, equipment, support staff. Preparation: Patient was prepped and draped in the usual sterile fashion.     Location:right radial    Ozzie's test normal: yes  Needle gauge: 20 g     Number of attempts: 1  Post-procedure: transparent dressing applied and line secured    Patient tolerance: well    Supervised by Mona Canales RRT

## 2020-01-29 NOTE — PROCEDURES
Arterial Line Placement Procedure Note          Performed by: Sneha Palomo DO               Indication: severe hypotension and shock    Consent: Unable to be obtained due to the emergent nature of this procedure. Ozzie's Test: Normal    Time out performed: Immediately prior to the procedure a \"time out\" was called to verify the correct patient, the correct procedure, equipment, support staff and site/side marked as required. All elements of maximal sterile barrier techniques were followed. Procedure: The skin over the right radial artery was prepped with Chloraprep. Local anesthesia was not performed due to the emergent nature of this procedure. An 18 gauge angiocath was then inserted, over a needle, using a modified Seldinger technique, into the vessel. The transducer set was then attached and securely fastened to the skin without sutures. Waveforms on the monitor were observed and found to be adequate. The patient had good distal perfusion after the procedure. The site was then dressed in a sterile fashion. The patient tolerated the procedure well.      Complications: None

## 2020-01-29 NOTE — PROGRESS NOTES
Neuro ICU History & Physical    Patient Name: Dora King  Patient : 1960  Room/Bed: 0529/0529-01  Code Status: Full   Allergies: No Known Allergies    CHIEF COMPLAINT     AMS    HPI    History Obtained From: Chart Review    The patient is a 61 y.o. male presented with AMS and was febrile. He was taken to [facility] and then promptly transferred here. He was discharged yesterday from a SNF. Patient was found unresponsive by staff today at the SNF and had a GCS of 4 per EMS and was intubated. Additionally he was determined to be in A. fib with RVR and was cardioverted. Upon review of patient in the emergency department he was intubated without sedation, white count of 7, elevated LFTs, CT head is stable and unchanged with chronic subdural.    Negative UA  ABG PF > 200  WBC 7   Ammonia 35  Hb 9.8  Cr 0.52  LA 2.6  Serum Osms 314  Trop 57  CTH stable SDH  CT Chest - LLL atelectasis and PNA    The prior hospital course from admission is contained below. The patient is a 60 yo male with history of atrial fibrillation on Eliquis, bilateral ICA stenosis (right more than left), DM2, HLD, HTN, CAD s/p PCI stents, PVD, CEA, and ETOH abuse who presents as a transfer from [facility] as a trauma alert for CT head revealed diffuse bialteral SAH and left parietal SDH.  He was given [place] and transferred to [facility] for higher level of care.        Patient was found confused by family at his home after being unable to reach him on the phone.  He was complaining of headache, and actively vomiting.  Also had bilateral arm scrapes and bruises concerning for recent fall, patient himself does not member falling, said around 4 PM he woke up and felt frontal and vertex headache and neck pain, and felt nauseous and started throwing up.  Endorses drinking alcohol last night states that he had 2 beers.  Per family has significant alcohol abuse history and has had multiple falls in the past.     On presentation in the ED [facility], patient mildly lethargic but oriented x4, complaining of headache, nausea, mild vertigo, left arm weaker than right, bilateral leg weakness.      Significant interdisciplinary discussion between the neurosurgeon, radiologist, trauma surgeon and stroke specialist about whether  bleed is consistent with traumatic versus spontaneous subarachnoid.  Stroke specialist has significant concern for severe stenosis, suspects traumatic bleed and that patient is at risk for vasospasm.  In addition has intracranial left vertebral artery athero-stenosis.  Consensus management strategy is to keep the patient 130-160 for blood pressure goals to avoid right MCA watershed stroke.     No aneurysm found on CTA or malformation-critical stenosis of the proximal left vertebral artery, critical stenosis of the proximal right cervical ICA, severe near critical stenosis of the right cavernous ICA, moderate stenosis of the left cavernous ICA. Non con CT head repeat at our emergency facility showed subarachnoid, predominantly in the bilateral sylvian fissures and bilateral cerebral hemisphere sulcal I, acute left parietal convexity subdural hematoma 4 mm in thickness without mass-effect.     Hospital Course:   12/27: CT head stable  12/28: Intubated overnight for respiratory distress, started on Cardizem infusion for atrial fibrillation with RVR, weaned off and Sotalol started. Versed for sedation with concern for possible alcohol withdrawal.  12/29: Cdiff negative. patient noted to have left upper extremity tremor, placed on LTME. Left hand cool to touch with delayed capillary refill, thumb spica splint removed to assess neurovascular status. With splint off, radial and ulnar pulses palpable and assessed via doppler. Capillary refill improved as did warmth and color. Ortho called to replace splint. Febrile this AM - infectious workup sent.   12/30: LTME showing diffuse slowing and disorganized background suggesting moderate encephalopathy. Started on Vanc/zosyn  12/31: Versed started for sedation, bradycardia noted with Precedex. 1/1: TCD normal. Failed SBT due to tachypnea  1/2: Sotalol increased to 160 mg BID. Zosyn started for CXR concerning for RLL pneumonia. 1/3: Failed SBT.  1/4: Tolerated CPAP trial better. 1/5: 8 beats of V. Tach - resolved spontaneously. 1/7: Extubated to room air  1/8: Respiratory distress with tachypnea, re-intubated. Found to have right gaze with right sided weakness, loaded with 2 grams Keppra. Went to cerebral angiogram for right ICA stenting. 1/9: EEG showing diffuse encephalopathy, no seizures. Will discontinue EEG and reduce Keppra to 500 mg BID. Will discontinue Librium today. MRI brain revealed bilateral MCA territory and left RUSTY territory infarcts. Provigil dosing increased to bid  1/11: Failed SBT  1/12: Family meeting rescheduled to 1/14 1/13: failed weaning due to tachypnea. 1/14: Lisinopril started.  Family meeting held, awaiting decision. 1/15: Awaiting on decision from family, palliative following. NaCl tabs d/c.  1/16: Awaiting family decision. Exam improving. 1/17: Family would like trach/peg. General surgery consulted. 1/20: no changes. Trach and PEG Wed vs Thursday this week per GS. COnt to be off AP x5 days prior to procedure   1/22: PEG placement   1/23: resume Heparin x1 more day. S/p PEG.   1/24: Awaiting placement.  Denied LTAC.   1/25: desat overnight, required NT suction. 1/26: No AEO.   1/27: No AEO. precert aquired. Eliquis restarted per NS with 2 week CTH and follow up with Dr. Florence Georges in the clinic.  [address]    Admitted to ICU From: ED   Reason for ICU Admission: AMS and Septic        PATIENT HISTORY   Past Medical History:        Diagnosis Date    Alcohol abuse     Atrial fibrillation ([facility] [facility]     Diabetes ([facility] [address] [facility]     Gastritis     Hyperlipidemia     Hypertension     Left ventricular hypertrophy     Neuropathy     Renal cyst        Past Surgical History:        Procedure Laterality Date    ANGIOPLASTY  2019    NO VASCULAR LEG STENTS PER DR. Jena Lezama    CAROTID STENT PLACEMENT  01/08/2019    carotid wallstent 10mm. mri conditonal safe immediately.     CORONARY ANGIOPLASTY WITH STENT PLACEMENT  2019    PT HAS 7 CARDIAC STENTS UNKNOWN 1.5 T ONLY    GASTROSTOMY TUBE PLACEMENT  01/22/2020    ESOPHAGOGASTRODUODENOSCOPY PEG TUBE INSERTION     UPPER GASTROINTESTINAL ENDOSCOPY N/A 1/22/2020    EGD ESOPHAGOGASTRODUODENOSCOPY PEG TUBE INSERTION performed by Gen Robins IV, DO at [address] History:   Social History     Socioeconomic History    Marital status: Single     Spouse name: Not on file    Number of children: Not on file    Years of education: Not on file    Highest education level: Not on file   Occupational History    Not on file   Social Needs    Financial resource strain: Not on file    Food insecurity:     Worry: Not on file     Inability: Not on file    Transportation needs:     Medical: Not on file     Non-medical: Not on file   Tobacco Use    Smoking status: Current Every Day Smoker     Packs/day: 0.50     Types: Cigarettes    Smokeless tobacco: Current User   Substance and Sexual Activity    Alcohol use: Yes     Frequency: 4 or more times a week    Drug use: Never    Sexual activity: Not on file   Lifestyle    Physical activity:     Days per week: Not on file     Minutes per session: Not on file    Stress: Not on file   Relationships    Social connections:     Talks on phone: Not on file     Gets together: Not on file     Attends Quaker service: Not on file     Active member of club or organization: Not on file     Attends meetings of clubs or organizations: Not on file     Relationship status: Not on file    Intimate partner violence:     Fear of current or ex partner: Not on file     Emotionally abused: Not on file     Physically abused: Not on file     Forced sexual activity: Not on file   Other Topics Concern    Not on file   Social History Narrative    Not on file       Family History:   History reviewed. No pertinent family history. Allergies:    Patient has no known allergies. Medications Prior to Admission:    Medications Prior to Admission: aspirin 325 MG EC tablet, Take 1 tablet by mouth daily  amantadine (SYMMETREL) 50 MG/5ML solution, 10 mLs by Per G Tube route 2 times daily at 0800 and 1400  sotalol (BETAPACE) 160 MG tablet, Take 1 tablet by mouth 2 times daily  [] cephALEXin (KEFLEX) 500 MG capsule, Take 1 capsule by mouth 2 times daily for 1 dose  gabapentin (NEURONTIN) 300 MG capsule, Take 300 mg by mouth 2 times daily. cilostazol (PLETAL) 100 MG tablet, Take 100 mg by mouth 2 times daily  amLODIPine (NORVASC) 5 MG tablet, Take 5 mg by mouth daily  folic acid (FOLVITE) 1 MG tablet, Take 1 mg by mouth daily  albuterol sulfate  (90 Base) MCG/ACT inhaler, Inhale 2 puffs into the lungs every 4 hours as needed for Wheezing  Multiple Vitamins-Minerals (THERAPEUTIC MULTIVITAMIN-MINERALS) tablet, Take 1 tablet by mouth daily  DULoxetine (CYMBALTA) 60 MG extended release capsule, Take 60 mg by mouth nightly  omeprazole (PRILOSEC) 20 MG delayed release capsule, Take 20 mg by mouth daily  traMADol (ULTRAM) 50 MG tablet, Take 50 mg by mouth every 6 hours as needed for Pain.   insulin glargine (BASAGLAR KWIKPEN) 100 UNIT/ML injection pen, Inject 5 Units into the skin nightly  apixaban (ELIQUIS) 5 MG TABS tablet, Take 5 mg by mouth 2 times daily  atorvastatin (LIPITOR) 40 MG tablet, Take 1 tablet by mouth daily  vitamin D (ERGOCALCIFEROL) 1.25 MG (91854 UT) CAPS capsule, Take 1 capsule by mouth once a week for 8 doses    Current Medications:  Current Facility-Administered Medications: piperacillin-tazobactam (ZOSYN) 3.375 g in dextrose 5 % 50 mL IVPB extended infusion (mini-bag), 3.375 g, Intravenous, Q8H  vancomycin (VANCOCIN) 1250 mg in dextrose 5 % 250 mL IVPB, 1,250 mg, Intravenous, Q12H  vancomycin (VANCOCIN) intermittent dosing (placeholder), , Other, RX Placeholder  amLODIPine (NORVASC) tablet 5 mg, 5 mg, Oral, Daily  atorvastatin (LIPITOR) tablet 40 mg, 40 mg, Oral, Nightly  cilostazol (PLETAL) tablet 100 mg, 100 mg, Oral, BID  DULoxetine (CYMBALTA) extended release capsule 60 mg, 60 mg, Oral, Nightly  folic acid (FOLVITE) tablet 1 mg, 1 mg, Oral, Daily  gabapentin (NEURONTIN) capsule 300 mg, 300 mg, Oral, BID  insulin glargine (LANTUS) injection vial 5 Units, 5 Units, Subcutaneous, Nightly  sotalol (BETAPACE) tablet 160 mg, 160 mg, Oral, BID  sodium chloride flush 0.9 % injection 10 mL, 10 mL, Intravenous, 2 times per day  sodium chloride flush 0.9 % injection 10 mL, 10 mL, Intravenous, PRN  acetaminophen (TYLENOL) tablet 650 mg, 650 mg, Oral, Q4H PRN  ondansetron (ZOFRAN) injection 4 mg, 4 mg, Intravenous, Q6H PRN  lactated ringers infusion, , Intravenous, Continuous  norepinephrine (LEVOPHED) 16 mg in sodium chloride 0.9 % 250 mL infusion, 2 mcg/min, Intravenous, Continuous    REVIEW OF SYSTEMS     Unable to obtain secondary to patient condition  PHYSICAL EXAM:     /65   Pulse 79   Temp 97.9 °F (36.6 °C) (Oral)   Resp 14   Ht 5' 11\" (1.803 m)   Wt 157 lb (71.2 kg)   SpO2 100%   BMI 21.90 kg/m²     PHYSICAL EXAM:  GENERAL: Cachectic appearing with GCS 3 T  HENT: normocephalic , nose normal  EYES: no occular discharge, no scleral icterus  NECK: no JVD, no tracheal deviation  CV: Normal S1 S2, no MRG  ABDOMEN: soft, no rigidity, PEG tube    NEURO:   Pupils equal round reactive to light bilaterally sluggish 2 mm  Oculocephalics reflex present  Positive Solorzano  Negative Babinski, negative clonus  Muscle tone in bilateral upper and bilateral lower extremities  Diminished reflexes in bilateral lower extremities  Corneal reflex present        LABS AND IMAGING:     RECENT LABS:  CBC with Differential:    Lab Results   Component Value Date    WBC 7.0 01/28/2020    RBC 4.08 01/28/2020

## 2020-01-29 NOTE — CARE COORDINATION
Case Management Initial Discharge Plan  Keith La,             Met with:family member patient and mother to discuss discharge plans. Information verified: address, contacts, phone number, , insurance Yes  PCP: Yael Lee MD  Date of last visit: 19    Insurance Provider: [facility] community     Discharge Planning    Living Arrangements: [address] [place] [address]: family      Home has 1 stories  3 stairs to climb to get into front door, 0 stairs to climb to reach second floor  Location of bedroom/bathroom in home main floor    Patient able to perform ADL's:Dependent    Current Services (outpatient & in home) SNF- [facility]  DME equipment: unknown  DME provider:       Potential Assistance Needed:       Patient agreeable to home care: No  [place] of choice provided:  n/a    Prior SNF/Rehab Placement and Facility: [address] [place] [address] Rd to SNF/Rehab: Yes  [place] of choice provided: yes   Evaluation: n/a    Expected Discharge date:     Patient expects to be discharged to: Follow Up Appointment: Best Day/ Time:      Transportation provider: self/family previous to prior admission  Transportation arrangements needed for discharge: Yes will need transport back to SNF    Readmission Risk              Risk of Unplanned Readmission:        16             Does patient have a readmission risk score greater than 14?: Yes  If yes, follow-up appointment must be made within 7 days of discharge. Goals of Care:       Discharge Plan: Pt came from [facility] in [facility] and was brought in 1 day after d/c.  Plan is to return to [facility].          Electronically signed by Matt Gr RN on 20 at 11:38 AM

## 2020-01-29 NOTE — CONSULTS
Renal cyst        Past Surgical History:        Procedure Laterality Date    ANGIOPLASTY      NO VASCULAR LEG STENTS PER DR. Michael Chaudhary    CAROTID STENT PLACEMENT  2019    carotid wallstent 10mm. mri conditonal safe immediately.  CORONARY ANGIOPLASTY WITH STENT PLACEMENT      PT HAS 7 CARDIAC STENTS UNKNOWN 1.5 T ONLY    GASTROSTOMY TUBE PLACEMENT  2020    ESOPHAGOGASTRODUODENOSCOPY PEG TUBE INSERTION     UPPER GASTROINTESTINAL ENDOSCOPY N/A 2020    EGD ESOPHAGOGASTRODUODENOSCOPY PEG TUBE INSERTION performed by Aubrey Robins IV,  at [place] [address]       Medications Prior to Admission:   Medications Prior to Admission: aspirin 325 MG EC tablet, Take 1 tablet by mouth daily  amantadine (SYMMETREL) 50 MG/5ML solution, 10 mLs by Per G Tube route 2 times daily at 0800 and 1400  sotalol (BETAPACE) 160 MG tablet, Take 1 tablet by mouth 2 times daily  [] cephALEXin (KEFLEX) 500 MG capsule, Take 1 capsule by mouth 2 times daily for 1 dose  gabapentin (NEURONTIN) 300 MG capsule, Take 300 mg by mouth 2 times daily. cilostazol (PLETAL) 100 MG tablet, Take 100 mg by mouth 2 times daily  amLODIPine (NORVASC) 5 MG tablet, Take 5 mg by mouth daily  folic acid (FOLVITE) 1 MG tablet, Take 1 mg by mouth daily  albuterol sulfate  (90 Base) MCG/ACT inhaler, Inhale 2 puffs into the lungs every 4 hours as needed for Wheezing  Multiple Vitamins-Minerals (THERAPEUTIC MULTIVITAMIN-MINERALS) tablet, Take 1 tablet by mouth daily  DULoxetine (CYMBALTA) 60 MG extended release capsule, Take 60 mg by mouth nightly  omeprazole (PRILOSEC) 20 MG delayed release capsule, Take 20 mg by mouth daily  traMADol (ULTRAM) 50 MG tablet, Take 50 mg by mouth every 6 hours as needed for Pain.   insulin glargine (BASAGLAR KWIKPEN) 100 UNIT/ML injection pen, Inject 5 Units into the skin nightly  apixaban (ELIQUIS) 5 MG TABS tablet, Take 5 mg by mouth 2 times daily  atorvastatin (LIPITOR) 40 MG tablet, Take 1 tablet by mouth daily  vitamin D (ERGOCALCIFEROL) 1.25 MG (71684 UT) CAPS capsule, Take 1 capsule by mouth once a week for 8 doses    Allergies:  Patient has no known allergies. Social History:   Social History     Socioeconomic History    Marital status: Single     Spouse name: Not on file    Number of children: Not on file    Years of education: Not on file    Highest education level: Not on file   Occupational History    Not on file   Social Needs    Financial resource strain: Not on file    Food insecurity:     Worry: Not on file     Inability: Not on file    Transportation needs:     Medical: Not on file     Non-medical: Not on file   Tobacco Use    Smoking status: Current Every Day Smoker     Packs/day: 0.50     Types: Cigarettes    Smokeless tobacco: Current User   Substance and Sexual Activity    Alcohol use: Yes     Frequency: 4 or more times a week    Drug use: Never    Sexual activity: Not on file   Lifestyle    Physical activity:     Days per week: Not on file     Minutes per session: Not on file    Stress: Not on file   Relationships    Social connections:     Talks on phone: Not on file     Gets together: Not on file     Attends Jewish service: Not on file     Active member of club or organization: Not on file     Attends meetings of clubs or organizations: Not on file     Relationship status: Not on file    Intimate partner violence:     Fear of current or ex partner: Not on file     Emotionally abused: Not on file     Physically abused: Not on file     Forced sexual activity: Not on file   Other Topics Concern    Not on file   Social History Narrative    Not on file       Family History:   History reviewed. No pertinent family history. REVIEW OF SYSTEMS:    General: Denies fever, chills, weight loss.  Reports normal energy levels  HEENT: Denies sore throat, sinus problems, allergic rhinosinusitis  Cardiovascular: Denies chest pain, palpitations, orthopnea  Pulmonary: Denies cough, 01/29/2020     01/29/2020    CO2 27 01/29/2020    BUN 20 01/29/2020    LABALBU 2.7 01/29/2020    CREATININE 0.30 01/29/2020    CALCIUM 8.8 01/29/2020    GFRAA >60 01/29/2020    LABGLOM >60 01/29/2020    GLUCOSE 103 01/29/2020     Hepatic Function Panel:    Lab Results   Component Value Date    ALKPHOS 93 01/29/2020     01/29/2020     01/29/2020    PROT 5.3 01/29/2020    BILITOT 0.67 01/29/2020    BILIDIR 0.21 01/29/2020    IBILI 0.46 01/29/2020    LABALBU 2.7 01/29/2020       Pertinent Radiology:       ASSESSMENT   Active Problems:    Altered mental status  Resolved Problems:    * No resolved hospital problems. *  Pneumonia  Respiratory failure    PLAN    1. General surgery was consulted to evaluate for tracheostomy. At this time the patient is dramatically improving and is awake and alert while on the ventilator. He is being treated for his pneumonia and he is continuing to improve his mental status. 2. Due to the patient's remarkable improvements we will hold off on any discussions of a tracheostomy at this time  3. Recommend daily spontaneous breathing trials until the patient is ready to extubate  4. At this time surgery will sign off. If the patient is unable to wean from the ventilator within the next 5 or 6 days then please reconsult us and we will reevaluate for tracheostomy at that time. ------------------------------------------------------------------  [address] [place] [address].  1/29/2020 at 10:02 AM                 Trauma Attending Attestation      I have reviewed the above GCS note(s) and confirmed the key elements of the medical history and physical exam. I have seen and examined the pt. I have discussed the findings, established the care plan and recommendations with Resident, GCS RN, bedside nurse.         Emma Bradford DO  1/29/2020  8:40 PM

## 2020-01-29 NOTE — PROGRESS NOTES
Physical Therapy  DATE: 2020    NAME: Krissy Desouza  MRN: [id number]   : 1960    Patient not seen this date for Physical Therapy due to:  [] Blood transfusion in progress  [] Hemodialysis  []  Patient Declined  [] Spine Precautions   [x] Strict Bedrest  [] Surgery/ Procedure  [] Testing      [] Other        [] PT being discontinued at this time. Patient independent. No further needs. [] PT being discontinued at this time as the patient has been transferred to palliative care. No further needs.     Mae Haynes, PT

## 2020-01-29 NOTE — PROGRESS NOTES
Pharmacy Note  Vancomycin Consult    Pal Ospina is a 61 y.o. male started on Vancomycin for possible sepsis; consult received from Dr. Grullon Prior to manage therapy. Also receiving the following antibiotics: Zosyn. Patient Active Problem List   Diagnosis    SAH (subarachnoid hemorrhage) (HCC)    Subarachnoid bleed (HCC)    Traumatic subarachnoid hemorrhage with loss of consciousness of 1 hour to 5 hours 59 minutes (HCC)    Carotid stenosis, right    Asymptomatic stenosis of left vertebral artery    Intracranial atherosclerosis    History of CEA (carotid endarterectomy)    Ventilator dependence ([facility] [address]    Delirium tremens (HCC)    Chronic a-fib    On mechanically assisted ventilation (HCC)    Encephalopathy    Acute ischemic multifocal anterior circulation stroke [facility])    Palliative care encounter    Subdural hematoma ([facility])    Altered mental status    Subdural hygroma       Allergies:  Patient has no known allergies. Temp max: 97.9    Recent Labs     01/27/20  0436 01/28/20  1536   BUN 22* 27*       Recent Labs     01/27/20  0436 01/28/20  1536   CREATININE 0.28* 0.52*       Recent Labs     01/27/20  1022 01/28/20  1536   WBC 4.9 7.0         Intake/Output Summary (Last 24 hours) at 1/29/2020 6659  Last data filed at 1/29/2020 0530  Gross per 24 hour   Intake 1197.14 ml   Output 725 ml   Net 472.14 ml       Culture Date      Source                       Results      Ht Readings from Last 1 Encounters:   01/29/20 5' 11\" (1.803 m)        Wt Readings from Last 1 Encounters:   01/28/20 157 lb (71.2 kg)         Body mass index is 21.9 kg/m². Estimated Creatinine Clearance: 154 mL/min (A) (based on SCr of 0.52 mg/dL (L)). Goal Trough Level: 15-19 mcg/mL    Assessment/Plan:  Will initiate vancomycin 1250 mg IV every 12 hours. Timing of trough level will be determined based on culture results, renal function, and clinical response. Thank you for the consult. Will continue to follow.       [place] Pharm.D.    1/29/2020  7:00 AM

## 2020-01-30 ENCOUNTER — APPOINTMENT (OUTPATIENT)
Dept: GENERAL RADIOLOGY | Age: 60
DRG: 005 | End: 2020-01-30
Payer: MEDICAID

## 2020-01-30 LAB
ALBUMIN SERPL-MCNC: 2.8 G/DL (ref 3.5–5.2)
ALBUMIN/GLOBULIN RATIO: 1 (ref 1–2.5)
ALLEN TEST: POSITIVE
ALP BLD-CCNC: 89 U/L (ref 40–129)
ALT SERPL-CCNC: 383 U/L (ref 5–41)
AMYLASE: 37 U/L (ref 28–100)
AST SERPL-CCNC: 194 U/L
BILIRUB SERPL-MCNC: 0.69 MG/DL (ref 0.3–1.2)
BILIRUBIN DIRECT: 0.32 MG/DL
BILIRUBIN, INDIRECT: 0.37 MG/DL (ref 0–1)
C-REACTIVE PROTEIN: 59.4 MG/L (ref 0–5)
EKG ATRIAL RATE: 75 BPM
EKG P AXIS: 104 DEGREES
EKG P-R INTERVAL: 132 MS
EKG Q-T INTERVAL: 486 MS
EKG QRS DURATION: 158 MS
EKG QTC CALCULATION (BAZETT): 542 MS
EKG R AXIS: -68 DEGREES
EKG T AXIS: 47 DEGREES
EKG VENTRICULAR RATE: 75 BPM
FIO2: 30
GLOBULIN: ABNORMAL G/DL (ref 1.5–3.8)
GLUCOSE BLD-MCNC: 113 MG/DL (ref 75–110)
GLUCOSE BLD-MCNC: 98 MG/DL (ref 74–100)
LACTIC ACID, WHOLE BLOOD: 0.9 MMOL/L (ref 0.7–2.1)
LACTIC ACID: NORMAL MMOL/L
LIPASE: 17 U/L (ref 13–60)
MAGNESIUM: 1.7 MG/DL (ref 1.6–2.6)
MODE: ABNORMAL
NEGATIVE BASE EXCESS, ART: ABNORMAL (ref 0–2)
O2 DEVICE/FLOW/%: ABNORMAL
PATIENT TEMP: ABNORMAL
PHOSPHORUS: 3.6 MG/DL (ref 2.5–4.5)
POC HCO3: 27.6 MMOL/L (ref 21–28)
POC O2 SATURATION: 97 % (ref 94–98)
POC PCO2 TEMP: ABNORMAL MM HG
POC PCO2: 33.5 MM HG (ref 35–48)
POC PH TEMP: ABNORMAL
POC PH: 7.53 (ref 7.35–7.45)
POC PO2 TEMP: ABNORMAL MM HG
POC PO2: 82 MM HG (ref 83–108)
POSITIVE BASE EXCESS, ART: 5 (ref 0–3)
PROCALCITONIN: 1.04 NG/ML
SAMPLE SITE: ABNORMAL
TCO2 (CALC), ART: 29 MMOL/L (ref 22–29)
TOTAL PROTEIN: 5.5 G/DL (ref 6.4–8.3)

## 2020-01-30 PROCEDURE — 93005 ELECTROCARDIOGRAM TRACING: CPT | Performed by: STUDENT IN AN ORGANIZED HEALTH CARE EDUCATION/TRAINING PROGRAM

## 2020-01-30 PROCEDURE — 71045 X-RAY EXAM CHEST 1 VIEW: CPT

## 2020-01-30 PROCEDURE — 83690 ASSAY OF LIPASE: CPT

## 2020-01-30 PROCEDURE — 6370000000 HC RX 637 (ALT 250 FOR IP): Performed by: STUDENT IN AN ORGANIZED HEALTH CARE EDUCATION/TRAINING PROGRAM

## 2020-01-30 PROCEDURE — 2580000003 HC RX 258: Performed by: STUDENT IN AN ORGANIZED HEALTH CARE EDUCATION/TRAINING PROGRAM

## 2020-01-30 PROCEDURE — 89220 SPUTUM SPECIMEN COLLECTION: CPT

## 2020-01-30 PROCEDURE — 6360000002 HC RX W HCPCS

## 2020-01-30 PROCEDURE — 84100 ASSAY OF PHOSPHORUS: CPT

## 2020-01-30 PROCEDURE — 99291 CRITICAL CARE FIRST HOUR: CPT | Performed by: PSYCHIATRY & NEUROLOGY

## 2020-01-30 PROCEDURE — 83605 ASSAY OF LACTIC ACID: CPT

## 2020-01-30 PROCEDURE — 6360000002 HC RX W HCPCS: Performed by: STUDENT IN AN ORGANIZED HEALTH CARE EDUCATION/TRAINING PROGRAM

## 2020-01-30 PROCEDURE — 94761 N-INVAS EAR/PLS OXIMETRY MLT: CPT

## 2020-01-30 PROCEDURE — 6360000002 HC RX W HCPCS: Performed by: PSYCHIATRY & NEUROLOGY

## 2020-01-30 PROCEDURE — 93010 ELECTROCARDIOGRAM REPORT: CPT | Performed by: INTERNAL MEDICINE

## 2020-01-30 PROCEDURE — 82803 BLOOD GASES ANY COMBINATION: CPT

## 2020-01-30 PROCEDURE — 37799 UNLISTED PX VASCULAR SURGERY: CPT

## 2020-01-30 PROCEDURE — 2580000003 HC RX 258: Performed by: PSYCHIATRY & NEUROLOGY

## 2020-01-30 PROCEDURE — 2700000000 HC OXYGEN THERAPY PER DAY

## 2020-01-30 PROCEDURE — 94003 VENT MGMT INPAT SUBQ DAY: CPT

## 2020-01-30 PROCEDURE — 94770 HC ETCO2 MONITOR DAILY: CPT

## 2020-01-30 PROCEDURE — 51701 INSERT BLADDER CATHETER: CPT

## 2020-01-30 PROCEDURE — 83735 ASSAY OF MAGNESIUM: CPT

## 2020-01-30 PROCEDURE — 80076 HEPATIC FUNCTION PANEL: CPT

## 2020-01-30 PROCEDURE — 86140 C-REACTIVE PROTEIN: CPT

## 2020-01-30 PROCEDURE — 84145 PROCALCITONIN (PCT): CPT

## 2020-01-30 PROCEDURE — 2000000003 HC NEURO ICU R&B

## 2020-01-30 PROCEDURE — 82947 ASSAY GLUCOSE BLOOD QUANT: CPT

## 2020-01-30 PROCEDURE — 82150 ASSAY OF AMYLASE: CPT

## 2020-01-30 RX ORDER — DILTIAZEM HYDROCHLORIDE 5 MG/ML
10 INJECTION INTRAVENOUS ONCE
Status: DISCONTINUED | OUTPATIENT
Start: 2020-01-30 | End: 2020-01-30

## 2020-01-30 RX ORDER — MAGNESIUM SULFATE 1 G/100ML
1 INJECTION INTRAVENOUS ONCE
Status: COMPLETED | OUTPATIENT
Start: 2020-01-30 | End: 2020-01-30

## 2020-01-30 RX ORDER — HYDRALAZINE HYDROCHLORIDE 20 MG/ML
10 INJECTION INTRAMUSCULAR; INTRAVENOUS ONCE
Status: COMPLETED | OUTPATIENT
Start: 2020-01-30 | End: 2020-01-30

## 2020-01-30 RX ORDER — HYDRALAZINE HYDROCHLORIDE 20 MG/ML
INJECTION INTRAMUSCULAR; INTRAVENOUS
Status: COMPLETED
Start: 2020-01-30 | End: 2020-01-30

## 2020-01-30 RX ORDER — ASPIRIN 81 MG/1
324 TABLET, CHEWABLE ORAL DAILY
Status: DISCONTINUED | OUTPATIENT
Start: 2020-01-30 | End: 2020-02-02

## 2020-01-30 RX ADMIN — SODIUM CHLORIDE, PRESERVATIVE FREE 10 ML: 5 INJECTION INTRAVENOUS at 08:25

## 2020-01-30 RX ADMIN — HYDRALAZINE HYDROCHLORIDE 10 MG: 20 INJECTION INTRAMUSCULAR; INTRAVENOUS at 02:09

## 2020-01-30 RX ADMIN — HEPARIN SODIUM 5000 UNITS: 5000 INJECTION INTRAVENOUS; SUBCUTANEOUS at 06:13

## 2020-01-30 RX ADMIN — AMLODIPINE BESYLATE 5 MG: 5 TABLET ORAL at 08:22

## 2020-01-30 RX ADMIN — APIXABAN 5 MG: 5 TABLET, FILM COATED ORAL at 22:17

## 2020-01-30 RX ADMIN — CILOSTAZOL 100 MG: 100 TABLET ORAL at 08:22

## 2020-01-30 RX ADMIN — DESMOPRESSIN ACETATE 40 MG: 0.2 TABLET ORAL at 22:17

## 2020-01-30 RX ADMIN — SOTALOL HYDROCHLORIDE 160 MG: 80 TABLET ORAL at 08:22

## 2020-01-30 RX ADMIN — FOLIC ACID 1 MG: 1 TABLET ORAL at 08:22

## 2020-01-30 RX ADMIN — HYDRALAZINE HYDROCHLORIDE 10 MG: 20 INJECTION INTRAMUSCULAR; INTRAVENOUS at 05:01

## 2020-01-30 RX ADMIN — VANCOMYCIN HYDROCHLORIDE 1250 MG: 10 INJECTION, POWDER, LYOPHILIZED, FOR SOLUTION INTRAVENOUS at 22:15

## 2020-01-30 RX ADMIN — INSULIN GLARGINE 5 UNITS: 100 INJECTION, SOLUTION SUBCUTANEOUS at 22:27

## 2020-01-30 RX ADMIN — ASPIRIN 81 MG: 81 TABLET, CHEWABLE ORAL at 08:22

## 2020-01-30 RX ADMIN — GABAPENTIN 300 MG: 300 CAPSULE ORAL at 17:42

## 2020-01-30 RX ADMIN — APIXABAN 5 MG: 5 TABLET, FILM COATED ORAL at 11:53

## 2020-01-30 RX ADMIN — DIBASIC SODIUM PHOSPHATE, MONOBASIC POTASSIUM PHOSPHATE AND MONOBASIC SODIUM PHOSPHATE 2 TABLET: 852; 155; 130 TABLET ORAL at 08:23

## 2020-01-30 RX ADMIN — DULOXETINE HYDROCHLORIDE 60 MG: 30 CAPSULE, DELAYED RELEASE ORAL at 22:17

## 2020-01-30 RX ADMIN — PIPERACILLIN AND TAZOBACTAM 3.38 G: 3; .375 INJECTION, POWDER, FOR SOLUTION INTRAVENOUS at 01:13

## 2020-01-30 RX ADMIN — PIPERACILLIN AND TAZOBACTAM 3.38 G: 3; .375 INJECTION, POWDER, FOR SOLUTION INTRAVENOUS at 08:35

## 2020-01-30 RX ADMIN — GABAPENTIN 300 MG: 300 CAPSULE ORAL at 08:25

## 2020-01-30 RX ADMIN — ASPIRIN 243 MG: 81 TABLET, CHEWABLE ORAL at 11:53

## 2020-01-30 RX ADMIN — CILOSTAZOL 100 MG: 100 TABLET ORAL at 22:17

## 2020-01-30 RX ADMIN — SOTALOL HYDROCHLORIDE 160 MG: 80 TABLET ORAL at 22:16

## 2020-01-30 RX ADMIN — VANCOMYCIN HYDROCHLORIDE 1250 MG: 10 INJECTION, POWDER, LYOPHILIZED, FOR SOLUTION INTRAVENOUS at 08:41

## 2020-01-30 RX ADMIN — MAGNESIUM SULFATE HEPTAHYDRATE 1 G: 1 INJECTION, SOLUTION INTRAVENOUS at 06:34

## 2020-01-30 RX ADMIN — PIPERACILLIN AND TAZOBACTAM 3.38 G: 3; .375 INJECTION, POWDER, FOR SOLUTION INTRAVENOUS at 17:39

## 2020-01-30 ASSESSMENT — PULMONARY FUNCTION TESTS
PIF_VALUE: 13
PIF_VALUE: 18
PIF_VALUE: 18
PIF_VALUE: 13
PIF_VALUE: 14
PIF_VALUE: 17
PIF_VALUE: 26
PIF_VALUE: 13

## 2020-01-30 ASSESSMENT — PAIN SCALES - GENERAL: PAINLEVEL_OUTOF10: 0

## 2020-01-30 NOTE — PROGRESS NOTES
presentation in the ED Tocarli Jeanine, patient mildly lethargic but oriented x4, complaining of headache, nausea, mild vertigo, left arm weaker than right, bilateral leg weakness.      Significant interdisciplinary discussion between the neurosurgeon, radiologist, trauma surgeon and stroke specialist about whether  bleed is consistent with traumatic versus spontaneous subarachnoid.  Stroke specialist has significant concern for severe stenosis, suspects traumatic bleed and that patient is at risk for vasospasm.  In addition has intracranial left vertebral artery athero-stenosis.  Consensus management strategy is to keep the patient 130-160 for blood pressure goals to avoid right MCA watershed stroke.     No aneurysm found on CTA or malformation-critical stenosis of the proximal left vertebral artery, critical stenosis of the proximal right cervical ICA, severe near critical stenosis of the right cavernous ICA, moderate stenosis of the left cavernous ICA. Non con CT head repeat at our emergency facility showed subarachnoid, predominantly in the bilateral sylvian fissures and bilateral cerebral hemisphere sulcal I, acute left parietal convexity subdural hematoma 4 mm in thickness without mass-effect.     Hospital Course:   12/27: CT head stable  12/28: Intubated overnight for respiratory distress, started on Cardizem infusion for atrial fibrillation with RVR, weaned off and Sotalol started. Versed for sedation with concern for possible alcohol withdrawal.  12/29: Cdiff negative. patient noted to have left upper extremity tremor, placed on LTME. Left hand cool to touch with delayed capillary refill, thumb spica splint removed to assess neurovascular status. With splint off, radial and ulnar pulses palpable and assessed via doppler. Capillary refill improved as did warmth and color. Ortho called to replace splint. Febrile this AM - infectious workup sent.   12/30: LTME showing diffuse slowing and disorganized background suggesting moderate encephalopathy. Started on Vanc/zosyn  12/31: Versed started for sedation, bradycardia noted with Precedex. 1/1: TCD normal. Failed SBT due to tachypnea  1/2: Sotalol increased to 160 mg BID. Zosyn started for CXR concerning for RLL pneumonia. 1/3: Failed SBT.  1/4: Tolerated CPAP trial better. 1/5: 8 beats of V. Tach - resolved spontaneously. 1/7: Extubated to room air  1/8: Respiratory distress with tachypnea, re-intubated. Found to have right gaze with right sided weakness, loaded with 2 grams Keppra. Went to cerebral angiogram for right ICA stenting. 1/9: EEG showing diffuse encephalopathy, no seizures. Will discontinue EEG and reduce Keppra to 500 mg BID. Will discontinue Librium today. MRI brain revealed bilateral MCA territory and left RUSTY territory infarcts. Provigil dosing increased to bid  1/11: Failed SBT  1/12: Family meeting rescheduled to 1/14 1/13: failed weaning due to tachypnea. 1/14: Lisinopril started.  Family meeting held, awaiting decision. 1/15: Awaiting on decision from family, palliative following. NaCl tabs d/c.  1/16: Awaiting family decision. Exam improving. 1/17: Family would like trach/peg. General surgery consulted. 1/20: no changes. Trach and PEG Wed vs Thursday this week per GS. COnt to be off AP x5 days prior to procedure   1/22: PEG placement   1/23: resume Heparin x1 more day. S/p PEG.   1/24: Awaiting placement.  Denied LTAC.   1/25: desat overnight, required NT suction. 1/26: No AEO.   1/27: No AEO. precert aquired. Eliquis restarted per NS with 2 week CTH and follow up with Dr. Concetta Bauman in the clinic. Discharged       1/30: overnight had episode of bradycardia and hypertension for a few seconds spont resolving. EKG WNL.      Admitted to ICU From: ED   Reason for ICU Admission: AMS and Septic        PATIENT HISTORY   Past Medical History:        Diagnosis Date    Alcohol abuse     [facility]rial fibrillation ([facility] [address]     Diabetes ([facility] [address]     Gastritis     Emotionally abused: Not on file     Physically abused: Not on file     Forced sexual activity: Not on file   Other Topics Concern    Not on file   Social History Narrative    Not on file       Family History:   History reviewed. No pertinent family history. Allergies:    Patient has no known allergies. Medications Prior to Admission:    Medications Prior to Admission: aspirin 325 MG EC tablet, Take 1 tablet by mouth daily  amantadine (SYMMETREL) 50 MG/5ML solution, 10 mLs by Per G Tube route 2 times daily at 0800 and 1400  sotalol (BETAPACE) 160 MG tablet, Take 1 tablet by mouth 2 times daily  [] cephALEXin (KEFLEX) 500 MG capsule, Take 1 capsule by mouth 2 times daily for 1 dose  gabapentin (NEURONTIN) 300 MG capsule, Take 300 mg by mouth 2 times daily. cilostazol (PLETAL) 100 MG tablet, Take 100 mg by mouth 2 times daily  amLODIPine (NORVASC) 5 MG tablet, Take 5 mg by mouth daily  folic acid (FOLVITE) 1 MG tablet, Take 1 mg by mouth daily  albuterol sulfate  (90 Base) MCG/ACT inhaler, Inhale 2 puffs into the lungs every 4 hours as needed for Wheezing  Multiple Vitamins-Minerals (THERAPEUTIC MULTIVITAMIN-MINERALS) tablet, Take 1 tablet by mouth daily  DULoxetine (CYMBALTA) 60 MG extended release capsule, Take 60 mg by mouth nightly  omeprazole (PRILOSEC) 20 MG delayed release capsule, Take 20 mg by mouth daily  traMADol (ULTRAM) 50 MG tablet, Take 50 mg by mouth every 6 hours as needed for Pain.   insulin glargine (BASAGLAR KWIKPEN) 100 UNIT/ML injection pen, Inject 5 Units into the skin nightly  apixaban (ELIQUIS) 5 MG TABS tablet, Take 5 mg by mouth 2 times daily  atorvastatin (LIPITOR) 40 MG tablet, Take 1 tablet by mouth daily  vitamin D (ERGOCALCIFEROL) 1.25 MG (88092 UT) CAPS capsule, Take 1 capsule by mouth once a week for 8 doses    Current Medications:  Current Facility-Administered Medications: apixaban (ELIQUIS) tablet 5 mg, 5 mg, Oral, BID  aspirin chewable tablet 324 mg, 324 mg, Oral, Daily  piperacillin-tazobactam (ZOSYN) 3.375 g in dextrose 5 % 50 mL IVPB extended infusion (mini-bag), 3.375 g, Intravenous, Q8H  vancomycin (VANCOCIN) 1250 mg in dextrose 5 % 250 mL IVPB, 1,250 mg, Intravenous, Q12H  vancomycin (VANCOCIN) intermittent dosing (placeholder), , Other, RX Placeholder  amLODIPine (NORVASC) tablet 5 mg, 5 mg, Oral, Daily  atorvastatin (LIPITOR) tablet 40 mg, 40 mg, Oral, Nightly  cilostazol (PLETAL) tablet 100 mg, 100 mg, Oral, BID  DULoxetine (CYMBALTA) extended release capsule 60 mg, 60 mg, Oral, Nightly  folic acid (FOLVITE) tablet 1 mg, 1 mg, Oral, Daily  gabapentin (NEURONTIN) capsule 300 mg, 300 mg, Oral, BID  insulin glargine (LANTUS) injection vial 5 Units, 5 Units, Subcutaneous, Nightly  sotalol (BETAPACE) tablet 160 mg, 160 mg, Oral, BID  sodium chloride flush 0.9 % injection 10 mL, 10 mL, Intravenous, 2 times per day  sodium chloride flush 0.9 % injection 10 mL, 10 mL, Intravenous, PRN  acetaminophen (TYLENOL) tablet 650 mg, 650 mg, Oral, Q4H PRN  ondansetron (ZOFRAN) injection 4 mg, 4 mg, Intravenous, Q6H PRN  lactated ringers infusion, , Intravenous, Continuous    REVIEW OF SYSTEMS     Unable to obtain secondary to patient condition  PHYSICAL EXAM:     /71   Pulse 77   Temp 96.8 °F (36 °C) (Oral)   Resp 26   Ht 5' 11\" (1.803 m)   Wt 157 lb (71.2 kg)   SpO2 100%   BMI 21.90 kg/m²     PHYSICAL EXAM:  GENERAL: Cachectic appearing with GCS 3 T  HENT: normocephalic , nose normal  EYES: no occular discharge, no scleral icterus  NECK: no JVD, no tracheal deviation  CV: Normal S1 S2, no MRG  ABDOMEN: soft, no rigidity, PEG tube    NEURO:   Pupils equal round reactive to light bilaterally sluggish 2 mm  Oculocephalics reflex present  Positive Solorzano  Negative Babinski, negative clonus  Muscle tone in bilateral upper and bilateral lower extremities  Diminished reflexes in bilateral lower extremities  Corneal reflex present        LABS AND IMAGING:     RECENT

## 2020-01-30 NOTE — PLAN OF CARE
Problem: OXYGENATION/RESPIRATORY FUNCTION  Goal: Patient will maintain patent airway  1/29/2020 1951 by Zaira Mathur RCP  Outcome: Ongoing  1/29/2020 1143 by Finas Bosworth, RCP  Outcome: Ongoing  1/29/2020 1141 by Finas Bosworth, RCP  Outcome: Ongoing     Problem: OXYGENATION/RESPIRATORY FUNCTION  Goal: Patient will achieve/maintain normal respiratory rate/effort  Description  Respiratory rate and effort will be within normal limits for the patient  1/29/2020 1951 by Zaira Mathur RCP  Outcome: Ongoing  1/29/2020 1143 by Finas Bosworth, RCP  Outcome: Ongoing  1/29/2020 1141 by Finas Bosworth, RCP  Outcome: Ongoing     Problem: MECHANICAL VENTILATION  Goal: Patient will maintain patent airway  1/29/2020 1951 by Zaira Mathur RCP  Outcome: Ongoing  1/29/2020 1143 by Finas Bosworth, RCP  Outcome: Ongoing  1/29/2020 1141 by Finas Bosworth, RCP  Outcome: Ongoing     Problem: MECHANICAL VENTILATION  Goal: Oral health is maintained or improved  1/29/2020 1951 by Zaira Mathur RCP  Outcome: Ongoing  1/29/2020 1143 by Finas Bosworth, RCP  Outcome: Ongoing     Problem: MECHANICAL VENTILATION  Goal: ET tube will be managed safely  1/29/2020 1951 by Zaira Mathur RCP  Outcome: Ongoing  1/29/2020 1143 by Finas Bosworth, RCP  Outcome: Ongoing     Problem: MECHANICAL VENTILATION  Goal: Ability to express needs and understand communication  1/29/2020 1951 by Zaira Mathur RCP  Outcome: Ongoing  1/29/2020 1143 by Finas Bosworth, RCP  Outcome: Ongoing  1/29/2020 1141 by Finas Bosworth, RCP  Outcome: Ongoing     Problem: MECHANICAL VENTILATION  Goal: Mobility/activity is maintained at optimum level for patient  1/29/2020 1951 by Zaira Mathur RCP  Outcome: Ongoing  1/29/2020 1143 by Finas Bosworth, RCP  Outcome: Ongoing  1/29/2020 1141 by Finas Bosworth, RCP  Outcome: Ongoing     Problem: SKIN INTEGRITY  Goal: Skin integrity is maintained or improved  1/29/2020 1951 by [place] Kevan,

## 2020-01-30 NOTE — PLAN OF CARE
Problem: OXYGENATION/RESPIRATORY FUNCTION  Goal: Patient will maintain patent airway  1/30/2020 0758 by Delmy Weiss RCP  Outcome: Ongoing  1/29/2020 1951 by Abundio Carroll RCP  Outcome: Ongoing  Goal: Patient will achieve/maintain normal respiratory rate/effort  Description  Respiratory rate and effort will be within normal limits for the patient  1/30/2020 0758 by Delmy Weiss RCP  Outcome: Ongoing  1/29/2020 1951 by Abundio Carroll RCP  Outcome: Ongoing     Problem: MECHANICAL VENTILATION  Goal: Patient will maintain patent airway  1/30/2020 0758 by Delmy Weiss RCP  Outcome: Ongoing  1/29/2020 1951 by Abundio Carroll RCP  Outcome: Ongoing  Goal: Oral health is maintained or improved  1/30/2020 0758 by Delmy Weiss RCP  Outcome: Ongoing  1/29/2020 1951 by Abundio Carroll RCP  Outcome: Ongoing  Goal: ET tube will be managed safely  1/30/2020 0758 by Delmy Weiss RCP  Outcome: Ongoing  1/29/2020 1951 by Abundio Carroll RCP  Outcome: Ongoing  Goal: Ability to express needs and understand communication  1/30/2020 0758 by Delmy Weiss RCP  Outcome: Ongoing  1/29/2020 1951 by Abundio Carroll RCP  Outcome: Ongoing  Goal: Mobility/activity is maintained at optimum level for patient  1/30/2020 0758 by Delmy Weiss RCP  Outcome: Ongoing  1/29/2020 1951 by Abundio Carroll RCP  Outcome: Ongoing     Problem: SKIN INTEGRITY  Goal: Skin integrity is maintained or improved  1/29/2020 1951 by Abundio Carroll RCP  Outcome: Ongoing

## 2020-01-30 NOTE — PROGRESS NOTES
Occupational Therapy    Occupational Therapy Not Seen Note    DATE: 2020  Name: Keith La  : 1960  MRN: [id number]    Patient not available for Occupational Therapy due to: Other: Pt weaning at this time and per RN hoping to extubate today. Next Scheduled Treatment: Attempt on  as appropriate.     Electronically signed by Dimitris Ragsdale OT on 2020 at 2:30 PM

## 2020-01-30 NOTE — PROGRESS NOTES
Physical Therapy  DATE: 2020    NAME: Manju Solo  MRN: [id number]   : 1960    Patient not seen this date for Physical Therapy due to:  [] Blood transfusion in progress  [] Hemodialysis  []  Patient Declined  [] Spine Precautions   [] Strict Bedrest  [] Surgery/ Procedure  [] Testing      [x] Other- Pt weaning at this time and per RN hoping to extubate today. Will check back in PM as time allows. [] PT being discontinued at this time. Patient independent. No further needs. [] PT being discontinued at this time as the patient has been transferred to palliative care. No further needs.     Carisa Teixeira, PT

## 2020-01-31 ENCOUNTER — APPOINTMENT (OUTPATIENT)
Dept: GENERAL RADIOLOGY | Age: 60
DRG: 005 | End: 2020-01-31
Payer: MEDICAID

## 2020-01-31 LAB
ABSOLUTE EOS #: 0.13 K/UL (ref 0–0.44)
ABSOLUTE EOS #: 0.13 K/UL (ref 0–0.44)
ABSOLUTE IMMATURE GRANULOCYTE: <0.03 K/UL (ref 0–0.3)
ABSOLUTE IMMATURE GRANULOCYTE: <0.03 K/UL (ref 0–0.3)
ABSOLUTE LYMPH #: 0.72 K/UL (ref 1.1–3.7)
ABSOLUTE LYMPH #: 0.75 K/UL (ref 1.1–3.7)
ABSOLUTE MONO #: 0.16 K/UL (ref 0.1–1.2)
ABSOLUTE MONO #: 0.2 K/UL (ref 0.1–1.2)
ALBUMIN SERPL-MCNC: 2.9 G/DL (ref 3.5–5.2)
ALBUMIN/GLOBULIN RATIO: 1.1 (ref 1–2.5)
ALLEN TEST: POSITIVE
ALP BLD-CCNC: 100 U/L (ref 40–129)
ALT SERPL-CCNC: 372 U/L (ref 5–41)
ANION GAP SERPL CALCULATED.3IONS-SCNC: 11 MMOL/L (ref 9–17)
AST SERPL-CCNC: 158 U/L
BASOPHILS # BLD: 0 % (ref 0–2)
BASOPHILS # BLD: 1 % (ref 0–2)
BASOPHILS ABSOLUTE: <0.03 K/UL (ref 0–0.2)
BASOPHILS ABSOLUTE: <0.03 K/UL (ref 0–0.2)
BILIRUB SERPL-MCNC: 0.64 MG/DL (ref 0.3–1.2)
BILIRUBIN DIRECT: 0.29 MG/DL
BILIRUBIN, INDIRECT: 0.35 MG/DL (ref 0–1)
BUN BLDV-MCNC: 11 MG/DL (ref 6–20)
BUN/CREAT BLD: ABNORMAL (ref 9–20)
C-REACTIVE PROTEIN: 25.2 MG/L (ref 0–5)
CALCIUM SERPL-MCNC: 8.7 MG/DL (ref 8.6–10.4)
CHLORIDE BLD-SCNC: 103 MMOL/L (ref 98–107)
CO2: 22 MMOL/L (ref 20–31)
CREAT SERPL-MCNC: 0.29 MG/DL (ref 0.7–1.2)
CULTURE: NORMAL
DIFFERENTIAL TYPE: ABNORMAL
DIFFERENTIAL TYPE: ABNORMAL
DIRECT EXAM: NORMAL
EOSINOPHILS RELATIVE PERCENT: 4 % (ref 1–4)
EOSINOPHILS RELATIVE PERCENT: 5 % (ref 1–4)
FIO2: 30
GFR AFRICAN AMERICAN: >60 ML/MIN
GFR NON-AFRICAN AMERICAN: >60 ML/MIN
GFR SERPL CREATININE-BSD FRML MDRD: ABNORMAL ML/MIN/{1.73_M2}
GFR SERPL CREATININE-BSD FRML MDRD: ABNORMAL ML/MIN/{1.73_M2}
GLOBULIN: ABNORMAL G/DL (ref 1.5–3.8)
GLUCOSE BLD-MCNC: 117 MG/DL (ref 74–100)
GLUCOSE BLD-MCNC: 98 MG/DL (ref 70–99)
HAV IGM SER IA-ACNC: NONREACTIVE
HCT VFR BLD CALC: 30 % (ref 40.7–50.3)
HCT VFR BLD CALC: 30.7 % (ref 40.7–50.3)
HEMOGLOBIN: 9.1 G/DL (ref 13–17)
HEMOGLOBIN: 9.2 G/DL (ref 13–17)
HEPATITIS B CORE IGM ANTIBODY: NONREACTIVE
HEPATITIS B SURFACE ANTIGEN: NONREACTIVE
HEPATITIS C ANTIBODY: NONREACTIVE
IMMATURE GRANULOCYTES: 0 %
IMMATURE GRANULOCYTES: 0 %
IRON SATURATION: 10 % (ref 20–55)
IRON: 23 UG/DL (ref 59–158)
LACTIC ACID, WHOLE BLOOD: 0.7 MMOL/L (ref 0.7–2.1)
LACTIC ACID: NORMAL MMOL/L
LYMPHOCYTES # BLD: 23 % (ref 24–43)
LYMPHOCYTES # BLD: 28 % (ref 24–43)
Lab: NORMAL
MAGNESIUM: 1.6 MG/DL (ref 1.6–2.6)
MCH RBC QN AUTO: 24.2 PG (ref 25.2–33.5)
MCH RBC QN AUTO: 24.6 PG (ref 25.2–33.5)
MCHC RBC AUTO-ENTMCNC: 29.6 G/DL (ref 28.4–34.8)
MCHC RBC AUTO-ENTMCNC: 30.7 G/DL (ref 28.4–34.8)
MCV RBC AUTO: 80.2 FL (ref 82.6–102.9)
MCV RBC AUTO: 81.6 FL (ref 82.6–102.9)
MODE: ABNORMAL
MONOCYTES # BLD: 6 % (ref 3–12)
MONOCYTES # BLD: 6 % (ref 3–12)
NEGATIVE BASE EXCESS, ART: ABNORMAL (ref 0–2)
NRBC AUTOMATED: 0 PER 100 WBC
NRBC AUTOMATED: 0 PER 100 WBC
O2 DEVICE/FLOW/%: ABNORMAL
PATIENT TEMP: ABNORMAL
PDW BLD-RTO: 19.8 % (ref 11.8–14.4)
PDW BLD-RTO: 19.9 % (ref 11.8–14.4)
PHOSPHORUS: 3.6 MG/DL (ref 2.5–4.5)
PLATELET # BLD: 197 K/UL (ref 138–453)
PLATELET # BLD: 208 K/UL (ref 138–453)
PLATELET ESTIMATE: ABNORMAL
PLATELET ESTIMATE: ABNORMAL
PMV BLD AUTO: 10.1 FL (ref 8.1–13.5)
PMV BLD AUTO: 10.2 FL (ref 8.1–13.5)
POC HCO3: 26.1 MMOL/L (ref 21–28)
POC O2 SATURATION: 96 % (ref 94–98)
POC PCO2 TEMP: ABNORMAL MM HG
POC PCO2: 34.3 MM HG (ref 35–48)
POC PH TEMP: ABNORMAL
POC PH: 7.49 (ref 7.35–7.45)
POC PO2 TEMP: ABNORMAL MM HG
POC PO2: 76.2 MM HG (ref 83–108)
POSITIVE BASE EXCESS, ART: 3 (ref 0–3)
POTASSIUM SERPL-SCNC: 3.9 MMOL/L (ref 3.7–5.3)
PROCALCITONIN: 0.45 NG/ML
RBC # BLD: 3.74 M/UL (ref 4.21–5.77)
RBC # BLD: 3.76 M/UL (ref 4.21–5.77)
RBC # BLD: ABNORMAL 10*6/UL
RBC # BLD: ABNORMAL 10*6/UL
SAMPLE SITE: ABNORMAL
SEG NEUTROPHILS: 60 % (ref 36–65)
SEG NEUTROPHILS: 66 % (ref 36–65)
SEGMENTED NEUTROPHILS ABSOLUTE COUNT: 1.6 K/UL (ref 1.5–8.1)
SEGMENTED NEUTROPHILS ABSOLUTE COUNT: 2.07 K/UL (ref 1.5–8.1)
SODIUM BLD-SCNC: 136 MMOL/L (ref 135–144)
SPECIMEN DESCRIPTION: NORMAL
TCO2 (CALC), ART: 27 MMOL/L (ref 22–29)
TOTAL IRON BINDING CAPACITY: 220 UG/DL (ref 250–450)
TOTAL PROTEIN: 5.6 G/DL (ref 6.4–8.3)
UNSATURATED IRON BINDING CAPACITY: 197 UG/DL (ref 112–347)
VANCOMYCIN TROUGH DATE LAST DOSE: NORMAL
VANCOMYCIN TROUGH DOSE AMOUNT: NORMAL
VANCOMYCIN TROUGH TIME LAST DOSE: NORMAL
VANCOMYCIN TROUGH: 12.1 UG/ML (ref 10–20)
WBC # BLD: 2.7 K/UL (ref 3.5–11.3)
WBC # BLD: 3.2 K/UL (ref 3.5–11.3)
WBC # BLD: ABNORMAL 10*3/UL
WBC # BLD: ABNORMAL 10*3/UL

## 2020-01-31 PROCEDURE — 6370000000 HC RX 637 (ALT 250 FOR IP): Performed by: STUDENT IN AN ORGANIZED HEALTH CARE EDUCATION/TRAINING PROGRAM

## 2020-01-31 PROCEDURE — 37799 UNLISTED PX VASCULAR SURGERY: CPT

## 2020-01-31 PROCEDURE — 94003 VENT MGMT INPAT SUBQ DAY: CPT

## 2020-01-31 PROCEDURE — 80074 ACUTE HEPATITIS PANEL: CPT

## 2020-01-31 PROCEDURE — 84100 ASSAY OF PHOSPHORUS: CPT

## 2020-01-31 PROCEDURE — 2700000000 HC OXYGEN THERAPY PER DAY

## 2020-01-31 PROCEDURE — 82803 BLOOD GASES ANY COMBINATION: CPT

## 2020-01-31 PROCEDURE — 83735 ASSAY OF MAGNESIUM: CPT

## 2020-01-31 PROCEDURE — 6360000002 HC RX W HCPCS: Performed by: STUDENT IN AN ORGANIZED HEALTH CARE EDUCATION/TRAINING PROGRAM

## 2020-01-31 PROCEDURE — 80076 HEPATIC FUNCTION PANEL: CPT

## 2020-01-31 PROCEDURE — 83550 IRON BINDING TEST: CPT

## 2020-01-31 PROCEDURE — 71045 X-RAY EXAM CHEST 1 VIEW: CPT

## 2020-01-31 PROCEDURE — 97163 PT EVAL HIGH COMPLEX 45 MIN: CPT

## 2020-01-31 PROCEDURE — 2580000003 HC RX 258: Performed by: PSYCHIATRY & NEUROLOGY

## 2020-01-31 PROCEDURE — 97535 SELF CARE MNGMENT TRAINING: CPT | Performed by: OCCUPATIONAL THERAPIST

## 2020-01-31 PROCEDURE — 97167 OT EVAL HIGH COMPLEX 60 MIN: CPT | Performed by: OCCUPATIONAL THERAPIST

## 2020-01-31 PROCEDURE — 99291 CRITICAL CARE FIRST HOUR: CPT | Performed by: PSYCHIATRY & NEUROLOGY

## 2020-01-31 PROCEDURE — 82947 ASSAY GLUCOSE BLOOD QUANT: CPT

## 2020-01-31 PROCEDURE — 80048 BASIC METABOLIC PNL TOTAL CA: CPT

## 2020-01-31 PROCEDURE — 83605 ASSAY OF LACTIC ACID: CPT

## 2020-01-31 PROCEDURE — 6360000002 HC RX W HCPCS

## 2020-01-31 PROCEDURE — 94761 N-INVAS EAR/PLS OXIMETRY MLT: CPT

## 2020-01-31 PROCEDURE — 2060000000 HC ICU INTERMEDIATE R&B

## 2020-01-31 PROCEDURE — 6360000002 HC RX W HCPCS: Performed by: PSYCHIATRY & NEUROLOGY

## 2020-01-31 PROCEDURE — 86140 C-REACTIVE PROTEIN: CPT

## 2020-01-31 PROCEDURE — 76937 US GUIDE VASCULAR ACCESS: CPT

## 2020-01-31 PROCEDURE — 94770 HC ETCO2 MONITOR DAILY: CPT

## 2020-01-31 PROCEDURE — 97530 THERAPEUTIC ACTIVITIES: CPT

## 2020-01-31 PROCEDURE — 80202 ASSAY OF VANCOMYCIN: CPT

## 2020-01-31 PROCEDURE — 84145 PROCALCITONIN (PCT): CPT

## 2020-01-31 PROCEDURE — 85025 COMPLETE CBC W/AUTO DIFF WBC: CPT

## 2020-01-31 PROCEDURE — 83540 ASSAY OF IRON: CPT

## 2020-01-31 RX ORDER — HYDRALAZINE HYDROCHLORIDE 20 MG/ML
10 INJECTION INTRAMUSCULAR; INTRAVENOUS ONCE
Status: COMPLETED | OUTPATIENT
Start: 2020-01-31 | End: 2020-01-31

## 2020-01-31 RX ORDER — HYDRALAZINE HYDROCHLORIDE 20 MG/ML
INJECTION INTRAMUSCULAR; INTRAVENOUS
Status: DISCONTINUED
Start: 2020-01-31 | End: 2020-01-31 | Stop reason: WASHOUT

## 2020-01-31 RX ORDER — FENTANYL CITRATE 50 UG/ML
25 INJECTION, SOLUTION INTRAMUSCULAR; INTRAVENOUS ONCE
Status: COMPLETED | OUTPATIENT
Start: 2020-01-31 | End: 2020-01-31

## 2020-01-31 RX ORDER — MAGNESIUM SULFATE 1 G/100ML
1 INJECTION INTRAVENOUS
Status: COMPLETED | OUTPATIENT
Start: 2020-01-31 | End: 2020-01-31

## 2020-01-31 RX ORDER — HYDRALAZINE HYDROCHLORIDE 20 MG/ML
10 INJECTION INTRAMUSCULAR; INTRAVENOUS ONCE
Status: DISCONTINUED | OUTPATIENT
Start: 2020-01-31 | End: 2020-01-31

## 2020-01-31 RX ORDER — FENTANYL CITRATE 50 UG/ML
INJECTION, SOLUTION INTRAMUSCULAR; INTRAVENOUS
Status: COMPLETED
Start: 2020-01-31 | End: 2020-01-31

## 2020-01-31 RX ADMIN — MAGNESIUM SULFATE HEPTAHYDRATE 1 G: 1 INJECTION, SOLUTION INTRAVENOUS at 08:00

## 2020-01-31 RX ADMIN — PIPERACILLIN AND TAZOBACTAM 3.38 G: 3; .375 INJECTION, POWDER, FOR SOLUTION INTRAVENOUS at 16:11

## 2020-01-31 RX ADMIN — APIXABAN 5 MG: 5 TABLET, FILM COATED ORAL at 08:38

## 2020-01-31 RX ADMIN — FENTANYL CITRATE 25 MCG: 50 INJECTION, SOLUTION INTRAMUSCULAR; INTRAVENOUS at 08:35

## 2020-01-31 RX ADMIN — VANCOMYCIN HYDROCHLORIDE 1250 MG: 10 INJECTION, POWDER, LYOPHILIZED, FOR SOLUTION INTRAVENOUS at 23:29

## 2020-01-31 RX ADMIN — CILOSTAZOL 100 MG: 100 TABLET ORAL at 23:31

## 2020-01-31 RX ADMIN — AMLODIPINE BESYLATE 5 MG: 5 TABLET ORAL at 08:38

## 2020-01-31 RX ADMIN — GABAPENTIN 300 MG: 300 CAPSULE ORAL at 23:29

## 2020-01-31 RX ADMIN — FOLIC ACID 1 MG: 1 TABLET ORAL at 08:38

## 2020-01-31 RX ADMIN — ASPIRIN 324 MG: 81 TABLET, CHEWABLE ORAL at 08:37

## 2020-01-31 RX ADMIN — GABAPENTIN 300 MG: 300 CAPSULE ORAL at 08:37

## 2020-01-31 RX ADMIN — APIXABAN 5 MG: 5 TABLET, FILM COATED ORAL at 23:31

## 2020-01-31 RX ADMIN — FENTANYL CITRATE 25 MCG: 50 INJECTION, SOLUTION INTRAMUSCULAR; INTRAVENOUS at 10:00

## 2020-01-31 RX ADMIN — PIPERACILLIN AND TAZOBACTAM 3.38 G: 3; .375 INJECTION, POWDER, FOR SOLUTION INTRAVENOUS at 01:28

## 2020-01-31 RX ADMIN — CILOSTAZOL 100 MG: 100 TABLET ORAL at 08:37

## 2020-01-31 RX ADMIN — SOTALOL HYDROCHLORIDE 160 MG: 80 TABLET ORAL at 08:38

## 2020-01-31 RX ADMIN — INSULIN GLARGINE 5 UNITS: 100 INJECTION, SOLUTION SUBCUTANEOUS at 23:32

## 2020-01-31 RX ADMIN — VANCOMYCIN HYDROCHLORIDE 1250 MG: 10 INJECTION, POWDER, LYOPHILIZED, FOR SOLUTION INTRAVENOUS at 11:57

## 2020-01-31 RX ADMIN — MAGNESIUM SULFATE HEPTAHYDRATE 1 G: 1 INJECTION, SOLUTION INTRAVENOUS at 08:43

## 2020-01-31 RX ADMIN — DESMOPRESSIN ACETATE 40 MG: 0.2 TABLET ORAL at 23:31

## 2020-01-31 RX ADMIN — DULOXETINE HYDROCHLORIDE 60 MG: 30 CAPSULE, DELAYED RELEASE ORAL at 23:30

## 2020-01-31 RX ADMIN — SOTALOL HYDROCHLORIDE 160 MG: 80 TABLET ORAL at 23:30

## 2020-01-31 RX ADMIN — PIPERACILLIN AND TAZOBACTAM 3.38 G: 3; .375 INJECTION, POWDER, FOR SOLUTION INTRAVENOUS at 08:38

## 2020-01-31 RX ADMIN — HYDRALAZINE HYDROCHLORIDE 10 MG: 20 INJECTION INTRAMUSCULAR; INTRAVENOUS at 01:42

## 2020-01-31 ASSESSMENT — PAIN SCALES - GENERAL: PAINLEVEL_OUTOF10: 5

## 2020-01-31 ASSESSMENT — PULMONARY FUNCTION TESTS
PIF_VALUE: 29
PIF_VALUE: 21

## 2020-01-31 ASSESSMENT — PAIN SCALES - WONG BAKER
WONGBAKER_NUMERICALRESPONSE: 0

## 2020-01-31 NOTE — PLAN OF CARE
Problem: OXYGENATION/RESPIRATORY FUNCTION  Goal: Patient will maintain patent airway  Outcome: Completed  Goal: Patient will achieve/maintain normal respiratory rate/effort  Description  Respiratory rate and effort will be within normal limits for the patient  Outcome: Completed     Problem: MECHANICAL VENTILATION  Goal: Patient will maintain patent airway  Outcome: Completed  Goal: Oral health is maintained or improved  Outcome: Completed  Goal: ET tube will be managed safely  Outcome: Completed  Goal: Ability to express needs and understand communication  Outcome: Completed  Goal: Mobility/activity is maintained at optimum level for patient  Outcome: Completed     Problem: SKIN INTEGRITY  Goal: Skin integrity is maintained or improved  Outcome: Completed

## 2020-01-31 NOTE — PROGRESS NOTES
Patient successfully extubated to a [facility] @ 10:45am

## 2020-01-31 NOTE — PLAN OF CARE
Problem: SKIN INTEGRITY  Goal: Skin integrity is maintained or improved  1/30/2020 1926 by Reza Rowan RCP  Outcome: Ongoing  1/30/2020 1904 by Ninfa Case RN  Outcome: Ongoing

## 2020-01-31 NOTE — PROGRESS NOTES
(01/22/2020); and Upper gastrointestinal endoscopy (N/A, 1/22/2020). Restrictions  Restrictions/Precautions  Restrictions/Precautions: Weight Bearing, General Precautions, Fall Risk  Required Braces or Orthoses?: Yes  Upper Extremity Weight Bearing Restrictions  Right Upper Extremity Weight Bearing: Non-weight Bearing   Left Upper Extremity Weight Bearing: Non-weight Bearing  Other: NWB bilat wrists per ortho recommendations from 1/17/20 s/p fall  Required Braces or Orthoses  Left Upper Extremity Brace/Splint: (thumb spika)  Position Activity Restriction  Other position/activity restrictions: Keep MAP>65  Vision/Hearing  Vision: (SHA; pt is not conversating at this time)  Hearing: (deaf in L ear per chart review)     Subjective  General  Chart Reviewed: Yes  Patient assessed for rehabilitation services?: Yes  Response To Previous Treatment: Not applicable  Family / Caregiver Present: No  Follows Commands: Within Functional Limits  General Comment  Comments: co-eval with OT  Subjective  Subjective: RN reports pt is good for therapy. Pt found resting in bed, not audibly speaking at this time but nods his head and is agreeable to therapy.    Pain Screening  Patient Currently in Pain: Denies(no facial grimace noted)  Vital Signs  Patient Currently in Pain: Other (comment)(pt is not conversating at this time - no facial grimaces or evidence for distress)  Pre Treatment Pain Screening  Intervention List: Patient able to continue with treatment    Orientation  Orientation  Overall Orientation Status: (SHA; pt is not conversating at this time)  Social/Functional History  Social/Functional History  Lives With: Alone  Type of Home: Apartment  Home Layout: One level  Bathroom Shower/Tub: Tub/Shower unit  Bathroom Toilet: Standard  ADL Assistance: Independent  Homemaking Assistance: Independent  Homemaking Responsibilities: Yes  Ambulation Assistance: Independent  Transfer Assistance: Independent  Active : No  Mode of Transportation: Family  Occupation: On disability  Additional Comments: Home information taken from previous admission due to pt unable to verbally respond at this time  Cognition   Cognition  Cognition Comment: SHA; pt not verbally responding at this time    Objective          PROM RLE (degrees)  RLE PROM: WFL  PROM LLE (degrees)  LLE PROM: WFL  PROM RUE (degrees)  RUE General PROM: shoulder flex and abd to 90 deg  PROM LUE (degrees)  LUE General PROM: shoulder flex and abd to 90 deg - facial grimaces at end range  Strength RLE  Comment: grossly 2-/5  Strength LLE  Comment: grossly 2-/5  Strength RUE  Comment: shoulder flex and abd 1/5, elbow flex 2/5, hand 2/5  Strength LUE  Comment: shoulder flex and abd 1/5, elbow flex 2/5, hand 3+/5  Strength Other  Other: strength difficulty to formally assess d/t pt's cognition and difficulty following commands  Tone RLE  RLE Tone: Normotonic  Tone LLE  LLE Tone: Normotonic  Motor Control  Gross Motor?: WFL  Sensation  Overall Sensation Status: (SHA; pt not verbally responding at this time)  Bed mobility  Supine to Sit: 2 Person assistance;Dependent/Total  Sit to Supine: 2 Person assistance;Dependent/Total  Comment: therapist required to advance LE's and for trunk management with bed mobility  Transfers  Comment: SHA; not safe to perform at this time  Ambulation  Ambulation?: No(pt unsafe to amb at this time)     Balance  Posture: Good  Sitting - Static: Fair;-  Sitting - Dynamic: Poor  Comments: pt sat EOB x11 min with therapist assistance, initially max A to maintain balance which fluctuated to as little as Desi throughout   Exercises  Hip Flexion: x5 ea LE AAROM  Knee Long Arc Quad: x5 ea LE AAROM     Plan   Plan  Times per week: 6-7x/wk  Current Treatment Recommendations: Strengthening, ROM, Balance Training, Functional Mobility Training, Transfer Training, Safety Education & Training, Home Exercise Program, Endurance Training, Equipment Evaluation, Education, & procurement, Patient/Caregiver Education & Training, Gait Training  Safety Devices  Type of devices: All fall risk precautions in place, Nurse notified, Left in bed, Call light within reach  Restraints  Initially in place: No      AM-PAC Score     AM-PAC Inpatient Mobility without Stair Climbing Raw Score : 8 (01/31/20 1443)  AM-PAC Inpatient without Stair Climbing T-Scale Score : 30.65 (01/31/20 1443)  Mobility Inpatient CMS 0-100% Score: 80.91 (01/31/20 1443)  Mobility Inpatient without Stair CMS G-Code Modifier : CM (01/31/20 1443)       Goals  Short term goals  Time Frame for Short term goals: 14 visits  Short term goal 1: Pt to tolerate 30 minutes of activity for improved tolerance to therapy interventions  Short term goal 2: Complete bed mobility Desi from flattened position using bedrails as needed for greater independence  Short term goal 3: Perform stand pivot transfer to recliner with Desi for ease of mobility and functional transfers  Short term goal 4: Demo fair + seated static and dynamic balance to reduce risk of falls  Short term goal 5: Participate in AAROM and AROM for strengthening       Therapy Time   Individual Concurrent Group Co-treatment   Time In 1338         Time Out 1408         Minutes 30         Timed Code Treatment Minutes: 15 Minutes       Sergio Hudson   This treatment/evaluation completed by signing SPT. Signing PT agrees with treatment and documentation.

## 2020-01-31 NOTE — PROGRESS NOTES
presentation in the ED [facility], patient mildly lethargic but oriented x4, complaining of headache, nausea, mild vertigo, left arm weaker than right, bilateral leg weakness.      Significant interdisciplinary discussion between the neurosurgeon, radiologist, trauma surgeon and stroke specialist about whether  bleed is consistent with traumatic versus spontaneous subarachnoid.  Stroke specialist has significant concern for severe stenosis, suspects traumatic bleed and that patient is at risk for vasospasm.  In addition has intracranial left vertebral artery athero-stenosis.  Consensus management strategy is to keep the patient 130-160 for blood pressure goals to avoid right MCA watershed stroke.     No aneurysm found on CTA or malformation-critical stenosis of the proximal left vertebral artery, critical stenosis of the proximal right cervical ICA, severe near critical stenosis of the right cavernous ICA, moderate stenosis of the left cavernous ICA. Non con CT head repeat at our emergency facility showed subarachnoid, predominantly in the bilateral sylvian fissures and bilateral cerebral hemisphere sulcal I, acute left parietal convexity subdural hematoma 4 mm in thickness without mass-effect.     Hospital Course:   12/27: CT head stable  12/28: Intubated overnight for respiratory distress, started on Cardizem infusion for atrial fibrillation with RVR, weaned off and Sotalol started. Versed for sedation with concern for possible alcohol withdrawal.  12/29: Cdiff negative. patient noted to have left upper extremity tremor, placed on LTME. Left hand cool to touch with delayed capillary refill, thumb spica splint removed to assess neurovascular status. With splint off, radial and ulnar pulses palpable and assessed via doppler. Capillary refill improved as did warmth and color. Ortho called to replace splint. Febrile this AM - infectious workup sent.   12/30: LTME showing diffuse slowing and disorganized background suggesting moderate encephalopathy. Started on Vanc/zosyn  12/31: Versed started for sedation, bradycardia noted with Precedex. 1/1: TCD normal. Failed SBT due to tachypnea  1/2: Sotalol increased to 160 mg BID. Zosyn started for CXR concerning for RLL pneumonia. 1/3: Failed SBT.  1/4: Tolerated CPAP trial better. 1/5: 8 beats of V. Tach - resolved spontaneously. 1/7: Extubated to room air  1/8: Respiratory distress with tachypnea, re-intubated. Found to have right gaze with right sided weakness, loaded with 2 grams Keppra. Went to cerebral angiogram for right ICA stenting. 1/9: EEG showing diffuse encephalopathy, no seizures. Will discontinue EEG and reduce Keppra to 500 mg BID. Will discontinue Librium today. MRI brain revealed bilateral MCA territory and left RUSTY territory infarcts. Provigil dosing increased to bid  1/11: Failed SBT  1/12: Family meeting rescheduled to 1/14 1/13: failed weaning due to tachypnea. 1/14: Lisinopril started.  Family meeting held, awaiting decision. 1/15: Awaiting on decision from family, palliative following. NaCl tabs d/c.  1/16: Awaiting family decision. Exam improving. 1/17: Family would like trach/peg. General surgery consulted. 1/20: no changes. Trach and PEG Wed vs Thursday this week per GS. COnt to be off AP x5 days prior to procedure   1/22: PEG placement   1/23: resume Heparin x1 more day. S/p PEG.   1/24: Awaiting placement.  Denied LTAC.   1/25: desat overnight, required NT suction. 1/26: No AEO.   1/27: No AEO. precert aquired. Eliquis restarted per NS with 2 week CTH and follow up with Dr. Mel Springer in the clinic. Discharged       1/30: overnight had episode of bradycardia and hypertension for a few seconds spont resolving. EKG WNL.   1/31: Extubated, stepdown status. Likely d/c tomorrow/.     Admitted to ICU From: ED   Reason for ICU Admission: AMS and Septic        PATIENT HISTORY   Past Medical History:        Diagnosis Date    Alcohol abuse     [facility] [address]     Diabetes [facility] [address]     Gastritis     Hyperlipidemia     Hypertension     Left ventricular hypertrophy     Neuropathy     Renal cyst        Past Surgical History:        Procedure Laterality Date    ANGIOPLASTY  2019    NO VASCULAR LEG STENTS PER DR. Chyna Castillo    CAROTID STENT PLACEMENT  01/08/2019    carotid wallstent 10mm. mri conditonal safe immediately.     CORONARY ANGIOPLASTY WITH STENT PLACEMENT  2019    PT HAS 7 CARDIAC STENTS UNKNOWN 1.5 T ONLY    GASTROSTOMY TUBE PLACEMENT  01/22/2020    ESOPHAGOGASTRODUODENOSCOPY PEG TUBE INSERTION     UPPER GASTROINTESTINAL ENDOSCOPY N/A 1/22/2020    EGD ESOPHAGOGASTRODUODENOSCOPY PEG TUBE INSERTION performed by Jack Robins IV, DO at [address] History:   Social History     Socioeconomic History    Marital status: Single     Spouse name: Not on file    Number of children: Not on file    Years of education: Not on file    Highest education level: Not on file   Occupational History    Not on file   Social Needs    Financial resource strain: Not on file    Food insecurity:     Worry: Not on file     Inability: Not on file    Transportation needs:     Medical: Not on file     Non-medical: Not on file   Tobacco Use    Smoking status: Current Every Day Smoker     Packs/day: 0.50     Types: Cigarettes    Smokeless tobacco: Current User   Substance and Sexual Activity    Alcohol use: Yes     Frequency: 4 or more times a week    Drug use: Never    Sexual activity: Not on file   Lifestyle    Physical activity:     Days per week: Not on file     Minutes per session: Not on file    Stress: Not on file   Relationships    Social connections:     Talks on phone: Not on file     Gets together: Not on file     Attends [employer] service: Not on file     Active member of club or organization: Not on file     Attends meetings of clubs or organizations: Not on file     Relationship status: Not on file    Intimate partner violence: Oral, BID  aspirin chewable tablet 324 mg, 324 mg, Oral, Daily  piperacillin-tazobactam (ZOSYN) 3.375 g in dextrose 5 % 50 mL IVPB extended infusion (mini-bag), 3.375 g, Intravenous, Q8H  vancomycin (VANCOCIN) 1250 mg in dextrose 5 % 250 mL IVPB, 1,250 mg, Intravenous, Q12H  vancomycin (VANCOCIN) intermittent dosing (placeholder), , Other, RX Placeholder  amLODIPine (NORVASC) tablet 5 mg, 5 mg, Oral, Daily  atorvastatin (LIPITOR) tablet 40 mg, 40 mg, Oral, Nightly  cilostazol (PLETAL) tablet 100 mg, 100 mg, Oral, BID  DULoxetine (CYMBALTA) extended release capsule 60 mg, 60 mg, Oral, Nightly  folic acid (FOLVITE) tablet 1 mg, 1 mg, Oral, Daily  gabapentin (NEURONTIN) capsule 300 mg, 300 mg, Oral, BID  insulin glargine (LANTUS) injection vial 5 Units, 5 Units, Subcutaneous, Nightly  sotalol (BETAPACE) tablet 160 mg, 160 mg, Oral, BID  sodium chloride flush 0.9 % injection 10 mL, 10 mL, Intravenous, 2 times per day  sodium chloride flush 0.9 % injection 10 mL, 10 mL, Intravenous, PRN  acetaminophen (TYLENOL) tablet 650 mg, 650 mg, Oral, Q4H PRN  ondansetron (ZOFRAN) injection 4 mg, 4 mg, Intravenous, Q6H PRN  lactated ringers infusion, , Intravenous, Continuous    REVIEW OF SYSTEMS     Unable to obtain secondary to patient condition  PHYSICAL EXAM:     /85   Pulse 69   Temp 98.6 °F (37 °C) (Oral)   Resp 23   Ht 5' 11\" (1.803 m)   Wt 157 lb (71.2 kg)   SpO2 98%   BMI 21.90 kg/m²     PHYSICAL EXAM:  GENERAL: Cachectic appearing with GCS 3 T  HENT: normocephalic , nose normal  EYES: no occular discharge, no scleral icterus  NECK: no JVD, no tracheal deviation  CV: Normal S1 S2, no MRG  ABDOMEN: soft, no rigidity, PEG tube    NEURO:   Pupils equal round reactive to light bilaterally sluggish 2 mm  Oculocephalics reflex present  Positive Solorzano  Negative Babinski, negative clonus  Muscle tone in bilateral upper and bilateral lower extremities  Diminished reflexes in bilateral lower extremities  Corneal reflex

## 2020-01-31 NOTE — PROGRESS NOTES
Occupational Therapy   Occupational Therapy Initial Assessment  Date: 2020   Patient Name: Keith La  MRN: [id number]     : 1960    Date of Service: 2020    Discharge Recommendations:    Further therapy recommended at discharge. Assessment   Performance deficits / Impairments: Decreased functional mobility ; Decreased endurance;Decreased balance;Decreased ADL status; Decreased strength;Decreased ROM; Decreased high-level IADLs  Prognosis: Fair  Decision Making: High Complexity  OT Education: OT Role;Plan of Care;Transfer Training  REQUIRES OT FOLLOW UP: Yes  Activity Tolerance  Activity Tolerance: Patient Tolerated treatment well  Safety Devices  Safety Devices in place: Yes  Type of devices: Left in bed;Call light within reach;Nurse notified; All fall risk precautions in place  Restraints  Initially in place: No        Patient Diagnosis(es): The encounter diagnosis was SDH (subdural hematoma) ([facility] [address] has a past medical history of Alcohol abuse, Atrial fibrillation ([facility] [address] Diabetes ([facility] [address] Gastritis, Hyperlipidemia, Hypertension, Left ventricular hypertrophy, Neuropathy, and Renal cyst.   has a past surgical history that includes Coronary angioplasty with stent (); angioplasty (); Carotid stent placement (2019); Gastrostomy tube placement (2020); and Upper gastrointestinal endoscopy (N/A, 2020).       Restrictions  Restrictions/Precautions  Restrictions/Precautions: Weight Bearing, General Precautions, Fall Risk  Required Braces or Orthoses?: Yes  Upper Extremity Weight Bearing Restrictions  Right Upper Extremity Weight Bearing: Non Weight Bearing  Left Upper Extremity Weight Bearing: Non Weight Bearing  Other: NWB bilat wrists per ortho recommendations  Required Braces or Orthoses  Left Upper Extremity Brace/Splint: (thumb spika)  Position Activity Restriction  Other position/activity restrictions: Keep MAP>65    Subjective   General  Patient assessed for Flex: 2/5  L Hand General: 2/5  RUE Strength  Gross RUE Strength: Exceptions to [facility]  R Shoulder Flex: 1/5  R Elbow Flex: 2/5  R Hand General: 3+/5      Plan   Plan  Times per week: 4x    AM-PAC Score   AM-PAC Inpatient Daily Activity Raw Score: 7 (01/31/20 1449)  AM-PAC Inpatient ADL T-Scale Score : 20.13 (01/31/20 1449)  ADL Inpatient CMS 0-100% Score: 92.44 (01/31/20 1449)  ADL Inpatient CMS G-Code Modifier : CM (01/31/20 1449)    Goals  Short term goals  Time Frame for Short term goals: By discharge pt will. ..   Short term goal 1: demo supine<>sit with max Ax2  Short term goal 2: complete simple grooming task with set up and mod A  Short term goal 3: demo 10 minutes EOB sitting balance with mod A  Short term goal 4: tolerate AAROM with bilateral UEs to increase participation in ADLs   Short term goal 5: progress mobility as medically able       Therapy Time   Individual Concurrent Group Co-treatment   Time In  1:38         Time Out  2:09         Minutes  31      Co eval with PT         BASHIR Ordaz/EZEKIEL

## 2020-01-31 NOTE — PLAN OF CARE
Pt assessed as a fall risk this shift. Remains free from falls and accidental injury at this time. Fall precautions in place, including falling star sign and fall risk band on pt. Floor free from obstacles, and bed is locked and in lowest position. Adequate lighting provided. Pt encouraged to call before getting Out Of Bed for any need. Bed alarm activated. Will continue to monitor needs during hourly rounding, and reinforce education on use of call light.

## 2020-01-31 NOTE — PROGRESS NOTES
Physical Therapy  DATE: 2020    NAME: Mayra Bautista  MRN: [id number]   : 1960    Patient not seen this date for Physical Therapy due to:  [] Blood transfusion in progress  [] Hemodialysis  []  Patient Declined  [] Spine Precautions   [] Strict Bedrest  [] Surgery/ Procedure  [] Testing      [x] Other: intubated, attempting to wean; ck back in pm or /        [] PT being discontinued at this time. Patient independent. No further needs. [] PT being discontinued at this time as the patient has been transferred to palliative care. No further needs. Az Vicente   This treatment/evaluation completed by signing SPT. Signing PT agrees with treatment and documentation.

## 2020-02-01 ENCOUNTER — APPOINTMENT (OUTPATIENT)
Dept: GENERAL RADIOLOGY | Age: 60
DRG: 005 | End: 2020-02-01
Payer: MEDICAID

## 2020-02-01 LAB
C-REACTIVE PROTEIN: 20.5 MG/L (ref 0–5)
FERRITIN: 113 UG/L (ref 30–400)
GLUCOSE BLD-MCNC: 106 MG/DL (ref 75–110)
GLUCOSE BLD-MCNC: 118 MG/DL (ref 75–110)
GLUCOSE BLD-MCNC: 143 MG/DL (ref 75–110)
GLUCOSE BLD-MCNC: 169 MG/DL (ref 75–110)
LACTIC ACID, WHOLE BLOOD: 0.8 MMOL/L (ref 0.7–2.1)
LACTIC ACID, WHOLE BLOOD: 0.9 MMOL/L (ref 0.7–2.1)
LACTIC ACID: NORMAL MMOL/L
LACTIC ACID: NORMAL MMOL/L
MAGNESIUM: 1.6 MG/DL (ref 1.6–2.6)
MAGNESIUM: 1.7 MG/DL (ref 1.6–2.6)
PHOSPHORUS: 2.9 MG/DL (ref 2.5–4.5)
PHOSPHORUS: 3 MG/DL (ref 2.5–4.5)
PROCALCITONIN: 0.25 NG/ML

## 2020-02-01 PROCEDURE — 6360000002 HC RX W HCPCS: Performed by: PSYCHIATRY & NEUROLOGY

## 2020-02-01 PROCEDURE — 71045 X-RAY EXAM CHEST 1 VIEW: CPT

## 2020-02-01 PROCEDURE — 99232 SBSQ HOSP IP/OBS MODERATE 35: CPT | Performed by: PSYCHIATRY & NEUROLOGY

## 2020-02-01 PROCEDURE — 86140 C-REACTIVE PROTEIN: CPT

## 2020-02-01 PROCEDURE — 2700000000 HC OXYGEN THERAPY PER DAY

## 2020-02-01 PROCEDURE — 82947 ASSAY GLUCOSE BLOOD QUANT: CPT

## 2020-02-01 PROCEDURE — 2580000003 HC RX 258: Performed by: STUDENT IN AN ORGANIZED HEALTH CARE EDUCATION/TRAINING PROGRAM

## 2020-02-01 PROCEDURE — 94762 N-INVAS EAR/PLS OXIMTRY CONT: CPT

## 2020-02-01 PROCEDURE — 82728 ASSAY OF FERRITIN: CPT

## 2020-02-01 PROCEDURE — 6370000000 HC RX 637 (ALT 250 FOR IP): Performed by: STUDENT IN AN ORGANIZED HEALTH CARE EDUCATION/TRAINING PROGRAM

## 2020-02-01 PROCEDURE — 2580000003 HC RX 258: Performed by: PSYCHIATRY & NEUROLOGY

## 2020-02-01 PROCEDURE — 84100 ASSAY OF PHOSPHORUS: CPT

## 2020-02-01 PROCEDURE — 94660 CPAP INITIATION&MGMT: CPT

## 2020-02-01 PROCEDURE — 83735 ASSAY OF MAGNESIUM: CPT

## 2020-02-01 PROCEDURE — 83605 ASSAY OF LACTIC ACID: CPT

## 2020-02-01 PROCEDURE — 84145 PROCALCITONIN (PCT): CPT

## 2020-02-01 PROCEDURE — 6370000000 HC RX 637 (ALT 250 FOR IP): Performed by: PSYCHIATRY & NEUROLOGY

## 2020-02-01 PROCEDURE — 2060000000 HC ICU INTERMEDIATE R&B

## 2020-02-01 PROCEDURE — 36415 COLL VENOUS BLD VENIPUNCTURE: CPT

## 2020-02-01 RX ORDER — IPRATROPIUM BROMIDE AND ALBUTEROL SULFATE 2.5; .5 MG/3ML; MG/3ML
1 SOLUTION RESPIRATORY (INHALATION) EVERY 6 HOURS PRN
Status: DISCONTINUED | OUTPATIENT
Start: 2020-02-01 | End: 2020-02-12

## 2020-02-01 RX ORDER — GLYCOPYRROLATE 1 MG/1
1 TABLET ORAL 2 TIMES DAILY
Status: DISCONTINUED | OUTPATIENT
Start: 2020-02-01 | End: 2020-02-06

## 2020-02-01 RX ADMIN — CILOSTAZOL 100 MG: 100 TABLET ORAL at 21:06

## 2020-02-01 RX ADMIN — PIPERACILLIN AND TAZOBACTAM 3.38 G: 3; .375 INJECTION, POWDER, FOR SOLUTION INTRAVENOUS at 01:58

## 2020-02-01 RX ADMIN — SOTALOL HYDROCHLORIDE 160 MG: 80 TABLET ORAL at 09:57

## 2020-02-01 RX ADMIN — ASPIRIN 324 MG: 81 TABLET, CHEWABLE ORAL at 09:56

## 2020-02-01 RX ADMIN — GLYCOPYRROLATE 1 MG: 1 TABLET ORAL at 21:06

## 2020-02-01 RX ADMIN — FOLIC ACID 1 MG: 1 TABLET ORAL at 09:56

## 2020-02-01 RX ADMIN — AMLODIPINE BESYLATE 5 MG: 5 TABLET ORAL at 09:56

## 2020-02-01 RX ADMIN — INSULIN GLARGINE 5 UNITS: 100 INJECTION, SOLUTION SUBCUTANEOUS at 21:20

## 2020-02-01 RX ADMIN — PIPERACILLIN AND TAZOBACTAM 3.38 G: 3; .375 INJECTION, POWDER, FOR SOLUTION INTRAVENOUS at 10:05

## 2020-02-01 RX ADMIN — SODIUM CHLORIDE, POTASSIUM CHLORIDE, SODIUM LACTATE AND CALCIUM CHLORIDE: 600; 310; 30; 20 INJECTION, SOLUTION INTRAVENOUS at 05:23

## 2020-02-01 RX ADMIN — VANCOMYCIN HYDROCHLORIDE 1250 MG: 10 INJECTION, POWDER, LYOPHILIZED, FOR SOLUTION INTRAVENOUS at 11:42

## 2020-02-01 RX ADMIN — APIXABAN 5 MG: 5 TABLET, FILM COATED ORAL at 10:02

## 2020-02-01 RX ADMIN — DESMOPRESSIN ACETATE 40 MG: 0.2 TABLET ORAL at 21:06

## 2020-02-01 RX ADMIN — CILOSTAZOL 100 MG: 100 TABLET ORAL at 09:56

## 2020-02-01 RX ADMIN — APIXABAN 5 MG: 5 TABLET, FILM COATED ORAL at 21:06

## 2020-02-01 RX ADMIN — SOTALOL HYDROCHLORIDE 160 MG: 80 TABLET ORAL at 21:06

## 2020-02-01 RX ADMIN — DULOXETINE HYDROCHLORIDE 60 MG: 30 CAPSULE, DELAYED RELEASE ORAL at 21:06

## 2020-02-01 RX ADMIN — GABAPENTIN 300 MG: 300 CAPSULE ORAL at 09:56

## 2020-02-01 RX ADMIN — GABAPENTIN 300 MG: 300 CAPSULE ORAL at 17:33

## 2020-02-01 RX ADMIN — SODIUM CHLORIDE, PRESERVATIVE FREE 10 ML: 5 INJECTION INTRAVENOUS at 21:06

## 2020-02-01 RX ADMIN — PIPERACILLIN AND TAZOBACTAM 3.38 G: 3; .375 INJECTION, POWDER, FOR SOLUTION INTRAVENOUS at 17:33

## 2020-02-01 RX ADMIN — GLYCOPYRROLATE 1 MG: 1 TABLET ORAL at 12:11

## 2020-02-01 ASSESSMENT — PAIN SCALES - WONG BAKER
WONGBAKER_NUMERICALRESPONSE: 0

## 2020-02-01 ASSESSMENT — PAIN SCALES - GENERAL: PAINLEVEL_OUTOF10: 0

## 2020-02-01 NOTE — PLAN OF CARE
Problem: Restraint Use - Nonviolent/Non-Self-Destructive Behavior:  Goal: Absence of restraint indications  Description  Absence of restraint indications  Outcome: Met This Shift  Goal: Absence of restraint-related injury  Description  Absence of restraint-related injury  Outcome: Met This Shift     Problem: Nutrition  Goal: Optimal nutrition therapy  Outcome: Met This Shift     Problem: Falls - Risk of:  Goal: Will remain free from falls  Description  Will remain free from falls  Outcome: Met This Shift  Goal: Absence of physical injury  Description  Absence of physical injury  Outcome: Met This Shift   Pt remained free of falls during shift. Bed in lowest position, call light within reach, and bed wheels locked. Will continue to monitor. Problem: Risk for Impaired Skin Integrity  Goal: Tissue integrity - skin and mucous membranes  Description  Structural intactness and normal physiological function of skin and  mucous membranes. Outcome: Met This Shift   Skin assessed for breakdown and redness, patient turned regularly, and heels elevated off of bed on pillows.       Problem: Musculor/Skeletal Functional Status  Goal: Highest potential functional level  Outcome: Met This Shift

## 2020-02-01 NOTE — PROGRESS NOTES
Neuro ICU Progress Note    Patient Name: Keith La  Patient : 1960  Room/Bed: 8165/9180-12  Code Status: Full   Allergies: No Known Allergies    CHIEF COMPLAINT     AMS    OVERNIGHT:   Afebrile, difficulty clearing secretions, started on [address] Course:   : CT head stable  : Intubated overnight for respiratory distress, started on Cardizem infusion for atrial fibrillation with RVR, weaned off and Sotalol started. Versed for sedation with concern for possible alcohol withdrawal.  : Cdiff negative. patient noted to have left upper extremity tremor, placed on LTME. Left hand cool to touch with delayed capillary refill, thumb spica splint removed to assess neurovascular status. With splint off, radial and ulnar pulses palpable and assessed via doppler. Capillary refill improved as did warmth and color. Ortho called to replace splint. Febrile this AM - infectious workup sent. : LTME showing diffuse slowing and disorganized background suggesting moderate encephalopathy. Started on Vanc/zosyn  : Versed started for sedation, bradycardia noted with Precedex. : TCD normal. Failed SBT due to tachypnea  : Sotalol increased to 160 mg BID. Zosyn started for CXR concerning for RLL pneumonia. 1/3: Failed SBT.  : Tolerated CPAP trial better. : 8 beats of V. Tach - resolved spontaneously. : Extubated to room air  : Respiratory distress with tachypnea, re-intubated. Found to have right gaze with right sided weakness, loaded with 2 grams Keppra. Went to cerebral angiogram for right ICA stenting. : EEG showing diffuse encephalopathy, no seizures. Will discontinue EEG and reduce Keppra to 500 mg BID. Will discontinue Librium today. MRI brain revealed bilateral MCA territory and left RUSTY territory infarcts. Provigil dosing increased to bid  : Failed SBT  : Family meeting rescheduled to : failed weaning due to tachypnea.   : Lisinopril daily  amLODIPine (NORVASC) 5 MG tablet, Take 5 mg by mouth daily  folic acid (FOLVITE) 1 MG tablet, Take 1 mg by mouth daily  albuterol sulfate  (90 Base) MCG/ACT inhaler, Inhale 2 puffs into the lungs every 4 hours as needed for Wheezing  Multiple Vitamins-Minerals (THERAPEUTIC MULTIVITAMIN-MINERALS) tablet, Take 1 tablet by mouth daily  DULoxetine (CYMBALTA) 60 MG extended release capsule, Take 60 mg by mouth nightly  omeprazole (PRILOSEC) 20 MG delayed release capsule, Take 20 mg by mouth daily  traMADol (ULTRAM) 50 MG tablet, Take 50 mg by mouth every 6 hours as needed for Pain.   insulin glargine (BASAGLAR KWIKPEN) 100 UNIT/ML injection pen, Inject 5 Units into the skin nightly  apixaban (ELIQUIS) 5 MG TABS tablet, Take 5 mg by mouth 2 times daily  atorvastatin (LIPITOR) 40 MG tablet, Take 1 tablet by mouth daily  vitamin D (ERGOCALCIFEROL) 1.25 MG (34348 UT) CAPS capsule, Take 1 capsule by mouth once a week for 8 doses    Current Medications:  Current Facility-Administered Medications: glycopyrrolate (ROBINUL) tablet 1 mg, 1 mg, Oral, BID  ipratropium-albuterol (DUONEB) nebulizer solution 1 ampule, 1 ampule, Inhalation, Q6H PRN  apixaban (ELIQUIS) tablet 5 mg, 5 mg, Oral, BID  aspirin chewable tablet 324 mg, 324 mg, Oral, Daily  piperacillin-tazobactam (ZOSYN) 3.375 g in dextrose 5 % 50 mL IVPB extended infusion (mini-bag), 3.375 g, Intravenous, Q8H  vancomycin (VANCOCIN) 1250 mg in dextrose 5 % 250 mL IVPB, 1,250 mg, Intravenous, Q12H  vancomycin (VANCOCIN) intermittent dosing (placeholder), , Other, RX Placeholder  amLODIPine (NORVASC) tablet 5 mg, 5 mg, Oral, Daily  atorvastatin (LIPITOR) tablet 40 mg, 40 mg, Oral, Nightly  cilostazol (PLETAL) tablet 100 mg, 100 mg, Oral, BID  DULoxetine (CYMBALTA) extended release capsule 60 mg, 60 mg, Oral, Nightly  folic acid (FOLVITE) tablet 1 mg, 1 mg, Oral, Daily  gabapentin (NEURONTIN) capsule 300 mg, 300 mg, Oral, BID  insulin glargine (LANTUS) injection vial 5 Units, 5 Units, Subcutaneous, Nightly  sotalol (BETAPACE) tablet 160 mg, 160 mg, Oral, BID  sodium chloride flush 0.9 % injection 10 mL, 10 mL, Intravenous, 2 times per day  sodium chloride flush 0.9 % injection 10 mL, 10 mL, Intravenous, PRN  acetaminophen (TYLENOL) tablet 650 mg, 650 mg, Oral, Q4H PRN  ondansetron (ZOFRAN) injection 4 mg, 4 mg, Intravenous, Q6H PRN  lactated ringers infusion, , Intravenous, Continuous    REVIEW OF SYSTEMS     Unable to obtain secondary to patient condition  PHYSICAL EXAM:     /77   Pulse 75   Temp 97.3 °F (36.3 °C) (Oral)   Resp 26   Ht 5' 11\" (1.803 m)   Wt 157 lb (71.2 kg)   SpO2 96%   BMI 21.90 kg/m²     PHYSICAL EXAM:  GENERAL: Cachectic appearing, tracking with eyes b/l  HENT: normocephalic , nose normal, secretions to oropharynx, on bipap  EYES: no occular discharge, no scleral icterus, tracks eyes  NECK: no JVD, no tracheal deviation  CV: Normal S1 S2, no MRG  ABDOMEN: soft, no rigidity, PEG tube    NEURO:   Pupils equal round reactive to light bilaterally sluggish 2 mm  Tracks eyes b/l  Negative Babinski, negative clonus  Equal Muscle tone in bilateral upper and bilateral lower extremities  Equal hand grasp, not following commands        LABS AND IMAGING:     RECENT LABS:  CBC with Differential:    Lab Results   Component Value Date    WBC 3.2 01/31/2020    RBC 3.76 01/31/2020    HGB 9.1 01/31/2020    HCT 30.7 01/31/2020     01/31/2020    MCV 81.6 01/31/2020    MCH 24.2 01/31/2020    MCHC 29.6 01/31/2020    RDW 19.8 01/31/2020    LYMPHOPCT 23 01/31/2020    MONOPCT 6 01/31/2020    BASOPCT 1 01/31/2020    MONOSABS 0.20 01/31/2020    LYMPHSABS 0.72 01/31/2020    EOSABS 0.13 01/31/2020    BASOSABS <0.03 01/31/2020    DIFFTYPE NOT REPORTED 01/31/2020     BMP:    Lab Results   Component Value Date     01/31/2020    K 3.9 01/31/2020     01/31/2020    CO2 22 01/31/2020    BUN 11 01/31/2020    LABALBU 2.9 01/31/2020    CREATININE 0.29 01/31/2020 including those of syntax and sound like substitutions which may escape proof reading. In such instances, actual meaning can be extrapolated by contextual diversion.

## 2020-02-02 LAB
ABSOLUTE EOS #: 0.08 K/UL (ref 0–0.44)
ABSOLUTE IMMATURE GRANULOCYTE: <0.03 K/UL (ref 0–0.3)
ABSOLUTE LYMPH #: 0.76 K/UL (ref 1.1–3.7)
ABSOLUTE MONO #: 0.2 K/UL (ref 0.1–1.2)
ALBUMIN SERPL-MCNC: 2.9 G/DL (ref 3.5–5.2)
ALBUMIN/GLOBULIN RATIO: 1 (ref 1–2.5)
ALP BLD-CCNC: 109 U/L (ref 40–129)
ALT SERPL-CCNC: 218 U/L (ref 5–41)
AST SERPL-CCNC: 61 U/L
BASOPHILS # BLD: 1 % (ref 0–2)
BASOPHILS ABSOLUTE: <0.03 K/UL (ref 0–0.2)
BILIRUB SERPL-MCNC: 0.56 MG/DL (ref 0.3–1.2)
BILIRUBIN DIRECT: 0.24 MG/DL
BILIRUBIN, INDIRECT: 0.32 MG/DL (ref 0–1)
C-REACTIVE PROTEIN: 18.3 MG/L (ref 0–5)
DIFFERENTIAL TYPE: ABNORMAL
EOSINOPHILS RELATIVE PERCENT: 2 % (ref 1–4)
GLOBULIN: ABNORMAL G/DL (ref 1.5–3.8)
GLUCOSE BLD-MCNC: 152 MG/DL (ref 75–110)
GLUCOSE BLD-MCNC: 155 MG/DL (ref 75–110)
GLUCOSE BLD-MCNC: 165 MG/DL (ref 75–110)
GLUCOSE BLD-MCNC: 169 MG/DL (ref 75–110)
GLUCOSE BLD-MCNC: 210 MG/DL (ref 75–110)
HCT VFR BLD CALC: 32.7 % (ref 40.7–50.3)
HEMOGLOBIN: 9.5 G/DL (ref 13–17)
IMMATURE GRANULOCYTES: 0 %
LACTIC ACID, WHOLE BLOOD: 1.1 MMOL/L (ref 0.7–2.1)
LACTIC ACID, WHOLE BLOOD: 1.1 MMOL/L (ref 0.7–2.1)
LACTIC ACID: NORMAL MMOL/L
LACTIC ACID: NORMAL MMOL/L
LYMPHOCYTES # BLD: 19 % (ref 24–43)
MAGNESIUM: 1.7 MG/DL (ref 1.6–2.6)
MAGNESIUM: 1.7 MG/DL (ref 1.6–2.6)
MCH RBC QN AUTO: 24.1 PG (ref 25.2–33.5)
MCHC RBC AUTO-ENTMCNC: 29.1 G/DL (ref 28.4–34.8)
MCV RBC AUTO: 82.8 FL (ref 82.6–102.9)
MONOCYTES # BLD: 5 % (ref 3–12)
NRBC AUTOMATED: 0 PER 100 WBC
PDW BLD-RTO: 19.6 % (ref 11.8–14.4)
PHOSPHORUS: 3.1 MG/DL (ref 2.5–4.5)
PHOSPHORUS: 3.1 MG/DL (ref 2.5–4.5)
PLATELET # BLD: 224 K/UL (ref 138–453)
PLATELET ESTIMATE: ABNORMAL
PMV BLD AUTO: 10.7 FL (ref 8.1–13.5)
PROCALCITONIN: 0.16 NG/ML
RBC # BLD: 3.95 M/UL (ref 4.21–5.77)
RBC # BLD: ABNORMAL 10*6/UL
SEG NEUTROPHILS: 73 % (ref 36–65)
SEGMENTED NEUTROPHILS ABSOLUTE COUNT: 2.97 K/UL (ref 1.5–8.1)
TOTAL PROTEIN: 5.7 G/DL (ref 6.4–8.3)
WBC # BLD: 4 K/UL (ref 3.5–11.3)
WBC # BLD: ABNORMAL 10*3/UL

## 2020-02-02 PROCEDURE — 99232 SBSQ HOSP IP/OBS MODERATE 35: CPT | Performed by: PSYCHIATRY & NEUROLOGY

## 2020-02-02 PROCEDURE — 36415 COLL VENOUS BLD VENIPUNCTURE: CPT

## 2020-02-02 PROCEDURE — 83605 ASSAY OF LACTIC ACID: CPT

## 2020-02-02 PROCEDURE — 6370000000 HC RX 637 (ALT 250 FOR IP): Performed by: STUDENT IN AN ORGANIZED HEALTH CARE EDUCATION/TRAINING PROGRAM

## 2020-02-02 PROCEDURE — 86140 C-REACTIVE PROTEIN: CPT

## 2020-02-02 PROCEDURE — 2060000000 HC ICU INTERMEDIATE R&B

## 2020-02-02 PROCEDURE — 84145 PROCALCITONIN (PCT): CPT

## 2020-02-02 PROCEDURE — 84100 ASSAY OF PHOSPHORUS: CPT

## 2020-02-02 PROCEDURE — 2500000003 HC RX 250 WO HCPCS: Performed by: NURSE PRACTITIONER

## 2020-02-02 PROCEDURE — 6360000002 HC RX W HCPCS: Performed by: PSYCHIATRY & NEUROLOGY

## 2020-02-02 PROCEDURE — 83735 ASSAY OF MAGNESIUM: CPT

## 2020-02-02 PROCEDURE — 2580000003 HC RX 258: Performed by: PSYCHIATRY & NEUROLOGY

## 2020-02-02 PROCEDURE — 82947 ASSAY GLUCOSE BLOOD QUANT: CPT

## 2020-02-02 PROCEDURE — 6370000000 HC RX 637 (ALT 250 FOR IP): Performed by: PSYCHIATRY & NEUROLOGY

## 2020-02-02 PROCEDURE — 94660 CPAP INITIATION&MGMT: CPT

## 2020-02-02 PROCEDURE — 80076 HEPATIC FUNCTION PANEL: CPT

## 2020-02-02 PROCEDURE — 85025 COMPLETE CBC W/AUTO DIFF WBC: CPT

## 2020-02-02 RX ORDER — SENNA AND DOCUSATE SODIUM 50; 8.6 MG/1; MG/1
2 TABLET, FILM COATED ORAL DAILY
Status: DISCONTINUED | OUTPATIENT
Start: 2020-02-02 | End: 2020-02-12

## 2020-02-02 RX ORDER — ASPIRIN 81 MG/1
81 TABLET, CHEWABLE ORAL DAILY
Status: DISCONTINUED | OUTPATIENT
Start: 2020-02-03 | End: 2020-02-12

## 2020-02-02 RX ORDER — LANOLIN ALCOHOL/MO/W.PET/CERES
325 CREAM (GRAM) TOPICAL
Status: DISCONTINUED | OUTPATIENT
Start: 2020-02-03 | End: 2020-02-12

## 2020-02-02 RX ADMIN — GABAPENTIN 300 MG: 300 CAPSULE ORAL at 17:52

## 2020-02-02 RX ADMIN — VANCOMYCIN HYDROCHLORIDE 1250 MG: 10 INJECTION, POWDER, LYOPHILIZED, FOR SOLUTION INTRAVENOUS at 09:00

## 2020-02-02 RX ADMIN — GLYCOPYRROLATE 1 MG: 1 TABLET ORAL at 20:11

## 2020-02-02 RX ADMIN — VANCOMYCIN HYDROCHLORIDE 1250 MG: 10 INJECTION, POWDER, LYOPHILIZED, FOR SOLUTION INTRAVENOUS at 00:10

## 2020-02-02 RX ADMIN — AMLODIPINE BESYLATE 5 MG: 5 TABLET ORAL at 09:03

## 2020-02-02 RX ADMIN — CILOSTAZOL 100 MG: 100 TABLET ORAL at 09:03

## 2020-02-02 RX ADMIN — APIXABAN 5 MG: 5 TABLET, FILM COATED ORAL at 09:03

## 2020-02-02 RX ADMIN — FOLIC ACID 1 MG: 1 TABLET ORAL at 09:03

## 2020-02-02 RX ADMIN — GABAPENTIN 300 MG: 300 CAPSULE ORAL at 09:03

## 2020-02-02 RX ADMIN — DULOXETINE HYDROCHLORIDE 60 MG: 30 CAPSULE, DELAYED RELEASE ORAL at 20:11

## 2020-02-02 RX ADMIN — PIPERACILLIN AND TAZOBACTAM 3.38 G: 3; .375 INJECTION, POWDER, FOR SOLUTION INTRAVENOUS at 10:33

## 2020-02-02 RX ADMIN — DESMOPRESSIN ACETATE 40 MG: 0.2 TABLET ORAL at 20:11

## 2020-02-02 RX ADMIN — ANTI-FUNGAL POWDER MICONAZOLE NITRATE TALC FREE: 1.42 POWDER TOPICAL at 10:00

## 2020-02-02 RX ADMIN — APIXABAN 5 MG: 5 TABLET, FILM COATED ORAL at 20:11

## 2020-02-02 RX ADMIN — SOTALOL HYDROCHLORIDE 160 MG: 80 TABLET ORAL at 09:03

## 2020-02-02 RX ADMIN — PIPERACILLIN AND TAZOBACTAM 3.38 G: 3; .375 INJECTION, POWDER, FOR SOLUTION INTRAVENOUS at 02:04

## 2020-02-02 RX ADMIN — SOTALOL HYDROCHLORIDE 160 MG: 80 TABLET ORAL at 20:11

## 2020-02-02 RX ADMIN — INSULIN GLARGINE 5 UNITS: 100 INJECTION, SOLUTION SUBCUTANEOUS at 20:39

## 2020-02-02 RX ADMIN — VANCOMYCIN HYDROCHLORIDE 1250 MG: 10 INJECTION, POWDER, LYOPHILIZED, FOR SOLUTION INTRAVENOUS at 22:26

## 2020-02-02 RX ADMIN — ANTI-FUNGAL POWDER MICONAZOLE NITRATE TALC FREE: 1.42 POWDER TOPICAL at 20:11

## 2020-02-02 RX ADMIN — GLYCOPYRROLATE 1 MG: 1 TABLET ORAL at 09:03

## 2020-02-02 RX ADMIN — PIPERACILLIN AND TAZOBACTAM 3.38 G: 3; .375 INJECTION, POWDER, FOR SOLUTION INTRAVENOUS at 17:52

## 2020-02-02 RX ADMIN — CILOSTAZOL 100 MG: 100 TABLET ORAL at 20:11

## 2020-02-02 RX ADMIN — ASPIRIN 324 MG: 81 TABLET, CHEWABLE ORAL at 09:03

## 2020-02-02 ASSESSMENT — PAIN SCALES - GENERAL
PAINLEVEL_OUTOF10: 0

## 2020-02-02 NOTE — CARE COORDINATION
Care Transition  Called mom and she does not want patient at [facility]. Her choices are [facility] first choice and [email or web address] 2nd choice. Referrals sent.

## 2020-02-02 NOTE — PLAN OF CARE
Patient remained free from injury. Patient verbalized understanding of need for the safety precautions. Demonstrates proper use of assistive devices. Bed remains in the lowest position. Call light remains within reach. [place] in use. Neuro assessment completed, fall precautions in place, aspirations precautions in place, assess for barriers in communication and mobility, interventions to assist in communication and mobility in place, encouraged to call for assistance, adaptive devices used as needed, assess emotional state and support offered, encouraged patient to communicate by available means, and support systems included in patient care. Problem: Falls - Risk of:  Goal: Will remain free from falls  Description  Will remain free from falls  Outcome: Met This Shift  Goal: Absence of physical injury  Description  Absence of physical injury  Outcome: Met This Shift     Problem: Risk for Impaired Skin Integrity  Goal: Tissue integrity - skin and mucous membranes  Description  Structural intactness and normal physiological function of skin and  mucous membranes.   Outcome: Met This Shift     Problem: HEMODYNAMIC STATUS  Goal: Patient has stable vital signs and fluid balance  Outcome: Met This Shift     Problem: Nutrition  Goal: Optimal nutrition therapy  Outcome: Ongoing     Problem: Musculor/Skeletal Functional Status  Goal: Highest potential functional level  Outcome: Not Met This Shift     Problem: ACTIVITY INTOLERANCE/IMPAIRED MOBILITY  Goal: Mobility/activity is maintained at optimum level for patient  Outcome: Not Met This Shift     Problem: COMMUNICATION IMPAIRMENT  Goal: Ability to express needs and understand communication  Outcome: Not Met This Shift     Problem: Restraint Use - Nonviolent/Non-Self-Destructive Behavior:  Goal: Absence of restraint indications  Description  Absence of restraint indications  Outcome: Completed  Goal: Absence of restraint-related injury  Description  Absence of restraint-related injury  Outcome: Completed

## 2020-02-02 NOTE — PROGRESS NOTES
11\" (1.803 m). Weight as of this encounter: 157 lb (71.2 kg).  []<16 Severe malnutrition  []16-16.99 Moderate malnutrition  []17-18.49 Mild malnutrition  [x]18.5-24.9 Normal  []25-29.9 Overweight (not obese)  []30-34.9 Obese class 1 (Low Risk)  []35-39.9 Obese class 2 (Moderate Risk)  []?40 Obese class 3 (High Risk)    RECENT LABS:   Lab Results   Component Value Date    WBC 4.0 02/02/2020    HGB 9.5 (L) 02/02/2020    HCT 32.7 (L) 02/02/2020     02/02/2020    CHOL 92 01/29/2020    TRIG 84 01/29/2020    HDL 26 (L) 01/29/2020     (H) 02/02/2020    AST 61 (H) 02/02/2020     01/31/2020    K 3.9 01/31/2020     01/31/2020    CREATININE 0.29 (L) 01/31/2020    BUN 11 01/31/2020    CO2 22 01/31/2020    INR 1.4 01/28/2020    LABA1C 5.8 01/29/2020     24 HOUR INTAKE/OUTPUT:    Intake/Output Summary (Last 24 hours) at 2/2/2020 1147  Last data filed at 2/2/2020 0800  Gross per 24 hour   Intake 4596 ml   Output 2425 ml   Net 2171 ml       Labs and Images reviewed with:  [] Dr. Pop Burgos. Sanjay    [x] Dr. Marisol Gross  [] Dr. Jordan Ryan  [] There are no new interval images to review. PHYSICAL EXAM       CONSTITUTIONAL:  Awake and alert. Follows simple commands inconsistently. Mute. Tracks appropriately. HEAD:  normocephalic, atraumatic    EYES:  PERRLA, EOMI.   ENT:  moist mucous membranes   NECK:  supple, symmetric   LUNGS:  Equal air entry bilaterally, clear   CARDIOVASCULAR:  normal s1 / s2, RRR, distal pulses intact   ABDOMEN:  Soft, no rigidity   NEUROLOGIC:  Mental Status:  Not oriented to date, Not oriented to person and Not oriented to place,awake             Cranial Nerves:    III: Pupils:  equal, round, reactive to light  III,IV,VI: Extra Ocular Movements: intact    Motor Exam:                 Moves all extremities spontaneously with left sided hemiparesis. DRAINS:  [x] There are no drains for Neuro Critical Care to monitor at this time.      ASSESSMENT AND PLAN:       62 yo male with recent bihemispheric infarcts with difficulty clearing secretions effectively presented with AMS and atrial fibrillation, being treated for left lower lobe pneumonia.     NEUROLOGIC:  - CT head stable SDH  - History of recent right carotid stenting, continue aspirin 81 mg daily  - Neuro checks per protocol    CARDIOVASCULAR:  - Goal normotension  - Continue Norvasc 5 mg QD  - History of atrial fibrillation, continue sotalol 160 mg BID and Eliquis 5 mg BID  - s/p cardioversion likely resulting in elevated troponin - 57  - Echocardiogram: EF 45%  - Peripheral artery disease, continue Pletal 100 mg BID,Lipitor 40 mg QHS  - Continue telemetry    PULMONARY:  - Extubated 1/31  - Bipap prn  - Aggressive NT suctioning, continue Robinol 1 mg BID  - History of COPD; continue Duoneb q6h prn  - CXR: retrocardiac opacity left hemidiaphragm    RENAL/FLUID/ELECTROLYTE:  - Normal renal function  - IVF: Total fluids @ 80 ml/hr  - Monitor urine output  - No need for daily labs    GI/NUTRITION:  NUTRITION:  DIET TUBE FEED CONTINUOUS/CYCLIC NPO; Diabetic; Gastrostomy; Continuous; 20; 70  - Bowel regimen: senna-s daily  - GI prophylaxis: Not indicated  - Last BM overnight    ID:  - Tmax 36.7  - Procalcitonin trending down, 0.25-0.16  - Lactic normal 0.8  -Continue Vancomycin and Zosyn empirically for likely aspiration pneumonia, Day 5/7  - Leukopenia improving, WBC 4.0  - Continue to monitor for fevers  - Daily CBC    HEME:   - H&H 9.5/32.7  - Platelets 797  - Start ferrous sulfate for iron deficiency anemia  - Daily CBC    ENDOCRINE:  - Continue to monitor blood glucose, goal <180  - Hemoglobin A1C 5.8  - Lantus 5 units QHS    OTHER:  - PT/OT/ST   - Code Status: Full  - Attempting to obtain records for OSH regarding previous esophageal imaging    PROPHYLAXIS:  Stress ulcer: Not indicated    DVT PROPHYLAXIS:  - SCD sleeves - Thigh High   - Eliquis    DISPOSITION:  [x] OK for out of ICU from Neuro Critical Care standpoint - We will continue to follow along. For any changes in exam or patient status please contact Neuro Critical Care.       PAMELA Tejada - CNP  Neuro Critical Care  Pager [phone]  2/2/2020     11:47 AM

## 2020-02-02 NOTE — PLAN OF CARE
Patient assessed for fall risk and fall precautions initiated as needed. Patient instructed about safety devices and allowed to make decisions related to safey. Environment kept free of clutter and adequate lighting provided. Bed in lowest position with brakes locked. Call light in reach. Patient ID band correct and in place. Patient transferred with appropriate methods. Patient free of falls during shift. Will continue to monitor. Analy Lala RN

## 2020-02-03 ENCOUNTER — APPOINTMENT (OUTPATIENT)
Dept: GENERAL RADIOLOGY | Age: 60
DRG: 005 | End: 2020-02-03
Payer: MEDICAID

## 2020-02-03 LAB
ALLEN TEST: POSITIVE
BUN BLDV-MCNC: 9 MG/DL (ref 6–20)
C-REACTIVE PROTEIN: 11.2 MG/L (ref 0–5)
CREAT SERPL-MCNC: 0.27 MG/DL (ref 0.7–1.2)
CULTURE: NORMAL
CULTURE: NORMAL
FIO2: 50
GFR AFRICAN AMERICAN: >60 ML/MIN
GFR NON-AFRICAN AMERICAN: >60 ML/MIN
GFR SERPL CREATININE-BSD FRML MDRD: ABNORMAL ML/MIN/{1.73_M2}
GFR SERPL CREATININE-BSD FRML MDRD: ABNORMAL ML/MIN/{1.73_M2}
GLUCOSE BLD-MCNC: 173 MG/DL (ref 75–110)
GLUCOSE BLD-MCNC: 188 MG/DL (ref 75–110)
GLUCOSE BLD-MCNC: 195 MG/DL (ref 75–110)
GLUCOSE BLD-MCNC: 204 MG/DL (ref 75–110)
LACTIC ACID, WHOLE BLOOD: 1.4 MMOL/L (ref 0.7–2.1)
LACTIC ACID, WHOLE BLOOD: 1.5 MMOL/L (ref 0.7–2.1)
LACTIC ACID: NORMAL MMOL/L
LACTIC ACID: NORMAL MMOL/L
Lab: NORMAL
Lab: NORMAL
MAGNESIUM: 1.7 MG/DL (ref 1.6–2.6)
MAGNESIUM: 1.7 MG/DL (ref 1.6–2.6)
MODE: ABNORMAL
NEGATIVE BASE EXCESS, ART: ABNORMAL (ref 0–2)
O2 DEVICE/FLOW/%: ABNORMAL
PATIENT TEMP: ABNORMAL
PHOSPHORUS: 2.8 MG/DL (ref 2.5–4.5)
PHOSPHORUS: 3.2 MG/DL (ref 2.5–4.5)
POC HCO3: 31 MMOL/L (ref 21–28)
POC O2 SATURATION: 92 % (ref 94–98)
POC PCO2 TEMP: ABNORMAL MM HG
POC PCO2: 48.5 MM HG (ref 35–48)
POC PH TEMP: ABNORMAL
POC PH: 7.41 (ref 7.35–7.45)
POC PO2 TEMP: ABNORMAL MM HG
POC PO2: 65.2 MM HG (ref 83–108)
POSITIVE BASE EXCESS, ART: 5 (ref 0–3)
PROCALCITONIN: 0.11 NG/ML
SAMPLE SITE: ABNORMAL
SPECIMEN DESCRIPTION: NORMAL
SPECIMEN DESCRIPTION: NORMAL
TCO2 (CALC), ART: 33 MMOL/L (ref 22–29)

## 2020-02-03 PROCEDURE — 84100 ASSAY OF PHOSPHORUS: CPT

## 2020-02-03 PROCEDURE — 82565 ASSAY OF CREATININE: CPT

## 2020-02-03 PROCEDURE — 2580000003 HC RX 258: Performed by: PSYCHIATRY & NEUROLOGY

## 2020-02-03 PROCEDURE — 6370000000 HC RX 637 (ALT 250 FOR IP): Performed by: STUDENT IN AN ORGANIZED HEALTH CARE EDUCATION/TRAINING PROGRAM

## 2020-02-03 PROCEDURE — 2580000003 HC RX 258: Performed by: STUDENT IN AN ORGANIZED HEALTH CARE EDUCATION/TRAINING PROGRAM

## 2020-02-03 PROCEDURE — 82947 ASSAY GLUCOSE BLOOD QUANT: CPT

## 2020-02-03 PROCEDURE — 83605 ASSAY OF LACTIC ACID: CPT

## 2020-02-03 PROCEDURE — 99233 SBSQ HOSP IP/OBS HIGH 50: CPT | Performed by: PSYCHIATRY & NEUROLOGY

## 2020-02-03 PROCEDURE — 71045 X-RAY EXAM CHEST 1 VIEW: CPT

## 2020-02-03 PROCEDURE — 6370000000 HC RX 637 (ALT 250 FOR IP): Performed by: PSYCHIATRY & NEUROLOGY

## 2020-02-03 PROCEDURE — 82803 BLOOD GASES ANY COMBINATION: CPT

## 2020-02-03 PROCEDURE — 97535 SELF CARE MNGMENT TRAINING: CPT

## 2020-02-03 PROCEDURE — 94761 N-INVAS EAR/PLS OXIMETRY MLT: CPT

## 2020-02-03 PROCEDURE — 36600 WITHDRAWAL OF ARTERIAL BLOOD: CPT

## 2020-02-03 PROCEDURE — 6360000002 HC RX W HCPCS: Performed by: PSYCHIATRY & NEUROLOGY

## 2020-02-03 PROCEDURE — 92507 TX SP LANG VOICE COMM INDIV: CPT

## 2020-02-03 PROCEDURE — 86140 C-REACTIVE PROTEIN: CPT

## 2020-02-03 PROCEDURE — 84145 PROCALCITONIN (PCT): CPT

## 2020-02-03 PROCEDURE — 83735 ASSAY OF MAGNESIUM: CPT

## 2020-02-03 PROCEDURE — 97530 THERAPEUTIC ACTIVITIES: CPT

## 2020-02-03 PROCEDURE — 36415 COLL VENOUS BLD VENIPUNCTURE: CPT

## 2020-02-03 PROCEDURE — 94660 CPAP INITIATION&MGMT: CPT

## 2020-02-03 PROCEDURE — 6370000000 HC RX 637 (ALT 250 FOR IP): Performed by: NURSE PRACTITIONER

## 2020-02-03 PROCEDURE — 84520 ASSAY OF UREA NITROGEN: CPT

## 2020-02-03 PROCEDURE — 2700000000 HC OXYGEN THERAPY PER DAY

## 2020-02-03 PROCEDURE — 2060000000 HC ICU INTERMEDIATE R&B

## 2020-02-03 RX ADMIN — DESMOPRESSIN ACETATE 40 MG: 0.2 TABLET ORAL at 20:47

## 2020-02-03 RX ADMIN — PIPERACILLIN AND TAZOBACTAM 3.38 G: 3; .375 INJECTION, POWDER, FOR SOLUTION INTRAVENOUS at 20:05

## 2020-02-03 RX ADMIN — GLYCOPYRROLATE 1 MG: 1 TABLET ORAL at 09:09

## 2020-02-03 RX ADMIN — INSULIN GLARGINE 5 UNITS: 100 INJECTION, SOLUTION SUBCUTANEOUS at 20:47

## 2020-02-03 RX ADMIN — ANTI-FUNGAL POWDER MICONAZOLE NITRATE TALC FREE: 1.42 POWDER TOPICAL at 20:47

## 2020-02-03 RX ADMIN — CILOSTAZOL 100 MG: 100 TABLET ORAL at 20:47

## 2020-02-03 RX ADMIN — SOTALOL HYDROCHLORIDE 160 MG: 80 TABLET ORAL at 20:47

## 2020-02-03 RX ADMIN — FERROUS SULFATE TAB EC 325 MG (65 MG FE EQUIVALENT) 325 MG: 325 (65 FE) TABLET DELAYED RESPONSE at 09:09

## 2020-02-03 RX ADMIN — CILOSTAZOL 100 MG: 100 TABLET ORAL at 09:09

## 2020-02-03 RX ADMIN — SODIUM CHLORIDE, PRESERVATIVE FREE 10 ML: 5 INJECTION INTRAVENOUS at 09:09

## 2020-02-03 RX ADMIN — GLYCOPYRROLATE 1 MG: 1 TABLET ORAL at 20:47

## 2020-02-03 RX ADMIN — ANTI-FUNGAL POWDER MICONAZOLE NITRATE TALC FREE: 1.42 POWDER TOPICAL at 09:08

## 2020-02-03 RX ADMIN — FOLIC ACID 1 MG: 1 TABLET ORAL at 09:09

## 2020-02-03 RX ADMIN — GABAPENTIN 300 MG: 300 CAPSULE ORAL at 16:46

## 2020-02-03 RX ADMIN — APIXABAN 5 MG: 5 TABLET, FILM COATED ORAL at 09:09

## 2020-02-03 RX ADMIN — DULOXETINE HYDROCHLORIDE 60 MG: 30 CAPSULE, DELAYED RELEASE ORAL at 20:47

## 2020-02-03 RX ADMIN — ASPIRIN 81 MG: 81 TABLET, CHEWABLE ORAL at 09:09

## 2020-02-03 RX ADMIN — SOTALOL HYDROCHLORIDE 160 MG: 80 TABLET ORAL at 09:08

## 2020-02-03 RX ADMIN — PIPERACILLIN AND TAZOBACTAM 3.38 G: 3; .375 INJECTION, POWDER, FOR SOLUTION INTRAVENOUS at 01:56

## 2020-02-03 RX ADMIN — VANCOMYCIN HYDROCHLORIDE 1250 MG: 10 INJECTION, POWDER, LYOPHILIZED, FOR SOLUTION INTRAVENOUS at 10:30

## 2020-02-03 RX ADMIN — PIPERACILLIN AND TAZOBACTAM 3.38 G: 3; .375 INJECTION, POWDER, FOR SOLUTION INTRAVENOUS at 13:00

## 2020-02-03 RX ADMIN — GABAPENTIN 300 MG: 300 CAPSULE ORAL at 09:09

## 2020-02-03 RX ADMIN — AMLODIPINE BESYLATE 5 MG: 5 TABLET ORAL at 09:09

## 2020-02-03 RX ADMIN — APIXABAN 5 MG: 5 TABLET, FILM COATED ORAL at 20:47

## 2020-02-03 ASSESSMENT — PAIN SCALES - WONG BAKER: WONGBAKER_NUMERICALRESPONSE: 0

## 2020-02-03 ASSESSMENT — PAIN SCALES - GENERAL
PAINLEVEL_OUTOF10: 0

## 2020-02-03 NOTE — PLAN OF CARE
Patient remained free from injury. Patient verbalized understanding of need for the safety precautions. Demonstrates proper use of assistive devices. Bed remains in the lowest position. Call light remains within reach. [place] [facility] in use. Neuro assessment completed, fall precautions in place, aspirations precautions in place, assess for barriers in communication and mobility, interventions to assist in communication and mobility in place, encouraged to call for assistance, adaptive devices used as needed, assess emotional state and support offered, encouraged patient to communicate by available means, and support systems included in patient care. Problem: Falls - Risk of:  Goal: Will remain free from falls  Description  Will remain free from falls  2/3/2020 0414 by Phuc Garcia RN  Outcome: Met This Shift  2/2/2020 1608 by Kate Araujo RN  Outcome: Ongoing  Goal: Absence of physical injury  Description  Absence of physical injury  Outcome: Met This Shift     Problem: Risk for Impaired Skin Integrity  Goal: Tissue integrity - skin and mucous membranes  Description  Structural intactness and normal physiological function of skin and  mucous membranes.   Outcome: Met This Shift     Problem: HEMODYNAMIC STATUS  Goal: Patient has stable vital signs and fluid balance  Outcome: Met This Shift     Problem: Nutrition  Goal: Optimal nutrition therapy  Outcome: Ongoing     Problem: Musculor/Skeletal Functional Status  Goal: Highest potential functional level  Outcome: Not Met This Shift     Problem: ACTIVITY INTOLERANCE/IMPAIRED MOBILITY  Goal: Mobility/activity is maintained at optimum level for patient  Outcome: Not Met This Shift     Problem: COMMUNICATION IMPAIRMENT  Goal: Ability to express needs and understand communication  Outcome: Not Met This Shift

## 2020-02-03 NOTE — PROGRESS NOTES
Daily Progress Note  Neuro Critical Care    Patient Name: Gayatri Bojorquez  Patient : 1960  Room/Bed: 9820/1123-92  Code Status: Full  Allergies: No Known Allergies    CHIEF COMPLAINT:     Respiratory distress     INTERVAL HISTORY    Initial Presentation (Admitted ): The patient is a 60 yo male history of atrial fibrillation on Eliquis, bilateral ICA stenosis (right more than left), DM2, HLD, HTN, CAD s/p PCI stents, PVD, CEA, and ETOH abuse with recent admission for traumatic subarachnoid and symptomatic right ICA stenosis s/p stent placement, bihemispheric strokes, and PEG placement who was discharged to [facility] on  who presents as a transfer from [facility] for altered mental status and respiratory distress. Per records, patient was found unresponsive by staff at the SNF with a GCS of 4 and intubated at the outlying facility. He was in atrial fibrillation with RVR and cardioverted prior to transfer to [facility] for direct admission to the Neuro ICU. Hospital Course:   : Febrile and hypotensive. Levophed started along with broad spectrum antibiotics for concerns of sepsis. CT chest revealed left lung consolidation. : Liver US: borderline hepatomegaly, otherwise unremarkable  : Aspirin and Eliquis resumed. : Extubated  : Increased oral secretions, requiring BiPap. robinol started with improvement. : Afebrile doing well    Last 24h:   No acute events overnight. Patient tolerated Bipap well overnight. ABX day 6. Awaiting records from [facility] regarding previous esophageal testing that had been completed with concern of abnormalities including possible cancer.  BM x2    CURRENT MEDICATIONS:  SCHEDULED MEDICATIONS:   miconazole   Topical BID    sennosides-docusate sodium  2 tablet Oral Daily    aspirin  81 mg Oral Daily    ferrous sulfate  325 mg Oral Daily with breakfast    glycopyrrolate  1 mg Oral BID    apixaban  5 mg Oral BID     02/02/2020    CHOL 92 01/29/2020    TRIG 84 01/29/2020    HDL 26 (L) 01/29/2020     (H) 02/02/2020    AST 61 (H) 02/02/2020     01/31/2020    K 3.9 01/31/2020     01/31/2020    CREATININE 0.29 (L) 01/31/2020    BUN 11 01/31/2020    CO2 22 01/31/2020    INR 1.4 01/28/2020    LABA1C 5.8 01/29/2020     24 HOUR INTAKE/OUTPUT:    Intake/Output Summary (Last 24 hours) at 2/3/2020 0631  Last data filed at 2/3/2020 0400  Gross per 24 hour   Intake 5224 ml   Output 1875 ml   Net 3349 ml       Labs and Images reviewed with:  [] Dr. Sonja Baer. Sanjay    [x] Dr. Stephanie Pack  [] Dr. Sjeal Carter  [] There are no new interval images to review. PHYSICAL EXAM       CONSTITUTIONAL:  Awake and alert. Follows simple commands inconsistently. Mute. Tracks appropriately. HEAD:  normocephalic, atraumatic    EYES:  PERRLA, EOMI.   ENT:  moist mucous membranes   NECK:  supple, symmetric   LUNGS:  Equal air entry bilaterally, clear   CARDIOVASCULAR:  normal s1 / s2, RRR, distal pulses intact   ABDOMEN:  Soft, no rigidity   NEUROLOGIC:  Mental Status:  Not oriented to date, Not oriented to person and Not oriented to place,awake             Cranial Nerves:    III: Pupils:  equal, round, reactive to light  III,IV,VI: Extra Ocular Movements: intact    Motor Exam:                 Moves all extremities spontaneously with left sided hemiparesis. DRAINS:  [x] There are no drains for Neuro Critical Care to monitor at this time. ASSESSMENT AND PLAN:       62 yo male with recent bihemispheric infarcts with difficulty clearing secretions effectively presented with AMS and atrial fibrillation, being treated for left lower lobe pneumonia. No acute events overnight. Patient tolerated Bipap well overnight. ABX day 6/7. Awaiting records from [facility] regarding previous esophageal testing that had been completed with concern of abnormalities including possible cancer. BM x2.  HR 70s, RR 20s on BiPaP, SBP 120s. NEUROLOGIC:  - CT head stable SDH  - History of recent right carotid stenting, continue aspirin 81 mg daily  - Neuro checks per protocol    CARDIOVASCULAR:  - Goal normotension  - Continue Norvasc 5 mg QD  - History of atrial fibrillation, continue sotalol 160 mg BID and Eliquis 5 mg BID  - s/p cardioversion likely resulting in elevated troponin - 57  - Echocardiogram: EF 45%  - Peripheral artery disease, continue Pletal 100 mg BID,Lipitor 40 mg QHS  - Continue telemetry    PULMONARY:  - Extubated 1/31  - Bipap prn  - Aggressive NT suctioning, continue Robinol 1 mg BID  - History of COPD; continue Duoneb q6h prn  - CXR: retrocardiac opacity left hemidiaphragm    RENAL/FLUID/ELECTROLYTE:  - Normal renal function  - IVF: Total fluids @ 80 ml/hr  - Monitor urine output  - No need for daily labs    GI/NUTRITION:  NUTRITION:  DIET TUBE FEED CONTINUOUS/CYCLIC NPO; Diabetic; Gastrostomy; Continuous; 20; 70  - Bowel regimen: senna-s daily  - GI prophylaxis: Not indicated  - Last BM overnight    ID:  Sepsis 2/2 likely Aspiration PNA - resolving  - Tmax 36.8  - Procalcitonin trending down, 0.25-0.16 - 0.11  - Lactic normal   - Vancomycin and Zosyn aspiration pneumonia, Day 6/7  - Leukopenia improving, WBC 4.0  - Continue to monitor for fevers  - Daily CBC    HEME:   - H&H 9.5/32.7  - Platelets 156  - Start ferrous sulfate for iron deficiency anemia  - Daily CBC    ENDOCRINE:  - Continue to monitor blood glucose, goal <180  - Hemoglobin A1C 5.8  - Lantus 5 units QHS    OTHER:  - PT/OT/ST    - Code Status: Full  - Attempting to obtain records for OSH regarding previous esophageal imaging    PROPHYLAXIS:  Stress ulcer: Not indicated    DVT PROPHYLAXIS:  - SCD sleeves - Thigh High   - Eliquis    DISPOSITION:  [] Dispo Planning touch base case mgmt SNF vs LTAC    For any changes in exam or patient status please contact Neuro Critical Care.       Lucrecia Sepulveda DO  Neuro Critical Care  Pager [phone]  2/3/2020 6:31 AM

## 2020-02-03 NOTE — PROGRESS NOTES
Speech Language Pathology  Speech Language Pathology  [address] [facility]    Speech Language Treatment Note    Date: 2/3/2020  Patients Name: Ramírez Chaney  MRN: [id number]  Patient Active Problem List   Diagnosis Code   [facility] (subarachnoid hemorrhage) ([address] I60.9    Subarachnoid bleed ([address] I60.9    Traumatic subarachnoid hemorrhage with loss of consciousness of 1 hour to 5 hours 59 minutes ([address] S06. 6X3A    Carotid stenosis, right I65.21    Asymptomatic stenosis of left vertebral artery I65.02    Intracranial atherosclerosis I67.2    History of CEA (carotid endarterectomy) Z98.890    Ventilator dependence (HCC) Z99.11    Delirium tremens (HCC) F10.231    Chronic a-fib I48.20    On mechanically assisted ventilation (HCC) Z99.11    Encephalopathy G93.40    Acute ischemic multifocal anterior circulation stroke (HCC) I63.529    Palliative care encounter Z51.5    Subdural hematoma (HCC) S06.5X9A    Altered mental status R41.82    Subdural hygroma G96.0    SDH (subdural hematoma) (HCC) S06.5X9A    Septic shock (HCC) A41.9, R65.21    Pneumonia of both lower lobes due to infectious organism ([facility]) J18.1       Pain: 0/10    Speech and Language Treatment  Treatment time: 9:54-10:03    Subjective: [] Alert [] Cooperative     [] Confused     [] Agitated    [x] Lethargic      Objective/Assessment:  Auditory Comprehension:    Following One Step Directions: 0/4 increased to 2/4 with moderate verbal and visual cues   Following Two Step Directions: 0/1 despite maximal verbal and visual cues   Single unit directions (point to objects in room): 0/3 no increase with  maximal verbal cues. Pt questionably  looked towards objects. Sentence Comprehension: 0/3 despite maximal visual and verbal cues. Biographical Information: Pt shook head yes accurately X1 in response to biographical yes/no questions after  mod cues and repetition. No  other yes/no response noted with further questions.

## 2020-02-03 NOTE — PROGRESS NOTES
Occupational Therapy  Facility/Department: [facility] NEURO  Daily Treatment Note  NAME: Rogerio Urban  : 1960  MRN: [id number]    Date of Service: 2/3/2020    Discharge Recommendations:  Pt to require continued therapy at discharge. Pt demo ability to participate in BUE PROM/AAROM and prolonged stretch as well as bed mobility and seated EOB ~10 minutes. Pt requires high levels of physical assistance in participate in all functional tasks as well as verbal cueing due to decreased cognitive status. Assessment   Performance deficits / Impairments: Decreased functional mobility ; Decreased endurance;Decreased balance;Decreased ADL status; Decreased strength;Decreased ROM; Decreased high-level IADLs;Decreased cognition;Decreased safe awareness;Decreased fine motor control;Decreased coordination  Prognosis: Fair  REQUIRES OT FOLLOW UP: Yes  Activity Tolerance  Activity Tolerance: Patient limited by fatigue;Patient limited by pain  Safety Devices  Safety Devices in place: Yes  Type of devices: Call light within reach;Nurse notified; Left in bed  Restraints  Initially in place: No         Patient Diagnosis(es): The encounter diagnosis was SDH (subdural hematoma) ([facility] has a past medical history of Alcohol abuse, Atrial fibrillation ([facility] Diabetes ([facility] Gastritis, Hyperlipidemia, Hypertension, Left ventricular hypertrophy, Neuropathy, and Renal cyst.   has a past surgical history that includes Coronary angioplasty with stent (); angioplasty (); Carotid stent placement (2019); Gastrostomy tube placement (2020); and Upper gastrointestinal endoscopy (N/A, 2020).     Restrictions  Restrictions/Precautions  Restrictions/Precautions: Weight Bearing, General Precautions, Fall Risk, Seizure  Required Braces or Orthoses?: Yes  Upper Extremity Weight Bearing Restrictions  Right Upper Extremity Weight Bearing: Non Weight Bearing  Left Upper Extremity Weight Bearing: Non Weight Bearing  Other: NWB bilat wrists per ortho recommendations from 1/17/20 s/p fall  Required Braces or Orthoses  Left Upper Extremity Brace/Splint: (splinted per orthosx)  Position Activity Restriction  Other position/activity restrictions: Keep MAP>65     Subjective   General  Chart Reviewed: Orders, Progress Notes, History and Physical, Imaging, Labs, Previous Admission  Patient assessed for rehabilitation services?: Yes  Family / Caregiver Present: Yes(3 family members present, mother very encouraging and motivating to pt throughout session)  General Comment  Comments: RN ok'd for therapy this AM. Pt shook head yes/no 4x appropriately in response to yes/no questions; agreeable to participation in therapy by shaking head \"yes\" and pt cooperative throughout. Vital Signs  Patient Currently in Pain: Denies(at rest; some noted grimacing during P/AAROM and prolonged stretch to BUE, pt unable to quantify due to cognition)     Orientation  Orientation  Overall Orientation Status: (SHA formally; pt appropriately reponds to name by looking at person speaking to him)     Objective    ADL  Grooming: Maximum assistance;Verbal cueing; Increased time to complete(hand over hand assist to bring wash cloth to face with RUE to wash face; while seated EOB)           Balance  Sitting Balance: (pt seated EOB ~10 minutes with mod A to max A rquired throughout; initially requiring max A however progressed to mod A and remained mod A ~6-7 minutes prior to increased fatigue and requiring increased support of max A; pt spontaneously reaching for handrail and attempting to use RUE on handrail for increased sitting balance, pt required education/VCs on NWB BUE and limitations for assisting with BUE during bed mobility and sitting balance)     Bed mobility  Supine to Sit: 2 Person assistance;Maximum assistance;Dependent/Total(HOB raised ~45*; assist required for trunk progression and for BLE progression)  Sit to Supine: 2 Person assistance;Maximum assistance;Dependent/Total(assist required for trunk progression and BLE progression)  Scooting: Maximal assistance            Cognition  Overall Cognitive Status: Exceptions  Arousal/Alertness: Delayed responses to stimuli;Inconsistent responses to stimuli  Following Commands: Follows one step commands with increased time; Follows one step commands with repetition(inconsistently)  Initiation: Requires cues for all  Sequencing: Requires cues for all         Type of ROM/Therapeutic Exercise  Type of ROM/Therapeutic Exercise: PROM;AAROM  Comment: Pt participated in PROM with inconsistent boughts of AAROM while supine in bed. Exercises  Shoulder Flexion: PROM bilaterally x10 from 0-90* pt with grimacing and resistance noted past 90*  Elbow Flexion: PROM/AAROM bilaterally x10; pt with no grimacing or resistance noted bilaterally, pt able to more actively assist with RUE  Elbow Extension: PROM/AAROM bilaterally x10; pt with no grimacing or resistance noted bilaterally, pt able to more actively assist with RUE  Grasp/Release: PROM/AAROM and prolonged stretch x5 for 15-20 second holds as tolerated; pt able to more actively assist with R hand         Plan   Plan  Times per week: 3-4x/wk  Current Treatment Recommendations: Balance Training, Functional Mobility Training, Endurance Training, Safety Education & Training, Patient/Caregiver Education & Training, Equipment Evaluation, Education, & procurement, Home Management Training, Self-Care / ADL, Cognitive Reorientation, Strengthening, ROM      Goals  Short term goals  Time Frame for Short term goals: By discharge pt will. ..   Short term goal 1: demo supine<>sit with max Ax2  Short term goal 2: complete simple grooming task with set up and mod A  Short term goal 3: demo 10 minutes EOB sitting balance with mod A  Short term goal 4: tolerate AAROM with bilateral UEs to increase participation in ADLs   Short term goal 5: progress mobility as medically able       Therapy Time Individual Concurrent Group Co-treatment   Time In 3028         Time Out 1108         Minutes 21         Timed Code Treatment Minutes: 9 Minutes       Latasha Tellez, OTR/L

## 2020-02-03 NOTE — CARE COORDINATION
Spoke to Jamie from [facility]. She informs me that she has received the referral but she was looking it over and she feels he will be better served at [place] [address] as they have respiratory therapist there. Spoke to mother at bedside, she states that while the patient was there, the facility resp. therapist told her that there are not equipped for a patient like him. They then sent him to the hospital and was then transferred to here. I have called facility to discuss issues with admissions and their respiratory dept.   Had to leave voicemail

## 2020-02-03 NOTE — PROGRESS NOTES
restrictions: Keep MAP>65  Subjective   General  Response To Previous Treatment: Patient unable to report, no changes reported from family or staff  Family / Caregiver Present: Yes(MOM,DAD, Brother.)  Subjective  Subjective: Pt alert in bed with Family in room, agreeable to PT; Pt very slow to respond, follow some commands . General Comment  Comments: Co-treat with OT  Pain Screening  Patient Currently in Pain: Denies  Vital Signs  Patient Currently in Pain: Denies       Orientation     Cognition      Objective   Bed mobility  Supine to Sit: 2 Person assistance;Dependent/Total;Maximum assistance  Sit to Supine: 2 Person assistance;Dependent/Total;Maximum assistance  Transfers  Comment: SHA; not safe to perform at this time  Ambulation  Ambulation?: No     Balance  Sitting - Static: Fair;-  Sitting - Dynamic: Poor;+  Comments: Pt tolerated sittind at EOb for ~10mins requiring MAx A for balance wheile demonstrating excessive posterior lean throughout. Exercises  Hip Flexion: 10 reps, AAROM  Knee Long Arc Quad: 5reps AROM; 5 Reps AAROM  Core Strengthening: Dangled ~10 mins at EOB. Comments: in sitting position. limited by fatigue.     Goals  Short term goals  Time Frame for Short term goals: 14 visits  Short term goal 1: Pt to tolerate 30 minutes of activity for improved tolerance to therapy interventions  Short term goal 2: Complete bed mobility Desi from flattened position using bedrails as needed for greater independence  Short term goal 3: Perform stand pivot transfer to recliner with Desi for ease of mobility and functional transfers  Short term goal 4: Demo fair + seated static and dynamic balance to reduce risk of falls  Short term goal 5: Participate in AAROM and AROM for strengthening    Plan    Plan  Times per week: 6-7x/wk  Current Treatment Recommendations: Strengthening, ROM, Balance Training, Functional Mobility Training, Transfer Training, Safety Education & Training, Home Exercise Program, Endurance Training, Equipment Evaluation, Education, & procurement, Patient/Caregiver Education & Training, Gait Training  Safety Devices  Type of devices:  All fall risk precautions in place, Nurse notified, Left in bed, Call light within reach  Restraints  Initially in place: No     Therapy Time   Individual Concurrent Group Co-treatment   Time In 1047         Time Out 1108         Minutes 21                 Denae Arriola, PTA

## 2020-02-03 NOTE — PROGRESS NOTES
Smoking Cessation - topics covered   []  Health Risks  []  Benefits of Quitting   []  Smoking Cessation  []  Patient has no history of tobacco use per note in significant history. []  Patient is former smoker per note in significant history. Patient quit in   []  No need for tobacco cessation education. []  Booklet given  []  Patient verbalizes understanding. []  Patient denies need for tobacco cessation education. []  Unable to meet with patient today. Will follow up as able. As patient continues to have altered mental status, will defer tobacco cessation education at this time.   Gladis Valles  9:39 AM

## 2020-02-03 NOTE — CARE COORDINATION
Karen Kenyon from [facility] here for on site. They do not have any male beds at this time. Mother would like list of other facilities.

## 2020-02-04 LAB
ABSOLUTE EOS #: 0.07 K/UL (ref 0–0.4)
ABSOLUTE IMMATURE GRANULOCYTE: 0 K/UL (ref 0–0.3)
ABSOLUTE LYMPH #: 1.19 K/UL (ref 1–4.8)
ABSOLUTE MONO #: 0.4 K/UL (ref 0.1–0.8)
BASOPHILS # BLD: 0 % (ref 0–2)
BASOPHILS ABSOLUTE: 0 K/UL (ref 0–0.2)
C-REACTIVE PROTEIN: 7.5 MG/L (ref 0–5)
DIFFERENTIAL TYPE: ABNORMAL
EOSINOPHILS RELATIVE PERCENT: 1 % (ref 1–4)
GLUCOSE BLD-MCNC: 184 MG/DL (ref 75–110)
GLUCOSE BLD-MCNC: 204 MG/DL (ref 75–110)
GLUCOSE BLD-MCNC: 209 MG/DL (ref 75–110)
GLUCOSE BLD-MCNC: 223 MG/DL (ref 75–110)
HCT VFR BLD CALC: 36.8 % (ref 40.7–50.3)
HEMOGLOBIN: 10.9 G/DL (ref 13–17)
IMMATURE GRANULOCYTES: 0 %
LACTIC ACID, WHOLE BLOOD: 1.3 MMOL/L (ref 0.7–2.1)
LACTIC ACID, WHOLE BLOOD: 1.9 MMOL/L (ref 0.7–2.1)
LACTIC ACID: NORMAL MMOL/L
LACTIC ACID: NORMAL MMOL/L
LYMPHOCYTES # BLD: 18 % (ref 24–44)
MAGNESIUM: 1.7 MG/DL (ref 1.6–2.6)
MAGNESIUM: 1.8 MG/DL (ref 1.6–2.6)
MCH RBC QN AUTO: 24.2 PG (ref 25.2–33.5)
MCHC RBC AUTO-ENTMCNC: 29.6 G/DL (ref 28.4–34.8)
MCV RBC AUTO: 81.8 FL (ref 82.6–102.9)
MONOCYTES # BLD: 6 % (ref 1–7)
MORPHOLOGY: ABNORMAL
MORPHOLOGY: ABNORMAL
NRBC AUTOMATED: 0 PER 100 WBC
PDW BLD-RTO: 20.2 % (ref 11.8–14.4)
PHOSPHORUS: 3.2 MG/DL (ref 2.5–4.5)
PHOSPHORUS: 3.2 MG/DL (ref 2.5–4.5)
PLATELET # BLD: 274 K/UL (ref 138–453)
PLATELET ESTIMATE: ABNORMAL
PMV BLD AUTO: 9.6 FL (ref 8.1–13.5)
PROCALCITONIN: 0.08 NG/ML
RBC # BLD: 4.5 M/UL (ref 4.21–5.77)
RBC # BLD: ABNORMAL 10*6/UL
SEG NEUTROPHILS: 75 % (ref 36–66)
SEGMENTED NEUTROPHILS ABSOLUTE COUNT: 4.94 K/UL (ref 1.8–7.7)
WBC # BLD: 6.6 K/UL (ref 3.5–11.3)
WBC # BLD: ABNORMAL 10*3/UL

## 2020-02-04 PROCEDURE — 2580000003 HC RX 258: Performed by: NURSE PRACTITIONER

## 2020-02-04 PROCEDURE — 6370000000 HC RX 637 (ALT 250 FOR IP): Performed by: PSYCHIATRY & NEUROLOGY

## 2020-02-04 PROCEDURE — 2580000003 HC RX 258: Performed by: STUDENT IN AN ORGANIZED HEALTH CARE EDUCATION/TRAINING PROGRAM

## 2020-02-04 PROCEDURE — APPNB180 APP NON BILLABLE TIME > 60 MINS: Performed by: NURSE PRACTITIONER

## 2020-02-04 PROCEDURE — 6360000002 HC RX W HCPCS: Performed by: NURSE PRACTITIONER

## 2020-02-04 PROCEDURE — 2060000000 HC ICU INTERMEDIATE R&B

## 2020-02-04 PROCEDURE — 6360000002 HC RX W HCPCS: Performed by: PSYCHIATRY & NEUROLOGY

## 2020-02-04 PROCEDURE — 84145 PROCALCITONIN (PCT): CPT

## 2020-02-04 PROCEDURE — 86140 C-REACTIVE PROTEIN: CPT

## 2020-02-04 PROCEDURE — 85025 COMPLETE CBC W/AUTO DIFF WBC: CPT

## 2020-02-04 PROCEDURE — 84100 ASSAY OF PHOSPHORUS: CPT

## 2020-02-04 PROCEDURE — 97110 THERAPEUTIC EXERCISES: CPT

## 2020-02-04 PROCEDURE — 83605 ASSAY OF LACTIC ACID: CPT

## 2020-02-04 PROCEDURE — 6370000000 HC RX 637 (ALT 250 FOR IP): Performed by: STUDENT IN AN ORGANIZED HEALTH CARE EDUCATION/TRAINING PROGRAM

## 2020-02-04 PROCEDURE — 2580000003 HC RX 258: Performed by: PSYCHIATRY & NEUROLOGY

## 2020-02-04 PROCEDURE — 99233 SBSQ HOSP IP/OBS HIGH 50: CPT | Performed by: PSYCHIATRY & NEUROLOGY

## 2020-02-04 PROCEDURE — 31720 CLEARANCE OF AIRWAYS: CPT

## 2020-02-04 PROCEDURE — 36415 COLL VENOUS BLD VENIPUNCTURE: CPT

## 2020-02-04 PROCEDURE — 6370000000 HC RX 637 (ALT 250 FOR IP): Performed by: NURSE PRACTITIONER

## 2020-02-04 PROCEDURE — 2700000000 HC OXYGEN THERAPY PER DAY

## 2020-02-04 PROCEDURE — 83735 ASSAY OF MAGNESIUM: CPT

## 2020-02-04 PROCEDURE — 82947 ASSAY GLUCOSE BLOOD QUANT: CPT

## 2020-02-04 RX ORDER — FLUOXETINE 10 MG/1
10 CAPSULE ORAL DAILY
Status: DISCONTINUED | OUTPATIENT
Start: 2020-02-04 | End: 2020-02-05

## 2020-02-04 RX ADMIN — SOTALOL HYDROCHLORIDE 160 MG: 80 TABLET ORAL at 09:44

## 2020-02-04 RX ADMIN — GABAPENTIN 300 MG: 300 CAPSULE ORAL at 09:44

## 2020-02-04 RX ADMIN — SOTALOL HYDROCHLORIDE 160 MG: 80 TABLET ORAL at 21:31

## 2020-02-04 RX ADMIN — PIPERACILLIN AND TAZOBACTAM 3.38 G: 3; .375 INJECTION, POWDER, FOR SOLUTION INTRAVENOUS at 18:17

## 2020-02-04 RX ADMIN — AMLODIPINE BESYLATE 5 MG: 5 TABLET ORAL at 09:45

## 2020-02-04 RX ADMIN — FERROUS SULFATE TAB EC 325 MG (65 MG FE EQUIVALENT) 325 MG: 325 (65 FE) TABLET DELAYED RESPONSE at 09:45

## 2020-02-04 RX ADMIN — PIPERACILLIN AND TAZOBACTAM 3.38 G: 3; .375 INJECTION, POWDER, FOR SOLUTION INTRAVENOUS at 08:58

## 2020-02-04 RX ADMIN — DESMOPRESSIN ACETATE 40 MG: 0.2 TABLET ORAL at 21:31

## 2020-02-04 RX ADMIN — CILOSTAZOL 100 MG: 100 TABLET ORAL at 21:31

## 2020-02-04 RX ADMIN — PIPERACILLIN AND TAZOBACTAM 3.38 G: 3; .375 INJECTION, POWDER, FOR SOLUTION INTRAVENOUS at 02:26

## 2020-02-04 RX ADMIN — INSULIN GLARGINE 5 UNITS: 100 INJECTION, SOLUTION SUBCUTANEOUS at 21:32

## 2020-02-04 RX ADMIN — VANCOMYCIN HYDROCHLORIDE 1250 MG: 10 INJECTION, POWDER, LYOPHILIZED, FOR SOLUTION INTRAVENOUS at 00:49

## 2020-02-04 RX ADMIN — GLYCOPYRROLATE 1 MG: 1 TABLET ORAL at 09:44

## 2020-02-04 RX ADMIN — GLYCOPYRROLATE 1 MG: 1 TABLET ORAL at 21:31

## 2020-02-04 RX ADMIN — FLUOXETINE 10 MG: 10 CAPSULE ORAL at 21:32

## 2020-02-04 RX ADMIN — SODIUM CHLORIDE, PRESERVATIVE FREE 10 ML: 5 INJECTION INTRAVENOUS at 09:48

## 2020-02-04 RX ADMIN — ASPIRIN 81 MG: 81 TABLET, CHEWABLE ORAL at 09:45

## 2020-02-04 RX ADMIN — APIXABAN 5 MG: 5 TABLET, FILM COATED ORAL at 21:31

## 2020-02-04 RX ADMIN — ANTI-FUNGAL POWDER MICONAZOLE NITRATE TALC FREE: 1.42 POWDER TOPICAL at 21:00

## 2020-02-04 RX ADMIN — CILOSTAZOL 100 MG: 100 TABLET ORAL at 09:45

## 2020-02-04 RX ADMIN — ANTI-FUNGAL POWDER MICONAZOLE NITRATE TALC FREE: 1.42 POWDER TOPICAL at 09:47

## 2020-02-04 RX ADMIN — VANCOMYCIN HYDROCHLORIDE 1250 MG: 10 INJECTION, POWDER, LYOPHILIZED, FOR SOLUTION INTRAVENOUS at 15:08

## 2020-02-04 RX ADMIN — SENNOSIDES AND DOCUSATE SODIUM 2 TABLET: 8.6; 5 TABLET ORAL at 09:44

## 2020-02-04 RX ADMIN — DULOXETINE HYDROCHLORIDE 60 MG: 30 CAPSULE, DELAYED RELEASE ORAL at 21:31

## 2020-02-04 RX ADMIN — APIXABAN 5 MG: 5 TABLET, FILM COATED ORAL at 09:45

## 2020-02-04 RX ADMIN — GABAPENTIN 300 MG: 300 CAPSULE ORAL at 18:17

## 2020-02-04 RX ADMIN — FOLIC ACID 1 MG: 1 TABLET ORAL at 09:45

## 2020-02-04 ASSESSMENT — PAIN SCALES - GENERAL
PAINLEVEL_OUTOF10: 0

## 2020-02-04 ASSESSMENT — PAIN SCALES - WONG BAKER
WONGBAKER_NUMERICALRESPONSE: 0

## 2020-02-04 NOTE — CARE COORDINATION
Transitional planning. Received message from [place] at [facility] and they cannot accept pt. Called her back to ask reason and left a message in admissions    2425 Venancio Turner got back with me. Cannot accomodate his needs. Will discuss LTAC with family even though denied before-Drs thinking of discussing code status with family as well    36 Spoke with Mom Julian Novoa and she wanted to know if he could go to \[facility]\"" Referral made to [facility] and spoke to Marilu in intake. The pt may get tracheostomy so an LTAC near home would be more likely. Will discuss with family about [facility] in [facility] made and left message in admissions with Dixie Martinez

## 2020-02-04 NOTE — PLAN OF CARE
Patient remained free from injury. Patient verbalized understanding of need for the safety precautions. Demonstrates proper use of assistive devices. Bed remains in the lowest position. Call light remains within reach. [facility] in use. Neuro assessment completed, fall precautions in place, aspirations precautions in place, assess for barriers in communication and mobility, interventions to assist in communication and mobility in place, encouraged to call for assistance, adaptive devices used as needed, assess emotional state and support offered, encouraged patient to communicate by available means, and support systems included in patient care. Problem: Falls - Risk of:  Goal: Will remain free from falls  Description  Will remain free from falls  2/4/2020 0602 by Alecia Christianson RN  Outcome: Met This Shift  2/3/2020 1730 by Vicki Frost RN  Outcome: Met This Shift  Goal: Absence of physical injury  Description  Absence of physical injury  2/4/2020 0602 by Alecia Christianson RN  Outcome: Met This Shift  2/3/2020 1730 by Vicki Frost RN  Outcome: Met This Shift     Problem: HEMODYNAMIC STATUS  Goal: Patient has stable vital signs and fluid balance  Outcome: Met This Shift     Problem: Nutrition  Goal: Optimal nutrition therapy  Outcome: Ongoing     Problem: Risk for Impaired Skin Integrity  Goal: Tissue integrity - skin and mucous membranes  Description  Structural intactness and normal physiological function of skin and  mucous membranes.   Outcome: Ongoing     Problem: ACTIVITY INTOLERANCE/IMPAIRED MOBILITY  Goal: Mobility/activity is maintained at optimum level for patient  Outcome: Ongoing     Problem: Pain:  Goal: Pain level will decrease  Description  Pain level will decrease  Outcome: Ongoing  Goal: Control of acute pain  Description  Control of acute pain  Outcome: Ongoing  Goal: Control of chronic pain  Description  Control of chronic pain  Outcome: Ongoing     Problem: Musculor/Skeletal

## 2020-02-04 NOTE — PROGRESS NOTES
Occupational Therapy    Occupational Therapy Not Seen Note    DATE: 2020  Name: Sulema Florian  : 1960  MRN: [id number]    Patient not available for Occupational Therapy due to: Other: Pt soundly asleep, ALINE attempted to awake pt however pt only opened eyes once.     Next Scheduled Treatment: Attempt at later date    Electronically signed by Rey Bernheim, COTA/L on 2020 at 1:34 PM

## 2020-02-04 NOTE — PROGRESS NOTES
[address] [facility]  Speech Language Pathology    Date: 2/4/2020  Patient Name: Sim Velez  YOB: 1960   AGE: 61 y.o. MRN: [id number]        Patient Not Available for Speech Therapy     Due to:  [] Testing  [] Hemodialysis  [] Cancelled by RN  [] Surgery   [] Intubation/Sedation/Pain Medication  [] Medical instability  [x] Other: Pt.not awake and alert for ST treatment upon attempt. Next scheduled treatment: 2/5/2020  Completed by:  Shannan Oviedo M.S. [address] [facility]

## 2020-02-04 NOTE — PROGRESS NOTES
(71.2 kg).  []<16 Severe malnutrition  []16-16.99 Moderate malnutrition  []17-18.49 Mild malnutrition  [x]18.5-24.9 Normal  []25-29.9 Overweight (not obese)  []30-34.9 Obese class 1 (Low Risk)  []35-39.9 Obese class 2 (Moderate Risk)  []?40 Obese class 3 (High Risk)    RECENT LABS:   Lab Results   Component Value Date    WBC 6.6 02/04/2020    HGB 10.9 (L) 02/04/2020    HCT 36.8 (L) 02/04/2020     02/04/2020    CHOL 92 01/29/2020    TRIG 84 01/29/2020    HDL 26 (L) 01/29/2020     (H) 02/02/2020    AST 61 (H) 02/02/2020     01/31/2020    K 3.9 01/31/2020     01/31/2020    CREATININE 0.27 (L) 02/03/2020    BUN 9 02/03/2020    CO2 22 01/31/2020    INR 1.4 01/28/2020    LABA1C 5.8 01/29/2020     24 HOUR INTAKE/OUTPUT:    Intake/Output Summary (Last 24 hours) at 2/4/2020 0824  Last data filed at 2/4/2020 0630  Gross per 24 hour   Intake 3234 ml   Output 1600 ml   Net 1634 ml       Labs and Images reviewed with:  [x] Dr. Janet Grace    [] Dr. Ruth Chappell  [] Dr. Hawk   [] There are no new interval images to review. PHYSICAL EXAM       CONSTITUTIONAL:  Awake and alert. Follows simple commands inconsistently. Mute. Tracks appropriately. HEAD:  normocephalic, atraumatic    EYES:  PERRLA, EOMI.   ENT:  moist mucous membranes   NECK:  supple, symmetric   LUNGS:  Equal air entry bilaterally, clear, tachypneic   CARDIOVASCULAR:  normal s1 / s2, RRR, distal pulses intact   ABDOMEN:  Soft, no rigidity   NEUROLOGIC:  Mental Status:  Not oriented to date, Not oriented to person and Not oriented to place,awake             Cranial Nerves:    III: Pupils:  equal, round, reactive to light  III,IV,VI: Extra Ocular Movements: intact    Motor Exam:                 Moves all extremities spontaneously with left sided hemiparesis. DRAINS:  [x] There are no drains for Neuro Critical Care to monitor at this time.      ASSESSMENT AND PLAN:       62 yo male with recent bihemispheric infarcts with Critical Care standpoint - We will continue to follow along. For any changes in exam or patient status please contact Neuro Critical Care.       PAMELA Ragland - GILBERT  Neuro Critical Care  Pager [phone]  2/4/2020     8:24 AM

## 2020-02-04 NOTE — PLAN OF CARE
Problem: Falls - Risk of:  Goal: Will remain free from falls  Description  Will remain free from falls  2/4/2020 1643 by Paul Olivarez RN  Outcome: Met This Shift     Problem: Nutrition  Goal: Optimal nutrition therapy  2/4/2020 1643 by Paul Olivarez RN  Outcome: Ongoing     Problem: Risk for Impaired Skin Integrity  Goal: Tissue integrity - skin and mucous membranes  Description  Structural intactness and normal physiological function of skin and  mucous membranes.   2/4/2020 1643 by Paul Olivarez RN  Outcome: Ongoing

## 2020-02-05 LAB
ABSOLUTE EOS #: 0.31 K/UL (ref 0–0.44)
ABSOLUTE IMMATURE GRANULOCYTE: <0.03 K/UL (ref 0–0.3)
ABSOLUTE LYMPH #: 1.27 K/UL (ref 1.1–3.7)
ABSOLUTE MONO #: 0.41 K/UL (ref 0.1–1.2)
BASOPHILS # BLD: 1 % (ref 0–2)
BASOPHILS ABSOLUTE: 0.05 K/UL (ref 0–0.2)
C-REACTIVE PROTEIN: 8.5 MG/L (ref 0–5)
DIFFERENTIAL TYPE: ABNORMAL
EOSINOPHILS RELATIVE PERCENT: 5 % (ref 1–4)
GLUCOSE BLD-MCNC: 202 MG/DL (ref 75–110)
GLUCOSE BLD-MCNC: 209 MG/DL (ref 75–110)
GLUCOSE BLD-MCNC: 266 MG/DL (ref 75–110)
HCT VFR BLD CALC: 38.4 % (ref 40.7–50.3)
HCT VFR BLD CALC: 39 % (ref 40.7–50.3)
HEMOGLOBIN: 11.3 G/DL (ref 13–17)
HEMOGLOBIN: 11.6 G/DL (ref 13–17)
IMMATURE GRANULOCYTES: 0 %
LYMPHOCYTES # BLD: 21 % (ref 24–43)
MCH RBC QN AUTO: 24.5 PG (ref 25.2–33.5)
MCH RBC QN AUTO: 24.7 PG (ref 25.2–33.5)
MCHC RBC AUTO-ENTMCNC: 29.4 G/DL (ref 28.4–34.8)
MCHC RBC AUTO-ENTMCNC: 29.7 G/DL (ref 28.4–34.8)
MCV RBC AUTO: 83.1 FL (ref 82.6–102.9)
MCV RBC AUTO: 83.2 FL (ref 82.6–102.9)
MONOCYTES # BLD: 7 % (ref 3–12)
NRBC AUTOMATED: 0 PER 100 WBC
NRBC AUTOMATED: 0 PER 100 WBC
PARTIAL THROMBOPLASTIN TIME: 24.1 SEC (ref 20.5–30.5)
PARTIAL THROMBOPLASTIN TIME: 26.6 SEC (ref 20.5–30.5)
PDW BLD-RTO: 20 % (ref 11.8–14.4)
PDW BLD-RTO: 20 % (ref 11.8–14.4)
PLATELET # BLD: 304 K/UL (ref 138–453)
PLATELET # BLD: 343 K/UL (ref 138–453)
PLATELET ESTIMATE: ABNORMAL
PMV BLD AUTO: 9.4 FL (ref 8.1–13.5)
PMV BLD AUTO: 9.8 FL (ref 8.1–13.5)
PROCALCITONIN: 0.07 NG/ML
RBC # BLD: 4.62 M/UL (ref 4.21–5.77)
RBC # BLD: 4.69 M/UL (ref 4.21–5.77)
RBC # BLD: ABNORMAL 10*6/UL
SEG NEUTROPHILS: 66 % (ref 36–65)
SEGMENTED NEUTROPHILS ABSOLUTE COUNT: 4.07 K/UL (ref 1.5–8.1)
WBC # BLD: 6.1 K/UL (ref 3.5–11.3)
WBC # BLD: 8 K/UL (ref 3.5–11.3)
WBC # BLD: ABNORMAL 10*3/UL

## 2020-02-05 PROCEDURE — 2580000003 HC RX 258: Performed by: STUDENT IN AN ORGANIZED HEALTH CARE EDUCATION/TRAINING PROGRAM

## 2020-02-05 PROCEDURE — 36415 COLL VENOUS BLD VENIPUNCTURE: CPT

## 2020-02-05 PROCEDURE — 6370000000 HC RX 637 (ALT 250 FOR IP): Performed by: STUDENT IN AN ORGANIZED HEALTH CARE EDUCATION/TRAINING PROGRAM

## 2020-02-05 PROCEDURE — 99233 SBSQ HOSP IP/OBS HIGH 50: CPT | Performed by: PSYCHIATRY & NEUROLOGY

## 2020-02-05 PROCEDURE — 85027 COMPLETE CBC AUTOMATED: CPT

## 2020-02-05 PROCEDURE — 97110 THERAPEUTIC EXERCISES: CPT

## 2020-02-05 PROCEDURE — 6370000000 HC RX 637 (ALT 250 FOR IP): Performed by: PSYCHIATRY & NEUROLOGY

## 2020-02-05 PROCEDURE — 6360000002 HC RX W HCPCS: Performed by: PSYCHIATRY & NEUROLOGY

## 2020-02-05 PROCEDURE — 6370000000 HC RX 637 (ALT 250 FOR IP): Performed by: NURSE PRACTITIONER

## 2020-02-05 PROCEDURE — 2060000000 HC ICU INTERMEDIATE R&B

## 2020-02-05 PROCEDURE — 84145 PROCALCITONIN (PCT): CPT

## 2020-02-05 PROCEDURE — 97129 THER IVNTJ 1ST 15 MIN: CPT

## 2020-02-05 PROCEDURE — 94660 CPAP INITIATION&MGMT: CPT

## 2020-02-05 PROCEDURE — 85025 COMPLETE CBC W/AUTO DIFF WBC: CPT

## 2020-02-05 PROCEDURE — 85730 THROMBOPLASTIN TIME PARTIAL: CPT

## 2020-02-05 PROCEDURE — 92507 TX SP LANG VOICE COMM INDIV: CPT

## 2020-02-05 PROCEDURE — 86140 C-REACTIVE PROTEIN: CPT

## 2020-02-05 PROCEDURE — 82947 ASSAY GLUCOSE BLOOD QUANT: CPT

## 2020-02-05 RX ORDER — HEPARIN SODIUM 1000 [USP'U]/ML
80 INJECTION, SOLUTION INTRAVENOUS; SUBCUTANEOUS PRN
Status: DISCONTINUED | OUTPATIENT
Start: 2020-02-05 | End: 2020-02-06

## 2020-02-05 RX ORDER — HEPARIN SODIUM 10000 [USP'U]/100ML
18 INJECTION, SOLUTION INTRAVENOUS CONTINUOUS
Status: DISCONTINUED | OUTPATIENT
Start: 2020-02-05 | End: 2020-02-07

## 2020-02-05 RX ORDER — FLUOXETINE HYDROCHLORIDE 20 MG/5ML
10 LIQUID ORAL DAILY
Status: DISCONTINUED | OUTPATIENT
Start: 2020-02-05 | End: 2020-02-05

## 2020-02-05 RX ORDER — HEPARIN SODIUM 1000 [USP'U]/ML
40 INJECTION, SOLUTION INTRAVENOUS; SUBCUTANEOUS PRN
Status: DISCONTINUED | OUTPATIENT
Start: 2020-02-05 | End: 2020-02-06

## 2020-02-05 RX ADMIN — SOTALOL HYDROCHLORIDE 160 MG: 80 TABLET ORAL at 08:50

## 2020-02-05 RX ADMIN — GLYCOPYRROLATE 1 MG: 1 TABLET ORAL at 20:41

## 2020-02-05 RX ADMIN — CILOSTAZOL 100 MG: 100 TABLET ORAL at 20:41

## 2020-02-05 RX ADMIN — AMLODIPINE BESYLATE 5 MG: 5 TABLET ORAL at 08:50

## 2020-02-05 RX ADMIN — SODIUM CHLORIDE, PRESERVATIVE FREE 10 ML: 5 INJECTION INTRAVENOUS at 20:56

## 2020-02-05 RX ADMIN — DULOXETINE HYDROCHLORIDE 60 MG: 30 CAPSULE, DELAYED RELEASE ORAL at 20:41

## 2020-02-05 RX ADMIN — SENNOSIDES AND DOCUSATE SODIUM 2 TABLET: 8.6; 5 TABLET ORAL at 08:50

## 2020-02-05 RX ADMIN — GABAPENTIN 300 MG: 300 CAPSULE ORAL at 17:52

## 2020-02-05 RX ADMIN — SOTALOL HYDROCHLORIDE 160 MG: 80 TABLET ORAL at 20:41

## 2020-02-05 RX ADMIN — DESMOPRESSIN ACETATE 40 MG: 0.2 TABLET ORAL at 20:41

## 2020-02-05 RX ADMIN — ANTI-FUNGAL POWDER MICONAZOLE NITRATE TALC FREE: 1.42 POWDER TOPICAL at 20:56

## 2020-02-05 RX ADMIN — ASPIRIN 81 MG: 81 TABLET, CHEWABLE ORAL at 08:50

## 2020-02-05 RX ADMIN — HEPARIN SODIUM AND DEXTROSE 18 UNITS/KG/HR: 10000; 5 INJECTION INTRAVENOUS at 23:51

## 2020-02-05 RX ADMIN — FOLIC ACID 1 MG: 1 TABLET ORAL at 08:52

## 2020-02-05 RX ADMIN — GABAPENTIN 300 MG: 300 CAPSULE ORAL at 08:50

## 2020-02-05 RX ADMIN — INSULIN GLARGINE 5 UNITS: 100 INJECTION, SOLUTION SUBCUTANEOUS at 20:41

## 2020-02-05 RX ADMIN — FLUOXETINE HYDROCHLORIDE 10 MG: 20 SOLUTION ORAL at 09:37

## 2020-02-05 RX ADMIN — FERROUS SULFATE TAB EC 325 MG (65 MG FE EQUIVALENT) 325 MG: 325 (65 FE) TABLET DELAYED RESPONSE at 08:50

## 2020-02-05 RX ADMIN — ANTI-FUNGAL POWDER MICONAZOLE NITRATE TALC FREE: 1.42 POWDER TOPICAL at 08:54

## 2020-02-05 RX ADMIN — CILOSTAZOL 100 MG: 100 TABLET ORAL at 08:50

## 2020-02-05 RX ADMIN — GLYCOPYRROLATE 1 MG: 1 TABLET ORAL at 08:50

## 2020-02-05 RX ADMIN — SODIUM CHLORIDE, PRESERVATIVE FREE 10 ML: 5 INJECTION INTRAVENOUS at 08:52

## 2020-02-05 RX ADMIN — APIXABAN 5 MG: 5 TABLET, FILM COATED ORAL at 08:51

## 2020-02-05 ASSESSMENT — PAIN SCALES - WONG BAKER
WONGBAKER_NUMERICALRESPONSE: 0

## 2020-02-05 ASSESSMENT — PAIN SCALES - GENERAL
PAINLEVEL_OUTOF10: 0
PAINLEVEL_OUTOF10: 0

## 2020-02-05 NOTE — ADT AUTH CERT
Utilization Reviews         Neurology [address] Day 9 (2/5/2020) by Diana Tavarez RN         Review Status Review Entered   Completed 2/5/2020 12:46       Criteria Review      Care Day: 9 Care Date: 2/5/2020 Level of Care:    Guideline Day 2    Level Of Care    (X) Floor    Clinical Status    ( ) * No ICU or intermediate care needs    Interventions    (X) Inpatient interventions continue    * Milestone   Additional Notes   CARE DAY 9   2/5/20 02/05/20 0721   Vitals   Temp 97.1 °F (36.2 °C)   Temp Source Axillary   Pulse 97   Heart Rate Source Monitor   Resp (!) 33   /86   MAP (mmHg) 99   BP Location Left upper arm   BP Upper/Lower Upper   BP Method Automatic   Patient Position Semi fowlers   Oxygen Therapy   SpO2 94 %   O2 Device PAP (positive airway pressure)      02/05/20 1036   Vitals   Temp 98.7 °F (37.1 °C)   Temp Source Oral   Pulse 105   Heart Rate Source Monitor   Resp (!) 36   /83   MAP (mmHg) 92   BP Location Left upper arm   BP Upper/Lower Upper   BP Method Automatic   Patient Position Semi fowlers   Oxygen Therapy   SpO2 (!) 89 %   O2 Device PAP (positive airway pressure)      Last 24h:    Overnight, patient desaturated to 88% with tachypnea in the 30-40s requiring NT suction. CXR and ABG WNL. Continued on Bipap without further incident. Despite Robinol, BiPap, and frequent suctioning, patient continues to have difficulty clearing secretions effectively. Concern that patient will deteriorate again outside of hospital and be subject to aspiration pneumonia. Discussed possible need for trach with family vs other options including code status. I have spoken to patient's POA, mother- [facility] and siblings who will discuss among themselves options and let us know.  Will move forward with surgical consult if needed.        Scheduled Medications    · miconazole   Topical BID   · sennosides-docusate sodium  2 tablet Oral Daily   · aspirin  81 mg Oral Daily   · ferrous sulfate  325 mg Oral Daily with breakfast   · glycopyrrolate  1 mg Oral BID   · apixaban  5 mg Oral BID   · piperacillin-tazobactam  3.375 g Intravenous Q8H   · vancomycin  1,250 mg Intravenous Q12H   · vancomycin (VANCOCIN) intermittent dosing (placeholder)   Other RX Placeholder   · amLODIPine  5 mg Oral Daily   · atorvastatin  40 mg Oral Nightly   · cilostazol  100 mg Oral BID   · DULoxetine  60 mg Oral Nightly   · folic acid  1 mg Oral Daily   · gabapentin  300 mg Oral BID   · insulin glargine  5 Units Subcutaneous Nightly   · sotalol  160 mg Oral BID   · sodium chloride flush  10 mL Intravenous 2 times per day       PRN Medications    ipratropium-albuterol, sodium chloride flush, acetaminophen, ondansetron          VITALS:   Temperature Range: Temp: 98.5 °F (36.9 °C) Temp  Av.4 °F (36.3 °C)  Min: 96.8 °F (36 °C)  Max: 98.5 °F (36.9 °C)   BP Range: Systolic (96KSP), CGL:178 , Min:121 , MQO:103      Diastolic (28LYQ), XCF:05, Min:77, Max:86       Pulse Range: Pulse  Av.2  Min: 78  Max: 82   Respiration Range: Resp  Av.6  Min: 22  Max: 31   Current Pulse Ox: SpO2: 100 %   24HR Pulse Ox Range: SpO2  Av.3 %  Min: 93 %  Max: 100 %ASSESSMENT AND PLAN:        Results for Boyd Hernandez (MRN [id number]) as of 2020 12:49      2020 05:48   Procalcitonin: 0.07   CRP: 8.5 (H)   WBC: 6.1   RBC: 4.62   Hemoglobin Quant: 11.3 (L)   Hematocrit: 38.4 (L)   MCV: 83.1   MCH: 24.5 (L)   MCHC: 29.4   MPV: 9.8   RDW: 20.0 (H)   Platelet Count: 647   Platelet Estimate: NOT REPORTED   Absolute Mono #: 0.41   Eosinophils %: 5 (H)   Basophils Absolute: 0.05   Differential Type: NOT REPORTED   Seg Neutrophils: 66 (H)   Segs Absolute: 4.07   Lymphocytes: 21 (L)   Absolute Lymph #: 1.27   Monocytes: 7   Absolute Eos #: 0.31   Basophils: 1   Immature Granulocytes: 0   WBC Morphology: NOT REPORTED   RBC Morphology: ANISOCYTOSIS PRESENT   Absolute Immature Granulocyte: <0.03   NRBC Automated: 0.0             62 yo male with recent bihemispheric infarcts with difficulty clearing secretions effectively presented with AMS and atrial fibrillation, being treated for left lower lobe pneumonia.       NEUROLOGIC:   - CT head stable SDH   - History of recent right carotid stenting, continue aspirin 81 mg daily   - Neuro checks per protocol       CARDIOVASCULAR:   - Goal normotension   - Continue Norvasc 5 mg QD   - History of atrial fibrillation, continue sotalol 160 mg BID and Eliquis 5 mg BID   - s/p cardioversion likely resulting in elevated troponin - 57   - Echocardiogram: EF 45%   - Peripheral artery disease, continue Pletal 100 mg BID,Lipitor 40 mg QHS   - Continue telemetry       PULMONARY:   - Extubated 1/31   - Bipap prn   - Aggressive NT suctioning, continue Robinol 1 mg BID   - History of COPD; continue Duoneb q6h prn   - CXR: retrocardiac opacity left hemidiaphragm   - Family discussion regarding need for tracheostomy for airway protection       RENAL/FLUID/ELECTROLYTE:   - Normal renal function   - IVF: Total fluids @ 80 ml/hr   - Monitor urine output   - No need for daily labs       GI/NUTRITION:   NUTRITION:   DIET TUBE FEED CONTINUOUS/CYCLIC NPO; Diabetic; Gastrostomy; Continuous; 20; 70   - Bowel regimen: senna-s daily   - GI prophylaxis: Not indicated   - Daily BM       ID:   - Tmax 36.9   - Procalcitonin trending down, 0.25-0.16   - Lactic normal 0.8   -Continue Vancomycin and Zosyn empirically for likely aspiration pneumonia, Day 7/7   - Leukopenia resolved, WBC 6.6   - Continue to monitor for fevers   - Daily CBC       HEME:    - H&H 10.9/36.8   - Platelets 180   - Continue ferrous sulfate for iron deficiency anemia   - Daily CBC       ENDOCRINE:   - Continue to monitor blood glucose, goal <180   - Hemoglobin A1C 5.8   - Lantus 5 units QHS       OTHER:   - PT/OT/ST    - Code Status: Full   - Attempting to obtain records for OSH regarding previous esophageal imaging       PROPHYLAXIS:   Stress ulcer: Not indicated       DVT

## 2020-02-05 NOTE — PROGRESS NOTES
Family is here, including patients mother. Mother is requesting to talk with Critical Care NP Kimberly Saldana. Stated she talked with her on the phone earlier. While waiting for Kimberly Saldana to come to room,  talks with mother and family.

## 2020-02-05 NOTE — PROGRESS NOTES
Speech Language Pathology  Speech Language Pathology  [address] [facility]    Speech Language Treatment Note    Date: 2/5/2020  Patients Name: Sulema Florian  MRN: [id number]  Patient Active Problem List   Diagnosis Code   [facility] (subarachnoid hemorrhage) ([facility] [address] I60.9    Subarachnoid bleed ([facility] [address] I60.9    Traumatic subarachnoid hemorrhage with loss of consciousness of 1 hour to 5 hours 59 minutes ([facility] [address] S06. 6X3A    Carotid stenosis, right I65.21    Asymptomatic stenosis of left vertebral artery I65.02    Intracranial atherosclerosis I67.2    History of CEA (carotid endarterectomy) Z98.890    Ventilator dependence (HCC) Z99.11    Delirium tremens (HCC) F10.231    Chronic a-fib I48.20    On mechanically assisted ventilation (HCC) Z99.11    Encephalopathy G93.40    Acute ischemic multifocal anterior circulation stroke (HCC) I63.529    Palliative care encounter Z51.5    Subdural hematoma (HCC) S06.5X9A    Altered mental status R41.82    Subdural hygroma G96.0    SDH (subdural hematoma) (HCC) S06.5X9A    Septic shock (HCC) A41.9, R65.21    Pneumonia of both lower lobes due to infectious organism ([facility]) J18.1       Pain: 0/10    Speech and Language Treatment  Treatment time: 9:14-9:26    Subjective: [x] Alert [] Cooperative     [] Confused     [] Agitated    [] Lethargic      Objective/Assessment:  Auditory Comprehension:    Following Directions: 1/5 not increased despite maximal verbal and visual cues   Point to a Picture: 0/3 despite maximal verbal and visual cues    Verbal Expression:    Biographical Information: 0/5 despite yes/no choices and maximal verbal and visual cues      Plan:  [x] Continue  services    [] Discharge from :      Discharge recommendations: [] Inpatient Rehab   [] [place]   [] Outpatient Therapy  [] Follow up at trauma clinic   [x] Other: Further therapy recommended at discharge.       Treatment completed by: Completed by: Yanick Reynolds  Clinician    Cosigned By: Evon Cuevas. S.CCC/SLP

## 2020-02-05 NOTE — PROGRESS NOTES
Daily Progress Note  Neuro Critical Care    Patient Name: Tamika Beckman  Patient : 1960  Room/Bed: 5723/3625-04  Code Status: Full  Allergies: No Known Allergies    CHIEF COMPLAINT:     Respiratory distress     INTERVAL HISTORY    Initial Presentation (Admitted ): The patient is a 62 yo male history of atrial fibrillation on Eliquis, bilateral ICA stenosis (right more than left), DM2, HLD, HTN, CAD s/p PCI stents, PVD, CEA, and ETOH abuse with recent admission for traumatic subarachnoid and symptomatic right ICA stenosis s/p stent placement, bihemispheric strokes, and PEG placement who was discharged to [facility] on  who presents as a transfer from [facility] for altered mental status and respiratory distress. Per records, patient was found unresponsive by staff at the SNF with a GCS of 4 and intubated at the outlying facility. He was in atrial fibrillation with RVR and cardioverted prior to transfer to [facility] for direct admission to the Neuro ICU. Hospital Course:   : Febrile and hypotensive. Levophed started along with broad spectrum antibiotics for concerns of sepsis. CT chest revealed left lung consolidation. : Liver US: borderline hepatomegaly, otherwise unremarkable  : Aspirin and Eliquis resumed. : Extubated  : Increased oral secretions, requiring BiPap. robinol started with improvement. : Ferrous sulfate started for iron deficiency anemia. 2/3: Stable  : Discussed possible need for trach with family vs other options including code status. I have spoken to patient's POA, mother- Joanna Colbert and siblings who will discuss among themselves options and let us know. Last 24h:   Overnight, patient required nearly hourly NT suctioning and Bipap to maintain oxygen saturations. Discussed course of care, concern for ability to protect airway, need for Bipap ventilation and frequent suctioning with family.  They are agreeable to pursuing a tracheostomy, bihemispheric infarcts with difficulty clearing secretions effectively presented with AMS and atrial fibrillation, being treated for left lower lobe pneumonia.     NEUROLOGIC:  - CT head stable SDH  - History of recent right carotid stenting, continue aspirin 81 mg daily, may need to hold - awaiting to hear from surgery  - Neuro checks per protocol    CARDIOVASCULAR:  - Goal normotension  - Continue Norvasc 5 mg QD  - History of atrial fibrillation, continue sotalol 160 mg BID   - Hold Eliquis and start Heparin infusion this evening in anticipation of surgery  - s/p cardioversion likely resulting in elevated troponin - 57  - Echocardiogram: EF 45%  - Peripheral artery disease, continue Pletal 100 mg BID,Lipitor 40 mg QHS  - Continue telemetry    PULMONARY:  - Extubated 1/31  - Bipap prn  - Aggressive NT suctioning, continue Robinol 1 mg BID  - History of COPD; continue Duoneb q6h prn  - CXR: retrocardiac opacity left hemidiaphragm  - General surgery consulted for tracheostomy    RENAL/FLUID/ELECTROLYTE:  - Normal renal function  - IVF: Total fluids @ 80 ml/hr  - Monitor urine output  - No need for daily labs    GI/NUTRITION:  NUTRITION:  DIET TUBE FEED CONTINUOUS/CYCLIC NPO; Diabetic; Gastrostomy; Continuous; 20; 70  - Bowel regimen: senna-s daily  - GI prophylaxis: Not indicated  - Daily BM    ID:  - Tmax 36.7  -Continue Vancomycin and Zosyn empirically for likely aspiration pneumonia, course completed  - Leukopenia resolved  - Continue to monitor for fevers    HEME:   - Continue ferrous sulfate for iron deficiency anemia    ENDOCRINE:  - Continue to monitor blood glucose, goal <180  - Hemoglobin A1C 5.8  - Lantus 5 units QHS    OTHER:  - PT/OT/ST   - Code Status: Full  - Attempting to obtain records for OSH regarding previous esophageal imaging    PROPHYLAXIS:  Stress ulcer: Not indicated    DVT PROPHYLAXIS:  - SCD sleeves - Thigh High   - Heparin infusion    DISPOSITION:  [x] OK for out of ICU from Neuro Critical

## 2020-02-06 ENCOUNTER — ANESTHESIA EVENT (OUTPATIENT)
Dept: OPERATING ROOM | Age: 60
DRG: 005 | End: 2020-02-06
Payer: MEDICAID

## 2020-02-06 LAB
GLUCOSE BLD-MCNC: 190 MG/DL (ref 75–110)
GLUCOSE BLD-MCNC: 220 MG/DL (ref 75–110)
GLUCOSE BLD-MCNC: 235 MG/DL (ref 75–110)
GLUCOSE BLD-MCNC: 262 MG/DL (ref 75–110)
PARTIAL THROMBOPLASTIN TIME: 20.4 SEC (ref 20.5–30.5)
PARTIAL THROMBOPLASTIN TIME: 37.4 SEC (ref 20.5–30.5)
PARTIAL THROMBOPLASTIN TIME: 58.2 SEC (ref 20.5–30.5)
PARTIAL THROMBOPLASTIN TIME: 60.7 SEC (ref 20.5–30.5)
PARTIAL THROMBOPLASTIN TIME: 63 SEC (ref 20.5–30.5)

## 2020-02-06 PROCEDURE — 99233 SBSQ HOSP IP/OBS HIGH 50: CPT | Performed by: PSYCHIATRY & NEUROLOGY

## 2020-02-06 PROCEDURE — 2580000003 HC RX 258: Performed by: STUDENT IN AN ORGANIZED HEALTH CARE EDUCATION/TRAINING PROGRAM

## 2020-02-06 PROCEDURE — 97530 THERAPEUTIC ACTIVITIES: CPT

## 2020-02-06 PROCEDURE — 2060000000 HC ICU INTERMEDIATE R&B

## 2020-02-06 PROCEDURE — 85730 THROMBOPLASTIN TIME PARTIAL: CPT

## 2020-02-06 PROCEDURE — 6370000000 HC RX 637 (ALT 250 FOR IP): Performed by: STUDENT IN AN ORGANIZED HEALTH CARE EDUCATION/TRAINING PROGRAM

## 2020-02-06 PROCEDURE — 97110 THERAPEUTIC EXERCISES: CPT

## 2020-02-06 PROCEDURE — 82947 ASSAY GLUCOSE BLOOD QUANT: CPT

## 2020-02-06 PROCEDURE — 6360000002 HC RX W HCPCS: Performed by: PSYCHIATRY & NEUROLOGY

## 2020-02-06 PROCEDURE — 6370000000 HC RX 637 (ALT 250 FOR IP): Performed by: PSYCHIATRY & NEUROLOGY

## 2020-02-06 PROCEDURE — 6370000000 HC RX 637 (ALT 250 FOR IP): Performed by: NURSE PRACTITIONER

## 2020-02-06 PROCEDURE — 94660 CPAP INITIATION&MGMT: CPT

## 2020-02-06 PROCEDURE — 36415 COLL VENOUS BLD VENIPUNCTURE: CPT

## 2020-02-06 PROCEDURE — 97535 SELF CARE MNGMENT TRAINING: CPT

## 2020-02-06 RX ORDER — NICOTINE POLACRILEX 4 MG
15 LOZENGE BUCCAL PRN
Status: DISCONTINUED | OUTPATIENT
Start: 2020-02-06 | End: 2020-02-12

## 2020-02-06 RX ORDER — GLYCOPYRROLATE 1 MG/1
1 TABLET ORAL 2 TIMES DAILY PRN
Status: DISCONTINUED | OUTPATIENT
Start: 2020-02-06 | End: 2020-02-12

## 2020-02-06 RX ORDER — DEXTROSE MONOHYDRATE 25 G/50ML
12.5 INJECTION, SOLUTION INTRAVENOUS PRN
Status: DISCONTINUED | OUTPATIENT
Start: 2020-02-06 | End: 2020-02-12

## 2020-02-06 RX ORDER — DEXTROSE MONOHYDRATE 50 MG/ML
100 INJECTION, SOLUTION INTRAVENOUS PRN
Status: DISCONTINUED | OUTPATIENT
Start: 2020-02-06 | End: 2020-02-12

## 2020-02-06 RX ADMIN — HEPARIN SODIUM AND DEXTROSE 24 UNITS/KG/HR: 10000; 5 INJECTION INTRAVENOUS at 16:00

## 2020-02-06 RX ADMIN — ANTI-FUNGAL POWDER MICONAZOLE NITRATE TALC FREE: 1.42 POWDER TOPICAL at 09:48

## 2020-02-06 RX ADMIN — INSULIN LISPRO 2 UNITS: 100 INJECTION, SOLUTION INTRAVENOUS; SUBCUTANEOUS at 12:53

## 2020-02-06 RX ADMIN — INSULIN LISPRO 2 UNITS: 100 INJECTION, SOLUTION INTRAVENOUS; SUBCUTANEOUS at 18:13

## 2020-02-06 RX ADMIN — INSULIN GLARGINE 5 UNITS: 100 INJECTION, SOLUTION SUBCUTANEOUS at 21:38

## 2020-02-06 RX ADMIN — ANTI-FUNGAL POWDER MICONAZOLE NITRATE TALC FREE: 1.42 POWDER TOPICAL at 21:41

## 2020-02-06 RX ADMIN — GLYCOPYRROLATE 1 MG: 1 TABLET ORAL at 09:47

## 2020-02-06 RX ADMIN — SOTALOL HYDROCHLORIDE 160 MG: 80 TABLET ORAL at 09:47

## 2020-02-06 RX ADMIN — DESMOPRESSIN ACETATE 40 MG: 0.2 TABLET ORAL at 21:26

## 2020-02-06 RX ADMIN — GABAPENTIN 300 MG: 300 CAPSULE ORAL at 18:13

## 2020-02-06 RX ADMIN — GABAPENTIN 300 MG: 300 CAPSULE ORAL at 09:47

## 2020-02-06 RX ADMIN — SENNOSIDES AND DOCUSATE SODIUM 2 TABLET: 8.6; 5 TABLET ORAL at 09:47

## 2020-02-06 RX ADMIN — FOLIC ACID 1 MG: 1 TABLET ORAL at 09:47

## 2020-02-06 RX ADMIN — SODIUM CHLORIDE, PRESERVATIVE FREE 10 ML: 5 INJECTION INTRAVENOUS at 09:48

## 2020-02-06 RX ADMIN — SOTALOL HYDROCHLORIDE 160 MG: 80 TABLET ORAL at 21:26

## 2020-02-06 RX ADMIN — FERROUS SULFATE TAB EC 325 MG (65 MG FE EQUIVALENT) 325 MG: 325 (65 FE) TABLET DELAYED RESPONSE at 09:47

## 2020-02-06 RX ADMIN — INSULIN LISPRO 1 UNITS: 100 INJECTION, SOLUTION INTRAVENOUS; SUBCUTANEOUS at 21:38

## 2020-02-06 RX ADMIN — AMLODIPINE BESYLATE 5 MG: 5 TABLET ORAL at 09:47

## 2020-02-06 RX ADMIN — DULOXETINE HYDROCHLORIDE 60 MG: 30 CAPSULE, DELAYED RELEASE ORAL at 21:26

## 2020-02-06 ASSESSMENT — PAIN SCALES - WONG BAKER

## 2020-02-06 ASSESSMENT — PAIN SCALES - GENERAL
PAINLEVEL_OUTOF10: 0

## 2020-02-06 NOTE — PROGRESS NOTES
General Surgery Progress Note            PATIENT NAME: Yumiko Lucia     TODAY'S DATE: 2/6/2020, 7:40 AM    SUBJECTIVE:    Pt seen and examined. Afebrile. Vitals stable. BiPAP and suctioning required. No acute issues. On heparin gtt. OBJECTIVE:   VITALS:  /79   Pulse 81   Temp 98.3 °F (36.8 °C) (Axillary)   Resp 20   Ht 5' 11\" (1.803 m)   Wt 157 lb (71.2 kg)   SpO2 98%   BMI 21.90 kg/m²      INTAKE/OUTPUT:      Intake/Output Summary (Last 24 hours) at 2/6/2020 0740  Last data filed at 2/6/2020 0533  Gross per 24 hour   Intake 150 ml   Output 1450 ml   Net -1300 ml       PHYSICAL EXAM  GEN: not following commands, NAD  HEENT: moist mucous membranes  CV: S1/S2  LUNG: no respiratory distress, no audible wheezing  ABDOMEN: soft, non-distended, 20Fr ponsky PEG in place  EXTREMITIES: No edema, palpable distal pulses     Data:  CBC:   Lab Results   Component Value Date    WBC 8.0 02/05/2020    RBC 4.69 02/05/2020    HGB 11.6 02/05/2020    HCT 39.0 02/05/2020    MCV 83.2 02/05/2020    MCH 24.7 02/05/2020    MCHC 29.7 02/05/2020    RDW 20.0 02/05/2020     02/05/2020    MPV 9.4 02/05/2020     BMP:    Lab Results   Component Value Date     01/31/2020    K 3.9 01/31/2020     01/31/2020    CO2 22 01/31/2020    BUN 9 02/03/2020    LABALBU 2.9 02/02/2020    CREATININE 0.27 02/03/2020    CALCIUM 8.7 01/31/2020    GFRAA >60 02/03/2020    LABGLOM >60 02/03/2020    GLUCOSE 98 01/31/2020       Radiology Review:    n/a    ASSESSMENT   1. Inability to manage secretions secondary to recent CVA causing frequent aspiration and most recently admission to hospital for aspiration pneumonia. Plan  1. Continue to hold eliquis and ASA  2. Okay for heparin Gtt in the interim. Plan to hold 4 hours prior to OR. Will give definitive time for case today. Planning for first start tomorrow am at 7:30-8am.  3. NPO/hold tube feeds tonight at midnight. 4. Will obtain consent.          Electronically signed by Suri Lees DO  on 2/6/2020 at 7:40 AM     I have reviewed the resident's note and I either performed the key elements of the medical history and physical exam or was present with the resident when the key elements of the medical history and physical exam were performed. I have discussed the findings, established the care plan and recommendations with Resident.     Electronically signed by Frannie Robins IV, DO on 2/12/20 at 7:58 AM

## 2020-02-06 NOTE — CONSULTS
laryngeal prominence and tracheal rings due to thin neck; no abnormalities   LUNGS: no respiratory distress, no audible wheezing  CARDIOVASCULAR: +S1/S2  ABDOMEN: non-distended, 20Fr ponsky PEG in place   MUSCULOSKELETAL: normal muscle tone throughout  NEURO: moving extremities spontaneously x4, PERRLA   EXTREMITIES: peripheral pulses normal, no pedal edema  SKIN: normal coloration and turgor      CBC:   Lab Results   Component Value Date    WBC 8.0 02/05/2020    RBC 4.69 02/05/2020    HGB 11.6 02/05/2020    HCT 39.0 02/05/2020    MCV 83.2 02/05/2020    MCH 24.7 02/05/2020    MCHC 29.7 02/05/2020    RDW 20.0 02/05/2020     02/05/2020    MPV 9.4 02/05/2020     BMP:    Lab Results   Component Value Date     01/31/2020    K 3.9 01/31/2020     01/31/2020    CO2 22 01/31/2020    BUN 9 02/03/2020    LABALBU 2.9 02/02/2020    CREATININE 0.27 02/03/2020    CALCIUM 8.7 01/31/2020    GFRAA >60 02/03/2020    LABGLOM >60 02/03/2020    GLUCOSE 98 01/31/2020       Pertinent Radiology:     Mri Brain Wo Contrast  Result Date: 1/9/2020  Stable left subdural fluid collection and minimal rightward midline shift. Scattered FLAIR signal abnormalities and restricted diffusion within the frontal, parietal, and temporal lobes bilaterally consistent with ischemia. Bilateral subarachnoid hemorrhage and intraventricular hemorrhagic products as described above. This is similar compared to prior exam.         ASSESSMENT     B/l hemispheric infarcts w/ inability to adequately manage secretions due to dysphagia. Recently tx for LLL aspiration pneumonia       PLAN    1. Discussed with primary team today and Eliquis has been held. Will need to hold for 2 days. Would also request that any additional ASA be held prior to surgery. Heparin gtt initiated for [facility] per primary team. Will be able to hold 4 hours prior to OR. 2. Plan for OR Friday morning for tracheostomy tube placement.  Hold tube feeds starting at midnight tomorrow. 3. Will need to obtain consent from appropriate family member or POA. 4. Medical management per primary team. Will follow with you.      ------------------------------------------------------------------  Agata Shea DO, PGY-4  Department of [address] [place] [address].  2/5/2020     I have reviewed the resident's note and I either performed the key elements of the medical history and physical exam or was present with the resident when the key elements of the medical history and physical exam were performed. I have discussed the findings, established the care plan and recommendations with Resident.     Electronically signed by Gail Robins IV, DO on 2/12/20 at 8:02 AM

## 2020-02-06 NOTE — PROGRESS NOTES
suction and biPAP to maintain O2 sats. General surgery consulted for trach. Eliquis held, heparin infusion started. Last 24h:   Per RN, patient tolerated biPAP well overnight. No acute events. Remains afebrile without leukocytosis. Clinical exam stable. General surgery planning trach tomorrow 20. Continue on heparin infusion for now.     CURRENT MEDICATIONS:  SCHEDULED MEDICATIONS:   miconazole   Topical BID    sennosides-docusate sodium  2 tablet Oral Daily    [Held by provider] aspirin  81 mg Oral Daily    ferrous sulfate  325 mg Oral Daily with breakfast    glycopyrrolate  1 mg Oral BID    amLODIPine  5 mg Oral Daily    atorvastatin  40 mg Oral Nightly    [Held by provider] cilostazol  100 mg Oral BID    DULoxetine  60 mg Oral Nightly    folic acid  1 mg Oral Daily    gabapentin  300 mg Oral BID    insulin glargine  5 Units Subcutaneous Nightly    sotalol  160 mg Oral BID    sodium chloride flush  10 mL Intravenous 2 times per day     CONTINUOUS INFUSIONS:   heparin (porcine) 24 Units/kg/hr (20 0650)     PRN MEDICATIONS:   heparin (porcine), heparin (porcine), ipratropium-albuterol, sodium chloride flush, acetaminophen, ondansetron    VITALS:  Temperature Range: Temp: 98.5 °F (36.9 °C) Temp  Av.6 °F (36.4 °C)  Min: 96.7 °F (35.9 °C)  Max: 98.7 °F (37.1 °C)  BP Range: Systolic (25FWX), LKH:698 , Min:107 , BFU:134     Diastolic (43VVK), LYJ:24, Min:78, Max:96    Pulse Range: Pulse  Av.4  Min: 76  Max: 105  Respiration Range: Resp  Av  Min: 19  Max: 36  Current Pulse Ox: SpO2: 100 %  24HR Pulse Ox Range: SpO2  Av.4 %  Min: 89 %  Max: 100 %  Patient Vitals for the past 12 hrs:   BP Temp Temp src Pulse Resp SpO2   20 0400 116/78 98.5 °F (36.9 °C) Axillary 76 19 100 %   20 0326 -- -- -- -- 22 --   20 0000 (!) 162/91 96.8 °F (36 °C) Oral 79 19 100 %   20 2326 -- -- -- -- 22 --   20 -- -- -- -- 24 --   20 (!) 140/96 96.7

## 2020-02-06 NOTE — PROGRESS NOTES
Physical Therapy  Facility/Department: [facility] NEURO  Daily Treatment Note  NAME: Melanie Edmonds  : 1960  MRN: [id number]    Date of Service: 2020    Discharge Recommendations:  Patient would benefit from continued therapy after discharge   PT Equipment Recommendations  Equipment Needed: No    Assessment   Body structures, Functions, Activity limitations: Decreased functional mobility ; Decreased ROM; Decreased strength;Decreased safe awareness;Decreased balance;Decreased endurance  Assessment: Pt sat EOB x 6 min with MAX A, due to poor seated balance. Pt will require continued PT after dc. Prognosis: Good  REQUIRES PT FOLLOW UP: Yes  Activity Tolerance  Activity Tolerance: Patient limited by cognitive status; Patient limited by endurance     Patient Diagnosis(es): The encounter diagnosis was SDH (subdural hematoma) ([facility] [address] has a past medical history of Alcohol abuse, Atrial fibrillation ([facility] [address] Diabetes ([facility] [address] Gastritis, Hyperlipidemia, Hypertension, Left ventricular hypertrophy, Neuropathy, and Renal cyst.   has a past surgical history that includes Coronary angioplasty with stent (); angioplasty (); Carotid stent placement (2019); Gastrostomy tube placement (2020); and Upper gastrointestinal endoscopy (N/A, 2020).     Restrictions  Restrictions/Precautions  Restrictions/Precautions: Weight Bearing, General Precautions, Fall Risk, Seizure  Required Braces or Orthoses?: Yes  Upper Extremity Weight Bearing Restrictions  Right Upper Extremity Weight Bearing: Non Weight Bearing  Left Upper Extremity Weight Bearing: Non Weight Bearing  Other: NWB bilat wrists per ortho recommendations from 20 s/p fall  Required Braces or Orthoses  Left Upper Extremity Brace/Splint: (splinted per orthosx)  Position Activity Restriction  Other position/activity restrictions: Keep MAP>65  Subjective   General  Response To Previous Treatment: Patient unable to report, no changes reported from family

## 2020-02-06 NOTE — PROGRESS NOTES
Occupational Therapy  Facility/Department: [facility] NEURO  Daily Treatment Note  NAME: Sulema Florian  : 1960  MRN: [id number]    Date of Service: 2020    Discharge Recommendations:  Patient would benefit from continued therapy after discharge   Assessment   Performance deficits / Impairments: Decreased functional mobility ; Decreased endurance;Decreased balance;Decreased ADL status; Decreased strength;Decreased ROM; Decreased high-level IADLs;Decreased cognition;Decreased safe awareness;Decreased fine motor control;Decreased coordination  Patient Education: OT POC, ADL, transfer safety - poor return   REQUIRES OT FOLLOW UP: Yes  Activity Tolerance  Activity Tolerance: Patient limited by fatigue  Safety Devices  Safety Devices in place: Yes  Type of devices: Call light within reach;Nurse notified; Left in bed(Pt left with PTA providing care. )         Patient Diagnosis(es): The encounter diagnosis was SDH (subdural hematoma) ([facility] [address] has a past medical history of Alcohol abuse, Atrial fibrillation ([facility] [address] Diabetes ([facility] [address] Gastritis, Hyperlipidemia, Hypertension, Left ventricular hypertrophy, Neuropathy, and Renal cyst.   has a past surgical history that includes Coronary angioplasty with stent (); angioplasty (); Carotid stent placement (2019); Gastrostomy tube placement (2020); and Upper gastrointestinal endoscopy (N/A, 2020).     Restrictions  Restrictions/Precautions  Restrictions/Precautions: Weight Bearing, General Precautions, Fall Risk, Seizure  Required Braces or Orthoses?: Yes  Upper Extremity Weight Bearing Restrictions  Right Upper Extremity Weight Bearing: Non Weight Bearing  Left Upper Extremity Weight Bearing: Non Weight Bearing  Other: NWB bilat wrists per ortho recommendations from 20 s/p fall  Required Braces or Orthoses  Left Upper Extremity Brace/Splint: (splinted per orthosx)  Position Activity Restriction  Other position/activity restrictions: Keep MAP>65  Subjective   General  Chart Reviewed: Orders, Progress Notes, History and Physical, Imaging, Labs, Previous Admission  Patient assessed for rehabilitation services?: Yes  Family / Caregiver Present: No  General Comment  Vital Signs  Patient Currently in Pain: Denies(Unable to assess d/t pt nonverbal. Pain assessment faces: 2, Hurts a little bit )   Orientation  Orientation  Overall Orientation Status: (Unable to assess. Pt responds to name by making eye contact. Pt demo flat affect but able to shake head yes/no)  Objective    ADL  Grooming: Maximum assistance;Verbal cueing; Increased time to complete(Hand-over-hand to bring washcloth to chest with RUE. Pt unable to wash face d/t total face BiPAP mask )        Balance  Sitting Balance: Maximum assistance(Pt sat on EOB ~ 6 min requiring max A. Pt demo strong posterior L lean. )  Bed mobility  Supine to Sit: 2 Person assistance;Maximum assistance(HOB elevated)  Sit to Supine: 2 Person assistance;Maximum assistance  Scooting: Maximal assistance   Cognition  Overall Cognitive Status: Exceptions  Arousal/Alertness: Delayed responses to stimuli;Inconsistent responses to stimuli  Following Commands: Follows one step commands with increased time; Follows one step commands with repetition  Initiation: Requires cues for all  Sequencing: Requires cues for all  Cognition Comment: Pt not verbally responding on this date. Plan   Plan  Times per week: 3-4x/wk  Current Treatment Recommendations: Balance Training, Functional Mobility Training, Endurance Training, Safety Education & Training, Patient/Caregiver Education & Training, Equipment Evaluation, Education, & procurement, Home Management Training, Self-Care / ADL, Cognitive Reorientation, Strengthening, ROM  Goals  Short term goals  Time Frame for Short term goals: By discharge pt will. ..   Short term goal 1: demo supine<>sit with max Ax2  Short term goal 2: complete simple grooming task with set up and mod A  Short term goal 3: demo 10 minutes EOB sitting balance with mod A  Short term goal 4: tolerate AAROM with bilateral UEs to increase participation in ADLs   Short term goal 5: progress mobility as medically able       Therapy Time   Individual Concurrent Group Co-treatment   Time In 0830      co-tx with PTA  (10 min)    Time Out 0854         Minutes 24min - 10 min =14 min              Pt in bed working with PTA upon arrival. Pt unable to verbally respond on this date but shook/nodded head 1-2 times when asked questions. See above for LOF. Pt retired to bed with PTA at end of session.      MINA Ames/ALINE Mcghee/EZEKIEL

## 2020-02-07 ENCOUNTER — APPOINTMENT (OUTPATIENT)
Dept: GENERAL RADIOLOGY | Age: 60
DRG: 005 | End: 2020-02-07
Payer: MEDICAID

## 2020-02-07 ENCOUNTER — ANESTHESIA (OUTPATIENT)
Dept: OPERATING ROOM | Age: 60
DRG: 005 | End: 2020-02-07
Payer: MEDICAID

## 2020-02-07 VITALS — OXYGEN SATURATION: 100 % | SYSTOLIC BLOOD PRESSURE: 102 MMHG | DIASTOLIC BLOOD PRESSURE: 69 MMHG | TEMPERATURE: 98.3 F

## 2020-02-07 LAB
ABSOLUTE EOS #: 0.23 K/UL (ref 0–0.4)
ABSOLUTE IMMATURE GRANULOCYTE: 0 K/UL (ref 0–0.3)
ABSOLUTE LYMPH #: 0.98 K/UL (ref 1–4.8)
ABSOLUTE MONO #: 0.3 K/UL (ref 0.1–0.8)
ALLEN TEST: ABNORMAL
ANION GAP SERPL CALCULATED.3IONS-SCNC: 12 MMOL/L (ref 9–17)
BASOPHILS # BLD: 0 % (ref 0–2)
BASOPHILS ABSOLUTE: 0 K/UL (ref 0–0.2)
BUN BLDV-MCNC: 17 MG/DL (ref 6–20)
BUN/CREAT BLD: ABNORMAL (ref 9–20)
CALCIUM SERPL-MCNC: 9.3 MG/DL (ref 8.6–10.4)
CARBOXYHEMOGLOBIN: 1 % (ref 0–5)
CHLORIDE BLD-SCNC: 97 MMOL/L (ref 98–107)
CO2: 27 MMOL/L (ref 20–31)
CREAT SERPL-MCNC: 0.27 MG/DL (ref 0.7–1.2)
DIFFERENTIAL TYPE: ABNORMAL
EOSINOPHILS RELATIVE PERCENT: 3 % (ref 1–4)
FIO2: ABNORMAL
GFR AFRICAN AMERICAN: >60 ML/MIN
GFR NON-AFRICAN AMERICAN: >60 ML/MIN
GFR SERPL CREATININE-BSD FRML MDRD: ABNORMAL ML/MIN/{1.73_M2}
GFR SERPL CREATININE-BSD FRML MDRD: ABNORMAL ML/MIN/{1.73_M2}
GLUCOSE BLD-MCNC: 119 MG/DL (ref 75–110)
GLUCOSE BLD-MCNC: 120 MG/DL (ref 75–110)
GLUCOSE BLD-MCNC: 173 MG/DL (ref 75–110)
GLUCOSE BLD-MCNC: 176 MG/DL (ref 75–110)
GLUCOSE BLD-MCNC: 204 MG/DL (ref 70–99)
HCO3 VENOUS: 30.1 MMOL/L (ref 24–30)
HCT VFR BLD CALC: 37.6 % (ref 40.7–50.3)
HEMOGLOBIN: 11.1 G/DL (ref 13–17)
IMMATURE GRANULOCYTES: 0 %
LYMPHOCYTES # BLD: 13 % (ref 24–44)
MCH RBC QN AUTO: 24.3 PG (ref 25.2–33.5)
MCHC RBC AUTO-ENTMCNC: 29.5 G/DL (ref 28.4–34.8)
MCV RBC AUTO: 82.3 FL (ref 82.6–102.9)
METHEMOGLOBIN: ABNORMAL % (ref 0–1.5)
MODE: ABNORMAL
MONOCYTES # BLD: 4 % (ref 1–7)
MORPHOLOGY: ABNORMAL
MORPHOLOGY: ABNORMAL
NEGATIVE BASE EXCESS, VEN: ABNORMAL MMOL/L (ref 0–2)
NOTIFICATION TIME: ABNORMAL
NOTIFICATION: ABNORMAL
NRBC AUTOMATED: 0 PER 100 WBC
O2 DEVICE/FLOW/%: ABNORMAL
O2 SAT, VEN: 73.7 % (ref 60–85)
OXYHEMOGLOBIN: ABNORMAL % (ref 95–98)
PARTIAL THROMBOPLASTIN TIME: 24.5 SEC (ref 20.5–30.5)
PARTIAL THROMBOPLASTIN TIME: 25.5 SEC (ref 20.5–30.5)
PARTIAL THROMBOPLASTIN TIME: 66.7 SEC (ref 20.5–30.5)
PATIENT TEMP: 37
PCO2, VEN, TEMP ADJ: ABNORMAL MMHG (ref 39–55)
PCO2, VEN: 45.3 (ref 39–55)
PDW BLD-RTO: 20.1 % (ref 11.8–14.4)
PEEP/CPAP: ABNORMAL
PH VENOUS: 7.44 (ref 7.32–7.42)
PH, VEN, TEMP ADJ: ABNORMAL (ref 7.32–7.42)
PLATELET # BLD: 302 K/UL (ref 138–453)
PLATELET ESTIMATE: ABNORMAL
PMV BLD AUTO: 9.2 FL (ref 8.1–13.5)
PO2, VEN, TEMP ADJ: ABNORMAL MMHG (ref 30–50)
PO2, VEN: 44 (ref 30–50)
POSITIVE BASE EXCESS, VEN: 5.6 MMOL/L (ref 0–2)
POTASSIUM SERPL-SCNC: 4.5 MMOL/L (ref 3.7–5.3)
PSV: ABNORMAL
PT. POSITION: ABNORMAL
RBC # BLD: 4.57 M/UL (ref 4.21–5.77)
RBC # BLD: ABNORMAL 10*6/UL
RESPIRATORY RATE: ABNORMAL
SAMPLE SITE: ABNORMAL
SEG NEUTROPHILS: 80 % (ref 36–66)
SEGMENTED NEUTROPHILS ABSOLUTE COUNT: 5.99 K/UL (ref 1.8–7.7)
SET RATE: ABNORMAL
SODIUM BLD-SCNC: 136 MMOL/L (ref 135–144)
TEXT FOR RESPIRATORY: ABNORMAL
TOTAL HB: ABNORMAL G/DL (ref 12–16)
TOTAL RATE: ABNORMAL
VT: ABNORMAL
WBC # BLD: 7.5 K/UL (ref 3.5–11.3)
WBC # BLD: ABNORMAL 10*3/UL

## 2020-02-07 PROCEDURE — 36415 COLL VENOUS BLD VENIPUNCTURE: CPT

## 2020-02-07 PROCEDURE — 2709999900 HC NON-CHARGEABLE SUPPLY: Performed by: SURGERY

## 2020-02-07 PROCEDURE — 2000000003 HC NEURO ICU R&B

## 2020-02-07 PROCEDURE — 2580000003 HC RX 258

## 2020-02-07 PROCEDURE — 6370000000 HC RX 637 (ALT 250 FOR IP): Performed by: STUDENT IN AN ORGANIZED HEALTH CARE EDUCATION/TRAINING PROGRAM

## 2020-02-07 PROCEDURE — 73110 X-RAY EXAM OF WRIST: CPT

## 2020-02-07 PROCEDURE — 6370000000 HC RX 637 (ALT 250 FOR IP): Performed by: SURGERY

## 2020-02-07 PROCEDURE — 6360000002 HC RX W HCPCS: Performed by: STUDENT IN AN ORGANIZED HEALTH CARE EDUCATION/TRAINING PROGRAM

## 2020-02-07 PROCEDURE — 2500000003 HC RX 250 WO HCPCS: Performed by: STUDENT IN AN ORGANIZED HEALTH CARE EDUCATION/TRAINING PROGRAM

## 2020-02-07 PROCEDURE — 6370000000 HC RX 637 (ALT 250 FOR IP): Performed by: NURSE PRACTITIONER

## 2020-02-07 PROCEDURE — 80048 BASIC METABOLIC PNL TOTAL CA: CPT

## 2020-02-07 PROCEDURE — 85025 COMPLETE CBC W/AUTO DIFF WBC: CPT

## 2020-02-07 PROCEDURE — 82805 BLOOD GASES W/O2 SATURATION: CPT

## 2020-02-07 PROCEDURE — 85730 THROMBOPLASTIN TIME PARTIAL: CPT

## 2020-02-07 PROCEDURE — 99233 SBSQ HOSP IP/OBS HIGH 50: CPT | Performed by: PSYCHIATRY & NEUROLOGY

## 2020-02-07 PROCEDURE — 94660 CPAP INITIATION&MGMT: CPT

## 2020-02-07 PROCEDURE — 6360000002 HC RX W HCPCS

## 2020-02-07 PROCEDURE — 3700000000 HC ANESTHESIA ATTENDED CARE: Performed by: SURGERY

## 2020-02-07 PROCEDURE — 2580000003 HC RX 258: Performed by: SURGERY

## 2020-02-07 PROCEDURE — 3600000014 HC SURGERY LEVEL 4 ADDTL 15MIN: Performed by: SURGERY

## 2020-02-07 PROCEDURE — 73130 X-RAY EXAM OF HAND: CPT

## 2020-02-07 PROCEDURE — 2580000003 HC RX 258: Performed by: STUDENT IN AN ORGANIZED HEALTH CARE EDUCATION/TRAINING PROGRAM

## 2020-02-07 PROCEDURE — 3600000004 HC SURGERY LEVEL 4 BASE: Performed by: SURGERY

## 2020-02-07 PROCEDURE — 2500000003 HC RX 250 WO HCPCS

## 2020-02-07 PROCEDURE — 7100000001 HC PACU RECOVERY - ADDTL 15 MIN: Performed by: SURGERY

## 2020-02-07 PROCEDURE — 97110 THERAPEUTIC EXERCISES: CPT

## 2020-02-07 PROCEDURE — 0B110F4 BYPASS TRACHEA TO CUTANEOUS WITH TRACHEOSTOMY DEVICE, OPEN APPROACH: ICD-10-PCS | Performed by: SURGERY

## 2020-02-07 PROCEDURE — 3700000001 HC ADD 15 MINUTES (ANESTHESIA): Performed by: SURGERY

## 2020-02-07 PROCEDURE — 94761 N-INVAS EAR/PLS OXIMETRY MLT: CPT

## 2020-02-07 PROCEDURE — APPNB45 APP NON BILLABLE 31-45 MINUTES: Performed by: NURSE PRACTITIONER

## 2020-02-07 PROCEDURE — 94003 VENT MGMT INPAT SUBQ DAY: CPT

## 2020-02-07 PROCEDURE — 82947 ASSAY GLUCOSE BLOOD QUANT: CPT

## 2020-02-07 PROCEDURE — 7100000000 HC PACU RECOVERY - FIRST 15 MIN: Performed by: SURGERY

## 2020-02-07 PROCEDURE — 71045 X-RAY EXAM CHEST 1 VIEW: CPT

## 2020-02-07 PROCEDURE — 94770 HC ETCO2 MONITOR DAILY: CPT

## 2020-02-07 RX ORDER — FENTANYL CITRATE 50 UG/ML
25 INJECTION, SOLUTION INTRAMUSCULAR; INTRAVENOUS
Status: DISCONTINUED | OUTPATIENT
Start: 2020-02-07 | End: 2020-02-13 | Stop reason: HOSPADM

## 2020-02-07 RX ORDER — GLYCOPYRROLATE 1 MG/5 ML
SYRINGE (ML) INTRAVENOUS PRN
Status: DISCONTINUED | OUTPATIENT
Start: 2020-02-07 | End: 2020-02-07 | Stop reason: SDUPTHER

## 2020-02-07 RX ORDER — LIDOCAINE HYDROCHLORIDE 10 MG/ML
INJECTION, SOLUTION EPIDURAL; INFILTRATION; INTRACAUDAL; PERINEURAL PRN
Status: DISCONTINUED | OUTPATIENT
Start: 2020-02-07 | End: 2020-02-07 | Stop reason: SDUPTHER

## 2020-02-07 RX ORDER — HEPARIN SODIUM 10000 [USP'U]/100ML
18 INJECTION, SOLUTION INTRAVENOUS CONTINUOUS
Status: DISCONTINUED | OUTPATIENT
Start: 2020-02-07 | End: 2020-02-07

## 2020-02-07 RX ORDER — PROPOFOL 10 MG/ML
INJECTION, EMULSION INTRAVENOUS PRN
Status: DISCONTINUED | OUTPATIENT
Start: 2020-02-07 | End: 2020-02-07 | Stop reason: SDUPTHER

## 2020-02-07 RX ORDER — SODIUM CHLORIDE, SODIUM LACTATE, POTASSIUM CHLORIDE, CALCIUM CHLORIDE 600; 310; 30; 20 MG/100ML; MG/100ML; MG/100ML; MG/100ML
INJECTION, SOLUTION INTRAVENOUS CONTINUOUS PRN
Status: DISCONTINUED | OUTPATIENT
Start: 2020-02-07 | End: 2020-02-07 | Stop reason: SDUPTHER

## 2020-02-07 RX ORDER — SODIUM CHLORIDE 0.9 % (FLUSH) 0.9 %
10 SYRINGE (ML) INJECTION PRN
Status: DISCONTINUED | OUTPATIENT
Start: 2020-02-07 | End: 2020-02-07 | Stop reason: HOSPADM

## 2020-02-07 RX ORDER — ULTRASOUND COUPLING MEDIUM
GEL (GRAM) TOPICAL PRN
Status: DISCONTINUED | OUTPATIENT
Start: 2020-02-07 | End: 2020-02-07 | Stop reason: HOSPADM

## 2020-02-07 RX ORDER — SODIUM CHLORIDE, SODIUM LACTATE, POTASSIUM CHLORIDE, CALCIUM CHLORIDE 600; 310; 30; 20 MG/100ML; MG/100ML; MG/100ML; MG/100ML
INJECTION, SOLUTION INTRAVENOUS CONTINUOUS
Status: DISCONTINUED | OUTPATIENT
Start: 2020-02-07 | End: 2020-02-07

## 2020-02-07 RX ORDER — LORAZEPAM 2 MG/ML
INJECTION INTRAMUSCULAR
Status: COMPLETED
Start: 2020-02-07 | End: 2020-02-07

## 2020-02-07 RX ORDER — POLYETHYLENE GLYCOL 3350 17 G/17G
17 POWDER, FOR SOLUTION ORAL DAILY
Status: DISCONTINUED | OUTPATIENT
Start: 2020-02-07 | End: 2020-02-12

## 2020-02-07 RX ORDER — ONDANSETRON 2 MG/ML
4 INJECTION INTRAMUSCULAR; INTRAVENOUS ONCE
Status: DISCONTINUED | OUTPATIENT
Start: 2020-02-07 | End: 2020-02-07 | Stop reason: HOSPADM

## 2020-02-07 RX ORDER — MAGNESIUM HYDROXIDE 1200 MG/15ML
LIQUID ORAL CONTINUOUS PRN
Status: COMPLETED | OUTPATIENT
Start: 2020-02-07 | End: 2020-02-07

## 2020-02-07 RX ORDER — SODIUM CHLORIDE 0.9 % (FLUSH) 0.9 %
10 SYRINGE (ML) INJECTION EVERY 12 HOURS SCHEDULED
Status: DISCONTINUED | OUTPATIENT
Start: 2020-02-07 | End: 2020-02-07 | Stop reason: HOSPADM

## 2020-02-07 RX ORDER — CHLORHEXIDINE GLUCONATE 0.12 MG/ML
15 RINSE ORAL 2 TIMES DAILY
Status: DISCONTINUED | OUTPATIENT
Start: 2020-02-07 | End: 2020-02-12

## 2020-02-07 RX ORDER — ROCURONIUM BROMIDE 10 MG/ML
INJECTION, SOLUTION INTRAVENOUS PRN
Status: DISCONTINUED | OUTPATIENT
Start: 2020-02-07 | End: 2020-02-07 | Stop reason: SDUPTHER

## 2020-02-07 RX ORDER — FENTANYL CITRATE 50 UG/ML
25 INJECTION, SOLUTION INTRAMUSCULAR; INTRAVENOUS ONCE
Status: DISCONTINUED | OUTPATIENT
Start: 2020-02-07 | End: 2020-02-07 | Stop reason: HOSPADM

## 2020-02-07 RX ORDER — PHENYLEPHRINE HCL IN 0.9% NACL 0.5 MG/5ML
SYRINGE (ML) INTRAVENOUS PRN
Status: DISCONTINUED | OUTPATIENT
Start: 2020-02-07 | End: 2020-02-07 | Stop reason: SDUPTHER

## 2020-02-07 RX ORDER — LORAZEPAM 2 MG/ML
0.5 INJECTION INTRAMUSCULAR ONCE
Status: COMPLETED | OUTPATIENT
Start: 2020-02-07 | End: 2020-02-07

## 2020-02-07 RX ORDER — LORAZEPAM 2 MG/ML
INJECTION INTRAMUSCULAR
Status: DISPENSED
Start: 2020-02-07 | End: 2020-02-07

## 2020-02-07 RX ORDER — FENTANYL CITRATE 50 UG/ML
INJECTION, SOLUTION INTRAMUSCULAR; INTRAVENOUS PRN
Status: DISCONTINUED | OUTPATIENT
Start: 2020-02-07 | End: 2020-02-07 | Stop reason: SDUPTHER

## 2020-02-07 RX ORDER — INSULIN GLARGINE 100 [IU]/ML
10 INJECTION, SOLUTION SUBCUTANEOUS NIGHTLY
Status: DISCONTINUED | OUTPATIENT
Start: 2020-02-07 | End: 2020-02-09

## 2020-02-07 RX ORDER — FENTANYL CITRATE 50 UG/ML
50 INJECTION, SOLUTION INTRAMUSCULAR; INTRAVENOUS ONCE
Status: COMPLETED | OUTPATIENT
Start: 2020-02-07 | End: 2020-02-07

## 2020-02-07 RX ORDER — MIDAZOLAM HYDROCHLORIDE 1 MG/ML
INJECTION INTRAMUSCULAR; INTRAVENOUS PRN
Status: DISCONTINUED | OUTPATIENT
Start: 2020-02-07 | End: 2020-02-07 | Stop reason: SDUPTHER

## 2020-02-07 RX ADMIN — FENTANYL CITRATE 50 MCG: 50 INJECTION INTRAMUSCULAR; INTRAVENOUS at 12:10

## 2020-02-07 RX ADMIN — SOTALOL HYDROCHLORIDE 160 MG: 80 TABLET ORAL at 09:05

## 2020-02-07 RX ADMIN — DESMOPRESSIN ACETATE 40 MG: 0.2 TABLET ORAL at 20:17

## 2020-02-07 RX ADMIN — GABAPENTIN 300 MG: 300 CAPSULE ORAL at 09:03

## 2020-02-07 RX ADMIN — POLYETHYLENE GLYCOL 3350 17 G: 17 POWDER, FOR SOLUTION ORAL at 09:03

## 2020-02-07 RX ADMIN — GLYCOPYRROLATE 1 MG: 1 TABLET ORAL at 20:20

## 2020-02-07 RX ADMIN — ANTI-FUNGAL POWDER MICONAZOLE NITRATE TALC FREE: 1.42 POWDER TOPICAL at 20:16

## 2020-02-07 RX ADMIN — INSULIN GLARGINE 10 UNITS: 100 INJECTION, SOLUTION SUBCUTANEOUS at 20:40

## 2020-02-07 RX ADMIN — LORAZEPAM 0.5 MG: 2 INJECTION INTRAMUSCULAR at 04:15

## 2020-02-07 RX ADMIN — SODIUM CHLORIDE, PRESERVATIVE FREE 10 ML: 5 INJECTION INTRAVENOUS at 09:04

## 2020-02-07 RX ADMIN — FOLIC ACID 1 MG: 1 TABLET ORAL at 09:03

## 2020-02-07 RX ADMIN — Medication 100 MCG: at 11:36

## 2020-02-07 RX ADMIN — Medication 100 MCG: at 11:44

## 2020-02-07 RX ADMIN — LORAZEPAM 0.5 MG: 2 INJECTION INTRAMUSCULAR; INTRAVENOUS at 04:47

## 2020-02-07 RX ADMIN — INSULIN LISPRO 1 UNITS: 100 INJECTION, SOLUTION INTRAVENOUS; SUBCUTANEOUS at 09:05

## 2020-02-07 RX ADMIN — MIDAZOLAM HYDROCHLORIDE 2 MG: 1 INJECTION, SOLUTION INTRAMUSCULAR; INTRAVENOUS at 12:10

## 2020-02-07 RX ADMIN — ANTI-FUNGAL POWDER MICONAZOLE NITRATE TALC FREE: 1.42 POWDER TOPICAL at 09:03

## 2020-02-07 RX ADMIN — ROCURONIUM BROMIDE 50 MG: 10 INJECTION INTRAVENOUS at 11:19

## 2020-02-07 RX ADMIN — DULOXETINE HYDROCHLORIDE 60 MG: 30 CAPSULE, DELAYED RELEASE ORAL at 20:17

## 2020-02-07 RX ADMIN — SENNOSIDES AND DOCUSATE SODIUM 2 TABLET: 8.6; 5 TABLET ORAL at 09:02

## 2020-02-07 RX ADMIN — Medication 15 ML: at 20:42

## 2020-02-07 RX ADMIN — PROPOFOL 160 MG: 10 INJECTION, EMULSION INTRAVENOUS at 11:19

## 2020-02-07 RX ADMIN — LORAZEPAM 0.5 MG: 2 INJECTION INTRAMUSCULAR; INTRAVENOUS at 04:15

## 2020-02-07 RX ADMIN — INSULIN LISPRO 1 UNITS: 100 INJECTION, SOLUTION INTRAVENOUS; SUBCUTANEOUS at 20:40

## 2020-02-07 RX ADMIN — LIDOCAINE HYDROCHLORIDE 50 MG: 10 INJECTION, SOLUTION EPIDURAL; INFILTRATION; INTRACAUDAL; PERINEURAL at 11:19

## 2020-02-07 RX ADMIN — SODIUM CHLORIDE, PRESERVATIVE FREE 10 ML: 5 INJECTION INTRAVENOUS at 20:39

## 2020-02-07 RX ADMIN — SOTALOL HYDROCHLORIDE 160 MG: 80 TABLET ORAL at 20:16

## 2020-02-07 RX ADMIN — FERROUS SULFATE TAB EC 325 MG (65 MG FE EQUIVALENT) 325 MG: 325 (65 FE) TABLET DELAYED RESPONSE at 09:02

## 2020-02-07 RX ADMIN — FENTANYL CITRATE 50 MCG: 50 INJECTION, SOLUTION INTRAMUSCULAR; INTRAVENOUS at 05:22

## 2020-02-07 RX ADMIN — AMLODIPINE BESYLATE 5 MG: 5 TABLET ORAL at 09:02

## 2020-02-07 RX ADMIN — SODIUM CHLORIDE, POTASSIUM CHLORIDE, SODIUM LACTATE AND CALCIUM CHLORIDE: 600; 310; 30; 20 INJECTION, SOLUTION INTRAVENOUS at 11:19

## 2020-02-07 RX ADMIN — Medication 0.2 MG: at 11:24

## 2020-02-07 RX ADMIN — GABAPENTIN 300 MG: 300 CAPSULE ORAL at 20:16

## 2020-02-07 ASSESSMENT — PULMONARY FUNCTION TESTS
PIF_VALUE: 10
PIF_VALUE: 2
PIF_VALUE: 14
PIF_VALUE: 17
PIF_VALUE: 20
PIF_VALUE: 17
PIF_VALUE: 15
PIF_VALUE: 16
PIF_VALUE: 17
PIF_VALUE: 20
PIF_VALUE: 19
PIF_VALUE: 17
PIF_VALUE: 10
PIF_VALUE: 17
PIF_VALUE: 17
PIF_VALUE: 18
PIF_VALUE: 7
PIF_VALUE: 17
PIF_VALUE: 19
PIF_VALUE: 1
PIF_VALUE: 14
PIF_VALUE: 13
PIF_VALUE: 18
PIF_VALUE: 17
PIF_VALUE: 20
PIF_VALUE: 13
PIF_VALUE: 19
PIF_VALUE: 20
PIF_VALUE: 15
PIF_VALUE: 17
PIF_VALUE: 1
PIF_VALUE: 17
PIF_VALUE: 25
PIF_VALUE: 19
PIF_VALUE: 17
PIF_VALUE: 17
PIF_VALUE: 18
PIF_VALUE: 24
PIF_VALUE: 19
PIF_VALUE: 19
PIF_VALUE: 17
PIF_VALUE: 17
PIF_VALUE: 2
PIF_VALUE: 19
PIF_VALUE: 16
PIF_VALUE: 26
PIF_VALUE: 20
PIF_VALUE: 28
PIF_VALUE: 17
PIF_VALUE: 17
PIF_VALUE: 20
PIF_VALUE: 21
PIF_VALUE: 0
PIF_VALUE: 17
PIF_VALUE: 17
PIF_VALUE: 2
PIF_VALUE: 19
PIF_VALUE: 2
PIF_VALUE: 17
PIF_VALUE: 17
PIF_VALUE: 33
PIF_VALUE: 17
PIF_VALUE: 17
PIF_VALUE: 15
PIF_VALUE: 1
PIF_VALUE: 17
PIF_VALUE: 17
PIF_VALUE: 18
PIF_VALUE: 19
PIF_VALUE: 17
PIF_VALUE: 17
PIF_VALUE: 1
PIF_VALUE: 27
PIF_VALUE: 17
PIF_VALUE: 20
PIF_VALUE: 17
PIF_VALUE: 1
PIF_VALUE: 14
PIF_VALUE: 18
PIF_VALUE: 14
PIF_VALUE: 17

## 2020-02-07 ASSESSMENT — PAIN SCALES - WONG BAKER

## 2020-02-07 ASSESSMENT — LIFESTYLE VARIABLES: SMOKING_STATUS: 1

## 2020-02-07 NOTE — PROGRESS NOTES
General Surgery Progress Note            PATIENT NAME: Griselda France     TODAY'S DATE: 2/7/2020, 8:25 AM    SUBJECTIVE:    Pt seen and examined. Afebrile. Vitals stable. No acute issues overnight. Tube feeds held for surgery. Heparin gtt held for OR today. OBJECTIVE:   VITALS:  BP (!) 140/92   Pulse 90   Temp 98.7 °F (37.1 °C) (Oral)   Resp (!) 34   Ht 5' 11\" (1.803 m)   Wt 157 lb (71.2 kg)   SpO2 95%   BMI 21.90 kg/m²      INTAKE/OUTPUT:      Intake/Output Summary (Last 24 hours) at 2/7/2020 0825  Last data filed at 2/7/2020 0518  Gross per 24 hour   Intake 3690.03 ml   Output 900 ml   Net 2790.03 ml       PHYSICAL EXAM  GEN: not following commands, NAD  HEENT: moist mucous membranes  CV: S1/S2  LUNG: no respiratory distress, no audible wheezing  ABDOMEN: soft, non-distended, 20Fr ponsky PEG in place  EXTREMITIES: No edema, palpable distal pulses     Data:  CBC:   Lab Results   Component Value Date    WBC 7.5 02/07/2020    RBC 4.57 02/07/2020    HGB 11.1 02/07/2020    HCT 37.6 02/07/2020    MCV 82.3 02/07/2020    MCH 24.3 02/07/2020    MCHC 29.5 02/07/2020    RDW 20.1 02/07/2020     02/07/2020    MPV 9.2 02/07/2020     BMP:    Lab Results   Component Value Date     02/07/2020    K 4.5 02/07/2020    CL 97 02/07/2020    CO2 27 02/07/2020    BUN 17 02/07/2020    LABALBU 2.9 02/02/2020    CREATININE 0.27 02/07/2020    CALCIUM 9.3 02/07/2020    GFRAA >60 02/07/2020    LABGLOM >60 02/07/2020    GLUCOSE 204 02/07/2020       Radiology Review:    n/a    ASSESSMENT   1. Inability to manage secretions secondary to recent CVA causing frequent aspiration and most recently admission to hospital for aspiration pneumonia. Plan  1. For open tracheostomy tube placement in OR today. 2. The risks, benefits, and alternatives were discussed with the patient's mother. Perioperative preparation was discussed and all questions were answered.  The patient's mother expresses understanding of the intervention and consent was obtained with an RN witness. 3. Continue to hold heparin gtt for OR. Will be able to resume post operatively at the appropriate timing or simply resuming Eliquis 24 hours after operation. 4. Post op orders to follow. Electronically signed by Otilio Christine DO  on 2/7/2020 at 8:25 AM     I have reviewed the resident's note and I either performed the key elements of the medical history and physical exam or was present with the resident when the key elements of the medical history and physical exam were performed. I have discussed the findings, established the care plan and recommendations with Resident.     Electronically signed by Kasandra Robins IV, DO on 2/12/20 at 7:58 AM

## 2020-02-07 NOTE — PROGRESS NOTES
Writer talked to Dr. Michael Hamilton to confirm when to hold heparin tonight. Dr. Michael Hamilton confirmed to hold heparin at 0500 for surgery at 1000.

## 2020-02-07 NOTE — CONSULTS
performed by Kiera Robins IV, DO at [facility] OR     Medications Prior to Admission:   Prior to Admission medications    Medication Sig Start Date End Date Taking? Authorizing Provider   aspirin 325 MG EC tablet Take 1 tablet by mouth daily 1/28/20   Sharri Carter MD   amantadine (SYMMETREL) 50 MG/5ML solution 10 mLs by Per G Tube route 2 times daily at 0800 and 1400 1/27/20   Sharri Carter MD   sotalol (BETAPACE) 160 MG tablet Take 1 tablet by mouth 2 times daily 1/27/20   Sharri Carter MD   gabapentin (NEURONTIN) 300 MG capsule Take 300 mg by mouth 2 times daily. Historical Provider, MD   cilostazol (PLETAL) 100 MG tablet Take 100 mg by mouth 2 times daily    Historical Provider, MD   amLODIPine (NORVASC) 5 MG tablet Take 5 mg by mouth daily    Historical Provider, MD   folic acid (FOLVITE) 1 MG tablet Take 1 mg by mouth daily    Historical Provider, MD   albuterol sulfate  (90 Base) MCG/ACT inhaler Inhale 2 puffs into the lungs every 4 hours as needed for Wheezing    Historical Provider, MD   Multiple Vitamins-Minerals (THERAPEUTIC MULTIVITAMIN-MINERALS) tablet Take 1 tablet by mouth daily    Historical Provider, MD   DULoxetine (CYMBALTA) 60 MG extended release capsule Take 60 mg by mouth nightly    Historical Provider, MD   omeprazole (PRILOSEC) 20 MG delayed release capsule Take 20 mg by mouth daily    Historical Provider, MD   traMADol (ULTRAM) 50 MG tablet Take 50 mg by mouth every 6 hours as needed for Pain.     Historical Provider, MD   insulin glargine (BASAGLAR KWIKPEN) 100 UNIT/ML injection pen Inject 5 Units into the skin nightly    Historical Provider, MD   apixaban (ELIQUIS) 5 MG TABS tablet Take 5 mg by mouth 2 times daily    Historical Provider, MD   atorvastatin (LIPITOR) 40 MG tablet Take 1 tablet by mouth daily 1/27/20   Sharri Carter MD   vitamin D (ERGOCALCIFEROL) 1.25 MG (41458 UT) CAPS capsule Take 1 capsule by mouth once a week for 8 doses 12/28/19 2/16/20  Jarocho ANNA DO [place]     Allergies:    Patient has no known allergies. Social History:   Social History     Socioeconomic History    Marital status: Single     Spouse name: None    Number of children: None    Years of education: None    Highest education level: None   Occupational History    None   Social Needs    Financial resource strain: None    Food insecurity:     Worry: None     Inability: None    Transportation needs:     Medical: None     Non-medical: None   Tobacco Use    Smoking status: Current Every Day Smoker     Packs/day: 0.50     Types: Cigarettes    Smokeless tobacco: Current User   Substance and Sexual Activity    Alcohol use: Yes     Frequency: 4 or more times a week    Drug use: Never    Sexual activity: None   Lifestyle    Physical activity:     Days per week: None     Minutes per session: None    Stress: None   Relationships    Social connections:     Talks on phone: None     Gets together: None     Attends Jainism service: None     Active member of club or organization: None     Attends meetings of clubs or organizations: None     Relationship status: None    Intimate partner violence:     Fear of current or ex partner: None     Emotionally abused: None     Physically abused: None     Forced sexual activity: None   Other Topics Concern    None   Social History Narrative    None     Family History:  History reviewed. No pertinent family history. REVIEW OF SYSTEMS:   Unable to obtain given patient's condition    PHYSICAL EXAM:  Blood pressure (!) 155/83, pulse 85, temperature 97.6 °F (36.4 °C), temperature source Oral, resp. rate 27, height 5' 11\" (1.803 m), weight 157 lb (71.2 kg), SpO2 95 %. Gen: resting comfortably, trach in place, patient does not respond to commands or verbal stimuli    Chest: Non labored breathing.     Cardiovascular: Regular rate, no dependent edema, distal pulses 2+    Respiratory: ventilatory assistance with trach    RUE: No ecchymoses, abrasion,

## 2020-02-07 NOTE — PROGRESS NOTES
procurement, Patient/Caregiver Education & Training, Gait Training  Safety Devices  Type of devices: All fall risk precautions in place, Nurse notified, Left in bed, Call light within reach  Restraints  Initially in place: No     Therapy Time   Individual Concurrent Group Co-treatment   Time In 0847         Time Out 0900         Minutes 13         Timed Code Treatment Minutes: 13 Minutes     Treatment performed by Student PTA under the supervision of co-signing PTA who agrees with all treatment and documentation.    PIERRE Jacques, [place]

## 2020-02-07 NOTE — SIGNIFICANT EVENT
3:53 AM Informed that patient has spO2 88% and still tachypneic in 30s-40s on BiPaP. I Evaluated bedside, patient appears agitated and is tachypneic in the upper 30s which is sustained (upon chart review tachypenic since approximately 1800). Patient also has abdominal retractions. No nasal flaring or grunting. There are significant gurgling sounds and secretions in his mouth. Will perform deep suctioning as well as give Robinul as these have helped decrease his tachypnea previously At this time patient is maintaining his airway and I believe the tachypnea is secondary to agitation/pain, will give 0.5 mg of Ativan and repeat x1 as needed and give 50mcg fentanyl. The agitation may be secondary to the BiPAP mask as patient does have improved tachypnea into the 20s and spO2 > 94% on 6 L nasal cannula. Will obtain VBG and chest x-ray. Will reassess and if still no significant improvement in tachypnea, will consider intubation. 5:12 AM  VBG showing Metabolic Alkalosis with pH 7.43 pco2 45 and HCO3 30. CXR pending.

## 2020-02-07 NOTE — CARE COORDINATION
Patient scheduled for trach today. Will be discharged to [facility] once precert is obtained. LTACH to start precert Monday. Patient has to be trached 7 days prior to precert being obtained.

## 2020-02-07 NOTE — PLAN OF CARE
Problem: Falls - Risk of:  Goal: Will remain free from falls  Description  Will remain free from falls  Outcome: Ongoing     Problem: Falls - Risk of:  Goal: Absence of physical injury  Description  Absence of physical injury  Outcome: Ongoing     Siderails up x 2  Hourly rounding  Call light in reach  Instructed to call for assist before attempting out of bed. Remains free from falls and accidental injury at this time   Floor free from obstacles  Bed is locked and in lowest position  Adequate lighting provided  Bed alarm on, Fall signs posted      Problem: HEMODYNAMIC STATUS  Goal: Patient has stable vital signs and fluid balance  Outcome: Ongoing    Neuro assessment completed, fall precautions in place, aspirations precautions in place, assess for barriers in communication and mobility, interventions to assist in communication and mobility in place, encouraged to call for assistance, adaptive devices used as needed, assess emotional state and support offered, encouraged patient to communicate by available means, and support systems included in patient care.

## 2020-02-07 NOTE — PROGRESS NOTES
Hold] dextrose, [MAR Hold] glucagon (rDNA), [MAR Hold] dextrose, [MAR Hold] glycopyrrolate, [MAR Hold] ipratropium-albuterol, [MAR Hold] sodium chloride flush, [MAR Hold] acetaminophen, [MAR Hold] ondansetron    VITALS:  Temperature Range: Temp: 98.7 °F (37.1 °C) Temp  Av.4 °F (36.9 °C)  Min: 96.4 °F (35.8 °C)  Max: 98.8 °F (37.1 °C)  BP Range: Systolic (07NQL), BLZ:706 , Min:49 , NCF:262     Diastolic (41NSX), FQS:30, Min:39, Max:98    Pulse Range: Pulse  Av.5  Min: 87  Max: 102  Respiration Range: Resp  Av.9  Min: 0  Max: 43  Current Pulse Ox: SpO2: 95 %  24HR Pulse Ox Range: SpO2  Av.8 %  Min: 93 %  Max: 100 %  Patient Vitals for the past 12 hrs:   BP Temp Temp src Pulse Resp SpO2   20 0817 -- -- -- -- (!) 34 --   20 0725 (!) 140/92 98.7 °F (37.1 °C) Oral 90 (!) 34 95 %   20 0450 -- -- -- -- (!) 38 --   20 0400 122/84 97.8 °F (36.6 °C) Axillary 99 (!) 43 99 %     Estimated body mass index is 21.9 kg/m² as calculated from the following:    Height as of this encounter: 5' 11\" (1.803 m).     Weight as of this encounter: 157 lb (71.2 kg).  []<16 Severe malnutrition  []16-16.99 Moderate malnutrition  []17-18.49 Mild malnutrition  [x]18.5-24.9 Normal  []25-29.9 Overweight (not obese)  []30-34.9 Obese class 1 (Low Risk)  []35-39.9 Obese class 2 (Moderate Risk)  []?40 Obese class 3 (High Risk)    RECENT LABS:   Lab Results   Component Value Date    WBC 7.5 2020    HGB 11.1 (L) 2020    HCT 37.6 (L) 2020     2020    CHOL 92 2020    TRIG 84 2020    HDL 26 (L) 2020     (H) 2020    AST 61 (H) 2020     2020    K 4.5 2020    CL 97 (L) 2020    CREATININE 0.27 (L) 2020    BUN 17 2020    CO2 27 2020    INR 1.4 2020    LABA1C 5.8 2020     24 HOUR INTAKE/OUTPUT:    Intake/Output Summary (Last 24 hours) at 2020 1224  Last data filed at 2020 0518  Gross per 24 hour 2/7/20    RENAL/FLUID/ELECTROLYTE:  - Normal renal functioning  - Adequate urine output, 0.5ml/kg/hr  - No maintenance IVF  - Replace electrolytes PRN  - Check BMP tomorrow AM    GI/NUTRITION:  NUTRITION:  Diet NPO, After Midnight   - Tube feeds on hold for trach, previously tolerating well  - Bowel regimen: Senokot-S daily, add Glycolax  - Give Milk of Mag x1  - GI prophylaxis: N/A    ID:  - Afebrile, Tmax 37.8  - No leukocytosis, WBC 7.5  - Sepsis resolved  - Completed 7 day course of Vanc/Zosyn on 2/4 for left lower lobe pneumonia  - Continue to monitor for fevers  - Daily CBC    HEME:   - H&H 11.1/37.6  - Platelets 458  - Daily CBC    ENDOCRINE:  - Continue to monitor blood glucose, goal <180  - History of DM 2  - Recent A1C improved to 5.8  - Glucose trend remains elevated, 190-266  - Increase Lantus to 10u QHS, continue low insulin sliding scale    OTHER:  - PT/OT/ST  - Discharge planning; plans to go to [facility]  - Per Case Management, needs to have trach for 7 days prior to dispo  - Code Status: FULL    PROPHYLAXIS:  Stress ulcer: N/A    DVT PROPHYLAXIS:  - SCD sleeves - Thigh High   - On heparin infusion, restart Eliquis tomorrow if OK with General Surgery    DISPOSITION:  [x] To remain on stepdown. We will continue to follow as primary. For any changes in exam or patient status please contact Neuro Critical Care.       PAMELA Rice - [facility]  Neuro Critical Care  Pager [phone]  2/7/2020     12:24 PM

## 2020-02-07 NOTE — CARE COORDINATION
Case Management Initial Discharge Plan  Urmila Damon,             Met with:patient or family member patient and mother to discuss discharge plans. Information verified: address, contacts, phone number, , insurance Yes  PCP: Hermilo Greene MD  Date of last visit: unknown    Insurance Provider: [facility] plan     Discharge Planning    Living Arrangements: with mother and step father      Support Systems: family      Home has 2 stories  1 stairs to climb to get into front door, 1 flight stairs to climb to reach second floor  Location of bedroom/bathroom in home 2nd    Patient able to perform ADL's:Independent    Current Services (outpatient & in home) none  DME equipment: none  DME provider:       Potential Assistance Needed: ride home      Patient agreeable to home care: Yes  Freedom of choice provided:  yes    Prior SNF/Rehab Placement and Facility: none  Agreeable to SNF/Rehab: No  [place] of choice provided: n/a   Evaluation: n/a    Expected Discharge date:     Patient expects to be discharged to: Follow Up Appointment: Best Day/ Time:      Transportation provider: self/famiyl  Transportation arrangements needed for discharge: Yes, may need insurance cab    Readmission Risk              Risk of Unplanned Readmission:        22             Does patient have a readmission risk score greater than 14?: Yes  If yes, follow-up appointment must be made within 7 days of discharge. Goals of Care: To get strong to get home      Discharge Plan: Return home with [facility].  Referral sent           Electronically signed by Jeny Olea RN on 20 at 3:46 PM

## 2020-02-07 NOTE — BRIEF OP NOTE
Brief Postoperative Note  ______________________________________________________________    Patient: Marko Raya  YOB: 1960  MRN: [id number]  Date of Procedure: 2/7/2020    Pre-Op Diagnosis: Respiratory failure    Post-Op Diagnosis: Same       Procedure(s):  OPEN tracheostomy; 6 DCT Fr gilberto     Anesthesia: General    Surgeon(s):  David Ambrocio IV, DO    Assistant: Navin Balbuena, PGY 5     Estimated Blood Loss (mL): 5 mL    Complications: None    Specimens:   * No specimens in log *    Implants:  * No implants in log *      Drains:   Gastrostomy/Enterostomy/Jejunostomy Percutaneous Endoscopic Gastrostomy (PEG) LUQ 1 20 fr (Active)   Drainage Appearance Other (Comment) 2/4/2020  8:01 AM   Site Description Healing 2/4/2020  8:01 AM   Dressing Status Other (Comment) 2/4/2020  8:01 AM   Tube Feeding Diabetic 2/7/2020  4:00 AM   Tube Feeding Intake (mL) 3084 ml 2/6/2020 10:00 AM   Free Water Flush (mL) 150 mL 2/6/2020  8:00 PM   Free Water Rate every 6 hrs 2/3/2020  5:00 PM   Output (mL) 0 ml 1/27/2020  8:00 AM   Tube Placement Verified Yes 2/3/2020  5:00 PM   Residual Volume (ml) 0 ml 2/4/2020  4:00 AM       External Urinary Catheter (Active)   Catheter changed  No 2/6/2020  4:00 AM   Urine Color Yellow 2/7/2020  4:00 AM   Urine Appearance Clear 2/7/2020  4:00 AM   Urine Odor Malodorous 2/7/2020  4:00 AM   Output (mL) 900 mL 2/5/2020  6:00 PM   Skin Assessment No Injury 2/7/2020  4:00 AM       [REMOVED] NG/OG/NJ/NE Tube Nasogastric Right nostril (Removed)       [REMOVED] NG/OG/NJ/NE Tube Nasogastric 18 fr Left nostril (Removed)   Surrounding Skin Dry; Intact 1/22/2020  8:00 AM   Securement device Yes 1/22/2020  8:00 AM   Status Clamped 1/22/2020  8:00 AM   Placement Verified by External Catheter Length;by Respiratory Status 1/22/2020  8:00 AM   NG/OG/NJ/NE External Measurement (cm) 72 cm 1/22/2020  8:00 AM   Drainage Appearance Tan 1/21/2020  4:36 PM   Tube Feeding Diabetic 1/21/2020  4:36 PM   Tube Time: 12:46 PM

## 2020-02-07 NOTE — ANESTHESIA PRE PROCEDURE
atorvastatin (LIPITOR) 40 MG tablet Take 1 tablet by mouth daily 1/27/20   Aleksander Quinones MD   vitamin D (ERGOCALCIFEROL) 1.25 MG (25584 UT) CAPS capsule Take 1 capsule by mouth once a week for 8 doses 12/28/19 2/16/20  Shakeel Coppola DO       Current medications:    Current Facility-Administered Medications   Medication Dose Route Frequency Provider Last Rate Last Dose    polyethylene glycol (GLYCOLAX) packet 17 g  17 g Oral Daily PAMELA Cedillo CNP        ceFAZolin (ANCEF) 2 g in dextrose 5 % 50 mL IVPB  2 g Intravenous Once Cheryl Sexton DO        insulin lispro (HUMALOG) injection vial 0-6 Units  0-6 Units Subcutaneous TID WC Anabel Farrell MD   2 Units at 02/06/20 1813    insulin lispro (HUMALOG) injection vial 0-3 Units  0-3 Units Subcutaneous Nightly Anabel Farrell MD   1 Units at 02/06/20 2138    glucose (GLUTOSE) 40 % oral gel 15 g  15 g Oral PRN Anabel Farrell MD        dextrose 50 % IV solution  12.5 g Intravenous PRN Anabel Farrell MD        glucagon (rDNA) injection 1 mg  1 mg Intramuscular PRN Anabel Farrell MD        dextrose 5 % solution  100 mL/hr Intravenous PRN Anaebl Farrell MD        glycopyrrolate (ROBINUL) tablet 1 mg  1 mg Oral BID PRN PAMELA Cedillo CNP        heparin 25,000 units in dextrose 5% 250 mL infusion  18 Units/kg/hr Intravenous Continuous Jaclyn Kline MD   Stopped at 02/07/20 0500    miconazole (MICOTIN) 2 % powder   Topical BID PAMELA Marin CNP        sennosides-docusate sodium (SENOKOT-S) 8.6-50 MG tablet 2 tablet  2 tablet Oral Daily PAMELA Marin CNP   2 tablet at 02/06/20 0947    [Held by provider] aspirin chewable tablet 81 mg  81 mg Oral Daily PAMELA Marin CNP   81 mg at 02/05/20 0850    ferrous sulfate EC tablet 325 mg  325 mg Oral Daily with breakfast PAMELA Marin CNP   325 mg at 02/06/20 0947    ipratropium-albuterol (DUONEB) nebulizer solution 1 ampule  1 ampule Inhalation Q6H PRN Juan Costa,         amLODIPine (NORVASC) tablet 5 mg  5 mg Oral Daily Priya Weeksodore Saint rosenda, DO   5 mg at 02/06/20 0947    atorvastatin (LIPITOR) tablet 40 mg  40 mg Oral Nightly Jarocho Wild Saint rosenda, DO   40 mg at 02/06/20 2126    [Held by provider] cilostazol (PLETAL) tablet 100 mg  100 mg Oral BID Priya Gonzálesore Hilario, DO   100 mg at 02/05/20 2041    DULoxetine (CYMBALTA) extended release capsule 60 mg  60 mg Oral Nightly Priya Commodore Saint marys, DO   60 mg at 12/54/54 5646    folic acid (FOLVITE) tablet 1 mg  1 mg Oral Daily Priyaa Commodore Saint marys, DO   1 mg at 02/06/20 2816    gabapentin (NEURONTIN) capsule 300 mg  300 mg Oral BID Liya Gonzalez, DO   300 mg at 02/06/20 1813    insulin glargine (LANTUS) injection vial 5 Units  5 Units Subcutaneous Nightly Priya Fremontodore Saint marys, DO   5 Units at 02/06/20 2138    sotalol (BETAPACE) tablet 160 mg  160 mg Oral BID Liya Gonzalez, DO   160 mg at 02/06/20 2126    sodium chloride flush 0.9 % injection 10 mL  10 mL Intravenous 2 times per day Liya Em, DO   10 mL at 02/06/20 6563    sodium chloride flush 0.9 % injection 10 mL  10 mL Intravenous PRN Liya Em, DO        acetaminophen (TYLENOL) tablet 650 mg  650 mg Oral Q4H PRN Liya Em, DO        ondansetron [facility]) injection 4 mg  4 mg Intravenous Q6H PRN Liya Em, DO           Allergies:  No Known Allergies    Problem List:    Patient Active Problem List   Diagnosis Code    SAH (subarachnoid hemorrhage) ([facility]) I60.9    Subarachnoid bleed ([facility]) I60.9    Traumatic subarachnoid hemorrhage with loss of consciousness of 1 hour to 5 hours 59 minutes ([facility] [address] S06. 6X3A    Carotid stenosis, right I65.21    Asymptomatic stenosis of left vertebral artery I65.02    Intracranial atherosclerosis I67.2    History of CEA (carotid endarterectomy) Z98.890    Ventilator dependence ([facility]) Z99.11    Delirium tremens ([facility]) F10.231    Chronic a-fib I48.20    On mechanically assisted ventilation ([facility] [address] Z99.11   

## 2020-02-07 NOTE — FLOWSHEET NOTE
Assessment:   attempted to visit patient for pre-procedure. Patient was awake but would not seem to acknowledge  when talking. Patient would make eye contact but would not speak or attempt to engage in conversation. Intervention:   attempted to provide space for patient to express feelings, thoughts, and concerns. Outcome:  Patient appears to be altered.         02/06/20 1630   Encounter Summary   Services provided to: Patient   Referral/Consult From: [facility] Unknown   Continue Visiting   (2.6.2020)   Complexity of Encounter Low   Length of Encounter 15 minutes   Routine   Type Pre-procedure   Assessment Unable to respond;Passive   Intervention Sustaining presence/ Ministry of presence   Outcome Did not respond     Electronically signed by Reynold Joe on 2/6/2020 at 8:32 PM

## 2020-02-07 NOTE — PROGRESS NOTES
Occupational Therapy Not Seen Note    DATE: 2020  Name: Luis Meyer  : 1960  MRN: [id number]    Patient not available for Occupational Therapy due to:    Pt off the unit for an OPEN TRACHEOTOMY       Next Scheduled Treatment:2020    Electronically signed by RD Cazares on 2020 at 2:18 PM

## 2020-02-07 NOTE — PROGRESS NOTES
02/07/20 1242   Surgical Airway (trach) Shiley Cuffed   Placement Date/Time: 02/07/20 1209   Placed By: In surgery  Surgical Airway Type: Tracheostomy  Brand: Aziza  Style: Cuffed  Size (mm): 6   Status Secured   Site Assessment Bleeding   Ties Assessment Intact; Secure

## 2020-02-08 ENCOUNTER — APPOINTMENT (OUTPATIENT)
Dept: GENERAL RADIOLOGY | Age: 60
DRG: 005 | End: 2020-02-08
Payer: MEDICAID

## 2020-02-08 LAB
ABSOLUTE EOS #: 0 K/UL (ref 0–0.4)
ABSOLUTE IMMATURE GRANULOCYTE: 0 K/UL (ref 0–0.3)
ABSOLUTE LYMPH #: 0.77 K/UL (ref 1–4.8)
ABSOLUTE MONO #: 0.36 K/UL (ref 0.1–0.8)
ANION GAP SERPL CALCULATED.3IONS-SCNC: 15 MMOL/L (ref 9–17)
BASOPHILS # BLD: 0 % (ref 0–2)
BASOPHILS ABSOLUTE: 0 K/UL (ref 0–0.2)
BUN BLDV-MCNC: 17 MG/DL (ref 6–20)
BUN/CREAT BLD: ABNORMAL (ref 9–20)
CALCIUM IONIZED: 1.26 MMOL/L (ref 1.13–1.33)
CALCIUM SERPL-MCNC: 9.6 MG/DL (ref 8.6–10.4)
CHLORIDE BLD-SCNC: 102 MMOL/L (ref 98–107)
CO2: 23 MMOL/L (ref 20–31)
CREAT SERPL-MCNC: 0.3 MG/DL (ref 0.7–1.2)
DIFFERENTIAL TYPE: ABNORMAL
EOSINOPHILS RELATIVE PERCENT: 0 % (ref 1–4)
GFR AFRICAN AMERICAN: >60 ML/MIN
GFR NON-AFRICAN AMERICAN: >60 ML/MIN
GFR SERPL CREATININE-BSD FRML MDRD: ABNORMAL ML/MIN/{1.73_M2}
GFR SERPL CREATININE-BSD FRML MDRD: ABNORMAL ML/MIN/{1.73_M2}
GLUCOSE BLD-MCNC: 202 MG/DL (ref 70–99)
GLUCOSE BLD-MCNC: 208 MG/DL (ref 75–110)
GLUCOSE BLD-MCNC: 211 MG/DL (ref 75–110)
GLUCOSE BLD-MCNC: 220 MG/DL (ref 75–110)
HCT VFR BLD CALC: 34.5 % (ref 40.7–50.3)
HEMOGLOBIN: 10 G/DL (ref 13–17)
IMMATURE GRANULOCYTES: 0 %
LYMPHOCYTES # BLD: 17 % (ref 24–44)
MAGNESIUM: 1.7 MG/DL (ref 1.6–2.6)
MCH RBC QN AUTO: 24.3 PG (ref 25.2–33.5)
MCHC RBC AUTO-ENTMCNC: 29 G/DL (ref 28.4–34.8)
MCV RBC AUTO: 83.9 FL (ref 82.6–102.9)
MONOCYTES # BLD: 8 % (ref 1–7)
MORPHOLOGY: ABNORMAL
NRBC AUTOMATED: 0 PER 100 WBC
PARTIAL THROMBOPLASTIN TIME: 25.3 SEC (ref 20.5–30.5)
PARTIAL THROMBOPLASTIN TIME: 27.5 SEC (ref 20.5–30.5)
PDW BLD-RTO: 20.3 % (ref 11.8–14.4)
PHOSPHORUS: 4.1 MG/DL (ref 2.5–4.5)
PLATELET # BLD: 295 K/UL (ref 138–453)
PLATELET ESTIMATE: ABNORMAL
PMV BLD AUTO: 9.6 FL (ref 8.1–13.5)
POTASSIUM SERPL-SCNC: 4.3 MMOL/L (ref 3.7–5.3)
RBC # BLD: 4.11 M/UL (ref 4.21–5.77)
RBC # BLD: ABNORMAL 10*6/UL
SEG NEUTROPHILS: 75 % (ref 36–66)
SEGMENTED NEUTROPHILS ABSOLUTE COUNT: 3.37 K/UL (ref 1.8–7.7)
SODIUM BLD-SCNC: 140 MMOL/L (ref 135–144)
WBC # BLD: 4.5 K/UL (ref 3.5–11.3)
WBC # BLD: ABNORMAL 10*3/UL

## 2020-02-08 PROCEDURE — 94761 N-INVAS EAR/PLS OXIMETRY MLT: CPT

## 2020-02-08 PROCEDURE — 2580000003 HC RX 258: Performed by: STUDENT IN AN ORGANIZED HEALTH CARE EDUCATION/TRAINING PROGRAM

## 2020-02-08 PROCEDURE — 94770 HC ETCO2 MONITOR DAILY: CPT

## 2020-02-08 PROCEDURE — 6360000002 HC RX W HCPCS: Performed by: INTERNAL MEDICINE

## 2020-02-08 PROCEDURE — 85025 COMPLETE CBC W/AUTO DIFF WBC: CPT

## 2020-02-08 PROCEDURE — 94003 VENT MGMT INPAT SUBQ DAY: CPT

## 2020-02-08 PROCEDURE — 71045 X-RAY EXAM CHEST 1 VIEW: CPT

## 2020-02-08 PROCEDURE — 82330 ASSAY OF CALCIUM: CPT

## 2020-02-08 PROCEDURE — 36415 COLL VENOUS BLD VENIPUNCTURE: CPT

## 2020-02-08 PROCEDURE — 6370000000 HC RX 637 (ALT 250 FOR IP): Performed by: STUDENT IN AN ORGANIZED HEALTH CARE EDUCATION/TRAINING PROGRAM

## 2020-02-08 PROCEDURE — 99233 SBSQ HOSP IP/OBS HIGH 50: CPT | Performed by: PSYCHIATRY & NEUROLOGY

## 2020-02-08 PROCEDURE — 84100 ASSAY OF PHOSPHORUS: CPT

## 2020-02-08 PROCEDURE — 85730 THROMBOPLASTIN TIME PARTIAL: CPT

## 2020-02-08 PROCEDURE — 2000000003 HC NEURO ICU R&B

## 2020-02-08 PROCEDURE — 6370000000 HC RX 637 (ALT 250 FOR IP): Performed by: NURSE PRACTITIONER

## 2020-02-08 PROCEDURE — 80048 BASIC METABOLIC PNL TOTAL CA: CPT

## 2020-02-08 PROCEDURE — 6360000002 HC RX W HCPCS: Performed by: STUDENT IN AN ORGANIZED HEALTH CARE EDUCATION/TRAINING PROGRAM

## 2020-02-08 PROCEDURE — 83735 ASSAY OF MAGNESIUM: CPT

## 2020-02-08 PROCEDURE — 2700000000 HC OXYGEN THERAPY PER DAY

## 2020-02-08 PROCEDURE — 82947 ASSAY GLUCOSE BLOOD QUANT: CPT

## 2020-02-08 RX ORDER — CILOSTAZOL 100 MG/1
100 TABLET ORAL 2 TIMES DAILY
Status: DISCONTINUED | OUTPATIENT
Start: 2020-02-09 | End: 2020-02-12

## 2020-02-08 RX ORDER — HYDRALAZINE HYDROCHLORIDE 20 MG/ML
10 INJECTION INTRAMUSCULAR; INTRAVENOUS
Status: DISCONTINUED | OUTPATIENT
Start: 2020-02-08 | End: 2020-02-12

## 2020-02-08 RX ORDER — LABETALOL 20 MG/4 ML (5 MG/ML) INTRAVENOUS SYRINGE
10 EVERY 4 HOURS PRN
Status: DISCONTINUED | OUTPATIENT
Start: 2020-02-08 | End: 2020-02-12

## 2020-02-08 RX ORDER — HEPARIN SODIUM 5000 [USP'U]/ML
5000 INJECTION, SOLUTION INTRAVENOUS; SUBCUTANEOUS ONCE
Status: COMPLETED | OUTPATIENT
Start: 2020-02-08 | End: 2020-02-08

## 2020-02-08 RX ADMIN — FOLIC ACID 1 MG: 1 TABLET ORAL at 08:51

## 2020-02-08 RX ADMIN — SODIUM CHLORIDE, PRESERVATIVE FREE 10 ML: 5 INJECTION INTRAVENOUS at 20:15

## 2020-02-08 RX ADMIN — ANTI-FUNGAL POWDER MICONAZOLE NITRATE TALC FREE: 1.42 POWDER TOPICAL at 09:04

## 2020-02-08 RX ADMIN — Medication 15 ML: at 09:04

## 2020-02-08 RX ADMIN — SOTALOL HYDROCHLORIDE 160 MG: 80 TABLET ORAL at 08:51

## 2020-02-08 RX ADMIN — FERROUS SULFATE TAB EC 325 MG (65 MG FE EQUIVALENT) 325 MG: 325 (65 FE) TABLET DELAYED RESPONSE at 08:51

## 2020-02-08 RX ADMIN — DULOXETINE HYDROCHLORIDE 60 MG: 30 CAPSULE, DELAYED RELEASE ORAL at 20:14

## 2020-02-08 RX ADMIN — SENNOSIDES AND DOCUSATE SODIUM 2 TABLET: 8.6; 5 TABLET ORAL at 08:51

## 2020-02-08 RX ADMIN — GABAPENTIN 300 MG: 300 CAPSULE ORAL at 18:20

## 2020-02-08 RX ADMIN — INSULIN GLARGINE 10 UNITS: 100 INJECTION, SOLUTION SUBCUTANEOUS at 21:23

## 2020-02-08 RX ADMIN — INSULIN LISPRO 2 UNITS: 100 INJECTION, SOLUTION INTRAVENOUS; SUBCUTANEOUS at 08:51

## 2020-02-08 RX ADMIN — Medication 15 ML: at 21:28

## 2020-02-08 RX ADMIN — GLYCOPYRROLATE 1 MG: 1 TABLET ORAL at 08:51

## 2020-02-08 RX ADMIN — GLYCOPYRROLATE 1 MG: 1 TABLET ORAL at 20:14

## 2020-02-08 RX ADMIN — HYDRALAZINE HYDROCHLORIDE 10 MG: 20 INJECTION INTRAMUSCULAR; INTRAVENOUS at 02:34

## 2020-02-08 RX ADMIN — DESMOPRESSIN ACETATE 40 MG: 0.2 TABLET ORAL at 20:16

## 2020-02-08 RX ADMIN — ANTI-FUNGAL POWDER MICONAZOLE NITRATE TALC FREE: 1.42 POWDER TOPICAL at 21:28

## 2020-02-08 RX ADMIN — SOTALOL HYDROCHLORIDE 160 MG: 80 TABLET ORAL at 20:15

## 2020-02-08 RX ADMIN — GABAPENTIN 300 MG: 300 CAPSULE ORAL at 08:51

## 2020-02-08 RX ADMIN — INSULIN LISPRO 2 UNITS: 100 INJECTION, SOLUTION INTRAVENOUS; SUBCUTANEOUS at 12:37

## 2020-02-08 RX ADMIN — HEPARIN SODIUM 5000 UNITS: 5000 INJECTION INTRAVENOUS; SUBCUTANEOUS at 12:38

## 2020-02-08 RX ADMIN — SODIUM CHLORIDE, PRESERVATIVE FREE 10 ML: 5 INJECTION INTRAVENOUS at 09:04

## 2020-02-08 RX ADMIN — AMLODIPINE BESYLATE 5 MG: 5 TABLET ORAL at 08:51

## 2020-02-08 RX ADMIN — INSULIN LISPRO 1 UNITS: 100 INJECTION, SOLUTION INTRAVENOUS; SUBCUTANEOUS at 21:23

## 2020-02-08 RX ADMIN — POLYETHYLENE GLYCOL 3350 17 G: 17 POWDER, FOR SOLUTION ORAL at 08:51

## 2020-02-08 ASSESSMENT — PAIN SCALES - WONG BAKER

## 2020-02-08 ASSESSMENT — PULMONARY FUNCTION TESTS
PIF_VALUE: 13
PIF_VALUE: 15
PIF_VALUE: 15
PIF_VALUE: 25
PIF_VALUE: 13

## 2020-02-08 NOTE — PLAN OF CARE
Problem: OXYGENATION/RESPIRATORY FUNCTION  Goal: Patient will maintain patent airway  2/7/2020 2137 by Elena Kruger RCP  Outcome: Ongoing     Problem: OXYGENATION/RESPIRATORY FUNCTION  Goal: Patient will achieve/maintain normal respiratory rate/effort  Description  Respiratory rate and effort will be within normal limits for the patient  2/7/2020 2137 by Elena Kruger RCP  Outcome: Ongoing     Problem: MECHANICAL VENTILATION  Goal: Patient will maintain patent airway  2/7/2020 2137 by Elena Kruger RCP  Outcome: Ongoing     Problem: MECHANICAL VENTILATION  Goal: Oral health is maintained or improved  2/7/2020 2137 by Elena Kruger RCP  Outcome: Ongoing     Problem: MECHANICAL VENTILATION  Goal: Tracheostomy will be managed safely  2/7/2020 2137 by Elena Kruger RCP  Outcome: Ongoing     Problem: SKIN INTEGRITY  Goal: Skin integrity is maintained or improved  2/7/2020 2137 by Elena Kruger RCP  Outcome: Ongoing

## 2020-02-08 NOTE — PROGRESS NOTES
General Surgery Progress Note            PATIENT NAME: Pal Ospina     TODAY'S DATE: 2/8/2020, 12:59 PM    SUBJECTIVE:    Pt seen and examined. Late entry. Seen early this am. Marielos Gonzalez functioning well without any bleeding or complications overnight. OBJECTIVE:   VITALS:  /73   Pulse 74   Temp 98.6 °F (37 °C)   Resp 25   Ht 5' 11\" (1.803 m)   Wt 157 lb (71.2 kg)   SpO2 96%   BMI 21.90 kg/m²      INTAKE/OUTPUT:      Intake/Output Summary (Last 24 hours) at 2/8/2020 1259  Last data filed at 2/8/2020 0555  Gross per 24 hour   Intake 535 ml   Output 700 ml   Net -165 ml       PHYSICAL EXAM  GEN: not following commands, NAD  HEENT: moist mucous membranes; tracheostomy tube n place  CV: S1/S2  LUNG: no respiratory distress, no audible wheezing  ABDOMEN: soft, non-distended, 20Fr ponsky PEG in place  EXTREMITIES: No edema, palpable distal pulses     Data:  CBC:   Lab Results   Component Value Date    WBC 4.5 02/08/2020    RBC 4.11 02/08/2020    HGB 10.0 02/08/2020    HCT 34.5 02/08/2020    MCV 83.9 02/08/2020    MCH 24.3 02/08/2020    MCHC 29.0 02/08/2020    RDW 20.3 02/08/2020     02/08/2020    MPV 9.6 02/08/2020     BMP:    Lab Results   Component Value Date     02/08/2020    K 4.3 02/08/2020     02/08/2020    CO2 23 02/08/2020    BUN 17 02/08/2020    LABALBU 2.9 02/02/2020    CREATININE 0.30 02/08/2020    CALCIUM 9.6 02/08/2020    GFRAA >60 02/08/2020    LABGLOM >60 02/08/2020    GLUCOSE 202 02/08/2020       Radiology Review:    n/a    ASSESSMENT   1. POD#1 open tracheostomy tube placement   2. Inability to manage secretions secondary to recent CVA causing frequent aspiration and most recently admission to hospital for aspiration pneumonia. Plan  1. Trach functioning well. Use as needed during patient's recovery. 2. Please call with any questions or concerns. Both trach and PEG without any acute issues. 3. Medical mgmt per primary. 4. Surgery to sign off at this time.

## 2020-02-08 NOTE — PLAN OF CARE
Problem: Risk for Impaired Skin Integrity  Goal: Tissue integrity - skin and mucous membranes  Description  Structural intactness and normal physiological function of skin and  mucous membranes. Outcome: Ongoing  Note:   Skin assessment complete. Interventions in place. See doc flowsheet for interventions initiated. Wound on coccyx, residents notified to place wound consult. Zinc cream applied. Will continue to monitor for additional needs and skin breakdown. Problem: Falls - Risk of:  Goal: Will remain free from falls  Description  Will remain free from falls  Outcome: Ongoing  Note:   No falls entire shift. Fall precautions in place. Continue to monitor.

## 2020-02-08 NOTE — PROGRESS NOTES
Daily Progress Note  Neuro Critical Care      Patient Name: Ramírez Chaney  Patient : 1960  Room/Bed: 0515/0515-01    CHIEF COMPLAINT:     Complaining of recurrent hypoxia, altered mental status    INTERVAL HISTORY:  Admission (2020)   Case of a 61 y.o. male patient with PMH of afib (on Eliquis), bilateral ICA stenosis, DM 2, HTN, HLD, CAD s/p PCI stents, PVD, and ETOH abuse who was recently admitted on 19 for a presumed traumatic SAH and SDH after a suspected fall. He had a prolonged hospital course and was found to have symptomatic right ICA stenosis and underwent stenting on 20 along with bihemispheric infarctions. Patient required PEG placement and was discharged to a skilled nursing facility on 20. Patient returned to the ED on 20 with altered mental status  He was found unresponsive at the SNF. Per records, on EMS arrival patient had a GCS of 4 and was intubated. He was taken to [facility]. Found to be in afib with RVR and cardioverted. CT Head stable. Transferred to [facility] for further evaluation. Arrive intubated, off sedation. Noted to be febrile and hypotensive. CTA chest negative for PE, consolidation present in left lower lobe. Hospital Course:   : Febrile and hypotensive. Levophed started along with broad spectrum antibiotics for concerns of sepsis. CT chest revealed left lung consolidation. : Liver US: borderline hepatomegaly, otherwise unremarkable  : Aspirin and Eliquis resumed. : Extubated  : Increased oral secretions, requiring BiPap. robinol started with improvement. : Ferrous sulfate started for iron deficiency anemia. 2/3: Stable  : Discussed possible need for trach with family vs other options including code status. I have spoken to patient's POA, mother- Deri Pod and siblings who will discuss among themselves options and let us know.   5: Requiring frequent NT suction and biPAP to maintain O2 sats.  General surgery consulted for trach. Eliquis held, heparin infusion started. 2/6: Tolerating biPAP well, remained on heparin infusion while awaiting trach. 2.7: Hypoxic with saturation into the 80's and tachypneic with RR 30-40's overnight. Hypoxia improved with frequent NT suctioning. Nursing reports suctioning thick, yellow secretions almost hourly overnight. FiO2 uptitrated to 60%. Given Fentanyl x1 with mild improvement in tachypnea. On exam this morning, patient remains mildly tachypneic but is saturating well on biPAP. No fevers, leukocytosis, tachycardia, or hypotension suggestive of infection. CXR this AM unremarkable. Blood glucose remains elevated (190-266), Lantus increased to 10u QHS. Neuro exam remains stable. Today   Patient was evaluated at bedside and found status post a tracheostomy postoperative day #1. Patient still aphasic. No fever, no leukocytosis. Patient is placed on CPAP this morning for evaluation of tolerance of tracheostomy. Patient still with thick secretions but been taking care off. Today we can start weaning off ventilator and patient will have pre-CERT to long-term facility. If unable to get into long-term facility will consider SNF. Will start anticoagulation today after tracheostomy and will start at night with Eliquis. Patient still need PT therapies due to spasticity. Will restart also aspirin and cilostazol. As per  pre-CERT will start on Monday, 2/10/2020.       CURRENT MEDICATIONS:  SCHEDULED MEDICATIONS:   polyethylene glycol  17 g Oral Daily    insulin glargine  10 Units Subcutaneous Nightly    chlorhexidine  15 mL Mouth/Throat BID    insulin lispro  0-6 Units Subcutaneous TID WC    insulin lispro  0-3 Units Subcutaneous Nightly    miconazole   Topical BID    sennosides-docusate sodium  2 tablet Oral Daily    [Held by provider] aspirin  81 mg Oral Daily    ferrous sulfate  325 mg Oral Daily with breakfast    amLODIPine  5 malnutrition  []17-18.49 Mild malnutrition  [x]18.5-24.9 Normal  []25-29.9 Overweight (not obese)  []30-34.9 Obese class 1 (Low Risk)  []35-39.9 Obese class 2 (Moderate Risk)  []?40 Obese class 3 (High Risk)    RECENT LABS:   DATA  CBC:   Recent Labs     02/05/20  1356 02/07/20  0427 02/08/20  0356   WBC 8.0 7.5 4.5   HGB 11.6* 11.1* 10.0*   HCT 39.0* 37.6* 34.5*    302 295     BMP:    Recent Labs     02/07/20  0427 02/08/20  0356    140   K 4.5 4.3   CL 97* 102   CO2 27 23   BUN 17 17   CREATININE 0.27* 0.30*   GLUCOSE 204* 202*   MG  --  1.7         Lab Results   Component Value Date    CHOL 92 01/29/2020    LDLCHOLESTEROL 49 01/29/2020    HDL 26 (L) 01/29/2020    TRIG 84 01/29/2020     (H) 02/02/2020    AST 61 (H) 02/02/2020    INR 1.4 01/28/2020    LABA1C 5.8 01/29/2020    [id number] 676 12/26/2019       24 HOUR INTAKE/OUTPUT:    Intake/Output Summary (Last 24 hours) at 2/8/2020 0622  Last data filed at 2/8/2020 0555  Gross per 24 hour   Intake 535 ml   Output 1005 ml   Net -470 ml       RADIOLOGY/IMAGING:   Labs and Images reviewed with:  [x] Jaclyn Kline MD      [] Yusuf Winters MD       [] Ming Hauser MD  [] Michael Bonilla MD  --[] there are no new interval images to review. 1. Head CT WO (1/28/2020)  Impression   Persistent extra-axial fluid collection within the left frontal   parietal/temporal region maximally measuring 2 cm in width with approximately   4 mm of midline shift.       Scattered subcortical and periventricular white matter hypodensities are most   compatible with chronic small vessel disease.         2. Head CT WO (1/29/2020)  Impression   1. Stable left frontoparietal subacute subdural hemorrhage, as discussed   above. 2. Unchanged associated rightward midline shift at the level of the septum   pellucidum measuring 3 mm.    3. Moderate cerebral white matter chronic microvascular ischemic disease.         3. Chest CT PE (1/28/2020)  Impression   1.  No evidence of pulmonary embolism.       2.  Slightly prominent ascending aorta of 4.2 cm.       3.  Scattered tree-in-bud and ground-glass infiltrates throughout the lungs.       4.  Consolidation left lower lobe.       5.  Trace pericardial effusion.       6.  Gastrostomy tube in situ.  Other findings as above.       RECOMMENDATIONS:   Vascular consultation may be considered on a nonemergent basis.         4. Brain MRI WO (1/29/2020)  Impression   1. Ongoing evolution of subacute/early chronic infarctions in the frontal   lobes bilaterally and left frontoparietal junction, involving the left RUSTY   and bilateral MCA territories with developing cortical laminar necrosis.  No   new or acute infarction. 2. Subarachnoid hemorrhage within the right frontal lobe as well as bilateral   sylvian fissures. 3. Chronic left subdural hemorrhage measures 1.5 cm in thickness with mass   effect on the subjacent left cerebral hemisphere.  No significant midline   shift. 4. Parenchymal volume loss and sequela of chronic microvascular ischemic   changes.    The findings were sent to the Radiology Results [address] at 12:10   pm on 1/29/2020to be communicated to a licensed caregiver.         ______________________________________________________________________    ROS:  CONSTITUTIONAL: negative for fatigue and malaise   EYES: negative for double vision and photophobia    HEENT: negative for tinnitus and sore throat   RESPIRATORY: negative for cough, shortness of breath   CARDIOVASCULAR: negative for chest pain, palpitations, or syncope   GASTROINTESTINAL: negative for abdominal pain, nausea, vomiting, diarrhea, or constipation    GENITOURINARY: negative for incontinence or retention    MUSCULOSKELETAL: negative for neck or back pain, negative for extremity pain   NEUROLOGICAL: Negative for seizures, headaches  Positive for aphasia, weakness   PSYCHIATRIC: negative for agitation, hallucination, SI/HI   SKIN Negative for spontaneous contusions, rashes, or lesions      PHYSICAL EXAM:    GENERAL  Appears comfortable and in no distress   HEENT  NC/ AT   HEART  S1 and S2 heard; palpation of pulses: radial pulse    NECK  Supple and no bruits heard   MENTAL STATUS:  Awake, moving spontaneously, aphasic   CRANIAL NERVES: II     -      Visual fields intact to confrontation  III,IV,VI -  PERR, EOMs full, no ptosis  V     -     Normal facial sensation   VII    -     Normal facial symmetry   MOTOR FUNCTION:  Patient is moving right upper extremity spontaneously more than left. Right upper extremity still spastic. Patient able to move spontaneously bilateral lower extremities   SENSORY FUNCTION:  Withdrawal to pain in all 4 extremities   CEREBELLAR FUNCTION:  Unable   REFLEX FUNCTION:  Symmetric in upper and lower extremities, no Babinski sign   STATION and GAIT  Deferred     VENTILATOR  [] No ventilator in this patient    [x] Ventilator:   Settings  Mode PRVC   Tidal Volume 450   RR 14   PEEP 5   O2% 30       DRAINS:  [x] There are no drains for Neuro Critical Care to monitor at this time. [] Drain monitored by NICU  [] Drain monitored by NSG    PLAN / MEDICAL DECISION MAKING:     This is a 61 y.o. 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 NEUROLOGIC:  · T head on January 28 show persistent extra-axial fluid collection left frontal parietal temporal region  · MRI brain showed evolution of the same infarct. No new evidence  · Will restart aspirin and cilostazol. CARDIOVASCULAR:  · SBP Goal < 140  · Continue amlodipine  · Telemetry    PULMONARY:  · Rhonchi  · X-ray 2/8/2020 showed mild atelectasis.   Continue respiratory therapy    RENAL/FLUID/ELECTROLYTE:  · Sodium 140  · Potassium 4.3  · Magnesium 1.7  · Phosphorous 4.1  · Creatinine 0.3  · Replace electrolytes as needed     GI/NUTRITION:  NUTRITION:  DIET TUBE FEED CONTINUOUS/CYCLIC NPO; Diabetic; Gastrostomy; Continuous; 20; 70      ID:  · Tmax: 37.6  · No antibiotics  · WBC 4.5  · Platelets 47-MRR course of Vanco dosing on 2/4/2020 for LLL pneumonia    HEMATOLOGIC:  · Hgb: 11.1  · Hct: 37.6  · Plt: 302    ENDOCRINE:  · Insulin sliding scale  · A1c 5.8    OTHER:   PT, OT, speech   Will need more physical therapy for spasticity   General surgery for tracheostomy   Orthopedic surgery for left hand fracture    PROPHYLAXIS:  Stress ulcer: None    Anticoagulation:     Prophylaxis    [] Heparin 5000 Subcutaneous Q8    [] Lovenox 40mg Subcutaneous D       [] Lovenox 30mg Suncutaneous D    [] Compression Stockings     Full Treatment    [] Heparin Drip    [] Lovenox 1mg/Kg Subcutaneous BID       [] Lovenox 1mg/Kg Subcutaneous D    [x] NOAC -Eliquis 5 mg twice daily    [] Warfarin    [] No chemical anticoagulation at moment due to:     DISPOSITION:  [x] To remain ICU  - Pre-CERT for long-term facility on Monday, 2/10/2020  [] OK for out of ICU from Neuro Critical Care standpoint    We will continue to follow along.      For any changes in exam or patient status please contact Neuro YE RE[place]ων MD Naz  Neurology Resident PGY-3  Neuro Critical Care  Pager [phone]  2/8/2020     6:22 AM

## 2020-02-08 NOTE — PLAN OF CARE
DATE: 2/8/2020    AWAKE & FOLLOWING COMMANDS: Patient is aphasic, spontaneously moving extremities, spastic    INTUBATED:  Tracheostomy 2/7/2020    SEDATION/ANALGESIA:    [] Propofol gtt  [] Precedex gtt [] Versed gtt   [x] No Sedation or Not Applicable  Pain medications:     VASOPRESSORS:  [x] No    [] Yes  [] Levophed [] Dopamine [] Vasopressin  [] Dobutamine [] Phenylephrine [] Epinephrine    CENTRAL LINES:  [x] No    [] Yes:     BECERRIL CATHETER: [x] No -condom catheter   [] Yes:      FEEDING: Able to take PO?  [] No:  [] NG/OG [x] PEG at goal 70  [] NPO for:  [] Tube Feeds    [] Yes:  Diet:     DVT Prophylaxis:  [] Lovenox   [] Heparin subQ   [x] Anticoagulation -Eliquis 5 mg twice daily (starting night 2/8/2020)   [] Contraindicated secondary to:     Stress Ulcer Prophylaxis:  [] PPI  [] H2 Receptor Blocker  [x] Not indicated    Blood Glucose:  [] Blood glucose <180 consistently  [x] Insulin sliding scale   [] Home Medications addressed    HOB >30 Degrees:  [x] Yes  [] No, contraindicated due to     Secondary Stroke Preventions:  [x] Aspirin 81mg       [] Clopidogrel 75mg PO D      [x] Atorvastatin 40mg PO HS    Toñito Kaur MD [facility]  Neurology Resident PGY-3  2/8/2020 at 1:16 PM

## 2020-02-09 LAB
ABSOLUTE EOS #: 0.16 K/UL (ref 0–0.4)
ABSOLUTE IMMATURE GRANULOCYTE: 0 K/UL (ref 0–0.3)
ABSOLUTE LYMPH #: 1.46 K/UL (ref 1–4.8)
ABSOLUTE MONO #: 0.26 K/UL (ref 0.1–0.8)
ANION GAP SERPL CALCULATED.3IONS-SCNC: 12 MMOL/L (ref 9–17)
BASOPHILS # BLD: 1 % (ref 0–2)
BASOPHILS ABSOLUTE: 0.05 K/UL (ref 0–0.2)
BUN BLDV-MCNC: 15 MG/DL (ref 6–20)
BUN/CREAT BLD: ABNORMAL (ref 9–20)
CALCIUM IONIZED: 1.16 MMOL/L (ref 1.13–1.33)
CALCIUM SERPL-MCNC: 9.3 MG/DL (ref 8.6–10.4)
CHLORIDE BLD-SCNC: 99 MMOL/L (ref 98–107)
CO2: 26 MMOL/L (ref 20–31)
CREAT SERPL-MCNC: 0.28 MG/DL (ref 0.7–1.2)
DIFFERENTIAL TYPE: ABNORMAL
EOSINOPHILS RELATIVE PERCENT: 3 % (ref 1–4)
GFR AFRICAN AMERICAN: >60 ML/MIN
GFR NON-AFRICAN AMERICAN: >60 ML/MIN
GFR SERPL CREATININE-BSD FRML MDRD: ABNORMAL ML/MIN/{1.73_M2}
GFR SERPL CREATININE-BSD FRML MDRD: ABNORMAL ML/MIN/{1.73_M2}
GLUCOSE BLD-MCNC: 155 MG/DL (ref 75–110)
GLUCOSE BLD-MCNC: 183 MG/DL (ref 75–110)
GLUCOSE BLD-MCNC: 199 MG/DL (ref 75–110)
GLUCOSE BLD-MCNC: 210 MG/DL (ref 75–110)
GLUCOSE BLD-MCNC: 226 MG/DL (ref 70–99)
HCT VFR BLD CALC: 35 % (ref 40.7–50.3)
HEMOGLOBIN: 10.4 G/DL (ref 13–17)
IMMATURE GRANULOCYTES: 0 %
LYMPHOCYTES # BLD: 28 % (ref 24–44)
MAGNESIUM: 1.8 MG/DL (ref 1.6–2.6)
MCH RBC QN AUTO: 24.5 PG (ref 25.2–33.5)
MCHC RBC AUTO-ENTMCNC: 29.7 G/DL (ref 28.4–34.8)
MCV RBC AUTO: 82.5 FL (ref 82.6–102.9)
MONOCYTES # BLD: 5 % (ref 1–7)
MORPHOLOGY: ABNORMAL
NRBC AUTOMATED: 0 PER 100 WBC
PDW BLD-RTO: 20.3 % (ref 11.8–14.4)
PHOSPHORUS: 4.3 MG/DL (ref 2.5–4.5)
PLATELET # BLD: 298 K/UL (ref 138–453)
PLATELET ESTIMATE: ABNORMAL
PMV BLD AUTO: 9.8 FL (ref 8.1–13.5)
POTASSIUM SERPL-SCNC: 4.4 MMOL/L (ref 3.7–5.3)
RBC # BLD: 4.24 M/UL (ref 4.21–5.77)
RBC # BLD: ABNORMAL 10*6/UL
SEG NEUTROPHILS: 63 % (ref 36–66)
SEGMENTED NEUTROPHILS ABSOLUTE COUNT: 3.27 K/UL (ref 1.8–7.7)
SODIUM BLD-SCNC: 137 MMOL/L (ref 135–144)
WBC # BLD: 5.2 K/UL (ref 3.5–11.3)
WBC # BLD: ABNORMAL 10*3/UL

## 2020-02-09 PROCEDURE — 94770 HC ETCO2 MONITOR DAILY: CPT

## 2020-02-09 PROCEDURE — 6370000000 HC RX 637 (ALT 250 FOR IP): Performed by: STUDENT IN AN ORGANIZED HEALTH CARE EDUCATION/TRAINING PROGRAM

## 2020-02-09 PROCEDURE — 2700000000 HC OXYGEN THERAPY PER DAY

## 2020-02-09 PROCEDURE — 6370000000 HC RX 637 (ALT 250 FOR IP): Performed by: INTERNAL MEDICINE

## 2020-02-09 PROCEDURE — 82947 ASSAY GLUCOSE BLOOD QUANT: CPT

## 2020-02-09 PROCEDURE — 2580000003 HC RX 258: Performed by: STUDENT IN AN ORGANIZED HEALTH CARE EDUCATION/TRAINING PROGRAM

## 2020-02-09 PROCEDURE — 84100 ASSAY OF PHOSPHORUS: CPT

## 2020-02-09 PROCEDURE — 82330 ASSAY OF CALCIUM: CPT

## 2020-02-09 PROCEDURE — 85025 COMPLETE CBC W/AUTO DIFF WBC: CPT

## 2020-02-09 PROCEDURE — 6370000000 HC RX 637 (ALT 250 FOR IP): Performed by: NURSE PRACTITIONER

## 2020-02-09 PROCEDURE — 80048 BASIC METABOLIC PNL TOTAL CA: CPT

## 2020-02-09 PROCEDURE — 94761 N-INVAS EAR/PLS OXIMETRY MLT: CPT

## 2020-02-09 PROCEDURE — 99233 SBSQ HOSP IP/OBS HIGH 50: CPT | Performed by: PSYCHIATRY & NEUROLOGY

## 2020-02-09 PROCEDURE — 83735 ASSAY OF MAGNESIUM: CPT

## 2020-02-09 PROCEDURE — 2060000000 HC ICU INTERMEDIATE R&B

## 2020-02-09 PROCEDURE — 36415 COLL VENOUS BLD VENIPUNCTURE: CPT

## 2020-02-09 PROCEDURE — 94003 VENT MGMT INPAT SUBQ DAY: CPT

## 2020-02-09 RX ORDER — INSULIN GLARGINE 100 [IU]/ML
15 INJECTION, SOLUTION SUBCUTANEOUS NIGHTLY
Status: DISCONTINUED | OUTPATIENT
Start: 2020-02-09 | End: 2020-02-12

## 2020-02-09 RX ADMIN — INSULIN LISPRO 1 UNITS: 100 INJECTION, SOLUTION INTRAVENOUS; SUBCUTANEOUS at 21:33

## 2020-02-09 RX ADMIN — Medication 15 ML: at 09:00

## 2020-02-09 RX ADMIN — DESMOPRESSIN ACETATE 40 MG: 0.2 TABLET ORAL at 21:41

## 2020-02-09 RX ADMIN — APIXABAN 5 MG: 5 TABLET, FILM COATED ORAL at 01:06

## 2020-02-09 RX ADMIN — INSULIN GLARGINE 15 UNITS: 100 INJECTION, SOLUTION SUBCUTANEOUS at 21:55

## 2020-02-09 RX ADMIN — GABAPENTIN 300 MG: 300 CAPSULE ORAL at 09:16

## 2020-02-09 RX ADMIN — SODIUM CHLORIDE, PRESERVATIVE FREE 10 ML: 5 INJECTION INTRAVENOUS at 09:00

## 2020-02-09 RX ADMIN — CILOSTAZOL 100 MG: 100 TABLET ORAL at 21:33

## 2020-02-09 RX ADMIN — SENNOSIDES AND DOCUSATE SODIUM 2 TABLET: 8.6; 5 TABLET ORAL at 09:16

## 2020-02-09 RX ADMIN — DULOXETINE HYDROCHLORIDE 60 MG: 30 CAPSULE, DELAYED RELEASE ORAL at 21:33

## 2020-02-09 RX ADMIN — SOTALOL HYDROCHLORIDE 160 MG: 80 TABLET ORAL at 21:33

## 2020-02-09 RX ADMIN — Medication 15 ML: at 21:33

## 2020-02-09 RX ADMIN — INSULIN LISPRO 1 UNITS: 100 INJECTION, SOLUTION INTRAVENOUS; SUBCUTANEOUS at 18:08

## 2020-02-09 RX ADMIN — AMLODIPINE BESYLATE 5 MG: 5 TABLET ORAL at 09:16

## 2020-02-09 RX ADMIN — FOLIC ACID 1 MG: 1 TABLET ORAL at 09:16

## 2020-02-09 RX ADMIN — FERROUS SULFATE TAB EC 325 MG (65 MG FE EQUIVALENT) 325 MG: 325 (65 FE) TABLET DELAYED RESPONSE at 09:16

## 2020-02-09 RX ADMIN — ANTI-FUNGAL POWDER MICONAZOLE NITRATE TALC FREE: 1.42 POWDER TOPICAL at 21:43

## 2020-02-09 RX ADMIN — SOTALOL HYDROCHLORIDE 160 MG: 80 TABLET ORAL at 09:16

## 2020-02-09 RX ADMIN — ASPIRIN 81 MG: 81 TABLET, CHEWABLE ORAL at 09:16

## 2020-02-09 RX ADMIN — POLYETHYLENE GLYCOL 3350 17 G: 17 POWDER, FOR SOLUTION ORAL at 09:17

## 2020-02-09 RX ADMIN — GLYCOPYRROLATE 1 MG: 1 TABLET ORAL at 13:33

## 2020-02-09 RX ADMIN — INSULIN LISPRO 2 UNITS: 100 INJECTION, SOLUTION INTRAVENOUS; SUBCUTANEOUS at 09:16

## 2020-02-09 RX ADMIN — INSULIN LISPRO 1 UNITS: 100 INJECTION, SOLUTION INTRAVENOUS; SUBCUTANEOUS at 12:36

## 2020-02-09 RX ADMIN — GABAPENTIN 300 MG: 300 CAPSULE ORAL at 19:40

## 2020-02-09 RX ADMIN — SODIUM CHLORIDE, PRESERVATIVE FREE 10 ML: 5 INJECTION INTRAVENOUS at 21:43

## 2020-02-09 ASSESSMENT — PAIN SCALES - WONG BAKER
WONGBAKER_NUMERICALRESPONSE: 0

## 2020-02-09 ASSESSMENT — PULMONARY FUNCTION TESTS
PIF_VALUE: 16
PIF_VALUE: 15
PIF_VALUE: 15
PIF_VALUE: 16

## 2020-02-09 ASSESSMENT — PAIN SCALES - GENERAL: PAINLEVEL_OUTOF10: 0

## 2020-02-09 NOTE — PLAN OF CARE
PROVIDE ADEQUATE OXYGENATION WITH ACCEPTABLE SP02/ABG'S    [x]  IDENTIFY APPROPRIATE OXYGEN THERAPY  [x]   MONITOR SP02/ABG'S AS NEEDED   [x]   PATIENT EDUCATION AS NEEDED    Ventilator Bronchodilator assessment    Breath sounds: rhonchi  Inspiratory Pressure: 16  Plateau Pressure: 14    Patient assessed at level 1          []    Bronchodilator Assessment    BRONCHODILATOR ASSESSMENT SCORE  Score 0 (Home) 1 2 3 4   Breath Sounds   []  Chronic Ventilator: Patient at baseline [x]  Mild Wheezes/ Clear []  Intermittent wheezes with good air entry []  Bilateral/unilateral wheezing with diminished air entry []  Insp/Exp wheeze and/or poor aeration   Ventilator Pressures   []  Chronic Ventilator [x]  Insp. Pressure less than 25 cm H20 []  Insp. Pressure less than 25 cm H20 []  Insp. Pressure exceeds 25 cm H20 []  Insp.  Pressure exceeds 30 cm H20   Plateau Pressure []  NA   [x]  Plateau Pressure less than 4  []  Plateau Pressure less than or equal to 5 []  Plateau Pressure greater than or equal to 6 []  Plateau Pressure greater than or equal to [place]  8:01 AM

## 2020-02-09 NOTE — CARE COORDINATION
Pt transferred to step-down, await admission to [facility]. Pt needs PT/OT updated notes for precert.

## 2020-02-09 NOTE — PROGRESS NOTES
Trach care done without incident. Inner cannula changed. Dressings changed. Mild redness under the dressing, washed with soap and water. No breakdown noted. Pt transferred to .  Report given

## 2020-02-09 NOTE — PROGRESS NOTES
sats.  General surgery consulted for trach. Eliquis held, heparin infusion started. 2/6: Tolerating biPAP well, remained on heparin infusion while awaiting trach. 2/7: Hypoxic with saturation into the 80's and tachypneic with RR 30-40's overnight. Hypoxia improved with frequent NT suctioning. Nursing reports suctioning thick, yellow secretions almost hourly overnight. FiO2 uptitrated to 60%. Given Fentanyl x1 with mild improvement in tachypnea. On exam this morning, patient remains mildly tachypneic but is saturating well on biPAP. No fevers, leukocytosis, tachycardia, or hypotension suggestive of infection. CXR this AM unremarkable. Blood glucose remains elevated (190-266), Lantus increased to 10u QHS. Neuro exam remains stable. 2/8:Patient was evaluated at bedside and found status post a tracheostomy postoperative day #1. Patient still aphasic. No fever, no leukocytosis. Patient is placed on CPAP this morning for evaluation of tolerance of tracheostomy. Patient still with thick secretions but been taking care off. Today we can start weaning off ventilator and patient will have pre-CERT to long-term facility. If unable to get into long-term facility will consider SNF. Will start anticoagulation today after tracheostomy and will start at night with Eliquis. Patient still need PT therapies due to spasticity. Will restart also aspirin and cilostazol. As per  pre-CERT will start on Monday, 2/10/2020. Today   Patient was evaluated at bedside and is status post tracheostomy postoperative day #2. Patient still aphasic but more awake. Still showing no fevers no leukocytosis. Patient tolerated T-tube during the morning and ventilation during the night.   Patient was started on Eliquis last night and this morning had a suspicious episode of blood-tinged secretions and Eliquis was held but by talking with respiratory seen his secretions patient will be started on Eliquis during the night.   Patient will be moved to the floor for ventilator unit I will neuro ICU will be primary and patient will start pre-CERT tomorrow for [facility] or possible SNF if not appropriate for LTACH    CURRENT MEDICATIONS:  SCHEDULED MEDICATIONS:   apixaban  5 mg Oral BID    cilostazol  100 mg Oral BID    polyethylene glycol  17 g Oral Daily    insulin glargine  10 Units Subcutaneous Nightly    chlorhexidine  15 mL Mouth/Throat BID    insulin lispro  0-6 Units Subcutaneous TID WC    insulin lispro  0-3 Units Subcutaneous Nightly    miconazole   Topical BID    sennosides-docusate sodium  2 tablet Oral Daily    aspirin  81 mg Oral Daily    ferrous sulfate  325 mg Oral Daily with breakfast    amLODIPine  5 mg Oral Daily    atorvastatin  40 mg Oral Nightly    DULoxetine  60 mg Oral Nightly    folic acid  1 mg Oral Daily    gabapentin  300 mg Oral BID    sotalol  160 mg Oral BID    sodium chloride flush  10 mL Intravenous 2 times per day         VITALS:  Temperature Range: Temp: 98.8 °F (37.1 °C) Temp  Av.8 °F (37.1 °C)  Min: 98.3 °F (36.8 °C)  Max: 99.3 °F (37.4 °C)  BP Range: Systolic (29GFF), BMA:192 , Min:110 , TXW:063     Diastolic (16WIG), FLZ:65, Min:68, Max:93    Pulse Range: Pulse  Av  Min: 70  Max: 102  Respiration Range: Resp  Av.4  Min: 22  Max: 30  Current Pulse Ox: SpO2: 97 %  24HR Pulse Ox Range: SpO2  Av.2 %  Min: 93 %  Max: 100 %  Patient Vitals for the past 12 hrs:   BP Temp Temp src Pulse Resp SpO2   20 1129 -- -- -- -- (!) 31 92 %   20 0916 (!) 154/79 -- -- -- -- --   20 0800 -- -- -- 89 -- --   20 0756 -- -- -- -- 24 96 %   20 0752 -- -- -- 79 22 96 %   20 0703 (!) 158/82 -- -- 81 26 93 %   20 0603 137/77 -- -- 79 26 97 %   20 0503 132/81 -- -- 81 25 93 %   20 0403 (!) 154/84 98.8 °F (37.1 °C) Oral 82 28 97 %   20 0343 -- -- -- 81 27 95 %   20 0303 (!) 135/93 -- -- 76 28 97 %   20 0203 129/77 -- -- 74 28 95 %     Estimated body mass index is 21.9 kg/m² as calculated from the following:    Height as of this encounter: 5' 11\" (1.803 m). Weight as of this encounter: 157 lb (71.2 kg).  []<16 Severe malnutrition  []16-16.99 Moderate malnutrition  []17-18.49 Mild malnutrition  [x]18.5-24.9 Normal  []25-29.9 Overweight (not obese)  []30-34.9 Obese class 1 (Low Risk)  []35-39.9 Obese class 2 (Moderate Risk)  []?40 Obese class 3 (High Risk)    RECENT LABS:   DATA  CBC:   Recent Labs     02/07/20 0427 02/08/20  0356 02/09/20  0454   WBC 7.5 4.5 5.2   HGB 11.1* 10.0* 10.4*   HCT 37.6* 34.5* 35.0*    295 298     BMP:    Recent Labs     02/07/20 0427 02/08/20  0356 02/09/20  0454    140 137   K 4.5 4.3 4.4   CL 97* 102 99   CO2 27 23 26   BUN 17 17 15   CREATININE 0.27* 0.30* 0.28*   GLUCOSE 204* 202* 226*   MG  --  1.7 1.8         Lab Results   Component Value Date    CHOL 92 01/29/2020    LDLCHOLESTEROL 49 01/29/2020    HDL 26 (L) 01/29/2020    TRIG 84 01/29/2020     (H) 02/02/2020    AST 61 (H) 02/02/2020    INR 1.4 01/28/2020    LABA1C 5.8 01/29/2020    [id number] 676 12/26/2019       24 HOUR INTAKE/OUTPUT:    Intake/Output Summary (Last 24 hours) at 2/9/2020 0640  Last data filed at 2/9/2020 0400  Gross per 24 hour   Intake 1197 ml   Output 920 ml   Net 277 ml       RADIOLOGY/IMAGING:   Labs and Images reviewed with:  [x] Sabine Godfrey MD      [] Alex Bar MD       [] Leoncio Hinojosa MD  [] Gail Alanis MD  --[] there are no new interval images to review. 1. Head CT WO (1/28/2020)  Impression   Persistent extra-axial fluid collection within the left frontal   parietal/temporal region maximally measuring 2 cm in width with approximately   4 mm of midline shift.       Scattered subcortical and periventricular white matter hypodensities are most   compatible with chronic small vessel disease.         2. Head CT WO (1/29/2020)  Impression   1.  Stable left frontoparietal subacute subdural for incontinence or retention    MUSCULOSKELETAL: negative for neck or back pain, negative for extremity pain   NEUROLOGICAL: Negative for seizures, headaches  Positive for aphasia, weakness   PSYCHIATRIC: negative for agitation, hallucination, SI/HI   SKIN Negative for spontaneous contusions, rashes, or lesions      PHYSICAL EXAM:    GENERAL  Appears comfortable and in no distress   HEENT  NC/ AT   HEART  S1 and S2 heard; palpation of pulses: radial pulse    NECK  Supple and no bruits heard   MENTAL STATUS:  Awake, moving spontaneously, aphasic   CRANIAL NERVES: II     -      Visual fields intact to confrontation  III,IV,VI -  PERR, EOMs full, no ptosis  V     -     Normal facial sensation   VII    -     Normal facial symmetry   MOTOR FUNCTION:  Patient is moving right upper extremity spontaneously more than left. Right upper extremity still spastic. Patient able to move spontaneously bilateral lower extremities   SENSORY FUNCTION:  Withdrawal to pain in all 4 extremities   CEREBELLAR FUNCTION:  Unable   REFLEX FUNCTION:  Symmetric in upper and lower extremities, no Babinski sign   STATION and GAIT  Deferred     VENTILATOR  [] No ventilator in this patient    [x] Ventilator: During today patient is on T-tube, during night patient uses ventilator   Settings  Mode PRVC   Tidal Volume 450   RR 14   PEEP 5   O2% 30       DRAINS:  [x] There are no drains for Neuro Critical Care to monitor at this time. [] Drain monitored by NICU  [] Drain monitored by NSG    PLAN / MEDICAL DECISION MAKING:     This is a 61 y.o. 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 NEUROLOGIC:  · CT head on January 28 show persistent extra-axial fluid collection left frontal parietal temporal region  · MRI brain showed evolution of the same infarct. No new evidence  · Continue aspirin and cilostazol.     CARDIOVASCULAR:  · SBP Goal < 140  · Continue amlodipine  · Telemetry    PULMONARY:  · Rhonchi  · X-ray 2/8/2020 showed mild atelectasis. Continue respiratory therapy  · Was some mention of some blood-tinged sputum secretions and Eliquis was held in the morning but will resume in the afternoon. Respiratory therapy preferred no blood tinged mucus by their exam and suction    RENAL/FLUID/ELECTROLYTE:  · Sodium 137  · Potassium 4.4  · Magnesium 1.8  · Phosphorous 4.3  · Creatinine 0.28  · Calcium 9.3  · Ionized Calcium 1.16  · Replace electrolytes as needed     GI/NUTRITION:  NUTRITION:  DIET TUBE FEED CONTINUOUS/CYCLIC NPO; Diabetic; Gastrostomy; Continuous; 20; 70      ID:  · Tmax: 37.4  · No antibiotics  · WBC 5.2  · Patient completed course of Vanco on 2/4/2020 for LLL pneumonia    HEMATOLOGIC:  · Hgb: 10.4 from 10  · Hct: 35  · Plt: 3298    ENDOCRINE:  · Insulin sliding scale  · A1c 5.8    OTHER:   PT, OT, speech   Will need more physical therapy for spasticity -make sure PT and OT work with patient   General surgery for tracheostomy -done on 2/7/2020   Orthopedic surgery for left hand fracture -they recommend a thumb spica cast    PROPHYLAXIS:  Stress ulcer: None    Anticoagulation:     Prophylaxis    [] Heparin 5000 Subcutaneous Q8    [] Lovenox 40mg Subcutaneous D       [] Lovenox 30mg Suncutaneous D    [] Compression Stockings     Full Treatment    [] Heparin Drip    [] Lovenox 1mg/Kg Subcutaneous BID       [] Lovenox 1mg/Kg Subcutaneous D    [x] NOAC -Eliquis 5 mg twice daily    [] Warfarin    [] No chemical anticoagulation at moment due to:     DISPOSITION:  [x]Patient can be moved to 4 floor on the ventilator unit to continue management-   As per  note on 4/9/1545 pre-CERT will start on Monday, 2/10/2020 for LTACH. If patient is not abdomen for LTACH will go to SNF  [] OK for out of ICU from Neuro Critical Care standpoint    We will continue to follow along.      For any changes in exam or patient status please contact

## 2020-02-09 NOTE — PROGRESS NOTES
02/08/20 2047   Vent Information   Vent Type Servo i   Vent Mode PRVC   Vt Ordered 450 mL   Rate Set 14 bmp   FiO2  30 %   Sensitivity 5   PEEP/CPAP 5   I Time/ I Time % 0.9 s     Pt placed back on ventilator at charted settings per verbal order from

## 2020-02-10 ENCOUNTER — APPOINTMENT (OUTPATIENT)
Dept: GENERAL RADIOLOGY | Age: 60
DRG: 005 | End: 2020-02-10
Payer: MEDICAID

## 2020-02-10 LAB
ABSOLUTE EOS #: 0.13 K/UL (ref 0–0.44)
ABSOLUTE IMMATURE GRANULOCYTE: 0 K/UL (ref 0–0.3)
ABSOLUTE LYMPH #: 1.14 K/UL (ref 1.1–3.7)
ABSOLUTE MONO #: 0.47 K/UL (ref 0.1–1.2)
ANION GAP SERPL CALCULATED.3IONS-SCNC: 13 MMOL/L (ref 9–17)
BASOPHILS # BLD: 1 % (ref 0–2)
BASOPHILS ABSOLUTE: 0.07 K/UL (ref 0–0.2)
BUN BLDV-MCNC: 15 MG/DL (ref 6–20)
BUN/CREAT BLD: ABNORMAL (ref 9–20)
CALCIUM IONIZED: 1.14 MMOL/L (ref 1.13–1.33)
CALCIUM SERPL-MCNC: 9.5 MG/DL (ref 8.6–10.4)
CHLORIDE BLD-SCNC: 98 MMOL/L (ref 98–107)
CO2: 25 MMOL/L (ref 20–31)
CREAT SERPL-MCNC: 0.31 MG/DL (ref 0.7–1.2)
DIFFERENTIAL TYPE: ABNORMAL
EOSINOPHILS RELATIVE PERCENT: 2 % (ref 1–4)
GFR AFRICAN AMERICAN: >60 ML/MIN
GFR NON-AFRICAN AMERICAN: >60 ML/MIN
GFR SERPL CREATININE-BSD FRML MDRD: ABNORMAL ML/MIN/{1.73_M2}
GFR SERPL CREATININE-BSD FRML MDRD: ABNORMAL ML/MIN/{1.73_M2}
GLUCOSE BLD-MCNC: 173 MG/DL (ref 75–110)
GLUCOSE BLD-MCNC: 190 MG/DL (ref 75–110)
GLUCOSE BLD-MCNC: 211 MG/DL (ref 75–110)
GLUCOSE BLD-MCNC: 238 MG/DL (ref 75–110)
GLUCOSE BLD-MCNC: 243 MG/DL (ref 75–110)
GLUCOSE BLD-MCNC: 248 MG/DL (ref 70–99)
HCT VFR BLD CALC: 37.2 % (ref 40.7–50.3)
HEMOGLOBIN: 11.1 G/DL (ref 13–17)
IMMATURE GRANULOCYTES: 0 %
LYMPHOCYTES # BLD: 17 % (ref 24–43)
MAGNESIUM: 1.8 MG/DL (ref 1.6–2.6)
MCH RBC QN AUTO: 24.6 PG (ref 25.2–33.5)
MCHC RBC AUTO-ENTMCNC: 29.8 G/DL (ref 28.4–34.8)
MCV RBC AUTO: 82.3 FL (ref 82.6–102.9)
MONOCYTES # BLD: 7 % (ref 3–12)
MORPHOLOGY: ABNORMAL
MORPHOLOGY: ABNORMAL
NRBC AUTOMATED: 0 PER 100 WBC
PDW BLD-RTO: 20.1 % (ref 11.8–14.4)
PHOSPHORUS: 4 MG/DL (ref 2.5–4.5)
PLATELET # BLD: 306 K/UL (ref 138–453)
PLATELET ESTIMATE: ABNORMAL
PMV BLD AUTO: 9.4 FL (ref 8.1–13.5)
POTASSIUM SERPL-SCNC: 4.7 MMOL/L (ref 3.7–5.3)
RBC # BLD: 4.52 M/UL (ref 4.21–5.77)
RBC # BLD: ABNORMAL 10*6/UL
SEG NEUTROPHILS: 73 % (ref 36–65)
SEGMENTED NEUTROPHILS ABSOLUTE COUNT: 4.89 K/UL (ref 1.5–8.1)
SODIUM BLD-SCNC: 136 MMOL/L (ref 135–144)
WBC # BLD: 6.7 K/UL (ref 3.5–11.3)
WBC # BLD: ABNORMAL 10*3/UL

## 2020-02-10 PROCEDURE — 76937 US GUIDE VASCULAR ACCESS: CPT

## 2020-02-10 PROCEDURE — 97110 THERAPEUTIC EXERCISES: CPT

## 2020-02-10 PROCEDURE — 2500000003 HC RX 250 WO HCPCS: Performed by: STUDENT IN AN ORGANIZED HEALTH CARE EDUCATION/TRAINING PROGRAM

## 2020-02-10 PROCEDURE — 6370000000 HC RX 637 (ALT 250 FOR IP): Performed by: NURSE PRACTITIONER

## 2020-02-10 PROCEDURE — 2060000000 HC ICU INTERMEDIATE R&B

## 2020-02-10 PROCEDURE — 93005 ELECTROCARDIOGRAM TRACING: CPT | Performed by: PSYCHIATRY & NEUROLOGY

## 2020-02-10 PROCEDURE — 2700000000 HC OXYGEN THERAPY PER DAY

## 2020-02-10 PROCEDURE — 94761 N-INVAS EAR/PLS OXIMETRY MLT: CPT

## 2020-02-10 PROCEDURE — 97530 THERAPEUTIC ACTIVITIES: CPT

## 2020-02-10 PROCEDURE — 83735 ASSAY OF MAGNESIUM: CPT

## 2020-02-10 PROCEDURE — 6370000000 HC RX 637 (ALT 250 FOR IP): Performed by: STUDENT IN AN ORGANIZED HEALTH CARE EDUCATION/TRAINING PROGRAM

## 2020-02-10 PROCEDURE — 6370000000 HC RX 637 (ALT 250 FOR IP): Performed by: INTERNAL MEDICINE

## 2020-02-10 PROCEDURE — 2580000003 HC RX 258: Performed by: STUDENT IN AN ORGANIZED HEALTH CARE EDUCATION/TRAINING PROGRAM

## 2020-02-10 PROCEDURE — 99233 SBSQ HOSP IP/OBS HIGH 50: CPT | Performed by: PSYCHIATRY & NEUROLOGY

## 2020-02-10 PROCEDURE — 36415 COLL VENOUS BLD VENIPUNCTURE: CPT

## 2020-02-10 PROCEDURE — 84100 ASSAY OF PHOSPHORUS: CPT

## 2020-02-10 PROCEDURE — 71045 X-RAY EXAM CHEST 1 VIEW: CPT

## 2020-02-10 PROCEDURE — 82947 ASSAY GLUCOSE BLOOD QUANT: CPT

## 2020-02-10 PROCEDURE — 97535 SELF CARE MNGMENT TRAINING: CPT

## 2020-02-10 PROCEDURE — 82330 ASSAY OF CALCIUM: CPT

## 2020-02-10 PROCEDURE — 80048 BASIC METABOLIC PNL TOTAL CA: CPT

## 2020-02-10 PROCEDURE — 85025 COMPLETE CBC W/AUTO DIFF WBC: CPT

## 2020-02-10 RX ORDER — METOPROLOL TARTRATE 5 MG/5ML
2.5 INJECTION INTRAVENOUS ONCE
Status: COMPLETED | OUTPATIENT
Start: 2020-02-10 | End: 2020-02-10

## 2020-02-10 RX ORDER — DILTIAZEM HYDROCHLORIDE 5 MG/ML
10 INJECTION INTRAVENOUS ONCE
Status: COMPLETED | OUTPATIENT
Start: 2020-02-10 | End: 2020-02-10

## 2020-02-10 RX ORDER — METOPROLOL TARTRATE 5 MG/5ML
5 INJECTION INTRAVENOUS ONCE
Status: COMPLETED | OUTPATIENT
Start: 2020-02-10 | End: 2020-02-10

## 2020-02-10 RX ADMIN — FOLIC ACID 1 MG: 1 TABLET ORAL at 09:12

## 2020-02-10 RX ADMIN — CILOSTAZOL 100 MG: 100 TABLET ORAL at 09:12

## 2020-02-10 RX ADMIN — DESMOPRESSIN ACETATE 40 MG: 0.2 TABLET ORAL at 21:19

## 2020-02-10 RX ADMIN — INSULIN LISPRO 2 UNITS: 100 INJECTION, SOLUTION INTRAVENOUS; SUBCUTANEOUS at 09:13

## 2020-02-10 RX ADMIN — ANTI-FUNGAL POWDER MICONAZOLE NITRATE TALC FREE: 1.42 POWDER TOPICAL at 21:35

## 2020-02-10 RX ADMIN — ASPIRIN 81 MG: 81 TABLET, CHEWABLE ORAL at 09:12

## 2020-02-10 RX ADMIN — AMLODIPINE BESYLATE 5 MG: 5 TABLET ORAL at 09:12

## 2020-02-10 RX ADMIN — GABAPENTIN 300 MG: 300 CAPSULE ORAL at 18:40

## 2020-02-10 RX ADMIN — ANTI-FUNGAL POWDER MICONAZOLE NITRATE TALC FREE: 1.42 POWDER TOPICAL at 09:13

## 2020-02-10 RX ADMIN — METOPROLOL TARTRATE 2.5 MG: 5 INJECTION, SOLUTION INTRAVENOUS at 18:38

## 2020-02-10 RX ADMIN — SENNOSIDES AND DOCUSATE SODIUM 2 TABLET: 8.6; 5 TABLET ORAL at 09:12

## 2020-02-10 RX ADMIN — DULOXETINE HYDROCHLORIDE 60 MG: 30 CAPSULE, DELAYED RELEASE ORAL at 21:20

## 2020-02-10 RX ADMIN — FERROUS SULFATE TAB EC 325 MG (65 MG FE EQUIVALENT) 325 MG: 325 (65 FE) TABLET DELAYED RESPONSE at 09:12

## 2020-02-10 RX ADMIN — APIXABAN 5 MG: 5 TABLET, FILM COATED ORAL at 21:19

## 2020-02-10 RX ADMIN — METOPROLOL TARTRATE 5 MG: 5 INJECTION, SOLUTION INTRAVENOUS at 18:55

## 2020-02-10 RX ADMIN — DILTIAZEM HYDROCHLORIDE 10 MG: 5 INJECTION INTRAVENOUS at 21:21

## 2020-02-10 RX ADMIN — SODIUM CHLORIDE, PRESERVATIVE FREE 10 ML: 5 INJECTION INTRAVENOUS at 09:13

## 2020-02-10 RX ADMIN — APIXABAN 5 MG: 5 TABLET, FILM COATED ORAL at 09:12

## 2020-02-10 RX ADMIN — CILOSTAZOL 100 MG: 100 TABLET ORAL at 21:19

## 2020-02-10 RX ADMIN — POLYETHYLENE GLYCOL 3350 17 G: 17 POWDER, FOR SOLUTION ORAL at 09:13

## 2020-02-10 RX ADMIN — INSULIN LISPRO 1 UNITS: 100 INJECTION, SOLUTION INTRAVENOUS; SUBCUTANEOUS at 16:46

## 2020-02-10 RX ADMIN — METOPROLOL TARTRATE 5 MG: 5 INJECTION, SOLUTION INTRAVENOUS at 19:07

## 2020-02-10 RX ADMIN — Medication 15 ML: at 21:19

## 2020-02-10 RX ADMIN — SODIUM CHLORIDE, PRESERVATIVE FREE 10 ML: 5 INJECTION INTRAVENOUS at 21:20

## 2020-02-10 RX ADMIN — SOTALOL HYDROCHLORIDE 160 MG: 80 TABLET ORAL at 21:20

## 2020-02-10 RX ADMIN — INSULIN GLARGINE 15 UNITS: 100 INJECTION, SOLUTION SUBCUTANEOUS at 21:20

## 2020-02-10 RX ADMIN — GABAPENTIN 300 MG: 300 CAPSULE ORAL at 09:12

## 2020-02-10 RX ADMIN — Medication 15 ML: at 09:13

## 2020-02-10 RX ADMIN — SOTALOL HYDROCHLORIDE 160 MG: 80 TABLET ORAL at 09:12

## 2020-02-10 RX ADMIN — INSULIN LISPRO 2 UNITS: 100 INJECTION, SOLUTION INTRAVENOUS; SUBCUTANEOUS at 21:24

## 2020-02-10 ASSESSMENT — PAIN SCALES - WONG BAKER

## 2020-02-10 ASSESSMENT — PAIN SCALES - GENERAL: PAINLEVEL_OUTOF10: 0

## 2020-02-10 NOTE — PLAN OF CARE
Problem: Risk for Impaired Skin Integrity  Goal: Tissue integrity - skin and mucous membranes  Description  Structural intactness and normal physiological function of skin and  mucous membranes. 2/10/2020 1509 by Avani Farrell RN  Outcome: Ongoing  Note:   Writer assisting pt with turns every 2 hours and heels elevated off bed to prevent any signs of new skin breakdown. Linens remain clean and dry. Will continue to monitor.

## 2020-02-10 NOTE — PROGRESS NOTES
Screening  Patient Currently in Pain: No  Vital Signs  Pulse: 100(During rolling, patient moving phlegm in trach tube, pulse became elevated which appeared to be due to struggling with phlegm in tube. Pulse up to low 120s. Suctioning done by nurse.)  Patient Currently in Pain: No       Orientation     Cognition   Cognition  Arousal/Alertness: Inconsistent responses to stimuli  Following Commands: Does not follow commands  Objective   Bed mobility  Rolling to Left: 2 Person assistance;Maximum assistance  Rolling to Right: Maximum assistance;2 Person assistance              Exercises  Comments: PROM of B UEs and LEs in all planes [id number]                                  Goals  Short term goals  Time Frame for Short term goals: 14 visits  Short term goal 1: Pt to tolerate 30 minutes of activity for improved tolerance to therapy interventions  Short term goal 2: Complete bed mobility Desi from flattened position using bedrails as needed for greater independence  Short term goal 3: Perform stand pivot transfer to recliner with Desi for ease of mobility and functional transfers  Short term goal 4: Demo fair + seated static and dynamic balance to reduce risk of falls  Short term goal 5: Participate in AAROM and AROM for strengthening    Plan    Plan  Times per week: 5-6x/wk  Current Treatment Recommendations: Strengthening, ROM, Balance Training, Functional Mobility Training, Transfer Training, Safety Education & Training, Home Exercise Program, Endurance Training, Equipment Evaluation, Education, & procurement, Patient/Caregiver Education & Training, Gait Training  Safety Devices  Type of devices:  All fall risk precautions in place, Nurse notified, Left in bed, Call light within reach  Restraints  Initially in place: No     Therapy Time   Individual Concurrent Group Co-treatment   Time In 0935         Time Out 1005         Minutes 30         Timed Code Treatment Minutes: [address] [facility], PT

## 2020-02-10 NOTE — PROGRESS NOTES
Occupational Therapy  Facility/Department: [facility] 4A STEPDOWN  Daily Treatment Note  NAME: Jose Chavez  : 1960  MRN: [id number]    Date of Service: 2/10/2020    Discharge Recommendations:  Patient would benefit from continued therapy after discharge. Assessment   Performance deficits / Impairments: Decreased functional mobility ; Decreased cognition;Decreased high-level IADLs;Decreased ADL status; Decreased endurance;Decreased balance;Decreased strength;Decreased posture;Decreased safe awareness  Prognosis: Fair  OT Education: OT Role;Plan of Care;Transfer Training  Patient Education: purpose of OT; proper hand, foot and body placement  Barriers to Learning: pt demo P carry over req assistance  REQUIRES OT FOLLOW UP: Yes  Activity Tolerance  Activity Tolerance: Patient limited by fatigue  Safety Devices  Safety Devices in place: Yes  Type of devices: Patient at risk for falls; Left in bed;Bed alarm in place;Call light within reach;Nurse notified         Patient Diagnosis(es): The encounter diagnosis was SDH (subdural hematoma) ([facility] [address] has a past medical history of Alcohol abuse, Atrial fibrillation ([facility] [address] Diabetes ([facility] [address] Gastritis, Hyperlipidemia, Hypertension, Left ventricular hypertrophy, Neuropathy, and Renal cyst.   has a past surgical history that includes Coronary angioplasty with stent (); angioplasty (); Carotid stent placement (2019); Gastrostomy tube placement (2020); Upper gastrointestinal endoscopy (N/A, 2020); tracheostomy (2020); and tracheostomy (N/A, 2020).     Restrictions  Restrictions/Precautions  Restrictions/Precautions: Weight Bearing, Fall Risk, General Precautions, Seizure  Required Braces or Orthoses?: Yes  Upper Extremity Weight Bearing Restrictions  Right Upper Extremity Weight Bearing: Non Weight Bearing  Left Upper Extremity Weight Bearing: Non Weight Bearing  Other: NWB bilat wrists per ortho recommendations from 20 s/p fall  Required Braces or Orthoses  Left Upper Extremity Brace/Splint: (splinted per orthosx)  Position Activity Restriction  Other position/activity restrictions: Keep MAP>65  Subjective   General  Chart Reviewed: Orders, Progress Notes, History and Physical, Imaging, Labs, Previous Admission  Patient assessed for rehabilitation services?: Yes  Family / Caregiver Present: No  General Comment  Comments: RN ok'd for therapy this AM. Pt shook head yes/no 4x appropriately in response to yes/no questions; agreeable to participation in therapy by shaking head \"yes\" and pt cooperative throughout. Pain Assessment  Non-Pharmaceutical Pain Intervention(s): Therapeutic presence;Elevation;Repositioned  Vital Signs  BP: 132/88(after EOB sitting)  Patient Currently in Pain: Yes(when asked if pt in pain, pt shook head yes. Pt not able to verbally rate and no physical signs of pain exhibited)  Oxygen Therapy  SpO2: 91 %(post activity)  O2 Device: T-Piece  O2 Flow Rate (L/min): 9 L/min   Orientation  Orientation  Overall Orientation Status: (SHA pt does not verbally respond. Pt oreinted to name)  Objective    ADL  Grooming: Maximum assistance;Setup;Verbal cueing; Increased time to complete(face washing completed supine in bed utilizing HHA with R hand)  Balance  Sitting Balance: Moderate assistance(Pt tolerated appeox 4 min of static sitting at EOB. Mod A with intermitent Min A and CGA)  Standing Balance  Comment: SHA- pt not appropriate  Bed mobility  Supine to Sit: Maximum assistance;2 Person assistance  Sit to Supine: Maximum assistance;2 Person assistance  Scooting: Maximal assistance  Comment: HOB elevated  Cognition  Overall Cognitive Status: Exceptions  Arousal/Alertness: Inconsistent responses to stimuli  Following Commands: Inconsistently follows commands  Attention Span: Difficulty attending to directions  Initiation: Requires cues for all  Sequencing: Requires cues for all    Pt and RN agreeable to therapy.  Pt req max encouragement for arousal this day. ADL task of grooming completed supine in bed see above for LOF. Pt supine in bed at session end with call light in reach and bed alarm on. Plan   Plan  Times per week: 3-4x/wk  Current Treatment Recommendations: Balance Training, Functional Mobility Training, Endurance Training, Safety Education & Training, Patient/Caregiver Education & Training, Equipment Evaluation, Education, & procurement, Home Management Training, Self-Care / ADL, Cognitive Reorientation, Strengthening, ROM  Cont POC  Goals  Short term goals  Time Frame for Short term goals: By discharge pt will. ..   Short term goal 1: demo supine<>sit with max Ax2  Short term goal 2: complete simple grooming task with set up and mod A  Short term goal 3: demo 10 minutes EOB sitting balance with mod A  Short term goal 4: tolerate AAROM with bilateral UEs to increase participation in ADLs   Short term goal 5: progress mobility as medically able       Therapy Time   Individual Concurrent Group Co-treatment   Time In 1328         Time Out 1358         Minutes ALINE Thao/EZEKIEL

## 2020-02-10 NOTE — PROGRESS NOTES
Measures:  · Ht: 5' 11\" (180.3 cm)   · Current Body Wt: 175 lb (79.4 kg)  · Admission Body Wt: 157 lb (71.2 kg)  · Ideal Body Wt: 172 lb (78 kg), % Ideal Body 91%  · BMI Classification: (21.9)    Nutrition Interventions:   Continue current Tube Feeding  Continued Inpatient Monitoring, Education Not Indicated    Nutrition Evaluation:   · Evaluation: Goal achieved   · Goals: Meet % of estimated nutrient needs with nutrition support.    · Monitoring: TF Intake, TF Tolerance, Monitor Bowel Function      Electronically signed by Terese Shah RD, LD on 2/10/20 at 1:12 PM    Contact Number: [phone]

## 2020-02-10 NOTE — OP NOTE
Operative Note  ______________________________________________________________    Patient: Germán Guo  YOB: 1960  MRN: [id number]  Date of Procedure: 2/7/2020    Pre-Op Diagnosis: Respiratory failure    Post-Op Diagnosis: Same       Procedure(s):  OPEN tracheostomy; 6 DCT Fr gilberto     Anesthesia: General    Surgeon(s):  David Ambrocio IV, DO    Assistant: Jose Martin Barger, PGY 5     Estimated Blood Loss (mL): 5 mL    Complications: None    Specimens:   * No specimens in log *    Implants:  * No implants in log *      Drains:   Gastrostomy/Enterostomy/Jejunostomy Percutaneous Endoscopic Gastrostomy (PEG) LUQ 1 20 fr (Active)   Drainage Appearance Other (Comment) 2/4/2020  8:01 AM   Site Description Healing 2/4/2020  8:01 AM   Dressing Status Other (Comment) 2/4/2020  8:01 AM   Tube Feeding Diabetic 2/7/2020  4:00 AM   Tube Feeding Intake (mL) 3084 ml 2/6/2020 10:00 AM   Free Water Flush (mL) 150 mL 2/6/2020  8:00 PM   Free Water Rate every 6 hrs 2/3/2020  5:00 PM   Output (mL) 0 ml 1/27/2020  8:00 AM   Tube Placement Verified Yes 2/3/2020  5:00 PM   Residual Volume (ml) 0 ml 2/4/2020  4:00 AM       External Urinary Catheter (Active)   Catheter changed  No 2/6/2020  4:00 AM   Urine Color Yellow 2/7/2020  4:00 AM   Urine Appearance Clear 2/7/2020  4:00 AM   Urine Odor Malodorous 2/7/2020  4:00 AM   Output (mL) 900 mL 2/5/2020  6:00 PM   Skin Assessment No Injury 2/7/2020  4:00 AM       [REMOVED] NG/OG/NJ/NE Tube Nasogastric Right nostril (Removed)       [REMOVED] NG/OG/NJ/NE Tube Nasogastric 18 fr Left nostril (Removed)   Surrounding Skin Dry; Intact 1/22/2020  8:00 AM   Securement device Yes 1/22/2020  8:00 AM   Status Clamped 1/22/2020  8:00 AM   Placement Verified by External Catheter Length;by Respiratory Status 1/22/2020  8:00 AM   NG/OG/NJ/NE External Measurement (cm) 72 cm 1/22/2020  8:00 AM   Drainage Appearance Tan 1/21/2020  4:36 PM   Tube Feeding Diabetic 1/21/2020  4:36 PM   Tube Feeding Status [REMOVED] External Urinary Catheter (Removed)   Urine Color Yellow 1/15/2020  4:00 PM   Urine Appearance Clear 1/15/2020  4:00 PM   Output (mL) 890 mL 1/15/2020  6:59 PM   Placement Replaced 1/15/2020  3:00 PM   Skin Assessment No Injury 1/15/2020  4:00 PM       [REMOVED] External Urinary Catheter (Removed)   Catheter changed  Yes 1/19/2020  8:00 AM   Urine Color Yellow 1/19/2020  4:00 PM   Urine Appearance Cloudy 1/19/2020  4:00 PM   Urine Odor Malodorous 1/19/2020  4:00 PM   Output (mL) 400 mL 1/19/2020  5:04 AM   Suction- Female Only N/A 1/19/2020  4:00 AM   Placement Replaced 1/19/2020 12:00 AM   Skin Assessment No Injury 1/19/2020  4:00 AM       [REMOVED] External Urinary Catheter (Removed)   Urine Color Yellow 1/21/2020  4:05 AM   Urine Appearance Clear 1/21/2020  4:05 AM   Output (mL) 300 mL 1/21/2020  6:00 AM   Skin Assessment No Injury 1/21/2020  4:05 AM       [REMOVED] External Urinary Catheter (Removed)   Catheter changed  Yes 1/25/2020  4:00 AM   Urine Color Yvonne 1/25/2020  4:00 AM   Urine Appearance Cloudy 1/25/2020  4:00 AM   Urine Odor Malodorous 1/25/2020  4:00 AM   Output (mL) 250 mL 1/24/2020  5:10 PM   Suction- Female Only N/A 1/25/2020  4:00 AM   Placement Replaced 1/25/2020  4:00 AM   Skin Assessment No Injury 1/25/2020  4:00 AM       [REMOVED] External Urinary Catheter (Removed)   Catheter changed  Yes 1/29/2020  6:36 AM   Urine Color Yvonne 1/29/2020  6:36 AM   Urine Appearance Cloudy 1/29/2020  6:36 AM   Urine Odor Malodorous 1/29/2020  6:36 AM   Suction- Female Only N/A 1/29/2020  6:36 AM   Placement Initiated 1/29/2020  6:36 AM   Skin Assessment No Injury 1/29/2020  6:36 AM       [REMOVED] Fecal Management System (Removed)   Stool Appearance Loose 12/30/2019  4:00 PM   Stool Color Brown 12/30/2019  4:00 PM   Fecal Management Tube Output 400 ml 12/30/2019 12:46 AM       Findings: Wound Class II; 6 DCT Fr gilberto, no complications. Indications: This is a 62 y/o male with h/o CVA.  He

## 2020-02-10 NOTE — PROGRESS NOTES
(PRILOSEC) 20 MG delayed release capsule Take 20 mg by mouth daily      traMADol (ULTRAM) 50 MG tablet Take 50 mg by mouth every 6 hours as needed for Pain.  insulin glargine (BASAGLAR KWIKPEN) 100 UNIT/ML injection pen Inject 5 Units into the skin nightly      apixaban (ELIQUIS) 5 MG TABS tablet Take 5 mg by mouth 2 times daily      atorvastatin (LIPITOR) 40 MG tablet Take 1 tablet by mouth daily 30 tablet 3    vitamin D (ERGOCALCIFEROL) 1.25 MG (58482 UT) CAPS capsule Take 1 capsule by mouth once a week for 8 doses 8 capsule 0       Objective    /80   Pulse 100   Temp 98.8 °F (37.1 °C) (Oral)   Resp 19   Ht 5' 11\" (1.803 m)   Wt 175 lb 0.7 oz (79.4 kg)   SpO2 100%   BMI 24.41 kg/m²     LABS:  WBC:    Lab Results   Component Value Date    WBC 6.7 02/10/2020     H/H:    Lab Results   Component Value Date    HGB 11.1 02/10/2020    HCT 37.2 02/10/2020     PTT:    Lab Results   Component Value Date    APTT 25.3 02/08/2020   [APTT}  PT/INR:    Lab Results   Component Value Date    PROTIME 14.2 01/28/2020    INR 1.4 01/28/2020     HgBA1c:    Lab Results   Component Value Date    LABA1C 5.8 01/29/2020       Assessment   Angel Risk Score: Angel Scale Score: 11    Patient Active Problem List   Diagnosis Code    SAH (subarachnoid hemorrhage) ([facility]) I60.9    Subarachnoid bleed ([facility]) I60.9    Traumatic subarachnoid hemorrhage with loss of consciousness of 1 hour to 5 hours 59 minutes ([facility]) S06. 6X3A    Carotid stenosis, right I65.21    Asymptomatic stenosis of left vertebral artery I65.02    Intracranial atherosclerosis I67.2    History of CEA (carotid endarterectomy) Z98.890    Ventilator dependence ([facility]) Z99.11    Delirium tremens ([facility]) F10.231    Chronic a-fib I48.20    On mechanically assisted ventilation ([facility]) Z99.11    Encephalopathy G93.40    Acute ischemic multifocal anterior circulation stroke ([facility]) I63.529    Palliative care encounter Z51.5    Subdural hematoma ([address] S06.5X9A    Altered mental status R41.82    Subdural hygroma G96.0    SDH (subdural hematoma) ([facility]) S06.5X9A    Septic shock ([facility]) A41.9, R65.21    Pneumonia of both lower lobes due to infectious organism ([facility] [address] J18.1    Oropharyngeal dysphagia R13.12       Measurements:     02/10/20 1130   Wound 12/26/19 Ankle Lateral;Left   Date First Assessed/Time First Assessed: 12/26/19 0800   Present on Hospital Admission: Yes  Primary Wound Type: Venous Ulcer  Location: Ankle  Wound Location Orientation: Lateral;Left  Wound Outcome: Healed   Wound Traumatic   Dressing/Treatment Collagen; Foam   Wound Cleansed Rinsed/Irrigated with saline   Wound Length (cm) 0.4 cm   Wound Width (cm) 0.4 cm   Wound Depth (cm) 0.2 cm   Wound Surface Area (cm^2) 0.16 cm^2   Change in Wound Size % (l*w) 89.33   Wound Volume (cm^3) 0.03 cm^3   Wound Healing % 90   Wound Assessment Pink   Wound 02/07/20 Buttocks Right   Date First Assessed/Time First Assessed: 02/07/20 1600   Primary Wound Type: Pressure Injury  Location: Buttocks  Wound Location Orientation: Right   Wound Image    Wound Deep tissue/Injury   Dressing/Treatment Moisture barrier  (zinc oxide paste)   Wound Cleansed   (barrier cream cloth)   Wound Length (cm) 3 cm   Wound Width (cm) 4.2 cm   Wound Surface Area (cm^2) 12.6 cm^2   Wound Assessment Red;Maroon;Fragile   Wound 02/07/20 Buttocks Left intergluteal cleft to left buttock   Date First Assessed: 02/07/20   Present on Hospital Admission:   Primary Wound Type: Pressure Injury  Location: Buttocks  Wound Location Orientation: Left  Wound Description (Comments): intergluteal cleft to left buttock   Wound Image    Wound Pressure Stage  2   Dressing/Treatment Moisture barrier   Wound Cleansed   (barrier cream cloth)   Wound Length (cm) 5 cm   Wound Width (cm) 1.5 cm   Wound Surface Area (cm^2) 7.5 cm^2   Wound Assessment Red;Fragile   Gisselle-wound Assessment Dry;Denuded  (dry flaky)           Plan   Plan of Care: Wound 12/26/19 Ankle Lateral;Left-Dressing/Treatment: (P) Collagen, Foam  Wound 02/07/20 Buttocks Right-Dressing/Treatment: Moisture barrier(zinc oxide paste)  Wound 02/07/20 Buttocks Left intergluteal cleft to left buttock-Dressing/Treatment: Moisture barrier     Turn every 2 hours  Float heels off of bed with pillows under calves    Use comfort glide to reposition patient to minimize potential for shear injury. Routine incontinence care with incontinence barrier cloths and zinc oxide cream.   Apply zinc oxide cream BID and prn incontinence.    Moisture wicking under pads     Specialty Bed Required : Yes   [x] Low Air Loss   [x] Pressure Redistribution  Dream Air mattress with Alternating pressure activated on motor  [] Fluid Immersion  [] Bariatric  [] Total Pressure Relief  [] Other:     Current Diet: DIET TUBE FEED CONTINUOUS/CYCLIC NPO; Diabetic; Gastrostomy; Continuous; 20; 70    Discharge Plan:  Placement for patient upon discharge: skilled nursing    Patient appropriate for Outpatient [address]: [place] [address] SHONDA, RN, [employer]

## 2020-02-10 NOTE — PROGRESS NOTES
Daily Progress Note  Neuro Critical Care    Patient Name: Andres Gallardo  Patient : 1960  Room/Bed: 0288/3843-70  Code Status: Full  Allergies: No Known Allergies    CHIEF COMPLAINT:     Respiratory distress     INTERVAL HISTORY    Initial Presentation (Admitted ): The patient is a 62 yo male history of atrial fibrillation on Eliquis, bilateral ICA stenosis (right more than left), DM2, HLD, HTN, CAD s/p PCI stents, PVD, CEA, and ETOH abuse with recent admission for traumatic subarachnoid and symptomatic right ICA stenosis s/p stent placement, bihemispheric strokes, and PEG placement who was discharged to [facility] on  who presents as a transfer from [facility] for altered mental status and respiratory distress. Per records, patient was found unresponsive by staff at the SNF with a GCS of 4 and intubated at the outlying facility. He was in atrial fibrillation with RVR and cardioverted prior to transfer to [facility] for direct admission to the Neuro ICU. Hospital Course:   : Febrile and hypotensive. Levophed started along with broad spectrum antibiotics for concerns of sepsis. CT chest revealed left lung consolidation. : Liver US: borderline hepatomegaly, otherwise unremarkable  : Aspirin and Eliquis resumed. : Extubated  : Increased oral secretions, requiring BiPap. robinol started with improvement. : Ferrous sulfate started for iron deficiency anemia. 2/3: Stable  : Discussed possible need for trach with family vs other options including code status. I have spoken to patient's POA, mother- Richardson Boone and siblings who will discuss among themselves options and let us know. 2/5: Requiring frequent NT suction and biPAP to maintain O2 sats.  General surgery consulted for trach.  Eliquis held, heparin infusion started. 26: Tolerating biPAP well, remained on heparin infusion while awaiting trach.   2/7: Hypoxic with saturation into the 80's and tachypneic with RR 30-40's overnight.  Hypoxia improved with frequent NT suctioning.  Nursing reports suctioning thick, yellow secretions almost hourly overnight.  FiO2 uptitrated to 60%.   Given Fentanyl x1 with mild improvement in tachypnea.  On exam this morning, patient remains mildly tachypneic but is saturating well on biPAP.  No fevers, leukocytosis, tachycardia, or hypotension suggestive of infection.  CXR this AM unremarkable.  Blood glucose remains elevated (190-266), Lantus increased to 10u QHS.  Neuro exam remains stable. 2/8:Patient was evaluated at bedside and found status post a tracheostomy postoperative day #1. Patient still aphasic. No fever, no leukocytosis. Patient is placed on CPAP this morning for evaluation of tolerance of tracheostomy. Patient still with thick secretions but been taking care off. Today we can start weaning off ventilator and patient will have pre-CERT to long-term facility. If unable to get into long-term facility will consider SNF. Will start anticoagulation today after tracheostomy and will start at night with Eliquis. Patient still need PT therapies due to spasticity. Will restart also aspirin and cilostazol. As per  pre-CERT will start on Monday, 2/10/2020.  2/9: Tolerating T-piece with trach. Last 24h:   No acute events overnight. Tachypneic with large amount of secretions, requiring frequent suctioning. CXR today with no acute findings. Will continue aggressive pulmonary toilet. May continue to use ventilator overnight and as needed with respiratory distress throughout the day. Awaiting approval to LTAC.      CURRENT MEDICATIONS:  SCHEDULED MEDICATIONS:   insulin lispro  0-6 Units Subcutaneous Q6H    insulin glargine  15 Units Subcutaneous Nightly    apixaban  5 mg Oral BID    cilostazol  100 mg Oral BID    polyethylene glycol  17 g Oral Daily    chlorhexidine  15 mL Mouth/Throat BID    miconazole   Topical BID    sennosides-docusate sodium  2 tablet Oral Daily    aspirin  81 mg Oral Daily    ferrous sulfate  325 mg Oral Daily with breakfast    amLODIPine  5 mg Oral Daily    atorvastatin  40 mg Oral Nightly    DULoxetine  60 mg Oral Nightly    folic acid  1 mg Oral Daily    gabapentin  300 mg Oral BID    sotalol  160 mg Oral BID    sodium chloride flush  10 mL Intravenous 2 times per day     CONTINUOUS INFUSIONS:   dextrose       PRN MEDICATIONS:   hydrALAZINE, labetalol, fentanNYL, glucose, dextrose, glucagon (rDNA), dextrose, glycopyrrolate, ipratropium-albuterol, sodium chloride flush, acetaminophen, ondansetron    VITALS:  Temperature Range: Temp: 98.6 °F (37 °C) Temp  Av.5 °F (36.9 °C)  Min: 98.2 °F (36.8 °C)  Max: 98.8 °F (37.1 °C)  BP Range: Systolic (67LAQ), BHW:450 , Min:111 , DOM:131     Diastolic (92PEW), CDE:13, Min:71, Max:88    Pulse Range: Pulse  Av.3  Min: 82  Max: 112  Respiration Range: Resp  Av.2  Min: 19  Max: 42  Current Pulse Ox: SpO2: 91 %(post activity)  24HR Pulse Ox Range: SpO2  Av.9 %  Min: 91 %  Max: 100 %  Patient Vitals for the past 12 hrs:   BP Temp Temp src Pulse Resp SpO2 Weight   02/10/20 1441 132/88 98.6 °F (37 °C) Axillary 101 26 91 % --   02/10/20 1320 -- -- -- 100 -- -- --   02/10/20 1200 114/81 98.3 °F (36.8 °C) Axillary 98 (!) 32 96 % --   02/10/20 1136 -- -- -- 100 19 100 % --   02/10/20 1100 112/80 98.4 °F (36.9 °C) Oral 101 (!) 38 92 % --   02/10/20 0900 118/79 98.8 °F (37.1 °C) Oral 105 (!) 35 92 % --   02/10/20 0833 -- -- -- 112 (!) 32 95 % --   02/10/20 0816 -- -- -- 108 -- -- --   02/10/20 0815 126/81 98.7 °F (37.1 °C) -- 107 (!) 42 91 % --   02/10/20 0700 115/79 98.6 °F (37 °C) Oral 99 (!) 36 94 % --   02/10/20 0547 -- -- -- -- -- -- 175 lb 0.7 oz (79.4 kg)   02/10/20 0416 -- -- -- -- 28 95 % --   02/10/20 0400 112/71 -- -- 94 (!) 37 93 % --     Estimated body mass index is 24.41 kg/m² as calculated from the following:    Height as of this encounter: 5' 11\" (1.803 m).     Weight as of this time.     ASSESSMENT AND PLAN:       60 yo male with recent bihemispheric infarcts with difficulty clearing secretions effectively presented with AMS and atrial fibrillation, being treated for left lower lobe pneumonia. NEUROLOGIC:  - CT head stable SDH  - History of recent right carotid stenting, continue aspirin 81 mg daily, may need to hold - awaiting to hear from surgery  - Neuro checks per protocol    CARDIOVASCULAR:  - Goal normotension  - Continue Norvasc 5 mg QD  - History of atrial fibrillation, continue sotalol 160 mg BID   - Eliquis 5 mg BID  - Echocardiogram: EF 45%  - Peripheral artery disease, continue Pletal 100 mg BID,Lipitor 40 mg QHS  - Continue telemetry    PULMONARY:  - Extubated 1/31  - Bipap prn  - Aggressive NT suctioning, continue Robinol 1 mg BID  - History of COPD; continue Duoneb q6h prn  - CXR: retrocardiac opacity left hemidiaphragm  - POD # 3 tracheostomy  - Tolerating t-piece, may continue use of ventilator prn  - Aggressive pulmonary toilet, chest physiotherapy    RENAL/FLUID/ELECTROLYTE:  - Normal renal function  - BUN 15, creatinine 0.31  - IVF: Total fluids @ 80 ml/hr  - Monitor urine output  - Daily BMP    GI/NUTRITION:  NUTRITION:  DIET TUBE FEED CONTINUOUS/CYCLIC NPO; Diabetic; Gastrostomy; Continuous; 20; 70  - Bowel regimen: senna-s daily  - GI prophylaxis: Not indicated    ID:  - Tmax 37.1  - No leukocytosis, WBC 6.7  - Continue to monitor for fevers    HEME:   - Continue ferrous sulfate for iron deficiency anemia    ENDOCRINE:  - Continue to monitor blood glucose, goal <180  - Hemoglobin A1C 5.8  - Lantus 5 units QHS    OTHER:  - PT/OT/ST   - Code Status: Full  - Wound ostomy following: continue zinc oxide and continue preventive measures  - Elevate left hand to reduce swelling, follow up with Dr. Yvon rutledge in 4 weeks.     PROPHYLAXIS:  Stress ulcer: Not indicated    DVT PROPHYLAXIS:  - SCD sleeves - Thigh High   - Eliquis    DISPOSITION:  [x] OK for out of ICU from

## 2020-02-10 NOTE — PROGRESS NOTES
Writer discontinued pt's nonviolent mitt restraint. Pt no longer pulling at IV tubing or tube feed tubing. Will continue to monitor.

## 2020-02-11 LAB
ABSOLUTE EOS #: 0.18 K/UL (ref 0–0.44)
ABSOLUTE IMMATURE GRANULOCYTE: <0.03 K/UL (ref 0–0.3)
ABSOLUTE LYMPH #: 1.44 K/UL (ref 1.1–3.7)
ABSOLUTE MONO #: 0.55 K/UL (ref 0.1–1.2)
ANION GAP SERPL CALCULATED.3IONS-SCNC: 12 MMOL/L (ref 9–17)
BASOPHILS # BLD: 1 % (ref 0–2)
BASOPHILS ABSOLUTE: 0.04 K/UL (ref 0–0.2)
BUN BLDV-MCNC: 19 MG/DL (ref 6–20)
BUN/CREAT BLD: ABNORMAL (ref 9–20)
CALCIUM IONIZED: 1.3 MMOL/L (ref 1.13–1.33)
CALCIUM SERPL-MCNC: 9.5 MG/DL (ref 8.6–10.4)
CHLORIDE BLD-SCNC: 101 MMOL/L (ref 98–107)
CO2: 24 MMOL/L (ref 20–31)
CREAT SERPL-MCNC: 0.32 MG/DL (ref 0.7–1.2)
DIFFERENTIAL TYPE: ABNORMAL
EKG ATRIAL RATE: 126 BPM
EKG ATRIAL RATE: 131 BPM
EKG P AXIS: 69 DEGREES
EKG P-R INTERVAL: 146 MS
EKG Q-T INTERVAL: 310 MS
EKG Q-T INTERVAL: 320 MS
EKG QRS DURATION: 152 MS
EKG QRS DURATION: 158 MS
EKG QTC CALCULATION (BAZETT): 428 MS
EKG QTC CALCULATION (BAZETT): 448 MS
EKG R AXIS: -66 DEGREES
EKG R AXIS: -70 DEGREES
EKG T AXIS: 24 DEGREES
EKG T AXIS: 34 DEGREES
EKG VENTRICULAR RATE: 108 BPM
EKG VENTRICULAR RATE: 126 BPM
EOSINOPHILS RELATIVE PERCENT: 3 % (ref 1–4)
GFR AFRICAN AMERICAN: >60 ML/MIN
GFR NON-AFRICAN AMERICAN: >60 ML/MIN
GFR SERPL CREATININE-BSD FRML MDRD: ABNORMAL ML/MIN/{1.73_M2}
GFR SERPL CREATININE-BSD FRML MDRD: ABNORMAL ML/MIN/{1.73_M2}
GLUCOSE BLD-MCNC: 196 MG/DL (ref 75–110)
GLUCOSE BLD-MCNC: 229 MG/DL (ref 75–110)
GLUCOSE BLD-MCNC: 233 MG/DL (ref 70–99)
GLUCOSE BLD-MCNC: 235 MG/DL (ref 75–110)
GLUCOSE BLD-MCNC: 242 MG/DL (ref 75–110)
GLUCOSE BLD-MCNC: 264 MG/DL (ref 75–110)
HCT VFR BLD CALC: 39 % (ref 40.7–50.3)
HEMOGLOBIN: 11.4 G/DL (ref 13–17)
IMMATURE GRANULOCYTES: 0 %
LACTIC ACID, WHOLE BLOOD: 2 MMOL/L (ref 0.7–2.1)
LYMPHOCYTES # BLD: 21 % (ref 24–43)
MAGNESIUM: 1.9 MG/DL (ref 1.6–2.6)
MCH RBC QN AUTO: 24.7 PG (ref 25.2–33.5)
MCHC RBC AUTO-ENTMCNC: 29.2 G/DL (ref 28.4–34.8)
MCV RBC AUTO: 84.4 FL (ref 82.6–102.9)
MONOCYTES # BLD: 8 % (ref 3–12)
NRBC AUTOMATED: 0 PER 100 WBC
PDW BLD-RTO: 20 % (ref 11.8–14.4)
PHOSPHORUS: 3.8 MG/DL (ref 2.5–4.5)
PLATELET # BLD: 318 K/UL (ref 138–453)
PLATELET ESTIMATE: ABNORMAL
PMV BLD AUTO: 10 FL (ref 8.1–13.5)
POTASSIUM SERPL-SCNC: 4.7 MMOL/L (ref 3.7–5.3)
PROCALCITONIN: 0.06 NG/ML
RBC # BLD: 4.62 M/UL (ref 4.21–5.77)
RBC # BLD: ABNORMAL 10*6/UL
SEG NEUTROPHILS: 67 % (ref 36–65)
SEGMENTED NEUTROPHILS ABSOLUTE COUNT: 4.71 K/UL (ref 1.5–8.1)
SODIUM BLD-SCNC: 137 MMOL/L (ref 135–144)
WBC # BLD: 6.9 K/UL (ref 3.5–11.3)
WBC # BLD: ABNORMAL 10*3/UL

## 2020-02-11 PROCEDURE — 36415 COLL VENOUS BLD VENIPUNCTURE: CPT

## 2020-02-11 PROCEDURE — 6370000000 HC RX 637 (ALT 250 FOR IP): Performed by: INTERNAL MEDICINE

## 2020-02-11 PROCEDURE — 84100 ASSAY OF PHOSPHORUS: CPT

## 2020-02-11 PROCEDURE — 6370000000 HC RX 637 (ALT 250 FOR IP): Performed by: STUDENT IN AN ORGANIZED HEALTH CARE EDUCATION/TRAINING PROGRAM

## 2020-02-11 PROCEDURE — 97110 THERAPEUTIC EXERCISES: CPT

## 2020-02-11 PROCEDURE — 2700000000 HC OXYGEN THERAPY PER DAY

## 2020-02-11 PROCEDURE — 82947 ASSAY GLUCOSE BLOOD QUANT: CPT

## 2020-02-11 PROCEDURE — 2500000003 HC RX 250 WO HCPCS: Performed by: STUDENT IN AN ORGANIZED HEALTH CARE EDUCATION/TRAINING PROGRAM

## 2020-02-11 PROCEDURE — 82330 ASSAY OF CALCIUM: CPT

## 2020-02-11 PROCEDURE — 2500000003 HC RX 250 WO HCPCS: Performed by: NURSE PRACTITIONER

## 2020-02-11 PROCEDURE — 6370000000 HC RX 637 (ALT 250 FOR IP): Performed by: NURSE PRACTITIONER

## 2020-02-11 PROCEDURE — 80048 BASIC METABOLIC PNL TOTAL CA: CPT

## 2020-02-11 PROCEDURE — 83605 ASSAY OF LACTIC ACID: CPT

## 2020-02-11 PROCEDURE — 85025 COMPLETE CBC W/AUTO DIFF WBC: CPT

## 2020-02-11 PROCEDURE — 83735 ASSAY OF MAGNESIUM: CPT

## 2020-02-11 PROCEDURE — APPNB45 APP NON BILLABLE 31-45 MINUTES: Performed by: NURSE PRACTITIONER

## 2020-02-11 PROCEDURE — 84145 PROCALCITONIN (PCT): CPT

## 2020-02-11 PROCEDURE — 99233 SBSQ HOSP IP/OBS HIGH 50: CPT | Performed by: PSYCHIATRY & NEUROLOGY

## 2020-02-11 PROCEDURE — 2060000000 HC ICU INTERMEDIATE R&B

## 2020-02-11 PROCEDURE — 2580000003 HC RX 258: Performed by: STUDENT IN AN ORGANIZED HEALTH CARE EDUCATION/TRAINING PROGRAM

## 2020-02-11 RX ORDER — DILTIAZEM HYDROCHLORIDE 5 MG/ML
10 INJECTION INTRAVENOUS ONCE
Status: COMPLETED | OUTPATIENT
Start: 2020-02-11 | End: 2020-02-11

## 2020-02-11 RX ORDER — METOPROLOL TARTRATE 5 MG/5ML
5 INJECTION INTRAVENOUS ONCE
Status: COMPLETED | OUTPATIENT
Start: 2020-02-11 | End: 2020-02-11

## 2020-02-11 RX ORDER — DILTIAZEM HYDROCHLORIDE 60 MG/1
60 TABLET, FILM COATED ORAL EVERY 6 HOURS SCHEDULED
Status: DISCONTINUED | OUTPATIENT
Start: 2020-02-11 | End: 2020-02-12

## 2020-02-11 RX ADMIN — DILTIAZEM HYDROCHLORIDE 10 MG: 5 INJECTION INTRAVENOUS at 03:26

## 2020-02-11 RX ADMIN — INSULIN LISPRO 4 UNITS: 100 INJECTION, SOLUTION INTRAVENOUS; SUBCUTANEOUS at 22:39

## 2020-02-11 RX ADMIN — Medication 15 ML: at 08:59

## 2020-02-11 RX ADMIN — ANTI-FUNGAL POWDER MICONAZOLE NITRATE TALC FREE: 1.42 POWDER TOPICAL at 08:58

## 2020-02-11 RX ADMIN — ANTI-FUNGAL POWDER MICONAZOLE NITRATE TALC FREE: 1.42 POWDER TOPICAL at 22:41

## 2020-02-11 RX ADMIN — DULOXETINE HYDROCHLORIDE 60 MG: 30 CAPSULE, DELAYED RELEASE ORAL at 21:11

## 2020-02-11 RX ADMIN — DILTIAZEM HYDROCHLORIDE 60 MG: 60 TABLET, FILM COATED ORAL at 17:59

## 2020-02-11 RX ADMIN — CILOSTAZOL 100 MG: 100 TABLET ORAL at 08:58

## 2020-02-11 RX ADMIN — GABAPENTIN 300 MG: 300 CAPSULE ORAL at 17:59

## 2020-02-11 RX ADMIN — INSULIN LISPRO 2 UNITS: 100 INJECTION, SOLUTION INTRAVENOUS; SUBCUTANEOUS at 04:31

## 2020-02-11 RX ADMIN — Medication 15 ML: at 21:12

## 2020-02-11 RX ADMIN — SOTALOL HYDROCHLORIDE 160 MG: 80 TABLET ORAL at 21:11

## 2020-02-11 RX ADMIN — DESMOPRESSIN ACETATE 40 MG: 0.2 TABLET ORAL at 21:11

## 2020-02-11 RX ADMIN — SENNOSIDES AND DOCUSATE SODIUM 2 TABLET: 8.6; 5 TABLET ORAL at 08:58

## 2020-02-11 RX ADMIN — FERROUS SULFATE TAB EC 325 MG (65 MG FE EQUIVALENT) 325 MG: 325 (65 FE) TABLET DELAYED RESPONSE at 08:58

## 2020-02-11 RX ADMIN — DILTIAZEM HYDROCHLORIDE 5 MG/HR: 5 INJECTION INTRAVENOUS at 03:34

## 2020-02-11 RX ADMIN — ASPIRIN 81 MG: 81 TABLET, CHEWABLE ORAL at 08:58

## 2020-02-11 RX ADMIN — METOPROLOL TARTRATE 5 MG: 5 INJECTION, SOLUTION INTRAVENOUS at 15:37

## 2020-02-11 RX ADMIN — INSULIN GLARGINE 15 UNITS: 100 INJECTION, SOLUTION SUBCUTANEOUS at 21:17

## 2020-02-11 RX ADMIN — FOLIC ACID 1 MG: 1 TABLET ORAL at 08:58

## 2020-02-11 RX ADMIN — APIXABAN 5 MG: 5 TABLET, FILM COATED ORAL at 08:58

## 2020-02-11 RX ADMIN — ACETAMINOPHEN 650 MG: 325 TABLET ORAL at 21:11

## 2020-02-11 RX ADMIN — SOTALOL HYDROCHLORIDE 160 MG: 80 TABLET ORAL at 08:58

## 2020-02-11 RX ADMIN — INSULIN LISPRO 4 UNITS: 100 INJECTION, SOLUTION INTRAVENOUS; SUBCUTANEOUS at 16:43

## 2020-02-11 RX ADMIN — CILOSTAZOL 100 MG: 100 TABLET ORAL at 21:11

## 2020-02-11 RX ADMIN — GABAPENTIN 300 MG: 300 CAPSULE ORAL at 08:58

## 2020-02-11 RX ADMIN — DILTIAZEM HYDROCHLORIDE 10 MG: 5 INJECTION INTRAVENOUS at 00:07

## 2020-02-11 RX ADMIN — APIXABAN 5 MG: 5 TABLET, FILM COATED ORAL at 21:11

## 2020-02-11 RX ADMIN — DILTIAZEM HYDROCHLORIDE 10 MG/HR: 5 INJECTION INTRAVENOUS at 18:47

## 2020-02-11 RX ADMIN — INSULIN LISPRO 1 UNITS: 100 INJECTION, SOLUTION INTRAVENOUS; SUBCUTANEOUS at 09:12

## 2020-02-11 ASSESSMENT — PAIN SCALES - WONG BAKER
WONGBAKER_NUMERICALRESPONSE: 0
WONGBAKER_NUMERICALRESPONSE: 2
WONGBAKER_NUMERICALRESPONSE: 0

## 2020-02-11 ASSESSMENT — PAIN SCALES - GENERAL
PAINLEVEL_OUTOF10: 0
PAINLEVEL_OUTOF10: 2

## 2020-02-11 NOTE — PLAN OF CARE
Problem: SKIN INTEGRITY  Goal: Skin integrity is maintained or improved  Outcome: Ongoing  Note:   Writer assisting pt with turns every 2 hours and heels elevated off bed. Linens remain clean and dry. Will continue to monitor.

## 2020-02-11 NOTE — CARE COORDINATION
Called Katie admissions coordinator for [address]. They do not have any beds available. Chary critical care NP is going to speak with the family in regards to palliative care. Palliative is following and family is receptive to palliative now. May re-refer palliative. Jordyn Stone is going to attempt to see if [facility] can take patient with a T-Piece. Called [facility] and spoke with gunner. She is going to give message to the admissions coordinator and have her return writers call. Faxed demographic to [facility].      If physician wants to do the peer to peer for nick CUETO, the ref# [id number]  Physician peer to peer phone # [phone]

## 2020-02-11 NOTE — PROGRESS NOTES
[facility]  Documentation Note                Admission Dx: Altered mental status [R41.82]    Past Medical History:   has a past medical history of Alcohol abuse, Atrial fibrillation ([facility] [address] Diabetes ([facility] [address] Gastritis, Hyperlipidemia, Hypertension, Left ventricular hypertrophy, Neuropathy, and Renal cyst.    Previous Testing:     ECHO 12/30/19: EF 45%, mild to moderate LVH, mild MR/TR. Previous office/hospital visit:   None     Home Medications:      Prior to Admission medications    Medication Sig Start Date End Date Taking? Authorizing Provider   aspirin 325 MG EC tablet Take 1 tablet by mouth daily 1/28/20   Chava Quiñones MD   amantadine (SYMMETREL) 50 MG/5ML solution 10 mLs by Per G Tube route 2 times daily at 0800 and 1400 1/27/20   Chava Quiñones MD   sotalol (BETAPACE) 160 MG tablet Take 1 tablet by mouth 2 times daily 1/27/20   Chava Quiñones MD   gabapentin (NEURONTIN) 300 MG capsule Take 300 mg by mouth 2 times daily. Historical Provider, MD   cilostazol (PLETAL) 100 MG tablet Take 100 mg by mouth 2 times daily    Historical Provider, MD   amLODIPine (NORVASC) 5 MG tablet Take 5 mg by mouth daily    Historical Provider, MD   folic acid (FOLVITE) 1 MG tablet Take 1 mg by mouth daily    Historical Provider, MD   albuterol sulfate  (90 Base) MCG/ACT inhaler Inhale 2 puffs into the lungs every 4 hours as needed for Wheezing    Historical Provider, MD   Multiple Vitamins-Minerals (THERAPEUTIC MULTIVITAMIN-MINERALS) tablet Take 1 tablet by mouth daily    Historical Provider, MD   DULoxetine (CYMBALTA) 60 MG extended release capsule Take 60 mg by mouth nightly    Historical Provider, MD   omeprazole (PRILOSEC) 20 MG delayed release capsule Take 20 mg by mouth daily    Historical Provider, MD   traMADol (ULTRAM) 50 MG tablet Take 50 mg by mouth every 6 hours as needed for Pain.     Historical Provider, MD   insulin glargine (BASAGLAR KWIKPEN) 100 UNIT/ML injection pen Inject (LIPITOR) tablet 40 mg, 40 mg, Oral, Nightly  DULoxetine (CYMBALTA) extended release capsule 60 mg, 60 mg, Oral, Nightly  folic acid (FOLVITE) tablet 1 mg, 1 mg, Oral, Daily  gabapentin (NEURONTIN) capsule 300 mg, 300 mg, Oral, BID  sotalol (BETAPACE) tablet 160 mg, 160 mg, Oral, BID  sodium chloride flush 0.9 % injection 10 mL, 10 mL, Intravenous, 2 times per day  sodium chloride flush 0.9 % injection 10 mL, 10 mL, Intravenous, PRN  acetaminophen (TYLENOL) tablet 650 mg, 650 mg, Oral, Q4H PRN  ondansetron (ZOFRAN) injection 4 mg, 4 mg, Intravenous, Q6H PRN    Labs:     CBC:   Recent Labs     02/10/20  0551 02/11/20  0208   WBC 6.7 6.9   HGB 11.1* 11.4*   HCT 37.2* 39.0*    318     BMP:   Recent Labs     02/10/20  0551 02/11/20  0208    137   K 4.7 4.7   CO2 25 24   BUN 15 19   CREATININE 0.31* 0.32*   LABGLOM >60 >60   GLUCOSE 248* 233*       FASTING LIPID PANEL:  Lab Results   Component Value Date    HDL 26 01/29/2020    TRIG 84 01/29/2020     Nini Baer [facility]   Pager: [phone]

## 2020-02-11 NOTE — SIGNIFICANT EVENT
Family requesting to speak with Neuro Critical Care team regarding code status. I met with patient's brother, sister, and mom at the bedside. Brother stated that he wished to revisit discussion regarding code status with his mother. He state him and his siblings have discussed and feel it would be in the patient's best interest to consider comfort care. He cites patient's respiratory issues and his bed sores as examples. I clarified that patient's current code status is FULL. I explained difference between FULL code, DNR-CCA, and DNR-CC. I let them know that the patient's mom is technically his POA. Mom states she would be OK with patient's siblings making the decision. Discussed patient's overall quality of life, explained that it is likely patient will remain dependent in a nursing facility requiring total assistance with care. Brother states that he and the patient's other siblings will continue to discuss and update the team.  I offered to meet with the family tomorrow but they state they don't know if they will be able to be present due to possible inclement weather. I also offered to telephone conference if needed. Palliative care consulted.       PAMELA Lambert - CNP  Neuro Critical Care  02/11/20 5:33 PM

## 2020-02-11 NOTE — PROGRESS NOTES
Trach care done, inner cannula changed, dressing removed and trach site cleaned, dryed and new dressing applied.

## 2020-02-11 NOTE — PROGRESS NOTES
Pt began to be tachycardic around 1800. EKG obtained. Pt in Afib w/RVR. Primary notified. 2.5mg IV lopressor ordered and given. No change. Dr. Lauren Arciniega to bedside. 5mg IV lopressor given. No change. Another 5mg IV lopressor given. Pt HR still 110s/120s. Shift handoff given. If not improved primary to be notified by nightshift RN.

## 2020-02-11 NOTE — PROGRESS NOTES
Physical Therapy  DATE: 2020  NAME: Urmila Damon  MRN: [id number]   : 1960    Discharge Recommendations: Continue to Assess (pending progress)     Subjective: Pt lying supine upon PT arrival. RN agreeable to PT this morning. Pt not following commands however when writer asked pt if it was okay to do therapy currently pt shook head yes. Pain: Pt displays no signs of pain with vitals remaining stable and no facial grimacing. Patient follows: No Commands  Is patient on ventilator: Yes  Is patient on sedation: No  Precautions: Restrictions/Precautions  Restrictions/Precautions: Weight Bearing, General Precautions, Fall Risk, Seizure  Required Braces or Orthoses?: Yes  Upper Extremity Weight Bearing Restrictions  Right Upper Extremity Weight Bearing: Non Weight Bearing  Left Upper Extremity Weight Bearing: Non Weight Bearing  Other: NWB bilat wrists per ortho recommendations from 20 s/p fall  Required Braces or Orthoses  Left Upper Extremity Brace/Splint: (splinted per orthosx)  Position Activity Restriction  Other position/activity restrictions: Keep MAP>65    Therapeutic exercises:  UE/LE(s)  Bilateral Passive range of motion all planes x 15 reps; did not complete bilateral wrist PROM. bilateral gastrocnemius stretching 3 reps x 15 seconds  Pt requires MaxA to complete rolling. Rolling completed bilaterally with RN to position patient properly.      Goals  Short Term Goals  Short term goal 1: Pt to tolerate 30 minutes of activity for improved tolerance to therapy interventions  Short term goal 2: Complete bed mobility Desi from flattened position using bedrails as needed for greater independence  Short term goal 3: Perform stand pivot transfer to recliner with Desi for ease of mobility and functional transfers  Short term goal 4: Demo fair + seated static and dynamic balance to reduce risk of falls  Short term goal 5: Participate in AAROM and AROM for strengthening    Plan: Progress functional

## 2020-02-11 NOTE — PROGRESS NOTES
Daily Progress Note  Neuro Critical Care    Patient Name: Rabia Meadows  Patient : 1960  Room/Bed: 0405/0405-01  Code Status: FULL  Allergies: No Known Allergies    CHIEF COMPLAINT:      Recurrent hypoxia     INTERVAL HISTORY    Initial Presentation (Admitted 20): The patient is a 62 yo male with a history of afib (on Eliquis), bilateral ICA stenosis, DM 2, HTN, HLD, CAD s/p PCI stents, PVD, and ETOH abuse who was recently admitted on 19 for a presumed traumatic SAH and SDH after a suspected fall. He had a prolonged hospital course and was found to have symptomatic right ICA stenosis and underwent stenting on 20 along with bihemispheric infarctions. Patient required PEG placement and was discharged to a skilled nursing facility on 20. Patient returned to the ED on 20 with altered mental status  He was found unresponsive at the SNF. Per records, on EMS arrival patient had a GCS of 4 and was intubated. He was taken to [facility] Found to be in afib with RVR and cardioverted. CT Head stable. Transferred to [facility] for further evaluation. Arrive intubated, off sedation. Noted to be febrile and hypotensive. CTA chest negative for PE, consolidation present in left lower lobe. Hospital Course:   : Febrile and hypotensive. Levophed started along with broad spectrum antibiotics for concerns of sepsis. CT chest revealed left lung consolidation. : Liver US: borderline hepatomegaly, otherwise unremarkable  : Aspirin and Eliquis resumed. : Extubated  : Increased oral secretions, requiring BiPap. robinol started with improvement. : Ferrous sulfate started for iron deficiency anemia. 2/3: Stable  : Discussed possible need for trach with family vs other options including code status. I have spoken to patient's POA, mother- Terrea Stager and siblings who will discuss among themselves options and let us know.   : Requiring frequent NT suction and biPAP to maintain O2 sats. General surgery consulted for trach. Eliquis held, heparin infusion started. 2/6: Tolerating biPAP well, remained on heparin infusion while awaiting trach. 2/7: Hypoxic with saturation into the 80's and tachypneic with RR 30-40's overnight.  Hypoxia improved with frequent NT suctioning.  Nursing reports suctioning thick, yellow secretions almost hourly overnight.  FiO2 uptitrated to 60%.   No fevers, leukocytosis, tachycardia, or hypotension suggestive of infection.  CXR this AM unremarkable.  Blood glucose remains elevated (190-266), Lantus increased to 10u QHS.  To OR for Tracheostomy. 2/8:  Eliquis, aspirin and cilostazol restarted. Tolerating CPAP.  2/9: Tolerating T-piece with trach. 2/10: Large amount of secretions, requiring frequent suctioning. CXR with left retrocardiac opacity. Last 24h:  On T-Piece overnight and this morning at 8L, 35% FiO2. Afib with RVR overnight, rate into the 130's. No response to IV Lopressor. Given 10mg IVP Diltiazem x2 then started on infusion with improvement in tachycardia. StartPO Cardizem 60mg Q6h, wean off infusion. Neuro exam remains stable.     CURRENT MEDICATIONS:  SCHEDULED MEDICATIONS:   insulin lispro  0-6 Units Subcutaneous Q6H    insulin glargine  15 Units Subcutaneous Nightly    apixaban  5 mg Oral BID    cilostazol  100 mg Oral BID    polyethylene glycol  17 g Oral Daily    chlorhexidine  15 mL Mouth/Throat BID    miconazole   Topical BID    sennosides-docusate sodium  2 tablet Oral Daily    aspirin  81 mg Oral Daily    ferrous sulfate  325 mg Oral Daily with breakfast    [Held by provider] amLODIPine  5 mg Oral Daily    atorvastatin  40 mg Oral Nightly    DULoxetine  60 mg Oral Nightly    folic acid  1 mg Oral Daily    gabapentin  300 mg Oral BID    sotalol  160 mg Oral BID    sodium chloride flush  10 mL Intravenous 2 times per day     CONTINUOUS INFUSIONS:   diltiazem (CARDIZEM) 125 mg in Liver  Result Date: 1/29/2020  Borderline hepatomegaly with slightly coarsened echotexture which can be seen in chronic hepatic dysfunction. Unremarkable appearance of the gallbladder. Xr Chest Portable  Result Date: 2/4/2020  No acute cardiopulmonary abnormality. Ct Chest Pulmonary Embolism W Contrast  Result Date: 1/28/2020  1. No evidence of pulmonary embolism. 2.  Slightly prominent ascending aorta of 4.2 cm. 3.  Scattered tree-in-bud and ground-glass infiltrates throughout the lungs. 4.  Consolidation left lower lobe. 5.  Trace pericardial effusion. 6.  Gastrostomy tube in situ. Other findings as above. RECOMMENDATIONS: Vascular consultation may be considered on a nonemergent basis. Mri Brain Without Contrast  Result Date: 1/29/2020  1. Ongoing evolution of subacute/early chronic infarctions in the frontal lobes bilaterally and left frontoparietal junction, involving the left RUSTY and bilateral MCA territories with developing cortical laminar necrosis. No new or acute infarction. 2. Subarachnoid hemorrhage within the right frontal lobe as well as bilateral sylvian fissures. 3. Chronic left subdural hemorrhage measures 1.5 cm in thickness with mass effect on the subjacent left cerebral hemisphere. No significant midline shift. 4. Parenchymal volume loss and sequela of chronic microvascular ischemic changes. The findings were sent to the Radiology Results [address] at 12:10 pm on 1/29/2020to be communicated to a licensed caregiver. Labs and Images reviewed with:  [x] Dr. Jonathon Longo. Sanjay    [] Dr. Gilberto Herrera  [] Dr. Maryse James  [] There are no new interval images to review. PHYSICAL EXAM       CONSTITUTIONAL:  Alert. Tracks bilaterally. Global aphasia. Tracks appropriately.    HEAD:  normocephalic, atraumatic    EYES:  PERRL, EOMI   ENT:  moist mucous membranes   NECK:  supple, symmetric   LUNGS:  Equal air entry bilaterally, tachypneic   CARDIOVASCULAR:  normal s1 / s2, RRR, distal pulses intact   ABDOMEN:  Soft, no rigidity, normal bowel sounds, PEG site intact   NEUROLOGIC:  Mental Status:  Awake and alert. Global aphasia. Cranial Nerves:    II: Visual fields:  Blinks to threat bilaterally  III: Pupils:  equal, round, reactive to light  III,IV,VI: Extra Ocular Movements: intact    Motor Exam:    Moving the right upper extremity spontaneously. DRAINS:  [x] There are no drains for Neuro Critical Care to monitor at this time. ASSESSMENT AND PLAN:       The patient is a 60 yo male with recent prolonged hospitalization for presumed traumatic SAH and bihemispheric infarctions who was admitted for management of respiratory distress and sepsis. Represented within 24h of being discharged after being found unresponsive at the SNF.       NEUROLOGIC:  - Recent presumed traumatic SAH and SDH  - Recent bihemispheric infarctions  - MRI Brain 1/29 showed evolution of subacute/early chronic infarctions in bilateral frontal lobes, left RUSTY territory, and bilateral MCA territories, right frontal SAH, chronic left SDH  - Neurological exam remains stable  - On Aspirin 81mg QD in setting of recent right ICA stenting  - Goal normotension   - Neuro checks per protocol    CARDIOVASCULAR:  - Goal normotension  - History of HTN; Hold Norvasc for now while on Cardizem infusion  - Atrial fibrillation, RVR  - Continue home Sotalol 160mg BID, Add Cardizem 60mg Q6h  - Wean off Cardizem infusion  - Continue Eliquis  - Recent Echo 12/30 EF 45%  - History of PVD; continue Pletal  - Continue telemetry    PULMONARY:  - POD#4 s/p trach  - CXR 2/10 showed left retrocardiac opacity  - Pulmonary toilet with aggressive NT suctioning PRN  - Daily chest PT  - Robinol 1mg BID PRN  - History of COPD; continue Duoneb Q6h PRN    RENAL/FLUID/ELECTROLYTE:  - Normal renal functioning  - BUN 19/Creatinine 0.32  - Adequate urine output per nursing  - No maintenance IVF  - Replace electrolytes PRN  - Check BMP

## 2020-02-11 NOTE — PROGRESS NOTES
Pt HR sustaining in 130s to 140 afib rvr most of the night. Dr made aware gave orders to give IV push meds. HR still remain tachy and in afib. Cardiac drip started per orders. Will continue to monitor.

## 2020-02-11 NOTE — ADT AUTH CERT
Utilization Reviews         Neurology [address] Day 15 (2020) by Justa Good RN         Review Status Review Entered   Completed 2020 15:04       Criteria Review      Care Day: 15 Care Date: 2020 Level of Care:    Guideline Day 2    Level Of Care    (X) Floor    Clinical Status    ( ) * No ICU or intermediate care needs    2020 3:04 PM EST by Pratibha Robledo      afib rvr on cardizem drip  waiting for precert for ltach  needs frequent suctioning    Interventions    (X) Inpatient interventions continue    * Milestone   Additional Notes   CARE DAY 15     2020      Last 24h:   Hypoxic with saturation into the 80's and tachypneic with RR 30-40's overnight.  Hypoxia improved with frequent NT suctioning.  Nursing reports suctioning thick, yellow secretions almost hourly overnight.  FiO2 uptitrated to 60%.  Given Fentanyl x1 with mild improvement in tachypnea.  On exam this morning, patient remains mildly tachypneic but is saturating well on biPAP.  No fevers, leukocytosis, tachycardia, or hypotension suggestive of infection.  CXR this AM unremarkable.  Blood glucose remains elevated (190-266), Lantus increased to 10u QHS.  Neuro exam remains stable. Infusions Meds    · diltiazem (CARDIZEM) 125 mg in dextrose 5% 125 mL infusion 5 mg/hr (20 0334)                  VITALS:   Temperature Range: Temp: 98.8 °F (37.1 °C) Temp  Av.6 °F (37 °C)  Min: 98.3 °F (36.8 °C)  Max: 98.8 °F (37.1 °C)   BP Range: Systolic (13HKC), RENETTA:803 , Min:97 , WBY:551      Diastolic (25CXV), HMB:90, Min:64, Max:108       Pulse Range: Pulse  Av  Min: 98  Max: 131   Respiration Range: Resp  Av.1  Min: 19  Max: 46   Current Pulse Ox: SpO2: 94 %   24HR Pulse Ox Range: SpO2  Av.1 %  Min: 91 %  Max: 100 %   Patient Vitals for the past 12 hrs: The patient is a 60 yo male with recent prolonged hospitalization for presumed traumatic SAH and bihemispheric infarctions who was admitted for management previously tolerating well   - Bowel regimen: Senokot-S daily, add Glycolax   - Give Milk of Mag x1   - GI prophylaxis: N/A       ID:   - Afebrile, Tmax 37.8   - No leukocytosis, WBC 7.5   - Sepsis resolved   - Completed 7 day course of Vanc/Zosyn on 2/4 for left lower lobe pneumonia   - Continue to monitor for fevers   - Daily CBC       HEME:    - H&H 11.1/37.6   - Platelets 933   - Daily CBC       ENDOCRINE:   - Continue to monitor blood glucose, goal <180   - History of DM 2   - Recent A1C improved to 5.8   - Glucose trend remains elevated, 190-266   - Increase Lantus to 10u QHS, continue low insulin sliding scale       OTHER:   - PT/OT/ST   - Discharge planning; plans to go to [facility]   - Per Case Management, needs to have trach for 7 days prior to dispo   - Code Status: FULL       PROPHYLAXIS:   Stress ulcer: N/A       DVT PROPHYLAXIS:   - SCD sleeves - Thigh High    - On heparin infusion, restart Eliquis tomorrow if OK with General Surgery       DISPOSITION:

## 2020-02-11 NOTE — PROGRESS NOTES
Occupational Therapy    Occupational Therapy Not Seen Note    DATE: 2020  Name: Ramírez Chaney  : 1960  MRN: [id number]    Patient not available for Occupational Therapy due to:     Other: Per RN, pt not following commands on this date    Next Scheduled Treatment: Check back     Electronically signed by RD Auguste on 2020 at 11:19 AM

## 2020-02-12 VITALS
OXYGEN SATURATION: 95 % | HEART RATE: 157 BPM | SYSTOLIC BLOOD PRESSURE: 126 MMHG | TEMPERATURE: 101.8 F | WEIGHT: 162.92 LBS | RESPIRATION RATE: 31 BRPM | DIASTOLIC BLOOD PRESSURE: 92 MMHG | BODY MASS INDEX: 22.81 KG/M2 | HEIGHT: 71 IN

## 2020-02-12 LAB
ABSOLUTE EOS #: 0.07 K/UL (ref 0–0.4)
ABSOLUTE IMMATURE GRANULOCYTE: 0 K/UL (ref 0–0.3)
ABSOLUTE LYMPH #: 1.79 K/UL (ref 1–4.8)
ABSOLUTE MONO #: 0.07 K/UL (ref 0.1–0.8)
ANION GAP SERPL CALCULATED.3IONS-SCNC: 14 MMOL/L (ref 9–17)
BASOPHILS # BLD: 2 % (ref 0–2)
BASOPHILS ABSOLUTE: 0.14 K/UL (ref 0–0.2)
BUN BLDV-MCNC: 24 MG/DL (ref 6–20)
BUN/CREAT BLD: ABNORMAL (ref 9–20)
CALCIUM IONIZED: 1.23 MMOL/L (ref 1.13–1.33)
CALCIUM SERPL-MCNC: 9.6 MG/DL (ref 8.6–10.4)
CHLORIDE BLD-SCNC: 104 MMOL/L (ref 98–107)
CO2: 24 MMOL/L (ref 20–31)
CREAT SERPL-MCNC: 0.39 MG/DL (ref 0.7–1.2)
DIFFERENTIAL TYPE: ABNORMAL
EOSINOPHILS RELATIVE PERCENT: 1 % (ref 1–4)
GFR AFRICAN AMERICAN: >60 ML/MIN
GFR NON-AFRICAN AMERICAN: >60 ML/MIN
GFR SERPL CREATININE-BSD FRML MDRD: ABNORMAL ML/MIN/{1.73_M2}
GFR SERPL CREATININE-BSD FRML MDRD: ABNORMAL ML/MIN/{1.73_M2}
GLUCOSE BLD-MCNC: 240 MG/DL (ref 75–110)
GLUCOSE BLD-MCNC: 246 MG/DL (ref 75–110)
GLUCOSE BLD-MCNC: 247 MG/DL (ref 70–99)
HCT VFR BLD CALC: 43.8 % (ref 40.7–50.3)
HEMOGLOBIN: 12.3 G/DL (ref 13–17)
IMMATURE GRANULOCYTES: 0 %
LYMPHOCYTES # BLD: 26 % (ref 24–44)
MAGNESIUM: 1.9 MG/DL (ref 1.6–2.6)
MCH RBC QN AUTO: 24.2 PG (ref 25.2–33.5)
MCHC RBC AUTO-ENTMCNC: 28.1 G/DL (ref 28.4–34.8)
MCV RBC AUTO: 86.2 FL (ref 82.6–102.9)
MONOCYTES # BLD: 1 % (ref 1–7)
MORPHOLOGY: ABNORMAL
NRBC AUTOMATED: 0 PER 100 WBC
PDW BLD-RTO: 19.9 % (ref 11.8–14.4)
PHOSPHORUS: 3.7 MG/DL (ref 2.5–4.5)
PLATELET # BLD: 315 K/UL (ref 138–453)
PLATELET ESTIMATE: ABNORMAL
PMV BLD AUTO: 10.1 FL (ref 8.1–13.5)
POTASSIUM SERPL-SCNC: 4.3 MMOL/L (ref 3.7–5.3)
PROCALCITONIN: 0.07 NG/ML
RBC # BLD: 5.08 M/UL (ref 4.21–5.77)
RBC # BLD: ABNORMAL 10*6/UL
SEG NEUTROPHILS: 70 % (ref 36–66)
SEGMENTED NEUTROPHILS ABSOLUTE COUNT: 4.83 K/UL (ref 1.8–7.7)
SODIUM BLD-SCNC: 142 MMOL/L (ref 135–144)
WBC # BLD: 6.9 K/UL (ref 3.5–11.3)
WBC # BLD: ABNORMAL 10*3/UL

## 2020-02-12 PROCEDURE — 6370000000 HC RX 637 (ALT 250 FOR IP): Performed by: STUDENT IN AN ORGANIZED HEALTH CARE EDUCATION/TRAINING PROGRAM

## 2020-02-12 PROCEDURE — 2700000000 HC OXYGEN THERAPY PER DAY

## 2020-02-12 PROCEDURE — 6370000000 HC RX 637 (ALT 250 FOR IP): Performed by: NURSE PRACTITIONER

## 2020-02-12 PROCEDURE — 83735 ASSAY OF MAGNESIUM: CPT

## 2020-02-12 PROCEDURE — 84100 ASSAY OF PHOSPHORUS: CPT

## 2020-02-12 PROCEDURE — 2500000003 HC RX 250 WO HCPCS: Performed by: STUDENT IN AN ORGANIZED HEALTH CARE EDUCATION/TRAINING PROGRAM

## 2020-02-12 PROCEDURE — 6370000000 HC RX 637 (ALT 250 FOR IP): Performed by: INTERNAL MEDICINE

## 2020-02-12 PROCEDURE — 94761 N-INVAS EAR/PLS OXIMETRY MLT: CPT

## 2020-02-12 PROCEDURE — 94640 AIRWAY INHALATION TREATMENT: CPT

## 2020-02-12 PROCEDURE — 85025 COMPLETE CBC W/AUTO DIFF WBC: CPT

## 2020-02-12 PROCEDURE — 2580000003 HC RX 258: Performed by: INTERNAL MEDICINE

## 2020-02-12 PROCEDURE — 6360000002 HC RX W HCPCS: Performed by: INTERNAL MEDICINE

## 2020-02-12 PROCEDURE — 97110 THERAPEUTIC EXERCISES: CPT

## 2020-02-12 PROCEDURE — 99223 1ST HOSP IP/OBS HIGH 75: CPT | Performed by: FAMILY MEDICINE

## 2020-02-12 PROCEDURE — 2580000003 HC RX 258: Performed by: STUDENT IN AN ORGANIZED HEALTH CARE EDUCATION/TRAINING PROGRAM

## 2020-02-12 PROCEDURE — 82947 ASSAY GLUCOSE BLOOD QUANT: CPT

## 2020-02-12 PROCEDURE — 80048 BASIC METABOLIC PNL TOTAL CA: CPT

## 2020-02-12 PROCEDURE — 82330 ASSAY OF CALCIUM: CPT

## 2020-02-12 PROCEDURE — 36415 COLL VENOUS BLD VENIPUNCTURE: CPT

## 2020-02-12 PROCEDURE — 84145 PROCALCITONIN (PCT): CPT

## 2020-02-12 PROCEDURE — APPNB45 APP NON BILLABLE 31-45 MINUTES: Performed by: NURSE PRACTITIONER

## 2020-02-12 RX ORDER — LORAZEPAM 2 MG/ML
0.5 INJECTION INTRAMUSCULAR EVERY 4 HOURS PRN
Status: DISCONTINUED | OUTPATIENT
Start: 2020-02-12 | End: 2020-02-13 | Stop reason: HOSPADM

## 2020-02-12 RX ORDER — GLYCOPYRROLATE 1 MG/5 ML
0.2 SYRINGE (ML) INTRAVENOUS EVERY 4 HOURS PRN
Status: DISCONTINUED | OUTPATIENT
Start: 2020-02-12 | End: 2020-02-12 | Stop reason: CLARIF

## 2020-02-12 RX ORDER — GLYCOPYRROLATE 0.2 MG/ML
0.2 INJECTION INTRAMUSCULAR; INTRAVENOUS EVERY 4 HOURS PRN
Status: DISCONTINUED | OUTPATIENT
Start: 2020-02-12 | End: 2020-02-13 | Stop reason: HOSPADM

## 2020-02-12 RX ADMIN — ANTI-FUNGAL POWDER MICONAZOLE NITRATE TALC FREE: 1.42 POWDER TOPICAL at 19:18

## 2020-02-12 RX ADMIN — INSULIN LISPRO 4 UNITS: 100 INJECTION, SOLUTION INTRAVENOUS; SUBCUTANEOUS at 04:14

## 2020-02-12 RX ADMIN — SENNOSIDES AND DOCUSATE SODIUM 2 TABLET: 8.6; 5 TABLET ORAL at 08:34

## 2020-02-12 RX ADMIN — Medication 15 ML: at 08:35

## 2020-02-12 RX ADMIN — FOLIC ACID 1 MG: 1 TABLET ORAL at 08:35

## 2020-02-12 RX ADMIN — SOTALOL HYDROCHLORIDE 160 MG: 80 TABLET ORAL at 09:59

## 2020-02-12 RX ADMIN — ACETAMINOPHEN 650 MG: 325 TABLET ORAL at 20:29

## 2020-02-12 RX ADMIN — ACETAMINOPHEN 650 MG: 325 TABLET ORAL at 01:51

## 2020-02-12 RX ADMIN — APIXABAN 5 MG: 5 TABLET, FILM COATED ORAL at 08:35

## 2020-02-12 RX ADMIN — CILOSTAZOL 100 MG: 100 TABLET ORAL at 08:35

## 2020-02-12 RX ADMIN — GLYCOPYRROLATE 1 MG: 1 TABLET ORAL at 05:40

## 2020-02-12 RX ADMIN — DILTIAZEM HYDROCHLORIDE 5 MG/HR: 5 INJECTION INTRAVENOUS at 09:59

## 2020-02-12 RX ADMIN — POLYETHYLENE GLYCOL 3350 17 G: 17 POWDER, FOR SOLUTION ORAL at 09:41

## 2020-02-12 RX ADMIN — ANTI-FUNGAL POWDER MICONAZOLE NITRATE TALC FREE: 1.42 POWDER TOPICAL at 09:41

## 2020-02-12 RX ADMIN — ASPIRIN 81 MG: 81 TABLET, CHEWABLE ORAL at 08:35

## 2020-02-12 RX ADMIN — FERROUS SULFATE TAB EC 325 MG (65 MG FE EQUIVALENT) 325 MG: 325 (65 FE) TABLET DELAYED RESPONSE at 08:35

## 2020-02-12 RX ADMIN — DILTIAZEM HYDROCHLORIDE 60 MG: 60 TABLET, FILM COATED ORAL at 05:40

## 2020-02-12 RX ADMIN — IPRATROPIUM BROMIDE AND ALBUTEROL SULFATE 1 AMPULE: .5; 3 SOLUTION RESPIRATORY (INHALATION) at 11:47

## 2020-02-12 RX ADMIN — AMIODARONE HYDROCHLORIDE 150 MG: 50 INJECTION, SOLUTION INTRAVENOUS at 10:46

## 2020-02-12 RX ADMIN — SODIUM CHLORIDE, PRESERVATIVE FREE 10 ML: 5 INJECTION INTRAVENOUS at 09:42

## 2020-02-12 RX ADMIN — GABAPENTIN 300 MG: 300 CAPSULE ORAL at 08:35

## 2020-02-12 RX ADMIN — INSULIN LISPRO 4 UNITS: 100 INJECTION, SOLUTION INTRAVENOUS; SUBCUTANEOUS at 12:05

## 2020-02-12 RX ADMIN — AMIODARONE HYDROCHLORIDE 1 MG/MIN: 50 INJECTION, SOLUTION INTRAVENOUS at 10:57

## 2020-02-12 ASSESSMENT — PAIN SCALES - WONG BAKER
WONGBAKER_NUMERICALRESPONSE: 0

## 2020-02-12 ASSESSMENT — PAIN SCALES - GENERAL
PAINLEVEL_OUTOF10: 0
PAINLEVEL_OUTOF10: 0

## 2020-02-12 NOTE — PROGRESS NOTES
Occupational Therapy    Occupational Therapy Not Seen Note    DATE: 2020  Name: Krissy Desouza  : 1960  MRN: [id number]    Patient not available for Occupational Therapy due to: Other: Pt opened eyes and responded to approx 3 yes/no questions. MIRELES began to set up supplies pt closed eyes and unable to arouse.      Next Scheduled Treatment: Attempt at later date    Electronically signed by RD Garcia on 2020 at 8:45 AM

## 2020-02-12 NOTE — SIGNIFICANT EVENT
After the meeting yesterday evening at bedside, patient's brother Nicko Jacobs called and spoke with Dr. Patrica Hernandez stating he had spoke with all his siblings and they would like to change code status to [facility]. This morning Dr. Kadi Barth and I called and spoke with patient's mother Agni Reyes about patient's code status. She agreed that the family had all decided to pursue comfort care measures for Kiet Oats. She states she wants him in [facility] closer to home. Code status changed to [facility] and hospice consulted. I called Nicko Jacobs, patient's brother, and updated him on the conversation with his mom. Nicko Jacobs told me that he spoke with his brothers and sisters and they all agree that comfort care is in the best interest of their brother. They do not feel that he currently has an acceptable quality of life given his tracheostomy, tube feedings, lack of mobility (bedbound), and lack of ability to comprehend and speak. They are updated on plan to try to discharge to SNF with Hospice. They are aware this means that active medications will be discontinued such as medications to control his heart rate. I also let Nicko Jacobs know that the patient had been running fevers but no invasive measures will be taken to work up for infection. Nicko Jacobs is agreeable.       PAMELA Herrmann - CNP  Neuro Critical Care  02/12/20 1:45 PM

## 2020-02-12 NOTE — PROGRESS NOTES
Daily Progress Note  Neuro Critical Care    Patient Name: oRla Ferguson  Patient : 1960  Room/Bed: 0405/0405-01  Code Status: FULL  Allergies: No Known Allergies    CHIEF COMPLAINT:      Recurrent hypoxia     INTERVAL HISTORY    Initial Presentation (Admitted 20): The patient is a 62 yo male with a history of afib (on Eliquis), bilateral ICA stenosis, DM 2, HTN, HLD, CAD s/p PCI stents, PVD, and ETOH abuse who was recently admitted on 19 for a presumed traumatic SAH and SDH after a suspected fall. He had a prolonged hospital course and was found to have symptomatic right ICA stenosis and underwent stenting on 20 along with bihemispheric infarctions. Patient required PEG placement and was discharged to a skilled nursing facility on 20. Patient returned to the ED on 20 with altered mental status  He was found unresponsive at the SNF. Per records, on EMS arrival patient had a GCS of 4 and was intubated. He was taken to [facility]. Found to be in afib with RVR and cardioverted. CT Head stable. Transferred to [facility] for further evaluation. Arrive intubated, off sedation. Noted to be febrile and hypotensive. CTA chest negative for PE, consolidation present in left lower lobe. Hospital Course:   : Febrile and hypotensive. Levophed started along with broad spectrum antibiotics for concerns of sepsis. CT chest revealed left lung consolidation. : Liver US: borderline hepatomegaly, otherwise unremarkable  : Aspirin and Eliquis resumed. : Extubated  : Increased oral secretions, requiring BiPap. robinol started with improvement. : Ferrous sulfate started for iron deficiency anemia. 2/3: Stable  : Discussed possible need for trach with family vs other options including code status. I have spoken to patient's POA, mother- Blase Cowden and siblings who will discuss among themselves options and let us know.   : Requiring frequent NT suction and biPAP to maintain O2 sats. General surgery consulted for trach. Eliquis held, heparin infusion started. 2/6: Tolerating biPAP well, remained on heparin infusion while awaiting trach. 2/7: Hypoxic with saturation into the 80's and tachypneic with RR 30-40's overnight.  Hypoxia improved with frequent NT suctioning.  Nursing reports suctioning thick, yellow secretions almost hourly overnight.  FiO2 uptitrated to 60%.   No fevers, leukocytosis, tachycardia, or hypotension suggestive of infection.  CXR this AM unremarkable.  Blood glucose remains elevated (190-266), Lantus increased to 10u QHS.  To OR for Tracheostomy. 2/8:  Eliquis, aspirin and cilostazol restarted. Tolerating CPAP.  2/9: Tolerating T-piece with trach. 2/10: Large amount of secretions, requiring frequent suctioning. CXR with left retrocardiac opacity. 2/11: On T-Piece overnight and this morning at 8L, 35% FiO2. Afib with RVR overnight, rate into the 130's. No response to IV Lopressor. Given 10mg IVP Diltiazem x2 then started on infusion with improvement in tachycardia. StartPO Cardizem 60mg Q6h, wean off infusion. Cardiology following. Met with family, considering changing code status to [facility]. Wendy Mcghee deferring. Last 24h:  Febrile and tachycardic overnight despite adding oral Cardizem. Remains on Cardizem infusion. Cardiology evaluated and changed to Amiodarone infusion. Palliative Care evaluated patient. Code status changed to [facility] in congruence with family wishes (see other notes). Plan to discharge to [facility].      CURRENT MEDICATIONS:  SCHEDULED MEDICATIONS:   diltiazem  60 mg Oral 4 times per day    insulin lispro  0-12 Units Subcutaneous Q6H    insulin glargine  15 Units Subcutaneous Nightly    apixaban  5 mg Oral BID    cilostazol  100 mg Oral BID    polyethylene glycol  17 g Oral Daily    chlorhexidine  15 mL Mouth/Throat BID    miconazole   Topical BID    sennosides-docusate sodium  2 tablet Oral Daily    aspirin  81 mg Oral Daily    ferrous sulfate  325 mg Oral Daily with breakfast    [Held by provider] amLODIPine  5 mg Oral Daily    atorvastatin  40 mg Oral Nightly    DULoxetine  60 mg Oral Nightly    folic acid  1 mg Oral Daily    gabapentin  300 mg Oral BID    sotalol  160 mg Oral BID    sodium chloride flush  10 mL Intravenous 2 times per day     CONTINUOUS INFUSIONS:   diltiazem (CARDIZEM) 125 mg in dextrose 5% 125 mL infusion 10 mg/hr (20 0300)    dextrose       PRN MEDICATIONS:   hydrALAZINE, labetalol, fentanNYL, glucose, dextrose, glucagon (rDNA), dextrose, glycopyrrolate, ipratropium-albuterol, sodium chloride flush, acetaminophen, ondansetron    VITALS:  Temperature Range: Temp: 99 °F (37.2 °C) Temp  Av.1 °F (37.8 °C)  Min: 98.1 °F (36.7 °C)  Max: 101.6 °F (38.7 °C)  BP Range: Systolic (59VFB), RWB:318 , Min:88 , BHK:607     Diastolic (33HUU), CZZ:34, Min:40, Max:101    Pulse Range: Pulse  Av.2  Min: 86  Max: 121  Respiration Range: Resp  Av.6  Min: 34  Max: 46  Current Pulse Ox: SpO2: 91 %  24HR Pulse Ox Range: SpO2  Av %  Min: 90 %  Max: 97 %  Patient Vitals for the past 12 hrs:   BP Temp Temp src Pulse Resp SpO2 Weight   20 0630 119/74 -- -- 114 (!) 36 -- --   20 0600 126/72 -- -- 113 (!) 40 -- --   20 0530 (!) 111/98 -- -- 101 (!) 37 -- 162 lb 14.7 oz (73.9 kg)   20 0500 118/80 -- -- 109 (!) 40 -- --   20 0430 134/75 -- -- 107 (!) 40 -- --   20 0400 122/71 99 °F (37.2 °C) Oral 108 (!) 39 -- --   20 0331 -- -- -- -- (!) 43 91 % --   20 0330 106/73 98.1 °F (36.7 °C) Oral 106 (!) 42 -- --   20 0130 119/67 100.6 °F (38.1 °C) Axillary 98 (!) 46 -- --   20 0100 103/71 -- -- 94 (!) 44 -- --   20 0030 111/73 -- -- 90 (!) 43 -- --   20 0000 88/70 -- -- 86 (!) 35 -- --   20 2303 -- -- -- -- (!) 42 97 % --   200 98/81 100.3 °F (37.9 °C) Axillary 90 (!) 41 -- --   20 2200 108/81 -- -- 89 (!) 44 -- --   02/11/20 2130 (!) 134/101 -- -- 100 (!) 45 -- --   02/11/20 2100 110/82 101.6 °F (38.7 °C) Axillary 97 (!) 43 -- --   02/11/20 2035 -- -- -- -- (!) 39 94 % --   02/11/20 2030 (!) 88/59 101.5 °F (38.6 °C) Axillary 94 (!) 43 -- --   02/11/20 2000 99/83 101.5 °F (38.6 °C) Axillary 89 (!) 45 -- --     Estimated body mass index is 22.72 kg/m² as calculated from the following:    Height as of this encounter: 5' 11\" (1.803 m). Weight as of this encounter: 162 lb 14.7 oz (73.9 kg).  []<16 Severe malnutrition  []16-16.99 Moderate malnutrition  []17-18.49 Mild malnutrition  [x]18.5-24.9 Normal  []25-29.9 Overweight (not obese)  []30-34.9 Obese class 1 (Low Risk)  []35-39.9 Obese class 2 (Moderate Risk)  []?40 Obese class 3 (High Risk)    RECENT LABS:   Lab Results   Component Value Date    WBC 6.9 02/12/2020    HGB 12.3 (L) 02/12/2020    HCT 43.8 02/12/2020     02/12/2020    CHOL 92 01/29/2020    TRIG 84 01/29/2020    HDL 26 (L) 01/29/2020     (H) 02/02/2020    AST 61 (H) 02/02/2020     02/12/2020    K 4.3 02/12/2020     02/12/2020    CREATININE 0.39 (L) 02/12/2020    BUN 24 (H) 02/12/2020    CO2 24 02/12/2020    INR 1.4 01/28/2020    LABA1C 5.8 01/29/2020     24 HOUR INTAKE/OUTPUT:    Intake/Output Summary (Last 24 hours) at 2/12/2020 0743  Last data filed at 2/12/2020 0554  Gross per 24 hour   Intake 2762 ml   Output --   Net 2762 ml       IMAGING:   Xr Chest Portable  Result Date: 2/10/2020  There are findings to suggest pneumomediastinum which is probably related to recent tracheostomy tube placement. New left retrocardiac parenchymal opacity reflecting atelectasis versus infiltrate. Ct Head Without Contrast  Result Date: 1/29/2020  1. Stable left frontoparietal subacute subdural hemorrhage, as discussed above. 2. Unchanged associated rightward midline shift at the level of the septum pellucidum measuring 3 mm.  3. Moderate cerebral white matter chronic microvascular ischemic disease. Ct Head Wo Contrast  Result Date: 1/28/2020  Persistent extra-axial fluid collection within the left frontal parietal/temporal region maximally measuring 2 cm in width with approximately 4 mm of midline shift. Scattered subcortical and periventricular white matter hypodensities are most compatible with chronic small vessel disease. Ct Abdomen Pelvis W Iv Contrast Additional Contrast? None  Result Date: 1/28/2020  1. Although degraded by motion artifact there appears to be extensive tree-in-bud micro nodules in the visualized right lower lobe indicative of infectious process. Posterior basal consolidation left lower lobe probably combination of atelectasis and pneumonia. Subsegmental atelectasis posterior basal right lower lobe. Findings have developed since the prior exam. 2. Percutaneous gastrostomy tube in place which appears to be in satisfactory position in the gastric antrum but definitive confirmation may be accomplished with dedicated tube position radiograph with utilizing contrast.     Us Liver  Result Date: 1/29/2020  Borderline hepatomegaly with slightly coarsened echotexture which can be seen in chronic hepatic dysfunction. Unremarkable appearance of the gallbladder. Xr Chest Portable  Result Date: 2/4/2020  No acute cardiopulmonary abnormality. Ct Chest Pulmonary Embolism W Contrast  Result Date: 1/28/2020  1. No evidence of pulmonary embolism. 2.  Slightly prominent ascending aorta of 4.2 cm. 3.  Scattered tree-in-bud and ground-glass infiltrates throughout the lungs. 4.  Consolidation left lower lobe. 5.  Trace pericardial effusion. 6.  Gastrostomy tube in situ. Other findings as above. RECOMMENDATIONS: Vascular consultation may be considered on a nonemergent basis. Mri Brain Without Contrast  Result Date: 1/29/2020  1.  Ongoing evolution of subacute/early chronic infarctions in the frontal lobes bilaterally and left frontoparietal junction, involving the left RUSTY and bilateral MCA territories with developing cortical laminar necrosis. No new or acute infarction. 2. Subarachnoid hemorrhage within the right frontal lobe as well as bilateral sylvian fissures. 3. Chronic left subdural hemorrhage measures 1.5 cm in thickness with mass effect on the subjacent left cerebral hemisphere. No significant midline shift. 4. Parenchymal volume loss and sequela of chronic microvascular ischemic changes. The findings were sent to the Radiology Results [address] at 12:10 pm on 1/29/2020to be communicated to a licensed caregiver. Labs and Images reviewed with:  [x] Dr. Ed Lennox. Sanjay    [] Dr. Dillon Moran  [] Dr. Alexandra Mcbride  [] There are no new interval images to review. PHYSICAL EXAM       CONSTITUTIONAL:  Awakens briskly to voice. Global aphasia. Does not answer questions, follow commands, or mimic. Tracks voice bilaterally. HEAD:  normocephalic, atraumatic    EYES:  PERRL, EOMI   ENT:  dry mucous membranes   NECK:  supple, symmetric   LUNGS:  Equal air entry bilaterally, coarse rales throughout   CARDIOVASCULAR:  normal s1 / s2, tachycardic   ABDOMEN:  Soft, no rigidity, PEG site intact without drainage   NEUROLOGIC:  Mental Status:  Awakens briskly to voice. Globally aphasic. Cranial Nerves:    III: Pupils:  equal, round, reactive to light  III,IV,VI: Extra Ocular Movements: intact     Motor Exam:    Moves his right upper extremity spontaneously. Able to resist gravity in left upper extremity when passively raised. Minimal movement in bilateral lower extremities, does not antigravity. DRAINS:  [x] There are no drains for Neuro Critical Care to monitor at this time. ASSESSMENT AND PLAN:       The patient is a 62 yo male with recent prolonged hospitalization for presumed traumatic SAH and bihemispheric infarctions who was admitted for management of respiratory distress and sepsis.   Represented within 24h of

## 2020-02-12 NOTE — DISCHARGE SUMMARY
Neuro Critical Care   Discharge Summary      PATIENT NAME: Rogerio Urban  YOB: 1960  MEDICAL RECORD NO. [id number]  DATE: 2/12/2020  PRIMARY CARE PHYSICIAN: Ritesh Adams MD  DISCHARGE DATE: 02/12/20  DISCHARGE DIAGNOSIS:   Patient Active Problem List   Diagnosis Code    SAH (subarachnoid hemorrhage) ([facility] I60.9    Subarachnoid bleed ([facility] I60.9    Traumatic subarachnoid hemorrhage with loss of consciousness of 1 hour to 5 hours 59 minutes ([facility] S06. 6X3A    Carotid stenosis, right I65.21    Asymptomatic stenosis of left vertebral artery I65.02    Intracranial atherosclerosis I67.2    History of CEA (carotid endarterectomy) Z98.890    Ventilator dependence (HCC) Z99.11    Delirium tremens ([facility]) F10.231    Chronic a-fib I48.20    On mechanically assisted ventilation ([facility]) Z99.11    Encephalopathy G93.40    Acute ischemic multifocal anterior circulation stroke ([facility]) I63.529    Palliative care encounter Z51.5    Subdural hematoma (HCC) S06.5X9A    Altered mental status R41.82    Subdural hygroma G96.0    SDH (subdural hematoma) ([facility]) S06.5X9A    Septic shock ([facility]) A41.9, R65.21    Pneumonia of both lower lobes due to infectious organism [facility]) J18.1    Oropharyngeal dysphagia R13.12       HOSPITAL COURSE     Rogerio Urban is a 61 y.o. yo male with a history of afib (on Eliquis), bilateral ICA stenosis, DM 2, HTN, HLD, CAD s/p PCI stents, PVD, and ETOH abuse was recently admitted on 12/25/19 for a presumed traumatic SAH and SDH after a suspected fall. He had a prolonged hospital course and developed bihemispheric strokes while off anticoagulation and was found to have symptomatic right ICA stenosis and underwent stenting on 1/9/20. Patient required PEG placement and was discharged to a skilled nursing facility on 1/27/20. Patient then returned to the ED on 1/28/20 with altered mental status  He was found unresponsive at the SNF.    Per records, on EMS arrival patient had a GCS of 4 and entry bilaterally, coarse rales throughout   CARDIOVASCULAR:  normal s1 / s2, tachycardic   ABDOMEN:  Soft, no rigidity, PEG site intact without drainage   NEUROLOGIC:  Mental Status:  Awakens briskly to voice. Globally aphasic. Cranial Nerves:    III: Pupils:  equal, round, reactive to light  III,IV,VI: Extra Ocular Movements: intact    Motor Exam:    Moves his right upper extremity spontaneously. Able to resist gravity in left upper extremity when passively raised. Minimal movement in bilateral lower extremities, does not antigravity. LABS/IMAGING     Recent Labs     02/10/20  0551 02/11/20  0208 02/12/20  0550   WBC 6.7 6.9 6.9   HGB 11.1* 11.4* 12.3*   HCT 37.2* 39.0* 43.8    318 315    137 142   K 4.7 4.7 4.3   CL 98 101 104   CO2 25 24 24   BUN 15 19 24*   CREATININE 0.31* 0.32* 0.39*     Xr Wrist Right (min 3 Views)  Result Date: 2/7/2020  Right wrist: 1. Proximal predominant degenerative changes as detailed above. 2. Mild osteoarthrosis. 3. Remote ulnar styloid avulsion fracture fragments. Loose body along the outer ulnocarpal joint space measuring up to 8 mm. 4. Moderate edema and soft tissue swelling of the right wrist/distal right forearm. 5. No acute fracture or dislocation. Left hand: 1. Stable appearance and alignment of known comminuted fracture through the proximal 1st metacarpal compared with 01/16/2020. 2. Overlying casting limits evaluation of fine osseous detail. 3. Mild underlying osteoarthrosis. Mild negative ulnar variance. 4. Moderate to severe soft tissue swelling at the dorsum of the left hand and wrist.     Xr Hand Left (min 3 Views)  Result Date: 2/7/2020  Right wrist: 1. Proximal predominant degenerative changes as detailed above. 2. Mild osteoarthrosis. 3. Remote ulnar styloid avulsion fracture fragments. Loose body along the outer ulnocarpal joint space measuring up to 8 mm.  4. Moderate edema and soft tissue swelling of the right wrist/distal right measuring 3 mm. 3. Moderate cerebral white matter chronic microvascular ischemic disease. Ct Abdomen Pelvis W Iv Contrast Additional Contrast? None  Result Date: 1/28/2020  1. Although degraded by motion artifact there appears to be extensive tree-in-bud micro nodules in the visualized right lower lobe indicative of infectious process. Posterior basal consolidation left lower lobe probably combination of atelectasis and pneumonia. Subsegmental atelectasis posterior basal right lower lobe. Findings have developed since the prior exam. 2. Percutaneous gastrostomy tube in place which appears to be in satisfactory position in the gastric antrum but definitive confirmation may be accomplished with dedicated tube position radiograph with utilizing contrast.     Us Liver  Result Date: 1/29/2020  Borderline hepatomegaly with slightly coarsened echotexture which can be seen in chronic hepatic dysfunction. Unremarkable appearance of the gallbladder. Xr Chest Portable  Result Date: 2/10/2020  There are findings to suggest pneumomediastinum which is probably related to recent tracheostomy tube placement. New left retrocardiac parenchymal opacity reflecting atelectasis versus infiltrate. Ct Chest Pulmonary Embolism W Contrast  Result Date: 1/28/2020  1. No evidence of pulmonary embolism. 2.  Slightly prominent ascending aorta of 4.2 cm. 3.  Scattered tree-in-bud and ground-glass infiltrates throughout the lungs. 4.  Consolidation left lower lobe. 5.  Trace pericardial effusion. 6.  Gastrostomy tube in situ. Other findings as above. RECOMMENDATIONS: Vascular consultation may be considered on a nonemergent basis. Mri Brain Without Contrast  Result Date: 1/29/2020  1. Ongoing evolution of subacute/early chronic infarctions in the frontal lobes bilaterally and left frontoparietal junction, involving the left RUSTY and bilateral MCA territories with developing cortical laminar necrosis. No new or acute infarction. 2. Subarachnoid hemorrhage within the right frontal lobe as well as bilateral sylvian fissures. 3. Chronic left subdural hemorrhage measures 1.5 cm in thickness with mass effect on the subjacent left cerebral hemisphere. No significant midline shift. 4. Parenchymal volume loss and sequela of chronic microvascular ischemic changes. The findings were sent to the Radiology Results [address] at 12:10 pm on 1/29/2020to be communicated to a licensed caregiver.      DISCHARGE INSTRUCTIONS     Discharge Medications:        Medication List      STOP taking these medications    albuterol sulfate  (90 Base) MCG/ACT inhaler     amantadine 50 MG/5ML solution  Commonly known as:  SYMMETREL     amLODIPine 5 MG tablet  Commonly known as:  NORVASC     apixaban 5 MG Tabs tablet  Commonly known as:  ELIQUIS     aspirin 325 MG EC tablet     atorvastatin 40 MG tablet  Commonly known as:  Lipitor     Basaglar KwikPen 100 UNIT/ML injection pen  Generic drug:  insulin glargine     cephALEXin 500 MG capsule  Commonly known as:  KEFLEX     cilostazol 100 MG tablet  Commonly known as:  PLETAL     DULoxetine 60 MG extended release capsule  Commonly known as:  CYMBALTA     folic acid 1 MG tablet  Commonly known as:  FOLVITE     gabapentin 300 MG capsule  Commonly known as:  NEURONTIN     omeprazole 20 MG delayed release capsule  Commonly known as:  PRILOSEC     sotalol 160 MG tablet  Commonly known as:  BETAPACE     therapeutic multivitamin-minerals tablet     traMADol 50 MG tablet  Commonly known as:  ULTRAM     vitamin D 1.25 MG (19330 UT) Caps capsule  Commonly known as:  ERGOCALCIFEROL          Diet: DIET TUBE FEED CONTINUOUS/CYCLIC NPO; Diabetic; Gastrostomy; Continuous; 20; 70 diet as tolerated  Activity: As tolerated  Follow-up:  N/A, discharging to hospice  Time Spent for discharge: 30 minutes    PAMELA Cedillo - CNP  Neuro Critical Care  2/12/2020, 6:22 PM

## 2020-02-12 NOTE — PROGRESS NOTES
Physical Therapy  Facility/Department: [address] STEPDOWN  Daily Treatment Note  NAME: Rabia Meadows  : 1960  MRN: [id number]    Date of Service: 2020    Discharge Recommendations:  Patient would benefit from continued therapy after discharge   PT Equipment Recommendations  Equipment Needed: No    Assessment   Body structures, Functions, Activity limitations: Decreased functional mobility ; Decreased ROM; Decreased strength;Decreased safe awareness;Decreased balance;Decreased endurance  Assessment: Patient not attempting to communicate, does not follow any commands. PROM only. PT Education: Plan of Care;General Safety;Precautions;Pressure Relief;Home Exercise Program  Patient Education: rationale for ROM, rolling  REQUIRES PT FOLLOW UP: Yes  Activity Tolerance  Activity Tolerance: Patient limited by cognitive status; Patient limited by endurance     Patient Diagnosis(es): The encounter diagnosis was SDH (subdural hematoma) ([facility] has a past medical history of Alcohol abuse, Atrial fibrillation ([facility] Diabetes ([facility] Gastritis, Hyperlipidemia, Hypertension, Left ventricular hypertrophy, Neuropathy, and Renal cyst.   has a past surgical history that includes Coronary angioplasty with stent (); angioplasty (); Carotid stent placement (2019); Gastrostomy tube placement (2020); Upper gastrointestinal endoscopy (N/A, 2020); tracheostomy (2020); and tracheostomy (N/A, 2020).     Restrictions  Restrictions/Precautions  Restrictions/Precautions: Weight Bearing, Fall Risk, General Precautions, Seizure  Required Braces or Orthoses?: Yes  Upper Extremity Weight Bearing Restrictions  Right Upper Extremity Weight Bearing: Non Weight Bearing  Left Upper Extremity Weight Bearing: Non Weight Bearing  Other: NWB bilat wrists per ortho recommendations from 20 s/p fall  Required Braces or Orthoses  Left Upper Extremity Brace/Splint: (splinted per orthosx)  Position Activity Time Out 1330         Minutes 15         Timed Code Treatment Minutes: 13 Minutes       Kimberlee Malhotra, PT

## 2020-02-12 NOTE — CONSULTS
PRN    Allergies:  Patient has no known allergies. Social History:   reports that he has been smoking cigarettes. He has been smoking about 0.50 packs per day. He uses smokeless tobacco. He reports current alcohol use. He reports that he does not use drugs. Family History: family history is not on file. No h/o sudden cardiac death. Unknown for premature CAD as patient is non-responsive. REVIEW OF SYSTEMS:    · Unable to obtain as patient is non-responsive. PHYSICAL EXAM:      /74   Pulse 114   Temp 99 °F (37.2 °C) (Oral)   Resp (!) 36   Ht 5' 11\" (1.803 m)   Wt 162 lb 14.7 oz (73.9 kg)   SpO2 91%   BMI 22.72 kg/m²    Constitutional and General Appearance: Non-responsive resting in bed on the ventilator. HEENT: PERRL, no cervical lymphadenopathy. No masses palpable. Normal oral mucosa  Respiratory:  · Patient currently on the ventilator. Cardiovascular:  · Heart sounds muffled by ventilator. · The apical impulse is not displaced  · Heart tones are crisp and normal. Irregularly irregular S1 and S2.  · Jugular venous pulsation Normal  · The carotid upstroke is normal in amplitude and contour without delay or bruit  · Peripheral pulses are symmetrical and full   Abdomen:   · No masses or tenderness  · Bowel sounds present  Extremities:  ·  No Cyanosis or Clubbing  ·  Lower extremity edema: No  ·  Skin: Warm and dry  Neurological:  · Patient will open his eyes to painful stimuli, otherwise not responsive. DATA:    Diagnostics:      EKG:   atrial fibrillation, rate 108, RBBB, unchanged from previous tracings. ECHO:   ECHO 12/30/19: EF 45%, mild to moderate LVH, mild MR/TR. Stress Test:   not obtained. Cardiac Angiography:   not obtained.     Labs:     CBC:   Recent Labs     02/11/20  0208 02/12/20  0550   WBC 6.9 6.9   HGB 11.4* 12.3*   HCT 39.0* 43.8    315     BMP:   Recent Labs     02/11/20  0208 02/12/20  0550    142   K 4.7 4.3   CO2 24 24   BUN 19 24* with the assessment, plan and orders as documented by the resident With changes made to the note.      Electronically signed by Anselmo Washburn MD on 2/12/2020 at 1:46 PM.    [facility]      [phone]        MS-4

## 2020-02-12 NOTE — CARE COORDINATION
Neuro NP Chary and Dr Martín Queen, Palliative Care, spoke with patient's mother, Wendy Mcghee. She is agreeable to comfort care at [facility] in [facility]. Writer spoke with mother Wendy Mcghee, given choice, eReferral sent to [facility]. Wendy Mcghee will be notifying patient's oldest brother Arminda Form. Code status to be changed to [facility] and hospice consult to be entered. 6745 - Call to [facility] ([phone]), spoke to [facility], he is working on case. He will contact nurses there and speak with patient's family. He will return call. [phone] - Return call from [facility] at [facility] ([phone]). Hospice RN will be meeting with patient's mom in [facility] and getting hospice paperwork signed. He is approved for inpatient hospice. Requested transport around 7 pm. [facility] transport requested. 5000 - Transport to be through [facility] per insurance. Confirmation [id number]. Will return call with transport time. Informed by RN that physician informed family that patient may die in transport. Still awaiting transport time. Informed will contact nurses station within the next 15 minutes (est time [phone]). RN updated, will contact hospice RN Shantel Aguilar when transport time available.

## 2020-02-12 NOTE — CONSULTS
hematoma) [facility] [S06.5X9A]     Altered mental status [R41.82] 01/28/2020       PAST MEDICAL HISTORY      Diagnosis Date    Alcohol abuse     Atrial [facility] ([facility]     Diabetes ([facility]     Gastritis     Hyperlipidemia     Hypertension     Left ventricular hypertrophy     Neuropathy     Renal cyst        PAST SURGICAL HISTORY  Past Surgical History:   Procedure Laterality Date    ANGIOPLASTY  2019    NO VASCULAR LEG STENTS PER DR. Clinton Jimenez    CAROTID STENT PLACEMENT  01/08/2019    carotid wallstent 10mm. mri conditonal safe immediately.     CORONARY ANGIOPLASTY WITH STENT PLACEMENT  2019    PT HAS 7 CARDIAC STENTS UNKNOWN 1.5 T ONLY    GASTROSTOMY TUBE PLACEMENT  01/22/2020    ESOPHAGOGASTRODUODENOSCOPY PEG TUBE INSERTION     TRACHEOSTOMY  02/07/2020    TRACHEOSTOMY N/A 2/7/2020    OPEN TRACHEOTOMY performed by Rojean Siemens Canos IV, DO at [facility] N/A 1/22/2020    EGD ESOPHAGOGASTRODUODENOSCOPY PEG TUBE INSERTION performed by Rojean Siemens Canos IV, DO at [address] [place] [address] History     Tobacco Use    Smoking status: Current Every Day Smoker     Packs/day: 0.50     Types: Cigarettes    Smokeless tobacco: Current User   Substance Use Topics    Alcohol use: Yes     Frequency: 4 or more times a week    Drug use: Never       ALLERGIES  No Known Allergies      MEDICATIONS  Current Medications    amiodarone  150 mg Intravenous Once    diltiazem  60 mg Oral 4 times per day    insulin lispro  0-12 Units Subcutaneous Q6H    insulin glargine  15 Units Subcutaneous Nightly    apixaban  5 mg Oral BID    cilostazol  100 mg Oral BID    polyethylene glycol  17 g Oral Daily    chlorhexidine  15 mL Mouth/Throat BID    miconazole   Topical BID    sennosides-docusate sodium  2 tablet Oral Daily    aspirin  81 mg Oral Daily    ferrous sulfate  325 mg Oral Daily with breakfast    [Held by provider] amLODIPine  5 mg Oral Daily    atorvastatin  40 mg Oral Nightly    DULoxetine  60 mg Oral Nightly    folic acid  1 mg Oral Daily    gabapentin  300 mg Oral BID    sodium chloride flush  10 mL Intravenous 2 times per day     hydrALAZINE, labetalol, fentanNYL, glucose, dextrose, glucagon (rDNA), dextrose, glycopyrrolate, ipratropium-albuterol, sodium chloride flush, acetaminophen, ondansetron  IV Drips/Infusions   amiodarone      dextrose       Home Medications  No current facility-administered medications on file prior to encounter. Current Outpatient Medications on File Prior to Encounter   Medication Sig Dispense Refill    aspirin 325 MG EC tablet Take 1 tablet by mouth daily 30 tablet 3    amantadine (SYMMETREL) 50 MG/5ML solution 10 mLs by Per G Tube route 2 times daily at 0800 and 1400 300 mL 3    sotalol (BETAPACE) 160 MG tablet Take 1 tablet by mouth 2 times daily 60 tablet 3    gabapentin (NEURONTIN) 300 MG capsule Take 300 mg by mouth 2 times daily.  cilostazol (PLETAL) 100 MG tablet Take 100 mg by mouth 2 times daily      amLODIPine (NORVASC) 5 MG tablet Take 5 mg by mouth daily      folic acid (FOLVITE) 1 MG tablet Take 1 mg by mouth daily      albuterol sulfate  (90 Base) MCG/ACT inhaler Inhale 2 puffs into the lungs every 4 hours as needed for Wheezing      Multiple Vitamins-Minerals (THERAPEUTIC MULTIVITAMIN-MINERALS) tablet Take 1 tablet by mouth daily      DULoxetine (CYMBALTA) 60 MG extended release capsule Take 60 mg by mouth nightly      omeprazole (PRILOSEC) 20 MG delayed release capsule Take 20 mg by mouth daily      traMADol (ULTRAM) 50 MG tablet Take 50 mg by mouth every 6 hours as needed for Pain.       insulin glargine (BASAGLAR KWIKPEN) 100 UNIT/ML injection pen Inject 5 Units into the skin nightly      apixaban (ELIQUIS) 5 MG TABS tablet Take 5 mg by mouth 2 times daily      atorvastatin (LIPITOR) 40 MG tablet Take 1 tablet by mouth daily 30 tablet 3    vitamin D (ERGOCALCIFEROL) 1.25 MG (10926 UT) CAPS capsule Take 1 capsule by mouth once a week for 8 doses 8 capsule 0       Data         /76   Pulse 105   Temp 101.3 °F (38.5 °C) (Axillary)   Resp (!) 41   Ht 5' 11\" (1.803 m)   Wt 162 lb 14.7 oz (73.9 kg)   SpO2 91%   BMI 22.72 kg/m²     Wt Readings from Last 3 Encounters:   02/12/20 162 lb 14.7 oz (73.9 kg)   01/22/20 157 lb 3 oz (71.3 kg)        Code Status: Full Code     ADVANCED CARE PLANNING:  Patient has capacity for medical decisions: no  Health Care Power of : no  Living Will: no     Personal, Social, and Family History  Marital Status:   Does patient understand diagnosis/treatment? no  Does caregiver understand diagnosis/treatment? yes      Assessment        REVIEW OF SYSTEMS    ROS unable to be done as patient is unresponsive    PHYSICAL ASSESSMENT:  Constitutional: Nonresponsive, not oriented x3, not replying to verbal commands  Head: Normocephalic and atraumatic. Eyes: EOM are normal. Pupils are equal, round   Neck: Normal range of motion. Neck supple. No tracheal deviation present. Cardiovascular: Normal rate and regular rhythm, S1, S2, no murmur   Pulmonary/Chest: Trached and on ventilator. No rales or wheezes. Abdomen: Soft. No tenderness, not distended, no ascites, no organomegaly   Musculoskeletal: Normal range of motion. No edema lower ext. Neurological: Unresponsive, not alert, not oriented x3 and not following commands  Skin: Normal turgor, no bleeding, no bruising     Palliative Performance Scale:  ___60%  Ambulation reduced; Significant disease; Can't do hobbies/housework; intake normal or reduced; occasional assist; LOC full/confusion  ___50%  Mainly sit/lie; Extensive disease; Can't do any work; Considerable assist; intake normal or reduced; LOC full/confusion  ___40%  Mainly in bed; Extensive disease; Mainly assist; intake normal or reduced; LOC full/confusion   ___30%  Bed Bound; Extensive disease;  Total care; intake reduced; LOCfull/confusion  __x_20% understands about it    I told Joanna Colbert that patient's  Marleepedro Pierre will be able to help in patient's discharge planning and I also told her that Marlee Pierre is there with me and [facility] and will be talking to her    Marlee Canoroxy talked with Joanna Colbert and discussed regarding patient's discharge planning    I changed patient's CODE STATUS to DNR CC  I ordered hospice consult    [facility] told Clinton Sayra that she will be calling patient's son Andre Child and updated him regarding the CODE STATUS change as well as about the hospice care and Joanna Colbert told that he can be called and she has already discussed about this with Andre Child and he also along with other siblings want the patient to be made comfort care    Education/support to staff  Education/support to family  Education/support to patient  Discharge planning/helping to coordinate care  Communications with primary service  Caregiver support/education  Code status clarified: Full Code  Code status clarified: [facility]  Code status clarified: DNRCCA  Other major issues     Principle Problem/Diagnosis:  AMS    Additional Assessments:   Active Problems:    Altered mental status    SDH (subdural hematoma) (HCC)    Septic shock ([facility]    Pneumonia of both lower lobes due to infectious organism ([facility]    Oropharyngeal dysphagia    1- Symptom management/ pain control     Pain Assessment:  Pain is controlled with current analgesics. Medication(s) being used: acetaminophen, narcotic analgesics including fentanyl IV                Anxiety:  none                          Dyspnea:  none                          Fatigue:  none    Other: Trached and on vent    We feel the patient symptoms are being controlled. his current regimen is reviewed by myself and discussed with the staff.      CODE STATUS changed to DNR CC  Hospice consult ordered    We will follow-up on the hospice consult    2- Goals of care evaluation   The patient goals of care are spiritual needs, strengthening relationships, preserve independence/autonomy/control and support for family/caregiver   Goals of care discussed with:    [] Patient independently    [] Patient and Family    [x] Family or Healthcare DPOA independently    [] Unable to discuss with patient, family/DPOA not present    3- Code Status  DNR CC    4- Other recommendations   - We will continue to provide comfort and support to the patient and the family  Please call with any palliative questions or concerns. Palliative Care Team is available via perfect serve or via phone. Palliative Care will continue to follow Mr. Raquel Gale care as needed. Thank you for allowing Palliative Care to participate in the care of Mr. Amara Israel . This note has been dictated by dragon, typing errors may be a possibility.     The total time I spent in seeing the patient, discussing goals of care, advanced directives, code status and other major issues was more than 70 minutes      Electronically signed by   Raysa Raya MD  Palliative Care Team  on 2/12/2020 at 10:24 AM    Palliative care office: [phone]

## 2020-02-13 NOTE — PROGRESS NOTES
Pt transferred via [id number] transport. T-piece went with pt. Monette called to notify of patient's departure.

## 2020-04-04 NOTE — PROGRESS NOTES
"   LOS: 4 days    Patient Care Team:  Dandy Bailey MD as PCP - General  Juan Pablo Osullivan MD as Surgeon (Cardiothoracic Surgery)  Jong Diop DO as Consulting Physician (Gastroenterology)    Chief Complaint:  CARLTON on CKD stage III     Subjective     Interval History:   No acute events overnight. Alert today    Review of Systems:    The following systems were reviewed and negative;  constitution, respiratory, cardiovascular and gastrointestinal    Objective     Vital Sign Min/Max for last 24 hours  Temp  Min: 98.1 °F (36.7 °C)  Max: 100.5 °F (38.1 °C)   BP  Min: 116/46  Max: 189/76   Pulse  Min: 37  Max: 70   Resp  Min: 18  Max: 28   SpO2  Min: 89 %  Max: 100 %   No data recorded   No data recorded     Flowsheet Rows      First Filed Value   Admission Height  177.8 cm (70\") Documented at 03/31/2020 1211   Admission Weight  127 kg (279 lb 15.8 oz) Documented at 03/31/2020 1211          I/O this shift:  In: -   Out: 350 [Urine:350]  I/O last 3 completed shifts:  In: 3939.4 [P.O.:610; I.V.:2434.4; Other:242; NG/GT:353; IV Piggyback:300]  Out: 7650 [Urine:7365; Stool:285]    Physical Exam:     General Appearance:    Alert, cooperative, in no acute distress   Head:    Normocephalic, without obvious abnormality, atraumatic               Neck:   Supple, trachea midline, no thyromegaly       Lungs:     Clear to auscultation,respirations regular, even and                  unlabored    Heart:    Regular rhythm and normal rate, normal S1 and S2, no            murmur, no gallop, no rub, no click       Abdomen:     Normal bowel sounds,  soft        non-tender, non-distended       Extremities:   Moves all extremities well, no edema, no cyanosis, no             redness               Neurologic:    No focal deficit noted grossly       WBC WBC   Date Value Ref Range Status   04/03/2020 7.39 3.40 - 10.80 10*3/mm3 Final     Comment:     Modified report. Previous result was 7.65 10*3/mm3 on 4/3/2020 at 1751 EDT. " Neurosurgery Resident    Daily Progress Note     12/30/2019  8:32 AM    Tmax overnight 102.5F. Fever work up in process. Blood cultures no growth to date. C. Diff negative. No leukocytosis. Remains on precedex. He will arouse more, not following commands. He spontaneously moves all extremities. Remains intubated. Off CIWA. Repeat head CT performed 12/28 with findings of a stable SDH, IVH, and extensive bihemispheric SAH. EEG for LUE twitching. Vitals:    12/30/19 0500 12/30/19 0503 12/30/19 0604 12/30/19 0722   BP: (!) 196/98 (!) 204/106 (!) 154/97    Pulse: 121 129 99 110   Resp:  23     Temp:  99.8 °F (37.7 °C) 99 °F (37.2 °C)    TempSrc:  Oral Oral    SpO2: 97% 97%  100%   Weight:       Height:           Exam  Febrile to 102.5  HENT:      Head: Normocephalic, atraumatic, EEG in place  Cardiovascular:     A fib, rate controlled  Pulmonary:    Intubated with clear breath sounds bilaterally  Abdominal:      General: Abdomen is flat.      Palpations: Abdomen is soft.    Neurological:      General: Spontaneous movements to all extremities     Mental Status: intubated, on precedex, intermittently follows commands     Motor: No tremor, atrophy, abnormal muscle tone or seizure activity.      Deep Tendon Reflexes:      Reflex Scores:       Bicep reflexes are 2+ on the right side and 2+ on the left side.       Patellar reflexes are 2+ on the right side and 2+ on the left side.     Lab Results   Component Value Date    WBC 6.2 12/30/2019    HGB 10.6 (L) 12/30/2019    HCT 35.6 (L) 12/30/2019     12/30/2019    CHOL 105 12/27/2019    TRIG 60 12/27/2019    HDL 52 12/27/2019    ALT 23 12/25/2019    AST 25 12/25/2019     12/30/2019    K 4.3 12/30/2019     12/30/2019    CREATININE 0.41 (L) 12/30/2019    BUN 12 12/30/2019    CO2 22 12/30/2019    INR 1.0 12/25/2019    LABA1C 7.1 (H) 12/27/2019       61 y.o. male who presents with               - Diffuse traumatic subarachnoid hemorrhage              - Left   04/03/2020 6.67 3.40 - 10.80 10*3/mm3 Final      HGB Hemoglobin   Date Value Ref Range Status   04/03/2020 8.0 (L) 13.0 - 17.7 g/dL Final   04/03/2020 7.5 (L) 13.0 - 17.7 g/dL Final      HCT Hematocrit   Date Value Ref Range Status   04/03/2020 25.0 (L) 37.5 - 51.0 % Final     Comment:     Modified report. Previous result was 25.3 % on 4/3/2020 at 1751 EDT.   04/03/2020 24.6 (L) 37.5 - 51.0 % Final      Platlets No results found for: LABPLAT   MCV MCV   Date Value Ref Range Status   04/03/2020 90.6 79.0 - 97.0 fL Final     Comment:     Modified report. Previous result was 91.0 fL on 4/3/2020 at 1751 EDT.   04/03/2020 91.8 79.0 - 97.0 fL Final          Sodium Sodium   Date Value Ref Range Status   04/04/2020 140 136 - 145 mmol/L Final   04/03/2020 140 136 - 145 mmol/L Final   04/03/2020 140 136 - 145 mmol/L Final   04/03/2020 141 136 - 145 mmol/L Final   04/02/2020 138 136 - 145 mmol/L Final   04/02/2020 140 136 - 145 mmol/L Final   04/01/2020 138 136 - 145 mmol/L Final      Potassium Potassium   Date Value Ref Range Status   04/04/2020 3.2 (L) 3.5 - 5.2 mmol/L Final   04/03/2020 3.9 3.5 - 5.2 mmol/L Final   04/03/2020 3.5 3.5 - 5.2 mmol/L Final   04/03/2020 3.9 3.5 - 5.2 mmol/L Final   04/02/2020 4.0 3.5 - 5.2 mmol/L Final   04/02/2020 4.4 3.5 - 5.2 mmol/L Final   04/01/2020 4.6 3.5 - 5.2 mmol/L Final      Chloride Chloride   Date Value Ref Range Status   04/04/2020 101 98 - 107 mmol/L Final   04/03/2020 103 98 - 107 mmol/L Final   04/03/2020 103 98 - 107 mmol/L Final   04/03/2020 105 98 - 107 mmol/L Final   04/02/2020 101 98 - 107 mmol/L Final   04/02/2020 104 98 - 107 mmol/L Final   04/01/2020 101 98 - 107 mmol/L Final      CO2 CO2   Date Value Ref Range Status   04/04/2020 25.0 22.0 - 29.0 mmol/L Final   04/03/2020 22.0 22.0 - 29.0 mmol/L Final   04/03/2020 24.0 22.0 - 29.0 mmol/L Final   04/03/2020 23.0 22.0 - 29.0 mmol/L Final   04/02/2020 24.0 22.0 - 29.0 mmol/L Final   04/02/2020 23.0 22.0 - 29.0 mmol/L  occipital parietal subdural hematoma, traumatic, stable              - Atrial fibrillation.  Eliquis is on hold     PLAN              - No neurosurgical intervention planned at this time              - Neuro checks per unit protocol              - Repeat CT head today              - Control HR, consult cards if needed              - Extubate when able              - CIWA protocol d/c'd per primary              - Continue management of TBI per neuro critical care team   - Fever work up in process     Please contact neurosurgery with any change in the patient neurological status or any questions or concerns      Saratha Duverney, DO, EM2  Neurosurgery/Neuro Critical Care Service  Pager [phone] Final   04/01/2020 24.0 22.0 - 29.0 mmol/L Final      BUN BUN   Date Value Ref Range Status   04/04/2020 45 (H) 8 - 23 mg/dL Final   04/03/2020 46 (H) 8 - 23 mg/dL Final   04/03/2020 52 (H) 8 - 23 mg/dL Final   04/03/2020 51 (H) 8 - 23 mg/dL Final   04/02/2020 55 (H) 8 - 23 mg/dL Final   04/02/2020 66 (H) 8 - 23 mg/dL Final   04/01/2020 73 (H) 8 - 23 mg/dL Final      Creatinine Creatinine   Date Value Ref Range Status   04/04/2020 2.40 (H) 0.76 - 1.27 mg/dL Final   04/03/2020 2.35 (H) 0.76 - 1.27 mg/dL Final   04/03/2020 2.80 (H) 0.76 - 1.27 mg/dL Final   04/03/2020 3.01 (H) 0.76 - 1.27 mg/dL Final   04/02/2020 3.13 (H) 0.76 - 1.27 mg/dL Final   04/02/2020 4.26 (H) 0.76 - 1.27 mg/dL Final   04/01/2020 5.09 (H) 0.76 - 1.27 mg/dL Final      Calcium Calcium   Date Value Ref Range Status   04/04/2020 8.0 (L) 8.6 - 10.5 mg/dL Final   04/03/2020 8.2 (L) 8.6 - 10.5 mg/dL Final   04/03/2020 7.9 (L) 8.6 - 10.5 mg/dL Final   04/03/2020 7.7 (L) 8.6 - 10.5 mg/dL Final   04/02/2020 7.7 (L) 8.6 - 10.5 mg/dL Final   04/02/2020 7.5 (L) 8.6 - 10.5 mg/dL Final   04/01/2020 7.1 (L) 8.6 - 10.5 mg/dL Final      PO4 No results found for: CAPO4   Albumin Albumin   Date Value Ref Range Status   04/03/2020 3.60 3.50 - 5.20 g/dL Final   04/03/2020 3.40 (L) 3.50 - 5.20 g/dL Final      Magnesium Magnesium   Date Value Ref Range Status   04/03/2020 1.7 1.6 - 2.4 mg/dL Final   04/03/2020 1.7 1.6 - 2.4 mg/dL Final   04/02/2020 2.1 1.6 - 2.4 mg/dL Final      Uric Acid No results found for: URICACID        Results Review:     I reviewed the patient's new clinical results.      amLODIPine 5 mg Oral Q24H   cefTRIAXone 1 g Intravenous Q24H   citalopram 20 mg Oral Daily   diazePAM 2.5 mg Intravenous Q8H   donepezil 10 mg Oral Nightly   doxycycline 100 mg Intravenous Q12H   heparin (porcine) 5,000 Units Subcutaneous Q8H   insulin regular 0-7 Units Subcutaneous Q6H   ipratropium-albuterol 3 mL Nebulization Q4H - RT   methylPREDNISolone sodium succinate  40 mg Intravenous Q24H   Morphine 60 mg Oral Q12H   pantoprazole 40 mg Intravenous Q AM   potassium chloride 40 mEq Nasogastric Q6H   PRO-STAT 30 mL Nasogastric BID   QUEtiapine 25 mg Oral Q12H   rosuvastatin 40 mg Oral Nightly   sodium chloride 10 mL Intravenous Q12H   terazosin 5 mg Oral Nightly       lactated ringers 100 mL/hr Last Rate: Stopped (04/03/20 1600)       Medication Review:     Assessment/Plan       CARLTON on CKD (baseline sCr 1.4-1.6)     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 Lasix 60 mg IV given yesterday with excellent UOP   2- Hx of MGUS   3- CKD stage III - baseline Scr 1.7   4- Anemia     Plan:  - Continue with current. IV fluid stopped  - Avoid nephrotoxic agents.   - renal diet.   - Adjust meds per renal function.       Ubaldo Wheeler MD  04/04/20  09:36

## 2021-05-21 NOTE — PROGRESS NOTES
Anesthesia Pre Eval Note    Anesthesia ROS/Med Hx    Overall Review:  EKG was reviewed, Echo was reviewed and Stress test was reviewed     Anesthetic Complication History:  Patient does not have a history of anesthetic complications      Pulmonary Review:    Positive for sleep apnea     Neuro/Psych Review:  Patient does not have a neuro/psych history       Cardiovascular Review:    Positive for hypertension  Positive for hyperlipidemia    GI/HEPATIC/RENAL Review:    Positive for GERD    End/Other Review:  Positive for diabetes - type 2  Positive for substance abuse - alcohol  Additional Results:  EKG:  No results found for this or any previous visit (from the past 4464 hour(s)). Echo:  No results found for: EFStress Test:  No procedure found.     ALLERGIES:  No Known Allergies       Lab Results       Component                Value               Date                       WBC                      12.5 (H)            03/24/2021                 WBC                      12.4 (H)            08/21/2018                 RBC                      4.52                03/24/2021                 RBC                      4.29 (L)            08/21/2018                 HGB                      15.1                03/24/2021                 HGB                      15.0                08/21/2018                 HCT                      44.8                03/24/2021                 HCT                      42.9                08/21/2018                 MCHC                     33.7                03/24/2021                 MCHC                     35.0                08/21/2018                 SODIUM                   136                 03/24/2021                 SODIUM                   136                 11/27/2019                 POTASSIUM                4.2                 03/24/2021                 POTASSIUM                4.5                 11/27/2019                 CHLORIDE                 99                  03/24/2021    Nutrition Assessment    Type and Reason for Visit: Initial    Nutrition Recommendations:    - If nutrition support desired, suggest diabetic (Glucerna) at 65 mL/hr while pt is intubated. - Recommend daily weights. Nutrition Assessment: Pt currently intubated. Feeds on hold. Malnutrition Assessment:  · Malnutrition Status: Insufficient data  · Context: Acute illness or injury  · Findings of the 6 clinical characteristics of malnutrition (Minimum of 2 out of 6 clinical characteristics is required to make the diagnosis of moderate or severe Protein Calorie Malnutrition based on AND/ASPEN Guidelines):  1. Energy Intake-Greater than 75% of estimated energy requirement, Greater than or equal to 7 days(with nutrition support)    2. Weight Loss-No significant weight loss, in 1 month(using stated weights per EHR)  3. Fat Loss-Unable to assess,    4. Muscle Loss-Unable to assess,    5. Fluid Accumulation-(Mild to moderate fluid accumulation), Extremities, Generalized  6.  Strength-Not measured    Nutrition Risk Level: High    Nutrient Needs:  · Estimated Daily Total Kcal: 4382-3426 kcals/day  · Estimated Daily Protein (g): 105-110 gm/day    Nutrition Diagnosis:   · Problem: Inadequate oral intake  · Etiology: related to Impaired respiratory function-inability to consume food     Signs and symptoms:  as evidenced by NPO status due to medical condition    Objective Information:  · Wound Type: (Redness on coccyx noted)  · Current Nutrition Therapies:  · Oral Diet Orders: NPO   · Anthropometric Measures:  · Ht: 5' 11\" (180.3 cm)   · Current Body Wt: 157 lb (71.2 kg)  · Ideal Body Wt: 172 lb (78 kg), % Ideal Body 91%  · BMI Classification: (21.9)    Nutrition Interventions:   Continue NPO  Continued Inpatient Monitoring, Education Not Indicated    Nutrition Evaluation:   · Evaluation: Goals set   · Goals: Meet % of estimated nutrient needs with nutrition support.     · Monitoring: Nutrition Progression,               CHLORIDE                 99                  11/27/2019                 CO2                      26                  03/24/2021                 CO2                      26                  11/27/2019                 CO2                      28                  07/31/2012                 GLUCOSE                  203 (H)             03/24/2021                 GLUCOSE                  245 (H)             11/27/2019                 BUN                      11                  03/24/2021                 BUN                      16                  11/27/2019                 CREATININE               0.57 (L)            03/24/2021                 CREATININE               0.56 (L)            11/27/2019                 GFRESTIMATE              >90                 03/24/2021                 GFRA                     >90                 11/27/2019                 GFRNA                    >90                 11/27/2019                 CALCIUM                  8.7                 03/24/2021                 CALCIUM                  9.0                 11/27/2019                 CALCIUM                  9.3                 07/31/2012                 PLT                      238                 03/24/2021                 PLT                      228                 08/21/2018                 PLT                      254                 10/08/2013                 PLT                      263                 07/13/2011                 PTT                      28                  06/07/2015                 INR                      1.0                 06/07/2015             Past Medical History:  No date: Anxiety  No date: Apnea, sleep  8/1/2012: Diabetes type 2, controlled (CMS/HCC)  No date: Essential (primary) hypertension  No date: Gastroesophageal reflux disease    Past Surgical History:  11/13/12: Colonoscopy remove lesion by snare      Comment:  Dr. Ennis, Polyps, F/U 5 years       Prior to Admission medications  :  Medication glimepiride (AMARYL) 4 MG tablet, Sig Take 1 tablet by mouth daily (before breakfast)., Start Date 3/8/21, End Date , Taking? Yes, Authorizing Provider Darshan Beverly, DO    Medication citalopram (CeleXA) 20 MG tablet, Sig Take 1 tablet by mouth daily., Start Date 3/8/21, End Date , Taking? Yes, Authorizing Provider Darshan Beverly, DO    Medication omeprazole (PriLOSEC) 40 MG capsule, Sig Take 1 capsule by mouth daily., Start Date 3/8/21, End Date , Taking? Yes, Authorizing Provider Darshan Beverly, DO    Medication metformin (GLUCOPHAGE) 1000 MG tablet, Sig Take 1 tablet by mouth 2 times daily (with meals)., Start Date 3/8/21, End Date , Taking? Yes, Authorizing Provider Darshan Beverly, DO    Medication losartan (COZAAR) 50 MG tablet, Sig Take 1 tablet by mouth daily., Start Date 3/8/21, End Date , Taking? Yes, Authorizing Provider Darshan Beverly, DO    Medication sildenafil (REVATIO) 20 MG tablet, Sig 20-100mg po qday prn ED, Start Date 3/8/21, End Date , Taking? Yes, Authorizing Provider Darshan Beverly, DO    Medication albuterol 108 (90 Base) MCG/ACT inhaler, Sig Inhale 2 puffs into the lungs every 4 hours as needed for Shortness of Breath or Wheezing., Start Date 12/11/19, End Date , Taking? Yes, Authorizing Provider Torey Schneider MD    Medication Multiple Vitamin (ONE-A-DAY MENS) tablet, Sig Take by mouth daily. , Start Date , End Date , Taking? Yes, Authorizing Provider Outside Provider    Medication B Complex Vitamins (VITAMIN B COMPLEX) tablet, Sig Take 1 tablet by mouth daily., Start Date , End Date , Taking? Yes, Authorizing Provider Outside Provider    Medication Red Yeast Rice Extract 600 MG Tab, Sig Take 2 tablets by mouth daily. , Start Date , End Date , Taking? Yes, Authorizing Provider Outside Provider    Medication atorvastatin (LIPITOR) 20 MG tablet, Sig Take 1 tablet by mouth daily., Start Date 10/15/13, End Date 5/22/21, Taking? Yes, Authorizing Provider Wesly DODSON  MD Jose    Medication atorvastatin (LIPITOR) 20 MG tablet, Sig TAKE 1 TABLET BY MOUTH EVERY DAY, Start Date 12/21/20, End Date , Taking? , Authorizing Provider Darshan Beverly DO    Medication indomethacin (INDOCIN) 50 MG capsule, Sig Take 1 capsule by mouth 3 times daily (with meals)., Start Date 11/27/19, End Date , Taking? , Authorizing Provider Rajat Rushing Jr., MD    Medication blood glucose test strip, Sig Test blood sugar 1-2 times daily as directed. Diagnosis: e11.9. Meter: Accu-check Guide or preferred meter, Start Date 10/23/18, End Date , Taking? , Authorizing Provider Darshan Beverly DO    Medication ACCU-CHEK FASTCLIX LANCETS Misc, Sig To test 1-2 times daily. E11.9 dispense fastclix or preferred lancets., Start Date 9/27/18, End Date , Taking? , Authorizing Provider Darshan Beverly DO            Relevant Problems   No relevant active problems       Physical Exam     Airway   Mallampati: IV  TM Distance: <3 FB  Neck ROM: Full    Cardiovascular  Cardiovascular exam normal    Dental Exam  Dental exam normal    Pulmonary Exam  Pulmonary exam normal    Abdominal Exam    Patient Demonstrates:  Obese      Anesthesia Plan:  Anesthesia Plan  No phone call attempted due to:  ASA Status: 3    Anesthesia Type: MAC    Induction: Intravenous  Preferred Airway Type: Mask  Maintenance: TIVA  Premedication: None      Post-op Pain Management: Per Surgeon      Checklist  Reviewed: Lab Results, Consultations, DNR Status, Past Med History, NPO Status, Patient Summary, Allergies, Beta Blocker Status, Medications, Problem list, Care Everywhere, Nursing Notes and EKG  Consent/Risks Discussed Statement:  The proposed anesthetic plan, including its risks and benefits, have been discussed with the Patient along with the risks and benefits of alternatives. Questions were encouraged and answered and the patient and/or representative understands and agrees to proceed.        I discussed with the patient (and/or  patient's legal representative) the risks and benefits of the proposed anesthesia plan, MAC, which may include services performed by other anesthesia providers.    Alternative anesthesia plans, if available, were reviewed with the patient (and/or patient's legal representative). Discussion has been held with the patient (and/or patient's legal representative) regarding risks of anesthesia, which include emergent situations that may require change in anesthesia plan.  The patient (and/or patient's legal representative) has indicated understanding, his/her questions have been answered, and he/she wishes to proceed with the planned anesthetic.      Blood Products: Not Anticipated    Comments  Plan Comments: Discussed MAC. GA for backup discussed. Discussed risk of sore throat, dental and oropharyngeal trauma, allergic reaction, nerve injury, pulmonary complications, cardiac arrest and even death.

## 2025-02-05 NOTE — PLAN OF CARE
Chronic. Stable.  Continue seeing psychiatry and psychology as recommended. Continue medications and plan per specialist. No red flags today.     Orders:    TSH reflex to FT4; Future     Problem: Restraint Use - Nonviolent/Non-Self-Destructive Behavior:  Goal: Absence of restraint-related injury  Description  Absence of restraint-related injury  1/1/2020 0644 by Latonia Gtz RN  Outcome: Ongoing  Note:   Restraints in use. ROM performed, vitals done. No injury noted. Continue to monitor. Problem: Falls - Risk of:  Goal: Will remain free from falls  Description  Will remain free from falls  1/1/2020 0644 by Latonia Gtz, RN  Outcome: Ongoing  Note:   No falls entire shift. Fall precautions in place. Continue to monitor.

## (undated) DEVICE — Z DISCONTINUED BY MEDLINE USE 2711682 TRAY SKIN PREP DRY W/ PREM GLV

## (undated) DEVICE — HOLDER TUBE TRACH LG COTTON STRP HK LOOP CLOSURE BRTRC FOAM

## (undated) DEVICE — GLOVE SURG SZ 6 THK91MIL LTX FREE SYN POLYISOPRENE ANTI

## (undated) DEVICE — SUTURE ABSORBABLE BRAIDED 3-0 12X18 IN COAT UD VICRYL + VCP110G

## (undated) DEVICE — SUTURE PROL SZ 2-0 L30IN NONABSORBABLE BLU L26MM CT-2 1/2 8411H

## (undated) DEVICE — 3M™ STERI-STRIP™ COMPOUND BENZOIN TINCTURE 40 BAGS/CARTON 4 CARTONS/CASE C1544: Brand: 3M™ STERI-STRIP™

## (undated) DEVICE — SPONGE DRN W4XL4IN RAYON/POLYESTER 6 PLY NONWOVEN PRECUT

## (undated) DEVICE — SUTURE VCRL SZ 2-0 L27IN ABSRB UD L26MM SH 1/2 CIR J417H

## (undated) DEVICE — GOWN,AURORA,NONREINFORCED,LARGE: Brand: MEDLINE

## (undated) DEVICE — STERILE POLYISOPRENE POWDER-FREE SURGICAL GLOVES WITH EMOLLIENT COATING: Brand: PROTEXIS

## (undated) DEVICE — SUTURE PERMAHAND SZ 2-0 L18IN NONABSORBABLE BLK L26MM PS 1588H

## (undated) DEVICE — BINDER ABD UNISX 9IN 62IN L AND XL UNIV

## (undated) DEVICE — GLOVE SURG SZ 65 THK91MIL LTX FREE SYN POLYISOPRENE

## (undated) DEVICE — NEEDLE HYPO 25GA L1.5IN BLU POLYPR HUB S STL REG BVL STR

## (undated) DEVICE — STRIP,CLOSURE,WOUND,MEDI-STRIP,1/2X4: Brand: MEDLINE

## (undated) DEVICE — Z DISCONTINUED BY MEDLINE USE 2716051 TUBE TRACH SZ 6 L74MM OD10.8MM ID6.4MM CUF W/ DISP INNR

## (undated) DEVICE — SOLUTION SCRB 4OZ 4% CHG H2O AIDED FOR PREOPERATIVE SKIN

## (undated) DEVICE — SUTURE PROL SZ 2-0 L30IN NONABSORBABLE BLU L26MM SH 1/2 CIR 8833H

## (undated) DEVICE — SPONGE,PEANUT,XRAY,ST,SM,3/8",5/CARD: Brand: MEDLINE INDUSTRIES, INC.

## (undated) DEVICE — GLOVE ORANGE PI 7   MSG9070